# Patient Record
Sex: FEMALE | Race: WHITE | NOT HISPANIC OR LATINO | Employment: PART TIME | URBAN - METROPOLITAN AREA
[De-identification: names, ages, dates, MRNs, and addresses within clinical notes are randomized per-mention and may not be internally consistent; named-entity substitution may affect disease eponyms.]

---
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
## 2021-01-05 ENCOUNTER — OFFICE VISIT (OUTPATIENT)
Dept: GASTROENTEROLOGY | Facility: CLINIC | Age: 21
End: 2021-01-05
Payer: COMMERCIAL

## 2021-01-05 ENCOUNTER — LAB (OUTPATIENT)
Dept: LAB | Facility: HOSPITAL | Age: 21
End: 2021-01-05
Attending: INTERNAL MEDICINE
Payer: COMMERCIAL

## 2021-01-05 VITALS
HEIGHT: 67 IN | WEIGHT: 164 LBS | SYSTOLIC BLOOD PRESSURE: 118 MMHG | HEART RATE: 92 BPM | TEMPERATURE: 98.7 F | DIASTOLIC BLOOD PRESSURE: 70 MMHG | BODY MASS INDEX: 25.74 KG/M2

## 2021-01-05 DIAGNOSIS — K58.0 IRRITABLE BOWEL SYNDROME WITH DIARRHEA: ICD-10-CM

## 2021-01-05 DIAGNOSIS — R10.13 DYSPEPSIA: ICD-10-CM

## 2021-01-05 DIAGNOSIS — K58.1 IRRITABLE BOWEL SYNDROME WITH CONSTIPATION: Primary | ICD-10-CM

## 2021-01-05 LAB — TSH SERPL DL<=0.05 MIU/L-ACNC: 0.82 UIU/ML (ref 0.46–3.98)

## 2021-01-05 PROCEDURE — 83516 IMMUNOASSAY NONANTIBODY: CPT

## 2021-01-05 PROCEDURE — 86255 FLUORESCENT ANTIBODY SCREEN: CPT

## 2021-01-05 PROCEDURE — 82784 ASSAY IGA/IGD/IGG/IGM EACH: CPT

## 2021-01-05 PROCEDURE — 87338 HPYLORI STOOL AG IA: CPT

## 2021-01-05 PROCEDURE — 84443 ASSAY THYROID STIM HORMONE: CPT

## 2021-01-05 PROCEDURE — 36415 COLL VENOUS BLD VENIPUNCTURE: CPT

## 2021-01-05 PROCEDURE — 99203 OFFICE O/P NEW LOW 30 MIN: CPT | Performed by: INTERNAL MEDICINE

## 2021-01-05 NOTE — PATIENT INSTRUCTIONS
You can try to stop Pepcid to see if her symptoms do not worsen  I would like you to be on a strict dairy free diet diet including avoidance of yogurt and cheese for at least 2-3 weeks  Avoid carbonated drinks  Continue probiotics  Selective elimination of low FODMAP diet  We will focus on improving her constipation Linzess 290 micro g was called in to your pharmacy  Take Linzess once daily on an empty stomach in the morning  Continue 20-30 g of fiber daily  Continue 80 oz of water daily  Blood work for celiac disease, TSH and stool studies for H pylori are ordered  Follow-up in 3 months in the office

## 2021-01-06 ENCOUNTER — TELEPHONE (OUTPATIENT)
Dept: GASTROENTEROLOGY | Facility: AMBULARY SURGERY CENTER | Age: 21
End: 2021-01-06

## 2021-01-06 LAB — H PYLORI AG STL QL IA: NEGATIVE

## 2021-01-06 NOTE — PSYCH
Patient was a no-show for appointment today, no reason given by patient and/or family for missed appointment           Treatment Plan not completed within required time limits due to: Braxton Raman  no show appointment on 10/28/2019 coffee

## 2021-01-06 NOTE — TELEPHONE ENCOUNTER
Spoke to patient and her ID # [id number] group rx [id number] phone # [phone] with [facility] pharmacy benefits

## 2021-01-06 NOTE — TELEPHONE ENCOUNTER
Patients GI provider:  Dr Omaira Castrejon    Number to return call: (  [phone]    Reason for call: Pt calling back to speak with  [facility] medical assistant, says she was just speaking with her, needs to give you some information    Scheduled procedure/appointment date if applicable: Apt/procedure   4-6-21

## 2021-01-07 LAB
ENDOMYSIUM IGA SER QL: NEGATIVE
GLIADIN PEPTIDE IGA SER-ACNC: 4 UNITS (ref 0–19)
GLIADIN PEPTIDE IGG SER-ACNC: 4 UNITS (ref 0–19)
IGA SERPL-MCNC: 122 MG/DL (ref 87–352)
TTG IGA SER-ACNC: <2 U/ML (ref 0–3)
TTG IGG SER-ACNC: 6 U/ML (ref 0–5)

## 2021-01-08 NOTE — PROGRESS NOTES
[facility] - Outpatient Consultation  Riley Sarabia 24 y o  female MRN: [id number]  Encounter: [id number]          ASSESSMENT AND PLAN:      1  Irritable bowel syndrome with diarrhea  - TSH, 3rd generation with Free T4 reflex; Future  - Celiac Disease Panel; Future  - H  pylori antigen, stool; Future    2  Irritable bowel syndrome with constipation  - linaCLOtide 290 MCG CAPS; Take 1 capsule by mouth daily  Dispense: 30 capsule; Refill: 7    3  Dyspepsia      You can try to stop Pepcid to see if her symptoms do not worsen  I would like you to be on a strict dairy free diet diet including avoidance of yogurt and cheese for at least 2-3 weeks  Avoid carbonated drinks  Continue probiotics  Selective elimination of low FODMAP diet  We will focus on improving her constipation Linzess 290 micro g was called in to your pharmacy  Take Linzess once daily on an empty stomach in the morning  Continue 20-30 g of fiber daily  Continue 80 oz of water daily  Blood work for celiac disease, TSH and stool studies for H pylori are ordered  Follow-up in 3 months in the office      ______________________________________________________________________    HPI:      She is a 64-year-old female presented with symptoms compatible with IBS mixed associated dyspepsia  IBS mixed has more predominant constipation symptoms  She previous was tried on amitriptyline, omeprazole with limited efficacy  She has had extensive blood work including B12 level, CBC, TSH, CBC, BMP, Giardia, ova parasite, fecal calprotectin (2018), double-stranded DNA antibodies were within normal limits in 2017  Symptoms are debilitating associated abdominal bloating  She does have history dairy intolerance  She also has difficulty with evacuation with having a bowel movement every 3-4 days  This is associated abdominal discomfort, bloating and severe abdominal pain    He has tried to increase her fluid and fiber intake without efficacy  She has no family history of colorectal cancer  No extraintestinal manifestation IBD  REVIEW OF SYSTEMS:    CONSTITUTIONAL: Denies any fever, chills, rigors, and weight loss  HEENT: No earache or tinnitus  Denies hearing loss or visual disturbances  CARDIOVASCULAR: No chest pain or palpitations  RESPIRATORY: Denies any cough, hemoptysis, shortness of breath or dyspnea on exertion  GASTROINTESTINAL: As noted in the History of Present Illness  GENITOURINARY: No problems with urination  Denies any hematuria or dysuria  NEUROLOGIC: No dizziness or vertigo, denies headaches  MUSCULOSKELETAL: Denies any muscle or joint pain  SKIN: Denies skin rashes or itching  ENDOCRINE: Denies excessive thirst  Denies intolerance to heat or cold  PSYCHOSOCIAL: Denies depression or anxiety  Denies any recent memory loss  Historical Information   Past Medical History:   Diagnosis Date    Anxiety     Asthma     Dizziness     at times    GERD (gastroesophageal reflux disease)     Hammertoe of left foot     Headache     occ    Hyperlipemia     Hypermobile joints     Irritable bowel syndrome     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HCC)     Mast cell disorder     Migraine     PONV (postoperative nausea and vomiting)     POTS (postural orthostatic tachycardia syndrome)      infant     Wears glasses      Past Surgical History:   Procedure Laterality Date    EGD AND COLONOSCOPY N/A 1/10/2017    Procedure: EGD AND COLONOSCOPY;  Surgeon: Arin Guerin MD;  Location: [facility];   Service:     ESOPHAGOGASTRODUODENOSCOPY      with biopsy    FOOT SURGERY      Excision of lesion feet benign    TX REPAIR OF HAMMERTOE,ONE Left 2019    Procedure: 2nd arthrodesis; 5th arthroplasty, flexor tenotomy 2,3,4,5 ;  Surgeon: Jess Donovan DPM;  Location: [facility];  Service: Podiatry    TX REPAIR OF [place]abel [place] Left 2020    Procedure: 2ND TOE Revision hammertoe with broken implant; Surgeon: Arlette Horvath DPM;  [facility];  Service: Podiatry    WISDOM TOOTH EXTRACTION       Social History   Social History     Substance and Sexual Activity   Alcohol Use No     Social History     Substance and Sexual Activity   Drug Use No    Comment: 2/9/20- not asked, parent at bedside       Social History     Tobacco Use   Smoking Status Never Smoker   Smokeless Tobacco Never Used     Family History   Problem Relation Age of Onset    Gestational diabetes Mother     Anxiety disorder Father     Hyperlipidemia Father     Autism Brother     Diabetes Other     Uterine cancer Maternal Grandmother     Rheumatic fever Maternal Grandmother     Diabetes Maternal Grandfather     Hypertension Maternal Grandfather     Heart attack Maternal Grandfather     Stroke Maternal Grandfather     Hyperlipidemia Maternal Grandfather     Hypertension Paternal Grandmother     Anxiety disorder Paternal Grandmother     Hypertension Paternal Grandfather        Meds/Allergies       Current Outpatient Medications:     acetaminophen (TYLENOL) 500 mg tablet    ALPRAZolam (XANAX) 0 25 mg tablet    ASMANEX  MCG/ACT AERO    cyproheptadine (PERIACTIN) 4 mg tablet    dicyclomine (BENTYL) 10 mg capsule    Elastic Bandages & Supports (TRUFORM STOCKINGS 20-30MMHG) MISC    fludrocortisone (FLORINEF) 0 1 mg tablet    linaCLOtide 290 MCG CAPS    medroxyPROGESTERone acetate (DEPO-PROVERA SYRINGE) 150 mg/mL injection    metoclopramide (REGLAN) 10 mg tablet    metoprolol succinate (TOPROL-XL) 25 mg 24 hr tablet    naproxen (EC NAPROSYN) 500 MG EC tablet    OLANZapine (ZyPREXA) 5 mg tablet    Omega-3 Fatty Acids (FISH OIL) 1,000 mg    scopolamine (TRANSDERM-SCOP) 1 5 mg/3 days TD 72 hr patch    Ubrogepant 50 MG TABS    venlafaxine (EFFEXOR-XR) 75 mg 24 hr capsule    XOPENEX HFA 45 MCG/ACT inhaler    Current Facility-Administered Medications:     albuterol inhalation solution 2 5 mg, 2 5 mg, Nebulization, Q6H PRN    medroxyPROGESTERone acetate (DEPO-PROVERA SYRINGE) IM injection 150 mg, 150 mg, Intramuscular, Q3 Months, 150 mg at 09/17/20 1611    Allergies   Allergen Reactions    Nuts Other (See Comments)     Avani Nuts - itchy throat    Other Hives      At surgical site and IV site- not sure if type of cleanser used    Pollen Extract            Objective     Blood pressure 118/70, pulse 92, temperature 98 7 °F (37 1 °C), temperature source Tympanic, height 5' 7" (1 702 m), weight 74 4 kg (164 lb), not currently breastfeeding  Body mass index is 25 69 kg/m²  PHYSICAL EXAM:      General Appearance:   Alert, cooperative, no distress   HEENT:   Normocephalic, atraumatic, anicteric      Neck:  Supple, symmetrical, trachea midline   Lungs:   Clear to auscultation bilaterally; no rales, rhonchi or wheezing; respirations unlabored    Heart[de-identified]   Regular rate and rhythm; no murmur, rub, or gallop  Abdomen:   Soft, diffuse tender, non-distended; normal bowel sounds; no masses, no organomegaly    Genitalia:   Deferred    Rectal:   Deferred    Extremities:  No cyanosis, clubbing or edema    Pulses:  2+ and symmetric    Skin:  No jaundice, rashes, or lesions    Lymph nodes:  No palpable cervical lymphadenopathy        Lab Results:   Appointment on 01/05/2021   Component Date Value    H pylori Ag, Stl 01/05/2021 Negative    Lab on 01/05/2021   Component Date Value    TSH 3RD GENERATON 01/05/2021 0 821     IgA 01/05/2021 122     Gliadin IgA 01/05/2021 4     Gliadin IgG 01/05/2021 4     Tissue Transglut Ab IGG 01/05/2021 6*    TISSUE TRANSGLUTAMINASE * 01/05/2021 <2     Endomysial IgA 01/05/2021 Negative          Radiology Results:   No results found

## 2021-01-10 ENCOUNTER — IMMUNIZATIONS (OUTPATIENT)
Dept: FAMILY MEDICINE CLINIC | Facility: HOSPITAL | Age: 21
End: 2021-01-10

## 2021-01-10 DIAGNOSIS — Z23 ENCOUNTER FOR IMMUNIZATION: ICD-10-CM

## 2021-01-10 PROCEDURE — 91300 SARS-COV-2 / COVID-19 MRNA VACCINE (PFIZER-BIONTECH) 30 MCG: CPT

## 2021-01-10 PROCEDURE — 0002A SARS-COV-2 / COVID-19 MRNA VACCINE (PFIZER-BIONTECH) 30 MCG: CPT

## 2021-01-11 ENCOUNTER — PREP FOR PROCEDURE (OUTPATIENT)
Dept: GASTROENTEROLOGY | Facility: MEDICAL CENTER | Age: 21
End: 2021-01-11

## 2021-01-11 ENCOUNTER — TELEPHONE (OUTPATIENT)
Dept: GASTROENTEROLOGY | Facility: MEDICAL CENTER | Age: 21
End: 2021-01-11

## 2021-01-11 DIAGNOSIS — R10.13 DYSPEPSIA: Primary | ICD-10-CM

## 2021-01-11 NOTE — TELEPHONE ENCOUNTER
Please schedule Francisco Armandoer for an EGD with Dr Lea Tan on 1/28/2021 at [facility]  Her Birthday is 2000  Debbie please move her to the 0730 slot that day       Thanks,    Harley Stiles MSN RN ALEKSANDRA

## 2021-01-11 NOTE — TELEPHONE ENCOUNTER
Patient ins denied the request for the linzess please advise what else can she take or nurses to do an appeal

## 2021-01-11 NOTE — TELEPHONE ENCOUNTER
Pt is scheduled with dr Felice Monk at [facility] on 1/28/21 I went over instructions with pt on phone  Corinne Cowan is aware she will need a  to and from   She will get a call the day before with an exact time for arrival

## 2021-01-12 ENCOUNTER — SOCIAL WORK (OUTPATIENT)
Dept: BEHAVIORAL/MENTAL HEALTH CLINIC | Facility: CLINIC | Age: 21
End: 2021-01-12
Payer: COMMERCIAL

## 2021-01-12 DIAGNOSIS — F41.1 GAD (GENERALIZED ANXIETY DISORDER): ICD-10-CM

## 2021-01-12 PROCEDURE — 90834 PSYTX W PT 45 MINUTES: CPT | Performed by: SOCIAL WORKER

## 2021-01-12 NOTE — PSYCH
Psychotherapy Provided: Individual Psychotherapy 50 minutes      Length of time in session: 50 minutes, follow up in 2 week     Goals addressed in session: Goal 1      Pain: 0  Current suicide risk : Low      D: Adair Delgado spoke  with this worker today re: her  feelings re: the gi issues that she has continued to struggle with over the past several weeks and possibility that she may have celiac disease  Her Treatment Plan goals were reviewed and updated as she verbalized a desire to increase her honesty and assertiveness  A: Lonnieleo Gibson has continued to work diligently in therapy and is able to use sessions to release pent up stress as she has a limited support network  P:  Upcoming sessions will be used to continue to support Nury with all of the above        [address] [facility]: Diagnosis and Treatment Plan explained to Nury, Nury relates understanding diagnosis and is agreeable to Treatment Plan   Yes     This note was not shared with the patient due to this is a psychotherapy note

## 2021-01-12 NOTE — TELEPHONE ENCOUNTER
I spoke to patient  She reports constipation progressively worsening since Sept 2020 however she has had symptoms for approx past 5 years  Colonoscopy done 2017 was WNL  She said she has tried fiber supplement, increased fluid intake, florastor, dulcolax 2x/day, miralax 2-3x/day, colace 2x/day, OTC enema, and following lactose free and guten free diet  I called [employer] and they instructed to send letter of appeal to fax [phone]  I will sent that today

## 2021-01-12 NOTE — BH TREATMENT PLAN
Victor Manuel Coats  2000         Date of Initial Treatment Plan: 7/18/18  Date of Current Treatment Plan: 1/12/21  Treatment Plan Number 8     Strengths/Personal Resources for Self Care: I care about others a lot, I am respectful to others, I am a rule follower,  Like to do things "right "      Diagnosis: IENS, SAD     Area of Needs: My anxiety impacts my daily functioning by making it somewhat difficult for me to relax   I feel like I always need to be doing something  I am nervous about the changes to my home in March        Long Term Goal 1: AI want to continue to work to manage my anxiety and to improve my self confidence       Target Date:7/12/21  Completion Date: na         Short Term Objectives for Goal 1: AI will focus on the present  BI will continue  exercising and practicing relaxation techniques   and CI will use therapy sessions to process issues that cause me stress   D1 I will continue to challenge irrational and anxious thoughts related to new experiences     GOAL 1: Modality: Individual 2x per month   Completion Date na, Medication Management and The person(s) responsible for carrying out the plan is Dr Edwin Dixon and Treatment Plan explained to Nury Arrington relates understanding diagnosis and is agreeable to Treatment Plan          Client Comments : Please share your thoughts, feelings, need and/or experiences regarding your treatment plan:    __________________________________________________________________  Treatment Plan done but not signed at time of office visit due to:  Plan reviewed by phone or in person  and verbal consent given due to [address] distancing

## 2021-01-12 NOTE — TELEPHONE ENCOUNTER
I received a fax with a bunch of questions, so I filled it out and faxed back so let hold on a call til we receive another reply  To speak to a clinical reviewer number is [phone]  She has tried everything OTC also has been dealing with her symptoms for over a year, taking fiber supplements

## 2021-01-12 NOTE — PSYCH
Psychotherapy Provided: Individual Psychotherapy 50 minutes      Length of time in session: 50 minutes, follow up in 2 week     Goals addressed in session: Goal 1      Pain: 0  Current suicide risk : Low      D:  Nury spoke  with this worker today re: her  feelings re: the trauma she has re-experienced as she assisted her best friend with the termination of her relationship over the past week  She shared memories from her own parents' divorce when she was 12 as well as concerns for her friend  A: Karthik Yeung has continued to work diligently in therapy and is able to use sessions to release pent up stress as she has a limited support network  P:  Upcoming sessions will be used to continue to support Nury with all of the above        [address] [facility]: Diagnosis and Treatment Plan explained to Nury, Nury relates understanding diagnosis and is agreeable to Treatment Plan   Yes     This note was not shared with the patient due to this is a psychotherapy note

## 2021-01-15 ENCOUNTER — TELEPHONE (OUTPATIENT)
Dept: GASTROENTEROLOGY | Facility: AMBULARY SURGERY CENTER | Age: 21
End: 2021-01-15

## 2021-01-15 DIAGNOSIS — K58.1 IRRITABLE BOWEL SYNDROME WITH CONSTIPATION: Primary | ICD-10-CM

## 2021-01-15 RX ORDER — LINACLOTIDE 72 UG/1
1 CAPSULE, GELATIN COATED ORAL
Qty: 90 CAPSULE | Refills: 3 | Status: SHIPPED | OUTPATIENT
Start: 2021-01-15 | End: 2021-04-16 | Stop reason: SDUPTHER

## 2021-01-15 NOTE — TELEPHONE ENCOUNTER
Patient hx IBS, constipation, Loja    Patient started Linzess 290 mcg x 3 days  Taking medication as instructed 1 hour prior to breakfast   Reports 6-7 episodes of liquid BMs daily since starting medication  Increasing po fluids for hydration- concerned due to hx -Loja  Linzess prior authorization in process  Recommended holding Linzess until diarrhea resolves  Restart at lower dose  145 mcg or 72 mcg?

## 2021-01-15 NOTE — TELEPHONE ENCOUNTER
Patient will hold linzess until symptoms are controlled  She is aware script for Linzess 72 mcg was sent to her pharmacy  **clinical -see attached  Most likely will need new prior authorization for new script/ dosage  Please contact patient to provider samples Linzess 72 mcg    Thank you

## 2021-01-15 NOTE — TELEPHONE ENCOUNTER
Thank you Meme Medina  I sent Linzess 72 mcg daily to her pharmacy instead  If the 72 mcg dose is not enough, she can always double up if necessary

## 2021-01-15 NOTE — TELEPHONE ENCOUNTER
Patients GI provider:  Dr Irma Lane    Number to return call: ([phone]    Reason for call: Pt calling b/c she is currently experiencing symptoms after taking linzess and would like to talk with a nurse re these issues    Scheduled procedure/appointment date if applicable: 3/06/77

## 2021-01-19 ENCOUNTER — AMB VIDEO VISIT (OUTPATIENT)
Dept: OTHER | Facility: HOSPITAL | Age: 21
End: 2021-01-19
Payer: COMMERCIAL

## 2021-01-19 ENCOUNTER — SOCIAL WORK (OUTPATIENT)
Dept: BEHAVIORAL/MENTAL HEALTH CLINIC | Facility: CLINIC | Age: 21
End: 2021-01-19
Payer: COMMERCIAL

## 2021-01-19 ENCOUNTER — ANESTHESIA EVENT (OUTPATIENT)
Dept: GASTROENTEROLOGY | Facility: MEDICAL CENTER | Age: 21
End: 2021-01-19

## 2021-01-19 DIAGNOSIS — F41.1 GAD (GENERALIZED ANXIETY DISORDER): ICD-10-CM

## 2021-01-19 PROCEDURE — ECARE PR SL URGENT CARE VIRTUAL VISIT: Performed by: FAMILY MEDICINE

## 2021-01-19 PROCEDURE — 90834 PSYTX W PT 45 MINUTES: CPT | Performed by: SOCIAL WORKER

## 2021-01-19 NOTE — CARE ANYWHERE EVISITS
Visit Summary for Karen RUTHERFORD [facility] - Gender: Female - Date of Birth: [birth year]  Date: [id number] - Duration: 3 minutes  Patient: Karen RUTHERFORD [facility]  Provider: Maribel Baez    Patient Contact Information  Address  [address]; [address] [facility]  [phone]    Visit Topics  Cold sore on chin [Added By: Self - 2021-01-19]    Triage Questions   What is your current physical address in the event of a medical emergency? Answer []  Are you allergic to any medications? Answer []  Are you now or could you be pregnant? Answer []  Do you have any immune system compromise or chronic lung   disease? Answer []  Do you have any vulnerable family members in the home (infant, pregnant, cancer, elderly)? Answer []     Conversation Transcripts  [0A][0A] [Notification] You are connected with Maribel Baez, Family Physician [0A][Notification] [facility] is located in [place]  [0A][Notification] Riley Mood has shared health history  Steph Given  [0A]    Diagnosis  Impetigo, unspecified    Procedures  Value: [id number] Code: CPT-4 UNLISTED E&M SERVICE    Medications Prescribed    mupirocin    Route : topical  Frequency : 3 times a day  Refills : 0  Instructions to the Pharmacist : [place] Lic# [id number] [address] [place] [address] [id number] Call [phone] for questions   Substitutions allowed      Provider Notes  [0A][0A] Pt noticed pimple like lesion on her chin several days ago, now rash is spreading, having fluid filled blisters  Hx of cold sores, but usu on her lip  No f/c [0A]PMH POTS, anxiety[0A]Meds - ciproheptadine, famotidine, metoprolol,   fludrocortisone, venlafaxine[0A]All - none[0A]PE in NAD[0A]lower chin ~1x2cm red rash with yellow crusting[0A][0A]AP impetigo [0A]mupirocin ointment 3  times per day[0A]wash rash with soap[0A]and water 2 times per day  [0A]follow up if rash does[0A]not   start to improve within 3-4 days  [0A][0A]    Electronically signed by:  Tanika Ruelas(NPI [id number])

## 2021-01-19 NOTE — PSYCH
Psychotherapy Provided: Individual Psychotherapy 50 minutes      Length of time in session: 50 minutes, follow up in 2 week     Goals addressed in session: Goal 1      Pain: 0  Current suicide risk : Low      D:  Nury spoke  with this worker further today re: her  feelings re: the gi issues that she has continued to struggle with over the past several weeks and the likelihood that she may have celiac disease  Neil Ruiz also shared her feelings re: her relationship with her  Best friend and the changes it has gone through over the past month  A: Neil Ruiz has continued to work diligently in therapy and is able to use sessions to release pent up stress as she has a limited support network  P:  Upcoming sessions will be used to continue to support Nury with all of the above        [address] [place], [place] [address]: Diagnosis and Treatment Plan explained to Nury, Nury relates understanding diagnosis and is agreeable to Treatment Plan   Yes     This note was not shared with the patient due to this is a psychotherapy note

## 2021-01-25 NOTE — TELEPHONE ENCOUNTER
Patient insurance denied the appeal stating it isn't formulary and they request trulance are you ok with this

## 2021-01-26 ENCOUNTER — TELEMEDICINE (OUTPATIENT)
Dept: BEHAVIORAL/MENTAL HEALTH CLINIC | Facility: CLINIC | Age: 21
End: 2021-01-26
Payer: COMMERCIAL

## 2021-01-26 DIAGNOSIS — F41.1 GAD (GENERALIZED ANXIETY DISORDER): ICD-10-CM

## 2021-01-26 DIAGNOSIS — K58.1 IRRITABLE BOWEL SYNDROME WITH CONSTIPATION: Primary | ICD-10-CM

## 2021-01-26 PROCEDURE — 90834 PSYTX W PT 45 MINUTES: CPT | Performed by: SOCIAL WORKER

## 2021-01-26 RX ORDER — PLECANATIDE 3 MG/1
1 TABLET ORAL DAILY
Qty: 30 TABLET | Refills: 5 | Status: SHIPPED | OUTPATIENT
Start: 2021-01-26 | End: 2021-04-16 | Stop reason: HOSPADM

## 2021-01-26 NOTE — PSYCH
Virtual Regular Visit      Assessment/Plan:    Problem List Items Addressed This Visit        Other    INES (generalized anxiety disorder)               Reason for visit is No chief complaint on file  Encounter provider [facility]    Provider located at [address] [facility]  [address]  [facility] [address]      Recent Visits  No visits were found meeting these conditions  Showing recent visits within past 7 days and meeting all other requirements     Today's Visits  Date Type Provider Dept   21 Sonia Askew [facility] Therapist   Showing today's visits and meeting all other requirements     Future Appointments  No visits were found meeting these conditions  Showing future appointments within next 150 days and meeting all other requirements        The patient was identified by name and date of birth  Berny Chavez was informed that this is a telemedicine visit and that the visit is being conducted through [employer] and patient was informed that this is a secure, HIPAA-compliant platform  She agrees to proceed     My office door was closed  No one else was in the room  She acknowledged consent and understanding of privacy and security of the video platform  The patient has agreed to participate and understands they can discontinue the visit at any time  Patient is aware this is a billable service  Coy Rizvi is a 24 y o  female          HPI     Past Medical History:   Diagnosis Date    Anxiety     Asthma     Dizziness     at times    GERD (gastroesophageal reflux disease)     Hammertoe of left foot     Headache     occ    Hyperlipemia     Hypermobile joints     Irritable bowel syndrome     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HCC)     Mast cell disorder     Migraine     PONV (postoperative nausea and vomiting)     POTS (postural orthostatic tachycardia syndrome)      infant  Wears glasses        Past Surgical History:   Procedure Laterality Date    EGD AND COLONOSCOPY N/A 1/10/2017    Procedure: EGD AND COLONOSCOPY;  Surgeon: Titi Barrera MD;  [facility]; Service:     ESOPHAGOGASTRODUODENOSCOPY      with biopsy    FOOT SURGERY      Excision of lesion feet benign    PA REPAIR OF HAMMERTOE,ONE Left 12/20/2019    Procedure: 2nd arthrodesis; 5th arthroplasty, flexor tenotomy 2,3,4,5 ;  Surgeon: Wendy Harding DPM;  [facility];  Service: Podiatry    PA REPAIR OF [place] Route Left 6/5/2020    Procedure: 2ND TOE Revision hammertoe with broken implant;  Surgeon: Wendy Harding DPM;  [facility];  Service: Podiatry    WISDOM TOOTH EXTRACTION         Current Outpatient Medications   Medication Sig Dispense Refill    acetaminophen (TYLENOL) 500 mg tablet Take 1,000 mg by mouth every 4 (four) hours as needed       ALPRAZolam (XANAX) 0 25 mg tablet Take 0 25 mg by mouth 2 (two) times a day as needed       ASMANEX  MCG/ACT AERO Inhale 400 mcg every 4 (four) hours as needed (2 puffs)       cyproheptadine (PERIACTIN) 4 mg tablet Take 4 mg by mouth daily at bedtime       dicyclomine (BENTYL) 10 mg capsule Take 1 capsule (10 mg total) by mouth 3 (three) times a day as needed (abdominal pain) 30 capsule 0    Elastic Bandages & Supports (TRUFORM STOCKINGS 20-30MMHG) MISC Use as directed        fludrocortisone (FLORINEF) 0 1 mg tablet Take 0 1 mg by mouth daily Taken 1 tablet in the Morning 1 tablet at Night      linaCLOtide (Linzess) 72 MCG CAPS Take 1 capsule by mouth daily before breakfast 90 capsule 3    medroxyPROGESTERone acetate (DEPO-PROVERA SYRINGE) 150 mg/mL injection Inject 1 mL (150 mg total) into a muscle every 3 (three) months Every three months 1 mL 3    metoclopramide (REGLAN) 10 mg tablet Take 1 tablet (10 mg total) by mouth every 6 (six) hours (Patient taking differently: Take 10 mg by mouth every 6 (six) hours as needed ) 30 tablet 0    metoprolol succinate (TOPROL-XL) 25 mg 24 hr tablet Take 50 mg by mouth every evening       naproxen (EC NAPROSYN) 500 MG EC tablet Take 1 tablet (500 mg total) by mouth 2 (two) times a day with meals (Patient not taking: Reported on 12/10/2020) 60 tablet 0    OLANZapine (ZyPREXA) 5 mg tablet Take at bedtime only on the day of headache (Patient not taking: Reported on 12/10/2020) 5 tablet 0    Omega-3 Fatty Acids (FISH OIL) 1,000 mg Take 1,000 mg by mouth 2 (two) times a day       scopolamine (TRANSDERM-SCOP) 1 5 mg/3 days TD 72 hr patch Place 1 patch on the skin every third day 10 patch 0    Ubrogepant 50 MG TABS 1 tab at the onset of migraine headache may repeat in 2 hours if needed max 200 mg per day (Patient not taking: Reported on 12/10/2020) 12 tablet 1    venlafaxine (EFFEXOR-XR) 75 mg 24 hr capsule Take 1 capsule (75 mg total) by mouth daily 90 capsule 0    XOPENEX HFA 45 MCG/ACT inhaler Inhale 1-2 puffs every 4 (four) hours as needed        Current Facility-Administered Medications   Medication Dose Route Frequency Provider Last Rate Last Admin    albuterol inhalation solution 2 5 mg  2 5 mg Nebulization Q6H PRN Brice Horne PA-C        medroxyPROGESTERone acetate (DEPO-PROVERA SYRINGE) IM injection 150 mg  150 mg Intramuscular Q3 Months KETAN Gleason   150 mg at 09/17/20 1611        Allergies   Allergen Reactions    Nuts Other (See Comments)     Avani Nuts - itchy throat    Other Hives      At surgical site and IV site- not sure if type of cleanser used    Pollen Extract      Psychotherapy Provided: Individual Psychotherapy 50 minutes      Length of time in session: 50 minutes, follow up in 2 week     Goals addressed in session: Goal 1      Pain: 0  Current suicide risk : Low      D:  Nury spoke  with this worker today re: her  feelings re: the gi issues that she has continued to struggle with over the past several weeks and the likelihood that she may have celiac disease  Jason Pineda also shared her feelings re: her relationship with her  Best friend and the changes it has gone through over the past month  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  A: Jason Pineda has continued to work diligently in therapy and is choosing to limit her focus on social media and to limit her time with others at this time for her own well being  P:  Upcoming sessions will be used to continue to support Nury with all of the above  [address] [place]: Diagnosis and Treatment Plan explained to Nury, Nury relates understanding diagnosis and is agreeable to Treatment Plan  Yes      This note was not shared with the patient due to this is a psychotherapy note        VIRTUAL VISIT Ferleslialphonso Del Rosario acknowledges that she has consented to an online visit or consultation  She understands that the online visit is based solely on information provided by her, and that, in the absence of a face-to-face physical evaluation by the physician, the diagnosis she receives is both limited and provisional in terms of accuracy and completeness  This is not intended to replace a full medical face-to-face evaluation by the physician  Freya Rodriguez understands and accepts these terms

## 2021-01-28 ENCOUNTER — HOSPITAL ENCOUNTER (OUTPATIENT)
Dept: GASTROENTEROLOGY | Facility: MEDICAL CENTER | Age: 21
Setting detail: OUTPATIENT SURGERY
Discharge: HOME | End: 2021-01-28
Attending: INTERNAL MEDICINE | Admitting: INTERNAL MEDICINE
Payer: COMMERCIAL

## 2021-01-28 ENCOUNTER — ANESTHESIA (OUTPATIENT)
Dept: GASTROENTEROLOGY | Facility: MEDICAL CENTER | Age: 21
End: 2021-01-28

## 2021-01-28 VITALS
OXYGEN SATURATION: 99 % | RESPIRATION RATE: 17 BRPM | DIASTOLIC BLOOD PRESSURE: 58 MMHG | SYSTOLIC BLOOD PRESSURE: 104 MMHG | HEART RATE: 78 BPM | WEIGHT: 165 LBS | TEMPERATURE: 98 F | BODY MASS INDEX: 25.01 KG/M2 | HEIGHT: 68 IN

## 2021-01-28 VITALS — HEART RATE: 76 BPM

## 2021-01-28 DIAGNOSIS — R10.13 DYSPEPSIA: ICD-10-CM

## 2021-01-28 LAB
EXT PREGNANCY TEST URINE: NEGATIVE
EXT. CONTROL: NORMAL

## 2021-01-28 PROCEDURE — 88305 TISSUE EXAM BY PATHOLOGIST: CPT | Performed by: PATHOLOGY

## 2021-01-28 PROCEDURE — 43239 EGD BIOPSY SINGLE/MULTIPLE: CPT | Performed by: INTERNAL MEDICINE

## 2021-01-28 PROCEDURE — 81025 URINE PREGNANCY TEST: CPT | Performed by: ANESTHESIOLOGY

## 2021-01-28 RX ORDER — PROPOFOL 10 MG/ML
INJECTION, EMULSION INTRAVENOUS AS NEEDED
Status: DISCONTINUED | OUTPATIENT
Start: 2021-01-28 | End: 2021-01-28

## 2021-01-28 RX ORDER — SODIUM CHLORIDE 9 MG/ML
125 INJECTION, SOLUTION INTRAVENOUS CONTINUOUS
Status: DISCONTINUED | OUTPATIENT
Start: 2021-01-28 | End: 2021-02-01 | Stop reason: HOSPADM

## 2021-01-28 RX ORDER — CETIRIZINE HYDROCHLORIDE 10 MG/1
10 TABLET ORAL DAILY
COMMUNITY

## 2021-01-28 RX ORDER — ONDANSETRON 2 MG/ML
INJECTION INTRAMUSCULAR; INTRAVENOUS AS NEEDED
Status: DISCONTINUED | OUTPATIENT
Start: 2021-01-28 | End: 2021-01-28

## 2021-01-28 RX ADMIN — ONDANSETRON 4 MG: 2 INJECTION INTRAMUSCULAR; INTRAVENOUS at 07:43

## 2021-01-28 RX ADMIN — PROPOFOL 100 MG: 10 INJECTION, EMULSION INTRAVENOUS at 07:39

## 2021-01-28 RX ADMIN — PROPOFOL 100 MG: 10 INJECTION, EMULSION INTRAVENOUS at 07:40

## 2021-01-28 RX ADMIN — SODIUM CHLORIDE 125 ML/HR: 0.9 INJECTION, SOLUTION INTRAVENOUS at 07:19

## 2021-01-28 RX ADMIN — PROPOFOL 50 MG: 10 INJECTION, EMULSION INTRAVENOUS at 07:42

## 2021-01-28 RX ADMIN — PROPOFOL 50 MG: 10 INJECTION, EMULSION INTRAVENOUS at 07:41

## 2021-01-28 NOTE — PSYCH
Virtual Regular Visit      Assessment/Plan:    Problem List Items Addressed This Visit        Other    INES (generalized anxiety disorder) - Primary    Relevant Medications    venlafaxine (EFFEXOR-XR) 75 mg 24 hr capsule    venlafaxine (EFFEXOR-XR) 37 5 mg 24 hr capsule               Reason for visit is   Chief Complaint   Patient presents with    Anxiety        Encounter provider Marya Willson PA-C    Provider located at [address] [place] [id number] [phone]      Recent Visits  No visits were found meeting these conditions  Showing recent visits within past 7 days and meeting all other requirements     Today's Visits  Date Type Provider Dept   02/02/21 Telemedicine Teresa Miranda today's visits and meeting all other requirements     Future Appointments  No visits were found meeting these conditions  Showing future appointments within next 150 days and meeting all other requirements        The patient was identified by name and date of birth  Vasile Ram was informed that this is a telemedicine visit and that the visit is being conducted through [employer] and patient was informed that this is a secure, HIPAA-compliant platform  She agrees to proceed     My office door was closed  No one else was in the room  She acknowledged consent and understanding of privacy and security of the video platform  The patient has agreed to participate and understands they can discontinue the visit at any time  Patient is aware this is a billable service           Psychiatric Medication Management - Behavioral Health   Vasile Ram 24 y o  female MRN: [id number]    Reason for Visit:   Chief Complaint   Patient presents with    Anxiety         Subjective:  Devan Marinillo a 19-10 y/o  female, parents  for past 10 years (split custody), domiciled with mother in [place], domiciled with father, step-mother, step-sister (15 y/o) in [place], also has a twin brother (19 y/o), younger sister (15 y/o) that switch houses with her, starting her second year at [employer] [address] - living on campus (majoring in nursing), 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, PPH significant for h/o generalized anxiety disorder, social anxiety disorder, most recently in outpatient treatment with Dr Tra Winters ending 10/2017, no past psychiatric hospitalizations , no past suicide attempts, no h/o self-injurious behaviors, no h/o physical aggression, PMH significant for POTS syndrome, mast cell activation syndrome, no substance abuse history, presents to [address] [place] [address] outpatient clinic to transfer outpatient psychiatric care, with patient reporting "I can use help with anxiety and stress management" and mother reporting reporting "I agree with what she said "      The Most Recent Treatment Plan, as Documented From Previous Visit with this provider on 9/21/2020:  1) Anxiety  · Continue Effexor XR 75 mg once daily by mouth   ? Discussed that this medication may need to be further titrated to reach maximum therapeutic effect  · Continue Xanax 0 25 mg, up to two tablets daily as needed for severe anxiety or panic attacks  ? Patient denies any recent significant panic attacks  · Continue to monitor for increased anxiety, ruminating thoughts, irritability, panic attacks, insomnia, depressed mood, decreased motivation or suicidal thoughts  · Continue regularly scheduled outpatient individual Psychotherapy  § PHQ-9: Previous score on 11/11/2019: 3  § INES-7: Previous score on 11/11/2019: 7  2) Medical  · Continue to follow-up with Primary Care Provider for ongoing medical care  3) Follow-up with this Provider in 3 months         History of Present Illness Obtained Through Problem-Focused Interview:   1) INES - Patient is back in school full time and managing a full time job   She is still seeing her therapist weekly  She thinks she is doing well overall  She has been having a lot of GI issues  She has IBS  She is following up with a GI Specialist to evaluate for Celiac disease  She had an endoscopy last week  She was diagnosed with chronic gastritis, inflammation of the gut  Therapist thinks that this has to do with her stress levels as well  Patient doesn't think she is too overly stressed but does admit to feeling overwhelmed  She knows she has a busy schedule with school full time and work full time  She reports that she does not feel overly anxious and it is manageable  She thinks that she is doing very well on the current dose of her Effexor XR and thinks it has helped her manage her anxiety  Her mood has been good  She has lacked motivation as well  She feels very overwhelmed and she wants to push off what she needs to do  Patient denies any thoughts of wanting to harm self or others  Patient denies any recent self-injurious behaviors  Patient denies any active or passive suicidal ideation, intent or plan  Patient is able to contract for safety at the present time  Patient remains future-oriented and goal-directed, as well as motivated and help seeking  Psychiatric Review of Systems:   Sleep normal   Appetite normal   Decreased Energy No   Decreased Interest/Pleasure in Activities No   Medication Side Effects None   Mood Symptoms No   Anxiety/Panic Symptoms Yes    Attention/Concentration Symptoms No   Manic Symptoms No   Auditory or Visual Hallucinations No   Delusional Ideations No   Suicidal/Homicidal Ideation No     Review Of Systems:  Constitutional Negative   ENT Negative   Cardiovascular Negative   Respiratory Negative   Gastrointestinal As noted in HPI, IBS   Genitourinary Negative   Musculoskeletal Negative   Integumentary Negative   Neurological Negative   Endocrine Negative     Note: Any significant positives in the Comprehensive Review of Systems will have been noted in the HPI   All other Review of Systems, unless noted otherwise above, are negative  Past Medical History:   Patient Active Problem List   Diagnosis    Celiac artery stenosis (HCC)    INES (generalized anxiety disorder)    Hypermobile joints    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HCC)    Median arcuate ligament syndrome (HCC)    Menorrhagia    POTS (postural orthostatic tachycardia syndrome)    Seasonal allergic rhinitis    Hypertriglyceridemia    Hammertoe of left foot    Migraine without aura and without status migrainosus, not intractable    Encounter for physical examination    Irritable bowel syndrome (IBS)    Dyspepsia              Allergies: Allergies   Allergen Reactions    Nuts Other (See Comments)     Avani Nuts - itchy throat    Other Hives      At surgical site and IV site- not sure if type of cleanser used    Pollen Extract          Past Surgical History:   Past Surgical History:   Procedure Laterality Date    COLONOSCOPY      EGD AND COLONOSCOPY N/A 1/10/2017    Procedure: EGD AND COLONOSCOPY;  Surgeon: Micheal Morton MD;  [facility];   Service:     ESOPHAGOGASTRODUODENOSCOPY      with biopsy    FOOT SURGERY      Excision of lesion feet benign    FL REPAIR OF HAMMERTOE,ONE Left 12/20/2019    Procedure: 2nd arthrodesis; 5th arthroplasty, flexor tenotomy 2,3,4,5 ;  Surgeon: Rosa Isela Morgan DPM;  [facility];  Service: Podiatry    FL REPAIR OF Jany Lancaster Left 6/5/2020    Procedure: 2ND TOE Revision hammertoe with broken implant;  Surgeon: Rosa Isela Morgan DPM;  [facility];  Service: Podiatry    WISDOM TOOTH EXTRACTION             The italicized information immediately following this statement has been pulled forward from previous documentation written by this Provider, during most recent office visit on 9/21/2020, and any pertinent changes have been updated accordingly:     Past Psychiatric History:   General Information: H/o generalized anxiety disorder, social anxiety disorder, most recently in outpatient treatment with Dr Tra Winters ending 10/2017, no past psychiatric hospitalizations , no past suicide attempts, no h/o self-injurious behaviors, no h/o physical aggression       Past Medication Trials: Zoloft (helpful but caused drowsiness, possible increase in migraines), Cymbalta 30 mg bid (ineffective)      Current Psychiatric Medications:  Effexor XR 75 mg, Xanax 0 25 mg prn      Therapist/Counseling Services: Currently in outpatient therapy with Rosaura Shi            Family Psychiatric History:   Brother- Autistic Spectrum Disorder  Father- Anxiety (Zoloft)     No FH of suicide        Social History:   Parents  about 10 years, get along with siblings  Payal Cadena 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, father works as a flight nurse for [employer], works for [employer]   No current relationships   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, plans to go to college in the area           Substance [address]  Denies any substance abuse         Traumatic History:  Denies any h/o physical or sexual abuse  The following portions of the patient's history were reviewed and updated as appropriate: allergies, current medications, past family history, past medical history, past social history, past surgical history and problem list         Objective: There were no vitals filed for this visit        Weight (last 2 days)     None                Mental Status:  Appearance sitting comfortably in chair, dressed in casual clothing, adequate hygiene and grooming, cooperative with interview, fairly well related, good eye contact, pleasant and friendly   Mood "good, overwhelmed"   Affect Appears generally euthymic, stable, mood-congruent   Speech Normal rate, rhythm, and volume   Thought Processes Linear and goal directed   Associations intact associations   Hallucinations Denies any auditory or visual hallucinations   Thought Content No passive or active suicidal or homicidal ideation, intent, or plan , No overt delusions elicited and Future-oriented, help-seeking, ruminative about stressors   Orientation Oriented to person, place, time, and situation   Recent and Remote Memory Grossly intact   Attention Span Concentration intact   Intellect Appears to be of Average Intelligence   Insight Insight intact   Judgment judgment was intact   Muscle Strength Muscle strength and tone were normal   Language Within normal limits   Fund of Knowledge Age appropriate   Pain None         Assessment:       Diagnoses and all orders for this visit:    INES (generalized anxiety disorder)  -     venlafaxine (EFFEXOR-XR) 75 mg 24 hr capsule; Take 1 capsule (75 mg total) by mouth daily Take with one 37 5 mg capsule  Total daily dose is 112 5 mg  -     venlafaxine (EFFEXOR-XR) 37 5 mg 24 hr capsule; Take 1 capsule (37 5 mg total) by mouth daily Take with one 75 mg capsule  Total daily dose is 112 5 mg                Diagnosis/Differential Diagnosis:  1) Generalized Anxiety Disorder, r/o social anxiety disorder, r/o OCD           Medical Decision Making: On assessment today, patient reports that she has been experiencing anxiety and feeling generally overwhelmed due to the demands of her job and school  She is experiencing physical manifestations of her anxiety in the form of GI symptoms and chronic gastritis  She may benefit from titration of Effexor XR to 112 5 mg, which her therapist has also suggested  Will titrate at this time and monitor for improvement in anxiety sypmtoms at subsequent office visits  This medication may need to be further titrated to reach maximum therapeutic effect depending on patient's future clinical condition  Patient will continue with regularly scheduled outpatient individual psychotherapy   Instructed patient and parent to contact provider between now and upcoming office visit if there are any questions or concerns, as well as any worsening of symptoms or negative side effects  Patient and parent were pleased with the treatment recommendations that were discussed during today's office visit, and were satisfied with the thorough education that was provided  Patient will follow up at next scheduled office visit  On suicide risk assessment, Patient denies any thoughts of wanting to harm self or others  Patient denies any recent self-injurious behaviors  Patient denies any active or passive suicidal ideation, intent or plan  Patient is able to contract for safety at the present time  Patient remains future-oriented and goal-directed, as well as motivated and help seeking  There are no significant risk factors  Protective factors include:  No family history of suicide, no personal history of suicide attempt or self-injurious behaviors, no history of substance use, no history of abuse or neglect, no gender dysphoria and no access to firearms  Patient will continue with regularly scheduled outpatient individual psychotherapy  Despite any risk factors that may be present, patient is not an imminent risk of harm to self or others, and is deemed appropriate for continuing outpatient level of care at this time  PHQ-9: Previous score on 11/11/2019: 3  INES-7: Previous score on 11/11/2019: 7        Plan:  1) Anxiety  · Titrate Effexor XR to 112 5 mg  once daily by mouth   ? This medication may need to be further titrated to reach maximum therapeutic effect depending on patient's future clinical condition  · Continue Xanax 0 25 mg, up to two tablets daily as needed for severe anxiety or panic attacks  · Continue regularly scheduled outpatient individual Psychotherapy  § PHQ-9: Previous score on 11/11/2019: 3  § INES-7: Previous score on 11/11/2019: 7  2) Medical  · Continue to follow-up with Primary Care Provider for ongoing medical care    · Follow-up with GI Specialist for ongoing care  3) Follow-up with this Provider in 6 weeks                 Summary of Above Information:     Treatment Recommendations/Precautions:     Increase Effexor XR  Sebastian Flower Continue psychotherapy with [facility] therapist [facility] of 24 hour and weekend coverage for urgent situations accessed by calling [facility] main practice number          Risks/Benefits:     Suicide/Homicide Risk Assessment:    Risk of Harm to Self:  Based on today's assessment, Cristine Chapman presents the following risk of harm to self: none    Risk of Harm to Others:  Based on today's assessment, Cristine Chapman presents the following risk of harm to others: none    The following interventions are recommended: no intervention changes needed      Medications Risks/Benefits:      Risks, Benefits And Possible Side Effects Of Medications:    Risks, benefits, and possible side effects of medications explained to Nury and she verbalizes understanding and agreement for treatment  Controlled Medication Discussion:     Not applicable              Psychotherapy Provided:     Individual psychotherapy provided:     No                 Treatment Plan:    Treatment Plan completed and signed during the session:     Not applicable - Treatment Plan to be completed by [address] [facility] [address] therapist                Based on today's assessment and clinical criteria, patient contracts for safety and is not an imminent risk of harm to self or others  Outpatient level of care is deemed appropriate at this current time  Patient understands that if they can no longer contract for safety, they need to call the office or report to their nearest Emergency Room for immediate evaluation  Portions of this progress note may have been dictated with the use of transcription software  As such, words that may "sound alike" may have been inserted into the overall text of this progress note  Carin Vallejo PA-C   02/02/21     I spent 25 minutes with the patient during this visit      VIRTUAL VISIT DISCLAIMER    Jennifer Garces Juan Carlos Castillo acknowledges that she has consented to an online visit or consultation  She understands that the online visit is based solely on information provided by her, and that, in the absence of a face-to-face physical evaluation by the physician, the diagnosis she receives is both limited and provisional in terms of accuracy and completeness  This is not intended to replace a full medical face-to-face evaluation by the physician  Kelvin Parrish understands and accepts these terms

## 2021-01-28 NOTE — H&P
History and Physical -  [facility]  Gee Pete 24 y o  female MRN: [id number]                  HPI: Gee Pete is a 24y o  year old female who presents for EGD for abnormal celiac disease serologies  REVIEW OF SYSTEMS: Per the HPI, and otherwise unremarkable  Historical Information   Past Medical History:   Diagnosis Date    Anxiety     Asthma     Dizziness     at times    GERD (gastroesophageal reflux disease)     Hammertoe of left foot     Headache     occ    Hyperlipemia     Hypermobile joints     Irritable bowel syndrome     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HCC)     Mast cell disorder     Migraine     PONV (postoperative nausea and vomiting)     POTS (postural orthostatic tachycardia syndrome)      infant     Wears glasses      Past Surgical History:   Procedure Laterality Date    COLONOSCOPY      EGD AND COLONOSCOPY N/A 1/10/2017    Procedure: EGD AND COLONOSCOPY;  Surgeon: Alejandra Miranda MD;  Location: [facility]; Service:     ESOPHAGOGASTRODUODENOSCOPY      with biopsy    FOOT SURGERY      Excision of lesion feet benign    HI REPAIR OF EYAD,ONE Left 2019    Procedure: 2nd arthrodesis; 5th arthroplasty, flexor tenotomy 2,3,4,5 ;  Surgeon: Timmy Nageotte, DPM;  [facility];  Service: Podiatry    HI REPAIR OF Alice Hochika Left 2020    Procedure: 2ND TOE Revision hammertoe with broken implant;  Surgeon: Timmy Nageotte, DPM;  [facility];  Service: Podiatry    WISDOM TOOTH EXTRACTION       Social History   Social History     Substance and Sexual Activity   Alcohol Use No     Social History     Substance and Sexual Activity   Drug Use No    Comment: 21- not asked, parent at bedside       Social History     Tobacco Use   Smoking Status Never Smoker   Smokeless Tobacco Never Used     Family History   Problem Relation Age of Onset    Gestational diabetes Mother     Anxiety disorder Father     Hyperlipidemia Father Autism Brother     Diabetes Other     Uterine cancer Maternal Grandmother     Rheumatic fever Maternal Grandmother     Diabetes Maternal Grandfather     Hypertension Maternal Grandfather     Heart attack Maternal Grandfather     Stroke Maternal Grandfather     Hyperlipidemia Maternal Grandfather     Hypertension Paternal Grandmother     Anxiety disorder Paternal Grandmother     Hypertension Paternal Grandfather        Meds/Allergies       Current Outpatient Medications:     cetirizine (ZyrTEC) 10 mg tablet    cyproheptadine (PERIACTIN) 4 mg tablet    fludrocortisone (FLORINEF) 0 1 mg tablet    linaCLOtide (Linzess) 72 MCG CAPS    metoprolol succinate (TOPROL-XL) 25 mg 24 hr tablet    Omega-3 Fatty Acids (FISH OIL) 1,000 mg    venlafaxine (EFFEXOR-XR) 75 mg 24 hr capsule    acetaminophen (TYLENOL) 500 mg tablet    ALPRAZolam (XANAX) 0 25 mg tablet    ASMANEX  MCG/ACT AERO    dicyclomine (BENTYL) 10 mg capsule    Elastic Bandages & Supports (TRUFORM STOCKINGS 20-30MMHG) MISC    medroxyPROGESTERone acetate (DEPO-PROVERA SYRINGE) 150 mg/mL injection    metoclopramide (REGLAN) 10 mg tablet    naproxen (EC NAPROSYN) 500 MG EC tablet    OLANZapine (ZyPREXA) 5 mg tablet    Plecanatide (Trulance) 3 MG TABS    scopolamine (TRANSDERM-SCOP) 1 5 mg/3 days TD 72 hr patch    Ubrogepant 50 MG TABS    XOPENEX HFA 45 MCG/ACT inhaler    Current Facility-Administered Medications:     albuterol inhalation solution 2 5 mg, 2 5 mg, Nebulization, Q6H PRN    medroxyPROGESTERone acetate (DEPO-PROVERA SYRINGE) IM injection 150 mg, 150 mg, Intramuscular, Q3 Months, 150 mg at 09/17/20 1611    sodium chloride 0 9 % infusion, 125 mL/hr, Intravenous, Continuous, 125 mL/hr at 01/28/21 0719    Allergies   Allergen Reactions    Nuts Other (See Comments)     Avani Nuts - itchy throat    Other Hives      At surgical site and IV site- not sure if type of cleanser used    Pollen Extract        Objective     /60   Pulse 87   Temp 98 °F (36 7 °C) (Temporal)   Resp 16   Ht 5' 7 5" (1 715 m)   Wt 74 8 kg (165 lb)   SpO2 100%   BMI 25 46 kg/m²       PHYSICAL EXAM    Gen: NAD  CV: RRR  CHEST: Clear  ABD: soft, NT/ND  EXT: no edema      ASSESSMENT/PLAN:  This is a 24y o  year old female here for EGD evaluation, and she is stable and optimized for her procedure

## 2021-01-28 NOTE — ANESTHESIA PREPROCEDURE EVALUATION
Procedure:  EGD    Relevant Problems   CARDIO   (+) Hypertriglyceridemia      NEURO/PSYCH   (+) INES (generalized anxiety disorder)      Other   (+) 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HCC)        Physical Exam    Airway    Mallampati score: II  TM Distance: >3 FB  Neck ROM: full     Dental       Cardiovascular  Rhythm: regular, Rate: normal, Cardiovascular exam normal    Pulmonary  Pulmonary exam normal Breath sounds clear to auscultation,     Other Findings        Anesthesia Plan  ASA Score- 2     Anesthesia Type- IV sedation with anesthesia with ASA Monitors  Additional Monitors:   Airway Plan:           Plan Factors-    Chart reviewed  Induction- intravenous  Postoperative Plan-     Informed Consent- Anesthetic plan and risks discussed with patient

## 2021-01-28 NOTE — DISCHARGE INSTRUCTIONS
Upper Endoscopy   WHAT YOU NEED TO KNOW:   An upper endoscopy is also called an upper gastrointestinal (GI) endoscopy, or an esophagogastroduodenoscopy (EGD)  You may feel bloated, gassy, or have some abdominal discomfort after your procedure  Your throat may be sore for 24 to 36 hours  You may burp or pass gas from air that is still inside your body  DISCHARGE INSTRUCTIONS:   Call 911 for any of the following:   · You have sudden chest pain or trouble breathing  Seek care immediately if:   · You feel dizzy or faint  · You have trouble swallowing  · Your bowel movements are very dark or black  · Your abdomen is hard and firm and you have severe pain  · You vomit blood  Contact your healthcare provider if:   · You feel full or bloated and cannot burp or pass gas  · You have not had a bowel movement for 3 days after your procedure  · You have neck pain  · You have a fever or chills  · You have nausea or are vomiting  · You have a rash or hives  · You have questions or concerns about your endoscopy  Relieve a sore throat:  Suck on throat lozenges or crushed ice  Gargle with a small amount of warm salt water  Mix 1 teaspoon of salt and 1 cup of warm water to make salt water  Relieve gas and discomfort from bloating:  Lie on your right side with a heating pad on your abdomen  Take short walks to help pass gas  Eat small meals until bloating is relieved  Rest after your procedure: You have been given medicine to relax you  Do not  drive or make important decisions until the day after your procedure  Return to your normal activity as directed  You can usually return to work the day after your procedure  Follow up with your healthcare provider as directed:  Write down your questions so you remember to ask them during your visits     © 2017 [address] is for End User's use only and may not be sold, redistributed or otherwise used for commercial purposes  All illustrations and images included in CareNotes® are the copyrighted property of A D A M , Inc  or Ran Reyes  The above information is an  only  It is not intended as medical advice for individual conditions or treatments  Talk to your doctor, nurse or pharmacist before following any medical regimen to see if it is safe and effective for you  Gluten-Free Diet   WHAT YOU NEED TO KNOW:   What is a gluten-free diet? A gluten-free diet is a meal plan that does not contain any gluten  Gluten is a protein found in wheat, rye, and barley  Gluten is found in many breads, pastas, cereals, cakes, pies, and cookies  Some vitamin supplements and medicines may also contain gluten  You need to follow this diet for the rest of your life if you have celiac disease, dermatitis herpetiformis, or non-celiac gluten sensitivity  Which foods should I avoid? Do not eat foods that contain the following ingredients:  · Barley, barley malt, barley extract, rye, bulgar, semolina, and triticale    · Wheat, wheat bran, wheat germ, and wheat starch     · Wheat varieties such as kamut and spelt    · Bran, couscous, durum, farina, and orzo    · Matzo flour, matzo meal, jeni flour     · Oats that are not labeled as gluten-free, unless your dietitian has allowed you to eat a small amount    · Malt extract and malt flavoring    · Einkorn, emmer, faro, panko, seitan, and udon    What foods can I have? Choose foods labeled as gluten-free  You may be able to eat gluten-free oats, or you may be able to eat regular oats in small amounts  Ask your dietitian if oats are safe for you to eat   You may eat foods that are made with the following types of grains and starches:   · Corn, potato starch, and potato flour    · Soy, tapioca, and teff    · Rice, rice bran, quinoa, sago, and sorghum    · Amaranth, arrowroot, and buckwheat    · Flours made from nuts, beans, and seeds    · Flax, millet, and elian  What are examples of gluten-free foods? · Fresh fruits and vegetables    · Eggs, meat, fish, and poultry    · Dried beans, lentils, and peas    · Beverages such as 100% fruit juice, coffee, tea, cocoa, and soft drinks    · Fats and oils, such as butter, margarine, and vegetable, canola, and olive oils    · Regular, unflavored milk, cottage cheese, most yogurts, and aged cheese such as cheddar cheese    · Cream, sour cream, cream cheese, most ice creams, and sherbets    · Cream of rice, grits, puffed rice, brown rice, and white rice    · Corn tacos and tortillas    What is a sample menu for 1 day? · Breakfast:  Soft boiled eggs, gluten-free toast with butter, and milk    · Morning snack:  Gluten-free rice cakes or crackers    · Lunch:  Tuna salad with tomato and lettuce, and an apple     · Afternoon snack:  Carrot sticks    · Dinner:  Broiled chicken, baked potato, green beans, and sorbet with strawberries    What can I do to make a gluten-free diet part of my lifestyle? · Follow up with your healthcare provider or dietitian as directed  It may take time to learn how to properly follow a gluten-free diet  Your dietitian can help you find ways to fit this diet into your lifestyle  · Learn to read food labels  Gluten is found in many foods and drinks  It may not be clear which foods contain gluten  Include a variety of allowed foods so that you can get all the nutrients you need  · Prepare for restaurant meals  Carry a list of items allowed on this diet with you when you are away from home  Go to the restaurant's website or call ahead to find out if the restaurant has gluten-free meals  Tell the  that you cannot eat wheat, rye, or barley  Ask how food is prepared  · Prepare gluten-free foods separately from other foods  Cross-contamination is when foods that contain gluten get mixed in with gluten-free foods   Prevent cross contamination by cleaning counter tops and cutting boards well to remove any crumbs that contain gluten  Wash cooking utensils, colanders, and pans well before you cook gluten-free foods  Buy a separate toaster to use for gluten-free breads  Buy separate jam, jelly, mayonnaise, or peanut butter to use on gluten-free breads to avoid cross contamination  These foods are also available in squeeze containers  · Include naturally gluten-free foods that are high in iron, folate, and vitamin B12  Foods high in iron and vitamin B12 include meat, fish, poultry (chicken and fish), liver, and eggs  Foods high in folate include broccoli, asparagus, orange juice, legumes, peanuts, walnuts, and sunflower seeds  · Ask your healthcare provider if you need a vitamin and mineral supplement  Celiac disease and dermatitis herpetiformis can cause damage to your intestines  This damage can decrease the absorption of certain vitamins and minerals  Also, many gluten-free cereals, pastas, and breads are not fortified with vitamin B and iron  When should I contact my healthcare provider? · You must take a medicine or vitamin that contains gluten  · Signs and symptoms of your condition get worse  · You are losing weight, without trying  · You have questions or concerns about your condition or care  CARE AGREEMENT:   You have the right to help plan your care  Discuss treatment options with your healthcare provider to decide what care you want to receive  You always have the right to refuse treatment  The above information is an  only  It is not intended as medical advice for individual conditions or treatments  Talk to your doctor, nurse or pharmacist before following any medical regimen to see if it is safe and effective for you  © Copyright [address] Information is for End User's use only and may not be sold, redistributed or otherwise used for commercial purposes   All illustrations and images included in CareNotes® are the copyrighted property of A  D A M , Inc  or [address] [place] [address]

## 2021-02-02 ENCOUNTER — TELEMEDICINE (OUTPATIENT)
Dept: PSYCHIATRY | Facility: CLINIC | Age: 21
End: 2021-02-02
Payer: COMMERCIAL

## 2021-02-02 ENCOUNTER — TELEMEDICINE (OUTPATIENT)
Dept: BEHAVIORAL/MENTAL HEALTH CLINIC | Facility: CLINIC | Age: 21
End: 2021-02-02
Payer: COMMERCIAL

## 2021-02-02 DIAGNOSIS — F41.1 GAD (GENERALIZED ANXIETY DISORDER): ICD-10-CM

## 2021-02-02 DIAGNOSIS — F41.1 GAD (GENERALIZED ANXIETY DISORDER): Primary | ICD-10-CM

## 2021-02-02 PROCEDURE — 99214 OFFICE O/P EST MOD 30 MIN: CPT | Performed by: PHYSICIAN ASSISTANT

## 2021-02-02 PROCEDURE — 90834 PSYTX W PT 45 MINUTES: CPT | Performed by: SOCIAL WORKER

## 2021-02-02 RX ORDER — VENLAFAXINE HYDROCHLORIDE 37.5 MG/1
37.5 CAPSULE, EXTENDED RELEASE ORAL DAILY
Qty: 90 CAPSULE | Refills: 0 | Status: SHIPPED | OUTPATIENT
Start: 2021-02-02 | End: 2021-03-23 | Stop reason: SDUPTHER

## 2021-02-02 RX ORDER — VENLAFAXINE HYDROCHLORIDE 75 MG/1
75 CAPSULE, EXTENDED RELEASE ORAL DAILY
Qty: 90 CAPSULE | Refills: 0 | Status: SHIPPED | OUTPATIENT
Start: 2021-02-02 | End: 2021-03-23 | Stop reason: SDUPTHER

## 2021-02-02 NOTE — PSYCH
Virtual Regular Visit      Assessment/Plan:    Problem List Items Addressed This Visit     None               Reason for visit is No chief complaint on file  Encounter provider [facility]    Provider located at [address] [facility]  [address]  [facility] [id number]      Recent Visits  Date Type Provider Dept   01/26/21 Sonia Razajoseph Helms [place] [facility] Therapist   Showing recent visits within past 7 days and meeting all other requirements     Today's Visits  Date Type Provider Dept   02/02/21 [address] [facility] today's visits and meeting all other requirements     Future Appointments  No visits were found meeting these conditions  Showing future appointments within next 150 days and meeting all other requirements        The patient was identified by name and date of birth  Jeremy Diaz was informed that this is a telemedicine visit and that the visit is being conducted through [employer] and patient was informed that this is a secure, HIPAA-compliant platform  She agrees to proceed     My office door was closed  No one else was in the room  She acknowledged consent and understanding of privacy and security of the video platform  The patient has agreed to participate and understands they can discontinue the visit at any time  Patient is aware this is a billable service  Orlena Phalen is a 24 y o  female          HPI     Past Medical History:   Diagnosis Date    Anxiety     Asthma     Dizziness     at times    GERD (gastroesophageal reflux disease)     Hammertoe of left foot     Headache     occ    Hyperlipemia     Hypermobile joints     Irritable bowel syndrome     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HCC)     Mast cell disorder     Migraine     PONV (postoperative nausea and vomiting)     POTS (postural orthostatic tachycardia syndrome)   infant     Wears glasses        Past Surgical History:   Procedure Laterality Date    COLONOSCOPY      EGD AND COLONOSCOPY N/A 1/10/2017    Procedure: EGD AND COLONOSCOPY;  Surgeon: Alejandra Miranda MD;  [facility]; Service:     ESOPHAGOGASTRODUODENOSCOPY      with biopsy    FOOT SURGERY      Excision of lesion feet benign    PA REPAIR OF MAYE CASTANO Left 2019    Procedure: 2nd arthrodesis; 5th arthroplasty, flexor tenotomy 2,3,4,5 ;  Surgeon: Timmy Nageotte, DPM;  [facility];  Service: Podiatry    PA REPAIR OF Alice Nichols Left 2020    Procedure: 2ND TOE Revision hammertoe with broken implant;  Surgeon: Timmy Nageotte, DPM;  [facility];  Service: Podiatry    WISDOM TOOTH EXTRACTION         Current Outpatient Medications   Medication Sig Dispense Refill    acetaminophen (TYLENOL) 500 mg tablet Take 1,000 mg by mouth every 4 (four) hours as needed       ALPRAZolam (XANAX) 0 25 mg tablet Take 0 25 mg by mouth 2 (two) times a day as needed       ASMANEX  MCG/ACT AERO Inhale 400 mcg every 4 (four) hours as needed (2 puffs)       cetirizine (ZyrTEC) 10 mg tablet Take 10 mg by mouth daily      cyproheptadine (PERIACTIN) 4 mg tablet Take 4 mg by mouth daily at bedtime       dicyclomine (BENTYL) 10 mg capsule Take 1 capsule (10 mg total) by mouth 3 (three) times a day as needed (abdominal pain) 30 capsule 0    Elastic Bandages & Supports (TRUFORM STOCKINGS 20-30MMHG) MISC Use as directed        fludrocortisone (FLORINEF) 0 1 mg tablet Take 0 1 mg by mouth daily Taken 1 tablet in the Morning 1 tablet at Night      linaCLOtide (Linzess) 72 MCG CAPS Take 1 capsule by mouth daily before breakfast 90 capsule 3    medroxyPROGESTERone acetate (DEPO-PROVERA SYRINGE) 150 mg/mL injection Inject 1 mL (150 mg total) into a muscle every 3 (three) months Every three months 1 mL 3    metoclopramide (REGLAN) 10 mg tablet Take 1 tablet (10 mg total) by mouth every 6 (six) hours (Patient taking differently: Take 10 mg by mouth every 6 (six) hours as needed ) 30 tablet 0    metoprolol succinate (TOPROL-XL) 25 mg 24 hr tablet Take 50 mg by mouth every evening       naproxen (EC NAPROSYN) 500 MG EC tablet Take 1 tablet (500 mg total) by mouth 2 (two) times a day with meals (Patient not taking: Reported on 12/10/2020) 60 tablet 0    OLANZapine (ZyPREXA) 5 mg tablet Take at bedtime only on the day of headache (Patient not taking: Reported on 12/10/2020) 5 tablet 0    Omega-3 Fatty Acids (FISH OIL) 1,000 mg Take 1,000 mg by mouth 2 (two) times a day       Plecanatide (Trulance) 3 MG TABS Take 1 tablet by mouth daily 30 tablet 5    scopolamine (TRANSDERM-SCOP) 1 5 mg/3 days TD 72 hr patch Place 1 patch on the skin every third day 10 patch 0    Ubrogepant 50 MG TABS 1 tab at the onset of migraine headache may repeat in 2 hours if needed max 200 mg per day (Patient not taking: Reported on 12/10/2020) 12 tablet 1    venlafaxine (EFFEXOR-XR) 75 mg 24 hr capsule Take 1 capsule (75 mg total) by mouth daily 90 capsule 0    XOPENEX HFA 45 MCG/ACT inhaler Inhale 1-2 puffs every 4 (four) hours as needed        Current Facility-Administered Medications   Medication Dose Route Frequency Provider Last Rate Last Admin    albuterol inhalation solution 2 5 mg  2 5 mg Nebulization Q6H PRN Tanesha Garcia PA-C        medroxyPROGESTERone acetate (DEPO-PROVERA SYRINGE) IM injection 150 mg  150 mg Intramuscular Q3 Months KETAN Adams   150 mg at 09/17/20 1611        Allergies   Allergen Reactions    Nuts Other (See Comments)     Avani Nuts - itchy throat    Other Hives      At surgical site and IV site- not sure if type of cleanser used    Pollen Extract      Psychotherapy Provided: Individual Psychotherapy 50 minutes      Length of time in session: 50 minutes, follow up in 2 week     Goals addressed in session: Goal 1      Pain: 0  Current suicide risk : Low      D:  Nury spoke  with this worker today re: her  feelings re: the outcome of the   egd test that she had performed last week that does not definitively indicate that she may have celiac disease  Kirstie Spence also shared her feelings re: her ongoing struggles to say no to others  Reasons for this, as well a the importance of this were discussed at length  Homework was assigned  A: Kirstie Spence has continued to work diligently in therapy and is choosing to limit her focus on social media and to limit her time with others at this time for her own well being  P:  Upcoming sessions will be used to continue to support Nury with all of the above  [address] [facility] [place]: Diagnosis and Treatment Plan explained to Nury, Nury relates understanding diagnosis and is agreeable to Treatment Plan  Yes      This note was not shared with the patient due to this is a psychotherapy note        VIRTUAL VISIT Saman Del Rosario acknowledges that she has consented to an online visit or consultation  She understands that the online visit is based solely on information provided by her, and that, in the absence of a face-to-face physical evaluation by the physician, the diagnosis she receives is both limited and provisional in terms of accuracy and completeness  This is not intended to replace a full medical face-to-face evaluation by the physician  Kelvin Parrish understands and accepts these terms

## 2021-02-09 ENCOUNTER — SOCIAL WORK (OUTPATIENT)
Dept: BEHAVIORAL/MENTAL HEALTH CLINIC | Facility: CLINIC | Age: 21
End: 2021-02-09
Payer: COMMERCIAL

## 2021-02-09 DIAGNOSIS — F41.1 GAD (GENERALIZED ANXIETY DISORDER): ICD-10-CM

## 2021-02-09 PROCEDURE — 90834 PSYTX W PT 45 MINUTES: CPT | Performed by: SOCIAL WORKER

## 2021-02-09 NOTE — PSYCH
Psychotherapy Provided: Individual Psychotherapy 50 minutes      Length of time in session: 50 minutes, follow up in 2 week     Goals addressed in session: Goal 1      Pain: 0  Current suicide risk : Low      D: Chelo Oliver spoke  with this worker today re: her  feelings re: what it was like to learn that her best friend's ex- fiance has impregnated his new paramour  Ways that this has triggered her trauma history were processed at length today  A : Neil Ruiz has continued to work diligently in therapy and is choosing to limit her focus on social media and to limit her time with others at this time for her own well being  P:  Upcoming sessions will be used to continue to support Nury with all of the above  [address] [place]: Diagnosis and Treatment Plan explained to Nury Arrington relates understanding diagnosis and is agreeable to Treatment Plan   Yes      This note was not shared with the patient due to this is a psychotherapy note

## 2021-02-16 ENCOUNTER — TELEMEDICINE (OUTPATIENT)
Dept: BEHAVIORAL/MENTAL HEALTH CLINIC | Facility: CLINIC | Age: 21
End: 2021-02-16
Payer: COMMERCIAL

## 2021-02-16 DIAGNOSIS — F41.1 GAD (GENERALIZED ANXIETY DISORDER): ICD-10-CM

## 2021-02-16 PROCEDURE — 90834 PSYTX W PT 45 MINUTES: CPT | Performed by: SOCIAL WORKER

## 2021-02-16 NOTE — PSYCH
Psychotherapy Provided: Individual Psychotherapy 50 minutes      Length of time in session: 50 minutes, follow up in 2 week     Goals addressed in session: Goal 1      Pain: 0  Current suicide risk : Low      D: Paulino Middleton spoke  with this worker today re: her  feelings re: what it was like to have her mother's fisage and his children begin to move their belongings into their home while their belongings are thrown into a dumpster  Tanisha Montenegro also discussed her inability to share her feelings about this with her mother  A : Tanisha Montenegro has continued to work diligently in therapy and is choosing to limit her focus on social media and to limit her time with others at this time for her own well being  P:  Upcoming sessions will be used to continue to support Nury with all of the above  [address] [place]: Diagnosis and Treatment Plan explained to Nury, Nury relates understanding diagnosis and is agreeable to Treatment Plan  Yes      This note was not shared with the patient due to this is a psychotherapy note  Virtual Regular Visit      Assessment/Plan:    Problem List Items Addressed This Visit        Other    INES (generalized anxiety disorder)               Reason for visit is No chief complaint on file  Encounter provider [facility]    Provider located at [address] [place] [id number] [phone]      Recent Visits  No visits were found meeting these conditions  Showing recent visits within past 7 days and meeting all other requirements     Today's Visits  Date Type Provider Dept   02/16/21 [address] [place] [address] today's visits and meeting all other requirements     Future Appointments  No visits were found meeting these conditions     Showing future appointments within next 150 days and meeting all other requirements The patient was identified by name and date of birth  Jeremy Diaz was informed that this is a telemedicine visit and that the visit is being conducted through [facility] and patient was informed that this is a secure, HIPAA-compliant platform  She agrees to proceed     My office door was closed  No one else was in the room  She acknowledged consent and understanding of privacy and security of the video platform  The patient has agreed to participate and understands they can discontinue the visit at any time  Patient is aware this is a billable service  Orlena Phalen is a 24 y o  female    HPI     Past Medical History:   Diagnosis Date    Anxiety     Asthma     Dizziness     at times    GERD (gastroesophageal reflux disease)     Hammertoe of left foot     Headache     occ    Hyperlipemia     Hypermobile joints     Irritable bowel syndrome     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HCC)     Mast cell disorder     Migraine     PONV (postoperative nausea and vomiting)     POTS (postural orthostatic tachycardia syndrome)      infant     Wears glasses        Past Surgical History:   Procedure Laterality Date    COLONOSCOPY      EGD AND COLONOSCOPY N/A 1/10/2017    Procedure: EGD AND COLONOSCOPY;  Surgeon: Meek Espinosa MD;  Location: [facility];   Service:     ESOPHAGOGASTRODUODENOSCOPY      with biopsy    FOOT SURGERY      Excision of lesion feet benign    NC REPAIR OF HAMMERTOE,ONE Left 2019    Procedure: 2nd arthrodesis; 5th arthroplasty, flexor tenotomy 2,3,4,5 ;  Surgeon: Lazarus Davis DPM;  Location: [facility];  Service: Podiatry    NC REPAIR OF Breann Reed Left 2020    Procedure: 2ND TOE Revision hammertoe with broken implant;  Surgeon: Lazarus Davis DPM;  Location: [facility];  Service: Podiatry    WISDOM TOOTH EXTRACTION         Current Outpatient Medications   Medication Sig Dispense Refill    acetaminophen (TYLENOL) 500 mg tablet Take 1,000 mg by mouth every 4 (four) hours as needed       ALPRAZolam (XANAX) 0 25 mg tablet Take 0 25 mg by mouth 2 (two) times a day as needed       ASMANEX  MCG/ACT AERO Inhale 400 mcg every 4 (four) hours as needed (2 puffs)       cetirizine (ZyrTEC) 10 mg tablet Take 10 mg by mouth daily      cyproheptadine (PERIACTIN) 4 mg tablet Take 4 mg by mouth daily at bedtime       dicyclomine (BENTYL) 10 mg capsule Take 1 capsule (10 mg total) by mouth 3 (three) times a day as needed (abdominal pain) 30 capsule 0    Elastic Bandages & Supports (TRUFORM STOCKINGS 20-30MMHG) MISC Use as directed        fludrocortisone (FLORINEF) 0 1 mg tablet Take 0 1 mg by mouth daily Taken 1 tablet in the Morning 1 tablet at Night      linaCLOtide (Linzess) 72 MCG CAPS Take 1 capsule by mouth daily before breakfast 90 capsule 3    medroxyPROGESTERone acetate (DEPO-PROVERA SYRINGE) 150 mg/mL injection Inject 1 mL (150 mg total) into a muscle every 3 (three) months Every three months 1 mL 3    metoclopramide (REGLAN) 10 mg tablet Take 1 tablet (10 mg total) by mouth every 6 (six) hours (Patient taking differently: Take 10 mg by mouth every 6 (six) hours as needed ) 30 tablet 0    metoprolol succinate (TOPROL-XL) 25 mg 24 hr tablet Take 50 mg by mouth every evening       naproxen (EC NAPROSYN) 500 MG EC tablet Take 1 tablet (500 mg total) by mouth 2 (two) times a day with meals (Patient not taking: Reported on 12/10/2020) 60 tablet 0    OLANZapine (ZyPREXA) 5 mg tablet Take at bedtime only on the day of headache (Patient not taking: Reported on 12/10/2020) 5 tablet 0    Omega-3 Fatty Acids (FISH OIL) 1,000 mg Take 1,000 mg by mouth 2 (two) times a day       Plecanatide (Trulance) 3 MG TABS Take 1 tablet by mouth daily 30 tablet 5    scopolamine (TRANSDERM-SCOP) 1 5 mg/3 days TD 72 hr patch Place 1 patch on the skin every third day 10 patch 0    Ubrogepant 50 MG TABS 1 tab at the onset of migraine headache may repeat in 2 hours if needed max 200 mg per day (Patient not taking: Reported on 12/10/2020) 12 tablet 1    venlafaxine (EFFEXOR-XR) 37 5 mg 24 hr capsule Take 1 capsule (37 5 mg total) by mouth daily Take with one 75 mg capsule  Total daily dose is 112 5 mg 90 capsule 0    venlafaxine (EFFEXOR-XR) 75 mg 24 hr capsule Take 1 capsule (75 mg total) by mouth daily Take with one 37 5 mg capsule  Total daily dose is 112 5 mg 90 capsule 0    XOPENEX HFA 45 MCG/ACT inhaler Inhale 1-2 puffs every 4 (four) hours as needed        Current Facility-Administered Medications   Medication Dose Route Frequency Provider Last Rate Last Admin    albuterol inhalation solution 2 5 mg  2 5 mg Nebulization Q6H PRN Jefe Grier PA-C        medroxyPROGESTERone acetate (DEPO-PROVERA SYRINGE) IM injection 150 mg  150 mg Intramuscular Q3 Months EverKETAN Mcadams   150 mg at 09/17/20 1611        Allergies   Allergen Reactions    Nuts Other (See Comments)     Avani Nuts - itchy throat    Other Hives      At surgical site and IV site- not sure if type of cleanser used    Pollen Extract            VIRTUAL VISIT DISCLAIMER    Margie Collins acknowledges that she has consented to an online visit or consultation  She understands that the online visit is based solely on information provided by her, and that, in the absence of a face-to-face physical evaluation by the physician, the diagnosis she receives is both limited and provisional in terms of accuracy and completeness  This is not intended to replace a full medical face-to-face evaluation by the physician  Margie Collins understands and accepts these terms

## 2021-02-23 ENCOUNTER — SOCIAL WORK (OUTPATIENT)
Dept: BEHAVIORAL/MENTAL HEALTH CLINIC | Facility: CLINIC | Age: 21
End: 2021-02-23
Payer: COMMERCIAL

## 2021-02-23 DIAGNOSIS — F41.1 GAD (GENERALIZED ANXIETY DISORDER): ICD-10-CM

## 2021-02-23 PROCEDURE — 90834 PSYTX W PT 45 MINUTES: CPT | Performed by: SOCIAL WORKER

## 2021-02-23 NOTE — PSYCH
Psychotherapy Provided: Individual Psychotherapy 50 minutes      Length of time in session: 50 minutes, follow up in 2 week     Goals addressed in session: Goal 1      Pain: 0  Current suicide risk : Low      D: Adair Delgado spoke  with this worker today re: her  feelings re: the conflictual interactions she has had over the past week with both her sister and her mother  Her attempts to assert herself were discussed and their responses to this were processed  A : El Gibson has continued to work diligently in therapy and is struggling with how the above have rebuffed her attempts to grow, change    P:  Upcoming sessions will be used to continue to support Nury with all of the above  [address] [place]: Diagnosis and Treatment Plan explained to Nury, Nury relates understanding diagnosis and is agreeable to Treatment Plan   Yes      This note was not shared with the patient due to this is a psychotherapy note

## 2021-02-25 ENCOUNTER — CLINICAL SUPPORT (OUTPATIENT)
Dept: OBGYN CLINIC | Facility: CLINIC | Age: 21
End: 2021-02-25
Payer: COMMERCIAL

## 2021-02-25 VITALS — WEIGHT: 168.4 LBS | BODY MASS INDEX: 25.99 KG/M2 | DIASTOLIC BLOOD PRESSURE: 78 MMHG | SYSTOLIC BLOOD PRESSURE: 118 MMHG

## 2021-02-25 DIAGNOSIS — Z30.42 ENCOUNTER FOR SURVEILLANCE OF INJECTABLE CONTRACEPTIVE: Primary | ICD-10-CM

## 2021-02-25 PROCEDURE — 96372 THER/PROPH/DIAG INJ SC/IM: CPT | Performed by: STUDENT IN AN ORGANIZED HEALTH CARE EDUCATION/TRAINING PROGRAM

## 2021-02-25 RX ORDER — MEDROXYPROGESTERONE ACETATE 150 MG/ML
150 INJECTION, SUSPENSION INTRAMUSCULAR ONCE
Status: COMPLETED | OUTPATIENT
Start: 2021-02-25 | End: 2021-02-25

## 2021-02-25 RX ADMIN — MEDROXYPROGESTERONE ACETATE 150 MG: 150 INJECTION, SUSPENSION INTRAMUSCULAR at 18:34

## 2021-02-25 NOTE — PROGRESS NOTES
Pt is here for Depo Provera injection  Her last dose was 12/10/2020  Her annual exam was on 6/19/2020  Urine pregnancy test done: No   Result: N/A  Depo given in Right Deltoid  Tolerated well  Lot [id number] Exp 9/30/2024  Next dose due 5/13/2021

## 2021-03-02 ENCOUNTER — SOCIAL WORK (OUTPATIENT)
Dept: BEHAVIORAL/MENTAL HEALTH CLINIC | Facility: CLINIC | Age: 21
End: 2021-03-02
Payer: COMMERCIAL

## 2021-03-02 DIAGNOSIS — F41.1 GAD (GENERALIZED ANXIETY DISORDER): ICD-10-CM

## 2021-03-02 PROCEDURE — 90834 PSYTX W PT 45 MINUTES: CPT | Performed by: SOCIAL WORKER

## 2021-03-03 NOTE — PSYCH
Psychotherapy Provided: Individual Psychotherapy 50 minutes      Length of time in session: 50 minutes, follow up in 2 week     Goals addressed in session: Goal 1      Pain: 0  Current suicide risk : Low      D: Haily Trejo spoke  with this worker today re: her  feelings re: the work she has done to prepare for her mid-terms  She also shared insights re: her relationship with her best friend  A : Tiffanie Jean has continued to work diligently in therapy and is struggling with how the above have rebuffed her attempts to grow, change    P:  Upcoming sessions will be used to continue to support Nury with all of the above  [address] [facility] [place]: Diagnosis and Treatment Plan explained to Nury, Nury relates understanding diagnosis and is agreeable to Treatment Plan   Yes      This note was not shared with the patient due to this is a psychotherapy note

## 2021-03-04 ENCOUNTER — TELEPHONE (OUTPATIENT)
Dept: GASTROENTEROLOGY | Facility: CLINIC | Age: 21
End: 2021-03-04

## 2021-03-04 NOTE — TELEPHONE ENCOUNTER
Called and left message for pt to call office for pt to call office to reschedule appt with erika on 4/6 due to change in schedule   Can be put on with dr Joey Casas or [facility]

## 2021-03-09 ENCOUNTER — SOCIAL WORK (OUTPATIENT)
Dept: BEHAVIORAL/MENTAL HEALTH CLINIC | Facility: CLINIC | Age: 21
End: 2021-03-09
Payer: COMMERCIAL

## 2021-03-09 DIAGNOSIS — F41.1 GAD (GENERALIZED ANXIETY DISORDER): ICD-10-CM

## 2021-03-09 PROCEDURE — 90834 PSYTX W PT 45 MINUTES: CPT | Performed by: SOCIAL WORKER

## 2021-03-09 NOTE — PSYCH
Psychotherapy Provided: Individual Psychotherapy 50 minutes      Length of time in session: 50 minutes, follow up in 2 week     Goals addressed in session: Goal 1      Pain: 0  Current suicide risk : Low      D: Rosanna Madera spoke  with this worker today re: her  feelings re: her relationship with her best friend  She stated that she does not want to bring up her feelings to her as she is fearful she is "making things bigger than they really are "    A : Nichol Genet has continued to work diligently in therapy and is struggling with how her friendship is changing as they each work to become healthier    P:  Upcoming sessions will be used to continue to support Nury with all of the above  [address] [place]: Diagnosis and Treatment Plan explained to Nury, Nury relates understanding diagnosis and is agreeable to Treatment Plan   Yes      This note was not shared with the patient due to this is a psychotherapy note

## 2021-03-11 ENCOUNTER — TELEPHONE (OUTPATIENT)
Dept: FAMILY MEDICINE CLINIC | Facility: CLINIC | Age: 21
End: 2021-03-11

## 2021-03-15 NOTE — TELEPHONE ENCOUNTER
Per Cassie Birmingham called to advise that out of network request was approved  Notified patient's mother

## 2021-03-15 NOTE — TELEPHONE ENCOUNTER
Patient's mother contacted manager and physician to see if we could provide further information to [address] for the O[facility] exception  Patient's appointment is tomorrow  Form was completed and sent over a week ago  Dr Kathrin Powell asked that letter be written  Letter sent to [facility] @ [phone] twice  P/C to [facility] - spoke with René BARRERA that time letter was not received, but we will check back in a few hours   Call ref# [id number]

## 2021-03-16 ENCOUNTER — SOCIAL WORK (OUTPATIENT)
Dept: BEHAVIORAL/MENTAL HEALTH CLINIC | Facility: CLINIC | Age: 21
End: 2021-03-16
Payer: COMMERCIAL

## 2021-03-16 DIAGNOSIS — F41.1 GAD (GENERALIZED ANXIETY DISORDER): ICD-10-CM

## 2021-03-16 PROCEDURE — 90834 PSYTX W PT 45 MINUTES: CPT | Performed by: SOCIAL WORKER

## 2021-03-16 NOTE — PSYCH
Psychotherapy Provided: Individual Psychotherapy 50 minutes      Length of time in session: 50 minutes, follow up in 2 week     Goals addressed in session: Goal 1      Pain: 0  Current suicide risk : Low      D: Haily Trejo spoke  with this worker today re: her  feelings re: her stress level over the past two weeks during midterms as well as her relief that they are over  She shared her grades this far and the "weeding out" process of her program that has been explained to her  [address] [place] [address] also shared the stress her family is under as they prepare to build a second restaurant, 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

## 2021-03-18 NOTE — PSYCH
Virtual Regular Visit      Assessment/Plan:    Problem List Items Addressed This Visit        Other    INES (generalized anxiety disorder) - Primary    Relevant Medications    venlafaxine (EFFEXOR-XR) 75 mg 24 hr capsule    venlafaxine (EFFEXOR-XR) 37 5 mg 24 hr capsule               Reason for visit is   Chief Complaint   Patient presents with    Anxiety        Encounter provider Duke Shahid PA-C    Provider located at [address] [address] [phone]      Recent Visits  No visits were found meeting these conditions  Showing recent visits within past 7 days and meeting all other requirements     Today's Visits  Date Type Provider Dept   03/23/21 Telemedicine Carin Green, [address] [place] [address] today's visits and meeting all other requirements     Future Appointments  No visits were found meeting these conditions  Showing future appointments within next 150 days and meeting all other requirements        The patient was identified by name and date of birth  Sylvie Parents was informed that this is a telemedicine visit and that the visit is being conducted through [employer] and patient was informed that this is a secure, HIPAA-compliant platform  She agrees to proceed     My office door was closed  No one else was in the room  She acknowledged consent and understanding of privacy and security of the video platform  The patient has agreed to participate and understands they can discontinue the visit at any time  Patient is aware this is a billable service           [facility] - [address] [place] [address] 22 y o  female MRN: [id number]    Reason for Visit:   Chief Complaint   Patient presents with    Anxiety         Subjective:  Macey Hernandez is a 20-1 y/o  female, parents  for past 10 years (split custody), domiciled with mother in [facility], domiciled with father, step-mother, step-sister (15 y/o) in [facility], also has a twin brother (17 y/o), younger sister (15 y/o) that switch houses with her, starting her second year at [employer] [address] - living on campus (majoring in nursing), 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, PPH significant for h/o generalized anxiety disorder, social anxiety disorder, most recently in outpatient treatment with Dr Roland Osei ending 10/2017, no past psychiatric hospitalizations , no past suicide attempts, no h/o self-injurious behaviors, no h/o physical aggression, PMH significant for POTS syndrome, mast cell activation syndrome, no substance abuse history, [facility] to [facility] outpatient psychiatric care, with patient reporting "I can use help with anxiety and stress management" and mother reporting reporting "I agree with what she said "      The Most Recent Treatment Plan, as Documented From Previous Visit with this Provider on 2/2/2021:  1) Anxiety  · Titrate Effexor XR to 112 5 mg  once daily by mouth   ? This medication may need to be further titrated to reach maximum therapeutic effect depending on patient's future clinical condition  · Continue Xanax 0 25 mg, up to two tablets daily as needed for severe anxiety or panic attacks  · Continue regularly scheduled outpatient individual Psychotherapy  § PHQ-9: Previous score on 11/11/2019: 3  § INES-7: Previous score on 11/11/2019: 7  2) Medical  · Continue to follow-up with Primary Care Provider for ongoing medical care  · Follow-up with GI Specialist for ongoing care  3) Follow-up with this Provider in 6 weeks         History of Present Illness Obtained Through Problem-Focused Interview:   1) INES - 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  School is going well, and a lot better now that she is past midterms  It was a very tough two weeks  She got through it and she feels much better on the other side   She lost a bit of her routine and it was hard to be off her schedule  She is looking forward to summer vacation  She reports that her mood has been good  She thinks that she was sensitive to the medication increase  She reports that it occurred during midterms, and she felt stressed and exhausted  She thinks she felt more tired than normal but she doesn't know if it was the increase in medication or the midterms, or a combination of both  She would go to bed at night and couldn't fall asleep and then she was exhausted during the day  She tried melatonin and it wasn't working  She then tried taking the medication at night and it seems to be better and maybe she has also adjusted to the change in dose of the medication  She is still struggling to fall asleep at night  She isn't as stressed as she used to be and she is tired, but she doesn't know why it takes a while to fall asleep  Psychiatric Review of Systems:   Sleep insomnia   Appetite normal   Decreased Energy Yes    Decreased Interest/Pleasure in Activities No   Medication Side Effects None   Mood Symptoms No   Anxiety/Panic Symptoms No   Attention/Concentration Symptoms No   Manic Symptoms No   Auditory or Visual Hallucinations No   Delusional Ideations No   Suicidal/Homicidal Ideation No     Review Of Systems:  Constitutional Negative   ENT Negative   Cardiovascular Negative   Respiratory Negative   Gastrointestinal Negative   Genitourinary Negative   Musculoskeletal Negative   Integumentary Negative   Neurological Negative   Endocrine Negative     Note: Any significant positives in the Comprehensive Review of Systems will have been noted in the HPI  All other Review of Systems, unless noted otherwise above, are negative          Past Medical History:   Patient Active Problem List   Diagnosis    Celiac artery stenosis (HCC)    INES (generalized anxiety disorder)    Hypermobile joints    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HCC)    Median arcuate ligament syndrome ([facility] [address] )    Menorrhagia    POTS (postural orthostatic tachycardia syndrome)    Seasonal allergic rhinitis    Hypertriglyceridemia    Hammertoe of left foot    Migraine without aura and without status migrainosus, not intractable    Encounter for physical examination    Irritable bowel syndrome (IBS)    Dyspepsia              Allergies: Allergies   Allergen Reactions    Nuts Other (See Comments)     Avani Nuts - itchy throat    Other Hives      At surgical site and IV site- not sure if type of cleanser used    Pollen Extract          Past Surgical History:   Past Surgical History:   Procedure Laterality Date    COLONOSCOPY      EGD AND COLONOSCOPY N/A 1/10/2017    Procedure: EGD AND COLONOSCOPY;  Surgeon: Elza Stephens MD;  [facility]: [facility];   Service:     ESOPHAGOGASTRODUODENOSCOPY      with biopsy    FOOT SURGERY      Excision of lesion feet benign    [place] REPAIR OF HAMMERTOE,ONE Left 12/20/2019    Procedure: 2nd arthrodesis; 5th arthroplasty, flexor tenotomy 2,3,4,5 ;  Surgeon: Bryn Cho DPM;  [facility];  Service: Podiatry    [place] REPAIR OF Nereida Mings Left 6/5/2020    Procedure: 2ND TOE Revision hammertoe with broken implant;  Surgeon: Bryn Cho DPM;  [facility];  Service: Podiatry    WISDOM TOOTH EXTRACTION             The italicized information immediately following this statement has been pulled forward from previous documentation written by this Provider, during most recent office visit on 2/2/2021, and any pertinent changes have been updated accordingly:     Past Psychiatric History:   General Information: H/o generalized anxiety disorder, social anxiety disorder, most recently in outpatient treatment with Dr Ousmane Stallings ending 10/2017, no past psychiatric hospitalizations , no past suicide attempts, no h/o self-injurious behaviors, no h/o physical aggression       Past Medication Trials: Zoloft (helpful but caused drowsiness, possible increase in migraines), Cymbalta 30 mg bid (ineffective)      Current Psychiatric Medications:  Effexor  5 mg, Xanax 0 25 mg prn      Therapist/Counseling Services: Currently in outpatient therapy with Billie Stuart            Family Psychiatric History:   Brother- Autistic Spectrum Disorder  Father- Anxiety (Zoloft)     No FH of suicide        Social History:   Parents  about 10 years, get along with siblings  Janette Luis 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, father works as a flight nurse for [employer], works for [employer] access to firearms   No current relationships   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, plans to go to college in the area           Substance Abuse History:   Denies any substance abuse         Traumatic History:  Denies any h/o physical or sexual abuse          The following portions of the patient's history were reviewed and updated as appropriate: allergies, current medications, past family history, past medical history, past social history, past surgical history and problem list         Objective: There were no vitals filed for this visit        Weight (last 2 days)     None                Mental Status:  Appearance sitting comfortably in chair, dressed in casual clothing, adequate hygiene and grooming, cooperative with interview, fairly well related, good eye contact, pleasant and friendly, bright and happy   Mood "good"   Affect Appears generally euthymic, stable, mood-congruent   Speech Normal rate, rhythm, and volume   Thought Processes Linear and goal directed   Associations intact associations   Hallucinations Denies any auditory or visual hallucinations   Thought Content No passive or active suicidal or homicidal ideation, intent, or plan , No overt delusions elicited, Ruminative about stressors and Future-oriented, help-seeking   Orientation Oriented to person, place, time, and situation   Recent and Remote Memory Grossly intact   Attention Span Concentration intact   Intellect Appears to be of Average Intelligence   Insight Insight intact   Judgment judgment was intact   Muscle Strength Muscle strength and tone were normal   Language Within normal limits   Fund of Knowledge Age appropriate   Pain None         Assessment:       Diagnoses and all orders for this visit:    INES (generalized anxiety disorder)  -     venlafaxine (EFFEXOR-XR) 75 mg 24 hr capsule; Take 1 capsule (75 mg total) by mouth daily Take with one 37 5 mg capsule  Total daily dose is 112 5 mg  -     venlafaxine (EFFEXOR-XR) 37 5 mg 24 hr capsule; Take 1 capsule (37 5 mg total) by mouth daily Take with one 75 mg capsule  Total daily dose is 112 5 mg                Diagnosis/Differential Diagnosis:  1) Generalized Anxiety Disorder, r/o social anxiety disorder, r/o OCD          Medical Decision Making: On assessment today, patient presents with overall improvement in mood and anxiety symptoms since last office visit  She did experience significant stress and anxiety during midterms, which was also around the time that her dose of Effexor XR was increased, but she is now reporting that she is feeling better overall  She does struggle to fall asleep at night, despite taking melatonin  Discussed JAY sleep vitamins with L-Theanine, as well as lavender and sleep hygiene techniques  Patient will continue with regularly scheduled outpatient individual psychotherapy  Instructed patient to contact provider between now and upcoming office visit if there are any questions or concerns, as well as any worsening of symptoms or negative side effects  Patient was pleased with the treatment recommendations that were discussed during today's office visit, and was satisfied with the thorough education that was provided  Patient will follow up at next scheduled office visit  On suicide risk assessment, Patient does not endorse any thoughts of wanting to harm self or others   Patient has not exhibited any recent self-injurious behaviors  Patient does not endorse any active or passive suicidal ideation, intent or plan  Patient is able to contract for safety at the present time  Patient remains future-oriented and goal-directed, as well as motivated and help seeking  There are no significant risk factors  Protective factors include:  No family history of suicide, no personal history of suicide attempt or self-injurious behaviors, no history of substance use, no history of abuse or neglect, no gender dysphoria and no access to firearms  Patient will continue with regularly scheduled outpatient individual psychotherapy  Despite any risk factors that may be present, patient is not an imminent risk of harm to self or others, and is deemed appropriate for continuing outpatient level of care at this time  PHQ-9: Previous score on 11/11/2019: 3  INES-7: Previous score on 11/11/2019: 7        Plan:  1) Anxiety  · Continue Effexor XR 112 5 mg once daily by mouth   ? This medication may need to be further titrated to reach maximum therapeutic effect depending on patient's future clinical condition  · Discussed JAY sleep vitamins with L-Theanine, as well as lavender and sleep hygiene techniques  · Continue Xanax 0 25 mg, up to two tablets daily as needed for severe anxiety or panic attacks  · Continue regularly scheduled outpatient individual Psychotherapy  § PHQ-9: Previous score on 11/11/2019: 3  § INES-7: Previous score on 11/11/2019: 7  2) Medical  · Continue to follow-up with Primary Care Provider for ongoing medical care    · Follow-up with GI Specialist for ongoing care  3) Follow-up with this Provider in 2 months               Summary of Above Information:     Treatment Recommendations/Precautions:     Continue Effexor XR   Continue psychotherapy with [facility] therapist [address] [place] [address] of 24 hour and weekend coverage for urgent situations accessed by calling [facility] main practice number          Risks/Benefits:     Suicide/Homicide Risk Assessment:    Risk of Harm to Self:  Based on today's assessment, Ammon Shaver presents the following risk of harm to self: none    Risk of Harm to Others:  Based on today's assessment, Nury presents the following risk of harm to others: none    The following interventions are recommended: no intervention changes needed      Medications Risks/Benefits:      Risks, Benefits And Possible Side Effects Of Medications:    Risks, benefits, and possible side effects of medications explained to Nury and she verbalizes understanding and agreement for treatment  Controlled Medication Discussion:     Not applicable              Psychotherapy Provided:     Individual psychotherapy provided:     No                 Treatment Plan:    Treatment Plan completed and signed during the session:     Not applicable - Treatment Plan to be completed by [address] [place] [address] E therapist                Based on today's assessment and clinical criteria, patient contracts for safety and is not an imminent risk of harm to self or others  Outpatient level of care is deemed appropriate at this current time  Patient understands that if they can no longer contract for safety, they need to call the office or report to their nearest Emergency Room for immediate evaluation  Portions of this progress note may have been dictated with the use of transcription software  As such, words that may "sound alike" may have been inserted into the overall text of this progress note  Carin Vallejo PA-C   03/23/21    I spent 25 minutes with the patient during this visit  VIRTUAL VISIT DISCLAIMER    Niko Coatssharons acknowledges that she has consented to an online visit or consultation   She understands that the online visit is based solely on information provided by her, and that, in the absence of a face-to-face physical evaluation by the physician, the diagnosis she receives is both limited and provisional in terms of accuracy and completeness  This is not intended to replace a full medical face-to-face evaluation by the physician  Donice Senters understands and accepts these terms

## 2021-03-23 ENCOUNTER — TELEMEDICINE (OUTPATIENT)
Dept: PSYCHIATRY | Facility: CLINIC | Age: 21
End: 2021-03-23
Payer: COMMERCIAL

## 2021-03-23 DIAGNOSIS — F41.1 GAD (GENERALIZED ANXIETY DISORDER): Primary | ICD-10-CM

## 2021-03-23 PROCEDURE — 99214 OFFICE O/P EST MOD 30 MIN: CPT | Performed by: PHYSICIAN ASSISTANT

## 2021-03-23 RX ORDER — VENLAFAXINE HYDROCHLORIDE 37.5 MG/1
37.5 CAPSULE, EXTENDED RELEASE ORAL DAILY
Qty: 90 CAPSULE | Refills: 0 | Status: SHIPPED | OUTPATIENT
Start: 2021-03-23 | End: 2021-07-19 | Stop reason: SDUPTHER

## 2021-03-23 RX ORDER — VENLAFAXINE HYDROCHLORIDE 75 MG/1
75 CAPSULE, EXTENDED RELEASE ORAL DAILY
Qty: 90 CAPSULE | Refills: 0 | Status: SHIPPED | OUTPATIENT
Start: 2021-03-23 | End: 2021-07-19 | Stop reason: SDUPTHER

## 2021-03-25 ENCOUNTER — TELEPHONE (OUTPATIENT)
Dept: GASTROENTEROLOGY | Facility: CLINIC | Age: 21
End: 2021-03-25

## 2021-03-25 DIAGNOSIS — K58.1 IRRITABLE BOWEL SYNDROME WITH CONSTIPATION: Primary | ICD-10-CM

## 2021-03-29 ENCOUNTER — TELEPHONE (OUTPATIENT)
Dept: GASTROENTEROLOGY | Facility: CLINIC | Age: 21
End: 2021-03-29

## 2021-03-30 ENCOUNTER — SOCIAL WORK (OUTPATIENT)
Dept: BEHAVIORAL/MENTAL HEALTH CLINIC | Facility: CLINIC | Age: 21
End: 2021-03-30
Payer: COMMERCIAL

## 2021-03-30 DIAGNOSIS — F41.1 GAD (GENERALIZED ANXIETY DISORDER): ICD-10-CM

## 2021-03-30 PROCEDURE — 90834 PSYTX W PT 45 MINUTES: CPT | Performed by: SOCIAL WORKER

## 2021-03-31 NOTE — PSYCH
Psychotherapy Provided: Individual Psychotherapy 50 minutes      Length of time in session: 50 minutes, follow up in 2 week     Goals addressed in session: Goal 1      Pain: 0  Current suicide risk : Low      D: Savannah Arnold spoke  with this worker today re: her  feelings re: her shutdown / "outburst" over the past weekend and the reasons as they relate to her best friend, fear of "losing" her were processed at length  Ange Cespedes shared that she does not feel "heard" by her parents nor supported / validated by them and has difficulty voicing her feelings, concerns to them  A : Ange Cespedes has continued to work diligently in therapy and is struggling with the above     P:  Upcoming sessions will be used to continue to support Nury with all of the above   A family session will be scheduled    Bharath Phillips: Diagnosis and Treatment Plan explained to Nury, Nury relates understanding diagnosis and is agreeable to Treatment Plan   Yes      This note was not shared with the patient due to this is a psychotherapy note

## 2021-04-06 ENCOUNTER — SOCIAL WORK (OUTPATIENT)
Dept: BEHAVIORAL/MENTAL HEALTH CLINIC | Facility: CLINIC | Age: 21
End: 2021-04-06
Payer: COMMERCIAL

## 2021-04-06 DIAGNOSIS — F41.1 GAD (GENERALIZED ANXIETY DISORDER): ICD-10-CM

## 2021-04-06 PROCEDURE — 90834 PSYTX W PT 45 MINUTES: CPT | Performed by: SOCIAL WORKER

## 2021-04-06 NOTE — PSYCH
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

## 2021-04-07 ENCOUNTER — SOCIAL WORK (OUTPATIENT)
Dept: BEHAVIORAL/MENTAL HEALTH CLINIC | Facility: CLINIC | Age: 21
End: 2021-04-07
Payer: COMMERCIAL

## 2021-04-07 DIAGNOSIS — F41.1 GAD (GENERALIZED ANXIETY DISORDER): ICD-10-CM

## 2021-04-07 PROCEDURE — 90847 FAMILY PSYTX W/PT 50 MIN: CPT | Performed by: SOCIAL WORKER

## 2021-04-08 NOTE — PSYCH
Psychotherapy Provided: Family Psychotherapy 50 minutes      Length of time in session: 50 minutes, follow up in 2 week     Goals addressed in session: Goal 1      Pain: 0  Current suicide risk : Low      D:  Nury met with both of her parents and this worker today in order to process her "shutdown" last week as well as to discuss  her "progress" in therapy  Angella Chaidez tenuously shared with them that she did not feel "heard" when she attempted to tell them how she felt and why she did not want a male peer invovled in their family business  Reasons for her panic were also processed as were their concerns that she be more open with them about her feelings  A : Angella Chaidez was able to share with her parents her work in becoming more assertive and vocal while both her parents stated their pride and recognition of her progress  They also agree that she (over)extends herseslf to others     P:  Upcoming sessions will be used to continue to support Nury with all of the above        [facility]: Diagnosis and Treatment Plan explained to Nury, Nury relates understanding diagnosis and is agreeable to Treatment Plan   Yes      This note was not shared with the patient due to this is a psychotherapy note

## 2021-04-13 ENCOUNTER — SOCIAL WORK (OUTPATIENT)
Dept: BEHAVIORAL/MENTAL HEALTH CLINIC | Facility: CLINIC | Age: 21
End: 2021-04-13
Payer: COMMERCIAL

## 2021-04-13 DIAGNOSIS — F41.1 GAD (GENERALIZED ANXIETY DISORDER): ICD-10-CM

## 2021-04-13 PROCEDURE — 90834 PSYTX W PT 45 MINUTES: CPT | Performed by: SOCIAL WORKER

## 2021-04-13 NOTE — PSYCH
Psychotherapy Provided: Individual Psychotherapy 50 minutes      Length of time in session: 50 minutes, follow up in 2 week     Goals addressed in session: Goal 1      Pain: 0  Current suicide risk : Low      D: Rena Garcia spoke today about her feelings re: last week's family session  She stated that her anxiety was so high prior to the session that she "came very close to cancelling "  She agreed to engage in a CBT assignment in order to dissect the Reasons for her internal panic about this  A : Juana Kimball was able to share reasons that she was unwilling to open up to her parents in this session  She does not feel "heard," and her stepmother is (still) not speaking to her after several weeks at this time       P:  Upcoming sessions will be used to continue to support Nury with all of the above        [address] [place], [place] [address]ke: Diagnosis and Treatment Plan explained to Nury, Nury relates understanding diagnosis and is agreeable to Treatment Plan   Yes      This note was not shared with the patient due to this is a psychotherapy note

## 2021-04-16 ENCOUNTER — OFFICE VISIT (OUTPATIENT)
Dept: GASTROENTEROLOGY | Facility: CLINIC | Age: 21
End: 2021-04-16
Payer: COMMERCIAL

## 2021-04-16 VITALS
HEART RATE: 110 BPM | TEMPERATURE: 97.5 F | SYSTOLIC BLOOD PRESSURE: 110 MMHG | HEIGHT: 68 IN | BODY MASS INDEX: 25.19 KG/M2 | DIASTOLIC BLOOD PRESSURE: 60 MMHG | WEIGHT: 166.2 LBS

## 2021-04-16 DIAGNOSIS — K58.0 IRRITABLE BOWEL SYNDROME WITH DIARRHEA: ICD-10-CM

## 2021-04-16 DIAGNOSIS — K58.1 IRRITABLE BOWEL SYNDROME WITH CONSTIPATION: Primary | ICD-10-CM

## 2021-04-16 DIAGNOSIS — R10.13 DYSPEPSIA: ICD-10-CM

## 2021-04-16 PROCEDURE — 99213 OFFICE O/P EST LOW 20 MIN: CPT | Performed by: PHYSICIAN ASSISTANT

## 2021-04-16 RX ORDER — LINACLOTIDE 72 UG/1
1 CAPSULE, GELATIN COATED ORAL
Qty: 12 CAPSULE | Refills: 0 | Status: SHIPPED | COMMUNITY
Start: 2021-04-16 | End: 2021-12-06 | Stop reason: SDUPTHER

## 2021-04-16 RX ORDER — LINACLOTIDE 72 UG/1
1 CAPSULE, GELATIN COATED ORAL
Qty: 90 CAPSULE | Refills: 3 | Status: SHIPPED | OUTPATIENT
Start: 2021-04-16 | End: 2022-06-15

## 2021-04-16 NOTE — PROGRESS NOTES
[facility] - Outpatient Follow-up Note  Samson Chavez 22 y o  female MRN: [id number]  Encounter: [id number]          ASSESSMENT AND PLAN:      1  Irritable bowel syndrome with constipation  She has longstanding history of IBS and has been struggling with constipation  Thus far, she has tried and failed Trulance and Amitiza along with multiple over-the-counter medications including fiber, MiraLax, magnesium citrate, oral Dulcolax  She had diarrhea from daily Linzess  I would recommend trialing Linzess 72 mcg every other day or every 3rd day  We will provide her with samples and also send new prescription to her pharmacy, as this will likely require prior authorization  If less frequent dosing of Linzess continues to cause diarrhea, we could go back to Amitiza and add Dulcolax suppositories/enemas every 3 days if no BM  Alternatively, we could try and get coverage for [employer] which works to [employer]  - linaCLOtide (Linzess) 72 MCG CAPS; Take 1 capsule by mouth daily before breakfast  Dispense: 90 capsule; Refill: 3  - linaCLOtide (Linzess) 72 MCG CAPS; Take 1 capsule by mouth daily before breakfast  Dispense: 12 capsule; Refill: 0    2  Dyspepsia  Her postprandial abdominal pain and bloating have responded to gluten free diet  Gastric and duodenal biopsies negative for H pylori and celiac disease  Gastric emptying study was normal     Follow-up in 3 months  ______________________________________________________________________    SUBJECTIVE:  71-year-old female with history of IBS, dyspepsia, POTS,  And anxiety presenting for office follow-up  She began eating a gluten free diet and her dyspepsia has much improved  She reports less abdominal pain after eating and bloating  She feels much better in this regard  She does continue to suffer from constipation  She was started on Linzess but had severe diarrhea so she stopped medication   She was then started on Trulance and had good results for a while, but the affect seemed to wear off in the constipation returned  She was started on Amitiza about 1 month ago but continues to have constipation issues  She has a bowel movement every 1-2 weeks  She still strains to have a BM  She is wondering what else she can take  REVIEW OF SYSTEMS IS OTHERWISE NEGATIVE  Historical Information   Past Medical History:   Diagnosis Date    Anxiety     Asthma     Dizziness     at times    GERD (gastroesophageal reflux disease)     Hammertoe of left foot     Headache     occ    Hyperlipemia     Hypermobile joints     Irritable bowel syndrome     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HCC)     Mast cell disorder     Migraine     PONV (postoperative nausea and vomiting)     POTS (postural orthostatic tachycardia syndrome)      infant     Wears glasses      Past Surgical History:   Procedure Laterality Date    COLONOSCOPY      EGD AND COLONOSCOPY N/A 1/10/2017    Procedure: EGD AND COLONOSCOPY;  Surgeon: Reji Degroot MD;  [facility]; Service:     ESOPHAGOGASTRODUODENOSCOPY      with biopsy    FOOT SURGERY      Excision of lesion feet benign    DE REPAIR OF HAMMERTOE,ONE Left 2019    Procedure: 2nd arthrodesis; 5th arthroplasty, flexor tenotomy 2,3,4,5 ;  Surgeon: Jamia Garza DPM;  [facility];  Service: Podiatry    DE REPAIR OF Leocadia Alden Left 2020    Procedure: 2ND TOE Revision hammertoe with broken implant;  Surgeon: Jamia Garza DPM;  [facility];  Service: Podiatry    UPPER GASTROINTESTINAL ENDOSCOPY      WISDOM TOOTH EXTRACTION       Social History   Social History     Substance and Sexual Activity   Alcohol Use No     Social History     Substance and Sexual Activity   Drug Use No    Comment: 21- not asked, parent at bedside       Social History     Tobacco Use   Smoking Status Never Smoker   Smokeless Tobacco Never Used     Family History   Problem Relation Age of Onset    Gestational diabetes Mother     Anxiety disorder Father     Hyperlipidemia Father     Autism Brother     Diabetes Other     Uterine cancer Maternal Grandmother     Rheumatic fever Maternal Grandmother     Diabetes Maternal Grandfather     Hypertension Maternal Grandfather     Heart attack Maternal Grandfather     Stroke Maternal Grandfather     Hyperlipidemia Maternal Grandfather     Hypertension Paternal Grandmother     Anxiety disorder Paternal Grandmother     Hypertension Paternal Grandfather        Meds/Allergies       Current Outpatient Medications:     acetaminophen (TYLENOL) 500 mg tablet    ALPRAZolam (XANAX) 0 25 mg tablet    ASMANEX  MCG/ACT AERO    cetirizine (ZyrTEC) 10 mg tablet    cyproheptadine (PERIACTIN) 4 mg tablet    dicyclomine (BENTYL) 10 mg capsule    Elastic Bandages & Supports (TRUFORM STOCKINGS 20-30MMHG) MISC    fludrocortisone (FLORINEF) 0 1 mg tablet    linaCLOtide (Linzess) 72 MCG CAPS    medroxyPROGESTERone acetate (DEPO-PROVERA SYRINGE) 150 mg/mL injection    metoclopramide (REGLAN) 10 mg tablet    metoprolol succinate (TOPROL-XL) 25 mg 24 hr tablet    OLANZapine (ZyPREXA) 5 mg tablet    Omega-3 Fatty Acids (FISH OIL) 1,000 mg    scopolamine (TRANSDERM-SCOP) 1 5 mg/3 days TD 72 hr patch    Ubrogepant 50 MG TABS    venlafaxine (EFFEXOR-XR) 37 5 mg 24 hr capsule    venlafaxine (EFFEXOR-XR) 75 mg 24 hr capsule    XOPENEX HFA 45 MCG/ACT inhaler    linaCLOtide (Linzess) 72 MCG CAPS    naproxen (EC NAPROSYN) 500 MG EC tablet    Current Facility-Administered Medications:     albuterol inhalation solution 2 5 mg, 2 5 mg, Nebulization, Q6H PRN    medroxyPROGESTERone acetate (DEPO-PROVERA SYRINGE) IM injection 150 mg, 150 mg, Intramuscular, Q3 Months, 150 mg at 09/17/20 1611    Allergies   Allergen Reactions    Nuts - Food Allergy Other (See Comments)     Avani Nuts - itchy throat    Other Hives      At surgical site and IV site- not sure if type of cleanser used    Pollen Extract            Objective     Blood pressure 110/60, pulse (!) 110, temperature 97 5 °F (36 4 °C), temperature source Tympanic, height 5' 7 5" (1 715 m), weight 75 4 kg (166 lb 3 2 oz), not currently breastfeeding  Body mass index is 25 65 kg/m²  PHYSICAL EXAM:      General Appearance:   Alert, cooperative, no distress   HEENT:   Normocephalic, atraumatic, anicteric      Neck:  Supple, symmetrical, trachea midline   Lungs:   Clear to auscultation bilaterally; no rales, rhonchi or wheezing; respirations unlabored    Heart[de-identified]   Regular rate and rhythm; no murmur, rub, or gallop  Abdomen:   Soft, non-tender, non-distended; normal bowel sounds; no masses, no organomegaly    Genitalia:   Deferred    Rectal:   Deferred    Extremities:  No cyanosis, clubbing or edema    Pulses:  2+ and symmetric    Skin:  No jaundice, rashes, or lesions    Lymph nodes:  No palpable cervical lymphadenopathy        Lab Results:   No visits with results within 1 Day(s) from this visit     Latest known visit with results is:   Hospital Outpatient Visit on 01/28/2021   Component Date Value    EXT Preg Test, Ur 01/28/2021 Negative     Control 01/28/2021 Valid     Case Report 01/28/2021                      Value:Surgical Pathology Report                         Case: [id number]                                   Authorizing Provider:  Horacio Ramon MD           Collected:           01/28/2021 0741              Ordering Location:     [facility]        Received:            01/28/2021 [facility]                                                     Pathologist:           Vaughn Garza MD                                                          Specimens:   A) - Duodenum, bx, R/O celiac                                                                       B) - Stomach, gastric bx, R/O H  pylori  Final Diagnosis 01/28/2021                      Value: This result contains rich text formatting which cannot be displayed here   Additional Information 01/28/2021                      Value: This result contains rich text formatting which cannot be displayed here  Sheryle Kil Description 01/28/2021                      Value: This result contains rich text formatting which cannot be displayed here  Radiology Results:   No results found

## 2021-04-16 NOTE — LETTER
April 16, 2021     Sveta Hartley, [address]     Patient: Samson Chavez   YOB: 2000   Date of Visit: 4/16/2021       Dear Dr Gerardo Romero: Thank you for referring Grisel Deleon to me for evaluation  Below are my notes for this consultation  If you have questions, please do not hesitate to call me  I look forward to following your patient along with you  Sincerely,        Airam Bustamante PA-C        CC: No Recipients  Airam Bustamante PA-C  4/16/2021 12:43 PM  Sign when Signing Visit  [facility] - Outpatient Follow-up Note  Samson Chavez 22 y o  female MRN: [id number]  Encounter: [id number]          ASSESSMENT AND PLAN:      1  Irritable bowel syndrome with constipation  She has longstanding history of IBS and has been struggling with constipation  Thus far, she has tried and failed Trulance and Amitiza along with multiple over-the-counter medications including fiber, MiraLax, magnesium citrate, oral Dulcolax  She had diarrhea from daily Linzess  I would recommend trialing Linzess 72 mcg every other day or every 3rd day  We will provide her with samples and also send new prescription to her pharmacy, as this will likely require prior authorization  If less frequent dosing of Linzess continues to cause diarrhea, we could go back to Amitiza and add Dulcolax suppositories/enemas every 3 days if no BM  Alternatively, we could try and get coverage for [employer] which works to [employer]  - linaCLOtide (Linzess) 72 MCG CAPS; Take 1 capsule by mouth daily before breakfast  Dispense: 90 capsule; Refill: 3  - linaCLOtide (Linzess) 72 MCG CAPS; Take 1 capsule by mouth daily before breakfast  Dispense: 12 capsule; Refill: 0    2  Dyspepsia  Her postprandial abdominal pain and bloating have responded to gluten free diet  Gastric and duodenal biopsies negative for H pylori and celiac disease   Gastric emptying study was normal     Follow-up in 3 months  ______________________________________________________________________    SUBJECTIVE:  60-year-old female with history of IBS, dyspepsia, POTS,  And anxiety presenting for office follow-up  She began eating a gluten free diet and her dyspepsia has much improved  She reports less abdominal pain after eating and bloating  She feels much better in this regard  She does continue to suffer from constipation  She was started on Linzess but had severe diarrhea so she stopped medication  She was then started on Trulance and had good results for a while, but the affect seemed to wear off in the constipation returned  She was started on Amitiza about 1 month ago but continues to have constipation issues  She has a bowel movement every 1-2 weeks  She still strains to have a BM  She is wondering what else she can take  REVIEW OF SYSTEMS IS OTHERWISE NEGATIVE  Historical Information   Past Medical History:   Diagnosis Date    Anxiety     Asthma     Dizziness     at times    GERD (gastroesophageal reflux disease)     Hammertoe of left foot     Headache     occ    Hyperlipemia     Hypermobile joints     Irritable bowel syndrome     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HCC)     Mast cell disorder     Migraine     PONV (postoperative nausea and vomiting)     POTS (postural orthostatic tachycardia syndrome)      infant     Wears glasses      Past Surgical History:   Procedure Laterality Date    COLONOSCOPY      EGD AND COLONOSCOPY N/A 1/10/2017    Procedure: EGD AND COLONOSCOPY;  Surgeon: Suzanne Knight MD;  [facility];   Service:     ESOPHAGOGASTRODUODENOSCOPY      with biopsy    FOOT SURGERY      Excision of lesion feet benign    NE REPAIR OF HAMMERTOE,ONE Left 2019    Procedure: 2nd arthrodesis; 5th arthroplasty, flexor tenotomy 2,3,4,5 ;  Surgeon: Brianna Henderson DPM;  Location[facility];  Service: [address] [place] [address] Candi Blanco Left 6/5/2020    Procedure: 2ND TOE Revision oscarertoe with broken implant;  Surgeon: Jessenia Naik DPM;  [facility];  Service: Podiatry    UPPER GASTROINTESTINAL ENDOSCOPY      WISDOM TOOTH EXTRACTION       Social History   Social History     Substance and Sexual Activity   Alcohol Use No     Social History     Substance and Sexual Activity   Drug Use No    Comment: 2/9/20- not asked, parent at bedside       Social History     Tobacco Use   Smoking Status Never Smoker   Smokeless Tobacco Never Used     Family History   Problem Relation Age of Onset    Gestational diabetes Mother     Anxiety disorder Father     Hyperlipidemia Father     Autism Brother     Diabetes Other     Uterine cancer Maternal Grandmother     Rheumatic fever Maternal Grandmother     Diabetes Maternal Grandfather     Hypertension Maternal Grandfather     Heart attack Maternal Grandfather     Stroke Maternal Grandfather     Hyperlipidemia Maternal Grandfather     Hypertension Paternal Grandmother     Anxiety disorder Paternal Grandmother     Hypertension Paternal Grandfather        Meds/Allergies       Current Outpatient Medications:     acetaminophen (TYLENOL) 500 mg tablet    ALPRAZolam (XANAX) 0 25 mg tablet    ASMANEX  MCG/ACT AERO    cetirizine (ZyrTEC) 10 mg tablet    cyproheptadine (PERIACTIN) 4 mg tablet    dicyclomine (BENTYL) 10 mg capsule    Elastic Bandages & Supports (TRUFORM STOCKINGS 20-30MMHG) MISC    fludrocortisone (FLORINEF) 0 1 mg tablet    linaCLOtide (Linzess) 72 MCG CAPS    medroxyPROGESTERone acetate (DEPO-PROVERA SYRINGE) 150 mg/mL injection    metoclopramide (REGLAN) 10 mg tablet    metoprolol succinate (TOPROL-XL) 25 mg 24 hr tablet    OLANZapine (ZyPREXA) 5 mg tablet    Omega-3 Fatty Acids (FISH OIL) 1,000 mg    scopolamine (TRANSDERM-SCOP) 1 5 mg/3 days TD 72 hr patch    Ubrogepant 50 MG TABS    venlafaxine (EFFEXOR-XR) 37 5 mg 24 hr capsule   venlafaxine (EFFEXOR-XR) 75 mg 24 hr capsule    XOPENEX HFA 45 MCG/ACT inhaler    linaCLOtide (Linzess) 72 MCG CAPS    naproxen (EC NAPROSYN) 500 MG EC tablet    Current Facility-Administered Medications:     albuterol inhalation solution 2 5 mg, 2 5 mg, Nebulization, Q6H PRN    medroxyPROGESTERone acetate (DEPO-PROVERA SYRINGE) IM injection 150 mg, 150 mg, Intramuscular, Q3 Months, 150 mg at 09/17/20 1611    Allergies   Allergen Reactions    Nuts - Food Allergy Other (See Comments)     Avani Nuts - itchy throat    Other Hives      At surgical site and IV site- not sure if type of cleanser used    Pollen Extract            Objective     Blood pressure 110/60, pulse (!) 110, temperature 97 5 °F (36 4 °C), temperature source Tympanic, height 5' 7 5" (1 715 m), weight 75 4 kg (166 lb 3 2 oz), not currently breastfeeding  Body mass index is 25 65 kg/m²  PHYSICAL EXAM:      General Appearance:   Alert, cooperative, no distress   HEENT:   Normocephalic, atraumatic, anicteric      Neck:  Supple, symmetrical, trachea midline   Lungs:   Clear to auscultation bilaterally; no rales, rhonchi or wheezing; respirations unlabored    Heart[de-identified]   Regular rate and rhythm; no murmur, rub, or gallop  Abdomen:   Soft, non-tender, non-distended; normal bowel sounds; no masses, no organomegaly    Genitalia:   Deferred    Rectal:   Deferred    Extremities:  No cyanosis, clubbing or edema    Pulses:  2+ and symmetric    Skin:  No jaundice, rashes, or lesions    Lymph nodes:  No palpable cervical lymphadenopathy        Lab Results:   No visits with results within 1 Day(s) from this visit     Latest known visit with results is:   Hospital Outpatient Visit on 01/28/2021   Component Date Value    EXT Preg Test, Ur 01/28/2021 Negative     Control 01/28/2021 Valid     Case Report 01/28/2021                      Value:Surgical Pathology Report                         Case: [id number]                                   Authorizing Provider:  Ingrid Rodriguez MD           Collected:           01/28/2021 0741              Ordering Location:     [facility]        Received:            01/28/2021 [facility]                                                     Pathologist:           Shaun Koch MD                                                          Specimens:   A) - Duodenum, bx, R/O celiac                                                                       B) - Stomach, gastric bx, R/O H  pylori                                                    Final Diagnosis 01/28/2021                      Value: This result contains rich text formatting which cannot be displayed here   Additional Information 01/28/2021                      Value: This result contains rich text formatting which cannot be displayed here  Dalila Dang Description 01/28/2021                      Value: This result contains rich text formatting which cannot be displayed here  Radiology Results:   No results found

## 2021-04-20 ENCOUNTER — TELEPHONE (OUTPATIENT)
Dept: GASTROENTEROLOGY | Facility: CLINIC | Age: 21
End: 2021-04-20

## 2021-04-20 ENCOUNTER — SOCIAL WORK (OUTPATIENT)
Dept: BEHAVIORAL/MENTAL HEALTH CLINIC | Facility: CLINIC | Age: 21
End: 2021-04-20
Payer: COMMERCIAL

## 2021-04-20 DIAGNOSIS — F41.1 GAD (GENERALIZED ANXIETY DISORDER): ICD-10-CM

## 2021-04-20 PROCEDURE — 90834 PSYTX W PT 45 MINUTES: CPT | Performed by: SOCIAL WORKER

## 2021-04-20 NOTE — TELEPHONE ENCOUNTER
Prior authorization for the medication Linzess 72 mcg capsule is pending  Clinicals faxed to [employer]  Waiting for response

## 2021-04-20 NOTE — PSYCH
Psychotherapy Provided: Individual Psychotherapy 50 minutes      Length of time in session: 50 minutes, follow up in 2 week     Goals addressed in session: Goal 1      Pain: 0  Current suicide risk : Low      D: Cassandra Lundberg spoke further today about her feelings re: the previous family session as her homework was reviewed  She shared what further information she had learned about her family that further deteriorated her trust in them and this was processed at length  A : Josesito Cage was recognize why is unable to trust her parents and her past was reviewed  In detail  She also experienced a panic attack during today's session that was processed       P:  Upcoming sessions will be used to continue to support Nury with all of the above        [facility] Luke: Diagnosis and Treatment Plan explained to Nury, Nury relates understanding diagnosis and is agreeable to Treatment Plan   Yes      This note was not shared with the patient due to this is a psychotherapy note

## 2021-04-22 ENCOUNTER — OFFICE VISIT (OUTPATIENT)
Dept: DERMATOLOGY | Facility: CLINIC | Age: 21
End: 2021-04-22
Payer: COMMERCIAL

## 2021-04-22 VITALS — BODY MASS INDEX: 25.25 KG/M2 | WEIGHT: 166.6 LBS | TEMPERATURE: 97.5 F | HEIGHT: 68 IN

## 2021-04-22 DIAGNOSIS — L70.0 ACNE VULGARIS: Primary | ICD-10-CM

## 2021-04-22 PROCEDURE — 99204 OFFICE O/P NEW MOD 45 MIN: CPT | Performed by: STUDENT IN AN ORGANIZED HEALTH CARE EDUCATION/TRAINING PROGRAM

## 2021-04-22 RX ORDER — DOXYCYCLINE HYCLATE 100 MG/1
100 CAPSULE ORAL 2 TIMES DAILY WITH MEALS
Qty: 60 CAPSULE | Refills: 2 | Status: SHIPPED | OUTPATIENT
Start: 2021-04-22 | End: 2021-05-22

## 2021-04-22 NOTE — PROGRESS NOTES
[facility] Dermatology Clinic Note     Patient Name: Faiza Manzano  Encounter Date: 04/22/2021     Have you been cared for by a [facility] Dermatologist in the last 3 years and, if so, which one? No    · Have you traveled outside of the [address] [place] [address] in the past 3 months or outside of the [facility] in the last 2 weeks? No     May we call your Preferred Phone number to discuss your specific medical information? Yes     May we leave a detailed message that includes your specific medical information? Yes      Today's Chief Concerns:   Concern #1:  acne   Concern #2:      Past Medical History:  Have you personally ever had or currently have any of the following? · Skin cancer (such as Melanoma, Basal Cell Carcinoma, Squamous Cell Carcinoma? (If Yes, please provide more detail)- No  · Eczema: No  · Psoriasis: No  · HIV/AIDS: No  · Hepatitis B or C: No  · Tuberculosis: No  · Systemic Immunosuppression such as Diabetes, Biologic or Immunotherapy, Chemotherapy, Organ Transplantation, Bone Marrow Transplantation (If YES, please provide more detail): No  · Radiation Treatment (If YES, please provide more detail): No  · Any other major medical conditions/concerns? 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 Family History:  Have any of your "first degree relatives" (parent, brother, sister, or child) had any of the following       · Skin cancer such as Melanoma or Merkel Cell Carcinoma or Pancreatic Cancer? YES, grandmother  · Eczema, Asthma, Hay Fever or Seasonal Allergies: No  · Psoriasis or Psoriatic Arthritis: No  · Do any other medical conditions seem to run in your family? If Yes, what condition and which relatives?   No    Current Medications:   (please update all dermatological medications before printing patient's AVS!)      Current Outpatient Medications:     acetaminophen (TYLENOL) 500 mg tablet, Take 1,000 mg by mouth every 4 (four) hours as needed , Disp: , Rfl:     ALPRAZolam (XANAX) 0 25 mg tablet, Take 0 25 mg by mouth 2 (two) times a day as needed , Disp: , Rfl:     ASMANEX  MCG/ACT AERO, Inhale 400 mcg every 4 (four) hours as needed (2 puffs) , Disp: , Rfl:     cetirizine (ZyrTEC) 10 mg tablet, Take 10 mg by mouth daily, Disp: , Rfl:     cyproheptadine (PERIACTIN) 4 mg tablet, Take 4 mg by mouth daily at bedtime , Disp: , Rfl:     dicyclomine (BENTYL) 10 mg capsule, Take 1 capsule (10 mg total) by mouth 3 (three) times a day as needed (abdominal pain), Disp: 30 capsule, Rfl: 0    Elastic Bandages & Supports (TRUFORM STOCKINGS 20-30MMHG) MISC, Use as directed , Disp: , Rfl:     fludrocortisone (FLORINEF) 0 1 mg tablet, Take 0 1 mg by mouth daily Taken 1 tablet in the Morning 1 tablet at Night, Disp: , Rfl:     linaCLOtide (Linzess) 72 MCG CAPS, Take 1 capsule by mouth daily before breakfast, Disp: 90 capsule, Rfl: 3    linaCLOtide (Linzess) 72 MCG CAPS, Take 1 capsule by mouth daily before breakfast, Disp: 12 capsule, Rfl: 0    medroxyPROGESTERone acetate (DEPO-PROVERA SYRINGE) 150 mg/mL injection, Inject 1 mL (150 mg total) into a muscle every 3 (three) months Every three months, Disp: 1 mL, Rfl: 3    metoclopramide (REGLAN) 10 mg tablet, Take 1 tablet (10 mg total) by mouth every 6 (six) hours (Patient taking differently: Take 10 mg by mouth every 6 (six) hours as needed ), Disp: 30 tablet, Rfl: 0    metoprolol succinate (TOPROL-XL) 25 mg 24 hr tablet, Take 50 mg by mouth every evening , Disp: , Rfl:     OLANZapine (ZyPREXA) 5 mg tablet, Take at bedtime only on the day of headache, Disp: 5 tablet, Rfl: 0    Omega-3 Fatty Acids (FISH OIL) 1,000 mg, Take 1,000 mg by mouth 2 (two) times a day , Disp: , Rfl:     scopolamine (TRANSDERM-SCOP) 1 5 mg/3 days TD 72 hr patch, Place 1 patch on the skin every third day, Disp: 10 patch, Rfl: 0    Ubrogepant 50 MG TABS, 1 tab at the onset of migraine headache may repeat in 2 hours if needed max 200 mg per day, Disp: 12 tablet, Rfl: 1    venlafaxine (EFFEXOR-XR) 37 5 mg 24 hr capsule, Take 1 capsule (37 5 mg total) by mouth daily Take with one 75 mg capsule  Total daily dose is 112 5 mg, Disp: 90 capsule, Rfl: 0    venlafaxine (EFFEXOR-XR) 75 mg 24 hr capsule, Take 1 capsule (75 mg total) by mouth daily Take with one 37 5 mg capsule  Total daily dose is 112 5 mg, Disp: 90 capsule, Rfl: 0    XOPENEX HFA 45 MCG/ACT inhaler, Inhale 1-2 puffs every 4 (four) hours as needed , Disp: , Rfl:     naproxen (EC NAPROSYN) 500 MG EC tablet, Take 1 tablet (500 mg total) by mouth 2 (two) times a day with meals (Patient not taking: Reported on 12/10/2020), Disp: 60 tablet, Rfl: 0    Current Facility-Administered Medications:     albuterol inhalation solution 2 5 mg, 2 5 mg, Nebulization, Q6H PRN, Tolu Pineda PA-C    medroxyPROGESTERone acetate (DEPO-PROVERA SYRINGE) IM injection 150 mg, 150 mg, Intramuscular, Q3 Months, KETAN Rolon, 150 mg at 09/17/20 1611      Review of Systems:  Have you recently had or currently have any of the following? If YES, what are you doing for the problem? · Fever, chills or unintended weight loss: No  · Sudden loss or change in your vision: No  · Nausea, vomiting or blood in your stool: No  · Painful or swollen joints: No  · Wheezing or cough: No  · Changing mole or non-healing wound: No  · Nosebleeds: No  · Excessive sweating: No  · Easy or prolonged bleeding? No  · Over the last 2 weeks, how often have you been bothered by the following problems? · Taking little interest or pleasure in doing things: 1 - Not at All  · Feeling down, depressed, or hopeless: 1 - Not at All   · Patient taking medication   · Rapid heartbeat with epinephrine:  No    · FEMALES ONLY:    · Are you pregnant or planning to become pregnant?  No  · Are you currently or planning to be nursing or breast feeding? No    · Any known allergies? Allergies   Allergen Reactions    Nuts - Food Allergy Other (See Comments)     Avani Nuts - itchy throat    Other Hives      At surgical site and IV site- not sure if type of cleanser used    Pollen Extract          Physical Exam:     Was a chaperone (Derm Clinical Assistant) present throughout the entire Physical Exam? Yes     Did the Dermatology Team specifically  the patient on the importance of a Full Skin Exam to be sure that nothing is missed clinically? Yes}  o Did the patient ultimately request or accept a Full Skin Exam?  NO  o Did the patient specifically refuse to have the areas "under-the-bra" examined by the Dermatologist? Carlos martinez Did the patient specifically refuse to have the areas "under-the-underwear" examined by the Dermatologist? YES    CONSTITUTIONAL:   Vitals:    04/22/21 0836   Temp: 97 5 °F (36 4 °C)   Weight: 75 6 kg (166 lb 9 6 oz)   Height: 5' 7 5" (1 715 m)           PSYCH: Normal mood and affect  EYES: Normal conjunctiva  ENT: Normal lips and oral mucosa  CARDIOVASCULAR: No edema  RESPIRATORY: Normal respirations  HEME/LYMPH/IMMUNO:  No ostensible subQ swelling except as noted below in "ASSESSMENT AND PLAN BY DIAGNOSIS"    SKIN:  FULL ORGAN SYSTEM EXAM  , Face, Upper chest, Upper back Normal except as noted below in Assessment   Neck, Cervical Chain Nodes    Right Arm/Hand/Fingers    Left Arm/Hand/Fingers    Chest/Breasts/Axillae    Abdomen, Umbilicus    Back/Spine    Groin/Genitalia/Buttocks    Right Leg, Foot, Toes    Left Leg, Foot, Toes         Assessment and Plan by Diagnosis:    History of Present Condition:     Duration:  How long has this been an issue for you?    o  teen years    Location Affected:  Where on the body is this affecting you?    o  face    Quality:  Is there any bleeding, pain, itch, burning/irritation, or redness associated with the skin lesion?     o denies   Severity:  Describe any bleeding, pain, itch, burning/irritation, or redness on a scale of 1 to 10 (with 10 being the worst)  o  n/a   Timing:  Does this condition seem to be there pretty constantly or do you notice it more at specific times throughout the day?    o  denies   Context:  Have you ever noticed that this condition seems to be associated with specific activities you do?    o  deniese   Modifying Factors:    o Anything that seems to make the condition worse?    -  becoming cystic   o What have you tried to do to make the condition better?    -  benzoyl peroxide wash, aczone, proactive, over the counter washes, minocycline     Associated Signs and Symptoms:  Does this skin lesion seem to be associated with any of the following:  o  denies       1  ACNE VULGARIS ("COMMON ACNE")    Physical Exam:   Psychiatric/Mood:   Anatomic Location Affected:  Face, chest and back    Morphological Description:  o Open/Closed Comedones:  - Several ("Moderate")  o Inflammatory Papules/Pustules:  - Several ("Moderate")  o Nodules:  - Several ("Moderate")  o Scarring:  - Several ("Moderate")  o Excoriations:  - Several ("Moderate")  o Local Skin Redness/Erythema:  - Several ("Moderate")  o Local Skin Dryness/Scaling:  - Several ("Moderate")  o Local Skin Dyspigmentation:  - Several ("Moderate")   Pertinent Positives:   Pertinent Negatives: Additional History of Present Condition:  Patient states she was seen by pcp and was treated for impetigo with some topical antibiotic cream      Assessment and Plan:   We reviewed the causes of acne, the kinds of acne, and the expected clinical course   We discussed treatment options ranging from over-the-counter products, topical retinoids, antibiotics, BP, hormonal therapies (OCPs/spironolactone), and isotretinoin (Accutane)   We reviewed specific over-the-counter interventions and medications   Recommended typical hygiene measures including water-based facial products, washing regularly with mild cleanser, and refraining from picking and popping any pimples   Recommended non-comedogenic sunscreen use daily   Expectations of therapy discussed  Side effects, risks and benefits of medications discussed   A comprehensive handout on Acne was provided   The phone number to call in case of questions or concerns (and instructions to stop medications in such a scenario) was provided   After lengthy discussion of etiology and treatment options, we decided to implement the following personalized treatment plan:    Based on a thorough discussion of this condition and the management approach to it (including a comprehensive discussion of the known risks, side effects and potential benefits of treatment), the patient (family) agrees to implement the following specific plan:    --------------------------------------------------------------------------------------  YOUR PERSONALIZED ACNE ACTION PLAN    [address] [facility]    1) SKIN HYGIENE:  In the shower, wash your face, chest and back gently with Cetaphil moisturizing cleanser or Dove Fragrance-free bar  Do not use a luffa or washcloth as these tend to be too irritating to acne-prone skin  2) ANTIMICROBIAL BENZOYL PEROXIDE:     Neutrogena Clear Pore (Benzoyl Peroxide 3% wash): In the shower, apply this medication to your face, chest and back  Leave this wash on your skin for about 5 minutes and then rinse it off completely while in the shower  If you do not rinse it off completely, then it will bleach your towels or clothing  This medication is now available without prescription (over-the-counter) in most drug stores or at [employer] for about $7 a bottle  3) ANTIBIOTICS:     Doxycycline Take 1 tablet with a full glass of water and food     EVENING ROUTINE    1) SKIN HYGIENE:  In the shower, wash your face, chest and back gently with Cetaphil moisturizing cleanser or Dove Fragrance-free bar  Do not use a lufa or washcloth as these tend to be too irritating to acne-prone skin  2) ANTIBIOTICS:     Doxycycline Take 1 tablet with a full glass of water and food     3) TOPICAL RETINOID:  At 1 hour before bedtime (after washing your face and allowing the skin to completely dry), spread only a single pea-sized amount of this medication evenly over your entire face (avoiding your eyes or mouth):   Adapalene 0 1% one hour before bedtime  Can start every other night and then increase every day if tolerable         Accutane was discussed if no improvement in 2 months         REMEMBER:  Always take your acne pills with lots of water! A pill stuck in your throat can cause significant burning and irritation  Drink a full glass of water to ensure the pill gets into your stomach  Avoid popping a pill right before bed, and stay upright for at least 1 hour after taking a pill  ACNE:  WHAT ZIT ALL ABOUT? WHY DO I HAVE ACNE/PIMPLES? Your skin is made of layers  To keep the skin from becoming dry and cracked, the skin needs oil  The oil is made in little wells in the deeper layers in the skin  People with acne have glands that make more oil and are more easily plugged, causing the glands to swell  Hormones, bacteria and your inherited tendency to have acne all play a role  The medical term for pimples is acne or acne vulgaris (vulgaris means common)  Most people get some acne  Acne does not come from being dirty  Instead, it is an expected consequence of changes that occur during normal growth and development  Hormones, bacteria, and your family's tendency to have acne may all play a role  Whiteheads or blackheads are openings of the glands (glands are the oil factories) onto the surface of the skin  Blackheads are not caused by dirt blocking the pores; instead, they result from the oxidation reaction of oil and skin in the pores with the air (like a rust reaction)          WHAT ABOUT STRESS? Stress does not cause acne but it can make it worse  Make sure you get enough sleep and daily exercise! WHAT ABOUT FOODS/DIET? Try to eat a balanced, healthy diet  Some people feel that certain foods worsen their acne  While there aren't many studies available on this question, severe dietary changes are unlikely to help your acne and may be harmful to the health of your skin  If you find that a certain food seems to aggravate your acne, you may consider avoiding that food  Discuss this with your physician! WHAT CAUSES MY ACNE? There are four contributors to acne--the body's natural oil (sebum), clogged pores, bacteria (with the scientific name Propionibacterium acnes, or P  acnes, for short), and the body's reaction to the bacteria living in the clogged pores (which causes inflammation)  Here's what happens:     Sebum is produced in the normal oil-making glands in the deeper layers of the skin and reaches the surface through the skin's pores  An increase in certain hormones occurs around the time of puberty, and these hormones trigger the oil glands to produce increased amounts of sebum   Pores with excess oil tend to become clogged more easily   At the same time, P  acnes--one of the many types of bacteria that normally live on everyone's skin--thrives in the excess oil and causes a skin reaction (inflammation)   If a pore is clogged close to the surface, there is little inflammation  However, this results in the formation of whiteheads (closed comedones) or blackheads (open comedones) at the surface of the skin   A plug that extends to, or forms a little deeper in the pore, or one that enlarges or ruptures may cause more inflammation  The result is red bumps (papules) and pus-filled pimples (pustules)   If plugging happens in the deepest skin layer, the inflammation may be even more severe, resulting in the formation of nodules or cysts   When these types of acne heal, they may leave behind discolored areas or true scars  SKIN HYGIENE:  HOW SHOULD I [address] MY SKIN? Acne does not come from being dirty, however, washing your face is part of taking good care of your skin and will help keep your face clear  Good skin hygiene is, therefore, critical to support any acne treatment plan  Here are several specific suggestions for practicing good skin hygiene and keeping your skin looking its best:     You should wash acne-prone skin TWICE A DAY: Once in the morning and once in the evening  This does include any showers you take that day, so do not overdo it!  Do not scrub the skin with a washcloth or loofah as these can irritate and inflame your acne  Acne does not come from dirt, so it is not necessary to scrub the skin clean  In fact, scrubbing may lead to dryness and irritation that makes the acne even worse and harder for patients to tolerate acne medications   Use a gentle facial moisturizing cleanser (Cetaphil Moisturizing Cleanser or Dove Fragrance-Free bar)  Avoid using soaps like [address] [facility], [address], [address] [facility], or soft/liquid soaps as these products will dry your skin   Do not use any over-the-counter acne washes without your doctor's specific instruction to do so  These products often contain salicylic acid or benzoyl peroxide  These ingredients can be helpful in clearing oil from the skin and reducing bacteria, but they may also be drying and can add to irritation   Do not use exfoliating products with microbeads or brushes as these can cause irritation to the skin   Facials and other treatments to remove, squeeze, or clean out pores are not recommended  Manipulating the skin in this way can make acne worse and can lead to severe infections and/or scarring  It also increases the likelihood that the skin will not be able to tolerate acne medications      Try not to pop pimples or pick at your acne as this can delay healing and may result in scarring or skin color changes (dark spots) that are often more noticeable than the acne itself  Picking/popping acne can also cause a serious skin infection   Wash or change your pillow case once to twice a week, especially if you use products in your hair   Wash the skin as soon as possible after playing sports or other activities that cause a lot of sweating  Also, pay attention to how your sports equipment (shoulder pads, helmet strap, etc ) might be making your acne worse   When you use makeup, moisturizer, or sunscreen make sure that these products are labeled non-comedogenic, or won't clog pores, or won't cause acne         SHOULD I TREAT MY ACNE? There are a number of other skin conditions that can look like acne  If there is any question about the diagnosis, then the person should be evaluated by a board certified pediatric and adolescent dermatologist   A physician should examine any child with acne who is between the ages of 3and 9years of age, as acne in this mid-childhood age group is not normal and may signal an underlying problem  If a preadolescent (9to 6years of age) or adolescent (15to 25years of age) has mild acne and the condition is not bothersome to the individual, proper and regular skin care (what your doctor may call skin hygiene) may be all that is needed at this point  Many people do, however, need specific acne medications to help their skin look and feel its best  Your doctor will tell you if you are one of these people  If so, you may be advised to use an over-the-counter or prescription medication that is applied to the skin (a topical medication) or if the addition of an oral medication (a medication taken by [employer]) is needed  The good news is that the medications work well when used properly! Some specific factors that may influence the choice of acne therapy include:     Severity   The number and type of skin lesions (papules or comedones) and the degree of inflammation (mild, moderate or severe)   Scarring  Scarring is most common when acne is severe, but it can happen even in children with mild acne   Impact  If a child is experiencing emotional complications because of the acne or is experiencing negative comments from other children   Cost of the acne medications  An acne expert can help to keep out of pocket costs to a minimum by utilizing the correct medications and the least expensive options   The patient's skin type (oily versus dry or combination skin, for example)   Potential side effects of the medication   The ease or overall complexity of the treatment plan or medication  WHAT ACNE TREATMENTS ARE AVAILABLE? Medications for acne try to stop the formation of new pimples by reducing or removing the oil, bacteria, and other things (like dead skin cells) that clog the pores  They can also decrease the inflammation or irritation response of the skin to bacteria  It may take from 6 to 8 weeks (about 2 months!) before you see any improvement and know if the medication is effective  It takes the layers of skin this long to regenerate  Remember, these medications do not cure the condition--the acne improves because of the medication  Therefore, treatment must be continued in order to prevent the return of acne lesions  There are many types of acne treatments  Some are applied to the skin (topical medications) and some are taken by mouth (oral medications)  In most cases of mild acne, the doctor will start with a topical medication  There are many different topical medications that are helpful for acne  If acne is more severe and it does not respond adequately to a topical medication, or if it covers large body surface areas such as the back and/or chest, oral antibiotics such as Doxycycline or Minocycline and/or oral hormone therapy such as Oral Contraceptive Pills or Spironolactone may be prescribed   In the most severe cases, isotretinoin (Accutane) may be used  In general, it is usually best to start with acne medications that are least likely to cause side effects but are at the same time capable of addressing the specific causes for the acne  Some patients have a good result with just one medication, but many will need to use a combination of treatments: two or more different topical agents or an oral medication plus a topical medication  Another treatment used for acne may include corticosteroid injections, which are used to help relieve pain, decrease the size, and encourage the healing of large, inflamed acne nodules  Also, dermatologists sometimes perform acne surgery, using a fine needle, a pointed blade, or an instrument known as a comedone extractor to mechanically clean out clogged pores  One must always weigh the risk for inducing a scar with the potential benefits of any procedure  Prior treatment with topical retinoids can loosen whiteheads and blackheads and make it easier to physically remove such lesions  Heat-based devices, and light and laser therapy are being studied to see whether there is any role for such treatments in mild to moderate acne  At this time, there is not enough evidence to make general recommendations about their use  TOPICAL ACNE MEDICATIONS    WHAT KIND OF TOPICALS ARE THERE?  Benzoyl peroxide (BP) helps to fight inflammation and is anti-microbial (kills bacteria, viruses, and other microorganisms) and is believed to help prevent resistance of bacteria to topical antibiotics  A benzoyl peroxide wash may be recommended for use on large areas such as the chest and/or back  Mild irritation and dryness are common when first using benzoyl peroxide-containing products  Be careful because benzoyl peroxide can bleach towels and clothing!    Retinoids (such as adapalene, tretinoin, or tazarotene) unplug the oil glands by helping peel away the layers of skin and other things plugging the opening of the glands  Mild irritation and dryness are common when first using these products  Facial waxing and other skin procedures can lead to excessive irritation and should be avoided during retinoid therapy   Antibiotics fight bacteria and help decrease inflammation  Topical antibiotics commonly used in acne include clindamycin, erythromycin, and combination agents (such as clindamycin/benzoyl peroxide or erythromycin/benzoyl peroxide)  Mild irritation and dryness are common when first using these products  Typically, topical antibiotics should not be used alone as treatment for acne   Other topical agents include salicylic acid, azelaic acid, dapsone, and sulfacetamide  Mild irritation and dryness can also occur when first using these products  USING YOUR TOPICAL TREATMENTS LIKE A PRO   Apply topical medications only to clean, dry skin  Topical medications may lead to significant dryness of the affected areas  To minimize this, wait 15-20 minutes after washing before applying your topical medication   These medications work deep in the skin to prevent new breakouts  Spot treatment of individual pimples does not do much  When applying topical medications to the face, use the 5-dot method  Start by placing a small pea-sized amount of the medication on your finger  Then, place dots in each of five locations of your face: Mid-forehead, each cheek, nose, and chin  Next, rub the medication into the entire area of skin - not just on individual pimples! Try to avoid the delicate skin around your eyes and corners of your mouth   The medications are not magic! They take weeks if not months to work  Be patient and use your medicine on a daily basis or as directed for six weeks before asking if your skin looks better  Try not to miss more than one or two days each week when using your medications     If you are starting a new medication, then try using it every other night or even every third night  Gradually work up to [employer] a day    This will give your skin time to adjust    The same medications often come in various forms or formulations: Creams, ointments, lotions, gels, microspheres, or foams  Use the formulation that has been recommended and don't switch to other forms unless instructed  Some forms (such as alcohol based gels) may be more drying and less tolerable for certain skin types   Sometimes individual medications are not as effective as a combination of two or more agents  The doctor may need to try several medications or combinations before finding the one that is best for that patient   Moisturizer, sunscreen, and make-up may be used in conjunction with topical acne medications  In general, acne medications are applied first so they may directly contact the skin  Ask your physician to review specific application instructions!  It is especially important to always use sunscreen when using a topical retinoid or oral antibiotic  These drugs can make your skin more sensitive to the sun  In general, sunscreen gets applied AFTER any acne medications   Don't stop using your acne medications just because your acne got better  Remember, the acne is better because of the medication, and prevention is the pelaez to treatment  ORAL ACNE MEDICATIONS    ORAL ANTIBIOTICS  Antibiotics include tetracycline-class medicines (which include the most commonly used oral antibiotics for acne, minocycline, and doxycycline), erythromycin, trimethoprim-sulfamethoxazole, and occasionally cephalexin or azithromycin  These drugs may decrease bacteria and inflammation, and they are most effective for moderate-to-severe inflammatory acne  A product containing benzoyl peroxide should be used along with these antibiotics to help decrease the possibility of microbial resistance  Always take your acne pills with lots of water! A pill stuck in your throat can cause significant burning and irritation  Drink a full glass of water to ensure the pill gets into your stomach  Avoid popping a pill right before bed, and stay upright for at least 1 hour after taking a pill  DOXYCYCLINE   This medication is usually taken ONCE or TWICE per day, as instructed by your physician  NOTE: Always take this medication with lots of water! A pill stuck in the throat can cause significant burning and irritation  Avoid popping a pill right before bed & stay upright for at least one hour after taking a pill  WARNING: Doxycycline increases your sensitivity to the sun, so practice excellent sun protection! If you notice any of the following, stop using the medication and notify your health care provider: headaches; blurred vision; dizziness; sun sensitivity; heartburn-stomach pain; irritation of the esophagus; darkening of scars, gums, or teeth (more often with minocycline); nail changes; yellowing of the eyes or skin (indicating possible liver disease); joint pains-and flu-like symptoms  Taking oral antibiotics with food may help with symptoms of upset stomach  COMMON SIDE EFFECTS: Headaches; dizziness; sun sensitivity; irritation of the throat; discoloration of scars, gums, or teeth; nail changes  MINOCYCLINE   This medication is usually taken ONCE or TWICE per day, as instructed by your physician  NOTE: Always take this medication with lots of water! A pill stuck in the throat can cause significant burning and irritation  Avoid popping a pill right before bed & stay upright for at least one hour after taking a pill  WARNING: Though less likely than doxycycline, minocycline may increase your sensitivity to the sun, so practice excellent sun protection!  If you notice any of the following, stop using the medication and notify your health care provider: headaches; blurred vision; dizziness; sun sensitivity; heartburn-stomach pain; irritation of the throat; darkening of scars, gums, or teeth; nail changes; yellowing of the eyes or skin (indicating possible liver disease); joint pains-and flu-like symptoms  Taking oral antibiotics with food may help with symptoms of upset stomach  COMMON SIDE EFFECTS: Headaches; dizziness; sun sensitivity; irritation of the throat; discoloration of scars, gums, or teeth (often with minocycline); nail changes  Minocycline can rarely cause liver disease, joint pains, severe skin rashes, and flu-like symptoms  If you should notice yellowing of the eyes or skin, or any of the above, notify your doctor and stop using the medication immediately  HORMONAL THERAPY  Hormonal treatment is used only in females and usually consists of oral contraceptives (birth control pills)  Spironolactone is also sometimes used  ORAL CONTRACEPTIVE PILLS   This medication is also known as the Birth Control Pill    We use it for hormonal regulation of acne  Take this medication as directed on the medication packet  NOTE: Try to find a regular time in your day to take the pill so that you don't forget  The best time is about half an hour after a meal or snack, or at bedtime  If you do forget to take your daily pill at the regular time, take one as soon as you remember and take the next at your regular scheduled time  WARNING: Do not take this medication until discussing it with your physician if you smoke, are pregnant (or trying to become pregnant or could be pregnant), have a personal history of breast cancer, have any artificial hardware or implants, have a condition called Factor 5 Leiden deficiency, have a family history of clotting problems, regularly have migraine headaches (especially with aura or due to flashing lights), or have any vaginal bleeding other than that associated with your menstrual cycle  ORAL ISOTRETINOIN (used to be called the brand name Karon Halsted)  Isotretinoin, a derivative of vitamin A, is a powerful drug with several significant potential side effects   It is reserved for acne which is severe or when other medications have not worked well enough  It used to be sold under the brand name Accutane but now several versions exist       HAVING PROBLEMS WITH ANY OF YOUR TREATMENTS? You should not be able to see any of the medicines on your face  If you can see a white film on your skin after you apply the medication, there is too much medicine in that area and you need to apply a thinner coat and make sure it is spread evenly on your face  If your skin gets too dry, you can apply a light (non-comedogenic) moisturizer on top of your medicine or you may switch to using the medicine every other day instead of every day  If your skin is still too irritated, you may need to switch to a milder medication  If your skin is red and very itchy, you may be allergic to the medication and you should stop using it  COMMON POSSIBLE SIDE EFFECTS OF MEDICATIONS     Retinoids - dryness, redness, increased sun sensitivity   Benzoyl peroxide - drying, redness, bleaching of clothes, towels and sheets, allergy   Doxycycline - headaches; dizziness; irritation of the throat; nail changes; discoloration of teeth   Sun sensitivity - even if you have dark skin, this medicine can make you burn more easily  Make sure you protect yourself from the sun, either by avoiding being outside between 11 AM and 3 PM, wearing and reapplying sunscreen/sunblock, or wearing sun protective clothing   Nausea/vomiting - if you experience nausea with this medication, take it with food   Minocycline - headaches; dizziness; vision problems,  irritation of the throat; discoloration of scars, gums, or teeth  Can rarely cause liver disease, joint pains, and flu-like symptoms       If you should notice yellowing of the skin or any of the above, notify your doctor and stop using the medication   Birth Control Pills - nausea; headaches; breast tenderness; feeling bloated; mood changes   Spotting between periods may occur for the first three weeks of the medication, but this is not serious  It may last for two or three cycles  Please call us if the bleeding is heavier than a light flow or lasts for more than a few days  WHEN AND WHERE TO CALL WITH CONCERNS  We are here to help! If you experience any unusual symptoms, then stop taking or using the medication and call our office at [phone]  It is better to be safe than to be sorry!     Scribe Attestation    I,:  Kirk Granados am acting as a scribe while in the presence of the attending physician :       I,:  Hellen North MD personally performed the services described in this documentation    as scribed in my presence :

## 2021-04-22 NOTE — PATIENT INSTRUCTIONS
YOUR PERSONALIZED ACNE ACTION PLAN    [facility]    1) SKIN HYGIENE:  In the shower, wash your face, chest and back gently with Cetaphil moisturizing cleanser or Dove Fragrance-free bar  Do not use a luffa or washcloth as these tend to be too irritating to acne-prone skin  2) ANTIMICROBIAL BENZOYL PEROXIDE:     Neutrogena Clear Pore (Benzoyl Peroxide 3% wash): In the shower, apply this medication to your face, chest and back  Leave this wash on your skin for about 5 minutes and then rinse it off completely while in the shower  If you do not rinse it off completely, then it will bleach your towels or clothing  This medication is now available without prescription (over-the-counter) in most drug stores or at [employer] for about $7 a bottle  3) ANTIBIOTICS:     Doxycycline Take 1 tablet with a full glass of water and food     EVENING ROUTINE    1) SKIN HYGIENE:  In the shower, wash your face, chest and back gently with Cetaphil moisturizing cleanser or Dove Fragrance-free bar  Do not use a lufa or washcloth as these tend to be too irritating to acne-prone skin  2) ANTIBIOTICS:     Doxycycline Take 1 tablet with a full glass of water and food     3) TOPICAL RETINOID:  At 1 hour before bedtime (after washing your face and allowing the skin to completely dry), spread only a single pea-sized amount of this medication evenly over your entire face (avoiding your eyes or mouth):   Adapalene 0 1% one hour before bedtime  Can start every other night and then increase every day if tolerable             REMEMBER:  Always take your acne pills with lots of water! A pill stuck in your throat can cause significant burning and irritation  Drink a full glass of water to ensure the pill gets into your stomach  Avoid popping a pill right before bed, and stay upright for at least 1 hour after taking a pill  ACNE:  WHAT ZIT ALL ABOUT? WHY DO I HAVE ACNE/PIMPLES?   Your skin is made of layers  To keep the skin from becoming dry and cracked, the skin needs oil  The oil is made in little wells in the deeper layers in the skin  People with acne have glands that make more oil and are more easily plugged, causing the glands to swell  Hormones, bacteria and your inherited tendency to have acne all play a role  The medical term for pimples is acne or acne vulgaris (vulgaris means common)  Most people get some acne  Acne does not come from being dirty  Instead, it is an expected consequence of changes that occur during normal growth and development  Hormones, bacteria, and your family's tendency to have acne may all play a role  Whiteheads or blackheads are openings of the glands (glands are the oil factories) onto the surface of the skin  Blackheads are not caused by dirt blocking the pores; instead, they result from the oxidation reaction of oil and skin in the pores with the air (like a rust reaction)  WHAT ABOUT STRESS? Stress does not cause acne but it can make it worse  Make sure you get enough sleep and daily exercise! WHAT ABOUT FOODS/DIET? Try to eat a balanced, healthy diet  Some people feel that certain foods worsen their acne  While there aren't many studies available on this question, severe dietary changes are unlikely to help your acne and may be harmful to the health of your skin  If you find that a certain food seems to aggravate your acne, you may consider avoiding that food  Discuss this with your physician! WHAT CAUSES MY ACNE? There are four contributors to acne--the body's natural oil (sebum), clogged pores, bacteria (with the scientific name Propionibacterium acnes, or P  acnes, for short), and the body's reaction to the bacteria living in the clogged pores (which causes inflammation)  Here's what happens:     Sebum is produced in the normal oil-making glands in the deeper layers of the skin and reaches the surface through the skin's pores  An increase in certain hormones occurs around the time of puberty, and these hormones trigger the oil glands to produce increased amounts of sebum   Pores with excess oil tend to become clogged more easily   At the same time, P  acnes--one of the many types of bacteria that normally live on everyone's skin--thrives in the excess oil and causes a skin reaction (inflammation)   If a pore is clogged close to the surface, there is little inflammation  However, this results in the formation of whiteheads (closed comedones) or blackheads (open comedones) at the surface of the skin   A plug that extends to, or forms a little deeper in the pore, or one that enlarges or ruptures may cause more inflammation  The result is red bumps (papules) and pus-filled pimples (pustules)   If plugging happens in the deepest skin layer, the inflammation may be even more severe, resulting in the formation of nodules or cysts  When these types of acne heal, they may leave behind discolored areas or true scars  SKIN HYGIENE:  HOW SHOULD I [facility] MY SKIN? Acne does not come from being dirty, however, washing your face is part of taking good care of your skin and will help keep your face clear  Good skin hygiene is, therefore, critical to support any acne treatment plan  Here are several specific suggestions for practicing good skin hygiene and keeping your skin looking its best:     You should wash acne-prone skin TWICE A DAY: Once in the morning and once in the evening  This does include any showers you take that day, so do not overdo it!  Do not scrub the skin with a washcloth or loofah as these can irritate and inflame your acne  Acne does not come from dirt, so it is not necessary to scrub the skin clean  In fact, scrubbing may lead to dryness and irritation that makes the acne even worse and harder for patients to tolerate acne medications      Use a gentle facial moisturizing cleanser (Cetaphil Moisturizing Cleanser or Dove Fragrance-Free bar)  Avoid using soaps like Nigel Alva, Ann [address] Fernandez [address], [address], or soft/liquid soaps as these products will dry your skin   Do not use any over-the-counter acne washes without your doctor's specific instruction to do so  These products often contain salicylic acid or benzoyl peroxide  These ingredients can be helpful in clearing oil from the skin and reducing bacteria, but they may also be drying and can add to irritation   Do not use exfoliating products with microbeads or brushes as these can cause irritation to the skin   Facials and other treatments to remove, squeeze, or clean out pores are not recommended  Manipulating the skin in this way can make acne worse and can lead to severe infections and/or scarring  It also increases the likelihood that the skin will not be able to tolerate acne medications   Try not to pop pimples or pick at your acne as this can delay healing and may result in scarring or skin color changes (dark spots) that are often more noticeable than the acne itself  Picking/popping acne can also cause a serious skin infection   Wash or change your pillow case once to twice a week, especially if you use products in your hair   Wash the skin as soon as possible after playing sports or other activities that cause a lot of sweating  Also, pay attention to how your sports equipment (shoulder pads, helmet strap, etc ) might be making your acne worse   When you use makeup, moisturizer, or sunscreen make sure that these products are labeled non-comedogenic, or won't clog pores, or won't cause acne         SHOULD I TREAT MY ACNE? There are a number of other skin conditions that can look like acne   If there is any question about the diagnosis, then the person should be evaluated by a board certified pediatric and adolescent dermatologist   A physician should examine any child with acne who is between the ages of 3and 9years of age, as acne in this mid-childhood age group is not normal and may signal an underlying problem  If a preadolescent (9to 6years of age) or adolescent (15to 25years of age) has mild acne and the condition is not bothersome to the individual, proper and regular skin care (what your doctor may call skin hygiene) may be all that is needed at this point  Many people do, however, need specific acne medications to help their skin look and feel its best  Your doctor will tell you if you are one of these people  If so, you may be advised to use an over-the-counter or prescription medication that is applied to the skin (a topical medication) or if the addition of an oral medication (a medication taken by [employer]) is needed  The good news is that the medications work well when used properly! Some specific factors that may influence the choice of acne therapy include:     Severity  The number and type of skin lesions (papules or comedones) and the degree of inflammation (mild, moderate or severe)   Scarring  Scarring is most common when acne is severe, but it can happen even in children with mild acne   Impact  If a child is experiencing emotional complications because of the acne or is experiencing negative comments from other children   Cost of the acne medications  An acne expert can help to keep out of pocket costs to a minimum by utilizing the correct medications and the least expensive options   The patient's skin type (oily versus dry or combination skin, for example)   Potential side effects of the medication   The ease or overall complexity of the treatment plan or medication  WHAT ACNE TREATMENTS ARE AVAILABLE? Medications for acne try to stop the formation of new pimples by reducing or removing the oil, bacteria, and other things (like dead skin cells) that clog the pores  They can also decrease the inflammation or irritation response of the skin to bacteria   It may take from 6 to 8 weeks (about 2 months!) before you see any improvement and know if the medication is effective  It takes the layers of skin this long to regenerate  Remember, these medications do not cure the condition--the acne improves because of the medication  Therefore, treatment must be continued in order to prevent the return of acne lesions  There are many types of acne treatments  Some are applied to the skin (topical medications) and some are taken by mouth (oral medications)  In most cases of mild acne, the doctor will start with a topical medication  There are many different topical medications that are helpful for acne  If acne is more severe and it does not respond adequately to a topical medication, or if it covers large body surface areas such as the back and/or chest, oral antibiotics such as Doxycycline or Minocycline and/or oral hormone therapy such as Oral Contraceptive Pills or Spironolactone may be prescribed  In the most severe cases, isotretinoin (Accutane) may be used  In general, it is usually best to start with acne medications that are least likely to cause side effects but are at the same time capable of addressing the specific causes for the acne  Some patients have a good result with just one medication, but many will need to use a combination of treatments: two or more different topical agents or an oral medication plus a topical medication  Another treatment used for acne may include corticosteroid injections, which are used to help relieve pain, decrease the size, and encourage the healing of large, inflamed acne nodules  Also, dermatologists sometimes perform acne surgery, using a fine needle, a pointed blade, or an instrument known as a comedone extractor to mechanically clean out clogged pores  One must always weigh the risk for inducing a scar with the potential benefits of any procedure   Prior treatment with topical retinoids can loosen whiteheads and blackheads and make it easier to physically remove such lesions  Heat-based devices, and light and laser therapy are being studied to see whether there is any role for such treatments in mild to moderate acne  At this time, there is not enough evidence to make general recommendations about their use  TOPICAL ACNE MEDICATIONS    WHAT KIND OF TOPICALS ARE THERE?  Benzoyl peroxide (BP) helps to fight inflammation and is anti-microbial (kills bacteria, viruses, and other microorganisms) and is believed to help prevent resistance of bacteria to topical antibiotics  A benzoyl peroxide wash may be recommended for use on large areas such as the chest and/or back  Mild irritation and dryness are common when first using benzoyl peroxide-containing products  Be careful because benzoyl peroxide can bleach towels and clothing!  Retinoids (such as adapalene, tretinoin, or tazarotene) unplug the oil glands by helping peel away the layers of skin and other things plugging the opening of the glands  Mild irritation and dryness are common when first using these products  Facial waxing and other skin procedures can lead to excessive irritation and should be avoided during retinoid therapy   Antibiotics fight bacteria and help decrease inflammation  Topical antibiotics commonly used in acne include clindamycin, erythromycin, and combination agents (such as clindamycin/benzoyl peroxide or erythromycin/benzoyl peroxide)  Mild irritation and dryness are common when first using these products  Typically, topical antibiotics should not be used alone as treatment for acne   Other topical agents include salicylic acid, azelaic acid, dapsone, and sulfacetamide  Mild irritation and dryness can also occur when first using these products  USING YOUR TOPICAL TREATMENTS LIKE A PRO   Apply topical medications only to clean, dry skin  Topical medications may lead to significant dryness of the affected areas   To minimize this, wait 15-20 minutes after washing before applying your topical medication   These medications work deep in the skin to prevent new breakouts  Spot treatment of individual pimples does not do much  When applying topical medications to the face, use the 5-dot method  Start by placing a small pea-sized amount of the medication on your finger  Then, place dots in each of five locations of your face: Mid-forehead, each cheek, nose, and chin  Next, rub the medication into the entire area of skin - not just on individual pimples! Try to avoid the delicate skin around your eyes and corners of your mouth   The medications are not magic! They take weeks if not months to work  Be patient and use your medicine on a daily basis or as directed for six weeks before asking if your skin looks better  Try not to miss more than one or two days each week when using your medications   If you are starting a new medication, then try using it every other night or even every third night   Gradually work up to [employer] a day    This will give your skin time to adjust    The same medications often come in various forms or formulations: Creams, ointments, lotions, gels, microspheres, or foams  Use the formulation that has been recommended and don't switch to other forms unless instructed  Some forms (such as alcohol based gels) may be more drying and less tolerable for certain skin types   Sometimes individual medications are not as effective as a combination of two or more agents  The doctor may need to try several medications or combinations before finding the one that is best for that patient   Moisturizer, sunscreen, and make-up may be used in conjunction with topical acne medications  In general, acne medications are applied first so they may directly contact the skin  Ask your physician to review specific application instructions!  It is especially important to always use sunscreen when using a topical retinoid or oral antibiotic   These drugs can make your skin more sensitive to the sun  In general, sunscreen gets applied AFTER any acne medications   Don't stop using your acne medications just because your acne got better  Remember, the acne is better because of the medication, and prevention is the pelaez to treatment  ORAL ACNE MEDICATIONS    ORAL ANTIBIOTICS  Antibiotics include tetracycline-class medicines (which include the most commonly used oral antibiotics for acne, minocycline, and doxycycline), erythromycin, trimethoprim-sulfamethoxazole, and occasionally cephalexin or azithromycin  These drugs may decrease bacteria and inflammation, and they are most effective for moderate-to-severe inflammatory acne  A product containing benzoyl peroxide should be used along with these antibiotics to help decrease the possibility of microbial resistance  Always take your acne pills with lots of water! A pill stuck in your throat can cause significant burning and irritation  Drink a full glass of water to ensure the pill gets into your stomach  Avoid popping a pill right before bed, and stay upright for at least 1 hour after taking a pill  DOXYCYCLINE   This medication is usually taken ONCE or TWICE per day, as instructed by your physician  NOTE: Always take this medication with lots of water! A pill stuck in the throat can cause significant burning and irritation  Avoid popping a pill right before bed & stay upright for at least one hour after taking a pill  WARNING: Doxycycline increases your sensitivity to the sun, so practice excellent sun protection!  If you notice any of the following, stop using the medication and notify your health care provider: headaches; blurred vision; dizziness; sun sensitivity; heartburn-stomach pain; irritation of the esophagus; darkening of scars, gums, or teeth (more often with minocycline); nail changes; yellowing of the eyes or skin (indicating possible liver disease); joint pains-and flu-like symptoms  Taking oral antibiotics with food may help with symptoms of upset stomach  COMMON SIDE EFFECTS: Headaches; dizziness; sun sensitivity; irritation of the throat; discoloration of scars, gums, or teeth; nail changes  MINOCYCLINE   This medication is usually taken ONCE or TWICE per day, as instructed by your physician  NOTE: Always take this medication with lots of water! A pill stuck in the throat can cause significant burning and irritation  Avoid popping a pill right before bed & stay upright for at least one hour after taking a pill  WARNING: Though less likely than doxycycline, minocycline may increase your sensitivity to the sun, so practice excellent sun protection! If you notice any of the following, stop using the medication and notify your health care provider: headaches; blurred vision; dizziness; sun sensitivity; heartburn-stomach pain; irritation of the throat; darkening of scars, gums, or teeth; nail changes; yellowing of the eyes or skin (indicating possible liver disease); joint pains-and flu-like symptoms  Taking oral antibiotics with food may help with symptoms of upset stomach  COMMON SIDE EFFECTS: Headaches; dizziness; sun sensitivity; irritation of the throat; discoloration of scars, gums, or teeth (often with minocycline); nail changes  Minocycline can rarely cause liver disease, joint pains, severe skin rashes, and flu-like symptoms  If you should notice yellowing of the eyes or skin, or any of the above, notify your doctor and stop using the medication immediately  HORMONAL THERAPY  Hormonal treatment is used only in females and usually consists of oral contraceptives (birth control pills)  Spironolactone is also sometimes used  ORAL CONTRACEPTIVE PILLS   This medication is also known as the Birth Control Pill    We use it for hormonal regulation of acne  Take this medication as directed on the medication packet     NOTE: Try to find a regular time in your day to take the pill so that you don't forget  The best time is about half an hour after a meal or snack, or at bedtime  If you do forget to take your daily pill at the regular time, take one as soon as you remember and take the next at your regular scheduled time  WARNING: Do not take this medication until discussing it with your physician if you smoke, are pregnant (or trying to become pregnant or could be pregnant), have a personal history of breast cancer, have any artificial hardware or implants, have a condition called Factor 5 Leiden deficiency, have a family history of clotting problems, regularly have migraine headaches (especially with aura or due to flashing lights), or have any vaginal bleeding other than that associated with your menstrual cycle  ORAL ISOTRETINOIN (used to be called the brand name Maycoandrew Harris)  Isotretinoin, a derivative of vitamin A, is a powerful drug with several significant potential side effects  It is reserved for acne which is severe or when other medications have not worked well enough  It used to be sold under the brand name Accutane but now several versions exist       HAVING PROBLEMS WITH ANY OF YOUR TREATMENTS? You should not be able to see any of the medicines on your face  If you can see a white film on your skin after you apply the medication, there is too much medicine in that area and you need to apply a thinner coat and make sure it is spread evenly on your face  If your skin gets too dry, you can apply a light (non-comedogenic) moisturizer on top of your medicine or you may switch to using the medicine every other day instead of every day  If your skin is still too irritated, you may need to switch to a milder medication  If your skin is red and very itchy, you may be allergic to the medication and you should stop using it  COMMON POSSIBLE SIDE EFFECTS OF MEDICATIONS     Retinoids - dryness, redness, increased sun sensitivity     Benzoyl peroxide - drying, redness, bleaching of clothes, towels and sheets, allergy   Doxycycline - headaches; dizziness; irritation of the throat; nail changes; discoloration of teeth   Sun sensitivity - even if you have dark skin, this medicine can make you burn more easily  Make sure you protect yourself from the sun, either by avoiding being outside between 11 AM and 3 PM, wearing and reapplying sunscreen/sunblock, or wearing sun protective clothing   Nausea/vomiting - if you experience nausea with this medication, take it with food   Minocycline - headaches; dizziness; vision problems,  irritation of the throat; discoloration of scars, gums, or teeth  Can rarely cause liver disease, joint pains, and flu-like symptoms   If you should notice yellowing of the skin or any of the above, notify your doctor and stop using the medication   Birth Control Pills - nausea; headaches; breast tenderness; feeling bloated; mood changes   Spotting between periods may occur for the first three weeks of the medication, but this is not serious  It may last for two or three cycles  Please call us if the bleeding is heavier than a light flow or lasts for more than a few days  WHEN AND WHERE TO CALL WITH CONCERNS  We are here to help! If you experience any unusual symptoms, then stop taking or using the medication and call our office at [phone])  It is better to be safe than to be sorry!

## 2021-04-27 ENCOUNTER — OFFICE VISIT (OUTPATIENT)
Dept: FAMILY MEDICINE CLINIC | Facility: CLINIC | Age: 21
End: 2021-04-27
Payer: COMMERCIAL

## 2021-04-27 ENCOUNTER — SOCIAL WORK (OUTPATIENT)
Dept: BEHAVIORAL/MENTAL HEALTH CLINIC | Facility: CLINIC | Age: 21
End: 2021-04-27
Payer: COMMERCIAL

## 2021-04-27 VITALS
BODY MASS INDEX: 25.82 KG/M2 | HEIGHT: 68 IN | RESPIRATION RATE: 18 BRPM | OXYGEN SATURATION: 96 % | HEART RATE: 63 BPM | TEMPERATURE: 98.2 F | WEIGHT: 170.38 LBS | SYSTOLIC BLOOD PRESSURE: 110 MMHG | DIASTOLIC BLOOD PRESSURE: 64 MMHG

## 2021-04-27 DIAGNOSIS — F41.1 GAD (GENERALIZED ANXIETY DISORDER): ICD-10-CM

## 2021-04-27 DIAGNOSIS — K58.1 IRRITABLE BOWEL SYNDROME WITH CONSTIPATION: ICD-10-CM

## 2021-04-27 DIAGNOSIS — G43.009 MIGRAINE WITHOUT AURA AND WITHOUT STATUS MIGRAINOSUS, NOT INTRACTABLE: ICD-10-CM

## 2021-04-27 DIAGNOSIS — Z76.89 ENCOUNTER TO ESTABLISH CARE: Primary | ICD-10-CM

## 2021-04-27 DIAGNOSIS — E78.1 HYPERTRIGLYCERIDEMIA: ICD-10-CM

## 2021-04-27 DIAGNOSIS — R53.83 FATIGUE, UNSPECIFIED TYPE: ICD-10-CM

## 2021-04-27 DIAGNOSIS — J30.2 SEASONAL ALLERGIES: ICD-10-CM

## 2021-04-27 DIAGNOSIS — Z00.00 ROUTINE HEALTH MAINTENANCE: ICD-10-CM

## 2021-04-27 DIAGNOSIS — D89.40 MAST CELL ACTIVATION SYNDROME (HCC): ICD-10-CM

## 2021-04-27 DIAGNOSIS — I49.8 POTS (POSTURAL ORTHOSTATIC TACHYCARDIA SYNDROME): ICD-10-CM

## 2021-04-27 PROCEDURE — 99215 OFFICE O/P EST HI 40 MIN: CPT | Performed by: FAMILY MEDICINE

## 2021-04-27 PROCEDURE — 90834 PSYTX W PT 45 MINUTES: CPT | Performed by: SOCIAL WORKER

## 2021-04-27 NOTE — PSYCH
Psychotherapy Provided: Individual Psychotherapy 50 minutes      Length of time in session: 50 minutes, follow up in 2 week     Goals addressed in session: Goal 1      Pain: 0  Current suicide risk : Low      D: Sheba Ramachandran spoke further today about her feelings re: how well she is doing in managing her school anxiety as she completes her finals while also preparing for her step family to move into her home  A : Elgin Bhaktadavid was able to recognize her progress in therapy and how much she has addressed her POTS as well    P:  Upcoming sessions will be used to continue to support Nury with all of the above        [address] [place] [address] Luke: Diagnosis and Treatment Plan explained to Nury, Nury relates understanding diagnosis and is agreeable to Treatment Plan  Yes      This note was not shared with the patient due to this is a psychotherapy note      Encounter Diagnoses   Name Primary?     INES (generalized anxiety disorder)

## 2021-05-01 ENCOUNTER — APPOINTMENT (OUTPATIENT)
Dept: LAB | Facility: CLINIC | Age: 21
End: 2021-05-01

## 2021-05-01 ENCOUNTER — LAB (OUTPATIENT)
Dept: LAB | Facility: CLINIC | Age: 21
End: 2021-05-01
Payer: COMMERCIAL

## 2021-05-01 ENCOUNTER — TRANSCRIBE ORDERS (OUTPATIENT)
Dept: LAB | Facility: CLINIC | Age: 21
End: 2021-05-01

## 2021-05-01 DIAGNOSIS — Z00.8 HEALTH EXAMINATION IN POPULATION SURVEY: Primary | ICD-10-CM

## 2021-05-01 DIAGNOSIS — F41.1 GAD (GENERALIZED ANXIETY DISORDER): ICD-10-CM

## 2021-05-01 DIAGNOSIS — D89.40 MAST CELL ACTIVATION (HCC): ICD-10-CM

## 2021-05-01 DIAGNOSIS — E78.1 PURE HYPERGLYCERIDEMIA: ICD-10-CM

## 2021-05-01 DIAGNOSIS — Z00.8 HEALTH EXAMINATION IN POPULATION SURVEY: ICD-10-CM

## 2021-05-01 DIAGNOSIS — I49.8 NODAL RHYTHM DISORDER: Primary | ICD-10-CM

## 2021-05-01 DIAGNOSIS — K58.1 IRRITABLE BOWEL SYNDROME WITH CONSTIPATION: ICD-10-CM

## 2021-05-01 DIAGNOSIS — R53.83 FATIGUE, UNSPECIFIED TYPE: ICD-10-CM

## 2021-05-01 DIAGNOSIS — I49.8 POTS (POSTURAL ORTHOSTATIC TACHYCARDIA SYNDROME): ICD-10-CM

## 2021-05-01 DIAGNOSIS — Z00.00 ROUTINE HEALTH MAINTENANCE: ICD-10-CM

## 2021-05-01 DIAGNOSIS — I49.8 NODAL RHYTHM DISORDER: ICD-10-CM

## 2021-05-01 LAB
25(OH)D3 SERPL-MCNC: 27 NG/ML (ref 30–100)
ALBUMIN SERPL BCP-MCNC: 3.9 G/DL (ref 3.5–5)
ALP SERPL-CCNC: 96 U/L (ref 46–116)
ALT SERPL W P-5'-P-CCNC: 29 U/L (ref 12–78)
ANION GAP SERPL CALCULATED.3IONS-SCNC: 10 MMOL/L (ref 4–13)
AST SERPL W P-5'-P-CCNC: 34 U/L (ref 5–45)
BASOPHILS # BLD AUTO: 0.01 THOUSANDS/ΜL (ref 0–0.1)
BASOPHILS NFR BLD AUTO: 0 % (ref 0–1)
BILIRUB SERPL-MCNC: 0.26 MG/DL (ref 0.2–1)
BUN SERPL-MCNC: 22 MG/DL (ref 5–25)
CALCIUM SERPL-MCNC: 9.3 MG/DL (ref 8.3–10.1)
CHLORIDE SERPL-SCNC: 104 MMOL/L (ref 100–108)
CHOLEST SERPL-MCNC: 145 MG/DL (ref 50–200)
CO2 SERPL-SCNC: 27 MMOL/L (ref 21–32)
CREAT SERPL-MCNC: 0.9 MG/DL (ref 0.6–1.3)
EOSINOPHIL # BLD AUTO: 0 THOUSAND/ΜL (ref 0–0.61)
EOSINOPHIL NFR BLD AUTO: 0 % (ref 0–6)
ERYTHROCYTE [DISTWIDTH] IN BLOOD BY AUTOMATED COUNT: 12.2 % (ref 11.6–15.1)
EST. AVERAGE GLUCOSE BLD GHB EST-MCNC: 100 MG/DL
GFR SERPL CREATININE-BSD FRML MDRD: 92 ML/MIN/1.73SQ M
GLUCOSE P FAST SERPL-MCNC: 90 MG/DL (ref 65–99)
HBA1C MFR BLD: 5.1 %
HCT VFR BLD AUTO: 39.7 % (ref 34.8–46.1)
HDLC SERPL-MCNC: 44 MG/DL
HGB BLD-MCNC: 13.1 G/DL (ref 11.5–15.4)
IMM GRANULOCYTES # BLD AUTO: 0.01 THOUSAND/UL (ref 0–0.2)
IMM GRANULOCYTES NFR BLD AUTO: 0 % (ref 0–2)
LDLC SERPL CALC-MCNC: 82 MG/DL (ref 0–100)
LYMPHOCYTES # BLD AUTO: 1.57 THOUSANDS/ΜL (ref 0.6–4.47)
LYMPHOCYTES NFR BLD AUTO: 41 % (ref 14–44)
MAGNESIUM SERPL-MCNC: 2.1 MG/DL (ref 1.6–2.6)
MCH RBC QN AUTO: 28.9 PG (ref 26.8–34.3)
MCHC RBC AUTO-ENTMCNC: 33 G/DL (ref 31.4–37.4)
MCV RBC AUTO: 88 FL (ref 82–98)
MONOCYTES # BLD AUTO: 0.32 THOUSAND/ΜL (ref 0.17–1.22)
MONOCYTES NFR BLD AUTO: 8 % (ref 4–12)
NEUTROPHILS # BLD AUTO: 1.96 THOUSANDS/ΜL (ref 1.85–7.62)
NEUTS SEG NFR BLD AUTO: 51 % (ref 43–75)
NONHDLC SERPL-MCNC: 101 MG/DL
NRBC BLD AUTO-RTO: 0 /100 WBCS
PLATELET # BLD AUTO: 222 THOUSANDS/UL (ref 149–390)
PMV BLD AUTO: 10.4 FL (ref 8.9–12.7)
POTASSIUM SERPL-SCNC: 4.1 MMOL/L (ref 3.5–5.3)
PROT SERPL-MCNC: 7.9 G/DL (ref 6.4–8.2)
RBC # BLD AUTO: 4.53 MILLION/UL (ref 3.81–5.12)
SODIUM SERPL-SCNC: 141 MMOL/L (ref 136–145)
TRIGL SERPL-MCNC: 96 MG/DL
TSH SERPL DL<=0.05 MIU/L-ACNC: 1.18 UIU/ML (ref 0.46–3.98)
VIT B12 SERPL-MCNC: 371 PG/ML (ref 100–900)
WBC # BLD AUTO: 3.87 THOUSAND/UL (ref 4.31–10.16)

## 2021-05-01 PROCEDURE — 82306 VITAMIN D 25 HYDROXY: CPT

## 2021-05-01 PROCEDURE — 83735 ASSAY OF MAGNESIUM: CPT

## 2021-05-01 PROCEDURE — 83036 HEMOGLOBIN GLYCOSYLATED A1C: CPT

## 2021-05-01 PROCEDURE — 82607 VITAMIN B-12: CPT

## 2021-05-01 PROCEDURE — 80061 LIPID PANEL: CPT

## 2021-05-01 PROCEDURE — 36415 COLL VENOUS BLD VENIPUNCTURE: CPT

## 2021-05-01 PROCEDURE — 84443 ASSAY THYROID STIM HORMONE: CPT

## 2021-05-01 PROCEDURE — 85025 COMPLETE CBC W/AUTO DIFF WBC: CPT

## 2021-05-01 PROCEDURE — 80053 COMPREHEN METABOLIC PANEL: CPT

## 2021-05-03 DIAGNOSIS — D72.819 LEUKOPENIA, UNSPECIFIED TYPE: Primary | ICD-10-CM

## 2021-05-03 DIAGNOSIS — E55.9 HYPOVITAMINOSIS D: ICD-10-CM

## 2021-05-03 DIAGNOSIS — E53.8 LOW VITAMIN B12 LEVEL: ICD-10-CM

## 2021-05-03 NOTE — RESULT ENCOUNTER NOTE
Please let patient know that her blood work was overall normal   Her vitamin-D was lower and would like her to take 2000 International Units daily and double this in the winter months  Her vitamin B12 was low normal   I would like her to take 500 mcg daily  I will recheck these again in 3 months and will print this out  In addition, her white count is mildly decreased  I am not sure why and would like to repeat this again in the next couple of weeks to ensure that it is back to normal range and will print this out  Otherwise the rest for blood work was within normal range  Her triglycerides are now 96    Congratulations on the good work

## 2021-05-04 ENCOUNTER — SOCIAL WORK (OUTPATIENT)
Dept: BEHAVIORAL/MENTAL HEALTH CLINIC | Facility: CLINIC | Age: 21
End: 2021-05-04
Payer: COMMERCIAL

## 2021-05-04 DIAGNOSIS — F41.1 GAD (GENERALIZED ANXIETY DISORDER): ICD-10-CM

## 2021-05-04 PROCEDURE — 90834 PSYTX W PT 45 MINUTES: CPT | Performed by: SOCIAL WORKER

## 2021-05-04 NOTE — PSYCH
Psychotherapy Provided: Individual Psychotherapy 50 minutes      Length of time in session: 50 minutes, follow up in 2 week     Goals addressed in session: Goal 1      Pain: 0  Current suicide risk : Low      D: Eddie Stone spoke further today about her feelings re: how she is doing in managing her school anxiety as she completes her finals while also continuing to prepare for her step family to move into her home  Cognitive flexibility was discussed as were ways to consider expressing her feelings and concerns to her parents  A : Mike Louis was able to speak openly about her feelings and frustrations  She also shared re: her need for clear and consistent boundaires with her family   P:  Upcoming sessions will be used to continue to support Nury with all of the above        [address] [place], [place] [id number]: Diagnosis and Treatment Plan explained to Nury, Nury relates understanding diagnosis and is agreeable to Treatment Plan  Yes      This note was not shared with the patient due to this is a psychotherapy note      Encounter Diagnoses   Name Primary?     INES (generalized anxiety disorder)

## 2021-05-11 ENCOUNTER — TELEPHONE (OUTPATIENT)
Dept: PSYCHIATRY | Facility: CLINIC | Age: 21
End: 2021-05-11

## 2021-05-11 ENCOUNTER — SOCIAL WORK (OUTPATIENT)
Dept: BEHAVIORAL/MENTAL HEALTH CLINIC | Facility: CLINIC | Age: 21
End: 2021-05-11
Payer: COMMERCIAL

## 2021-05-11 ENCOUNTER — TELEPHONE (OUTPATIENT)
Dept: OBGYN CLINIC | Facility: CLINIC | Age: 21
End: 2021-05-11

## 2021-05-11 DIAGNOSIS — F41.1 GAD (GENERALIZED ANXIETY DISORDER): ICD-10-CM

## 2021-05-11 PROCEDURE — 90834 PSYTX W PT 45 MINUTES: CPT | Performed by: SOCIAL WORKER

## 2021-05-11 NOTE — PSYCH
Psychotherapy Provided: Individual Psychotherapy 50 minutes      Length of time in session: 50 minutes, follow up in 2 week     Goals addressed in session: Goal 1      Pain: 0  Current suicide risk : Low      D: Ashish Grey spoke today about her feelings re: how she did on the remainder of her finals while also continuing to prepare for her step family to move into her home  Mothers Day and the struggle to divide her time between her mom and stepmother were processed as Nicolle Davemarielle also spoke about how she has begun to view her parents through more "adult" eyes  A : Trevajhon Davemarielle was able to speak openly about her feelings and frustrations  She also shared re: her need for clear and consistent boundaires with her family   P:  Upcoming sessions will be used to continue to support Nury with all of the above        [address] [place] [address] Luke: Diagnosis and Treatment Plan explained to Nury, Nury relates understanding diagnosis and is agreeable to Treatment Plan  Yes      This note was not shared with the patient due to this is a psychotherapy note      Encounter Diagnoses   Name Primary?     INES (generalized anxiety disorder)

## 2021-05-11 NOTE — TELEPHONE ENCOUNTER
Pt is scheduled on May 20 for depo inj and wants to know if she can get her 1st Gardasil at the same visit?

## 2021-05-11 NOTE — TELEPHONE ENCOUNTER
I called and spoke to pt informing that I spoke with one of the nurse's and that there is no reason to their knowledge why she couldn't get both depo and Gardasil at same visit   Pt was thankful and verbalized understanding

## 2021-05-11 NOTE — TELEPHONE ENCOUNTER
Patient called and left a voice message that she would like to cancel and reschedule her upcoming apt with Carin on 5/24/21 at 11:30 she said something came up that she has to cancel the apt can you please give her a call thank you

## 2021-05-18 ENCOUNTER — SOCIAL WORK (OUTPATIENT)
Dept: BEHAVIORAL/MENTAL HEALTH CLINIC | Facility: CLINIC | Age: 21
End: 2021-05-18
Payer: COMMERCIAL

## 2021-05-18 ENCOUNTER — OFFICE VISIT (OUTPATIENT)
Dept: FAMILY MEDICINE CLINIC | Facility: CLINIC | Age: 21
End: 2021-05-18
Payer: COMMERCIAL

## 2021-05-18 VITALS
BODY MASS INDEX: 26.56 KG/M2 | WEIGHT: 175.25 LBS | SYSTOLIC BLOOD PRESSURE: 120 MMHG | HEART RATE: 94 BPM | HEIGHT: 68 IN | RESPIRATION RATE: 18 BRPM | TEMPERATURE: 98 F | OXYGEN SATURATION: 97 % | DIASTOLIC BLOOD PRESSURE: 70 MMHG

## 2021-05-18 DIAGNOSIS — F41.1 GAD (GENERALIZED ANXIETY DISORDER): ICD-10-CM

## 2021-05-18 DIAGNOSIS — Z00.00 ROUTINE HEALTH MAINTENANCE: Primary | ICD-10-CM

## 2021-05-18 DIAGNOSIS — J45.909 UNCOMPLICATED ASTHMA, UNSPECIFIED ASTHMA SEVERITY, UNSPECIFIED WHETHER PERSISTENT: ICD-10-CM

## 2021-05-18 DIAGNOSIS — I49.8 POTS (POSTURAL ORTHOSTATIC TACHYCARDIA SYNDROME): ICD-10-CM

## 2021-05-18 DIAGNOSIS — E78.1 HYPERTRIGLYCERIDEMIA: ICD-10-CM

## 2021-05-18 DIAGNOSIS — G43.009 MIGRAINE WITHOUT AURA AND WITHOUT STATUS MIGRAINOSUS, NOT INTRACTABLE: ICD-10-CM

## 2021-05-18 DIAGNOSIS — K58.1 IRRITABLE BOWEL SYNDROME WITH CONSTIPATION: ICD-10-CM

## 2021-05-18 DIAGNOSIS — J30.2 SEASONAL ALLERGIES: ICD-10-CM

## 2021-05-18 DIAGNOSIS — D89.40 MAST CELL ACTIVATION SYNDROME (HCC): ICD-10-CM

## 2021-05-18 LAB
SL AMB  POCT GLUCOSE, UA: NORMAL
SL AMB LEUKOCYTE ESTERASE,UA: NORMAL
SL AMB POCT BILIRUBIN,UA: NORMAL
SL AMB POCT BLOOD,UA: NORMAL
SL AMB POCT CLARITY,UA: CLEAR
SL AMB POCT COLOR,UA: YELLOW
SL AMB POCT KETONES,UA: NORMAL
SL AMB POCT NITRITE,UA: NORMAL
SL AMB POCT PH,UA: 7.5
SL AMB POCT SPECIFIC GRAVITY,UA: 1
SL AMB POCT URINE PROTEIN: NORMAL
SL AMB POCT UROBILINOGEN: 0.2

## 2021-05-18 PROCEDURE — 81003 URINALYSIS AUTO W/O SCOPE: CPT | Performed by: FAMILY MEDICINE

## 2021-05-18 PROCEDURE — 99395 PREV VISIT EST AGE 18-39: CPT | Performed by: FAMILY MEDICINE

## 2021-05-18 PROCEDURE — 90834 PSYTX W PT 45 MINUTES: CPT | Performed by: SOCIAL WORKER

## 2021-05-18 NOTE — PATIENT INSTRUCTIONS

## 2021-05-18 NOTE — PSYCH
Psychotherapy Provided: Individual Psychotherapy 50 minutes      Length of time in session: 50 minutes, follow up in 2 week     Goals addressed in session: Goal 1      Pain: 0  Current suicide risk : Low      D: Fernanda Bingham spoke today about her feelings re: her recent POTS flare following the end of her semester  She verbalized that she has not  Been to the gym in 3 weeks and the importance of self care was stressed  She shared that she recently learned that her per [employer] job would be eliminated next month and she would (again) need to seek out a new position  A: Cottojaquelin Steve was able to speak openly about her feelings and fears  She was able to verbalize a desire to work on her dating fears today      P:  Upcoming sessions will be used to continue to support Nury with all of the above  [address] Luke: Diagnosis and Treatment Plan explained to Nury, Nury relates understanding diagnosis and is agreeable to Treatment Plan  Yes      This note was not shared with the patient due to this is a psychotherapy note      Encounter Diagnoses   Name Primary?     INES (generalized anxiety disorder)

## 2021-05-18 NOTE — PROGRESS NOTES
FAMILY PRACTICE OFFICE VISIT       NAME: Libby Iraheta  AGE: 22 y o  SEX: female       : 2000        MRN: [id number]    DATE: 2021  TIME: 12:28 PM    Assessment and Plan     Problem List Items Addressed This Visit        Digestive    Irritable bowel syndrome (IBS)       Respiratory    Uncomplicated asthma       Cardiovascular and Mediastinum    POTS (postural orthostatic tachycardia syndrome)    Migraine without aura and without status migrainosus, not intractable       Other    INES (generalized anxiety disorder)    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HCC)    Hypertriglyceridemia    Routine health maintenance - Primary    Relevant Orders    POCT urine dip auto non-scope (Completed)    Seasonal allergies            Routine health maintenance:  She is on Depo-Provera which is followed by gyn, [address]  She will follow-up with her gynecologist in   Patient will have a Pap exam   Will be getting gardasil at gyn office   She maintains a very well balance diet, exercises 5 days weekly, sleeps 7-8 hours nightly and feels rested  Mood is overall good  More recently did not exercise regularly due to final exams however will restart again  She also has a history of Celiac ttg igg positive test results  This was followed by [facility], biopsy was neg for celiac  She does avoid gluten    Generalized anxiety: This is overall stable  She takes Effexor  followed by Eliot Guerrero every 6 months for INES  She is on effexor  She also sees therapist [facility] weekly     POTS:  Sees cardio at [facility] Dr Maritza abraham-yearly  Was diagnosed in 2016  Is doing very well  She is active, able to exercise  She walks regularly, does a stair stepper  Drinking lots of water, gatorade  She is on Florinef and metoprolol  Migraine headaches:   saw Dr Simon Falling   Is in the process of looking for a neurologist,last migraine was 8 months ago,   Was not sure why she has had migraines  Was started on reglan, ubrevly, muscle relaxer and a steroid initially which broke the cycle   Prior to 8 months ago, was  Having migraines once every 2 weeks    Mast cell activation syndrome:  Has mast cell activation syndrome, histamine levels are high  Went through a time in 2017 when she had full body pruritis  Overall stable  Continue with Zyrtec and cyproheptadine  IBS -constipation dominant: Bowel movements have improved, occurring 3 times weekly after starting Linzess in mid April which she takes every other day  Recommend maintaining adequate hydration as well as adequate fiber intake  Continue with regular exercise  She is followed by GI  She is currently on 50 billion cfu probiotic-from [employer]es    Hypertriglyceridemia:  Her recent triglycerides have improved and are within normal range  She will continue with lifestyle modifications as well as taking fish oil  Seasonal allergies:  Appears stable  Continue with Zyrtec  History of asthma:  Stable  Patient Instructions     Wellness Visit for Adults   AMBULATORY CARE:   A wellness visit  is when you see your healthcare provider to get screened for health problems  Your healthcare provider will also give you advice on how to stay healthy  Write down your questions so you remember to ask them  Ask your healthcare provider how often you should have a wellness visit  What happens at a wellness visit:  Your healthcare provider will ask about your health, and your family history of health problems  This includes high blood pressure, heart disease, and cancer  He or she will ask if you have symptoms that concern you, if you smoke, and about your mood  You may also be asked about your intake of medicines, supplements, food, and alcohol  Any of the following may be done:  · Your weight  will be checked  Your height may also be checked so your body mass index (BMI) can be calculated  Your BMI shows if you are at a healthy weight  · Your blood pressure  and heart rate will be checked   Your temperature may also be checked  · Blood and urine tests  may be done  Blood tests may be done to check your cholesterol levels  Abnormal cholesterol levels increase your risk for heart disease and stroke  You may also need a blood or urine test to check for diabetes if you are at increased risk  Urine tests may be done to look for signs of an infection or kidney disease  · A physical exam  includes checking your heartbeat and lungs with a stethoscope  Your healthcare provider may also check your skin to look for sun damage  · Screening tests  may be recommended  A screening test is done to check for diseases that may not cause symptoms  The screening tests you may need depend on your age, gender, family history, and lifestyle habits  For example, colorectal screening may be recommended if you are 48years old or older  Screening tests you need if you are a woman:   · A Pap smear  is used to screen for cervical cancer  Pap smears are usually done every 3 to 5 years depending on your age  You may need them more often if you have had abnormal Pap smear test results in the past  Ask your healthcare provider how often you should have a Pap smear  · A mammogram  is an x-ray of your breasts to screen for breast cancer  Experts recommend mammograms every 2 years starting at age 48 years  You may need a mammogram at age 52 years or younger if you have an increased risk for breast cancer  Talk to your healthcare provider about when you should start having mammograms and how often you need them  Vaccines you may need:   · Get an influenza vaccine  every year  The influenza vaccine protects you from the flu  Several types of viruses cause the flu  The viruses change over time, so new vaccines are made each year  · Get a tetanus-diphtheria (Td) booster vaccine  every 10 years  This vaccine protects you against tetanus and diphtheria   Tetanus is a severe infection that may cause painful muscle spasms and myke  Diphtheria is a severe bacterial infection that causes a thick covering in the back of your mouth and throat  · Get a human papillomavirus (HPV) vaccine  if you are female and aged 23 to 32 or male 23 to 24 and never received it  This vaccine protects you from HPV infection  HPV is the most common infection spread by sexual contact  HPV may also cause vaginal, penile, and anal cancers  · Get a pneumococcal vaccine  if you are aged 72 years or older  The pneumococcal vaccine is an injection given to protect you from pneumococcal disease  Pneumococcal disease is an infection caused by pneumococcal bacteria  The infection may cause pneumonia, meningitis, or an ear infection  · Get a shingles vaccine  if you are 60 or older, even if you have had shingles before  The shingles vaccine is an injection to protect you from the varicella-zoster virus  This is the same virus that causes chickenpox  Shingles is a painful rash that develops in people who had chickenpox or have been exposed to the virus  How to eat healthy:  My Plate is a model for planning healthy meals  It shows the types and amounts of foods that should go on your plate  Fruits and vegetables make up about half of your plate, and grains and protein make up the other half  A serving of dairy is included on the side of your plate  The amount of calories and serving sizes you need depends on your age, gender, weight, and height  Examples of healthy foods are listed below:  · Eat a variety of vegetables  such as dark green, red, and orange vegetables  You can also include canned vegetables low in sodium (salt) and frozen vegetables without added butter or sauces  · Eat a variety of fresh fruits , canned fruit in 100% juice, frozen fruit, and dried fruit  · Include whole grains  At least half of the grains you eat should be whole grains   Examples include whole-wheat bread, wheat pasta, brown rice, and whole-grain cereals such as oatmeal     · Eat a variety of protein foods such as seafood (fish and shellfish), lean meat, and poultry without skin (turkey and chicken)  Examples of lean meats include pork leg, shoulder, or tenderloin, and beef round, sirloin, tenderloin, and extra lean ground beef  Other protein foods include eggs and egg substitutes, beans, peas, soy products, nuts, and seeds  · Choose low-fat dairy products such as skim or 1% milk or low-fat yogurt, cheese, and cottage cheese  · Limit unhealthy fats  such as butter, hard margarine, and shortening  Exercise:  Exercise at least 30 minutes per day on most days of the week  Some examples of exercise include walking, biking, dancing, and swimming  You can also fit in more physical activity by taking the stairs instead of the elevator or parking farther away from stores  Include muscle strengthening activities 2 days each week  Regular exercise provides many health benefits  It helps you manage your weight, and decreases your risk for type 2 diabetes, heart disease, stroke, and high blood pressure  Exercise can also help improve your mood  Ask your healthcare provider about the best exercise plan for you  General health and safety guidelines:   · Do not smoke  Nicotine and other chemicals in cigarettes and cigars can cause lung damage  Ask your healthcare provider for information if you currently smoke and need help to quit  E-cigarettes or smokeless tobacco still contain nicotine  Talk to your healthcare provider before you use these products  · Limit alcohol  A drink of alcohol is 12 ounces of beer, 5 ounces of wine, or 1½ ounces of liquor  · Lose weight, if needed  Being overweight increases your risk of certain health conditions  These include heart disease, high blood pressure, type 2 diabetes, and certain types of cancer  · Protect your skin  Do not sunbathe or use tanning beds  Use sunscreen with a SPF 15 or higher   Apply sunscreen at least 15 minutes before you go outside  Reapply sunscreen every 2 hours  Wear protective clothing, hats, and sunglasses when you are outside  · Drive safely  Always wear your seatbelt  Make sure everyone in your car wears a seatbelt  A seatbelt can save your life if you are in an accident  Do not use your cell phone when you are driving  This could distract you and cause an accident  Pull over if you need to make a call or send a text message  · Practice safe sex  Use latex condoms if are sexually active and have more than one partner  Your healthcare provider may recommend screening tests for sexually transmitted infections (STIs)  · Wear helmets, lifejackets, and protective gear  Always wear a helmet when you ride a bike or motorcycle, go skiing, or play sports that could cause a head injury  Wear protective equipment when you play sports  Wear a lifejacket when you are on a boat or doing water sports  © Copyright [address] Information is for End User's use only and may not be sold, redistributed or otherwise used for commercial purposes  All illustrations and images included in CareNotes® are the copyrighted property of A D A M , Inc  or [address] [place], [place] [address]  The above information is an  only  It is not intended as medical advice for individual conditions or treatments  Talk to your doctor, nurse or pharmacist before following any medical regimen to see if it is safe and effective for you  Chief Complaint     Chief Complaint   Patient presents with    Well Check       History of Present Illness     HPI   43-year-old female presents today for routine health maintenance exam   She is doing well overall  She will follow-up with her gynecologist in June  Patient will have a Pap exam   Will be getting gardasil at gyn office   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 and did well    Her bowel movements are stable, occurring 3 times weekly which is an improvement from previously  Otherwise she does not have any other complaints today  Review of Systems   Review of Systems   Constitutional: Negative for appetite change  Respiratory: Negative for shortness of breath  Cardiovascular: Negative  Gastrointestinal: Negative for abdominal pain and constipation  Genitourinary: Negative for dysuria  Neurological: Negative for dizziness  Psychiatric/Behavioral: Negative for dysphoric mood and sleep disturbance  Active Problem List     Patient Active Problem List   Diagnosis    Celiac artery stenosis (HCC)    INES (generalized anxiety disorder)    Hypermobile joints    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HCC)    Median arcuate ligament syndrome (HCC)    Menorrhagia    POTS (postural orthostatic tachycardia syndrome)    Seasonal allergic rhinitis    Hypertriglyceridemia    Hammertoe of left foot    Migraine without aura and without status migrainosus, not intractable    Routine health maintenance    Irritable bowel syndrome (IBS)    Dyspepsia    Seasonal allergies    Uncomplicated asthma       Past Medical History:  Past Medical History:   Diagnosis Date    Anxiety     Asthma     Dizziness     at times    GERD (gastroesophageal reflux disease)     Hammertoe of left foot     Headache     occ    Hyperlipemia     Hypermobile joints     Irritable bowel syndrome     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HCC)     Mast cell disorder     Migraine     PONV (postoperative nausea and vomiting)     POTS (postural orthostatic tachycardia syndrome)      infant     Wears glasses        Past Surgical History:  Past Surgical History:   Procedure Laterality Date    COLONOSCOPY      EGD AND COLONOSCOPY N/A 1/10/2017    Procedure: EGD AND COLONOSCOPY;  Surgeon: Jyotsna Adan MD;  Location: [facility];   Service:     ESOPHAGOGASTRODUODENOSCOPY      with biopsy    FOOT SURGERY      Excision of lesion feet benign    PA REPAIR OF HAMMERTOE,ONE Left 2019 Procedure: 2nd arthrodesis; 5th arthroplasty, flexor tenotomy 2,3,4,5 ;  Surgeon: Milady Patel DPM;  [facility];  Service: Podiatry    UT REPAIR OF Marvetta Solders Left 6/5/2020    Procedure: 2ND TOE Revision hammertoe with broken implant;  Surgeon: Milady Patel DPM;  [facility];  Service: Podiatry    UPPER GASTROINTESTINAL ENDOSCOPY      WISDOM TOOTH EXTRACTION         Family History:  Family History   Problem Relation Age of Onset    Gestational diabetes Mother     Migraines Mother     Anxiety disorder Father     Hyperlipidemia Father     Autism Brother     Diabetes Other     Uterine cancer Maternal Grandmother     Rheumatic fever Maternal Grandmother     Diabetes Maternal Grandfather     Hypertension Maternal Grandfather     Heart attack Maternal Grandfather     Stroke Maternal Grandfather     Hyperlipidemia Maternal Grandfather     Hypertension Paternal Grandmother     Anxiety disorder Paternal Grandmother     Hypertension Paternal Grandfather        Social History:  Social History     Socioeconomic History    Marital status: Single     Spouse name: Not on file    Number of children: Not on file    Years of education: Not on file    Highest education level: Not on file   Occupational History    Not on file   Social Needs    Financial resource strain: Not on file    Food insecurity     Worry: Not on file     Inability: Not on file    Transportation needs     Medical: Not on file     Non-medical: Not on file   Tobacco Use    Smoking status: Never Smoker    Smokeless tobacco: Never Used   Substance and Sexual Activity    Alcohol use: No    Drug use: No     Comment: 2/9/20- not asked, parent at bedside      Sexual activity: Never   Lifestyle    Physical activity     Days per week: Not on file     Minutes per session: Not on file    Stress: Not on file   Relationships    Social connections     Talks on phone: Not on file     Gets together: Not on file     Attends Worship service: Not on file     Active member of club or organization: Not on file     Attends meetings of clubs or organizations: Not on file     Relationship status: Not on file    Intimate partner violence     Fear of current or ex partner: Not on file     Emotionally abused: Not on file     Physically abused: Not on file     Forced sexual activity: Not on file   Other Topics Concern    Not on file   Social History Narrative    Lives with parents     I have reviewed the patient's medical history in detail; there are no changes to the history as noted in the electronic medical record  Objective     Vitals:    05/18/21 1438   BP: 120/70   Pulse: 94   Resp: 18   Temp: 98 °F (36 7 °C)   SpO2: 97%     Wt Readings from Last 3 Encounters:   05/18/21 79 5 kg (175 lb 4 oz)   04/27/21 77 3 kg (170 lb 6 oz)   04/22/21 75 6 kg (166 lb 9 6 oz)         Physical Exam  Vitals signs and nursing note reviewed  Constitutional:       General: She is not in acute distress  Appearance: Normal appearance  She is well-developed  HENT:      Head: Normocephalic and atraumatic  Right Ear: Tympanic membrane normal       Left Ear: Tympanic membrane normal       Mouth/Throat:      Mouth: Mucous membranes are moist       Pharynx: Oropharynx is clear  Eyes:      Conjunctiva/sclera: Conjunctivae normal       Pupils: Pupils are equal, round, and reactive to light  Neck:      Musculoskeletal: Normal range of motion and neck supple  Thyroid: No thyromegaly  Cardiovascular:      Rate and Rhythm: Normal rate and regular rhythm  Pulmonary:      Effort: Pulmonary effort is normal       Breath sounds: Normal breath sounds  Abdominal:      General: Bowel sounds are normal       Palpations: Abdomen is soft  Tenderness: There is no abdominal tenderness  Musculoskeletal: Normal range of motion  General: No swelling  Lymphadenopathy:      Cervical: No cervical adenopathy     Neurological: Mental Status: She is alert and oriented to person, place, and time     Psychiatric:         Mood and Affect: Mood normal          Pertinent Laboratory/Diagnostic Studies:  Lab Results   Component Value Date    BUN 22 05/01/2021    CREATININE 0 90 05/01/2021    CALCIUM 9 3 05/01/2021    K 4 1 05/01/2021    CO2 27 05/01/2021     05/01/2021     Lab Results   Component Value Date    ALT 29 05/01/2021    AST 34 05/01/2021    ALKPHOS 96 05/01/2021       Lab Results   Component Value Date    WBC 3 87 (L) 05/01/2021    HGB 13 1 05/01/2021    HCT 39 7 05/01/2021    MCV 88 05/01/2021     05/01/2021       No results found for: TSH    No results found for: CHOL  Lab Results   Component Value Date    TRIG 96 05/01/2021     Lab Results   Component Value Date    HDL 44 05/01/2021     Lab Results   Component Value Date    LDLCALC 82 05/01/2021     Lab Results   Component Value Date    HGBA1C 5 1 05/01/2021       Results for orders placed or performed in visit on 05/18/21   POCT urine dip auto non-scope   Result Value Ref Range     COLOR,UA yellow     CLARITY,UA clear     SPECIFIC GRAVITY,UA 1 000      PH,UA 7 5     LEUKOCYTE ESTERASE,UA neg     NITRITE,UA neg     GLUCOSE, UA neg     KETONES,UA neg     BILIRUBIN,UA neg     BLOOD,UA neg     POCT URINE PROTEIN neg     SL AMB POCT UROBILINOGEN 0 2        Orders Placed This Encounter   Procedures    POCT urine dip auto non-scope       ALLERGIES:  Allergies   Allergen Reactions    Nuts - Food Allergy Other (See Comments)     Avani Nuts - itchy throat    Other Hives      At surgical site and IV site- not sure if type of cleanser used    Pollen Extract        Current Medications     Current Outpatient Medications   Medication Sig Dispense Refill    acetaminophen (TYLENOL) 500 mg tablet Take 1,000 mg by mouth every 4 (four) hours as needed       ALPRAZolam (XANAX) 0 25 mg tablet Take 0 25 mg by mouth 2 (two) times a day as needed       ASMANEX  MCG/ACT AERO Inhale 400 mcg every 4 (four) hours as needed (2 puffs)       cetirizine (ZyrTEC) 10 mg tablet Take 10 mg by mouth daily      cyproheptadine (PERIACTIN) 4 mg tablet Take 4 mg by mouth daily at bedtime       Elastic Bandages & Supports (TRUFORM STOCKINGS 20-30MMHG) MISC Use as directed   fludrocortisone (FLORINEF) 0 1 mg tablet Take 0 1 mg by mouth daily Taken 1 tablet in the Morning 1 tablet at Night      linaCLOtide (Linzess) 72 MCG CAPS Take 1 capsule by mouth daily before breakfast 90 capsule 3    medroxyPROGESTERone acetate (DEPO-PROVERA SYRINGE) 150 mg/mL injection Inject 1 mL (150 mg total) into a muscle every 3 (three) months Every three months 1 mL 3    metoclopramide (REGLAN) 10 mg tablet Take 1 tablet (10 mg total) by mouth every 6 (six) hours (Patient taking differently: Take 10 mg by mouth every 6 (six) hours as needed ) 30 tablet 0    metoprolol succinate (TOPROL-XL) 25 mg 24 hr tablet Take 50 mg by mouth every evening       Omega-3 Fatty Acids (FISH OIL) 1,000 mg Take 1,000 mg by mouth 2 (two) times a day       Ubrogepant 50 MG TABS 1 tab at the onset of migraine headache may repeat in 2 hours if needed max 200 mg per day 12 tablet 1    venlafaxine (EFFEXOR-XR) 37 5 mg 24 hr capsule Take 1 capsule (37 5 mg total) by mouth daily Take with one 75 mg capsule  Total daily dose is 112 5 mg 90 capsule 0    venlafaxine (EFFEXOR-XR) 75 mg 24 hr capsule Take 1 capsule (75 mg total) by mouth daily Take with one 37 5 mg capsule   Total daily dose is 112 5 mg 90 capsule 0    XOPENEX HFA 45 MCG/ACT inhaler Inhale 1-2 puffs every 4 (four) hours as needed       linaCLOtide (Linzess) 72 MCG CAPS Take 1 capsule by mouth daily before breakfast (Patient not taking: Reported on 4/27/2021) 12 capsule 0    naproxen (EC NAPROSYN) 500 MG EC tablet Take 1 tablet (500 mg total) by mouth 2 (two) times a day with meals (Patient not taking: Reported on 12/10/2020) 60 tablet 0    scopolamine (TRANSDERM-SCOP) 1 5 mg/3 days TD 72 hr patch Place 1 patch on the skin every third day (Patient not taking: Reported on 4/27/2021) 10 patch 0     Current Facility-Administered Medications   Medication Dose Route Frequency Provider Last Rate Last Admin    albuterol inhalation solution 2 5 mg  2 5 mg Nebulization Q6H PRN Sotero Skinner PA-C        medroxyPROGESTERone acetate (DEPO-PROVERA SYRINGE) IM injection 150 mg  150 mg Intramuscular Q3 Months KETAN Del Castillo   150 mg at 05/20/21 [facility] Maintenance   Topic Date Due    HIV Screening  Never done    Annual Physical  05/12/2021    HPV Vaccine (2 - 3-dose series) 06/17/2021    BMI: Followup Plan  07/08/2021    Depression Screening PHQ  04/27/2022    BMI: Adult  05/18/2022    DTaP,Tdap,and Td Vaccines (7 - Td) 08/21/2022    Hepatitis B Vaccine  Completed    Hepatitis A Vaccine  Completed    Influenza Vaccine  Completed    COVID-19 Vaccine  Completed    Pneumococcal Vaccine: Pediatrics (0 to 5 Years) and At-Risk Patients (6 to 59 Years)  Aged Out    HIB Vaccine  Aged Out    IPV Vaccine  Aged Out    Meningococcal ACWY Vaccine  Aged [employer] History   Administered Date(s) Administered    DTaP 2001, 2001, 2001, 06/19/2002, 07/19/2006    HPV9 05/20/2021    Hep A, adult 06/12/2019, 02/24/2020    Hep B, adult 2001, 2001, 06/19/2002    INFLUENZA 11/05/2016, 11/05/2016, 10/01/2020    IPV 2001, 2001, 06/19/2002    MMR 03/25/2002    Meningococcal MCV4P 08/21/2012    SARS-CoV-2 / COVID-19 mRNA IM ([employer]) 12/20/2020, 01/10/2021    Tdap 08/21/2012    Varicella 03/25/2002, 08/27/2009       Niko Lua MD

## 2021-05-19 ENCOUNTER — TELEPHONE (OUTPATIENT)
Dept: NEUROLOGY | Facility: CLINIC | Age: 21
End: 2021-05-19

## 2021-05-19 NOTE — TELEPHONE ENCOUNTER
[id number] patient to reschedule   Advised Dr Eder Grady is no longer, w/[facility] and we need to reschedule  Advised patient no longer has an appointment 08/17/21  Rescheduled: 8/31/21 @ 1:00pm, w/Dr Josh Johns, in [facility]  Patient requested location

## 2021-05-20 ENCOUNTER — TELEPHONE (OUTPATIENT)
Dept: PSYCHIATRY | Facility: CLINIC | Age: 21
End: 2021-05-20

## 2021-05-20 ENCOUNTER — CLINICAL SUPPORT (OUTPATIENT)
Dept: OBGYN CLINIC | Facility: CLINIC | Age: 21
End: 2021-05-20
Payer: COMMERCIAL

## 2021-05-20 DIAGNOSIS — Z23 NEED FOR HPV VACCINATION: Primary | ICD-10-CM

## 2021-05-20 PROCEDURE — 90651 9VHPV VACCINE 2/3 DOSE IM: CPT | Performed by: NURSE PRACTITIONER

## 2021-05-20 PROCEDURE — 96372 THER/PROPH/DIAG INJ SC/IM: CPT | Performed by: NURSE PRACTITIONER

## 2021-05-20 PROCEDURE — 90471 IMMUNIZATION ADMIN: CPT | Performed by: NURSE PRACTITIONER

## 2021-05-20 RX ADMIN — MEDROXYPROGESTERONE ACETATE 150 MG: 150 INJECTION, SUSPENSION INTRAMUSCULAR at 17:20

## 2021-05-20 NOTE — PROGRESS NOTES
Pt presents for her depo injection, given today in her left deltoid  Last injection was 2/25/2021  She did well  Gardasil #1 given today in her right deltoid  She did well 
14-Sep-2018 20:46

## 2021-05-20 NOTE — TELEPHONE ENCOUNTER
----- Message from Hugh Valdivia sent at 5/19/2021  5:31 PM EDT -----  Regarding: Cancellation  Provider: Requested MR to cancelled appointment due to no reason given  Reason: Cancelled appointment out of provider's schedule

## 2021-05-25 ENCOUNTER — SOCIAL WORK (OUTPATIENT)
Dept: BEHAVIORAL/MENTAL HEALTH CLINIC | Facility: CLINIC | Age: 21
End: 2021-05-25
Payer: COMMERCIAL

## 2021-05-25 DIAGNOSIS — F41.1 GAD (GENERALIZED ANXIETY DISORDER): ICD-10-CM

## 2021-05-25 PROCEDURE — 90834 PSYTX W PT 45 MINUTES: CPT | Performed by: SOCIAL WORKER

## 2021-05-25 NOTE — PSYCH
Psychotherapy Provided: Individual Psychotherapy 50 minutes      Length of time in session: 50 minutes, follow up in 2 week     Goals addressed in session: Goal 1      Pain: 0  Current suicide risk : Low      D: Hoang Queen spoke today about her feelings of relief re: acquiring a  a new per aylin position  She also spoke about her family dynamics at length, upcoming move-in plans for her "new" family and her decision to be "away" when this occurs  Kareen Dillard also shared details re: her progress beginning to "date "    A: Kareen Dillard was able to speak openly about her feelings and fears  She continues to work on her dating fears as evidenced by her willingness to discuss a "future" with this male      P:  Upcoming sessions will be used to continue to support Nury with all of the above  [address] [place]: Diagnosis and Treatment Plan explained to Nury, Nury relates understanding diagnosis and is agreeable to Treatment Plan  Yes      This note was not shared with the patient due to this is a psychotherapy note      Encounter Diagnoses   Name Primary?     INES (generalized anxiety disorder)

## 2021-05-27 PROBLEM — J45.909 UNCOMPLICATED ASTHMA: Status: ACTIVE | Noted: 2021-05-27

## 2021-05-27 PROBLEM — J30.2 SEASONAL ALLERGIES: Status: ACTIVE | Noted: 2021-05-27

## 2021-06-09 ENCOUNTER — SOCIAL WORK (OUTPATIENT)
Dept: BEHAVIORAL/MENTAL HEALTH CLINIC | Facility: CLINIC | Age: 21
End: 2021-06-09
Payer: COMMERCIAL

## 2021-06-09 DIAGNOSIS — F41.1 GAD (GENERALIZED ANXIETY DISORDER): ICD-10-CM

## 2021-06-09 PROCEDURE — 90834 PSYTX W PT 45 MINUTES: CPT | Performed by: SOCIAL WORKER

## 2021-06-09 NOTE — PSYCH
Psychotherapy Provided: Individual Psychotherapy 50 minutes      Length of time in session: 50 minutes, follow up in 2 week     Goals addressed in session: Goal 1      Pain: 0  Current suicide risk : Low      D: Nancy Burroughs spoke today about her feelings of anxiety re: her mom's relationship with her paramour as her past two relationships have failed which resulted in Aiden Edge being abandoned by these males and needing to "help her mom  the pieces "   Aiden Edge also shared more details re: her plans to go on a double date tonight and how this also relates to her fears  A: Aiden Edge was again able to speak openly about her feelings and fears  She continues to work on her dating fears and is able to identify her progress in setting boundaries and asserting herself      P:  Upcoming sessions will be used to continue to support Nury with all of the above  [address] [place]: Diagnosis and Treatment Plan explained to Nury, Nury relates understanding diagnosis and is agreeable to Treatment Plan  Yes      This note was not shared with the patient due to this is a psychotherapy note    Encounter Diagnoses   Name Primary?     INES (generalized anxiety disorder)

## 2021-06-22 ENCOUNTER — TELEPHONE (OUTPATIENT)
Dept: FAMILY MEDICINE CLINIC | Facility: CLINIC | Age: 21
End: 2021-06-22

## 2021-06-22 DIAGNOSIS — Z01.84 IMMUNITY STATUS TESTING: Primary | ICD-10-CM

## 2021-06-22 DIAGNOSIS — M24.9 HYPERMOBILE JOINTS: ICD-10-CM
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
## 2021-06-22 DIAGNOSIS — Z00.00 ROUTINE HEALTH MAINTENANCE: Primary | ICD-10-CM

## 2021-06-22 NOTE — TELEPHONE ENCOUNTER
Patient currently clinical student at [employer]   States she needs titers drawn for the below:    MMR  Chicken Pox  Hep B  Hep B Surface Antigen  TB  TDap

## 2021-06-23 ENCOUNTER — SOCIAL WORK (OUTPATIENT)
Dept: BEHAVIORAL/MENTAL HEALTH CLINIC | Facility: CLINIC | Age: 21
End: 2021-06-23
Payer: COMMERCIAL

## 2021-06-23 DIAGNOSIS — F41.1 GAD (GENERALIZED ANXIETY DISORDER): ICD-10-CM

## 2021-06-23 PROCEDURE — 90834 PSYTX W PT 45 MINUTES: CPT | Performed by: SOCIAL WORKER

## 2021-06-23 NOTE — PSYCH
Psychotherapy Provided: Individual Psychotherapy 50 minutes      Length of time in session: 50 minutes, follow up in 2 week     Goals addressed in session: Goal 1      Pain: 0  Current suicide risk : Low      D: Deeitan Pugh spoke today about her feelings of anxiety re: her both her father's as well as her mom's relationship with her paramour  Reasons for her anxiety were discussed  Kenyetta Burks also shared details re: her recent shifts in her new work role were processed  A: Kenyetta Burks was again able to speak openly about her feelings and fears  She continues to work on her irrational beliefs and is able to identify her progress and struggles with this worker       P:  Upcoming sessions will be used to continue to support Nury with all of the above  [address] [place]: Diagnosis and Treatment Plan explained to Nury, Nury relates understanding diagnosis and is agreeable to Treatment Plan  Yes      This note was not shared with the patient due to this is a psychotherapy note    Encounter Diagnoses   Name Primary?     INES (generalized anxiety disorder)

## 2021-06-25 ENCOUNTER — TELEMEDICINE (OUTPATIENT)
Dept: DERMATOLOGY | Facility: CLINIC | Age: 21
End: 2021-06-25
Payer: COMMERCIAL

## 2021-06-25 ENCOUNTER — TELEPHONE (OUTPATIENT)
Dept: DERMATOLOGY | Facility: CLINIC | Age: 21
End: 2021-06-25

## 2021-06-25 DIAGNOSIS — L70.0 ACNE VULGARIS: Primary | ICD-10-CM

## 2021-06-25 PROCEDURE — 99213 OFFICE O/P EST LOW 20 MIN: CPT | Performed by: STUDENT IN AN ORGANIZED HEALTH CARE EDUCATION/TRAINING PROGRAM

## 2021-06-25 RX ORDER — CLINDAMYCIN PHOSPHATE 10 UG/ML
LOTION TOPICAL DAILY
Qty: 60 ML | Refills: 0 | Status: SHIPPED | OUTPATIENT
Start: 2021-06-25

## 2021-06-25 RX ORDER — DOXYCYCLINE HYCLATE 100 MG/1
100 CAPSULE ORAL DAILY
Qty: 30 CAPSULE | Refills: 1 | Status: SHIPPED | OUTPATIENT
Start: 2021-06-25 | End: 2021-07-25

## 2021-06-25 NOTE — PROGRESS NOTES
Virtual Regular Visit      Assessment/Plan:    Problem List Items Addressed This Visit     None               Reason for visit is   [facility] Follow Up Note    Patient Name: Brandi Wilcox  Encounter Date: 06/25/21      Today's Chief Concerns:  Concern #1:   Acne    Current Medications:    Current Outpatient Medications:     acetaminophen (TYLENOL) 500 mg tablet, Take 1,000 mg by mouth every 4 (four) hours as needed , Disp: , Rfl:     ALPRAZolam (XANAX) 0 25 mg tablet, Take 0 25 mg by mouth 2 (two) times a day as needed , Disp: , Rfl:     ASMANEX  MCG/ACT AERO, Inhale 400 mcg every 4 (four) hours as needed (2 puffs) , Disp: , Rfl:     cetirizine (ZyrTEC) 10 mg tablet, Take 10 mg by mouth daily, Disp: , Rfl:     cyproheptadine (PERIACTIN) 4 mg tablet, Take 4 mg by mouth daily at bedtime , Disp: , Rfl:     Elastic Bandages & Supports (TRUFORM STOCKINGS 20-30MMHG) MISC, Use as directed , Disp: , Rfl:     fludrocortisone (FLORINEF) 0 1 mg tablet, Take 0 1 mg by mouth daily Taken 1 tablet in the Morning 1 tablet at Night, Disp: , Rfl:     linaCLOtide (Linzess) 72 MCG CAPS, Take 1 capsule by mouth daily before breakfast, Disp: 90 capsule, Rfl: 3    linaCLOtide (Linzess) 72 MCG CAPS, Take 1 capsule by mouth daily before breakfast (Patient not taking: Reported on 4/27/2021), Disp: 12 capsule, Rfl: 0    medroxyPROGESTERone acetate (DEPO-PROVERA SYRINGE) 150 mg/mL injection, Inject 1 mL (150 mg total) into a muscle every 3 (three) months Every three months, Disp: 1 mL, Rfl: 3    metoclopramide (REGLAN) 10 mg tablet, Take 1 tablet (10 mg total) by mouth every 6 (six) hours (Patient taking differently: Take 10 mg by mouth every 6 (six) hours as needed ), Disp: 30 tablet, Rfl: 0    metoprolol succinate (TOPROL-XL) 25 mg 24 hr tablet, Take 50 mg by mouth every evening , Disp: , Rfl:     naproxen (EC NAPROSYN) 500 MG EC tablet, Take 1 tablet (500 mg total) by mouth 2 (two) times a day with meals (Patient not taking: Reported on 12/10/2020), Disp: 60 tablet, Rfl: 0    Omega-3 Fatty Acids (FISH OIL) 1,000 mg, Take 1,000 mg by mouth 2 (two) times a day , Disp: , Rfl:     scopolamine (TRANSDERM-SCOP) 1 5 mg/3 days TD 72 hr patch, Place 1 patch on the skin every third day (Patient not taking: Reported on 4/27/2021), Disp: 10 patch, Rfl: 0    Ubrogepant 50 MG TABS, 1 tab at the onset of migraine headache may repeat in 2 hours if needed max 200 mg per day, Disp: 12 tablet, Rfl: 1    venlafaxine (EFFEXOR-XR) 37 5 mg 24 hr capsule, Take 1 capsule (37 5 mg total) by mouth daily Take with one 75 mg capsule  Total daily dose is 112 5 mg, Disp: 90 capsule, Rfl: 0    venlafaxine (EFFEXOR-XR) 75 mg 24 hr capsule, Take 1 capsule (75 mg total) by mouth daily Take with one 37 5 mg capsule  Total daily dose is 112 5 mg, Disp: 90 capsule, Rfl: 0    XOPENEX HFA 45 MCG/ACT inhaler, Inhale 1-2 puffs every 4 (four) hours as needed , Disp: , Rfl:     Current Facility-Administered Medications:     albuterol inhalation solution 2 5 mg, 2 5 mg, Nebulization, Q6H PRN, Efra Perdomo PA-C    medroxyPROGESTERone acetate (DEPO-PROVERA SYRINGE) IM injection 150 mg, 150 mg, Intramuscular, Q3 Months, KETAN Dunlap, 150 mg at 05/20/21 1720    CONSTITUTIONAL:   There were no vitals filed for this visit  Specific Alerts:    Have you been seen by a [facility]ologist in the last 3 years? YES    Are you pregnant or planning to become pregnant? No    Are you currently or planning to be nursing or breast feeding? No    Allergies   Allergen Reactions    Nuts - Food Allergy Other (See Comments)     Avani Nuts - itchy throat    Other Hives      At surgical site and IV site- not sure if type of cleanser used    Pollen Extract        May we call your Preferred Phone number to discuss your specific medical information? YES    May we leave a detailed message that includes your specific medical information? YES    Have you traveled outside of [facility] in the past 3 months? No    Do you currently have a pacemaker or defibrillator? No    Do you have any artificial heart valves, joints, plates, screws, rods, stents, pins, etc? No   - If Yes, were any placed within the last 2 years? Do you require any medications prior to a surgical procedure? No    Are you taking any medications that cause you to bleed more easily ("blood thinners") No    Have you ever experienced a rapid heartbeat with epinephrine? No    Have you ever been treated with "gold" (gold sodium thiomalate) therapy? [facility] can help with wrinkles, "laugh lines," facial volume loss, "double chin," "love handles," age spots, and more  Are you interested in learning today about some of the skin enhancement procedures that we offer? (If Yes, please provide more detail)     Review of Systems:  Have you recently had or currently have any of the following?     · Fever or chills: No  · Night Sweats: No  · Headaches: No  · Weight Gain: No  · Weight Loss: No  · Blurry Vision: No  · Nausea: No  · Vomiting: No  · Diarrhea: No  · Blood in Stool: No  · Abdominal Pain: No  · Itchy Skin: No  · Painful Joints: No  · Swollen Joints: No  · Muscle Pain: No  · Irregular Mole: No  · Sun Burn: No  · Dry Skin: No  · Skin Color Changes: No  · Scar or Keloid: No  · Cold Sores/Fever Blisters: YES  · Bacterial Infections/MRSA: No  · Anxiety:yes  · Depression: No  · Suicidal or Homicidal Thoughts: No    PSYCH: Normal mood and affect  EYES: Normal conjunctiva  ENT: Normal lips and oral mucosa  CARDIOVASCULAR: No edema  RESPIRATORY: Normal respirations  HEME/LYMPH/IMMUNO:  No regional lymphadenopathy except as noted below in ASSESSMENT AND PLAN BY DIAGNOSIS    FOCUSED ORGAN SYSTEM SKIN EXAM (SKIN)   Face, Neck Normal except as noted below in Assessment     ACNE VULGARIS ("COMMON ACNE")  POST INFLAMMATORY ERYTHMA AND HYPERPIGMENTATION    Physical Exam:   Anatomic Location Affected & Morphological Description:  face with scattered open/closed comedones, inflammatory papules and excoriations with erythematous  macules within affected areas    Additional History of Present Condition:  Present for a couple of months  Treatments tried include: Cetaphil wash once daily, Differin gel, Neutrogena BPO wash at night  Doxycycline prescribed at last vist and took once daily  Assessment and Plan:   We reviewed the causes of acne, the kinds of acne, and the expected clinical course   We discussed treatment options ranging from over-the-counter products, topical retinoids, antibiotics, BP, hormonal therapies (OCPs/spironolactone), and isotretinoin (Accutane)   We reviewed specific over-the-counter interventions and medications  Recommended typical hygiene measures washing regularly with mild cleanser, and refraining from picking and popping any pimples  Recommended non-comedogenic sunscreen use daily   Expectations of therapy discussed  Side effects, risks and benefits of medications discussed  A comprehensive handout with treatment plan provided  The phone number to call in case of questions or concerns (and instructions to stop medications in such a scenario) was provided   Counseled patient on treatment options  After lengthy discussion of etiology and treatment options, we decided to implement/continue the following personalized treatment plan  All adjustments from prior treatments highlighted below   Discussed fading away of post inflammatory erythema and hyperpigmentation and that good sun protection and topical tretinoin would help with this  Mornin  Wash with over the counter Benzoyl peroxide wash  There are several good over the counter options for this but it is important to make sure to get one that is less than 5% strength as others may be overly drying  This will bleach your towels  Will not bleach your skin      Options include:    Cerave Acne Foaming Cream cleanser     Neutrogena Clear Pore cleanser    2  We will add on Clindamycin lotion to entire affected area   3  Follow with an oil-free sunscreen (SPF 30 or higher)  Some options include Neutrogena oil-free moisturizer with SPF     Night:    1  Wash your face with a gentle face wash - like Cetaphil wash, Cerave Hydrating , LaRoche Hydrating Cleanser   2  Pat dry your face, wait 15 mins and then apply a pea-sized amount of tretinoin 0 025% cream - an even thin layer on your face  Can be drying  Start by using every other night and then build it up to every night  Avoid the eye area and the curves around your nose and corners of your lips  - If too irritating, keep using spaced out to every other night or every 3rd night and slowly increase frequency of use to nightly  Can also use on back and chest if you have these areas involved with acne  - If you were prescribed a prescription strength version and this is not covered by insurance, try to see what the cost is on [employer]- plug in the prescription name (tretinoin 0 025% cream, the size of the tube 45 grams) and it will provide a pharmacy-specific coupon code for you that you can show the pharmacist   3  Follow with an oil free moisturizer on top of this medicine to combat the dryness  Doxycycline (prescription medicine) - take 1 pill daily  This is an antibiotic so if you are prone to yeast infections (females), take a daily probiotic with it   Take the pill with a meal and a big glass of water  Avoid milk or dairy intake immediately before or after taking the pill and take at least one hour  from vitamins  If you take this on an empty stomach, it can cause nausea  Without water intake, it can cause burning and even ulceration of the esophagus  Avoid lying down immediately after taking the pill due to this burning sensation    It's best to take this at least one hour prior to bedtime if taking at night  Doxycycline can make you more sensitive to the sun  Be careful to use sunscreen and protective clothing while on this medication  Do not become pregnant or plan to become pregnant while on this medication as it can have harmful effects on the fetus in utero  Make sure all products you use on face are labeled as "non-comedongenic" or "oil-free" or " does not clog pores"  Acne can be frustrating and difficult to treat  Most acne regimens take 2-3 months to see an improvement, so stick with them  Don't give up! As always, call your doctor if you have any concerns about your medications  Chief Complaint   Patient presents with    Virtual Regular Visit        Encounter provider Treasa Epley, MD    Provider located at [address] [place] [address]  [phone]      Recent Visits  No visits were found meeting these conditions  Showing recent visits within past 7 days and meeting all other requirements  Future Appointments  No visits were found meeting these conditions  Showing future appointments within next 150 days and meeting all other requirements       The patient was identified by name and date of birth  Carolyn Whitfield was informed that this is a telemedicine visit and that the visit is being conducted through [facility] and patient was informed that this is not a secure, HIPAA-compliant platform  She agrees to proceed     My office door was closed  The patient was notified the following individuals were present in the room Deshaun Stiles  She acknowledged consent and understanding of privacy and security of the video platform  The patient has agreed to participate and understands they can discontinue the visit at any time  Patient is aware this is a billable service  Subjective  Carolyn Whitfield is a 22 y o  female         HPI     Past Medical History:   Diagnosis Date    Anxiety     Asthma     Dizziness at times    GERD (gastroesophageal reflux disease)     Hammertoe of left foot     Headache     occ    Hyperlipemia     Hypermobile joints     Irritable bowel syndrome     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HCC)     Mast cell disorder     Migraine     PONV (postoperative nausea and vomiting)     POTS (postural orthostatic tachycardia syndrome)      infant     Wears glasses        Past Surgical History:   Procedure Laterality Date    COLONOSCOPY      EGD AND COLONOSCOPY N/A 1/10/2017    Procedure: EGD AND COLONOSCOPY;  Surgeon: Liz Carrera MD;  Location: [facility]; Service:     ESOPHAGOGASTRODUODENOSCOPY      with biopsy    FOOT SURGERY      Excision of lesion feet benign    MT REPAIR OF HAMMERTOE,ONE Left 2019    Procedure: 2nd arthrodesis; 5th arthroplasty, flexor tenotomy 2,3,4,5 ;  Surgeon: Aubrey Harmon DPM;  Location: [facility];  Service: Podiatry    MT REPAIR OF Bi Min Left 2020    Procedure: 2ND TOE Revision hammertoe with broken implant;  Surgeon: Aubrey Harmon DPM;  Location[facility];  Service: Podiatry    UPPER GASTROINTESTINAL ENDOSCOPY      WISDOM TOOTH EXTRACTION         Current Outpatient Medications   Medication Sig Dispense Refill    acetaminophen (TYLENOL) 500 mg tablet Take 1,000 mg by mouth every 4 (four) hours as needed       ALPRAZolam (XANAX) 0 25 mg tablet Take 0 25 mg by mouth 2 (two) times a day as needed       ASMANEX  MCG/ACT AERO Inhale 400 mcg every 4 (four) hours as needed (2 puffs)       cetirizine (ZyrTEC) 10 mg tablet Take 10 mg by mouth daily      cyproheptadine (PERIACTIN) 4 mg tablet Take 4 mg by mouth daily at bedtime       Elastic Bandages & Supports (TRUFORM STOCKINGS 20-30MMHG) MISC Use as directed        fludrocortisone (FLORINEF) 0 1 mg tablet Take 0 1 mg by mouth daily Taken 1 tablet in the Morning 1 tablet at Night      linaCLOtide (Linzess) 72 MCG CAPS Take 1 capsule by mouth daily before breakfast 90 capsule 3    linaCLOtide (Linzess) 72 MCG CAPS Take 1 capsule by mouth daily before breakfast (Patient not taking: Reported on 4/27/2021) 12 capsule 0    medroxyPROGESTERone acetate (DEPO-PROVERA SYRINGE) 150 mg/mL injection Inject 1 mL (150 mg total) into a muscle every 3 (three) months Every three months 1 mL 3    metoclopramide (REGLAN) 10 mg tablet Take 1 tablet (10 mg total) by mouth every 6 (six) hours (Patient taking differently: Take 10 mg by mouth every 6 (six) hours as needed ) 30 tablet 0    metoprolol succinate (TOPROL-XL) 25 mg 24 hr tablet Take 50 mg by mouth every evening       naproxen (EC NAPROSYN) 500 MG EC tablet Take 1 tablet (500 mg total) by mouth 2 (two) times a day with meals (Patient not taking: Reported on 12/10/2020) 60 tablet 0    Omega-3 Fatty Acids (FISH OIL) 1,000 mg Take 1,000 mg by mouth 2 (two) times a day       scopolamine (TRANSDERM-SCOP) 1 5 mg/3 days TD 72 hr patch Place 1 patch on the skin every third day (Patient not taking: Reported on 4/27/2021) 10 patch 0    Ubrogepant 50 MG TABS 1 tab at the onset of migraine headache may repeat in 2 hours if needed max 200 mg per day 12 tablet 1    venlafaxine (EFFEXOR-XR) 37 5 mg 24 hr capsule Take 1 capsule (37 5 mg total) by mouth daily Take with one 75 mg capsule  Total daily dose is 112 5 mg 90 capsule 0    venlafaxine (EFFEXOR-XR) 75 mg 24 hr capsule Take 1 capsule (75 mg total) by mouth daily Take with one 37 5 mg capsule   Total daily dose is 112 5 mg 90 capsule 0    XOPENEX HFA 45 MCG/ACT inhaler Inhale 1-2 puffs every 4 (four) hours as needed        Current Facility-Administered Medications   Medication Dose Route Frequency Provider Last Rate Last Admin    albuterol inhalation solution 2 5 mg  2 5 mg Nebulization Q6H PRN Dulce Maria Fonseca PA-C        medroxyPROGESTERone acetate (DEPO-PROVERA SYRINGE) IM injection 150 mg  150 mg Intramuscular Q3 Months KETAN Erwin   150 mg at 05/20/21 5918 Allergies   Allergen Reactions    Nuts - Food Allergy Other (See Comments)     Avani Nuts - itchy throat    Other Hives      At surgical site and IV site- not sure if type of cleanser used    Pollen Extract        Review of Systems    Video Exam    There were no vitals filed for this visit  Physical Exam     I spent 15 minutes directly with the patient during this visit      Ann Marie Gómez acknowledges that she has consented to an online visit or consultation  She understands that the online visit is based solely on information provided by her, and that, in the absence of a face-to-face physical evaluation by the physician, the diagnosis she receives is both limited and provisional in terms of accuracy and completeness  This is not intended to replace a full medical face-to-face evaluation by the physician  Zara Severino understands and accepts these terms

## 2021-06-25 NOTE — PATIENT INSTRUCTIONS
ACNE VULGARIS ("COMMON ACNE")  POST INFLAMMATORY ERYTHMA AND HYPERPIGMENTATION    Assessment and Plan:   We reviewed the causes of acne, the kinds of acne, and the expected clinical course   We discussed treatment options ranging from over-the-counter products, topical retinoids, antibiotics, BP, hormonal therapies (OCPs/spironolactone), and isotretinoin (Accutane)   We reviewed specific over-the-counter interventions and medications  Recommended typical hygiene measures washing regularly with mild cleanser, and refraining from picking and popping any pimples  Recommended non-comedogenic sunscreen use daily   Expectations of therapy discussed  Side effects, risks and benefits of medications discussed  A comprehensive handout with treatment plan provided  The phone number to call in case of questions or concerns (and instructions to stop medications in such a scenario) was provided   Counseled patient on treatment options  After lengthy discussion of etiology and treatment options, we decided to implement/continue the following personalized treatment plan  All adjustments from prior treatments highlighted below   Discussed fading away of post inflammatory erythema and hyperpigmentation and that good sun protection and topical tretinoin would help with this  Mornin  Wash with over the counter Benzoyl peroxide wash  There are several good over the counter options for this but it is important to make sure to get one that is less than 5% strength as others may be overly drying  This will bleach your towels  Will not bleach your skin  Options include:    Cerave Acne Foaming Cream cleanser     Neutrogena Clear Pore cleanser    2  We will add on Clindamycin lotion to entire affected area   3  Follow with an oil-free sunscreen (SPF 30 or higher)  Some options include Neutrogena oil-free moisturizer with SPF     Night:    1   Wash your face with a gentle face wash - like Cetaphil wash, Cerave Hydrating , LaRoche Hydrating Cleanser   2  Pat dry your face, wait 15 mins and then apply a pea-sized amount of tretinoin 0 025% cream - an even thin layer on your face  Can be drying  Start by using every other night and then build it up to every night  Avoid the eye area and the curves around your nose and corners of your lips  - If too irritating, keep using spaced out to every other night or every 3rd night and slowly increase frequency of use to nightly  Can also use on back and chest if you have these areas involved with acne  - If you were prescribed a prescription strength version and this is not covered by insurance, try to see what the cost is on [employer]- plug in the prescription name (tretinoin 0 025% cream, the size of the tube 45 grams) and it will provide a coupon code for you that you can show the pharmacist   3  Follow with an oil free moisturizer on top of this medicine to combat the dryness  Doxycycline (prescription medicine) - take 1 pill daily  This is an antibiotic so if you are prone to yeast infections (females), take a daily probiotic with it   Take the pill with a meal and a big glass of water  Avoid milk or dairy intake immediately before or after taking the pill and take at least one hour  from vitamins  If you take this on an empty stomach, it can cause nausea  Without water intake, it can cause burning and even ulceration of the esophagus  Avoid lying down immediately after taking the pill due to this burning sensation  It's best to take this at least one hour prior to bedtime if taking at night  Doxycycline can make you more sensitive to the sun  Be careful to use sunscreen and protective clothing while on this medication  Do not become pregnant or plan to become pregnant while on this medication as it can have harmful effects on the fetus in utero      Make sure all products you use on face are labeled as "non-comedongenic" or "oil-free" or " does not clog pores"  Acne can be frustrating and difficult to treat  Most acne regimens take 2-3 months to see an improvement, so stick with them  Don't give up! As always, call your doctor if you have any concerns about your medications

## 2021-06-30 ENCOUNTER — TELEPHONE (OUTPATIENT)
Dept: DERMATOLOGY | Facility: CLINIC | Age: 21
End: 2021-06-30

## 2021-06-30 ENCOUNTER — OFFICE VISIT (OUTPATIENT)
Dept: OBGYN CLINIC | Facility: CLINIC | Age: 21
End: 2021-06-30
Payer: COMMERCIAL

## 2021-06-30 VITALS — SYSTOLIC BLOOD PRESSURE: 116 MMHG | DIASTOLIC BLOOD PRESSURE: 80 MMHG | BODY MASS INDEX: 26.36 KG/M2 | WEIGHT: 170.8 LBS

## 2021-06-30 DIAGNOSIS — K58.1 IRRITABLE BOWEL SYNDROME WITH CONSTIPATION: Primary | ICD-10-CM

## 2021-06-30 PROCEDURE — 99213 OFFICE O/P EST LOW 20 MIN: CPT | Performed by: STUDENT IN AN ORGANIZED HEALTH CARE EDUCATION/TRAINING PROGRAM

## 2021-06-30 NOTE — PROGRESS NOTES
Assessment/Plan:     Problem List Items Addressed This Visit        Digestive    Irritable bowel syndrome (IBS) - Primary        -discussed etiology and management of endometriosis  Discussed that isolated constipation would be an unusual presentation of endometriosis  Discussed diagnosis generally based on clinical symptoms, definitively diagnosed via diagnostic laparoscopy  Discussed first line of management would be OCPs or depo, which she has been on for many years  Discussed that chronic symptoms of endometriosis more likely secondary to adhesive disease, that this is less likely at her young age and on suppression for so long  Also discussed that intervention of adhesions would be lysis, that if the bowel were being affected by serosal adhesions, resection might be the recommended therapy  She is to think about all of this, would like to continue depo for now, continue to try to treat constipation  RTO annual     Subjective:      Patient ID: Libby Iraheta is a 22 y o  female  HPI  She presents today to discuss the possibility that her severe chronic constipation might be related to endometriosis, as this was suggested by her GI  She is unfamiliar with endometriosis in general  She notes generally lower abdominal pain that comes and goes  It is not on a monthly cycle, but more frequent  She is virginal, cannot attest to dyspareunia  She notes heavy and painful menses after menarche, but was very quickly diagnosed with POTS and placed on OCPs, has been on this or depo since  Therefore, she cannot attest to dysmenorrhea  She denies hematochezia/melena  The following portions of the patient's history were reviewed and updated as appropriate: allergies, current medications, past family history, past medical history, past social history, past surgical history and problem list     Review of Systems   Constitutional: Negative for chills and fever  HENT: Negative for congestion and sore throat  Respiratory: Negative for cough  Gastrointestinal: Positive for constipation  Negative for diarrhea  Genitourinary: Positive for pelvic pain  Negative for hematuria  Psychiatric/Behavioral: Negative for dysphoric mood  Objective:  /80 (BP Location: Left arm, Patient Position: Sitting, Cuff Size: Standard)   Wt 77 5 kg (170 lb 12 8 oz)   BMI 26 36 kg/m²        Physical Exam  Vitals reviewed  Constitutional:       Appearance: She is well-developed  HENT:      Head: Normocephalic  Cardiovascular:      Rate and Rhythm: Normal rate  Pulmonary:      Effort: Pulmonary effort is normal    Musculoskeletal:         General: Normal range of motion  Cervical back: Normal range of motion  Neurological:      Mental Status: She is alert and oriented to person, place, and time     Psychiatric:         Behavior: Behavior normal

## 2021-07-02 ENCOUNTER — APPOINTMENT (OUTPATIENT)
Dept: LAB | Facility: HOSPITAL | Age: 21
End: 2021-07-02
Payer: COMMERCIAL

## 2021-07-02 ENCOUNTER — HOSPITAL ENCOUNTER (EMERGENCY)
Facility: HOSPITAL | Age: 21
Discharge: HOME | End: 2021-07-02
Attending: EMERGENCY MEDICINE
Payer: COMMERCIAL

## 2021-07-02 VITALS
HEIGHT: 67 IN | TEMPERATURE: 98.4 F | BODY MASS INDEX: 26.37 KG/M2 | DIASTOLIC BLOOD PRESSURE: 68 MMHG | HEART RATE: 86 BPM | SYSTOLIC BLOOD PRESSURE: 118 MMHG | WEIGHT: 168 LBS | OXYGEN SATURATION: 96 % | RESPIRATION RATE: 18 BRPM
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
## 2021-07-02 DIAGNOSIS — Z00.00 ROUTINE HEALTH MAINTENANCE: ICD-10-CM

## 2021-07-02 DIAGNOSIS — G43.901 STATUS MIGRAINOSUS: Primary | ICD-10-CM

## 2021-07-02 DIAGNOSIS — D72.819 LEUKOPENIA, UNSPECIFIED TYPE: ICD-10-CM

## 2021-07-02 DIAGNOSIS — Z01.84 IMMUNITY STATUS TESTING: ICD-10-CM

## 2021-07-02 LAB
BASOPHILS # BLD AUTO: 0 THOUSANDS/ΜL (ref 0–0.1)
BASOPHILS NFR BLD AUTO: 0 % (ref 0–1)
EOSINOPHIL # BLD AUTO: 0 THOUSAND/ΜL (ref 0–0.61)
EOSINOPHIL NFR BLD AUTO: 0 % (ref 0–6)
ERYTHROCYTE [DISTWIDTH] IN BLOOD BY AUTOMATED COUNT: 12.2 % (ref 11.6–15.1)
HBV CORE AB SER QL: NORMAL
HBV SURFACE AB SER-ACNC: 4.94 MIU/ML
HBV SURFACE AG SER QL: NORMAL
HCT VFR BLD AUTO: 41.6 % (ref 34.8–46.1)
HGB BLD-MCNC: 13.7 G/DL (ref 11.5–15.4)
IMM GRANULOCYTES # BLD AUTO: 0.01 THOUSAND/UL (ref 0–0.2)
IMM GRANULOCYTES NFR BLD AUTO: 0 % (ref 0–2)
LYMPHOCYTES # BLD AUTO: 1.57 THOUSANDS/ΜL (ref 0.6–4.47)
LYMPHOCYTES NFR BLD AUTO: 37 % (ref 14–44)
MCH RBC QN AUTO: 28.9 PG (ref 26.8–34.3)
MCHC RBC AUTO-ENTMCNC: 32.9 G/DL (ref 31.4–37.4)
MCV RBC AUTO: 88 FL (ref 82–98)
MONOCYTES # BLD AUTO: 0.37 THOUSAND/ΜL (ref 0.17–1.22)
MONOCYTES NFR BLD AUTO: 9 % (ref 4–12)
NEUTROPHILS # BLD AUTO: 2.31 THOUSANDS/ΜL (ref 1.85–7.62)
NEUTS SEG NFR BLD AUTO: 54 % (ref 43–75)
NRBC BLD AUTO-RTO: 0 /100 WBCS
PLATELET # BLD AUTO: 237 THOUSANDS/UL (ref 149–390)
PMV BLD AUTO: 10.7 FL (ref 8.9–12.7)
RBC # BLD AUTO: 4.74 MILLION/UL (ref 3.81–5.12)
RUBV IGG SERPL IA-ACNC: 116.3 IU/ML
WBC # BLD AUTO: 4.26 THOUSAND/UL (ref 4.31–10.16)

## 2021-07-02 PROCEDURE — 86704 HEP B CORE ANTIBODY TOTAL: CPT

## 2021-07-02 PROCEDURE — 86787 VARICELLA-ZOSTER ANTIBODY: CPT

## 2021-07-02 PROCEDURE — 86762 RUBELLA ANTIBODY: CPT

## 2021-07-02 PROCEDURE — 86735 MUMPS ANTIBODY: CPT

## 2021-07-02 PROCEDURE — 99284 EMERGENCY DEPT VISIT MOD MDM: CPT | Performed by: EMERGENCY MEDICINE

## 2021-07-02 PROCEDURE — 86765 RUBEOLA ANTIBODY: CPT

## 2021-07-02 PROCEDURE — 87340 HEPATITIS B SURFACE AG IA: CPT

## 2021-07-02 PROCEDURE — 85025 COMPLETE CBC W/AUTO DIFF WBC: CPT

## 2021-07-02 PROCEDURE — 36415 COLL VENOUS BLD VENIPUNCTURE: CPT

## 2021-07-02 PROCEDURE — 96365 THER/PROPH/DIAG IV INF INIT: CPT

## 2021-07-02 PROCEDURE — 86706 HEP B SURFACE ANTIBODY: CPT

## 2021-07-02 PROCEDURE — 96361 HYDRATE IV INFUSION ADD-ON: CPT

## 2021-07-02 PROCEDURE — 99283 EMERGENCY DEPT VISIT LOW MDM: CPT

## 2021-07-02 PROCEDURE — 86480 TB TEST CELL IMMUN MEASURE: CPT

## 2021-07-02 PROCEDURE — 96375 TX/PRO/DX INJ NEW DRUG ADDON: CPT

## 2021-07-02 RX ORDER — METOCLOPRAMIDE 10 MG/1
10 TABLET ORAL EVERY 8 HOURS PRN
Qty: 9 TABLET | Refills: 0 | Status: SHIPPED | OUTPATIENT
Start: 2021-07-02 | End: 2021-07-05

## 2021-07-02 RX ORDER — KETOROLAC TROMETHAMINE 30 MG/ML
15 INJECTION, SOLUTION INTRAMUSCULAR; INTRAVENOUS ONCE
Status: COMPLETED | OUTPATIENT
Start: 2021-07-02 | End: 2021-07-02

## 2021-07-02 RX ORDER — METOCLOPRAMIDE HYDROCHLORIDE 5 MG/ML
10 INJECTION INTRAMUSCULAR; INTRAVENOUS ONCE
Status: COMPLETED | OUTPATIENT
Start: 2021-07-02 | End: 2021-07-02

## 2021-07-02 RX ORDER — MAGNESIUM SULFATE 1 G/100ML
1 INJECTION INTRAVENOUS ONCE
Status: COMPLETED | OUTPATIENT
Start: 2021-07-02 | End: 2021-07-02

## 2021-07-02 RX ORDER — DIPHENHYDRAMINE HYDROCHLORIDE 50 MG/ML
25 INJECTION INTRAMUSCULAR; INTRAVENOUS ONCE
Status: COMPLETED | OUTPATIENT
Start: 2021-07-02 | End: 2021-07-02

## 2021-07-02 RX ORDER — DEXAMETHASONE SODIUM PHOSPHATE 10 MG/ML
10 INJECTION, SOLUTION INTRAMUSCULAR; INTRAVENOUS ONCE
Status: COMPLETED | OUTPATIENT
Start: 2021-07-02 | End: 2021-07-02

## 2021-07-02 RX ADMIN — SODIUM CHLORIDE 1000 ML: 0.9 INJECTION, SOLUTION INTRAVENOUS at 18:54

## 2021-07-02 RX ADMIN — DEXAMETHASONE SODIUM PHOSPHATE 10 MG: 10 INJECTION, SOLUTION INTRAMUSCULAR; INTRAVENOUS at 19:19

## 2021-07-02 RX ADMIN — DIPHENHYDRAMINE HYDROCHLORIDE 25 MG: 50 INJECTION, SOLUTION INTRAMUSCULAR; INTRAVENOUS at 18:59

## 2021-07-02 RX ADMIN — METOCLOPRAMIDE HYDROCHLORIDE 10 MG: 5 INJECTION INTRAMUSCULAR; INTRAVENOUS at 19:05

## 2021-07-02 RX ADMIN — KETOROLAC TROMETHAMINE 15 MG: 30 INJECTION, SOLUTION INTRAMUSCULAR; INTRAVENOUS at 19:03

## 2021-07-02 RX ADMIN — MAGNESIUM SULFATE HEPTAHYDRATE 1 G: 1 INJECTION, SOLUTION INTRAVENOUS at 19:27

## 2021-07-02 NOTE — ED NOTES
Pt states no changes in medications recently  Pt states no trauma to head or neck  HA described as behind forehead and back of head  Pressure  Pt's FIORE has improved since coming in, from 8/10, to 5/10  Pt denies visual changes, but nausea is still there        Sally Esparza, AMARIS  07/02/21 1808

## 2021-07-02 NOTE — ED TRIAGE NOTES
Patient states 3 day history of worsening headache  (-)photosensitivity, (-)change in vision, (-)difficulty walking, (-)change in sleep pattern  (+)nausea, took 4mg Zofran at 16:30 with no relief  Patient usually gets 2L IVF and analgesia for migraine flares  Seen by Dr Gopi Saavedra, Neurology (4/8/8155) - headache specialist and prescribed 60mg Toradol, Decadrom 2mg every AM for 3 days, Reglan 10mg TID for 3 days, Gabapentin 300mg qHS, Cyproheptadine 8mg qHS  for flare events

## 2021-07-05 ENCOUNTER — NURSE TRIAGE (OUTPATIENT)
Dept: OTHER | Facility: OTHER | Age: 21
End: 2021-07-05

## 2021-07-05 ENCOUNTER — HOSPITAL ENCOUNTER (EMERGENCY)
Facility: HOSPITAL | Age: 21
Discharge: HOME | End: 2021-07-05
Attending: EMERGENCY MEDICINE
Payer: COMMERCIAL

## 2021-07-05 VITALS
DIASTOLIC BLOOD PRESSURE: 78 MMHG | RESPIRATION RATE: 18 BRPM | SYSTOLIC BLOOD PRESSURE: 127 MMHG | OXYGEN SATURATION: 98 % | TEMPERATURE: 96.8 F | HEART RATE: 86 BPM

## 2021-07-05 DIAGNOSIS — G43.901 STATUS MIGRAINOSUS: Primary | ICD-10-CM

## 2021-07-05 DIAGNOSIS — R03.0 ELEVATED BLOOD PRESSURE READING: ICD-10-CM

## 2021-07-05 PROCEDURE — 96365 THER/PROPH/DIAG IV INF INIT: CPT

## 2021-07-05 PROCEDURE — 99284 EMERGENCY DEPT VISIT MOD MDM: CPT | Performed by: EMERGENCY MEDICINE

## 2021-07-05 PROCEDURE — 96367 TX/PROPH/DG ADDL SEQ IV INF: CPT

## 2021-07-05 PROCEDURE — 96375 TX/PRO/DX INJ NEW DRUG ADDON: CPT

## 2021-07-05 PROCEDURE — 99283 EMERGENCY DEPT VISIT LOW MDM: CPT

## 2021-07-05 PROCEDURE — 96372 THER/PROPH/DIAG INJ SC/IM: CPT

## 2021-07-05 RX ORDER — METOCLOPRAMIDE HYDROCHLORIDE 5 MG/ML
10 INJECTION INTRAMUSCULAR; INTRAVENOUS ONCE
Status: COMPLETED | OUTPATIENT
Start: 2021-07-05 | End: 2021-07-05

## 2021-07-05 RX ORDER — KETOROLAC TROMETHAMINE 30 MG/ML
15 INJECTION, SOLUTION INTRAMUSCULAR; INTRAVENOUS ONCE
Status: COMPLETED | OUTPATIENT
Start: 2021-07-05 | End: 2021-07-05

## 2021-07-05 RX ORDER — MAGNESIUM SULFATE HEPTAHYDRATE 40 MG/ML
2 INJECTION, SOLUTION INTRAVENOUS ONCE
Status: COMPLETED | OUTPATIENT
Start: 2021-07-05 | End: 2021-07-05

## 2021-07-05 RX ADMIN — SODIUM CHLORIDE 500 MG: 9 INJECTION, SOLUTION INTRAVENOUS at 19:55

## 2021-07-05 RX ADMIN — KETOROLAC TROMETHAMINE 15 MG: 30 INJECTION, SOLUTION INTRAMUSCULAR at 18:46

## 2021-07-05 RX ADMIN — METOCLOPRAMIDE 10 MG: 5 INJECTION, SOLUTION INTRAMUSCULAR; INTRAVENOUS at 18:46

## 2021-07-05 RX ADMIN — MAGNESIUM SULFATE HEPTAHYDRATE 2 G: 40 INJECTION, SOLUTION INTRAVENOUS at 18:47

## 2021-07-05 NOTE — ED PROVIDER NOTES
History  Chief Complaint   Patient presents with    Headache - Recurrent or Known Dx Migraines     HPI     81-year-old female with a history of migraines presents for a typical migraine  Onset was a few days ago worsening  Not different than her normal migraine just not going away  Has been taking her over-the-counter and prescription medications without relief  No neurologic deficits no visual changes no other modifying factors or associated symptoms  Moderate severity  Used to follow with headache specialist but they since left the network  Denies neck pain fever chills  Exam is unremarkable    Assessment plan:  Migraine, will provide migraine cocktail patient will follow-up with neurologist     Prior to Admission Medications   Prescriptions Last Dose Informant Patient Reported? Taking? ALPRAZolam (XANAX) 0 25 mg tablet  Self Yes No   Sig: Take 0 25 mg by mouth 2 (two) times a day as needed    ASMANEX  MCG/ACT AERO  Self Yes No   Sig: Inhale 400 mcg every 4 (four) hours as needed (2 puffs)    Elastic Bandages & Supports (TRUFORM STOCKINGS 20-30MMHG) MISC  Self Yes No   Sig: Use as directed     Omega-3 Fatty Acids (FISH OIL) 1,000 mg  Self Yes No   Sig: Take 1,000 mg by mouth 2 (two) times a day    Ubrogepant 50 MG TABS  Self No No   Si tab at the onset of migraine headache may repeat in 2 hours if needed max 200 mg per day   XOPENEX HFA 45 MCG/ACT inhaler  Self Yes No   Sig: Inhale 1-2 puffs every 4 (four) hours as needed    acetaminophen (TYLENOL) 500 mg tablet  Self Yes No   Sig: Take 1,000 mg by mouth every 4 (four) hours as needed    cetirizine (ZyrTEC) 10 mg tablet  Self Yes No   Sig: Take 10 mg by mouth daily   clindamycin (CLEOCIN T) 1 % lotion   No No   Sig: Apply topically daily To face   cyproheptadine (PERIACTIN) 4 mg tablet  Self Yes No   Sig: Take 4 mg by mouth daily at bedtime    doxycycline hyclate (VIBRAMYCIN) 100 mg capsule   No No   Sig: Take 1 capsule (100 mg total) by mouth daily   fludrocortisone (FLORINEF) 0 1 mg tablet  Self Yes No   Sig: Take 0 1 mg by mouth daily Taken 1 tablet in the Morning 1 tablet at Night   linaCLOtide (Linzess) 72 MCG CAPS   No No   Sig: Take 1 capsule by mouth daily before breakfast   linaCLOtide (Linzess) 72 MCG CAPS   No No   Sig: Take 1 capsule by mouth daily before breakfast   Patient not taking: Reported on 4/27/2021   medroxyPROGESTERone acetate (DEPO-PROVERA SYRINGE) 150 mg/mL injection  Self No No   Sig: Inject 1 mL (150 mg total) into a muscle every 3 (three) months Every three months   metoclopramide (REGLAN) 10 mg tablet  Self No No   Sig: Take 1 tablet (10 mg total) by mouth every 6 (six) hours   Patient taking differently: Take 10 mg by mouth every 6 (six) hours as needed    metoprolol succinate (TOPROL-XL) 25 mg 24 hr tablet  Self Yes No   Sig: Take 50 mg by mouth every evening    naproxen (EC NAPROSYN) 500 MG EC tablet  Self No No   Sig: Take 1 tablet (500 mg total) by mouth 2 (two) times a day with meals   Patient not taking: Reported on 12/10/2020   scopolamine (TRANSDERM-SCOP) 1 5 mg/3 days TD 72 hr patch  Self No No   Sig: Place 1 patch on the skin every third day   Patient not taking: Reported on 4/27/2021   tretinoin (RETIN-A) 0 025 % cream   No No   Sig: Apply topically daily at bedtime   venlafaxine (EFFEXOR-XR) 37 5 mg 24 hr capsule 7/2/2021 at Unknown time Self No Yes   Sig: Take 1 capsule (37 5 mg total) by mouth daily Take with one 75 mg capsule  Total daily dose is 112 5 mg   venlafaxine (EFFEXOR-XR) 75 mg 24 hr capsule 7/2/2021 at Unknown time Self No Yes   Sig: Take 1 capsule (75 mg total) by mouth daily Take with one 37 5 mg capsule   Total daily dose is 112 5 mg      Facility-Administered Medications Last Administration Doses Remaining   albuterol inhalation solution 2 5 mg None recorded    medroxyPROGESTERone acetate (DEPO-PROVERA SYRINGE) IM injection 150 mg 5/20/2021  5:20 PM           Past Medical History: Diagnosis Date    Anxiety     Asthma     Dizziness     at times    GERD (gastroesophageal reflux disease)     Hammertoe of left foot     Headache     occ    Hyperlipemia     Hypermobile joints     Irritable bowel syndrome     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HCC)     Mast cell disorder     Migraine     PONV (postoperative nausea and vomiting)     POTS (postural orthostatic tachycardia syndrome)      infant     Wears glasses        Past Surgical History:   Procedure Laterality Date    COLONOSCOPY      EGD AND COLONOSCOPY N/A 1/10/2017    Procedure: EGD AND COLONOSCOPY;  Surgeon: Tod Boas, MD;  Location: [facility]; Service:     ESOPHAGOGASTRODUODENOSCOPY      with biopsy    FOOT SURGERY      Excision of lesion feet benign    IA REPAIR OF HAMMERTOE,ONE Left 2019    Procedure: 2nd arthrodesis; 5th arthroplasty, flexor tenotomy 2,3,4,5 ;  Surgeon: Jamari Augustine DPM;  Location: [facility];  Service: Podiatry    IA REPAIR OF Alexander Mora Left 2020    Procedure: 2ND TOE Revision hammertoe with broken implant;  Surgeon: Jamari Augustine DPM;  Location[facility];  Service: Podiatry    UPPER GASTROINTESTINAL ENDOSCOPY      WISDOM TOOTH EXTRACTION         Family History   Problem Relation Age of Onset    Gestational diabetes Mother     Migraines Mother     Anxiety disorder Father     Hyperlipidemia Father     Autism Brother     Diabetes Other     Uterine cancer Maternal Grandmother     Rheumatic fever Maternal Grandmother     Diabetes Maternal Grandfather     Hypertension Maternal Grandfather     Heart attack Maternal Grandfather     Stroke Maternal Grandfather     Hyperlipidemia Maternal Grandfather     Hypertension Paternal Grandmother     Anxiety disorder Paternal Grandmother     Hypertension Paternal Grandfather      I have reviewed and agree with the history as documented      E-Cigarette/Vaping    E-Cigarette Use Never User E-Cigarette/Vaping Substances    Nicotine No     THC No     CBD No     Flavoring No     Other No     Unknown No      Social History     Tobacco Use    Smoking status: Never Smoker    Smokeless tobacco: Never Used   Vaping Use    Vaping Use: Never used   Substance Use Topics    Alcohol use: No    Drug use: No     Comment: 2/9/20- not asked, parent at bedside  Review of Systems   Constitutional: Negative for chills, fatigue and fever  Eyes: Negative for photophobia and visual disturbance  Respiratory: Negative for cough and shortness of breath  Cardiovascular: Negative for chest pain, palpitations and leg swelling  Gastrointestinal: Negative for diarrhea, nausea and vomiting  Endocrine: Negative for polydipsia and polyuria  Genitourinary: Negative for decreased urine volume, difficulty urinating, dysuria and frequency  Musculoskeletal: Negative for back pain, neck pain and neck stiffness  Skin: Negative for color change and rash  Allergic/Immunologic: Negative for environmental allergies and immunocompromised state  Neurological: Positive for headaches  Negative for dizziness  Hematological: Negative for adenopathy  Does not bruise/bleed easily  Psychiatric/Behavioral: Negative for dysphoric mood  The patient is not nervous/anxious  Physical Exam  Physical Exam  Vitals and nursing note reviewed  Constitutional:       General: She is not in acute distress  Appearance: She is well-developed  She is not diaphoretic  HENT:      Head: Normocephalic and atraumatic  Nose: Nose normal    Eyes:      General: No scleral icterus  Conjunctiva/sclera: Conjunctivae normal       Pupils: Pupils are equal, round, and reactive to light  Neck:      Thyroid: No thyromegaly  Vascular: No JVD  Trachea: No tracheal deviation  Cardiovascular:      Rate and Rhythm: Normal rate and regular rhythm  Heart sounds: Normal heart sounds  No friction rub   No gallop  Pulmonary:      Effort: Pulmonary effort is normal  No respiratory distress  Breath sounds: Normal breath sounds  No wheezing or rales  Chest:      Chest wall: No tenderness  Abdominal:      General: Bowel sounds are normal  There is no distension  Palpations: Abdomen is soft  There is no mass  Tenderness: There is no abdominal tenderness  There is no guarding or rebound  Hernia: No hernia is present  Musculoskeletal:         General: No tenderness or deformity  Normal range of motion  Cervical back: Normal range of motion and neck supple  Skin:     General: Skin is warm and dry  Findings: No erythema  Neurological:      Mental Status: She is alert and oriented to person, place, and time  Cranial Nerves: No cranial nerve deficit  Coordination: Coordination normal       Deep Tendon Reflexes: Reflexes are normal and symmetric     Psychiatric:         Behavior: Behavior normal          Vital Signs  ED Triage Vitals [07/02/21 1827]   Temperature Pulse Respirations Blood Pressure SpO2   (!) 97 2 °F (36 2 °C) 97 14 122/63 99 %      Temp Source Heart Rate Source Patient Position - Orthostatic VS BP Location FiO2 (%)   Tympanic Monitor Sitting Left arm --      Pain Score       7           Vitals:    07/02/21 1827 07/02/21 1933 07/02/21 2041   BP: 122/63 118/68    Pulse: 97 102 86   Patient Position - Orthostatic VS: Sitting Lying          Visual Acuity  Visual Acuity      Most Recent Value   L Pupil Size (mm)  3   R Pupil Size (mm)  3          ED Medications  Medications   sodium chloride 0 9 % bolus 1,000 mL (0 mL Intravenous Stopped 7/2/21 2125)   magnesium sulfate IVPB (premix) SOLN 1 g (0 g Intravenous Stopped 7/2/21 2041)   dexamethasone (PF) (DECADRON) injection 10 mg (10 mg Intravenous Given 7/2/21 1919)   ketorolac (TORADOL) injection 15 mg (15 mg Intravenous Given 7/2/21 1903)   metoclopramide (REGLAN) injection 10 mg (10 mg Intravenous Given 7/2/21 1905) diphenhydrAMINE (BENADRYL) injection 25 mg (25 mg Intravenous Given 7/2/21 1041)       Diagnostic Studies  Results Reviewed     None                 No orders to display              Procedures  Procedures         ED Course                                           MDM  Number of Diagnoses or Management Options  Status migrainosus: new and requires workup  Diagnosis management comments: Patient reports some improvement of her headache, she will be discharged on Reglan, as that has worked in the past  She is to return if her symptoms worsen    Patient Progress  Patient progress: improved      Disposition  Final diagnoses:   Status migrainosus     Time reflects when diagnosis was documented in both MDM as applicable and the Disposition within this note     Time User Action Codes Description Comment    7/2/2021  8:55 PM Joel Christina Add [[id number]] Status migrainosus       ED Disposition     ED Disposition Condition Date/Time Comment    Discharge Stable Fri Jul 2, 2021  8:56 PM Ingrid Rosas discharge to home/self care  Follow-up Information     Follow up With Specialties Details Why Contact Info Additional [address] Emergency Department Emergency Medicine  If symptoms worsen [address]  [address] [place] [address] Emergency Department, [address] [place] [address], [facility]   [phone]          Discharge Medication List as of 7/2/2021  8:57 PM      START taking these medications    Details   !! metoclopramide (REGLAN) 10 mg tablet Take 1 tablet (10 mg total) by mouth every 8 (eight) hours as needed (migraine) for up to 3 days, Starting Fri 7/2/2021, Until Mon 7/5/2021 at 2359, Normal       !! - Potential duplicate medications found  Please discuss with provider        CONTINUE these medications which have NOT CHANGED    Details   !! venlafaxine (EFFEXOR-XR) 37 5 mg 24 hr capsule Take 1 capsule (37 5 mg total) by mouth daily Take with one 75 mg capsule  Total daily dose is 112 5 mg, Starting Tue 3/23/2021, Normal      !! venlafaxine (EFFEXOR-XR) 75 mg 24 hr capsule Take 1 capsule (75 mg total) by mouth daily Take with one 37 5 mg capsule   Total daily dose is 112 5 mg, Starting Tue 3/23/2021, Normal      acetaminophen (TYLENOL) 500 mg tablet Take 1,000 mg by mouth every 4 (four) hours as needed , Historical Med      ALPRAZolam (XANAX) 0 25 mg tablet Take 0 25 mg by mouth 2 (two) times a day as needed , Starting u 8/17/2017, Historical Med      ASMANEX  MCG/ACT AERO Inhale 400 mcg every 4 (four) hours as needed (2 puffs) , Starting Fri 3/30/2018, Historical Med      cetirizine (ZyrTEC) 10 mg tablet Take 10 mg by mouth daily, Historical Med      clindamycin (CLEOCIN T) 1 % lotion Apply topically daily To face, Starting Fri 6/25/2021, Normal      cyproheptadine (PERIACTIN) 4 mg tablet Take 4 mg by mouth daily at bedtime , Starting Tue 4/21/2020, Historical Med      doxycycline hyclate (VIBRAMYCIN) 100 mg capsule Take 1 capsule (100 mg total) by mouth daily, Starting Fri 6/25/2021, Until Sun 7/25/2021, Normal      Elastic Bandages & Supports (TRUFORM STOCKINGS 20-30MMHG) MISC Use as directed , Historical Med      fludrocortisone (FLORINEF) 0 1 mg tablet Take 0 1 mg by mouth daily Taken 1 tablet in the Morning 1 tablet at Night, Starting Wed 5/23/2018, Historical Med      !! linaCLOtide (Linzess) 72 MCG CAPS Take 1 capsule by mouth daily before breakfast, Starting Fri 4/16/2021, Normal      !! linaCLOtide (Linzess) 72 MCG CAPS Take 1 capsule by mouth daily before breakfast, Starting Fri 4/16/2021, Sample      medroxyPROGESTERone acetate (DEPO-PROVERA SYRINGE) 150 mg/mL injection Inject 1 mL (150 mg total) into a muscle every 3 (three) months Every three months, Starting Mon 12/14/2020, Normal      !! metoclopramide (REGLAN) 10 mg tablet Take 1 tablet (10 mg total) by mouth every 6 (six) hours, Starting Sun 2/9/2020, Print      metoprolol succinate (TOPROL-XL) 25 mg 24 hr tablet Take 50 mg by mouth every evening , Starting Tue 12/18/2018, Historical Med      naproxen (EC NAPROSYN) 500 MG EC tablet Take 1 tablet (500 mg total) by mouth 2 (two) times a day with meals, Starting Sun 2/9/2020, Until Mon 2/8/2021, Print      Omega-3 Fatty Acids (FISH OIL) 1,000 mg Take 1,000 mg by mouth 2 (two) times a day , Historical Med      scopolamine (TRANSDERM-SCOP) 1 5 mg/3 days TD 72 hr patch Place 1 patch on the skin every third day, Starting Tue 12/1/2020, Normal      tretinoin (RETIN-A) 0 025 % cream Apply topically daily at bedtime, Starting Fri 6/25/2021, Normal      Ubrogepant 50 MG TABS 1 tab at the onset of migraine headache may repeat in 2 hours if needed max 200 mg per day, Normal      XOPENEX HFA 45 MCG/ACT inhaler Inhale 1-2 puffs every 4 (four) hours as needed , Starting Fri 3/23/2018, Historical Med       !! - Potential duplicate medications found  Please discuss with provider  No discharge procedures on file      PDMP Review     None          ED Provider  Electronically Signed by           Lori Clemens DO  07/05/21 4668

## 2021-07-05 NOTE — ED PROVIDER NOTES
History  Chief Complaint   Patient presents with    Headache - Recurrent or Known Dx Migraines     pt reports migraine with hx of migraines, seen on friday and d/c home  reports same symptoms returned  reports sensitivity to light     Patient is a 67-year-old female, with a history significant for migraines, POTS, asthma, who presents the ED today due to progressively worsening atraumatic headache  Patient characterizes this headache as similar to prior migraines  Notably, patient was evaluated on Friday in the ED and discharged home; she states that the headache has been constant since then  The exacerbation occurred at 1:00 p m  this afternoon  The pain is characterized as pressure at a 8/10 intensity  Light exacerbates her symptoms  Patient has attempted taking Benadryl and Naprosyn to remit her symptoms without effect  There is no associated fever, weakness, numbness to, chest pain, shortness of breath, dysarthria, trouble swallowing  Lying down does not appear to exacerbate the symptoms  Patient has a neurology follow-up tomorrow  - No language barrier    - History obtained from patient  - There are no limitations to the history obtained  - Previous charting was reviewed          Prior to Admission Medications   Prescriptions Last Dose Informant Patient Reported? Taking? ALPRAZolam (XANAX) 0 25 mg tablet  Self Yes No   Sig: Take 0 25 mg by mouth 2 (two) times a day as needed    ASMANEX  MCG/ACT AERO  Self Yes No   Sig: Inhale 400 mcg every 4 (four) hours as needed (2 puffs)    Elastic Bandages & Supports (TRUFORM STOCKINGS 20-30MMHG) MISC  Self Yes No   Sig: Use as directed     Omega-3 Fatty Acids (FISH OIL) 1,000 mg  Self Yes No   Sig: Take 1,000 mg by mouth 2 (two) times a day    Ubrogepant 50 MG TABS 2021 at Unknown time Self No Yes   Si tab at the onset of migraine headache may repeat in 2 hours if needed max 200 mg per day   XOPENEX HFA 45 MCG/ACT inhaler  Self Yes No   Sig: Inhale 1-2 puffs every 4 (four) hours as needed    acetaminophen (TYLENOL) 500 mg tablet  Self Yes No   Sig: Take 1,000 mg by mouth every 4 (four) hours as needed    cetirizine (ZyrTEC) 10 mg tablet  Self Yes No   Sig: Take 10 mg by mouth daily   clindamycin (CLEOCIN T) 1 % lotion   No No   Sig: Apply topically daily To face   cyproheptadine (PERIACTIN) 4 mg tablet  Self Yes No   Sig: Take 4 mg by mouth daily at bedtime    doxycycline hyclate (VIBRAMYCIN) 100 mg capsule   No No   Sig: Take 1 capsule (100 mg total) by mouth daily   fludrocortisone (FLORINEF) 0 1 mg tablet  Self Yes No   Sig: Take 0 1 mg by mouth daily Taken 1 tablet in the Morning 1 tablet at Night   linaCLOtide (Linzess) 72 MCG CAPS   No No   Sig: Take 1 capsule by mouth daily before breakfast   linaCLOtide (Linzess) 72 MCG CAPS   No No   Sig: Take 1 capsule by mouth daily before breakfast   Patient not taking: Reported on 4/27/2021   medroxyPROGESTERone acetate (DEPO-PROVERA SYRINGE) 150 mg/mL injection  Self No No   Sig: Inject 1 mL (150 mg total) into a muscle every 3 (three) months Every three months   metoclopramide (REGLAN) 10 mg tablet  Self No No   Sig: Take 1 tablet (10 mg total) by mouth every 6 (six) hours   Patient taking differently: Take 10 mg by mouth every 6 (six) hours as needed    metoclopramide (REGLAN) 10 mg tablet   No No   Sig: Take 1 tablet (10 mg total) by mouth every 8 (eight) hours as needed (migraine) for up to 3 days   metoprolol succinate (TOPROL-XL) 25 mg 24 hr tablet  Self Yes No   Sig: Take 50 mg by mouth every evening    naproxen (EC NAPROSYN) 500 MG EC tablet 7/5/2021 at Unknown time Self No Yes   Sig: Take 1 tablet (500 mg total) by mouth 2 (two) times a day with meals   scopolamine (TRANSDERM-SCOP) 1 5 mg/3 days TD 72 hr patch  Self No No   Sig: Place 1 patch on the skin every third day   Patient not taking: Reported on 4/27/2021   tretinoin (RETIN-A) 0 025 % cream   No No   Sig: Apply topically daily at bedtime venlafaxine (EFFEXOR-XR) 37 5 mg 24 hr capsule  Self No No   Sig: Take 1 capsule (37 5 mg total) by mouth daily Take with one 75 mg capsule  Total daily dose is 112 5 mg   venlafaxine (EFFEXOR-XR) 75 mg 24 hr capsule  Self No No   Sig: Take 1 capsule (75 mg total) by mouth daily Take with one 37 5 mg capsule  Total daily dose is 112 5 mg      Facility-Administered Medications Last Administration Doses Remaining   albuterol inhalation solution 2 5 mg None recorded    medroxyPROGESTERone acetate (DEPO-PROVERA SYRINGE) IM injection 150 mg 2021  5:20 PM           Past Medical History:   Diagnosis Date    Anxiety     Asthma     Dizziness     at times    GERD (gastroesophageal reflux disease)     Hammertoe of left foot     Headache     occ    Hyperlipemia     Hypermobile joints     Irritable bowel syndrome     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HCC)     Mast cell disorder     Migraine     PONV (postoperative nausea and vomiting)     POTS (postural orthostatic tachycardia syndrome)      infant     Wears glasses        Past Surgical History:   Procedure Laterality Date    COLONOSCOPY      EGD AND COLONOSCOPY N/A 1/10/2017    Procedure: EGD AND COLONOSCOPY;  Surgeon: Lior Saravia MD;  Location: [facility];   Service:     ESOPHAGOGASTRODUODENOSCOPY      with biopsy    FOOT SURGERY      Excision of lesion feet benign    MI REPAIR OF HAMMERTOE,ONE Left 2019    Procedure: 2nd arthrodesis; 5th arthroplasty, flexor tenotomy 2,3,4,5 ;  Surgeon: Naomi Andrew DPM;  Location: [facility];  Service: Podiatry    MI REPAIR OF Rabun Screen Left 2020    Procedure: 2ND TOE Revision hammertoe with broken implant;  Surgeon: Naomi Andrew DPM;  Location: [facility];  Service: Podiatry    UPPER GASTROINTESTINAL ENDOSCOPY      WISDOM TOOTH EXTRACTION         Family History   Problem Relation Age of Onset    Gestational diabetes Mother     Migraines Mother     Anxiety disorder Father  Hyperlipidemia Father     Autism Brother     Diabetes Other     Uterine cancer Maternal Grandmother     Rheumatic fever Maternal Grandmother     Diabetes Maternal Grandfather     Hypertension Maternal Grandfather     Heart attack Maternal Grandfather     Stroke Maternal Grandfather     Hyperlipidemia Maternal Grandfather     Hypertension Paternal Grandmother     Anxiety disorder Paternal Grandmother     Hypertension Paternal Grandfather      I have reviewed and agree with the history as documented  E-Cigarette/Vaping    E-Cigarette Use Never User      E-Cigarette/Vaping Substances    Nicotine No     THC No     CBD No     Flavoring No     Other No     Unknown No      Social History     Tobacco Use    Smoking status: Never Smoker    Smokeless tobacco: Never Used   Vaping Use    Vaping Use: Never used   Substance Use Topics    Alcohol use: No    Drug use: No     Comment: 2/9/20- not asked, parent at bedside  Review of Systems   Constitutional: Negative for fever  HENT: Negative for trouble swallowing  Eyes: Negative for visual disturbance  Respiratory: Negative for shortness of breath  Cardiovascular: Negative for chest pain  Gastrointestinal: Negative for abdominal pain  Endocrine: Negative for polyuria  Genitourinary: Negative for dysuria  Musculoskeletal: Negative for gait problem  Skin: Negative for rash  Allergic/Immunologic: Positive for environmental allergies  Neurological: Positive for headaches  Negative for speech difficulty, weakness and numbness  Hematological: Negative for adenopathy  Psychiatric/Behavioral: Negative for confusion         Physical Exam  ED Triage Vitals [07/05/21 1748]   Temperature Pulse Respirations Blood Pressure SpO2   (!) 96 8 °F (36 °C) 87 20 140/87 100 %      Temp Source Heart Rate Source Patient Position - Orthostatic VS BP Location FiO2 (%)   Tympanic Monitor Lying Right arm --      Pain Score       8 Orthostatic Vital Signs  Vitals:    07/05/21 1748 07/05/21 2053   BP: 140/87 127/78   Pulse: 87 86   Patient Position - Orthostatic VS: Lying Sitting       Physical Exam  Vitals and nursing note reviewed  Constitutional:       General: She is not in acute distress  Appearance: She is not ill-appearing or toxic-appearing  HENT:      Head: Normocephalic  Right Ear: External ear normal       Left Ear: External ear normal       Nose: Nose normal  No rhinorrhea  Mouth/Throat:      Mouth: Mucous membranes are moist       Pharynx: Oropharynx is clear  No oropharyngeal exudate or posterior oropharyngeal erythema  Eyes:      General: No scleral icterus  Right eye: No discharge  Left eye: No discharge  Extraocular Movements: Extraocular movements intact  Conjunctiva/sclera: Conjunctivae normal       Pupils: Pupils are equal, round, and reactive to light  Cardiovascular:      Rate and Rhythm: Normal rate and regular rhythm  Pulses: Normal pulses  Heart sounds: Normal heart sounds  No murmur heard  No friction rub  No gallop  Pulmonary:      Effort: Pulmonary effort is normal  No respiratory distress  Breath sounds: Normal breath sounds  No stridor  No wheezing, rhonchi or rales  Abdominal:      General: Abdomen is flat  Bowel sounds are normal  There is no distension  Palpations: Abdomen is soft  Tenderness: There is no abdominal tenderness  There is no guarding or rebound  Musculoskeletal:         General: No tenderness  Cervical back: Normal range of motion and neck supple  No rigidity or tenderness  Right lower leg: No edema  Left lower leg: No edema  Lymphadenopathy:      Cervical: No cervical adenopathy  Skin:     General: Skin is warm and dry  Capillary Refill: Capillary refill takes less than 2 seconds  Neurological:      Mental Status: She is alert  Comments: AAO x3  Cranial nerves 2-12 intact    5/5 strength in all 4 extremities  Sensation to light touch in intact in all 4 extremities  No pronator drift  Normal finger-to-nose  Negative Babinski  Patient is speaking clearly complete sentences  Patient is answering appropriately and able follow commands  Psychiatric:         Mood and Affect: Mood normal          Behavior: Behavior normal          ED Medications  Medications   ketorolac (TORADOL) injection 15 mg (15 mg Intramuscular Given 7/5/21 1846)   magnesium sulfate 2 g/50 mL IVPB (premix) 2 g (0 g Intravenous Stopped 7/5/21 1945)   metoclopramide (REGLAN) injection 10 mg (10 mg Intravenous Given 7/5/21 1846)   valproate (DEPACON) 500 mg in sodium chloride 0 9 % 50 mL BOLUS (0 mg Intravenous Stopped 7/5/21 2040)       Diagnostic Studies  Results Reviewed     None                 No orders to display         Procedures  Procedures      ED Course                                       MDM  Number of Diagnoses or Management Options  Elevated blood pressure reading  Status migrainosus  Diagnosis management comments: Patient is a 28-year-old female, with a history significant for migraines, POTS, asthma, who presents the ED today due to progressively worsening atraumatic headache  Patient characterizes this headache as similar to prior migraines  Notably, patient was evaluated on Friday in the ED and discharged home; she states that the headache has been constant since then  The exacerbation occurred at 1:00 p m  this afternoon  The pain is characterized as pressure at a 8/10 intensity  Light exacerbates her symptoms  Patient is currently afebrile and hemodynamically stable  Her physical exam is unremarkable  This presentation is concerning for:  Migraine/primary headache  I have a low clinical suspicion for SAH/meningitis at this time based upon history and physical exam   Will manage with Toradol, Reglan, valproic, magnesium  Will manage further based on response         Disposition  Final diagnoses:   Status migrainosus   Elevated blood pressure reading     Time reflects when diagnosis was documented in both MDM as applicable and the Disposition within this note     Time User Action Codes Description Comment    7/5/2021  6:07 PM Karen Arevalo Add [R03 0] Elevated blood pressure reading     7/5/2021  8:51 PM Suann Poplin Remove [R03 0] Elevated blood pressure reading     7/5/2021  8:51 PM Suann Poplin Add [G43 901] Status migrainosus     7/6/2021  1:57 AM Chen Espinosa Ivetterobert Cash BARRERA Add [R03 0] Elevated blood pressure reading       ED Disposition     ED Disposition Condition Date/Time Comment    Discharge Stable Mon Jul 5, 2021  8:51 PM Christine Varela discharge to home/self care              Follow-up Information    None         Discharge Medication List as of 7/5/2021  8:51 PM      CONTINUE these medications which have NOT CHANGED    Details   naproxen (EC NAPROSYN) 500 MG EC tablet Take 1 tablet (500 mg total) by mouth 2 (two) times a day with meals, Starting Sun 2/9/2020, Until Mon 7/5/2021, Print      Ubrogepant 50 MG TABS 1 tab at the onset of migraine headache may repeat in 2 hours if needed max 200 mg per day, Normal      acetaminophen (TYLENOL) 500 mg tablet Take 1,000 mg by mouth every 4 (four) hours as needed , Historical Med      ALPRAZolam (XANAX) 0 25 mg tablet Take 0 25 mg by mouth 2 (two) times a day as needed , Starting Thu 8/17/2017, Historical Med      ASMANEX  MCG/ACT AERO Inhale 400 mcg every 4 (four) hours as needed (2 puffs) , Starting Fri 3/30/2018, Historical Med      cetirizine (ZyrTEC) 10 mg tablet Take 10 mg by mouth daily, Historical Med      clindamycin (CLEOCIN T) 1 % lotion Apply topically daily To face, Starting Fri 6/25/2021, Normal      cyproheptadine (PERIACTIN) 4 mg tablet Take 4 mg by mouth daily at bedtime , Starting Tue 4/21/2020, Historical Med      doxycycline hyclate (VIBRAMYCIN) 100 mg capsule Take 1 capsule (100 mg total) by mouth daily, Starting Fri 6/25/2021, Until Sun 7/25/2021, Normal      Elastic Bandages & Supports (TRUFORM STOCKINGS 20-30MMHG) MISC Use as directed , Historical Med      fludrocortisone (FLORINEF) 0 1 mg tablet Take 0 1 mg by mouth daily Taken 1 tablet in the Morning 1 tablet at Night, Starting Wed 5/23/2018, Historical Med      !! linaCLOtide (Linzess) 72 MCG CAPS Take 1 capsule by mouth daily before breakfast, Starting Fri 4/16/2021, Normal      !! linaCLOtide (Linzess) 72 MCG CAPS Take 1 capsule by mouth daily before breakfast, Starting Fri 4/16/2021, Sample      medroxyPROGESTERone acetate (DEPO-PROVERA SYRINGE) 150 mg/mL injection Inject 1 mL (150 mg total) into a muscle every 3 (three) months Every three months, Starting Mon 12/14/2020, Normal      !! metoclopramide (REGLAN) 10 mg tablet Take 1 tablet (10 mg total) by mouth every 6 (six) hours, Starting Sun 2/9/2020, Print      !! metoclopramide (REGLAN) 10 mg tablet Take 1 tablet (10 mg total) by mouth every 8 (eight) hours as needed (migraine) for up to 3 days, Starting Fri 7/2/2021, Until Mon 7/5/2021 at 2359, Normal      metoprolol succinate (TOPROL-XL) 25 mg 24 hr tablet Take 50 mg by mouth every evening , Starting Tue 12/18/2018, Historical Med      Omega-3 Fatty Acids (FISH OIL) 1,000 mg Take 1,000 mg by mouth 2 (two) times a day , Historical Med      scopolamine (TRANSDERM-SCOP) 1 5 mg/3 days TD 72 hr patch Place 1 patch on the skin every third day, Starting Tue 12/1/2020, Normal      tretinoin (RETIN-A) 0 025 % cream Apply topically daily at bedtime, Starting Fri 6/25/2021, Normal      !! venlafaxine (EFFEXOR-XR) 37 5 mg 24 hr capsule Take 1 capsule (37 5 mg total) by mouth daily Take with one 75 mg capsule  Total daily dose is 112 5 mg, Starting Tue 3/23/2021, Normal      !! venlafaxine (EFFEXOR-XR) 75 mg 24 hr capsule Take 1 capsule (75 mg total) by mouth daily Take with one 37 5 mg capsule   Total daily dose is 112 5 mg, Starting Tue 3/23/2021, Normal XOPENEX HFA 45 MCG/ACT inhaler Inhale 1-2 puffs every 4 (four) hours as needed , Starting Fri 3/23/2018, Historical Med       !! - Potential duplicate medications found  Please discuss with provider  No discharge procedures on file  PDMP Review     None           ED Provider  Attending physically available and evaluated Karol Wallace I managed the patient along with the ED Attending      Electronically Signed by         Hetal Jenkins MD  07/06/21 8302

## 2021-07-05 NOTE — TELEPHONE ENCOUNTER
Reason for Disposition   [1] SEVERE headache (e g , excruciating) AND [2] not improved after 2 hours of pain medicine    Answer Assessment - Initial Assessment Questions  1  LOCATION: "Where does it hurt?"       Front of head    2  ONSET: "When did the headache start?" (Minutes, hours or days)       This past Tuesday    3  PATTERN: "Does the pain come and go, or has it been constant since it started?"      Constant    4  SEVERITY: "How bad is the pain?" and "What does it keep you from doing?"  (e g , Scale 1-10; mild, moderate, or severe)    - MILD (1-3): doesn't interfere with normal activities     - MODERATE (4-7): interferes with normal activities or awakens from sleep     - SEVERE (8-10): excruciating pain, unable to do any normal activities         8/10 pain    5  RECURRENT SYMPTOM: "Have you ever had headaches before?" If Yes, ask: "When was the last time?" and "What happened that time?"       Yes    6  CAUSE: "What do you think is causing the headache?"      Migraine    7  MIGRAINE: "Have you been diagnosed with migraine headaches?" If Yes, ask: "Is this headache similar?"       Yes    8  HEAD INJURY: "Has there been any recent injury to the head?"       Denies    9  OTHER SYMPTOMS: "Do you have any other symptoms?" (fever, stiff neck, eye pain, sore throat, cold symptoms)      Nausea    10   PREGNANCY: "Is there any chance you are pregnant?" "When was your last menstrual period?"        denies    Protocols used: HEADACHE-ADULT-

## 2021-07-05 NOTE — TELEPHONE ENCOUNTER
Regarding: Migraine  ----- Message from Heidy Machuca sent at 7/5/2021  4:22 PM EDT -----  " My Daughter went to the ED Friday Night for a Migraine Headache   Her Head Ache is getting Worse and I need advise as to what to do  "

## 2021-07-06 ENCOUNTER — TELEPHONE (OUTPATIENT)
Dept: FAMILY MEDICINE CLINIC | Facility: CLINIC | Age: 21
End: 2021-07-06

## 2021-07-06 ENCOUNTER — OFFICE VISIT (OUTPATIENT)
Dept: NEUROLOGY | Facility: CLINIC | Age: 21
End: 2021-07-06
Payer: COMMERCIAL

## 2021-07-06 ENCOUNTER — TELEPHONE (OUTPATIENT)
Dept: DERMATOLOGY | Facility: CLINIC | Age: 21
End: 2021-07-06

## 2021-07-06 VITALS
HEART RATE: 104 BPM | RESPIRATION RATE: 98 BRPM | DIASTOLIC BLOOD PRESSURE: 78 MMHG | TEMPERATURE: 97.7 F | SYSTOLIC BLOOD PRESSURE: 128 MMHG | BODY MASS INDEX: 26.37 KG/M2 | WEIGHT: 168 LBS | HEIGHT: 67 IN

## 2021-07-06 DIAGNOSIS — G43.009 MIGRAINE WITHOUT AURA AND WITHOUT STATUS MIGRAINOSUS, NOT INTRACTABLE: Primary | ICD-10-CM

## 2021-07-06 LAB
GAMMA INTERFERON BACKGROUND BLD IA-ACNC: 0.04 IU/ML
M TB IFN-G BLD-IMP: NEGATIVE
M TB IFN-G CD4+ BCKGRND COR BLD-ACNC: -0.01 IU/ML
M TB IFN-G CD4+ BCKGRND COR BLD-ACNC: -0.01 IU/ML
MEV IGG SER QL: NORMAL
MITOGEN IGNF BCKGRD COR BLD-ACNC: >10 IU/ML
MUV IGG SER QL: NORMAL
VZV IGG SER IA-ACNC: NORMAL

## 2021-07-06 PROCEDURE — 99213 OFFICE O/P EST LOW 20 MIN: CPT | Performed by: PSYCHIATRY & NEUROLOGY

## 2021-07-06 PROCEDURE — 96372 THER/PROPH/DIAG INJ SC/IM: CPT | Performed by: PSYCHIATRY & NEUROLOGY

## 2021-07-06 RX ORDER — GABAPENTIN 100 MG/1
100 CAPSULE ORAL DAILY
Qty: 30 CAPSULE | Refills: 2 | Status: SHIPPED | OUTPATIENT
Start: 2021-07-06 | End: 2021-11-04

## 2021-07-06 RX ORDER — DEXAMETHASONE 2 MG/1
4 TABLET ORAL 2 TIMES DAILY PRN
Qty: 30 TABLET | Refills: 1 | Status: SHIPPED | OUTPATIENT
Start: 2021-07-06 | End: 2021-11-04 | Stop reason: SDUPTHER

## 2021-07-06 RX ORDER — KETOROLAC TROMETHAMINE 30 MG/ML
60 INJECTION, SOLUTION INTRAMUSCULAR; INTRAVENOUS ONCE
Status: COMPLETED | OUTPATIENT
Start: 2021-07-06 | End: 2021-07-06

## 2021-07-06 RX ADMIN — KETOROLAC TROMETHAMINE 60 MG: 30 INJECTION, SOLUTION INTRAMUSCULAR; INTRAVENOUS at 12:03

## 2021-07-06 NOTE — TELEPHONE ENCOUNTER
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

## 2021-07-06 NOTE — TELEPHONE ENCOUNTER
L/M for pt to return this call for MD instructions  Hep B series is initial, one month then 6 months as nurse visit

## 2021-07-06 NOTE — TELEPHONE ENCOUNTER
Please contact patient  I reviewed her blood work  · She has good immunity against measles, mumps, rubella and chickenpox  ·  test for tuberculosis is still pending  ·  her white blood cell count  Is decreased minimally, rest of CBC with diff is normal   This is essentially baseline for patient, no concerns  ·  she does not have immunity against hepatitis-B and we need to restart hepatitis-B series for adult, please set up with the nurse         thank you

## 2021-07-06 NOTE — DISCHARGE INSTRUCTIONS
Neuro appointment tomorrow, return to the ED for severe headache, incessant vomiting, or any new or worrisome symptoms

## 2021-07-06 NOTE — PROGRESS NOTES
Procedures     Pt tolerated Toradol INJ  Right Gluteus Steve   Pt waited 15 mins  0 Erythema  0 Induration   the patient had excellent results of this with pain control

## 2021-07-06 NOTE — ED ATTENDING ATTESTATION
7/5/2021  IKelli DO, saw and evaluated the patient  I have discussed the patient with the resident/non-physician practitioner and agree with the resident's/non-physician practitioner's findings, Plan of Care, and MDM as documented in the resident's/non-physician practitioner's note, except where noted  All available labs and Radiology studies were reviewed  I was present for key portions of any procedure(s) performed by the resident/non-physician practitioner and I was immediately available to provide assistance  At this point I agree with the current assessment done in the Emergency Department  I have conducted an independent evaluation of this patient a history and physical is as follows:    28-year-old female presents for migraines  Typical for her previous migraines  Was seen last week and had some mild relief with her symptoms but returns today because worsening symptoms  She is supposed to see Neurology tomorrow  They recommended giving 60 mg of Toradol intramuscular as well as other medications  We will give 15 mg of IV Toradol as studies have shown that anything above really 10 mg Toradol causes adverse side effects without increasing analgesic effect   Normal neuro exam    ED Course         Critical Care Time  Procedures

## 2021-07-06 NOTE — PROGRESS NOTES
Assessment/Plan:     status migrainosus: At this juncture I am going to intensify her Toradol with a 60 mg intramuscular injection along with hydration rest sleep and oral nonsteroidal medication and initiation of Neurontin for several days  We will follow up directly daily  Subjective:      Patient ID: Eduardo Silver is a 22 y o  female  HPI       The patient is well known to Neurology now with approximately 4 days of recurrence severe migraine headache with nausea vomiting light sensitivity and noise sensitivity  She has had 2 emergency room visits without relief  No symptoms are of any focality  There been no fever chills animal or bug bites or change in sensorium or cognition    Review of Systems   Constitutional: Negative  HENT: Negative  Eyes: Negative  Respiratory: Negative  Cardiovascular: Negative  Gastrointestinal: Negative  Endocrine: Negative  Genitourinary: Negative  Musculoskeletal: Negative  Skin: Negative  Allergic/Immunologic: Negative  Neurological: Positive for headaches (Migraines since Wednesday )  Hematological: Negative  Psychiatric/Behavioral: Negative  Objective: There were no vitals taken for this visit  Physical Exam   The patient is fully awake and alert  Normal speech and language normal cognition  The neck is supple  Lungs are clear  Heart has a regular rhythm she is not tachycardic  There is normal fundi extraocular movements are full  Speech speech and language is normal   Motor strength is 5/5 in the upper and lower extremity full throughout normal tone and bulk  Station and gait is normal reflexes are 2+ throughout

## 2021-07-07 ENCOUNTER — TELEPHONE (OUTPATIENT)
Dept: FAMILY MEDICINE CLINIC | Facility: CLINIC | Age: 21
End: 2021-07-07

## 2021-07-07 ENCOUNTER — TELEMEDICINE (OUTPATIENT)
Dept: BEHAVIORAL/MENTAL HEALTH CLINIC | Facility: CLINIC | Age: 21
End: 2021-07-07
Payer: COMMERCIAL

## 2021-07-07 DIAGNOSIS — F41.1 GAD (GENERALIZED ANXIETY DISORDER): ICD-10-CM

## 2021-07-07 PROCEDURE — 90834 PSYTX W PT 45 MINUTES: CPT | Performed by: SOCIAL WORKER

## 2021-07-07 NOTE — TELEPHONE ENCOUNTER
Called patient, no answer  Left detailed VM with providers results note       Advised patient to contact our office for questions or concerns     Also sent my chart message

## 2021-07-07 NOTE — TELEPHONE ENCOUNTER
----- Message from Miya Aparicio MD sent at 7/6/2021  7:29 PM EDT -----  Please contact patient  Test for tuberculosis is normal/negative      Thank you

## 2021-07-07 NOTE — PSYCH
Psychotherapy Provided: Individual Psychotherapy 50 minutes      Length of time in session: 50 minutes, follow up in 2 week     Goals addressed in session: Goal 1      Pain: 0  Current suicide risk : Low      D: Nichole Duarte spoke today about her feelings re: her multiple ER visits over the past week for her status migraine and her need to work on earlier recognition of her headache symptoms  She agreed to work on increasing her awareness of her sense of self and how she can provide better self care  A: Siobhan Chavez was again able to speak openly about her feelings and fears  She continues to work on her irrational beliefs and was able to verbalize her fear that she will become medication-dependent  Or overly anxious if she increases her attention towards her self       P:  Upcoming sessions will be used to continue to support Nury with all of the above  [address] [place]: Diagnosis and Treatment Plan explained to Nury, Nury relates understanding diagnosis and is agreeable to Treatment Plan  Yes      This note was not shared with the patient due to this is a psychotherapy note      Encounter Diagnoses   Name Primary?     INES (generalized anxiety disorder)

## 2021-07-08 ENCOUNTER — DOCUMENTATION (OUTPATIENT)
Dept: BEHAVIORAL/MENTAL HEALTH CLINIC | Facility: CLINIC | Age: 21
End: 2021-07-08

## 2021-07-08 NOTE — PROGRESS NOTES
Treatment Plan Tracking    # 1Treatment Plan not completed within required time limits due to: Client presented with emotional/behavorial issues that required clinical intervention  Raffi Amaral

## 2021-07-09 ENCOUNTER — TELEPHONE (OUTPATIENT)
Dept: FAMILY MEDICINE CLINIC | Facility: CLINIC | Age: 21
End: 2021-07-09

## 2021-07-09 NOTE — TELEPHONE ENCOUNTER
From   Camilla Holloway To   Ann Marie Alarcon 74 and Delivered   7/7/2021  8:49 AM   Please contact patient      Test for tuberculosis is normal/negative      Thank you       Spoke with pt, aware of above

## 2021-07-13 ENCOUNTER — SOCIAL WORK (OUTPATIENT)
Dept: BEHAVIORAL/MENTAL HEALTH CLINIC | Facility: CLINIC | Age: 21
End: 2021-07-13
Payer: COMMERCIAL

## 2021-07-13 DIAGNOSIS — F41.1 GAD (GENERALIZED ANXIETY DISORDER): ICD-10-CM

## 2021-07-13 PROCEDURE — 90834 PSYTX W PT 45 MINUTES: CPT | Performed by: SOCIAL WORKER

## 2021-07-13 NOTE — BH TREATMENT PLAN
Rita Kussmaul  2000         Date of Initial Treatment Plan: 7/18/18  Date of Current Treatment Plan: 7/13/21  Treatment Plan Number 9     Strengths/Personal Resources for Self Care: I care about others a lot, I am respectful to others, I am a rule follower, Melissa Hooker to do things "right "      Diagnosis: INES     Area of Needs: My anxiety continues to impact my daily functioning and thinking   I feel like I always need to be doing something, particularly to help others      Long Term Goal 1: AI want to continue to work to manage my anxiety and to improve my self confidence       Target Date:1/13/22  Completion Date: na         Short Term Objectives for Goal 1: AI will continue to implement mindfulness techniques as I practice remaining in the present  BI will continue  exercising and engaging in daily self care techniques   and CI will use therapy sessions to process issues that cause me stress   D1 I will continue to challenge irrational and anxious thoughts related to new experiences     GOAL 1: Modality: Individual 2x per month   Completion Date na, Medication Management and The person(s) responsible for carrying out the plan is Dr Renuka Nye and Treatment Plan explained to Nury Arrington relates understanding diagnosis and is agreeable to Treatment Plan          Client Comments : Please share your thoughts, feelings, need and/or experiences regarding your treatment plan:    __________________________________________________________________    Treatment Plan done but not signed at time of office visit due to:  Plan reviewed by phone or in person  and verbal consent given due to [address] distancing

## 2021-07-13 NOTE — PSYCH
Psychotherapy Provided: Individual Psychotherapy 50 minutes      Length of time in session: 50 minutes, follow up in 2 week     Goals addressed in session: Goal 1      Pain: 8  Current suicide risk : Low      D: Tye Wills spoke today about her feelings re: her continued  ER visits over the past week for her status migraine and her fear that she may need to return there today following this session  She again agreed to work on increasing her awareness of her sense of self and how she can provide better self care, and admitted that her present headache may be somewhat due to anxiety about what physical reasons her head continues to hurt so badly  A: Isreal Thomas was again able to speak openly about her feelings and fears  She continues to struggle with and argue internally with irrational beliefs and was able to verbalize several of these    P:  Upcoming sessions will be used to continue to support Nury with all of the above        [address] [place] [address] Luke: Diagnosis and Treatment Plan explained to Nury, Nury relates understanding diagnosis and is agreeable to Treatment Plan  Yes      This note was not shared with the patient due to this is a psychotherapy note      Encounter Diagnoses   Name Primary?     INES (generalized anxiety disorder)

## 2021-07-14 NOTE — TELEPHONE ENCOUNTER
Patient called left a vm yesterday, stating that reached out to pharmacy and she still waiting for the prior Auth ; she had requested the PA 2 weeks ago, she want to know status, and if there is anything else she needs to do  Patient would like a call back with status

## 2021-07-19 ENCOUNTER — CLINICAL SUPPORT (OUTPATIENT)
Dept: FAMILY MEDICINE CLINIC | Facility: CLINIC | Age: 21
End: 2021-07-19
Payer: COMMERCIAL

## 2021-07-19 DIAGNOSIS — F41.1 GAD (GENERALIZED ANXIETY DISORDER): ICD-10-CM

## 2021-07-19 DIAGNOSIS — Z23 ENCOUNTER FOR ADMINISTRATION OF VACCINE: Primary | ICD-10-CM

## 2021-07-19 PROCEDURE — 90746 HEPB VACCINE 3 DOSE ADULT IM: CPT

## 2021-07-19 PROCEDURE — 90471 IMMUNIZATION ADMIN: CPT

## 2021-07-19 RX ORDER — VENLAFAXINE HYDROCHLORIDE 75 MG/1
75 CAPSULE, EXTENDED RELEASE ORAL DAILY
Qty: 90 CAPSULE | Refills: 0 | Status: SHIPPED | OUTPATIENT
Start: 2021-07-19 | End: 2021-10-20 | Stop reason: SDUPTHER

## 2021-07-19 RX ORDER — VENLAFAXINE HYDROCHLORIDE 37.5 MG/1
37.5 CAPSULE, EXTENDED RELEASE ORAL DAILY
Qty: 90 CAPSULE | Refills: 0 | Status: SHIPPED | OUTPATIENT
Start: 2021-07-19 | End: 2021-10-20 | Stop reason: SDUPTHER

## 2021-07-19 NOTE — PROGRESS NOTES
Received request from patient's therapist to provide refills of Effexor  5 mg  Refills sent to pharmacy as requested

## 2021-07-28 ENCOUNTER — SOCIAL WORK (OUTPATIENT)
Dept: BEHAVIORAL/MENTAL HEALTH CLINIC | Facility: CLINIC | Age: 21
End: 2021-07-28
Payer: COMMERCIAL

## 2021-07-28 DIAGNOSIS — F41.1 GAD (GENERALIZED ANXIETY DISORDER): ICD-10-CM

## 2021-07-28 PROCEDURE — 90834 PSYTX W PT 45 MINUTES: CPT | Performed by: SOCIAL WORKER

## 2021-07-28 NOTE — PSYCH
Psychotherapy Provided: Individual Psychotherapy 50 minutes      Length of time in session: 50 minutes, follow up in 2 week     Goals addressed in session: Goal 1      Pain: 4  Current suicide risk : Low      D: Kade Ernie spoke today about her feelings re: her continued  headaches over the past week and plan to be seen by her cardiologist at [facility] as they may be POTS related at this point  She verbalized sadness at her regression with the [facility] protocol as she remains very "hard" on herself despite her accomplishments and weekly schedule  A: [employer] was again able to speak openly about her feelings and fears  She continues to utilize sessions to process her emotions and concerns     P:  Upcoming sessions will be used to continue to support Nury with all of the above        [facility] Luke: Diagnosis and Treatment Plan explained to Nury, Nury relates understanding diagnosis and is agreeable to Treatment Plan  Yes      This note was not shared with the patient due to this is a psychotherapy note        Encounter Diagnoses   Name Primary?     INES (generalized anxiety disorder)

## 2021-08-03 ENCOUNTER — SOCIAL WORK (OUTPATIENT)
Dept: BEHAVIORAL/MENTAL HEALTH CLINIC | Facility: CLINIC | Age: 21
End: 2021-08-03
Payer: COMMERCIAL

## 2021-08-03 DIAGNOSIS — F41.1 GAD (GENERALIZED ANXIETY DISORDER): ICD-10-CM

## 2021-08-03 PROCEDURE — 90834 PSYTX W PT 45 MINUTES: CPT | Performed by: SOCIAL WORKER

## 2021-08-04 NOTE — PSYCH
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     INES (generalized anxiety disorder)

## 2021-08-10 ENCOUNTER — SOCIAL WORK (OUTPATIENT)
Dept: BEHAVIORAL/MENTAL HEALTH CLINIC | Facility: CLINIC | Age: 21
End: 2021-08-10
Payer: COMMERCIAL

## 2021-08-10 ENCOUNTER — APPOINTMENT (OUTPATIENT)
Dept: LAB | Facility: HOSPITAL | Age: 21
End: 2021-08-10
Payer: COMMERCIAL

## 2021-08-10 ENCOUNTER — TELEPHONE (OUTPATIENT)
Dept: OBGYN CLINIC | Facility: CLINIC | Age: 21
End: 2021-08-10

## 2021-08-10 ENCOUNTER — OFFICE VISIT (OUTPATIENT)
Dept: GASTROENTEROLOGY | Facility: CLINIC | Age: 21
End: 2021-08-10
Payer: COMMERCIAL

## 2021-08-10 ENCOUNTER — TELEPHONE (OUTPATIENT)
Dept: FAMILY MEDICINE CLINIC | Facility: CLINIC | Age: 21
End: 2021-08-10

## 2021-08-10 VITALS
TEMPERATURE: 98.1 F | HEART RATE: 88 BPM | SYSTOLIC BLOOD PRESSURE: 102 MMHG | HEIGHT: 67 IN | BODY MASS INDEX: 27.97 KG/M2 | DIASTOLIC BLOOD PRESSURE: 60 MMHG | WEIGHT: 178.2 LBS

## 2021-08-10 DIAGNOSIS — K59.04 CHRONIC IDIOPATHIC CONSTIPATION: ICD-10-CM

## 2021-08-10 DIAGNOSIS — N80.9 ENDOMETRIOSIS: Primary | ICD-10-CM

## 2021-08-10 DIAGNOSIS — R10.13 DYSPEPSIA: ICD-10-CM

## 2021-08-10 DIAGNOSIS — E53.8 LOW VITAMIN B12 LEVEL: ICD-10-CM

## 2021-08-10 DIAGNOSIS — F41.1 GAD (GENERALIZED ANXIETY DISORDER): ICD-10-CM

## 2021-08-10 DIAGNOSIS — E55.9 HYPOVITAMINOSIS D: ICD-10-CM

## 2021-08-10 DIAGNOSIS — N80.8 OTHER ENDOMETRIOSIS: ICD-10-CM

## 2021-08-10 DIAGNOSIS — R89.4 ABNORMAL CELIAC ANTIBODY PANEL: Primary | ICD-10-CM

## 2021-08-10 PROBLEM — I77.1 CELIAC ARTERY STENOSIS (HCC): Status: RESOLVED | Noted: 2017-05-03 | Resolved: 2021-08-10

## 2021-08-10 PROBLEM — I77.4 CELIAC ARTERY STENOSIS (HCC): Status: RESOLVED | Noted: 2017-05-03 | Resolved: 2021-08-10

## 2021-08-10 LAB
25(OH)D3 SERPL-MCNC: 80.4 NG/ML (ref 30–100)
VIT B12 SERPL-MCNC: 796 PG/ML (ref 100–900)

## 2021-08-10 PROCEDURE — 99214 OFFICE O/P EST MOD 30 MIN: CPT | Performed by: INTERNAL MEDICINE

## 2021-08-10 PROCEDURE — 82306 VITAMIN D 25 HYDROXY: CPT

## 2021-08-10 PROCEDURE — 36415 COLL VENOUS BLD VENIPUNCTURE: CPT

## 2021-08-10 PROCEDURE — 82607 VITAMIN B-12: CPT

## 2021-08-10 PROCEDURE — 90834 PSYTX W PT 45 MINUTES: CPT | Performed by: SOCIAL WORKER

## 2021-08-10 RX ORDER — OLANZAPINE 5 MG/1
5 TABLET ORAL
COMMUNITY
Start: 2021-07-29 | End: 2021-11-04

## 2021-08-10 NOTE — TELEPHONE ENCOUNTER
Spoke with patient  Made aware of  results and provider's instructions  Patient verbalized understanding, she reports she is taking both, Vitamin B12 and Vitamin D  She is not sure of the doses, she thinks is 500 for the Vit B12 and 1,000 units for Vit   D

## 2021-08-10 NOTE — PSYCH
Psychotherapy Provided: Individual Psychotherapy 50 minutes      Length of time in session: 50 minutes, follow up in 2 week     Goals addressed in session: Goal 1      Pain: 4    Current suicide risk : Low      D: Josy Ariela spoke today about her feelings re: her  appt today with her GI specialist during which she will be discussing her severe bloating which may be related to endometriosis  Iris Cordon also shared details re: recent shifts at work and concerns re: patient care  Her homework was also reviewed and additional homework was assigned  A: Iris Cordon was again able to speak openly about her feelings re: the above  She continues to utilize sessions to process her emotions and concerns     P:  Upcoming sessions will be used to continue to support Nury with all of the above        [address] [place], [place] [address]ke: Diagnosis and Treatment Plan explained to Nury, Nury relates understanding diagnosis and is agreeable to Treatment Plan  Yes      This note was not shared with the patient due to this is a psychotherapy note    Encounter Diagnoses   Name Primary?     INES (generalized anxiety disorder)

## 2021-08-10 NOTE — PROGRESS NOTES
[facility]  Progress Note - Jose Carvajal 22 y o  female MRN: [id number]    Unit/Bed#:  Encounter: [id number]    Assessment/Plan:  1  Dyspepsia  2  Chronic idiopathic constipation  - Ambulatory referral to Physical Therapy; Future    3  Abnormal celiac antibody panel  - Celiac Disease Antibody Profile; Future      She is a 25-year-old female with history of POTS, dyspepsia, likely gluten intolerance versus celiac disease, and constipation  She also has history of generalized anxiety disorder  Constipation has been difficult to control as she has tried Linzess, Amitiza, over-the-counter stool softener, Dulcolax  She continues to have difficulty with evacuation and stool that are compatible [place] stool type 1   P r n  enemas has also helped but has not alleviate her symptoms  She is currently getting evaluation for endometriosis by her gynecologist   She is on birth control medication which has improved her menstrual cycle related symptoms overall  She has had extensive evaluation including gastric emptying study, EGD with biopsies for H pylori and celiac disease  All this has been negative  Previously Transglutaminase IgG was borderline elevated to 6  Now she is on a strict gluten free diet with significant improvement  She has improved on a gluten free diet so she will continue this  Dyspepsia has improved with a strict gluten free diet  She continues to have adequate amount of fluid and fiber intake for history of constipation  Lower GI symptoms with abdominal cramping and pain with evacuation still persists    -continue gluten free diet  -pelvic PT as she has been refractory to prescription medication and over-the-counter medication for constipation  -continue Linzess  -discuss may need anal manometry in the future  -currently she is gluten free check serologies for celiac disease  -follow-up in 3-6 month  Objective:     Vitals: Blood pressure 102/60, pulse 88, temperature 98 1 °F (36 7 °C), temperature source Tympanic, height 5' 7" (1 702 m), weight 80 8 kg (178 lb 3 2 oz), not currently breastfeeding  ,Body mass index is 27 91 kg/m²  [unfilled]    Physical Exam:    GEN: wn/wd, NAD  HEENT: MMM, no cervical or supraclavicular LAD, anciteric  CV: RRR, no m/r/g  CHEST: CTA b/l, no w/r/r  ABD: +BS, soft, NT/ND, no hepatosplenomegaly  EXT: no c/c/e  SKIN: no rashes  NEURO: aaox3      Invasive Devices     None                         Lab, Imaging and other studies:     Appointment on 08/10/2021   Component Date Value    Vit D, 25-Hydroxy 08/10/2021 80 4     Vitamin B-12 08/10/2021 796          I have personally reviewed pertinent reports        Current Facility-Administered Medications   Medication Dose Route Frequency    albuterol inhalation solution 2 5 mg  2 5 mg Nebulization Q6H PRN    medroxyPROGESTERone acetate (DEPO-PROVERA SYRINGE) IM injection 150 mg  150 mg Intramuscular Q3 Months

## 2021-08-10 NOTE — TELEPHONE ENCOUNTER
Continue same vitamin B12 dose  For vitamin D, take half of whatever current dose is  If she is not able to split pill/capsule, take every other day, instead of daily

## 2021-08-10 NOTE — TELEPHONE ENCOUNTER
----- Message from [address] sent at 8/10/2021  2:20 PM EDT -----   Please let patient know vitamin B12 level is normal   Vitamin-D level is high, is she taking any vitamin B12 supplements or vitamin-D supplements?

## 2021-08-10 NOTE — TELEPHONE ENCOUNTER
I'm sorry I thought Kilo Paris was asking if she can order it  We can order it with endometriosis as the diagnosis code   Insurance will probably not approve the MRI

## 2021-08-10 NOTE — TELEPHONE ENCOUNTER
Spoke with patient  Made aware of  provider's instructions  Patient verbalized understanding and was agreeable w/ plan

## 2021-08-10 NOTE — TELEPHONE ENCOUNTER
Pt saw her GI doctor today and they recommended she go through with testing for endometrosis that she had Dr Sorenson Ask at her last visit  Would like to get an ultrasound and MRI as suggested by the GI doctor  Please advise

## 2021-08-13 ENCOUNTER — HOSPITAL ENCOUNTER (OUTPATIENT)
Dept: RADIOLOGY | Facility: HOSPITAL | Age: 21
Discharge: HOME | End: 2021-08-13
Attending: STUDENT IN AN ORGANIZED HEALTH CARE EDUCATION/TRAINING PROGRAM
Payer: COMMERCIAL

## 2021-08-13 DIAGNOSIS — N80.9 ENDOMETRIOSIS: ICD-10-CM

## 2021-08-13 PROCEDURE — 76830 TRANSVAGINAL US NON-OB: CPT

## 2021-08-13 PROCEDURE — 76856 US EXAM PELVIC COMPLETE: CPT

## 2021-08-16 NOTE — TELEPHONE ENCOUNTER
Mri order placed, jonna ins requires no precert for mri, pt has decided to wait until she is done doing some pelvic floor therapy, this was ordered by her gi dr

## 2021-08-17 ENCOUNTER — SOCIAL WORK (OUTPATIENT)
Dept: BEHAVIORAL/MENTAL HEALTH CLINIC | Facility: CLINIC | Age: 21
End: 2021-08-17
Payer: COMMERCIAL

## 2021-08-17 ENCOUNTER — TELEPHONE (OUTPATIENT)
Dept: NEUROLOGY | Facility: CLINIC | Age: 21
End: 2021-08-17

## 2021-08-17 DIAGNOSIS — F41.1 GAD (GENERALIZED ANXIETY DISORDER): ICD-10-CM

## 2021-08-17 PROCEDURE — 90834 PSYTX W PT 45 MINUTES: CPT | Performed by: SOCIAL WORKER

## 2021-08-17 NOTE — TELEPHONE ENCOUNTER
Called and left a message for patient with mom - Please call back to confirm upcoming appointment with Willy Tilley  Provided patient with apt date, time and location  Informed patient that check in is at least 15 minutes prior to apt time

## 2021-08-17 NOTE — TELEPHONE ENCOUNTER
Patient confirmed appointment if she get in sooner to [facility] she will call back and cancel

## 2021-08-17 NOTE — PSYCH
Psychotherapy Provided: Individual Psychotherapy 50 minutes      Length of time in session: 50 minutes, follow up in 2 week     Goals addressed in session: Goal 1      Pain: 3    Current suicide risk : Low      D: Josy Ariela spoke today about her feelings re: her  appt for a transvaginal ultrasound following a visit with  her GI specialist  She stated that the results of both the test and the appt were unremarkable and her headaches continue, as well  A: Iris Cordon was again able to speak openly about her feelings re: the above  She had previously been avoiding thoughts about her mother's upcoming wedding, but can no longer do so as the even is less than two months away and is in direct conflict with her clinical / course schedule for the fall       P:  Upcoming sessions will be used to continue to support Nury with all of the above        [address] [place] [address]: Diagnosis and Treatment Plan explained to Nury, Nury relates understanding diagnosis and is agreeable to Treatment Plan   Yes      This note was not shared with the patient due to this is a psychotherapy note

## 2021-08-18 ENCOUNTER — CLINICAL SUPPORT (OUTPATIENT)
Dept: OBGYN CLINIC | Facility: CLINIC | Age: 21
End: 2021-08-18
Payer: COMMERCIAL

## 2021-08-18 VITALS — SYSTOLIC BLOOD PRESSURE: 117 MMHG | WEIGHT: 179.8 LBS | DIASTOLIC BLOOD PRESSURE: 60 MMHG | BODY MASS INDEX: 28.16 KG/M2

## 2021-08-18 DIAGNOSIS — Z30.42 ENCOUNTER FOR DEPO-PROVERA CONTRACEPTION: ICD-10-CM

## 2021-08-18 PROCEDURE — 96372 THER/PROPH/DIAG INJ SC/IM: CPT | Performed by: STUDENT IN AN ORGANIZED HEALTH CARE EDUCATION/TRAINING PROGRAM

## 2021-08-18 RX ADMIN — MEDROXYPROGESTERONE ACETATE 150 MG: 150 INJECTION, SUSPENSION INTRAMUSCULAR at 15:31

## 2021-08-18 NOTE — PROGRESS NOTES
Pt is here for Depo Provera injection  Her last dose was 5/20/2021  Her annual exam was on 6/19/2021  Urine pregnancy test done: N  Depo given in R Deltoid  Tolerated well  Lot: [id number]  Exp: 8/31/2025  Next dose due: November 3 - November 17, 2021  **pt was supposed to get her Gardasil #2 vaccine today but deferred until after she gets her covid booster vaccine which is on 8/21  **

## 2021-08-19 ENCOUNTER — CLINICAL SUPPORT (OUTPATIENT)
Dept: FAMILY MEDICINE CLINIC | Facility: CLINIC | Age: 21
End: 2021-08-19
Payer: COMMERCIAL

## 2021-08-19 DIAGNOSIS — Z23 NEED FOR HEPATITIS B BOOSTER VACCINATION: Primary | ICD-10-CM

## 2021-08-19 PROCEDURE — 90471 IMMUNIZATION ADMIN: CPT

## 2021-08-19 PROCEDURE — 90746 HEPB VACCINE 3 DOSE ADULT IM: CPT

## 2021-08-23 ENCOUNTER — TELEPHONE (OUTPATIENT)
Dept: NEUROLOGY | Facility: CLINIC | Age: 21
End: 2021-08-23

## 2021-08-23 NOTE — TELEPHONE ENCOUNTER
Patient canceled through My Chart  Lm for patient to call the office if she would like to reschedule her appointment

## 2021-08-24 ENCOUNTER — APPOINTMENT (OUTPATIENT)
Dept: PHYSICAL THERAPY | Facility: CLINIC | Age: 21
End: 2021-08-24
Payer: COMMERCIAL

## 2021-08-30 ENCOUNTER — SOCIAL WORK (OUTPATIENT)
Dept: BEHAVIORAL/MENTAL HEALTH CLINIC | Facility: CLINIC | Age: 21
End: 2021-08-30
Payer: COMMERCIAL

## 2021-08-30 DIAGNOSIS — F41.1 GAD (GENERALIZED ANXIETY DISORDER): ICD-10-CM

## 2021-08-30 PROCEDURE — 90834 PSYTX W PT 45 MINUTES: CPT | Performed by: SOCIAL WORKER

## 2021-08-31 ENCOUNTER — APPOINTMENT (OUTPATIENT)
Dept: PHYSICAL THERAPY | Facility: CLINIC | Age: 21
End: 2021-08-31
Payer: COMMERCIAL

## 2021-08-31 ENCOUNTER — EVALUATION (OUTPATIENT)
Dept: PHYSICAL THERAPY | Facility: CLINIC | Age: 21
End: 2021-08-31
Payer: COMMERCIAL

## 2021-08-31 DIAGNOSIS — K59.04 CHRONIC IDIOPATHIC CONSTIPATION: ICD-10-CM

## 2021-08-31 PROCEDURE — 97163 PT EVAL HIGH COMPLEX 45 MIN: CPT | Performed by: PHYSICAL THERAPIST

## 2021-08-31 PROCEDURE — 97112 NEUROMUSCULAR REEDUCATION: CPT | Performed by: PHYSICAL THERAPIST

## 2021-08-31 NOTE — PROGRESS NOTES
Physical Therapy Initial Evaluation    Today's date: 2021  Patient name: Servando Benedict  : 2000  MRN: [id number]  Referring provider: Bill Flynn MD  Dx:   Encounter Diagnosis     ICD-10-CM    1  Chronic idiopathic constipation  K59 04 Ambulatory referral to Physical Therapy                  Assessment  Impairments: activity intolerance, impaired physical strength, pain with function, poor posture  and poor body mechanics    Goals  (up 6 weeks)  1  Independent and safe with HEP  2  Independent and safe with self-postural correction/body mechanics and activation of PFM  3  Independent and safe with abdominal quieting techniques to decreased discomfort with dynamic ADL's   4  B LE MMT / t/o to max standing ADL's  Plan  Patient would benefit from: skilled physical therapy  Planned modality interventions: biofeedback  Planned therapy interventions: manual therapy, neuromuscular re-education, patient education, postural training, strengthening, stretching, therapeutic activities, therapeutic exercise, therapeutic training, home exercise program, graded exercise, graded activity, functional ROM exercises, flexibility, breathing training, body mechanics training, behavior modification, activity modification and abdominal trunk stabilization  Frequency: 1x week  Duration in weeks: 6  Treatment plan discussed with: patient        PT Pelvic Floor Subjective:   History of Present Illness:   Pt is 21 y/o female with Hx of abdominal and pelvic pain  As per pt's PMHx: (+) POTS, (+) endometriosis, (+) gluten intolerance, (+) constipation, (+) Celiac dis  Onset: (+) constipation all her life (premarure birth 1 10 lbs), but recently in the past year pt's symptoms are getting worse  Current functional limitations: (+) abdominal pain and pressure with various positions (ie  prolong sitting), BM 1-2x per week (some improvement), pain with transvaginal US testing verbalized      Date of onset: 2021 Recurrent probem    Quality of life: good    Social Support:     Lives in:  Multiple-level home    Lives with:  Parents    Relationship status: never     Work status: unemployed (student)    Life stress level: 8    Life stress severity: severe    History of Depression: no  Hand dominance:  Right  Diet and Exercise:    Diet:balanced nutrition and gluten free    Exercise type: combination activity, strengthening exercise program and walking    Exercise frequency: 3-4 times per week  OB/ gyn History    Gestational History:     Prior Pregnancy: No      Menstrual History:      Menstrual irregularities irregular menses (no period fo 2 years)    Difficulty managing menstrual pain: Hx of painful periods and heavy bleeding  Tolerates tampons: no by choice    Birth control method: birth control pills  Bladder Function:     Voiding Difficulties positive for: frequent urination, straining and incomplete emptying      Voiding Difficulties comments:     Voiding frequency: every 31-60 minutes    Urinary leakage: no urine leakage    Nocturia (episodes per night): 2 and 3    Painful urination: No      Fluid Intake Type: Water, sports drinks and coffee    Intake (ounces): Water intake (oz): 90 oz  Coffee intake (oz): 3 cups per day  Intake (ounces) comment: (+) Gatorade Zero (3x 32 oz per day)  Incontinence Management:     Pads/Diaper Use:  None  Bowel Function:     Voiding DIfficulties: stool frequency abnormal, painful defecating, unfinished feeling after defecating and constipation      Bowel frequency: every 3 days    [place] Stool Scale: type 1 and type 2    Stool softener use: stool softeners    Enema use: enema    Uses "squatty potty": Squatty Potty  Sexual Function:     Sexually Active:  Not sexually active  Pain:     Current pain ratin    At best pain rating:  3    At worst pain rating:  10    Pain location: lower abdominal region and LB region      Onset:  More than 2 years ago    Quality:  Cramping, pressure and knife-like    Aggravating factors: Bowel movements, eating certain foods and prolonged positions    Duration of symptoms:  More than 1 day    Relieving factors:  Change in position, medications and heat (diet/fluid intake/exercise)    Progression:  Improved  Diagnostic Tests:     Ultrasound: normal    Colonoscopy: normal  Treatments:     Previous treatment:  Medication (diet changes)    Current treatment: medication      Current treatment comment:  Diet changes  Patient Goals:     Patient goals for therapy:  Improved quality of life, decreased pain, fully empty bladder or bowels, improved bladder or bowel function, improved comfort and improved pain management      Objective     Static Posture   General Observations  Symmetrical weight bearing  Head  Forward  Shoulders  Rounded  Thoracic Spine  Flattened thoracic spine  Lumbar Spine   Flattened and decreased lordosis  Postural Observations  Seated posture: fair  Standing posture: fair  Correction of posture: has no consistent effect    Additional Postural Observation Details  L-roll use recomm      Active Range of Motion     Lumbar   Normal active range of motion    Passive Range of Motion     Lumbar   Normal passive range of motion    Strength/Myotome Testing     Left Hip   Planes of Motion   Flexion: 5  Extension: 5  Abduction: 5  Adduction: 4  External rotation: 5  Internal rotation: 4    Right Hip   Planes of Motion   Flexion: 5  Extension: 5  Abduction: 5  Adduction: 4  External rotation: 5  Internal rotation: 4    Left Knee   Flexion: 5  Extension: 5    Right Knee   Flexion: 5  Extension: 5    Left Ankle/Foot   Dorsiflexion: 5  Plantar flexion: 5    Right Ankle/Foot   Dorsiflexion: 5  Plantar flexion: 5  Pelvic Floor Exam   Position: supine exam    Diastatis   Diastasis recti present? no  Connective tissue integrity at linea alba: firm  tenderness at linea alba  unable to engage transverse abdominis     Skin inspection:   scars present  Additional skin inspection details: (+) low abdominal skin stretch marks, symmetrical (? origin)    General Perineum Exam:     General perineum exam comments: TBA    Visual Inspection of Perineum:   PFM Contraction Comments: TBA    SMEG Biofeedback   to be assessed next treatment               Precautions: not any given      Manuals 8/31            Abdominal myofascial tech/TPR next            "I/L/U" abdominal massage next            B LE AD's stretch next                         Neuro Re-Ed                          [facility] PT/edu/benefits/purpose/anatomy 10'                                                                             Ther Ex             Quick flick w visual feedback             Prolong hold w visual feedback                                                                                           Ther Activity             HEP edu/review             Posture/body [facility]               Gait Training                                       Modalities             MH

## 2021-09-04 ENCOUNTER — DOCUMENTATION (OUTPATIENT)
Dept: NEUROLOGY | Facility: CLINIC | Age: 21
End: 2021-09-04

## 2021-09-04 DIAGNOSIS — G43.009 MIGRAINE WITHOUT AURA AND WITHOUT STATUS MIGRAINOSUS, NOT INTRACTABLE: Primary | ICD-10-CM

## 2021-09-07 ENCOUNTER — OFFICE VISIT (OUTPATIENT)
Dept: PHYSICAL THERAPY | Facility: CLINIC | Age: 21
End: 2021-09-07
Payer: COMMERCIAL

## 2021-09-07 DIAGNOSIS — K59.04 CHRONIC IDIOPATHIC CONSTIPATION: Primary | ICD-10-CM

## 2021-09-07 PROCEDURE — 97140 MANUAL THERAPY 1/> REGIONS: CPT | Performed by: PHYSICAL THERAPIST

## 2021-09-07 PROCEDURE — 97110 THERAPEUTIC EXERCISES: CPT | Performed by: PHYSICAL THERAPIST

## 2021-09-07 PROCEDURE — 97530 THERAPEUTIC ACTIVITIES: CPT | Performed by: PHYSICAL THERAPIST

## 2021-09-07 NOTE — PROGRESS NOTES
Daily Note     Today's date: 2021  Patient name: Rita Kussmaul  : 2000  MRN: [id number]  Referring provider: Clarisse Sawyer MD  Dx:   Encounter Diagnosis     ICD-10-CM    1  Chronic idiopathic constipation  K59 04                   Subjective: Pt verbalized not having BM for about a week and she feels very uncomfortable  Objective: See treatment diary below      Assessment: Tolerated treatment well  Patient presents with (+) weakness in PFM (pt agreed to anal PFM MMT to fully assess PFM strength)  Pt is bearing down with PFM contractions (VC's/TC's needed for correction) and education on correct abdominal breathing needed with therex  HEP was given and reviewed  Plan: Continue per plan of care  Progress treatment as tolerated  Precautions: not any given      Manuals            Abdominal myofascial tech/TPR next            "I/L/U" abdominal massage next            B LE AD's stretch next            PFM MMT/anal  1+/5           Neuro Re-Ed                          [facility] PT/edu/benefits/purpose/anatomy 10'                                                                             Ther Ex             Quick flick w visual feedback  10x2"           Prolong hold w visual feedback  10x5"           PF/TA  10x3"           PF/TA/AD's t-ball use  10x3"                                     Abdominal quieting/breathing tech  10x3" w VC's/TC's                        Ther Activity             HEP edu/review  5'           Posture/body mech    5'           Gait Training                                       Modalities

## 2021-09-07 NOTE — PSYCH
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, her mom's impending wedding, and her disagreement with a friend  A: Al Mercado was again able to speak openly about her feelings re: the above  She had previously been avoiding thoughts about her mother's upcoming wedding, but can no longer do so as the even is less than two months away and is in direct conflict with her 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     INES (generalized anxiety disorder)

## 2021-09-08 ENCOUNTER — CLINICAL SUPPORT (OUTPATIENT)
Dept: OBGYN CLINIC | Facility: CLINIC | Age: 21
End: 2021-09-08
Payer: COMMERCIAL

## 2021-09-08 DIAGNOSIS — Z23 NEED FOR HPV VACCINATION: Primary | ICD-10-CM

## 2021-09-08 PROCEDURE — 90651 9VHPV VACCINE 2/3 DOSE IM: CPT | Performed by: STUDENT IN AN ORGANIZED HEALTH CARE EDUCATION/TRAINING PROGRAM

## 2021-09-08 PROCEDURE — 90471 IMMUNIZATION ADMIN: CPT | Performed by: STUDENT IN AN ORGANIZED HEALTH CARE EDUCATION/TRAINING PROGRAM

## 2021-09-09 ENCOUNTER — TELEPHONE (OUTPATIENT)
Dept: DERMATOLOGY | Age: 21
End: 2021-09-09

## 2021-09-09 RX ORDER — BUTALBITAL, ACETAMINOPHEN AND CAFFEINE 50; 325; 40 MG/1; MG/1; MG/1
1 TABLET ORAL EVERY 4 HOURS PRN
Qty: 30 TABLET | Refills: 0 | Status: SHIPPED | OUTPATIENT
Start: 2021-09-09 | End: 2022-05-23

## 2021-09-09 NOTE — TELEPHONE ENCOUNTER
Call pt to reschedule apt that was made with Dr Ankit Gonzalez 9/14 @4:30 lvm to call us back  Alfonso Stovall

## 2021-09-13 ENCOUNTER — SOCIAL WORK (OUTPATIENT)
Dept: BEHAVIORAL/MENTAL HEALTH CLINIC | Facility: CLINIC | Age: 21
End: 2021-09-13
Payer: COMMERCIAL

## 2021-09-13 DIAGNOSIS — F41.1 GAD (GENERALIZED ANXIETY DISORDER): ICD-10-CM

## 2021-09-13 PROCEDURE — 90834 PSYTX W PT 45 MINUTES: CPT | Performed by: SOCIAL WORKER

## 2021-09-13 NOTE — PSYCH
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  She stated that she does not have any energy to be concerned with the details of her mother's upcoming wedding  A: [address] [facility] was again able to speak openly about her feelings re: the above  She also shared details about her pelvic floor therapy and the amount of work that will be required for her to heal       P:  Upcoming sessions will be used to continue to support Nury with all of the above  [address] [place]: Diagnosis and Treatment Plan explained to Nury, Nury relates understanding diagnosis and is agreeable to Treatment Plan  Yes      This note was not shared with the patient due to this is a psychotherapy note    Encounter Diagnoses   Name Primary?     INES (generalized anxiety disorder)

## 2021-09-15 ENCOUNTER — OFFICE VISIT (OUTPATIENT)
Dept: PHYSICAL THERAPY | Facility: CLINIC | Age: 21
End: 2021-09-15
Payer: COMMERCIAL

## 2021-09-15 DIAGNOSIS — K59.04 CHRONIC IDIOPATHIC CONSTIPATION: Primary | ICD-10-CM

## 2021-09-15 PROCEDURE — 97110 THERAPEUTIC EXERCISES: CPT | Performed by: PHYSICAL THERAPIST

## 2021-09-15 PROCEDURE — 97140 MANUAL THERAPY 1/> REGIONS: CPT | Performed by: PHYSICAL THERAPIST

## 2021-09-15 PROCEDURE — 97530 THERAPEUTIC ACTIVITIES: CPT | Performed by: PHYSICAL THERAPIST

## 2021-09-15 NOTE — PROGRESS NOTES
Daily Note     Today's date: 9/15/2021  Patient name: Stanislaw Arias  : 2000  MRN: [id number]  Referring provider: Matilde Limon MD  Dx:   Encounter Diagnosis     ICD-10-CM    1  Chronic idiopathic constipation  K59 04                   Subjective: "I was able to have bowel movement more often after performing my home exercises "      Objective: See treatment diary below      Assessment: Tolerated treatment well  Patient c/o some discomfort on L side of abdominal region with palpation and manual tx  But not worsening of symptoms upon leaving [facility] PT  Pt's HEP was updated and reviewed  Plan: Continue per plan of care  Progress treatment as tolerated  Precautions: not any given      Manuals 8/31 9/7 9/15          Abdominal myofascial tech/TPR next  SZ          "I/L/U" abdominal massage next  SZ          B LE AD's stretch next  next          PFM MMT/anal  1+/5           Neuro Re-Ed                          [facility] PT/edu/benefits/purpose/anatomy 10'                                                                             Ther Ex             Quick flick w visual feedback  10x2"           Prolong hold w visual feedback  10x5"           PF/TA  10x3" 10x3"          PF/TA/AD's t-ball use  10x3" 10x3"          PT/TA/AB's t-band use   10x3"          Bridging/PF/TA   10x3"          Abdominal quieting/breathing tech  10x3" w VC's/TC's 10x3" w VC's                       Ther Activity             HEP edu/review  5' 5'          Posture/body mech  5'           "I/L/U" self home massage edu/review   5'          Gait Training                                       Modalities             MH   10' w ex

## 2021-09-19 ENCOUNTER — OFFICE VISIT (OUTPATIENT)
Dept: URGENT CARE | Facility: CLINIC | Age: 21
End: 2021-09-19
Payer: COMMERCIAL

## 2021-09-19 VITALS
RESPIRATION RATE: 16 BRPM | HEART RATE: 84 BPM | DIASTOLIC BLOOD PRESSURE: 78 MMHG | TEMPERATURE: 96.3 F | BODY MASS INDEX: 28.6 KG/M2 | HEIGHT: 67 IN | OXYGEN SATURATION: 99 % | WEIGHT: 182.2 LBS | SYSTOLIC BLOOD PRESSURE: 112 MMHG

## 2021-09-19 DIAGNOSIS — G43.809 OTHER MIGRAINE WITHOUT STATUS MIGRAINOSUS, NOT INTRACTABLE: Primary | ICD-10-CM

## 2021-09-19 PROCEDURE — G0382 LEV 3 HOSP TYPE B ED VISIT: HCPCS | Performed by: PREVENTIVE MEDICINE

## 2021-09-19 RX ORDER — KETOROLAC TROMETHAMINE 30 MG/ML
60 INJECTION, SOLUTION INTRAMUSCULAR; INTRAVENOUS ONCE
Status: COMPLETED | OUTPATIENT
Start: 2021-09-19 | End: 2021-09-19

## 2021-09-19 RX ADMIN — KETOROLAC TROMETHAMINE 60 MG: 30 INJECTION, SOLUTION INTRAMUSCULAR; INTRAVENOUS at 13:34

## 2021-09-19 NOTE — PROGRESS NOTES
[facility]        NAME: Erla Canavan is a 22 y o  female  : 2000    MRN: [id number]  DATE: 2021  TIME: 1:31 PM    Assessment and Plan   Other migraine without status migrainosus, not intractable [G43 809]  1  Other migraine without status migrainosus, not intractable  ketorolac (TORADOL) injection 60 mg         Patient Instructions       Follow up with PCP in 3-5 days  Proceed to  ER if symptoms worsen  Chief Complaint     Chief Complaint   Patient presents with    Migraine     Onset 2-3 months ongoing headache and has increased since thursday without relief         History of Present Illness        History of migraine headaches sometimes intractable  Her neurologist called me today and asked if we could give her 60 mg of Toradol  Review of Systems   Review of Systems   Neurological: Positive for headaches           Current Medications       Current Outpatient Medications:     acetaminophen (TYLENOL) 500 mg tablet, Take 1,000 mg by mouth every 4 (four) hours as needed , Disp: , Rfl:     ALPRAZolam (XANAX) 0 25 mg tablet, Take 0 25 mg by mouth 2 (two) times a day as needed , Disp: , Rfl:     ASMANEX  MCG/ACT AERO, Inhale 400 mcg every 4 (four) hours as needed (2 puffs) , Disp: , Rfl:     butalbital-acetaminophen-caffeine (FIORICET,ESGIC) -40 mg per tablet, Take 1 tablet by mouth every 4 (four) hours as needed for headaches, Disp: 30 tablet, Rfl: 0    cetirizine (ZyrTEC) 10 mg tablet, Take 10 mg by mouth daily, Disp: , Rfl:     clindamycin (CLEOCIN T) 1 % lotion, Apply topically daily To face, Disp: 60 mL, Rfl: 0    cyproheptadine (PERIACTIN) 4 mg tablet, Take 4 mg by mouth daily at bedtime , Disp: , Rfl:     dexamethasone (DECADRON) 2 mg tablet, Take 2 tablets (4 mg total) by mouth 2 (two) times a day as needed (migraine), Disp: 30 tablet, Rfl: 1    Elastic Bandages & Supports (TRUFORM STOCKINGS 20-30MMHG) MISC, Use as directed , Disp: , Rfl:    fludrocortisone (FLORINEF) 0 1 mg tablet, Take 0 1 mg by mouth daily Taken 1 tablet in the Morning 1 tablet at Night, Disp: , Rfl:     gabapentin (NEURONTIN) 100 mg capsule, Take 1 capsule (100 mg total) by mouth daily, Disp: 30 capsule, Rfl: 2    linaCLOtide (Linzess) 72 MCG CAPS, Take 1 capsule by mouth daily before breakfast, Disp: 90 capsule, Rfl: 3    linaCLOtide (Linzess) 72 MCG CAPS, Take 1 capsule by mouth daily before breakfast, Disp: 12 capsule, Rfl: 0    medroxyPROGESTERone acetate (DEPO-PROVERA SYRINGE) 150 mg/mL injection, Inject 1 mL (150 mg total) into a muscle every 3 (three) months Every three months, Disp: 1 mL, Rfl: 3    metoclopramide (REGLAN) 10 mg tablet, Take 1 tablet (10 mg total) by mouth every 6 (six) hours (Patient taking differently: Take 10 mg by mouth every 6 (six) hours as needed ), Disp: 30 tablet, Rfl: 0    metoprolol succinate (TOPROL-XL) 25 mg 24 hr tablet, Take 50 mg by mouth every evening , Disp: , Rfl:     OLANZapine (ZyPREXA) 5 mg tablet, Take 5 mg by mouth, Disp: , Rfl:     Omega-3 Fatty Acids (FISH OIL) 1,000 mg, Take 1,000 mg by mouth 2 (two) times a day , Disp: , Rfl:     scopolamine (TRANSDERM-SCOP) 1 5 mg/3 days TD 72 hr patch, Place 1 patch on the skin every third day, Disp: 10 patch, Rfl: 0    tretinoin (RETIN-A) 0 025 % cream, Apply topically daily at bedtime, Disp: 45 g, Rfl: 2    Ubrogepant 50 MG TABS, 1 tab at the onset of migraine headache may repeat in 2 hours if needed max 200 mg per day, Disp: 12 tablet, Rfl: 1    venlafaxine (EFFEXOR-XR) 37 5 mg 24 hr capsule, Take 1 capsule (37 5 mg total) by mouth daily Take with one 75 mg capsule  Total daily dose is 112 5 mg, Disp: 90 capsule, Rfl: 0    venlafaxine (EFFEXOR-XR) 75 mg 24 hr capsule, Take 1 capsule (75 mg total) by mouth daily Take with one 37 5 mg capsule   Total daily dose is 112 5 mg, Disp: 90 capsule, Rfl: 0    XOPENEX HFA 45 MCG/ACT inhaler, Inhale 1-2 puffs every 4 (four) hours as needed , Disp: , Rfl:     naproxen (EC NAPROSYN) 500 MG EC tablet, Take 1 tablet (500 mg total) by mouth 2 (two) times a day with meals (Patient not taking: Reported on 8/10/2021), Disp: 60 tablet, Rfl: 0    Current Facility-Administered Medications:     albuterol inhalation solution 2 5 mg, 2 5 mg, Nebulization, Q6H PRN, Bettie Collier PA-C    ketorolac (TORADOL) injection 60 mg, 60 mg, Intramuscular, Once, Kevin Coppola MD    medroxyPROGESTERone acetate (DEPO-PROVERA SYRINGE) IM injection 150 mg, 150 mg, Intramuscular, Q3 Months, KETAN Chow, 150 mg at 21 1531    Current Allergies     Allergies as of 2021 - Reviewed 2021   Allergen Reaction Noted    Nuts - food allergy Other (See Comments) 2020    Other Hives 2020    Pollen extract  09/10/2014            The following portions of the patient's history were reviewed and updated as appropriate: allergies, current medications, past family history, past medical history, past social history, past surgical history and problem list      Past Medical History:   Diagnosis Date    Anxiety     Asthma     Dizziness     at times    GERD (gastroesophageal reflux disease)     Hammertoe of left foot     Headache     occ    Hyperlipemia     Hypermobile joints     Irritable bowel syndrome     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HCC)     Mast cell disorder     Migraine     PONV (postoperative nausea and vomiting)     POTS (postural orthostatic tachycardia syndrome)      infant     Wears glasses        Past Surgical History:   Procedure Laterality Date    COLONOSCOPY      EGD AND COLONOSCOPY N/A 1/10/2017    Procedure: EGD AND COLONOSCOPY;  Surgeon: Tony Kenny MD;  Location: [facility];   Service:     ESOPHAGOGASTRODUODENOSCOPY      with biopsy    FOOT SURGERY      Excision of lesion feet benign    CA REPAIR OF HAMMERTOE,ONE Left 2019    Procedure: 2nd arthrodesis; 5th arthroplasty, flexor tenotomy 2,3,4,5 ;  Surgeon: Kalie Gaona DPM;  Location: [facility];  Service: Podiatry    NH REPAIR OF Nichole Shutters Left 6/5/2020    Procedure: 2ND TOE Revision hammertoe with broken implant;  Surgeon: Kalie Gaona DPM;  Location[facility];  Service: Podiatry    UPPER GASTROINTESTINAL ENDOSCOPY  01/28/2021    WISDOM TOOTH EXTRACTION         Family History   Problem Relation Age of Onset    Gestational diabetes Mother     Migraines Mother     Anxiety disorder Father     Hyperlipidemia Father     Autism Brother     Diabetes Other     Uterine cancer Maternal Grandmother     Rheumatic fever Maternal Grandmother     Diabetes Maternal Grandfather     Hypertension Maternal Grandfather     Heart attack Maternal Grandfather     Stroke Maternal Grandfather     Hyperlipidemia Maternal Grandfather     Hypertension Paternal Grandmother     Anxiety disorder Paternal Grandmother     Hypertension Paternal Grandfather          Medications have been verified  Objective   /78   Pulse 84   Temp (!) 96 3 °F (35 7 °C) (Tympanic)   Resp 16   Ht 5' 7" (1 702 m)   Wt 82 6 kg (182 lb 3 2 oz)   SpO2 99%   BMI 28 54 kg/m²   No LMP recorded  Patient has had an injection  Physical Exam     Physical Exam  Nursing note reviewed  Constitutional:       General: She is not in acute distress  Appearance: She is not ill-appearing, toxic-appearing or diaphoretic  Neurological:      Mental Status: She is alert

## 2021-09-20 ENCOUNTER — SOCIAL WORK (OUTPATIENT)
Dept: BEHAVIORAL/MENTAL HEALTH CLINIC | Facility: CLINIC | Age: 21
End: 2021-09-20
Payer: COMMERCIAL

## 2021-09-20 DIAGNOSIS — F41.1 GAD (GENERALIZED ANXIETY DISORDER): ICD-10-CM

## 2021-09-20 PROCEDURE — 90834 PSYTX W PT 45 MINUTES: CPT | Performed by: SOCIAL WORKER

## 2021-09-20 NOTE — PSYCH
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  She stated that she, over the past week, has considered disenrolling, as a result of the above  A: Eliot Austin is frustrated by her mother's belief that her migraine's are anxiety-related  She is also concerned about the feedback she has received from her physical therapist re: her colon and the possibility that she could need surgery to repair her bowel       P:  Upcoming sessions will be used to continue to support Nury with all of the above  [address] [place]: Diagnosis and Treatment Plan explained to Nury, Nury relates understanding diagnosis and is agreeable to Treatment Plan  Yes      This note was not shared with the patient due to this is a psychotherapy note    Encounter Diagnoses   Name Primary?     INES (generalized anxiety disorder)

## 2021-09-22 ENCOUNTER — OFFICE VISIT (OUTPATIENT)
Dept: PHYSICAL THERAPY | Facility: CLINIC | Age: 21
End: 2021-09-22
Payer: COMMERCIAL

## 2021-09-22 DIAGNOSIS — K59.04 CHRONIC IDIOPATHIC CONSTIPATION: Primary | ICD-10-CM

## 2021-09-22 PROCEDURE — 97140 MANUAL THERAPY 1/> REGIONS: CPT | Performed by: PHYSICAL THERAPIST

## 2021-09-22 PROCEDURE — 97110 THERAPEUTIC EXERCISES: CPT | Performed by: PHYSICAL THERAPIST

## 2021-09-22 PROCEDURE — 97112 NEUROMUSCULAR REEDUCATION: CPT | Performed by: PHYSICAL THERAPIST

## 2021-09-22 NOTE — PROGRESS NOTES
Daily Note     Today's date: 2021  Patient name: Christine Varela  : 2000  MRN: [id number]  Referring provider: Tanisha Boateng MD  Dx:   Encounter Diagnosis     ICD-10-CM    1  Chronic idiopathic constipation  K59 04                   Subjective: "I am some issues with BM this week and because of that I feel more my left side of abdominals  The self massage was little uncomfortable because of that, so I did it only every other day "      Objective: See treatment diary below      Assessment: Tolerated treatment well  Patient was focusing during today's session on abdominal quieting/breathing techniques and PFM/abdnominal stretching  No pain worsening verbalized post tx  HEP was updated and reviewed  Plan: Continue PT as per POC  Precautions: not any given      Manuals 8/31 9/7 9/15 9/22         Abdominal myofascial tech/TPR next  SZ          "I/L/U" abdominal massage next  SZ          B LE AD's stretch next  next SZ         PFM MMT/anal  1+/5           Neuro Re-Ed                          [facility] PT/edu/benefits/purpose/anatomy 10'                         Rec bike/posture/PF/L-roll use    10'                                                Ther Ex             Quick flick w visual feedback  10x2"           Prolong hold w visual feedback  10x5"           PF/TA  10x3" 10x3"          PF/TA/AD's t-ball use  10x3" 10x3"          PT/TA/AB's t-band use   10x3"          Bridging/PF/TA   10x3" 5x3"         Bridging w SLR/PF/TA    10x3"                                   Abdominal quieting/breathing tech  10x3" w VC's/TC's 10x3" w VC's 10x3"         Happy baby PF stretch    5x30"         Child pose stretch    5x15"         Butterfly groin stretch    5x15"         SHANTELLE/REIL stretch w abd breathing    10x                                                Ther Activity             HEP edu/review  5' 5' 5'         Posture/body mech    5'           "I/L/U" self home massage edu/review   5'          Gait Training Modalities             MH   10' w ex  10' w ex

## 2021-09-25 ENCOUNTER — IMMUNIZATIONS (OUTPATIENT)
Dept: FAMILY MEDICINE CLINIC | Facility: HOSPITAL | Age: 21
End: 2021-09-25

## 2021-09-25 DIAGNOSIS — Z23 ENCOUNTER FOR IMMUNIZATION: Primary | ICD-10-CM

## 2021-09-25 PROCEDURE — 0001A SARS-COV-2 / COVID-19 MRNA VACCINE (PFIZER-BIONTECH) 30 MCG: CPT

## 2021-09-25 PROCEDURE — 91300 SARS-COV-2 / COVID-19 MRNA VACCINE (PFIZER-BIONTECH) 30 MCG: CPT

## 2021-09-27 ENCOUNTER — SOCIAL WORK (OUTPATIENT)
Dept: BEHAVIORAL/MENTAL HEALTH CLINIC | Facility: CLINIC | Age: 21
End: 2021-09-27
Payer: COMMERCIAL

## 2021-09-27 DIAGNOSIS — F41.1 GAD (GENERALIZED ANXIETY DISORDER): ICD-10-CM

## 2021-09-27 PROCEDURE — 90834 PSYTX W PT 45 MINUTES: CPT | Performed by: SOCIAL WORKER

## 2021-09-29 ENCOUNTER — OFFICE VISIT (OUTPATIENT)
Dept: PHYSICAL THERAPY | Facility: CLINIC | Age: 21
End: 2021-09-29
Payer: COMMERCIAL

## 2021-09-29 ENCOUNTER — OFFICE VISIT (OUTPATIENT)
Dept: NEUROLOGY | Facility: CLINIC | Age: 21
End: 2021-09-29

## 2021-09-29 VITALS
WEIGHT: 176 LBS | DIASTOLIC BLOOD PRESSURE: 84 MMHG | HEIGHT: 67 IN | BODY MASS INDEX: 27.62 KG/M2 | HEART RATE: 101 BPM | TEMPERATURE: 98.1 F | SYSTOLIC BLOOD PRESSURE: 110 MMHG | RESPIRATION RATE: 16 BRPM

## 2021-09-29 DIAGNOSIS — K59.04 CHRONIC IDIOPATHIC CONSTIPATION: Primary | ICD-10-CM

## 2021-09-29 DIAGNOSIS — G43.009 MIGRAINE WITHOUT AURA AND WITHOUT STATUS MIGRAINOSUS, NOT INTRACTABLE: Primary | ICD-10-CM

## 2021-09-29 PROCEDURE — 97110 THERAPEUTIC EXERCISES: CPT | Performed by: PHYSICAL THERAPIST

## 2021-09-29 PROCEDURE — 97140 MANUAL THERAPY 1/> REGIONS: CPT | Performed by: PHYSICAL THERAPIST

## 2021-09-29 PROCEDURE — 97112 NEUROMUSCULAR REEDUCATION: CPT | Performed by: PHYSICAL THERAPIST

## 2021-09-29 NOTE — PROGRESS NOTES
Assessment/Plan:    Note was constructed from contemporaneous notes taken at the time of the visit  Refractory common migraine headaches with episodic status migrainosus  At this time I reviewed with the patient lifestyle modifications and discussed potential medication alternatives  I am going to be referring the patient to our headache specialist in our department for further management  Subjective:      Patient ID: Sheri De Leon is a 22 y o  female  HPI the patient has had many years of severe episodic headache  When she gets a headache she becomes very photophobic and somewhat phobic with nausea and vomiting  She has been trialed on multiple medications and seems to have breakthroughs with typical stressors such as sleep disruption and intense school activities  She is currently a nursing student  Family history is significant in the patient's mother for migraine headaches  There have been no focal or lateralizing symptoms or signs  Review of Systems   Constitutional: Negative  HENT: Negative  Eyes: Negative  Respiratory: Negative  Cardiovascular: Negative  Gastrointestinal: Positive for constipation  Endocrine: Negative  Genitourinary: Negative  Musculoskeletal: Negative  Skin: Negative  Allergic/Immunologic: Negative  Neurological: Positive for headaches (migraines are becoming more frequent and headaches have been daily)  Negative for dizziness, tremors, seizures, speech difficulty, weakness, light-headedness and numbness  Hematological: Negative  Psychiatric/Behavioral: Negative  Objective: Patient is fully awake and alert  She walks without limp byron antalgic quality  Cranial nerves are fully intact  Funduscopic examination is totally normal   Motor strength in the upper and lower extremities is full throughout with normal tone and bulk    Crude and discriminatory modalities of sensation are normal   Reflexes are full "throughout        /84 (BP Location: Right arm, Patient Position: Sitting, Cuff Size: Standard)   Pulse 101   Temp 98 1 °F (36 7 °C) (Temporal)   Resp 16   Ht 5' 7\" (1 702 m)   Wt 79 8 kg (176 lb)   BMI 27 57 kg/m²          Physical Exam    "

## 2021-09-29 NOTE — PROGRESS NOTES
Daily Note     Today's date: 2021  Patient name: Erla Canavan  : 2000  MRN: [id number]  Referring provider: Boo Garcia MD  Dx:   Encounter Diagnosis     ICD-10-CM    1  Chronic idiopathic constipation  K59 04                   Subjective: "I just had a BM last night after week and half with use of enema  I was really uncomfortable  I got part of it out, but not completely " today's discomfort 5/10 in abdominal and anal region  Objective: See treatment diary below      Assessment: Tolerated treatment well  Patient exhibited good technique with therapeutic exercises and would benefit from continued PT for abdominal manual tx, various stretching tech and PFM strengthening  Plan: Continue per plan of care  PFM MMT re-assessment next session       Precautions: not any given      Manuals 8/31 9/7 9/15 9/22 9/29        Abdominal myofascial tech/TPR next  SZ  SZ        "I/L/U" abdominal massage next  SZ  SZ        B LE AD's stretch next  next SZ         PFM MMT/anal  1+/5   next        Neuro Re-Ed                          [facility] PT/edu/benefits/purpose/anatomy 10'                         Rec bike/posture/PF/L-roll use    10'         Elliptical use/trunk rot/PF use     10'                                  Ther Ex             Quick flick w visual feedback  10x2"           Prolong hold w visual feedback  10x5"           PF/TA  10x3" 10x3"  10x3"        PF/TA/AD's t-ball use  10x3" 10x3"          PT/TA/AB's t-band use   10x3"          Bridging/PF/TA   10x3" 5x3" 10x3"        Bridging w SLR/PF/TA    10x3"         LTR stretch     10x15"        U/l RFIL stretch     10x15"        Abdominal quieting/breathing tech  10x3" w VC's/TC's 10x3" w VC's 10x3" 10x3" w TC's        Happy baby PF stretch    5x30"         Child pose stretch    5x15" 5x15"        Butterfly groin stretch    5x15"         SHANTELLE/REIL stretch w abd breathing    10x 10x10"                                               Ther Activity HEP edu/review  5' 5' 5' 5'        Posture/body mech  5'           "I/L/U" self home massage edu/review   5'          Gait Training                                       Modalities             MH   10' w ex  10' w ex

## 2021-09-30 NOTE — TELEPHONE ENCOUNTER
[id number] María Carrillo, in that case, I think the below plan is appropriate  She can take magnesium citrate when she has had no bowel movement for 3 or more days  Can use miralax 2-3 times daily  Continue Trulance and ensure adequate hydration    Thanks
Called and advised of recommendations
Called and made patient aware
Called patient  After my recommendation on Thursday, she took magnesium citrate and states she just felt more bloated afterwards but was unable to have a bowel movement  Over the weekend she ended up doing a saline enema and states she was able to get a "decent amount" of the stool out  She states she still feels there is stool she has to pass and cannot  She is awaiting pre auth for amitiza, but wants to know what we recommend in the meantime   She has failed miralax and dulcolax in the past   I recommended she should continue her trulance until she can start the [place], She verbalized understanding and agreed she will continue taking it, she just wants to know if there is anything else we can recommend for her, please advise
Did she try Linzess 72 micro g daily? If she did and she did not have any significant benefit, we could potentially try the Linzess again at a higher dose  Otherwise, would agree with pursuing the auth for Guadalupe Henderson
I will work on her prior auth with [employer] 
Medication was approved til 4/1/22
Patient called the office to speak with someone about issues with medication  She has taken it for a month, and it worked in the beginning but states it is now no longer working   States she has not had a bm in about a week    Plecanatide (Trulance) 3 MG TABS
Patient is aware and will call pharmacy 
Patient of Dr Vesta Gonzalez, last seen 1/28/21    History of IBS, dyspepsia    Called and spoke with patient  She states she has not had a bowel movement in 7 days now and as a result is uncomfortable and bloated  She recently started trulance ~ 1 month ago  She states for the first week, it gave her diarrhea, the second week she was having normal, regular bowel movements, and the last few weeks she has been having less and less frequent bowel movements  She states that a few days ago she tried to use otc laxatives but has still been unable to go  I recommended she purchase magnesium citrate  I advised this is not a regular medication to use, and is only to be used in cases of severe constipation, I explained I would reach out to provider to figure out long term regimen for her to use  She states she tried linzess but only for 2 weeks from a sample, ultimately we could not get this approved through her insurance despite pre auth  Please advise  Amitiza?
Pt having extreme constipation     # [phone]
Calm

## 2021-10-04 ENCOUNTER — SOCIAL WORK (OUTPATIENT)
Dept: BEHAVIORAL/MENTAL HEALTH CLINIC | Facility: CLINIC | Age: 21
End: 2021-10-04
Payer: COMMERCIAL

## 2021-10-04 DIAGNOSIS — F41.1 GAD (GENERALIZED ANXIETY DISORDER): ICD-10-CM

## 2021-10-04 PROCEDURE — 90834 PSYTX W PT 45 MINUTES: CPT | Performed by: SOCIAL WORKER

## 2021-10-06 ENCOUNTER — ANNUAL EXAM (OUTPATIENT)
Dept: OBGYN CLINIC | Facility: CLINIC | Age: 21
End: 2021-10-06
Payer: COMMERCIAL

## 2021-10-06 ENCOUNTER — OFFICE VISIT (OUTPATIENT)
Dept: PHYSICAL THERAPY | Facility: CLINIC | Age: 21
End: 2021-10-06
Payer: COMMERCIAL

## 2021-10-06 VITALS — BODY MASS INDEX: 28.1 KG/M2 | DIASTOLIC BLOOD PRESSURE: 80 MMHG | SYSTOLIC BLOOD PRESSURE: 112 MMHG | WEIGHT: 179.4 LBS

## 2021-10-06 DIAGNOSIS — K59.04 CHRONIC IDIOPATHIC CONSTIPATION: ICD-10-CM

## 2021-10-06 DIAGNOSIS — K59.04 CHRONIC IDIOPATHIC CONSTIPATION: Primary | ICD-10-CM

## 2021-10-06 DIAGNOSIS — Z01.419 ENCOUNTER FOR GYNECOLOGICAL EXAMINATION (GENERAL) (ROUTINE) WITHOUT ABNORMAL FINDINGS: Primary | ICD-10-CM

## 2021-10-06 PROCEDURE — 97110 THERAPEUTIC EXERCISES: CPT | Performed by: PHYSICAL THERAPIST

## 2021-10-06 PROCEDURE — 99395 PREV VISIT EST AGE 18-39: CPT | Performed by: STUDENT IN AN ORGANIZED HEALTH CARE EDUCATION/TRAINING PROGRAM

## 2021-10-06 PROCEDURE — 97140 MANUAL THERAPY 1/> REGIONS: CPT | Performed by: PHYSICAL THERAPIST

## 2021-10-06 PROCEDURE — 97530 THERAPEUTIC ACTIVITIES: CPT | Performed by: PHYSICAL THERAPIST

## 2021-10-13 ENCOUNTER — OFFICE VISIT (OUTPATIENT)
Dept: PHYSICAL THERAPY | Facility: CLINIC | Age: 21
End: 2021-10-13
Payer: COMMERCIAL

## 2021-10-13 ENCOUNTER — TELEPHONE (OUTPATIENT)
Dept: OBGYN CLINIC | Facility: CLINIC | Age: 21
End: 2021-10-13

## 2021-10-13 DIAGNOSIS — K59.04 CHRONIC IDIOPATHIC CONSTIPATION: Primary | ICD-10-CM

## 2021-10-13 PROCEDURE — 97530 THERAPEUTIC ACTIVITIES: CPT | Performed by: PHYSICAL THERAPIST

## 2021-10-13 PROCEDURE — 97110 THERAPEUTIC EXERCISES: CPT | Performed by: PHYSICAL THERAPIST

## 2021-10-13 PROCEDURE — 97140 MANUAL THERAPY 1/> REGIONS: CPT | Performed by: PHYSICAL THERAPIST

## 2021-10-18 ENCOUNTER — SOCIAL WORK (OUTPATIENT)
Dept: BEHAVIORAL/MENTAL HEALTH CLINIC | Facility: CLINIC | Age: 21
End: 2021-10-18
Payer: COMMERCIAL

## 2021-10-18 DIAGNOSIS — F41.1 GAD (GENERALIZED ANXIETY DISORDER): ICD-10-CM

## 2021-10-18 PROCEDURE — 90834 PSYTX W PT 45 MINUTES: CPT | Performed by: SOCIAL WORKER

## 2021-10-20 ENCOUNTER — TELEPHONE (OUTPATIENT)
Dept: GASTROENTEROLOGY | Facility: CLINIC | Age: 21
End: 2021-10-20

## 2021-10-20 ENCOUNTER — CLINICAL SUPPORT (OUTPATIENT)
Dept: NEUROLOGY | Facility: CLINIC | Age: 21
End: 2021-10-20
Payer: COMMERCIAL

## 2021-10-20 ENCOUNTER — TELEPHONE (OUTPATIENT)
Dept: NEUROLOGY | Facility: CLINIC | Age: 21
End: 2021-10-20

## 2021-10-20 ENCOUNTER — OFFICE VISIT (OUTPATIENT)
Dept: GASTROENTEROLOGY | Facility: CLINIC | Age: 21
End: 2021-10-20

## 2021-10-20 ENCOUNTER — OFFICE VISIT (OUTPATIENT)
Dept: PHYSICAL THERAPY | Facility: CLINIC | Age: 21
End: 2021-10-20
Payer: COMMERCIAL

## 2021-10-20 VITALS
DIASTOLIC BLOOD PRESSURE: 80 MMHG | SYSTOLIC BLOOD PRESSURE: 105 MMHG | HEART RATE: 63 BPM | WEIGHT: 179.6 LBS | HEIGHT: 67 IN | BODY MASS INDEX: 28.19 KG/M2 | TEMPERATURE: 96.6 F

## 2021-10-20 DIAGNOSIS — K59.04 CHRONIC IDIOPATHIC CONSTIPATION: Primary | ICD-10-CM

## 2021-10-20 DIAGNOSIS — K59.00 CONSTIPATION, UNSPECIFIED CONSTIPATION TYPE: Primary | ICD-10-CM

## 2021-10-20 DIAGNOSIS — G43.009 MIGRAINE WITHOUT AURA AND WITHOUT STATUS MIGRAINOSUS, NOT INTRACTABLE: ICD-10-CM

## 2021-10-20 DIAGNOSIS — R10.13 DYSPEPSIA: ICD-10-CM

## 2021-10-20 DIAGNOSIS — F41.1 GAD (GENERALIZED ANXIETY DISORDER): ICD-10-CM

## 2021-10-20 DIAGNOSIS — G43.009 MIGRAINE WITHOUT AURA AND WITHOUT STATUS MIGRAINOSUS, NOT INTRACTABLE: Primary | ICD-10-CM

## 2021-10-20 PROCEDURE — 97140 MANUAL THERAPY 1/> REGIONS: CPT | Performed by: PHYSICAL THERAPIST

## 2021-10-20 PROCEDURE — 96372 THER/PROPH/DIAG INJ SC/IM: CPT | Performed by: PSYCHIATRY & NEUROLOGY

## 2021-10-20 PROCEDURE — 97110 THERAPEUTIC EXERCISES: CPT | Performed by: PHYSICAL THERAPIST

## 2021-10-20 PROCEDURE — 99214 OFFICE O/P EST MOD 30 MIN: CPT | Performed by: INTERNAL MEDICINE

## 2021-10-20 RX ORDER — KETOROLAC TROMETHAMINE 30 MG/ML
60 INJECTION, SOLUTION INTRAMUSCULAR; INTRAVENOUS ONCE
Status: COMPLETED | OUTPATIENT
Start: 2021-10-20 | End: 2021-10-20

## 2021-10-20 RX ORDER — VENLAFAXINE HYDROCHLORIDE 37.5 MG/1
37.5 CAPSULE, EXTENDED RELEASE ORAL DAILY
Qty: 90 CAPSULE | Refills: 0 | Status: SHIPPED | OUTPATIENT
Start: 2021-10-20 | End: 2022-01-20 | Stop reason: SDUPTHER

## 2021-10-20 RX ORDER — VENLAFAXINE HYDROCHLORIDE 75 MG/1
75 CAPSULE, EXTENDED RELEASE ORAL DAILY
Qty: 90 CAPSULE | Refills: 0 | Status: SHIPPED | OUTPATIENT
Start: 2021-10-20 | End: 2022-01-20 | Stop reason: SDUPTHER

## 2021-10-20 RX ADMIN — KETOROLAC TROMETHAMINE 60 MG: 30 INJECTION, SOLUTION INTRAMUSCULAR; INTRAVENOUS at 11:27

## 2021-10-23 ENCOUNTER — APPOINTMENT (OUTPATIENT)
Dept: LAB | Facility: HOSPITAL | Age: 21
End: 2021-10-23
Attending: INTERNAL MEDICINE
Payer: COMMERCIAL

## 2021-10-23 DIAGNOSIS — R89.4 ABNORMAL CELIAC ANTIBODY PANEL: ICD-10-CM

## 2021-10-23 DIAGNOSIS — K59.00 CONSTIPATION, UNSPECIFIED CONSTIPATION TYPE: ICD-10-CM

## 2021-10-23 LAB
ALBUMIN SERPL BCP-MCNC: 3.5 G/DL (ref 3.5–5)
ALP SERPL-CCNC: 101 U/L (ref 46–116)
ALT SERPL W P-5'-P-CCNC: 23 U/L (ref 12–78)
ANION GAP SERPL CALCULATED.3IONS-SCNC: 7 MMOL/L (ref 4–13)
AST SERPL W P-5'-P-CCNC: 17 U/L (ref 5–45)
BILIRUB SERPL-MCNC: 0.2 MG/DL (ref 0.2–1)
BUN SERPL-MCNC: 17 MG/DL (ref 5–25)
CALCIUM SERPL-MCNC: 9.3 MG/DL (ref 8.3–10.1)
CHLORIDE SERPL-SCNC: 108 MMOL/L (ref 100–108)
CO2 SERPL-SCNC: 23 MMOL/L (ref 21–32)
CREAT SERPL-MCNC: 0.78 MG/DL (ref 0.6–1.3)
GFR SERPL CREATININE-BSD FRML MDRD: 110 ML/MIN/1.73SQ M
GLUCOSE P FAST SERPL-MCNC: 94 MG/DL (ref 65–99)
POTASSIUM SERPL-SCNC: 4 MMOL/L (ref 3.5–5.3)
PROT SERPL-MCNC: 7.6 G/DL (ref 6.4–8.2)
SODIUM SERPL-SCNC: 138 MMOL/L (ref 136–145)

## 2021-10-23 PROCEDURE — 82784 ASSAY IGA/IGD/IGG/IGM EACH: CPT

## 2021-10-23 PROCEDURE — 80053 COMPREHEN METABOLIC PANEL: CPT

## 2021-10-23 PROCEDURE — 83516 IMMUNOASSAY NONANTIBODY: CPT

## 2021-10-23 PROCEDURE — 36415 COLL VENOUS BLD VENIPUNCTURE: CPT

## 2021-10-23 PROCEDURE — 86255 FLUORESCENT ANTIBODY SCREEN: CPT

## 2021-10-24 ENCOUNTER — HOSPITAL ENCOUNTER (OUTPATIENT)
Dept: RADIOLOGY | Facility: HOSPITAL | Age: 21
Discharge: HOME | End: 2021-10-24
Attending: INTERNAL MEDICINE
Payer: COMMERCIAL

## 2021-10-24 ENCOUNTER — HOSPITAL ENCOUNTER (OUTPATIENT)
Dept: RADIOLOGY | Facility: HOSPITAL | Age: 21
Discharge: HOME | End: 2021-10-24
Attending: STUDENT IN AN ORGANIZED HEALTH CARE EDUCATION/TRAINING PROGRAM
Payer: COMMERCIAL

## 2021-10-24 DIAGNOSIS — N80.9 ENDOMETRIOSIS: ICD-10-CM

## 2021-10-24 DIAGNOSIS — R29.898 WEAKNESS OF EXTREMITY: ICD-10-CM

## 2021-10-24 DIAGNOSIS — K59.00 CONSTIPATION, UNSPECIFIED CONSTIPATION TYPE: ICD-10-CM

## 2021-10-24 PROCEDURE — 70553 MRI BRAIN STEM W/O & W/DYE: CPT

## 2021-10-24 PROCEDURE — 74177 CT ABD & PELVIS W/CONTRAST: CPT

## 2021-10-24 PROCEDURE — A9585 GADOBUTROL INJECTION: HCPCS | Performed by: PSYCHIATRY & NEUROLOGY

## 2021-10-24 PROCEDURE — G1004 CDSM NDSC: HCPCS

## 2021-10-24 RX ADMIN — IOHEXOL 80 ML: 350 INJECTION, SOLUTION INTRAVENOUS at 09:05

## 2021-10-24 RX ADMIN — GADOBUTROL 7 ML: 604.72 INJECTION INTRAVENOUS at 10:57

## 2021-10-25 ENCOUNTER — TELEPHONE (OUTPATIENT)
Dept: NEUROLOGY | Facility: CLINIC | Age: 21
End: 2021-10-25

## 2021-10-25 ENCOUNTER — SOCIAL WORK (OUTPATIENT)
Dept: BEHAVIORAL/MENTAL HEALTH CLINIC | Facility: CLINIC | Age: 21
End: 2021-10-25
Payer: COMMERCIAL

## 2021-10-25 DIAGNOSIS — F41.1 GAD (GENERALIZED ANXIETY DISORDER): ICD-10-CM

## 2021-10-25 LAB
ENDOMYSIUM IGA SER QL: NEGATIVE
GLIADIN PEPTIDE IGA SER-ACNC: 2 UNITS (ref 0–19)
GLIADIN PEPTIDE IGG SER-ACNC: 3 UNITS (ref 0–19)
IGA SERPL-MCNC: 118 MG/DL (ref 87–352)
TTG IGA SER-ACNC: <2 U/ML (ref 0–3)
TTG IGG SER-ACNC: <2 U/ML (ref 0–5)

## 2021-10-25 PROCEDURE — 90834 PSYTX W PT 45 MINUTES: CPT | Performed by: SOCIAL WORKER

## 2021-10-27 ENCOUNTER — OFFICE VISIT (OUTPATIENT)
Dept: PHYSICAL THERAPY | Facility: CLINIC | Age: 21
End: 2021-10-27
Payer: COMMERCIAL

## 2021-10-27 DIAGNOSIS — K59.04 CHRONIC IDIOPATHIC CONSTIPATION: Primary | ICD-10-CM

## 2021-10-27 PROCEDURE — 97140 MANUAL THERAPY 1/> REGIONS: CPT | Performed by: PHYSICAL THERAPIST

## 2021-10-27 PROCEDURE — 97530 THERAPEUTIC ACTIVITIES: CPT | Performed by: PHYSICAL THERAPIST

## 2021-10-27 PROCEDURE — 97110 THERAPEUTIC EXERCISES: CPT | Performed by: PHYSICAL THERAPIST

## 2021-11-01 ENCOUNTER — SOCIAL WORK (OUTPATIENT)
Dept: BEHAVIORAL/MENTAL HEALTH CLINIC | Facility: CLINIC | Age: 21
End: 2021-11-01
Payer: COMMERCIAL

## 2021-11-01 DIAGNOSIS — F41.1 GAD (GENERALIZED ANXIETY DISORDER): ICD-10-CM

## 2021-11-01 PROCEDURE — 90834 PSYTX W PT 45 MINUTES: CPT | Performed by: SOCIAL WORKER

## 2021-11-04 ENCOUNTER — TELEPHONE (OUTPATIENT)
Dept: NEUROLOGY | Facility: CLINIC | Age: 21
End: 2021-11-04

## 2021-11-04 ENCOUNTER — TELEMEDICINE (OUTPATIENT)
Dept: NEUROLOGY | Facility: CLINIC | Age: 21
End: 2021-11-04
Payer: COMMERCIAL

## 2021-11-04 VITALS
HEIGHT: 67 IN | DIASTOLIC BLOOD PRESSURE: 80 MMHG | BODY MASS INDEX: 28.25 KG/M2 | SYSTOLIC BLOOD PRESSURE: 120 MMHG | WEIGHT: 180 LBS

## 2021-11-04 DIAGNOSIS — G43.011 INTRACTABLE MIGRAINE WITHOUT AURA AND WITH STATUS MIGRAINOSUS: Primary | ICD-10-CM

## 2021-11-04 DIAGNOSIS — K59.04 CHRONIC IDIOPATHIC CONSTIPATION: ICD-10-CM

## 2021-11-04 DIAGNOSIS — R89.4 ABNORMAL CELIAC ANTIBODY PANEL: ICD-10-CM

## 2021-11-04 DIAGNOSIS — I49.8 POTS (POSTURAL ORTHOSTATIC TACHYCARDIA SYNDROME): ICD-10-CM

## 2021-11-04 DIAGNOSIS — M24.9 HYPERMOBILE JOINTS: ICD-10-CM

## 2021-11-04 DIAGNOSIS — F41.1 GAD (GENERALIZED ANXIETY DISORDER): ICD-10-CM

## 2021-11-04 DIAGNOSIS — K58.1 IRRITABLE BOWEL SYNDROME WITH CONSTIPATION: ICD-10-CM

## 2021-11-04 DIAGNOSIS — D89.40 MAST CELL ACTIVATION SYNDROME (HCC): ICD-10-CM

## 2021-11-04 DIAGNOSIS — G43.009 MIGRAINE WITHOUT AURA AND WITHOUT STATUS MIGRAINOSUS, NOT INTRACTABLE: ICD-10-CM

## 2021-11-04 PROCEDURE — 99215 OFFICE O/P EST HI 40 MIN: CPT | Performed by: PHYSICIAN ASSISTANT

## 2021-11-04 RX ORDER — DIVALPROEX SODIUM 500 MG/1
500 TABLET, EXTENDED RELEASE ORAL DAILY
Qty: 5 TABLET | Refills: 0 | Status: SHIPPED | OUTPATIENT
Start: 2021-11-04

## 2021-11-04 RX ORDER — RIMEGEPANT SULFATE 75 MG/75MG
75 TABLET, ORALLY DISINTEGRATING ORAL AS NEEDED
Qty: 8 TABLET | Refills: 3 | Status: SHIPPED | OUTPATIENT
Start: 2021-11-04 | End: 2022-05-23

## 2021-11-04 RX ORDER — VERAPAMIL HYDROCHLORIDE 120 MG/1
240 TABLET, FILM COATED ORAL 2 TIMES DAILY
Qty: 120 TABLET | Refills: 3 | Status: SHIPPED | OUTPATIENT
Start: 2021-11-04 | End: 2022-02-01 | Stop reason: SDUPTHER

## 2021-11-04 RX ORDER — DEXAMETHASONE 2 MG/1
2 TABLET ORAL
Qty: 5 TABLET | Refills: 1 | Status: SHIPPED | OUTPATIENT
Start: 2021-11-04 | End: 2021-12-07 | Stop reason: SDUPTHER

## 2021-11-05 ENCOUNTER — TELEPHONE (OUTPATIENT)
Dept: NEUROLOGY | Facility: CLINIC | Age: 21
End: 2021-11-05

## 2021-11-05 DIAGNOSIS — G43.011 INTRACTABLE MIGRAINE WITHOUT AURA AND WITH STATUS MIGRAINOSUS: Primary | ICD-10-CM

## 2021-11-05 RX ORDER — SUCRALFATE 1 G/1
1 TABLET ORAL 3 TIMES DAILY PRN
Qty: 15 TABLET | Refills: 0 | Status: SHIPPED | OUTPATIENT
Start: 2021-11-05 | End: 2021-12-07 | Stop reason: SDUPTHER

## 2021-11-05 RX ORDER — INDOMETHACIN 50 MG/1
50 CAPSULE ORAL
Qty: 15 CAPSULE | Refills: 3 | Status: SHIPPED | OUTPATIENT
Start: 2021-11-05 | End: 2022-05-23

## 2021-11-09 ENCOUNTER — HOSPITAL ENCOUNTER (EMERGENCY)
Facility: HOSPITAL | Age: 21
Discharge: HOME | End: 2021-11-09
Payer: COMMERCIAL

## 2021-11-09 VITALS
SYSTOLIC BLOOD PRESSURE: 121 MMHG | BODY MASS INDEX: 27.62 KG/M2 | DIASTOLIC BLOOD PRESSURE: 74 MMHG | HEIGHT: 67 IN | OXYGEN SATURATION: 97 % | RESPIRATION RATE: 16 BRPM | WEIGHT: 176 LBS | HEART RATE: 78 BPM | TEMPERATURE: 98.3 F

## 2021-11-09 LAB
EXT PREG TEST URINE: NEGATIVE
EXT. CONTROL ED NAV: NORMAL

## 2021-11-09 PROCEDURE — 96372 THER/PROPH/DIAG INJ SC/IM: CPT

## 2021-11-09 PROCEDURE — 99283 EMERGENCY DEPT VISIT LOW MDM: CPT

## 2021-11-09 PROCEDURE — 81025 URINE PREGNANCY TEST: CPT | Performed by: PSYCHIATRY & NEUROLOGY

## 2021-11-09 RX ORDER — KETOROLAC TROMETHAMINE 30 MG/ML
60 INJECTION, SOLUTION INTRAMUSCULAR; INTRAVENOUS ONCE
Status: COMPLETED | OUTPATIENT
Start: 2021-11-09 | End: 2021-11-09

## 2021-11-09 RX ADMIN — KETOROLAC TROMETHAMINE 60 MG: 30 INJECTION, SOLUTION INTRAMUSCULAR at 12:37

## 2021-11-15 ENCOUNTER — SOCIAL WORK (OUTPATIENT)
Dept: BEHAVIORAL/MENTAL HEALTH CLINIC | Facility: CLINIC | Age: 21
End: 2021-11-15
Payer: COMMERCIAL

## 2021-11-15 DIAGNOSIS — F41.1 GAD (GENERALIZED ANXIETY DISORDER): ICD-10-CM

## 2021-11-15 PROCEDURE — 90834 PSYTX W PT 45 MINUTES: CPT | Performed by: SOCIAL WORKER

## 2021-11-16 ENCOUNTER — CLINICAL SUPPORT (OUTPATIENT)
Dept: OBGYN CLINIC | Facility: CLINIC | Age: 21
End: 2021-11-16
Payer: COMMERCIAL

## 2021-11-16 VITALS — WEIGHT: 184.6 LBS | BODY MASS INDEX: 28.91 KG/M2 | DIASTOLIC BLOOD PRESSURE: 72 MMHG | SYSTOLIC BLOOD PRESSURE: 110 MMHG

## 2021-11-16 DIAGNOSIS — I49.8 POTS (POSTURAL ORTHOSTATIC TACHYCARDIA SYNDROME): ICD-10-CM

## 2021-11-16 PROCEDURE — 96372 THER/PROPH/DIAG INJ SC/IM: CPT | Performed by: PHYSICIAN ASSISTANT

## 2021-11-16 RX ORDER — MEDROXYPROGESTERONE ACETATE 150 MG/ML
150 INJECTION, SUSPENSION INTRAMUSCULAR
Qty: 1 ML | Refills: 3 | Status: SHIPPED | OUTPATIENT
Start: 2021-11-16 | End: 2022-05-23

## 2021-11-16 RX ADMIN — MEDROXYPROGESTERONE ACETATE 150 MG: 150 INJECTION, SUSPENSION INTRAMUSCULAR at 15:13

## 2021-11-29 ENCOUNTER — TELEPHONE (OUTPATIENT)
Dept: FAMILY MEDICINE CLINIC | Facility: CLINIC | Age: 21
End: 2021-11-29

## 2021-12-03 ENCOUNTER — TELEPHONE (OUTPATIENT)
Dept: NEUROLOGY | Facility: CLINIC | Age: 21
End: 2021-12-03

## 2021-12-06 ENCOUNTER — SOCIAL WORK (OUTPATIENT)
Dept: BEHAVIORAL/MENTAL HEALTH CLINIC | Facility: CLINIC | Age: 21
End: 2021-12-06
Payer: COMMERCIAL

## 2021-12-06 ENCOUNTER — OFFICE VISIT (OUTPATIENT)
Dept: FAMILY MEDICINE CLINIC | Facility: CLINIC | Age: 21
End: 2021-12-06
Payer: COMMERCIAL

## 2021-12-06 VITALS
WEIGHT: 189 LBS | HEART RATE: 80 BPM | TEMPERATURE: 98.4 F | SYSTOLIC BLOOD PRESSURE: 108 MMHG | DIASTOLIC BLOOD PRESSURE: 60 MMHG | RESPIRATION RATE: 16 BRPM | BODY MASS INDEX: 29.66 KG/M2 | OXYGEN SATURATION: 98 % | HEIGHT: 67 IN

## 2021-12-06 DIAGNOSIS — E78.1 HYPERTRIGLYCERIDEMIA: ICD-10-CM

## 2021-12-06 DIAGNOSIS — F41.1 GAD (GENERALIZED ANXIETY DISORDER): ICD-10-CM

## 2021-12-06 DIAGNOSIS — I49.8 POTS (POSTURAL ORTHOSTATIC TACHYCARDIA SYNDROME): ICD-10-CM

## 2021-12-06 DIAGNOSIS — Z71.84 TRAVEL ADVICE ENCOUNTER: Primary | ICD-10-CM

## 2021-12-06 DIAGNOSIS — K58.0 IRRITABLE BOWEL SYNDROME WITH DIARRHEA: ICD-10-CM

## 2021-12-06 DIAGNOSIS — J45.909 UNCOMPLICATED ASTHMA, UNSPECIFIED ASTHMA SEVERITY, UNSPECIFIED WHETHER PERSISTENT: ICD-10-CM

## 2021-12-06 PROCEDURE — 99214 OFFICE O/P EST MOD 30 MIN: CPT | Performed by: NURSE PRACTITIONER

## 2021-12-06 PROCEDURE — 90834 PSYTX W PT 45 MINUTES: CPT | Performed by: SOCIAL WORKER

## 2021-12-06 RX ORDER — CIPROFLOXACIN 500 MG/1
500 TABLET, FILM COATED ORAL EVERY 12 HOURS SCHEDULED
Qty: 6 TABLET | Refills: 0 | Status: SHIPPED | OUTPATIENT
Start: 2021-12-06 | End: 2021-12-07 | Stop reason: ALTCHOICE

## 2021-12-06 RX ORDER — SCOLOPAMINE TRANSDERMAL SYSTEM 1 MG/1
1 PATCH, EXTENDED RELEASE TRANSDERMAL
Qty: 10 PATCH | Refills: 0 | Status: SHIPPED | OUTPATIENT
Start: 2021-12-06 | End: 2022-06-15

## 2021-12-07 ENCOUNTER — TELEPHONE (OUTPATIENT)
Dept: FAMILY MEDICINE CLINIC | Facility: CLINIC | Age: 21
End: 2021-12-07

## 2021-12-07 ENCOUNTER — OFFICE VISIT (OUTPATIENT)
Dept: NEUROLOGY | Facility: CLINIC | Age: 21
End: 2021-12-07
Payer: COMMERCIAL

## 2021-12-07 VITALS
SYSTOLIC BLOOD PRESSURE: 123 MMHG | WEIGHT: 188 LBS | DIASTOLIC BLOOD PRESSURE: 65 MMHG | HEIGHT: 67 IN | HEART RATE: 72 BPM | BODY MASS INDEX: 29.51 KG/M2

## 2021-12-07 DIAGNOSIS — K58.1 IRRITABLE BOWEL SYNDROME WITH CONSTIPATION: Primary | ICD-10-CM

## 2021-12-07 DIAGNOSIS — R51.9 HEADACHE: ICD-10-CM

## 2021-12-07 DIAGNOSIS — D89.40 MAST CELL ACTIVATION SYNDROME (HCC): ICD-10-CM

## 2021-12-07 DIAGNOSIS — G43.009 MIGRAINE WITHOUT AURA AND WITHOUT STATUS MIGRAINOSUS, NOT INTRACTABLE: ICD-10-CM

## 2021-12-07 DIAGNOSIS — I49.8 POTS (POSTURAL ORTHOSTATIC TACHYCARDIA SYNDROME): ICD-10-CM

## 2021-12-07 DIAGNOSIS — K59.04 CHRONIC IDIOPATHIC CONSTIPATION: ICD-10-CM

## 2021-12-07 DIAGNOSIS — G43.019 INTRACTABLE MIGRAINE WITHOUT AURA AND WITHOUT STATUS MIGRAINOSUS: Primary | ICD-10-CM

## 2021-12-07 DIAGNOSIS — G43.011 INTRACTABLE MIGRAINE WITHOUT AURA AND WITH STATUS MIGRAINOSUS: ICD-10-CM

## 2021-12-07 DIAGNOSIS — F41.1 GAD (GENERALIZED ANXIETY DISORDER): ICD-10-CM

## 2021-12-07 DIAGNOSIS — A09 TRAVELER'S DIARRHEA: ICD-10-CM

## 2021-12-07 PROCEDURE — 99215 OFFICE O/P EST HI 40 MIN: CPT | Performed by: PHYSICIAN ASSISTANT

## 2021-12-07 RX ORDER — SUCRALFATE 1 G/1
1 TABLET ORAL 3 TIMES DAILY PRN
Qty: 15 TABLET | Refills: 3 | Status: SHIPPED | OUTPATIENT
Start: 2021-12-07

## 2021-12-07 RX ORDER — NAPROXEN 500 MG/1
500 TABLET ORAL 2 TIMES DAILY WITH MEALS
Qty: 60 TABLET | Refills: 0 | Status: SHIPPED | OUTPATIENT
Start: 2021-12-07 | End: 2022-06-15 | Stop reason: SDUPTHER

## 2021-12-07 RX ORDER — DEXAMETHASONE 2 MG/1
2 TABLET ORAL
Qty: 5 TABLET | Refills: 1 | Status: SHIPPED | OUTPATIENT
Start: 2021-12-07

## 2021-12-07 RX ORDER — AZITHROMYCIN 500 MG/1
500 TABLET, FILM COATED ORAL DAILY
Qty: 3 TABLET | Refills: 0 | Status: SHIPPED | OUTPATIENT
Start: 2021-12-07 | End: 2021-12-10

## 2022-01-03 ENCOUNTER — SOCIAL WORK (OUTPATIENT)
Dept: BEHAVIORAL/MENTAL HEALTH CLINIC | Facility: CLINIC | Age: 22
End: 2022-01-03
Payer: COMMERCIAL

## 2022-01-03 DIAGNOSIS — F41.1 GAD (GENERALIZED ANXIETY DISORDER): ICD-10-CM

## 2022-01-03 PROCEDURE — 90834 PSYTX W PT 45 MINUTES: CPT | Performed by: SOCIAL WORKER

## 2022-01-03 NOTE — PSYCH
Psychotherapy Provided: Individual Psychotherapy 50 minutes      Length of time in session: 50 minutes, follow up in 2 week     Goals addressed in session: Goal 1      Pain:3    Current suicide risk : Low      D: Nova Nieto spoke today about her feelings re: her time off classes for the holidays, her trip to [place] with her [employer] program, and the death of her dog  A:  Derek Pereira was able to recognize several ways that she would like to make changes to her behavior over the upcoming semester  She indicated a desire to limit the time she spends working, to go to the gym regularly, to maintain perspective, to be more mindful, and to write things down more      P:  Upcoming sessions will be used to continue to support Nury with all of the above   MBSR techniques will also  be taught during upcoming therapy sessions     [place] and Treatment Plan explained to Nury, Nury relates understanding diagnosis and is agreeable to Treatment Plan   Yes      This note was not shared with the patient due to this is a psychotherapy note

## 2022-01-05 ENCOUNTER — CLINICAL SUPPORT (OUTPATIENT)
Dept: OBGYN CLINIC | Facility: CLINIC | Age: 22
End: 2022-01-05
Payer: COMMERCIAL

## 2022-01-05 VITALS — WEIGHT: 189.8 LBS | SYSTOLIC BLOOD PRESSURE: 100 MMHG | BODY MASS INDEX: 29.73 KG/M2 | DIASTOLIC BLOOD PRESSURE: 80 MMHG

## 2022-01-05 DIAGNOSIS — Z23 NEED FOR HPV VACCINATION: Primary | ICD-10-CM

## 2022-01-05 PROCEDURE — 90651 9VHPV VACCINE 2/3 DOSE IM: CPT | Performed by: PHYSICIAN ASSISTANT

## 2022-01-05 PROCEDURE — 90471 IMMUNIZATION ADMIN: CPT | Performed by: PHYSICIAN ASSISTANT

## 2022-01-06 ENCOUNTER — TELEPHONE (OUTPATIENT)
Dept: NEUROLOGY | Facility: CLINIC | Age: 22
End: 2022-01-06

## 2022-01-10 ENCOUNTER — TELEMEDICINE (OUTPATIENT)
Dept: NEUROLOGY | Facility: CLINIC | Age: 22
End: 2022-01-10
Payer: COMMERCIAL

## 2022-01-10 DIAGNOSIS — F41.1 GAD (GENERALIZED ANXIETY DISORDER): ICD-10-CM

## 2022-01-10 DIAGNOSIS — I49.8 POTS (POSTURAL ORTHOSTATIC TACHYCARDIA SYNDROME): ICD-10-CM

## 2022-01-10 DIAGNOSIS — G43.709 CHRONIC MIGRAINE WITHOUT AURA WITHOUT STATUS MIGRAINOSUS, NOT INTRACTABLE: Primary | ICD-10-CM

## 2022-01-10 DIAGNOSIS — D89.40 MAST CELL ACTIVATION SYNDROME (HCC): ICD-10-CM

## 2022-01-10 DIAGNOSIS — K58.1 IRRITABLE BOWEL SYNDROME WITH CONSTIPATION: ICD-10-CM

## 2022-01-10 DIAGNOSIS — K59.04 CHRONIC IDIOPATHIC CONSTIPATION: ICD-10-CM

## 2022-01-10 PROCEDURE — 99214 OFFICE O/P EST MOD 30 MIN: CPT | Performed by: PHYSICIAN ASSISTANT

## 2022-01-10 NOTE — ASSESSMENT & PLAN NOTE
Preventive therapy:  - magnesium oxide 400 mg  And  Vitamin B2 200 mg  For one month and see how pt does with out it  If no change in headaches then stay off of it    -  venlafaxine 75 mg and 37 5 mg in a m   -  verapamil 220 mg twice a day   Abortive therapy:   - at the onset of her migraine headache,  Take Nurtec 75 mg    Limit of 1 in 24 hours  Over the counter medications for headaches to less than 3 doses a week

## 2022-01-10 NOTE — PATIENT INSTRUCTIONS
POTS/MCAS -POTS related headaches  - Patient is currently on cyproheptadine 4 mg and zyrtec at bedtime along with metoprolol 25 mg once a day    Migraine headache with/ without aura  Preventive therapy:  - magnesium oxide 400 mg  And  Vitamin B2 200 mg  For one month and see how pt does with out it  If no change in headaches then stay off of it    -  venlafaxine 75 mg and 37 5 mg in a m   -  verapamil 220 mg twice a day   Abortive therapy:   - at the onset of her migraine headache,  Take Nurtec 75 mg  Limit of 1 in 24 hours  Over the counter medications for headaches to less than 3 doses a week    Fioricet or Fiorinal or drugs used to treat migraines  More than any drugs (including narcotics) using Fioricet or Fiorinal makes migraines worse over time, which is why these drugs are banned in most [facility]  Fioricet and Fiorinal contain caffeine, acetaminophen (Tylenol) and butalbital   Butalbital is a barbiturate  While benzodiazepine drugs for anxiety such as Valium, Klonopin, and Xanax have many side effects with chronic use, butalbital belongs to a class of drugs called barbiturates  Compared to Valium or Xanax, barbiturates are more likely to cause death (think Jareth Richards or Ze Barton), and stopping Fioricet or Fiorinal commonly causes seizures  In large studies using Fioricet or Fiorinal even 1 day per week worsens migraines              Please look up [employer] and Mindspace  Do only the free portions of them

## 2022-01-10 NOTE — PROGRESS NOTES
Virtual Regular Visit    Verification of patient location:    Patient is located in the following state in which I hold an active license [place]      Assessment/Plan:    Problem List Items Addressed This Visit        Digestive    Irritable bowel syndrome (IBS)    Chronic idiopathic constipation       Cardiovascular and Mediastinum    POTS (postural orthostatic tachycardia syndrome)    Chronic migraine without aura without status migrainosus, not intractable - Primary     Preventive therapy:  - magnesium oxide 400 mg  And  Vitamin B2 200 mg  For one month and see how pt does with out it  If no change in headaches then stay off of it    -  venlafaxine 75 mg and 37 5 mg in a m   -  verapamil 220 mg twice a day   Abortive therapy:   - at the onset of her migraine headache,  Take Nurtec 75 mg  Limit of 1 in 24 hours  Over the counter medications for headaches to less than 3 doses a week            Other    INES (generalized anxiety disorder)    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[facility] [address] )               Reason for visit is   Chief Complaint   Patient presents with    Migraine    Virtual Regular Visit        Encounter provider Victor Manuel Hernandez PA-C    Provider located at [address] [address] [phone]      Recent Visits  Date Type Provider Dept   01/06/22 Telephone Micheal Benjamin, [address] [place] [address] recent visits within past 7 days and meeting all other requirements  Today's Visits  Date Type Provider Dept   01/10/22 Telemedicine Victor Manuel Hernandez PA-C  [facility]   Showing today's visits and meeting all other requirements  Future Appointments  No visits were found meeting these conditions  Showing future appointments within next 150 days and meeting all other requirements       The patient was identified by name and date of birth   Anjelica Guerin was informed that this is a telemedicine visit and that the visit is being conducted through [address] and patient was informed this is a secure, HIPAA-complaint platform  She agrees to proceed     My office door was closed  No one else was in the room  She acknowledged consent and understanding of privacy and security of the video platform  The patient has agreed to participate and understands they can discontinue the visit at any time  Patient is aware this is a billable service  Subjective  Elizabeth Limon is a 22 y o  female 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   Started clinicals this semester     History:   In summer of 2019 patient began having a new type of headache which he experienced 4 times and matter of 6-12 months   At that time she had headache that lasted 2 days   This was preceded by a [place] of light in her right eye   Headache resolved after 2-3 days on her own this headache occurred again in the winter of [id number]   Third occurrence was summer of 2020 at 1st she thought it was Pots but she had persistent photophobia and nausea          In July 4, 2021 migrainous headache occurred with shooting pain down her neck and spine   Ended up going to the ER   The headache lasted approximately 2 weeks   Since that time she has had a persistent daily headache which she described as pressure and some pain in the back of her head without any photophobia or nausea   Was taking naproxen 500 mg daily  Since this time in July has never been headache free  Fahad Cera states she has gone to the emergency room and not much has helped  Fahad Lopez has gotten Toradol injections but only relieves it slightly for approximately 24 hours and then resume is a full force         Current medical illnesses:  QTc:  June 5, 2017 426 ms     POTS:  Sees cardio at [facility] Dr Ulysses abraham-yearly  Was diagnosed in 2016  Is doing very well  She is active, able to exercise   She walks regularly, does a stair stepper    Drinking lots of water, gatorade  She is on Florinef and metoprolol      IBS  mast cell activation syndrome  Malachi-Danlos syndrome  Anxiety:  Sees Carin Vallejo and a counselor  Non celiac gluten sensitivity  Celiac artery stenosis      No history of tobacco use     Interval update 1/10/2022  As of 1/10/2022 when came home from [facility] did have bad migraines  Were an 8/10  Tried Nurtec multiple days in a row  Patient stopped chocolate after she noticed that it was worsening her migraines  Has been almost a week without them  Today and yesterday felt better  For past 2 days has not been waking up with them        Interval update 12/7/2021:  Reports some improvement since last visit  Still not truly headache free but has notice a decrease int he severity of her daily headaches as well as decrease int he number of severe migraines     What medications do you take or have you taken for your headaches? Current Preventive:   Zyrtec  (mast cell related), Cyproheptadine  Fludrocortisone (POTS)  Metoprolol (POTS)  omega-3, riboflavin  venlafaxine   Verapamil  Cyproheptadine  Current Abortive:   Fioricet   Reglan   Naproxen, Tylenol   Decadron  Indomethacin  Nurtec     Prior Preventive:   vitamin-D, multivitamin  Benadryl, Atarax   Zoloft, Cymbalta  Gabapentin   Robaxin   Xanax  Prior Abortive:   Percocet, Dilaudid, fentanyl   Toradol As of 11/2021 only helps for 24 hours), naproxen, ibuprofen  Zofran  Benadryl   Decadron, prednisone   Tylenol  Ubrelvy      Current pain 1-2/10     Mild headaches: that has improved with the increase in metoprolol  Moderate to severe headaches:   2020: Feb: first week but lasted for 9 day  March: first week lasted 2-3 days  April: first week - lasted 2-3 days -   2021:  Daily since 7/4/2021 averages 6-7/10 gets to an 8-9/10 3+ days a week  When first started Verapamil did decrease her baseline to 3-4/10 for 2 weeks     As of 12/7/21 has a daily headache at a 3-4/10    Gets to an 6-7/10 at least 1 day a week     As of 1/10/2022 when came home from [facility] did have bad migraines  Were an 8/10  Tried Nurtec multiple days in a row  Patient stopped chocolate after she noticed that it was worsening her migraines  Has been almost a week without them  Today and yesterday felt better  For past 2 days has not been waking up with them     Are you ever headache free? for past 2 days has been headache free     Aura/Warning and how long does it last?   - White spots in front of her visual field, seconds  - POTS headache is usually preceded by palpitation and flushing  This was more when she was having random headaches not persistant        What time of the day do the headaches start?   This has broken  Yesterday and today woke up with 1-2/10 and only got to a 4/0    Continuous pain since July 2021  Wakes up with an average of a 6/10  Goes down to a 3-4/10 after 1 hour  Prior visit was Wakes up with 8/10 but does go down to a 6/10 approximately 2 hours later (after Tylenol or Naproxen), starts to increase again around 4 pm        How long do the headaches last?   Moderate/severe for 1 hours in the am but takes medication to decrease to lower pain but never goes away   Moderate headaches last the full day when doesn't go down to 3-4/10      Where is your headache located? Mild headaches: frontal  Moderate to severe headaches: frontalis, retro-orbital, occipitalis and neck bilaterally (worse when in occipitalis)      Describe your usual headache? Mild headaches: nagging and aching with pressure  Moderate to severe headaches: throbbing and pressure     What is the intensity of pain?    Mild headaches: 3-4/10   Moderate to severe headaches: 7-8/10      Associated symptoms:   - Nausea, vomitting   - Photophobia, Osmophobia   - Problems with concentration   - Insomnia (waking up frequently in middle of night)   - Prefer to be in a dark room      Number of days missed per month because of headaches:   Work (or school) days: doesn't miss (has some trouble getting her work done)  Social or Family activities: occasionally, doesn't have a lot of free time with school     Headache are worse if the patient: Bending over, exertion   Headache triggers: weather changes  Barometric changes, chocolate  What time of the year do headaches occur more frequently? More summer POTS flares      Have you had trigger point injection performed and how often? No   Have you had Botox injection performed and how often? No   Have you had epidural injections or transforaminal injections performed? No      Alternative therapies used in the past for headaches? No      Have you used CBD or THC for your headaches and how often? No      How many caffeine products to drink a day? 1-2   How much water to drink a day? 64oz      Are you current pregnant or planning on getting pregnant?  Currently on a birth control pill     Have you ever had any Brain imaging? Yes   2020-CT head   no acute intracranial abnormality     10/24/2021 MRI brain   No acute infarction, intracranial hemorrhage or mass   I personally reviewed these images      Reviewed old notes from physician seen in the past- see above HPI for summary of previous encounters        Past Medical History:   Diagnosis Date    Anxiety     Asthma     Dizziness     at times    GERD (gastroesophageal reflux disease)     Hammertoe of left foot     Headache     occ    Hyperlipemia     Hypermobile joints     Irritable bowel syndrome     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HCC)     Mast cell disorder     Migraine     PONV (postoperative nausea and vomiting)     POTS (postural orthostatic tachycardia syndrome)      infant     Wears glasses        Past Surgical History:   Procedure Laterality Date    COLONOSCOPY      EGD AND COLONOSCOPY N/A 1/10/2017    Procedure: EGD AND COLONOSCOPY;  Surgeon: Jyotsna Adan MD;  Location: [facility];   Service:     ESOPHAGOGASTRODUODENOSCOPY      with biopsy  FOOT SURGERY      Excision of lesion feet benign    MT REPAIR OF MAYE CASTANO Left 12/20/2019    Procedure: 2nd arthrodesis; 5th arthroplasty, flexor tenotomy 2,3,4,5 ;  Surgeon: Keke Mason DPM;  [facility];  Service: Podiatry    MT REPAIR OF Sherly Jones Left 6/5/2020    Procedure: 2ND TOE Revision hammertoe with broken implant;  Surgeon: Keke Mason DPM;  [facility]: [facility];  Service: Podiatry    UPPER GASTROINTESTINAL ENDOSCOPY  01/28/2021    WISDOM TOOTH EXTRACTION         Current Outpatient Medications   Medication Sig Dispense Refill    acetaminophen (TYLENOL) 500 mg tablet Take 1,000 mg by mouth every 4 (four) hours as needed       ALPRAZolam (XANAX) 0 25 mg tablet Take 0 25 mg by mouth 2 (two) times a day as needed       ASMANEX  MCG/ACT AERO Inhale 400 mcg every 4 (four) hours as needed (2 puffs)       butalbital-acetaminophen-caffeine (FIORICET,ESGIC) -40 mg per tablet Take 1 tablet by mouth every 4 (four) hours as needed for headaches 30 tablet 0    cetirizine (ZyrTEC) 10 mg tablet Take 10 mg by mouth daily      clindamycin (CLEOCIN T) 1 % lotion Apply topically daily To face (Patient taking differently: Apply 1 application topically daily To face ) 60 mL 0    cyproheptadine (PERIACTIN) 4 mg tablet Take 4 mg by mouth daily at bedtime       Elastic Bandages & Supports (TRUFORM STOCKINGS 20-30MMHG) MISC Use as directed        fludrocortisone (FLORINEF) 0 1 mg tablet Take 0 1 mg by mouth 2 (two) times a day        indomethacin (INDOCIN) 50 mg capsule Take 1 capsule (50 mg total) by mouth 3 (three) times a day with meals (Patient taking differently: Take 50 mg by mouth as needed  ) 15 capsule 3    linaCLOtide (Linzess) 72 MCG CAPS Take 1 capsule by mouth daily before breakfast (Patient taking differently: Take 1 capsule by mouth daily as needed ) 90 capsule 3    medroxyPROGESTERone acetate (DEPO-PROVERA SYRINGE) 150 mg/mL injection Inject 1 mL (150 mg total) into a muscle every 3 (three) months Every three months 1 mL 3    metoprolol succinate (TOPROL-XL) 25 mg 24 hr tablet Take 50 mg by mouth 2 (two) times a day        naproxen (EC NAPROSYN) 500 MG EC tablet Take 1 tablet (500 mg total) by mouth 2 (two) times a day with meals (Patient taking differently: Take 500 mg by mouth as needed  ) 60 tablet 0    Omega-3 Fatty Acids (FISH OIL) 1,000 mg Take 1,000 mg by mouth 2 (two) times a day       Riboflavin 100 MG TABS Take 1 tablet (100 mg total) by mouth 4 (four) times a day 120 tablet 3    Rimegepant Sulfate (Nurtec) 75 MG TBDP Take 75 mg by mouth as needed (migraine) Limit of 1 in 24 hours 8 tablet 3    scopolamine (TRANSDERM-SCOP) 1 mg/3 days TD 72 hr patch Place 1 patch on the skin every third day (Patient taking differently: Place 1 patch on the skin as needed  ) 10 patch 0    sucralfate (CARAFATE) 1 g tablet Take 1 tablet (1 g total) by mouth 3 (three) times a day as needed (headache-take before indomethacin) 15 tablet 3    tretinoin (RETIN-A) 0 025 % cream Apply topically daily at bedtime 45 g 2    venlafaxine (EFFEXOR-XR) 37 5 mg 24 hr capsule Take 1 capsule (37 5 mg total) by mouth daily Take with one 75 mg capsule  Total daily dose is 112 5 mg 90 capsule 0    venlafaxine (EFFEXOR-XR) 75 mg 24 hr capsule Take 1 capsule (75 mg total) by mouth daily Take with one 37 5 mg capsule   Total daily dose is 112 5 mg 90 capsule 0    verapamil (CALAN) 120 mg tablet Take 2 tablets (240 mg total) by mouth 2 (two) times a day 120 tablet 3    XOPENEX HFA 45 MCG/ACT inhaler Inhale 1-2 puffs every 4 (four) hours as needed       dexamethasone (DECADRON) 2 mg tablet Take 1 tablet (2 mg total) by mouth daily with breakfast (Patient not taking: Reported on 1/10/2022 ) 5 tablet 1    divalproex sodium (DEPAKOTE ER) 500 mg 24 hr tablet Take 1 tablet (500 mg total) by mouth daily (Patient not taking: Reported on 1/10/2022 ) 5 tablet 0     Current Facility-Administered Medications   Medication Dose Route Frequency Provider Last Rate Last Admin    albuterol inhalation solution 2 5 mg  2 5 mg Nebulization Q6H PRN Jaqui Lewis PA-C        medroxyPROGESTERone acetate (DEPO-PROVERA SYRINGE) IM injection 150 mg  150 mg Intramuscular Q3 Months John KITTY AshLEON   150 mg at 11/16/21 1513        Allergies   Allergen Reactions    Nuts - Food Allergy Other (See Comments)     Avani Nuts - itchy throat    Other Hives      At surgical site and IV site- not sure if type of cleanser used    Pollen Extract     I have reviewed the patient's medical, social and surgical history as well as medications in detail and updated the computerized patient record  Review of Systems   Constitutional: Negative  HENT: Negative  Eyes: Negative  Respiratory: Negative  Cardiovascular: Negative  Gastrointestinal: Negative  Endocrine: Negative  Genitourinary: Negative  Musculoskeletal: Negative  Skin: Negative  Allergic/Immunologic: Negative  Neurological: Positive for headaches  Hematological: Negative  Psychiatric/Behavioral: Negative  I personally reviewed and updated the ROS that was entered by the medical assistant      Video Exam    There were no vitals filed for this visit  Physical Exam   CONSTITUTIONAL: Well developed, well nourished, well groomed  No dysmorphic features  Eyes:  EOM normal      Neck:  Normal ROM, neck supple  HEENT:  Normocephalic atraumatic  Chest:  Respirations regular and unlabored  Psychiatric:  Normal behavior and appropriate affect      MENTAL STATUS  Orientation: Alert and oriented x 3  Fund of knowledge: Intact  MOTOR (Upper and lower extremities)   Bulk/tone/abnormal movement: Normal muscle bulk and tone          I spent 15 minutes with patient today in which greater than 50% of the time was spent in counseling/coordination of care regarding as above and 20 minutes of non-face to face time    VIRTUAL VISIT [facility] verbally agrees to participate in [employer]  Pt is aware that [employer] could be limited without vital signs or the ability to perform a full hands-on physical Good  understands she or the provider may request at any time to terminate the video visit and request the patient to seek care or treatment in person

## 2022-01-11 ENCOUNTER — OFFICE VISIT (OUTPATIENT)
Dept: GASTROENTEROLOGY | Facility: CLINIC | Age: 22
End: 2022-01-11
Payer: COMMERCIAL

## 2022-01-11 ENCOUNTER — SOCIAL WORK (OUTPATIENT)
Dept: BEHAVIORAL/MENTAL HEALTH CLINIC | Facility: CLINIC | Age: 22
End: 2022-01-11
Payer: COMMERCIAL

## 2022-01-11 VITALS
BODY MASS INDEX: 30.17 KG/M2 | SYSTOLIC BLOOD PRESSURE: 104 MMHG | DIASTOLIC BLOOD PRESSURE: 66 MMHG | TEMPERATURE: 98 F | WEIGHT: 192.2 LBS | HEIGHT: 67 IN

## 2022-01-11 DIAGNOSIS — F41.1 GAD (GENERALIZED ANXIETY DISORDER): ICD-10-CM

## 2022-01-11 DIAGNOSIS — R10.13 DYSPEPSIA: ICD-10-CM

## 2022-01-11 DIAGNOSIS — K59.00 CONSTIPATION, UNSPECIFIED CONSTIPATION TYPE: Primary | ICD-10-CM

## 2022-01-11 PROCEDURE — 99213 OFFICE O/P EST LOW 20 MIN: CPT | Performed by: INTERNAL MEDICINE

## 2022-01-11 PROCEDURE — 90834 PSYTX W PT 45 MINUTES: CPT | Performed by: SOCIAL WORKER

## 2022-01-11 NOTE — PROGRESS NOTES
[facility] - Outpatient Consultation  Elizabeth Limon 22 y o  female MRN: [id number]  Encounter: [id number]          ASSESSMENT AND PLAN:      1  Constipation, unspecified constipation type  Ongoing since infancy requiring medical management throughout childhood, with recent worsening of constipation over the past year  There is no associated abdominal pain relieved with bowel movements to suggest IBS-C  She has had poor response to fiber supplementation, stool softeners including bisacodyl and docusate, osmotic laxatives, Amitiza  She is currently on Linzess 75mcg (higher dose likely causing overflow Diarrhea)  While her symptoms may be from chronic constipation alone, suspect a defecatory disorder as well  In the absence of colonic in the absence of colonic dilation there is low suspicion dilation there is low suspicion for Hirschsprung for Hirschsprung disease  disease  She may benefit from anorectal manometry to confirm or rule out dyssynergic defecation in the future  Furthermore if she has normal anorectal manometry, would also consider a rectocele in the differential          2  Dyspepsia  Stable, no significant symptoms at this time    Follow-up in 4-5 months    ______________________________________________________________________    HPI: Ms Anand Baird is a pleasant 70-year-old female here for follow-up for ongoing complaints of severe constipation since infancy  For both symptoms of dyspepsia as well as chronic significant constipation, she has had an extensive workup including EGD (images reviewed from 1/11/21)  and colonoscopy both of which were overall unremarkable    Lab work/imaging including celiac panel, H pylori stool antigen, TSH,  Gastric emptying study, have also been within normal limits other than a borderline TTG IgG of 6        She has taken a number of laxatives and stool softeners  Including MiraLax, docusate, senna, and the use of enemas and suppositories, and most recently has been on Linzess 72 mcg daily  She had been tried on a higher dose of Linzess which caused diarrhea, with likely overflow diarrhea not having any large formed stools proceeding this  She is also been going to pelvic floor physical therapy for the treatment of constipation  for the past 2 months  She has had very mild improvement in her symptoms, and by evaluation of the pelvic floor physical therapist  There is a small objective improvement  Currently on a lower dose of Linzess as well as re-addition of docusate she is having infrequent small volume stools every few days         She has been evaluated by Ob gyn and due to concerns for possible endometriosis, however on her vaginal exam it was noted that she had significant stool burden in the rectal vault, thought to be more likely the cause of her pelvic symptoms  REVIEW OF SYSTEMS:    CONSTITUTIONAL: Denies any fever, chills, rigors, and weight loss  HEENT: Denies odynophagia, tinnitus  CARDIOVASCULAR: No chest pain or palpitations  RESPIRATORY: Denies any cough, hemoptysis, shortness of breath or dyspnea on exertion  GASTROINTESTINAL: As noted in the History of Present Illness  GENITOURINARY: No problems with urination  Denies any hematuria or dysuria  NEUROLOGIC: No dizziness or vertigo, denies headaches  MUSCULOSKELETAL: Denies any muscle or joint pain  SKIN: Denies skin rashes or itching  ENDOCRINE:  Denies intolerance to heat or cold  PSYCHOSOCIAL: Denies depression or anxiety  Denies any recent memory loss         Historical Information   Past Medical History:   Diagnosis Date    Anxiety     Asthma     Dizziness     at times    GERD (gastroesophageal reflux disease)     Hammertoe of left foot     Headache     occ    Hyperlipemia     Hypermobile joints     Irritable bowel syndrome     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HCC)     Mast cell disorder     Migraine     PONV (postoperative nausea and vomiting)  POTS (postural orthostatic tachycardia syndrome)      infant     Wears glasses      Past Surgical History:   Procedure Laterality Date    COLONOSCOPY      EGD AND COLONOSCOPY N/A 1/10/2017    Procedure: EGD AND COLONOSCOPY;  Surgeon: Eve Ramachandran MD;  [facility]; Service:     ESOPHAGOGASTRODUODENOSCOPY      with biopsy    FOOT SURGERY      Excision of lesion feet benign    MO REPAIR OF MAYE CASTANO Left 2019    Procedure: 2nd arthrodesis; 5th arthroplasty, flexor tenotomy 2,3,4,5 ;  Surgeon: Dusty Bhatt DPM;  [facility];  Service: Podiatry    MO REPAIR OF Jessietrae Fernandez Left 2020    Procedure: 2ND TOE Revision hammertoe with broken implant;  Surgeon: Dusty Bhatt DPM;  [facility];  Service: Podiatry    UPPER GASTROINTESTINAL ENDOSCOPY  2021    WISDOM TOOTH EXTRACTION       Social History   Social History     Substance and Sexual Activity   Alcohol Use No     Social History     Substance and Sexual Activity   Drug Use No    Comment: 20- not asked, parent at bedside       Social History     Tobacco Use   Smoking Status Never Smoker   Smokeless Tobacco Never Used     Family History   Problem Relation Age of Onset    Gestational diabetes Mother     Migraines Mother     Anxiety disorder Father     Hyperlipidemia Father     Autism Brother     Diabetes Other     Uterine cancer Maternal Grandmother     Rheumatic fever Maternal Grandmother     Diabetes Maternal Grandfather     Hypertension Maternal Grandfather     Heart attack Maternal Grandfather     Stroke Maternal Grandfather     Hyperlipidemia Maternal Grandfather     Hypertension Paternal Grandmother     Anxiety disorder Paternal Grandmother     Hypertension Paternal Grandfather        Meds/Allergies       Current Outpatient Medications:     acetaminophen (TYLENOL) 500 mg tablet    ALPRAZolam (XANAX) 0 25 mg tablet    ASMANEX  MCG/ACT AERO   butalbital-acetaminophen-caffeine (FIORICET,ESGIC) -40 mg per tablet    cetirizine (ZyrTEC) 10 mg tablet    clindamycin (CLEOCIN T) 1 % lotion    cyproheptadine (PERIACTIN) 4 mg tablet    dexamethasone (DECADRON) 2 mg tablet    divalproex sodium (DEPAKOTE ER) 500 mg 24 hr tablet    Elastic Bandages & Supports (TRUFORM STOCKINGS 20-30MMHG) MISC    fludrocortisone (FLORINEF) 0 1 mg tablet    indomethacin (INDOCIN) 50 mg capsule    linaCLOtide (Linzess) 72 MCG CAPS    medroxyPROGESTERone acetate (DEPO-PROVERA SYRINGE) 150 mg/mL injection    metoprolol succinate (TOPROL-XL) 25 mg 24 hr tablet    naproxen (EC NAPROSYN) 500 MG EC tablet    Omega-3 Fatty Acids (FISH OIL) 1,000 mg    Riboflavin 100 MG TABS    Rimegepant Sulfate (Nurtec) 75 MG TBDP    scopolamine (TRANSDERM-SCOP) 1 mg/3 days TD 72 hr patch    sucralfate (CARAFATE) 1 g tablet    tretinoin (RETIN-A) 0 025 % cream    venlafaxine (EFFEXOR-XR) 37 5 mg 24 hr capsule    venlafaxine (EFFEXOR-XR) 75 mg 24 hr capsule    verapamil (CALAN) 120 mg tablet    XOPENEX HFA 45 MCG/ACT inhaler    Current Facility-Administered Medications:     albuterol inhalation solution 2 5 mg, 2 5 mg, Nebulization, Q6H PRN    medroxyPROGESTERone acetate (DEPO-PROVERA SYRINGE) IM injection 150 mg, 150 mg, Intramuscular, Q3 Months, 150 mg at 11/16/21 1513    Allergies   Allergen Reactions    Nuts - Food Allergy Other (See Comments)     Avani Nuts - itchy throat    Other Hives      At surgical site and IV site- not sure if type of cleanser used    Pollen Extract            Objective     not currently breastfeeding  There is no height or weight on file to calculate BMI  PHYSICAL EXAM:      General Appearance:   Alert, cooperative, no distress   HEENT:   Normocephalic, atraumatic, anicteric       Neck:  Supple, symmetrical, trachea midline   Lungs:   Clear to auscultation bilaterally; no rales, rhonchi or wheezing; respirations unlabored    Heart[de-identified] Regular rate and rhythm; no murmur, rub, or gallop  Abdomen:   Soft, non-tender, non-distended; normal bowel sounds; no masses, no organomegaly    Genitalia:   Deferred    Rectal:   Deferred    Extremities:  No cyanosis, clubbing or edema    Pulses:  2+ and symmetric    Skin:  No jaundice, rashes, or lesions          Lab Results:   No visits with results within 1 Day(s) from this visit  Latest known visit with results is:   Admission on 11/09/2021, Discharged on 11/09/2021   Component Date Value    EXT PREG TEST UR (Ref: N* 11/09/2021 Negative     Control 11/09/2021 Valid          Radiology Results:   No results found

## 2022-01-11 NOTE — PSYCH
Psychotherapy Provided: Individual Psychotherapy 50 minutes      Length of time in session: 50 minutes, follow up in 2 week     Goals addressed in session: Goal 1      Pain:1    Current suicide risk : Low      D: Eber Duncan spoke today about her feelings re: her appt this am with her gastro and ongoing struggles with constipation  She also discussed her family's decision to adopt two dogs to replace the one that passed away last week  Jazmín Diaz shared that she has begun to study for her first pharm test and ways that she is working to keep her anxiety in "check "   A:  Jazmín Diaz was able to acknowledge consistency in her intentention to remain less overwhelmed during the upcoming semester  She again confirmed that she will not be working so that she can focus on just her classwork and clinicals         P:  Upcoming sessions will be used to continue to support Nury with all of the above   MBSR techniques will also  be taught during upcoming therapy sessions     Bharath Phillips: Diagnosis and Treatment Plan explained to Nury, Nury relates understanding diagnosis and is agreeable to Treatment Plan  Yes      This note was not shared with the patient due to this is a psychotherapy note    Encounter Diagnoses   Name Primary?     INES (generalized anxiety disorder)

## 2022-01-16 ENCOUNTER — OFFICE VISIT (OUTPATIENT)
Dept: URGENT CARE | Facility: CLINIC | Age: 22
End: 2022-01-16
Payer: COMMERCIAL

## 2022-01-16 VITALS
RESPIRATION RATE: 18 BRPM | WEIGHT: 180 LBS | TEMPERATURE: 98.3 F | HEART RATE: 100 BPM | OXYGEN SATURATION: 98 % | HEIGHT: 67 IN | BODY MASS INDEX: 28.25 KG/M2

## 2022-01-16 DIAGNOSIS — J20.8 ACUTE BACTERIAL BRONCHITIS: ICD-10-CM

## 2022-01-16 DIAGNOSIS — R05.1 ACUTE COUGH: Primary | ICD-10-CM

## 2022-01-16 DIAGNOSIS — B96.89 ACUTE BACTERIAL BRONCHITIS: ICD-10-CM

## 2022-01-16 PROCEDURE — 87636 SARSCOV2 & INF A&B AMP PRB: CPT | Performed by: PHYSICIAN ASSISTANT

## 2022-01-16 PROCEDURE — 99213 OFFICE O/P EST LOW 20 MIN: CPT | Performed by: PHYSICIAN ASSISTANT

## 2022-01-16 RX ORDER — METHYLPREDNISOLONE 4 MG/1
TABLET ORAL
Qty: 1 EACH | Refills: 0 | Status: SHIPPED | OUTPATIENT
Start: 2022-01-16 | End: 2022-02-03

## 2022-01-16 RX ORDER — AZITHROMYCIN 250 MG/1
TABLET, FILM COATED ORAL
Qty: 6 TABLET | Refills: 0 | Status: SHIPPED | OUTPATIENT
Start: 2022-01-16 | End: 2022-01-20

## 2022-01-16 RX ORDER — BENZONATATE 200 MG/1
200 CAPSULE ORAL 3 TIMES DAILY PRN
Qty: 30 CAPSULE | Refills: 0 | Status: SHIPPED | OUTPATIENT
Start: 2022-01-16 | End: 2022-02-03

## 2022-01-16 NOTE — PROGRESS NOTES
[facility]        NAME: Deana Lujan is a 22 y o  female  : 2000    MRN: [id number]  DATE: 2022  TIME: 10:26 AM    Assessment and Plan   Acute cough [R05 1]  1  Acute cough  [facility]   2  Acute bacterial bronchitis  azithromycin (ZITHROMAX) 250 mg tablet    methylPREDNISolone 4 MG tablet therapy pack    benzonatate (TESSALON) 200 MG capsule         Patient Instructions     Patient Instructions   Take antibiotic as prescribed  Start Medrol Dosepak   May take Tessalon Perles up to 3 times daily to help with cough  Continue DayQuil and NyQuil, would avoid NSAIDs such as ibuprofen while on Medrol Dosepak  Return if symptoms fail to improve in 1 week   Go to ER with high fever, difficulty breathing or any other distress      Follow up with PCP in 3-5 days  Proceed to  ER if symptoms worsen  Chief Complaint     Chief Complaint   Patient presents with    Cough     started 1 week ago, with cough and congestion and loss of voice, no fevers, dayquil/nyquil and otc cough meds, pt is NOT in distress, + cold contacts at home, pt is FULLY vaccinated for covid and flu          History of Present Illness       42-year-old female presents complaining of cold symptoms for 1 week  She reports that symptoms are worsening  Started as congestion, sore throat and mild cough, however cough is getting worse and is now productive of yellow sputum  Feels tight in the chest   Has fatigue and change in voice  Still able to swallow  Denies fever, chills, COVID contacts, abdominal complaints, headache  Taking DayQuil and NyQuil with mild relief  Denies respiratory distress  Trying albuterol inhaler without much relief    Cough  Associated symptoms include postnasal drip, rhinorrhea, a sore throat and wheezing  Pertinent negatives include no chest pain, chills, ear pain, eye redness, fever, headaches, myalgias or shortness of breath         Review of Systems Review of Systems   Constitutional: Positive for appetite change and fatigue  Negative for chills and fever  HENT: Positive for congestion, postnasal drip, rhinorrhea and sore throat  Negative for ear pain, facial swelling, hearing loss, sinus pressure, sinus pain and voice change  Eyes: Negative for pain, redness and visual disturbance  Respiratory: Positive for cough, chest tightness and wheezing  Negative for shortness of breath  Cardiovascular: Negative for chest pain, palpitations and leg swelling  Gastrointestinal: Negative for abdominal pain, constipation, diarrhea, nausea and vomiting  Genitourinary: Negative for difficulty urinating, dysuria and frequency  Musculoskeletal: Negative for myalgias  Skin: Negative for color change, pallor and wound  Neurological: Negative for dizziness, syncope, numbness and headaches  Hematological: Negative for adenopathy           Current Medications       Current Outpatient Medications:     acetaminophen (TYLENOL) 500 mg tablet, Take 1,000 mg by mouth every 4 (four) hours as needed , Disp: , Rfl:     ALPRAZolam (XANAX) 0 25 mg tablet, Take 0 25 mg by mouth 2 (two) times a day as needed , Disp: , Rfl:     ASMANEX  MCG/ACT AERO, Inhale 400 mcg every 4 (four) hours as needed (2 puffs) , Disp: , Rfl:     butalbital-acetaminophen-caffeine (FIORICET,ESGIC) -40 mg per tablet, Take 1 tablet by mouth every 4 (four) hours as needed for headaches, Disp: 30 tablet, Rfl: 0    cetirizine (ZyrTEC) 10 mg tablet, Take 10 mg by mouth daily, Disp: , Rfl:     clindamycin (CLEOCIN T) 1 % lotion, Apply topically daily To face (Patient taking differently: Apply 1 application topically daily To face ), Disp: 60 mL, Rfl: 0    cyproheptadine (PERIACTIN) 4 mg tablet, Take 4 mg by mouth daily at bedtime , Disp: , Rfl:     Elastic Bandages & Supports (TRUFORM STOCKINGS 20-30MMHG) MISC, Use as directed , Disp: , Rfl:     fludrocortisone (FLORINEF) 0 1 mg tablet, Take 0 1 mg by mouth 2 (two) times a day  , Disp: , Rfl:     indomethacin (INDOCIN) 50 mg capsule, Take 1 capsule (50 mg total) by mouth 3 (three) times a day with meals (Patient taking differently: Take 50 mg by mouth as needed  ), Disp: 15 capsule, Rfl: 3    linaCLOtide (Linzess) 72 MCG CAPS, Take 1 capsule by mouth daily before breakfast (Patient taking differently: Take 1 capsule by mouth daily as needed ), Disp: 90 capsule, Rfl: 3    medroxyPROGESTERone acetate (DEPO-PROVERA SYRINGE) 150 mg/mL injection, Inject 1 mL (150 mg total) into a muscle every 3 (three) months Every three months, Disp: 1 mL, Rfl: 3    metoprolol succinate (TOPROL-XL) 25 mg 24 hr tablet, Take 50 mg by mouth 2 (two) times a day  , Disp: , Rfl:     naproxen (EC NAPROSYN) 500 MG EC tablet, Take 1 tablet (500 mg total) by mouth 2 (two) times a day with meals (Patient taking differently: Take 500 mg by mouth as needed  ), Disp: 60 tablet, Rfl: 0    Omega-3 Fatty Acids (FISH OIL) 1,000 mg, Take 1,000 mg by mouth 2 (two) times a day , Disp: , Rfl:     Rimegepant Sulfate (Nurtec) 75 MG TBDP, Take 75 mg by mouth as needed (migraine) Limit of 1 in 24 hours, Disp: 8 tablet, Rfl: 3    scopolamine (TRANSDERM-SCOP) 1 mg/3 days TD 72 hr patch, Place 1 patch on the skin every third day (Patient taking differently: Place 1 patch on the skin as needed  ), Disp: 10 patch, Rfl: 0    sucralfate (CARAFATE) 1 g tablet, Take 1 tablet (1 g total) by mouth 3 (three) times a day as needed (headache-take before indomethacin), Disp: 15 tablet, Rfl: 3    tretinoin (RETIN-A) 0 025 % cream, Apply topically daily at bedtime, Disp: 45 g, Rfl: 2    venlafaxine (EFFEXOR-XR) 37 5 mg 24 hr capsule, Take 1 capsule (37 5 mg total) by mouth daily Take with one 75 mg capsule  Total daily dose is 112 5 mg, Disp: 90 capsule, Rfl: 0    venlafaxine (EFFEXOR-XR) 75 mg 24 hr capsule, Take 1 capsule (75 mg total) by mouth daily Take with one 37 5 mg capsule   Total daily dose is 112 5 mg, Disp: 90 capsule, Rfl: 0    verapamil (CALAN) 120 mg tablet, Take 2 tablets (240 mg total) by mouth 2 (two) times a day, Disp: 120 tablet, Rfl: 3    XOPENEX HFA 45 MCG/ACT inhaler, Inhale 1-2 puffs every 4 (four) hours as needed , Disp: , Rfl:     azithromycin (ZITHROMAX) 250 mg tablet, Take 2 tablets today then 1 tablet daily x 4 days, Disp: 6 tablet, Rfl: 0    benzonatate (TESSALON) 200 MG capsule, Take 1 capsule (200 mg total) by mouth 3 (three) times a day as needed for cough, Disp: 30 capsule, Rfl: 0    dexamethasone (DECADRON) 2 mg tablet, Take 1 tablet (2 mg total) by mouth daily with breakfast (Patient not taking: Reported on 1/10/2022 ), Disp: 5 tablet, Rfl: 1    divalproex sodium (DEPAKOTE ER) 500 mg 24 hr tablet, Take 1 tablet (500 mg total) by mouth daily (Patient not taking: Reported on 1/10/2022 ), Disp: 5 tablet, Rfl: 0    methylPREDNISolone 4 MG tablet therapy pack, Use as directed on package, Disp: 1 each, Rfl: 0    Riboflavin 100 MG TABS, Take 1 tablet (100 mg total) by mouth 4 (four) times a day, Disp: 120 tablet, Rfl: 3    Current Facility-Administered Medications:     albuterol inhalation solution 2 5 mg, 2 5 mg, Nebulization, Q6H PRN, Alayna Forde PA-C    medroxyPROGESTERone acetate (DEPO-PROVERA SYRINGE) IM injection 150 mg, 150 mg, Intramuscular, Q3 Months, KETAN Osorio, 150 mg at 11/16/21 1513    Current Allergies     Allergies as of 01/16/2022 - Reviewed 01/16/2022   Allergen Reaction Noted    Nuts - food allergy Other (See Comments) 02/11/2020    Other Hives 05/29/2020    Pollen extract  09/10/2014            The following portions of the patient's history were reviewed and updated as appropriate: allergies, current medications, past family history, past medical history, past social history, past surgical history and problem list      Past Medical History:   Diagnosis Date    Anxiety     Asthma     Dizziness     at times    GERD (gastroesophageal reflux disease)     Hammertoe of left foot     Headache     occ    Hyperlipemia     Hypermobile joints     Irritable bowel syndrome     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HCC)     Mast cell disorder     Migraine     PONV (postoperative nausea and vomiting)     POTS (postural orthostatic tachycardia syndrome)      infant     Wears glasses        Past Surgical History:   Procedure Laterality Date    COLONOSCOPY      EGD AND COLONOSCOPY N/A 1/10/2017    Procedure: EGD AND COLONOSCOPY;  Surgeon: Jack Smalls MD;  [facility]; Service:     ESOPHAGOGASTRODUODENOSCOPY      with biopsy    FOOT SURGERY      Excision of lesion feet benign    DE REPAIR OF HAMMERTOE,ONE Left 2019    Procedure: 2nd arthrodesis; 5th arthroplasty, flexor tenotomy 2,3,4,5 ;  Surgeon: Martha Fleming DPM;  [facility];  Service: Podiatry    DE REPAIR OF Castillo Mering Left 2020    Procedure: 2ND TOE Revision hammertoe with broken implant;  Surgeon: Martha Fleming DPM;  [facility];  Service: Podiatry    UPPER GASTROINTESTINAL ENDOSCOPY  2021    WISDOM TOOTH EXTRACTION         Family History   Problem Relation Age of Onset    Gestational diabetes Mother     Migraines Mother     Anxiety disorder Father     Hyperlipidemia Father     Autism Brother     Diabetes Other     Uterine cancer Maternal Grandmother     Rheumatic fever Maternal Grandmother     Diabetes Maternal Grandfather     Hypertension Maternal Grandfather     Heart attack Maternal Grandfather     Stroke Maternal Grandfather     Hyperlipidemia Maternal Grandfather     Hypertension Paternal Grandmother     Anxiety disorder Paternal Grandmother     Hypertension Paternal Grandfather          Medications have been verified          Objective   Pulse 100   Temp 98 3 °F (36 8 °C) (Tympanic)   Resp 18   Ht 5' 7" (1 702 m)   Wt 81 6 kg (180 lb)   SpO2 98%   BMI 28 19 kg/m²   No LMP recorded  Patient has had an injection  Physical Exam     Physical Exam  Constitutional:       General: She is not in acute distress  Appearance: She is well-developed  She is not diaphoretic  HENT:      Head: Normocephalic and atraumatic  Right Ear: Hearing, tympanic membrane, ear canal and external ear normal  No decreased hearing noted  No tenderness  No middle ear effusion  Left Ear: Hearing, tympanic membrane, ear canal and external ear normal       Nose: Mucosal edema and rhinorrhea present  Right Sinus: No maxillary sinus tenderness or frontal sinus tenderness  Left Sinus: No maxillary sinus tenderness or frontal sinus tenderness  Mouth/Throat:      Pharynx: Posterior oropharyngeal erythema present  No oropharyngeal exudate or uvula swelling  Tonsils: No tonsillar abscesses  Eyes:      General: No scleral icterus  Right eye: No discharge  Left eye: No discharge  Conjunctiva/sclera: Conjunctivae normal       Pupils: Pupils are equal, round, and reactive to light  Cardiovascular:      Rate and Rhythm: Normal rate and regular rhythm  Heart sounds: Normal heart sounds  No murmur heard  No friction rub  No gallop  Pulmonary:      Effort: Pulmonary effort is normal  No respiratory distress  Breath sounds: Wheezing present  No rales  Musculoskeletal:         General: Normal range of motion  Cervical back: Normal range of motion and neck supple  Lymphadenopathy:      Cervical: No cervical adenopathy  Skin:     General: Skin is warm and dry  Coloration: Skin is not pale  Findings: No erythema or rash  Neurological:      Mental Status: She is alert and oriented to person, place, and time  Cranial Nerves: No cranial nerve deficit

## 2022-01-16 NOTE — PATIENT INSTRUCTIONS
Take antibiotic as prescribed  Start Medrol Dosepak   May take Tessalon Perles up to 3 times daily to help with cough  Continue DayQuil and NyQuil, would avoid NSAIDs such as ibuprofen while on Medrol Dosepak    Return if symptoms fail to improve in 1 week   Go to ER with high fever, difficulty breathing or any other distress

## 2022-01-16 NOTE — LETTER
[address] [facility] [address] [facility] [address]  Dept: [phone]    January 16, 2022    Patient: Kristina Nazario  YOB: 2000    Kristina Nazario was seen and evaluated at our [facility]  Please note if Covid and Flu tests are negative, they may return to work when fever free for 24 hours without the use of a fever reducing agent  If Covid or Flu test is positive, they may return to work on 01/18/2022, as this is 5 days from the onset of symptoms  Upon return, they must then adhere to strict masking for an additional 5 days      Sincerely,    Brad Angelo PA-C

## 2022-01-18 LAB
FLUAV RNA RESP QL NAA+PROBE: NEGATIVE
FLUBV RNA RESP QL NAA+PROBE: NEGATIVE
SARS-COV-2 RNA RESP QL NAA+PROBE: NEGATIVE

## 2022-01-20 ENCOUNTER — TELEPHONE (OUTPATIENT)
Dept: PSYCHIATRY | Facility: CLINIC | Age: 22
End: 2022-01-20

## 2022-01-20 DIAGNOSIS — F41.1 GAD (GENERALIZED ANXIETY DISORDER): ICD-10-CM

## 2022-01-20 RX ORDER — VENLAFAXINE HYDROCHLORIDE 37.5 MG/1
37.5 CAPSULE, EXTENDED RELEASE ORAL DAILY
Qty: 90 CAPSULE | Refills: 0 | Status: SHIPPED | OUTPATIENT
Start: 2022-01-20 | End: 2022-04-19 | Stop reason: SDUPTHER

## 2022-01-20 RX ORDER — VENLAFAXINE HYDROCHLORIDE 75 MG/1
75 CAPSULE, EXTENDED RELEASE ORAL DAILY
Qty: 90 CAPSULE | Refills: 0 | Status: SHIPPED | OUTPATIENT
Start: 2022-01-20 | End: 2022-04-19 | Stop reason: SDUPTHER

## 2022-01-20 NOTE — TELEPHONE ENCOUNTER
Received a message from [facility] that [address] [facility] needed a refill for venlafaxine  Carin reviewed and [address] [facility] [facility] called to review the prescription was sent to the pharmacy

## 2022-01-24 ENCOUNTER — SOCIAL WORK (OUTPATIENT)
Dept: BEHAVIORAL/MENTAL HEALTH CLINIC | Facility: CLINIC | Age: 22
End: 2022-01-24
Payer: COMMERCIAL

## 2022-01-24 DIAGNOSIS — F41.1 GAD (GENERALIZED ANXIETY DISORDER): ICD-10-CM

## 2022-01-24 PROCEDURE — 90834 PSYTX W PT 45 MINUTES: CPT | Performed by: SOCIAL WORKER

## 2022-01-24 NOTE — PSYCH
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  A:  Yadiel Steve was able to accept feedback on ways that her anxiety continues to impact her functioning and how much anxiety that both of her parents struggle with, as well  At present her anxiety is manifesting in her worries about COVID    P:  Upcoming sessions will be used to continue to support Nury with all of the above   MBSR techniques will also  be taught during upcoming therapy sessions     [address] [place] [address]: Diagnosis and Treatment Plan explained to Nury, Nury relates understanding diagnosis and is agreeable to Treatment Plan  Yes      This note was not shared with the patient due to this is a psychotherapy note    Encounter Diagnoses   Name Primary?     INES (generalized anxiety disorder)

## 2022-01-27 ENCOUNTER — CLINICAL SUPPORT (OUTPATIENT)
Dept: FAMILY MEDICINE CLINIC | Facility: CLINIC | Age: 22
End: 2022-01-27
Payer: COMMERCIAL

## 2022-01-27 VITALS — TEMPERATURE: 98 F

## 2022-01-27 DIAGNOSIS — Z23 ENCOUNTER FOR IMMUNIZATION: Primary | ICD-10-CM

## 2022-01-27 PROCEDURE — 90746 HEPB VACCINE 3 DOSE ADULT IM: CPT

## 2022-01-27 PROCEDURE — 90471 IMMUNIZATION ADMIN: CPT

## 2022-01-31 DIAGNOSIS — I49.8 POTS (POSTURAL ORTHOSTATIC TACHYCARDIA SYNDROME): ICD-10-CM

## 2022-01-31 RX ORDER — MEDROXYPROGESTERONE ACETATE 150 MG/ML
150 INJECTION, SUSPENSION INTRAMUSCULAR
Qty: 1 ML | Refills: 0 | Status: CANCELLED | OUTPATIENT
Start: 2022-01-31

## 2022-02-01 DIAGNOSIS — G43.011 INTRACTABLE MIGRAINE WITHOUT AURA AND WITH STATUS MIGRAINOSUS: ICD-10-CM

## 2022-02-01 RX ORDER — VERAPAMIL HYDROCHLORIDE 120 MG/1
240 TABLET, FILM COATED ORAL 2 TIMES DAILY
Qty: 120 TABLET | Refills: 6 | Status: SHIPPED | OUTPATIENT
Start: 2022-02-01 | End: 2022-05-23 | Stop reason: SDUPTHER

## 2022-02-02 ENCOUNTER — CLINICAL SUPPORT (OUTPATIENT)
Dept: OBGYN CLINIC | Facility: CLINIC | Age: 22
End: 2022-02-02
Payer: COMMERCIAL

## 2022-02-02 VITALS
HEIGHT: 67 IN | WEIGHT: 193.4 LBS | BODY MASS INDEX: 30.35 KG/M2 | SYSTOLIC BLOOD PRESSURE: 116 MMHG | DIASTOLIC BLOOD PRESSURE: 66 MMHG

## 2022-02-02 DIAGNOSIS — Z30.42 ENCOUNTER FOR DEPO-PROVERA CONTRACEPTION: Primary | ICD-10-CM

## 2022-02-02 PROCEDURE — 96372 THER/PROPH/DIAG INJ SC/IM: CPT | Performed by: STUDENT IN AN ORGANIZED HEALTH CARE EDUCATION/TRAINING PROGRAM

## 2022-02-02 RX ORDER — MEDROXYPROGESTERONE ACETATE 150 MG/ML
150 INJECTION, SUSPENSION INTRAMUSCULAR
Status: SHIPPED | OUTPATIENT
Start: 2022-02-02

## 2022-02-02 RX ADMIN — MEDROXYPROGESTERONE ACETATE 150 MG: 150 INJECTION, SUSPENSION INTRAMUSCULAR at 15:05

## 2022-02-02 NOTE — PROGRESS NOTES
Pt is here for Depo Provera injection  Her last dose was 11/16/2021  Her annual exam was on 10/06/2021  Urine pregnancy test done: N   Result: N/A  Depo given in L Buttock  Tolerated well  Lot [id number]  Exp 06/2023  Next dose due April 20 - May 4

## 2022-02-03 ENCOUNTER — OFFICE VISIT (OUTPATIENT)
Dept: FAMILY MEDICINE CLINIC | Facility: CLINIC | Age: 22
End: 2022-02-03
Payer: COMMERCIAL

## 2022-02-03 VITALS
WEIGHT: 193 LBS | HEART RATE: 76 BPM | HEIGHT: 67 IN | RESPIRATION RATE: 18 BRPM | OXYGEN SATURATION: 99 % | BODY MASS INDEX: 30.29 KG/M2 | SYSTOLIC BLOOD PRESSURE: 100 MMHG | DIASTOLIC BLOOD PRESSURE: 70 MMHG

## 2022-02-03 DIAGNOSIS — R89.4 ABNORMAL CELIAC ANTIBODY PANEL: ICD-10-CM

## 2022-02-03 DIAGNOSIS — F41.1 GAD (GENERALIZED ANXIETY DISORDER): ICD-10-CM

## 2022-02-03 DIAGNOSIS — Z76.89 ENCOUNTER TO ESTABLISH CARE WITH NEW DOCTOR: Primary | ICD-10-CM

## 2022-02-03 DIAGNOSIS — G43.709 CHRONIC MIGRAINE WITHOUT AURA WITHOUT STATUS MIGRAINOSUS, NOT INTRACTABLE: ICD-10-CM

## 2022-02-03 DIAGNOSIS — K59.04 CHRONIC IDIOPATHIC CONSTIPATION: ICD-10-CM

## 2022-02-03 DIAGNOSIS — I49.8 POTS (POSTURAL ORTHOSTATIC TACHYCARDIA SYNDROME): ICD-10-CM

## 2022-02-03 DIAGNOSIS — E66.9 OBESITY, CLASS I, BMI 30-34.9: ICD-10-CM

## 2022-02-03 DIAGNOSIS — E78.1 HYPERTRIGLYCERIDEMIA: ICD-10-CM

## 2022-02-03 DIAGNOSIS — L70.9 ACNE, UNSPECIFIED ACNE TYPE: ICD-10-CM

## 2022-02-03 DIAGNOSIS — I77.4 MEDIAN ARCUATE LIGAMENT SYNDROME (HCC): ICD-10-CM

## 2022-02-03 DIAGNOSIS — D89.40 MAST CELL ACTIVATION SYNDROME (HCC): ICD-10-CM

## 2022-02-03 DIAGNOSIS — J30.2 SEASONAL ALLERGIES: ICD-10-CM

## 2022-02-03 PROCEDURE — 99204 OFFICE O/P NEW MOD 45 MIN: CPT | Performed by: FAMILY MEDICINE

## 2022-02-03 NOTE — PROGRESS NOTES
FAMILY PRACTICE OFFICE VISIT    NAME: Samson Chavez    AGE: 22 y o  SEX: female  : 2000   MRN: [id number]    DATE: 2/3/2022  TIME: 11:10 AM    Assessment and Plan      1  Encounter to establish care with new doctor  Anticipatory guidance and preventative medicine discussed  Pt just finished another course of hep B immunizations and completed less than 2 weeks ago - so will hold off on adacel today and give at f/u visit  Had covid shots X 3 and flu shot  2  Obesity, Class I, BMI 30-34 9  Urge regular exercise  3  POTS (postural orthostatic tachycardia syndrome)  Sees cardio at [place]      4  Median arcuate ligament syndrome ([address] )      5  Chronic migraine without aura without status migrainosus, not intractable  Pt on prophylactic meds  To consider readding magnesium to regimine which could help     Generalized anxiety disorder  Stable on effexor  Not taking xanax  Seeing psychiatry and counselor  7  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HCC)  stable    8  Seasonal allergies  stable    9  Hypertriglyceridemia  Due for labs  Patient to call for results if he/she does not hear from us      10  Abnormal celiac antibody panel  bx was (-)  Pt follows a gluten free diet    11  Chronic idiopathic constipation  Currently stable  Takes linzess prn    12  Acne, unspecified acne type  Controlled with current regimine      There are no Patient Instructions on file for this visit      Follows at [facility] with cardio for POTS  Sees psychiatry  And neuro  And gyn  And GI    Return In 6 mos  Can order routine labs at that time    Patient Instructions   Begin taking calcium 1000 - 1200 mg daily  Along with vit D - 1,000 - 2,000 IU's daily    Consider adding mag supplement 400 mg daily for migraine prevention and to help with constipation and anxiety            Chief Complaint     Chief Complaint   Patient presents with   [address]       History of Present Illness   Samson Chavez is a 22y o -year-old female who presents today as a new patient to get established  Pt with complex PMH  And prior PCP just left the practice  Pt is a nursing student at [employer]    Had pneumonia a couple weeks ago - not hospitalized; fully recovered  Did not have covid  Had covid shots - X 3      Review of Systems   Review of Systems   Constitutional: Negative for chills, diaphoresis and fever  Working out regularly at gym and doing well with POTS     Eyes:        Had vision change from working on the computer  So will be getting new rx for glasses  Respiratory: Negative for cough, shortness of breath and wheezing  Uses xopenex and asmanex during times of illness  Recovered from recent pneumonia - not currently using MDI's     Cardiovascular: Negative for chest pain and palpitations  Gastrointestinal: Negative for abdominal pain  Chronic constipation  Uses linzess prn but daily use was not helping much     Neurological:        No syncopal episodes from POTS  Migraines began in 7/2021 - and were lasting X 2 weeks - pt has a migraine cocktail  Usually has a daily headache but in general  - less migraines  Triggered by stress - full time nursing program     Psychiatric/Behavioral: Negative for self-injury and suicidal ideas  Has not had to take xanax in over 2 years  Is on effexor and seeing psych and counselor  And symptoms controlled           Active Problem List     Patient Active Problem List   Diagnosis    INES (generalized anxiety disorder)    Hypermobile joints    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HCC)    Median arcuate ligament syndrome (HCC)    Menorrhagia    POTS (postural orthostatic tachycardia syndrome)    Seasonal allergic rhinitis    Hypertriglyceridemia    Hammertoe of left foot    Chronic migraine without aura without status migrainosus, not intractable    Irritable bowel syndrome (IBS)    Dyspepsia    Seasonal allergies    Uncomplicated asthma    Abnormal celiac antibody panel    Chronic idiopathic constipation    Acute cough    Acute bacterial bronchitis         Past Medical History:  Past Medical History:   Diagnosis Date    Anxiety     Asthma     Dizziness     at times    GERD (gastroesophageal reflux disease)     Hammertoe of left foot     Headache     occ    Hyperlipemia     Hypermobile joints     Irritable bowel syndrome     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HCC)     Mast cell disorder     Migraine     PONV (postoperative nausea and vomiting)     POTS (postural orthostatic tachycardia syndrome)      infant     Wears glasses        Past Surgical History:  Past Surgical History:   Procedure Laterality Date    COLONOSCOPY      EGD AND COLONOSCOPY N/A 1/10/2017    Procedure: EGD AND COLONOSCOPY;  Surgeon: Iris Jim MD;  Location: [facility];   Service:     ESOPHAGOGASTRODUODENOSCOPY      with biopsy    FOOT SURGERY      Excision of lesion feet benign    CO REPAIR OF HAMMERTOE,ONE Left 2019    Procedure: 2nd arthrodesis; 5th arthroplasty, flexor tenotomy 2,3,4,5 ;  Surgeon: Hilda Farrell DPM;  [facility];  Service: Podiatry    CO REPAIR OF Shade Centeno Left 2020    Procedure: 2ND TOE Revision hammertoe with broken implant;  Surgeon: iHlda Farrell DPM;  [facility];  Service: Podiatry    UPPER GASTROINTESTINAL ENDOSCOPY  2021    WISDOM TOOTH EXTRACTION         Family History:  Family History   Problem Relation Age of Onset    Gestational diabetes Mother     Migraines Mother     Anxiety disorder Father     Hyperlipidemia Father     Autism Brother     Diabetes Other     Uterine cancer Maternal Grandmother     Rheumatic fever Maternal Grandmother     Diabetes Maternal Grandfather     Hypertension Maternal Grandfather     Heart attack Maternal Grandfather     Stroke Maternal Grandfather     Hyperlipidemia Maternal Grandfather     Hypertension Paternal Grandmother     Anxiety disorder Paternal Grandmother     Hypertension Paternal Grandfather        Social History:  Social History     Socioeconomic History    Marital status: Single     Spouse name: Not on file    Number of children: Not on file    Years of education: Not on file    Highest education level: Not on file   Occupational History    Not on file   Tobacco Use    Smoking status: Never Smoker    Smokeless tobacco: Never Used   Vaping Use    Vaping Use: Never used   Substance and Sexual Activity    Alcohol use: No    Drug use: No     Comment: 2/9/20- not asked, parent at bedside   Sexual activity: Never   Other Topics Concern    Not on file   Social History Narrative    Lives with parents     Social Determinants of Health     Financial Resource Strain: Not on file   Food Insecurity: Not on file   Transportation Needs: Not on file   Physical Activity: Not on file   Stress: Not on file   Social Connections: Not on file   Intimate Partner Violence: Not on file   Housing Stability: Not on file       Objective     Vitals:    02/03/22 1055   BP: 100/70   Pulse: 76   Resp: 18   SpO2: 99%     Wt Readings from Last 3 Encounters:   02/03/22 87 5 kg (193 lb)   02/02/22 87 7 kg (193 lb 6 4 oz)   01/16/22 81 6 kg (180 lb)       Physical Exam  Vitals and nursing note reviewed  Constitutional:       General: She is not in acute distress  Appearance: Normal appearance  She is not ill-appearing or toxic-appearing  HENT:      Right Ear: Tympanic membrane normal  There is no impacted cerumen  Left Ear: Tympanic membrane normal  There is no impacted cerumen  Mouth/Throat:      Mouth: Mucous membranes are moist       Pharynx: No posterior oropharyngeal erythema  Eyes:      General: No scleral icterus  Pupils: Pupils are equal, round, and reactive to light  Neck:      Vascular: No carotid bruit  Cardiovascular:      Rate and Rhythm: Normal rate and regular rhythm  Pulses: Normal pulses        Heart sounds: Normal heart sounds  No murmur heard  Pulmonary:      Effort: Pulmonary effort is normal  No respiratory distress  Breath sounds: Normal breath sounds  No wheezing, rhonchi or rales  Abdominal:      General: Abdomen is flat  There is no distension  Palpations: Abdomen is soft  There is no mass  Tenderness: There is no abdominal tenderness  There is no guarding or rebound  Musculoskeletal:      Cervical back: Neck supple  Right lower leg: No edema  Left lower leg: No edema  Lymphadenopathy:      Cervical: No cervical adenopathy  Skin:     General: Skin is warm  Coloration: Skin is not jaundiced  Neurological:      General: No focal deficit present  Mental Status: She is alert and oriented to person, place, and time  Psychiatric:         Mood and Affect: Mood normal          Behavior: Behavior normal          Thought Content:  Thought content normal          Judgment: Judgment normal          Pertinent Laboratory/Diagnostic Studies:  Lab Results   Component Value Date    BUN 17 10/23/2021    CREATININE 0 78 10/23/2021    CALCIUM 9 3 10/23/2021    K 4 0 10/23/2021    CO2 23 10/23/2021     10/23/2021     Lab Results   Component Value Date    ALT 23 10/23/2021    AST 17 10/23/2021    ALKPHOS 101 10/23/2021       Lab Results   Component Value Date    WBC 4 26 (L) 07/02/2021    HGB 13 7 07/02/2021    HCT 41 6 07/02/2021    MCV 88 07/02/2021     07/02/2021       No results found for: TSH    No results found for: CHOL  Lab Results   Component Value Date    TRIG 96 05/01/2021     Lab Results   Component Value Date    HDL 44 05/01/2021     Lab Results   Component Value Date    LDLCALC 82 05/01/2021     Lab Results   Component Value Date    HGBA1C 5 1 05/01/2021       Results for orders placed or performed in visit on 01/16/22   Cov/Flu-Collected at [facility] or [facility]    Specimen: Nose; Nares   Result Value Ref Range    SARS-CoV-2 Negative Negative    INFLUENZA A PCR Negative Negative    INFLUENZA B PCR Negative Negative       No orders of the defined types were placed in this encounter  ALLERGIES:  Allergies   Allergen Reactions    Nuts - Food Allergy Other (See Comments)     Avani Nuts - itchy throat    Other Hives      At surgical site and IV site- not sure if type of cleanser used    Pollen Extract        Current Medications     Current Outpatient Medications   Medication Sig Dispense Refill    acetaminophen (TYLENOL) 500 mg tablet Take 1,000 mg by mouth every 4 (four) hours as needed       ALPRAZolam (XANAX) 0 25 mg tablet Take 0 25 mg by mouth 2 (two) times a day as needed       ASMANEX  MCG/ACT AERO Inhale 400 mcg every 4 (four) hours as needed (2 puffs)       butalbital-acetaminophen-caffeine (FIORICET,ESGIC) -40 mg per tablet Take 1 tablet by mouth every 4 (four) hours as needed for headaches 30 tablet 0    cetirizine (ZyrTEC) 10 mg tablet Take 10 mg by mouth daily      clindamycin (CLEOCIN T) 1 % lotion Apply topically daily To face (Patient taking differently: Apply 1 application topically daily To face ) 60 mL 0    cyproheptadine (PERIACTIN) 4 mg tablet Take 4 mg by mouth daily at bedtime       dexamethasone (DECADRON) 2 mg tablet Take 1 tablet (2 mg total) by mouth daily with breakfast 5 tablet 1    divalproex sodium (DEPAKOTE ER) 500 mg 24 hr tablet Take 1 tablet (500 mg total) by mouth daily 5 tablet 0    Elastic Bandages & Supports (TRUFORM STOCKINGS 20-30MMHG) MISC Use as directed        fludrocortisone (FLORINEF) 0 1 mg tablet Take 0 1 mg by mouth 2 (two) times a day        indomethacin (INDOCIN) 50 mg capsule Take 1 capsule (50 mg total) by mouth 3 (three) times a day with meals (Patient taking differently: Take 50 mg by mouth as needed  ) 15 capsule 3    linaCLOtide (Linzess) 72 MCG CAPS Take 1 capsule by mouth daily before breakfast (Patient taking differently: Take 1 capsule by mouth daily as needed ) 90 capsule 3    medroxyPROGESTERone acetate (DEPO-PROVERA SYRINGE) 150 mg/mL injection Inject 1 mL (150 mg total) into a muscle every 3 (three) months Every three months 1 mL 3    metoprolol succinate (TOPROL-XL) 25 mg 24 hr tablet Take 50 mg by mouth 2 (two) times a day        naproxen (EC NAPROSYN) 500 MG EC tablet Take 1 tablet (500 mg total) by mouth 2 (two) times a day with meals (Patient taking differently: Take 500 mg by mouth as needed  ) 60 tablet 0    Omega-3 Fatty Acids (FISH OIL) 1,000 mg Take 1,000 mg by mouth 2 (two) times a day       Rimegepant Sulfate (Nurtec) 75 MG TBDP Take 75 mg by mouth as needed (migraine) Limit of 1 in 24 hours 8 tablet 3    scopolamine (TRANSDERM-SCOP) 1 mg/3 days TD 72 hr patch Place 1 patch on the skin every third day (Patient taking differently: Place 1 patch on the skin as needed  ) 10 patch 0    sucralfate (CARAFATE) 1 g tablet Take 1 tablet (1 g total) by mouth 3 (three) times a day as needed (headache-take before indomethacin) 15 tablet 3    tretinoin (RETIN-A) 0 025 % cream Apply topically daily at bedtime 45 g 2    venlafaxine (EFFEXOR-XR) 37 5 mg 24 hr capsule Take 1 capsule (37 5 mg total) by mouth daily Take with one 75 mg capsule  Total daily dose is 112 5 mg 90 capsule 0    venlafaxine (EFFEXOR-XR) 75 mg 24 hr capsule Take 1 capsule (75 mg total) by mouth daily Take with one 37 5 mg capsule   Total daily dose is 112 5 mg 90 capsule 0    verapamil (CALAN) 120 mg tablet Take 2 tablets (240 mg total) by mouth 2 (two) times a day 120 tablet 6    XOPENEX HFA 45 MCG/ACT inhaler Inhale 1-2 puffs every 4 (four) hours as needed       benzonatate (TESSALON) 200 MG capsule Take 1 capsule (200 mg total) by mouth 3 (three) times a day as needed for cough (Patient not taking: Reported on 2/3/2022 ) 30 capsule 0    methylPREDNISolone 4 MG tablet therapy pack Use as directed on package (Patient not taking: Reported on 2/3/2022 ) 1 each 0    Riboflavin 100 MG TABS Take 1 tablet (100 mg total) by mouth 4 (four) times a day 120 tablet 3     Current Facility-Administered Medications   Medication Dose Route Frequency Provider Last Rate Last Admin    albuterol inhalation solution 2 5 mg  2 5 mg Nebulization Q6H PRN Vick Marcial PA-C        medroxyPROGESTERone acetate (DEPO-PROVERA SYRINGE) IM injection 150 mg  150 mg Intramuscular Q3 Months KETAN Chacon   150 mg at 11/16/21 1513    medroxyPROGESTERone acetate (DEPO-PROVERA SYRINGE) IM injection 150 mg  150 mg Intramuscular Q3 Months Nuria Templeton MD   150 mg at 02/02/22 1505         Health Maintenance     Health Maintenance   Topic Date Due    Hepatitis C Screening  Never done    Pneumococcal Vaccine: Pediatrics (0 to 5 Years) and At-Risk Patients (6 to 59 Years) (1 of 2 - PPSV23) Never done    HIV Screening  Never done    BMI: Followup Plan  07/08/2021    DTaP,Tdap,and Td Vaccines (7 - Td or Tdap) 08/21/2022    Annual Physical  10/06/2022    Depression Screening  01/16/2023    BMI: Adult  02/03/2023    Hepatitis B Vaccine  Completed    Hepatitis A Vaccine  Completed    Influenza Vaccine  Completed    HPV Vaccine  Completed    COVID-19 Vaccine  Completed    HIB Vaccine  Aged Out    IPV Vaccine  Aged Out    Meningococcal ACWY Vaccine  Aged Out     Immunization History   Administered Date(s) Administered    COVID-19 PFIZER VACCINE 0 3 ML IM 12/20/2020, 01/10/2021, 09/25/2021    DTaP 2001, 2001, 2001, 06/19/2002, 07/19/2006    HPV9 05/20/2021, 09/08/2021, 01/05/2022    Hep A, adult 06/12/2019, 02/24/2020    Hep B, adult 2001, 2001, 06/19/2002, 07/19/2021, 08/19/2021, 01/27/2022    INFLUENZA 11/05/2016, 11/05/2016, 10/01/2020, 11/01/2021    IPV 2001, 2001, 06/19/2002    MMR 03/25/2002    Meningococcal MCV4P 08/21/2012    Tdap 08/21/2012    Varicella 03/25/2002, 08/27/2009     BMI Counseling: Body mass index is 30 23 kg/m²   The BMI is above normal  Nutrition recommendations include decreasing portion sizes, encouraging healthy choices of fruits and vegetables, decreasing fast food intake, consuming healthier snacks, limiting drinks that contain sugar, moderation in carbohydrate intake, increasing intake of lean protein, reducing intake of saturated and trans fat and reducing intake of cholesterol  Exercise recommendations include exercising 3-5 times per week  No pharmacotherapy was ordered  Rationale for BMI follow-up plan is due to patient being overweight or obese  Pt exercises regularly      Depression Screening and Follow-up Plan: Patient was screened for depression during today's encounter  They screened negative with a PHQ-2 score of 0          Tarun Bowden DO

## 2022-02-03 NOTE — PATIENT INSTRUCTIONS
Begin taking calcium 1000 - 1200 mg daily  Along with vit D - 1,000 - 2,000 IU's daily    Consider adding mag supplement 400 mg daily for migraine prevention and to help with constipation and anxiety

## 2022-02-07 ENCOUNTER — TELEPHONE (OUTPATIENT)
Dept: OBGYN CLINIC | Facility: CLINIC | Age: 22
End: 2022-02-07

## 2022-02-07 ENCOUNTER — PATIENT MESSAGE (OUTPATIENT)
Dept: OBGYN CLINIC | Facility: CLINIC | Age: 22
End: 2022-02-07

## 2022-02-07 NOTE — TELEPHONE ENCOUNTER
----- Message from Cruz Maradiaga sent at 2/7/2022 11:17 AM EST -----  Regarding: Depo Injection   Mike Mccoy! I am reaching out due to a few concerns Rachelle had about the Depo Injection Rahcelle been on for for awhile now! I am not certain of the start date of when we first started the depo injection but I think its been at least two years now  I was 165lbs at one point in 2020 and now Im around 190lbs  I go to the gym 6 days a week (doing weight lifting and spin classes) as well as I have a pretty good diet! Nothing has changed from 2020-now with my exercising or diet  I know that weight gain is a side effect of the depo, and I dont want to gain anymore weight!!    Also, I have been having daily headaches for the last 6 months  I have been under care of a neurologist and we have tried a lot of pharmacological treatments that are not working  I am unsure if the depo has any role in these headaches but am curious as to if they would go away if I stopped the depo! ?    With those two big concerns, I think I would like to stop the depo injection and give my body a break for a little bit over the summer if possible! I just had an injection last week so I would have to wait till the next one in April to make any changes  I originally started birth control due to how bad my pots was in 2016  Now I feel like I have a good handle on it and can handle bad periods if thats the way they come back! Please let me know your thoughts and if you would rather meet with me in person!      Thank you,  Srinivasa Hernandez

## 2022-02-09 NOTE — TELEPHONE ENCOUNTER
I am absolutely ok with her doing a trial off of her Depo  I think it is a good idea given the weight gain and headaches  Please let her know that it can be up to 6 months before it is completely cleared  If she is happy, then that's definitely ok  If she wants to go back on it, also no problem  Thank you!

## 2022-02-10 ENCOUNTER — SOCIAL WORK (OUTPATIENT)
Dept: BEHAVIORAL/MENTAL HEALTH CLINIC | Facility: CLINIC | Age: 22
End: 2022-02-10
Payer: COMMERCIAL

## 2022-02-10 DIAGNOSIS — F41.1 GAD (GENERALIZED ANXIETY DISORDER): ICD-10-CM

## 2022-02-10 PROCEDURE — 90834 PSYTX W PT 45 MINUTES: CPT | Performed by: SOCIAL WORKER

## 2022-02-10 NOTE — BH TREATMENT PLAN
Little Diane  2000         Date of Initial Treatment Plan: 7/18/18  Date of Current Treatment Plan: 2/10/22  Treatment Plan Number 10     Strengths/Personal Resources for Self Care: I care about others a lot, I am respectful to others, I am a rule follower, Michelle Fung to do things "right "      Diagnosis: INES     Area of Needs: My anxiety continues to impact my functioning and thinking  Jackie Prophet still feel like I always need to be doing something        Long Term Goal 1: AI want to continue to work to lessen my anxiety and to improve my self confidence       Target Date:8/10/22  Completion Date: na         Short Term Objectives for Goal 1: AI will continue to practice breathing to decrease my anxiety  BI will continue  exercising and engaging in daily self care  and CI will use therapy sessions to process issues that cause me stress and to give myself credit for my progress   D1 I will continue to challenge irrational and anxious thoughts related to new experiences ("I CAN do it ")      GOAL 1: Modality: Individual 2x per month   Completion Date na, Medication Management and The person(s) responsible for carrying out the plan is Dr Benny Dailey and Treatment Plan explained to Nury Arrington relates understanding diagnosis and is agreeable to Treatment Plan          Client Comments : Please share your thoughts, feelings, need and/or experiences regarding your treatment plan:    __________________________________________________________________    Treatment Plan done but not signed at time of office visit due to:  Plan reviewed by phone or in person  and verbal consent given due to [address] distancing

## 2022-02-10 NOTE — PSYCH
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  She shared that she has been making it to the gym 3-5 days per week and managing her schoolwork, as well  She has decided to go off her depo due to a weight gain of 25 lbs with the hope that she also sees a reduction in headaches  A:  Michaela Watson presented today as much less anxious / stressed  She has adjusted to the new semester and clinicals  She is also less overwhelmed with COVID anxiety at this time    P:  Upcoming sessions will be used to continue to support Nury with all of the above   MBSR techniques will also  be taught during upcoming therapy sessions     Bharath Phillips: Diagnosis and Treatment Plan explained to Nury, Nury relates understanding diagnosis and is agreeable to Treatment Plan  Yes      This note was not shared with the patient due to this is a psychotherapy note    Encounter Diagnoses   Name Primary?     INES (generalized anxiety disorder)

## 2022-02-10 NOTE — PSYCH
Treatment Plan Tracking    # 1Treatment Plan not completed within required time limits due to: Zara Severino presented with emotional/behavioral issues that required clinical intervention

## 2022-02-21 ENCOUNTER — SOCIAL WORK (OUTPATIENT)
Dept: BEHAVIORAL/MENTAL HEALTH CLINIC | Facility: CLINIC | Age: 22
End: 2022-02-21
Payer: COMMERCIAL

## 2022-02-21 DIAGNOSIS — F41.1 GAD (GENERALIZED ANXIETY DISORDER): ICD-10-CM

## 2022-02-21 PROCEDURE — 90834 PSYTX W PT 45 MINUTES: CPT | Performed by: SOCIAL WORKER

## 2022-02-21 NOTE — PSYCH
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 while preparing for two exams  In addition, she verbalized frustration with her conflictual relationship with her sister  A:  Elsa Hong presented today as very insightful today as she shared re: the above  Her anxiety / awareness is much decreased this semester      P:  Upcoming sessions will be used to continue to support Nury with all of the above   MBSR techniques will also  be taught during upcoming therapy sessions     Bharath Phillips: Diagnosis and Treatment Plan explained to Nury, Nury relates understanding diagnosis and is agreeable to Treatment Plan  Yes      This note was not shared with the patient due to this is a psychotherapy note    Encounter Diagnoses   Name Primary?     INES (generalized anxiety disorder)

## 2022-03-07 ENCOUNTER — SOCIAL WORK (OUTPATIENT)
Dept: BEHAVIORAL/MENTAL HEALTH CLINIC | Facility: CLINIC | Age: 22
End: 2022-03-07
Payer: COMMERCIAL

## 2022-03-07 DIAGNOSIS — F41.1 GAD (GENERALIZED ANXIETY DISORDER): ICD-10-CM

## 2022-03-07 PROCEDURE — 90834 PSYTX W PT 45 MINUTES: CPT | Performed by: SOCIAL WORKER

## 2022-03-07 NOTE — PSYCH
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  She discussed differences in the material and ways she is "mastering" it  Yadiel Steve also reported changes with her POTS medications as she is at a stable point in her current treatment regimen  A:  Yadiel Steve once again presented today as very insightful today as she shared re: her response to her parents when they were not particularly supportive of her  She continues to demonstrate increased assertiveness and mindfulness    P:  Upcoming sessions will be used to continue to support Nury with all of the above   MBSR techniques will also  be taught during upcoming therapy sessions     Bharath Phillips: Diagnosis and Treatment Plan explained to Nury, Nury relates understanding diagnosis and is agreeable to Treatment Plan  Yes      This note was not shared with the patient due to this is a psychotherapy note    Encounter Diagnoses   Name Primary?     INES (generalized anxiety disorder)

## 2022-03-21 ENCOUNTER — TELEMEDICINE (OUTPATIENT)
Dept: BEHAVIORAL/MENTAL HEALTH CLINIC | Facility: CLINIC | Age: 22
End: 2022-03-21
Payer: COMMERCIAL

## 2022-03-21 DIAGNOSIS — F41.1 GAD (GENERALIZED ANXIETY DISORDER): ICD-10-CM

## 2022-03-21 PROCEDURE — 90834 PSYTX W PT 45 MINUTES: CPT | Performed by: SOCIAL WORKER

## 2022-03-21 NOTE — PSYCH
Psychotherapy Provided: Individual Psychotherapy 50 minutes      Length of time in session: 50 minutes, follow up in 2 week     Pain:1    Current suicide risk : Low      D: Merissa Lorenzana spoke today about her feelings re: her 26st birthday today  She also shared how her first 3 acupuncture sessions have (begun) to relieve her headaches  Henry Jones compared her grades (B's) to her best friend's grades (A's) and the importance of the skill of critical thinking was discussed  A:  Henry Jones once again presented today as insightful today as she shared re: the above  She is aware of her struggles to remain balanced but benefits from "reminders "   P:  Upcoming sessions will be used to continue to support Nury with all of the above   Ways to incorporate deep breathing and visualizations into her acupuncture sessions were recommended as homework      Bharath Phillips: Diagnosis and Treatment Plan explained to Nury, Nury relates understanding diagnosis and is agreeable to Treatment Plan  Yes      This note was not shared with the patient due to this is a psychotherapy note    Virtual Regular Visit    Verification of patient location:    Patient is located in the following [facility] in which I hold an active license [place]      Assessment/Plan:    Problem List Items Addressed This Visit     None          Goals addressed in session: Goal 1          Reason for visit is   Chief Complaint   Patient presents with    Virtual Regular Visit        Encounter provider [facility]    Provider located at [address] [place] [address] [facility] [address] [phone]      Recent Visits  No visits were found meeting these conditions    Showing recent visits within past 7 days and meeting all other requirements  Today's Visits  Date Type Provider Dept   03/21/22 [address] Showing today's visits and meeting all other requirements  Future Appointments  No visits were found meeting these conditions  Showing future appointments within next 150 days and meeting all other requirements       The patient was identified by name and date of birth  Blaire Kinsey was informed that this is a telemedicine visit and that the visit is being conducted through[employer] and patient was informed that this is a secure, HIPAA-compliant platform  She agrees to proceed     My office door was closed  No one else was in the room  She acknowledged consent and understanding of privacy and security of the video platform  The patient has agreed to participate and understands they can discontinue the visit at any time  Patient is aware this is a billable service  Baldomero Jack is a 25 y o  female    HPI     Past Medical History:   Diagnosis Date    Anxiety     Asthma     Dizziness     at times    GERD (gastroesophageal reflux disease)     Hammertoe of left foot     Headache     occ    Hyperlipemia     Hypermobile joints     Irritable bowel syndrome     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HCC)     Mast cell disorder     Migraine     PONV (postoperative nausea and vomiting)     POTS (postural orthostatic tachycardia syndrome)      infant     Wears glasses        Past Surgical History:   Procedure Laterality Date    COLONOSCOPY      EGD AND COLONOSCOPY N/A 1/10/2017    Procedure: EGD AND COLONOSCOPY;  Surgeon: Lori Brush MD;  [facility];   Service:     ESOPHAGOGASTRODUODENOSCOPY      with biopsy    FOOT SURGERY      Excision of lesion feet benign    NE REPAIR OF HAMMERTOE,ONE Left 2019    Procedure: 2nd arthrodesis; 5th arthroplasty, flexor tenotomy 2,3,4,5 ;  Surgeon: Keke Mason DPM;  [facility];  Service: Podiatry    NE REPAIR OF Sherly Jones Left 2020    Procedure: 2ND TOE Revision hammertoe with broken implant;  Surgeon: Katarzyna Boyer DPM;  [facility];  Service: Podiatry    UPPER GASTROINTESTINAL ENDOSCOPY  01/28/2021    WISDOM TOOTH EXTRACTION         Current Outpatient Medications   Medication Sig Dispense Refill    acetaminophen (TYLENOL) 500 mg tablet Take 1,000 mg by mouth every 4 (four) hours as needed       ALPRAZolam (XANAX) 0 25 mg tablet Take 0 25 mg by mouth 2 (two) times a day as needed       ASMANEX  MCG/ACT AERO Inhale 400 mcg every 4 (four) hours as needed (2 puffs)       butalbital-acetaminophen-caffeine (FIORICET,ESGIC) -40 mg per tablet Take 1 tablet by mouth every 4 (four) hours as needed for headaches 30 tablet 0    cetirizine (ZyrTEC) 10 mg tablet Take 10 mg by mouth daily      clindamycin (CLEOCIN T) 1 % lotion Apply topically daily To face (Patient taking differently: Apply 1 application topically daily To face ) 60 mL 0    cyproheptadine (PERIACTIN) 4 mg tablet Take 4 mg by mouth daily at bedtime       dexamethasone (DECADRON) 2 mg tablet Take 1 tablet (2 mg total) by mouth daily with breakfast 5 tablet 1    divalproex sodium (DEPAKOTE ER) 500 mg 24 hr tablet Take 1 tablet (500 mg total) by mouth daily 5 tablet 0    Elastic Bandages & Supports (TRUFORM STOCKINGS 20-30MMHG) MISC Use as directed        fludrocortisone (FLORINEF) 0 1 mg tablet Take 0 1 mg by mouth 2 (two) times a day        indomethacin (INDOCIN) 50 mg capsule Take 1 capsule (50 mg total) by mouth 3 (three) times a day with meals (Patient taking differently: Take 50 mg by mouth as needed  ) 15 capsule 3    linaCLOtide (Linzess) 72 MCG CAPS Take 1 capsule by mouth daily before breakfast (Patient taking differently: Take 1 capsule by mouth daily as needed ) 90 capsule 3    medroxyPROGESTERone acetate (DEPO-PROVERA SYRINGE) 150 mg/mL injection Inject 1 mL (150 mg total) into a muscle every 3 (three) months Every three months 1 mL 3    metoprolol succinate (TOPROL-XL) 25 mg 24 hr tablet Take 50 mg by mouth 2 (two) times a day        naproxen (EC NAPROSYN) 500 MG EC tablet Take 1 tablet (500 mg total) by mouth 2 (two) times a day with meals (Patient taking differently: Take 500 mg by mouth as needed  ) 60 tablet 0    Omega-3 Fatty Acids (FISH OIL) 1,000 mg Take 1,000 mg by mouth 2 (two) times a day       Riboflavin 100 MG TABS Take 1 tablet (100 mg total) by mouth 4 (four) times a day 120 tablet 3    Rimegepant Sulfate (Nurtec) 75 MG TBDP Take 75 mg by mouth as needed (migraine) Limit of 1 in 24 hours 8 tablet 3    scopolamine (TRANSDERM-SCOP) 1 mg/3 days TD 72 hr patch Place 1 patch on the skin every third day (Patient taking differently: Place 1 patch on the skin as needed  ) 10 patch 0    sucralfate (CARAFATE) 1 g tablet Take 1 tablet (1 g total) by mouth 3 (three) times a day as needed (headache-take before indomethacin) 15 tablet 3    tretinoin (RETIN-A) 0 025 % cream Apply topically daily at bedtime 45 g 2    venlafaxine (EFFEXOR-XR) 37 5 mg 24 hr capsule Take 1 capsule (37 5 mg total) by mouth daily Take with one 75 mg capsule  Total daily dose is 112 5 mg 90 capsule 0    venlafaxine (EFFEXOR-XR) 75 mg 24 hr capsule Take 1 capsule (75 mg total) by mouth daily Take with one 37 5 mg capsule   Total daily dose is 112 5 mg 90 capsule 0    verapamil (CALAN) 120 mg tablet Take 2 tablets (240 mg total) by mouth 2 (two) times a day 120 tablet 6    XOPENEX HFA 45 MCG/ACT inhaler Inhale 1-2 puffs every 4 (four) hours as needed        Current Facility-Administered Medications   Medication Dose Route Frequency Provider Last Rate Last Admin    albuterol inhalation solution 2 5 mg  2 5 mg Nebulization Q6H PRN Pop Mooney PA-C        medroxyPROGESTERone acetate (DEPO-PROVERA SYRINGE) IM injection 150 mg  150 mg Intramuscular Q3 Months KETAN Ervin   150 mg at 11/16/21 1513    medroxyPROGESTERone acetate (DEPO-PROVERA SYRINGE) IM injection 150 mg  150 mg Intramuscular Q3 Months Puja Woodard MD   150 mg at 02/02/22 1504        Allergies   Allergen Reactions    Nuts - Food Allergy Other (See Comments)     Avani Nuts - itchy throat    Other Hives      At surgical site and IV site- not sure if type of cleanser used    Pollen Extract      Encounter Diagnoses   Name Primary?  INES (generalized anxiety disorder)            VIRTUAL VISIT DISCLAIMER    Claude Curl verbally agrees to participate in [employer]  Pt is aware that [employer] could be limited without vital signs or the ability to perform a full hands-on physical Reford Bolus understands she or the provider may request at any time to terminate the video visit and request the patient to seek care or treatment in person

## 2022-04-02 ENCOUNTER — IMMUNIZATIONS (OUTPATIENT)
Dept: FAMILY MEDICINE CLINIC | Facility: HOSPITAL | Age: 22
End: 2022-04-02

## 2022-04-02 PROCEDURE — 91305 COVID-19 PFIZER VACC TRIS-SUCROSE GRAY CAP 0.3 ML: CPT

## 2022-04-02 PROCEDURE — 0054A COVID-19 PFIZER VACC TRIS-SUCROSE GRAY CAP 0.3 ML: CPT

## 2022-04-04 ENCOUNTER — SOCIAL WORK (OUTPATIENT)
Dept: BEHAVIORAL/MENTAL HEALTH CLINIC | Facility: CLINIC | Age: 22
End: 2022-04-04
Payer: COMMERCIAL

## 2022-04-04 DIAGNOSIS — F41.1 GAD (GENERALIZED ANXIETY DISORDER): ICD-10-CM

## 2022-04-04 PROCEDURE — 90834 PSYTX W PT 45 MINUTES: CPT | Performed by: SOCIAL WORKER

## 2022-04-04 NOTE — PSYCH
Psychotherapy Provided: Individual Psychotherapy 50 minutes      Length of time in session: 50 minutes, follow up in 2 week     Pain:1    Current suicide risk : Low      D: Chelo Oliver spoke today about her feelings re: her enhanced positive outcome following acupuncture placement in her forehead during her most recent session  She also spoke at length about her grades, interests in her classes, clinicals and future careers  A:  Neil Ruzi once again presented today as insightful today as she shared re: the above  She is aware of her potential and presented today as less anxious   P:  Upcoming sessions will be used to continue to support Nury with all of the above        [address] [place] [address] Luke: Diagnosis and Treatment Plan explained to Nury, Nury relates understanding diagnosis and is agreeable to Treatment Plan  Yes      This note was not shared with the patient due to this is a psychotherapy note    Virtual Regular Visit    Verification of patient location:    Patient is located in the following state in which I hold an active license [place]      Assessment/Plan:    Problem List Items Addressed This Visit     None          Goals addressed in session: Goal 1          Reason for visit is   No chief complaint on file  [facility]    Provider located at [address] [place] [address] [phone]      Recent Visits  No visits were found meeting these conditions  Showing recent visits within past 7 days and meeting all other requirements  Future Appointments  No visits were found meeting these conditions  Showing future appointments within next 150 days and meeting all other requirements       The patient was identified by name and date of birth   Mabel Millan was informed that this is a telemedicine visit and that the [facility] and patient was informed that this is a secure, HIPAA-compliant platform  She agrees to proceed     My office door was closed  No one else was in the room  She acknowledged consent and understanding of privacy and security of the video platform  The patient has agreed to participate and understands they can discontinue the visit at any time  Patient is aware this is a billable service  Tripp Abrams is a 25 y o  female    HPI     Past Medical History:   Diagnosis Date    Anxiety     Asthma     Dizziness     at times    GERD (gastroesophageal reflux disease)     Hammertoe of left foot     Headache     occ    Hyperlipemia     Hypermobile joints     Irritable bowel syndrome     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HCC)     Mast cell disorder     Migraine     PONV (postoperative nausea and vomiting)     POTS (postural orthostatic tachycardia syndrome)      infant     Wears glasses        Past Surgical History:   Procedure Laterality Date    COLONOSCOPY      EGD AND COLONOSCOPY N/A 1/10/2017    Procedure: EGD AND COLONOSCOPY;  Surgeon: Arsalan Davis MD;  [facility];   Service:     ESOPHAGOGASTRODUODENOSCOPY      with biopsy    FOOT SURGERY      Excision of lesion feet benign    IN REPAIR OF HAMMERTOE,ONE Left 2019    Procedure: 2nd arthrodesis; 5th arthroplasty, flexor tenotomy 2,3,4,5 ;  Surgeon: Henny Garcia DPM;  [facility];  Service: Podiatry    IN REPAIR OF Raynelle Prophet Left 2020    Procedure: 2ND TOE Revision hammertoe with broken implant;  Surgeon: Henny Garcia DPM;  [facility];  Service: Podiatry    UPPER GASTROINTESTINAL ENDOSCOPY  2021    WISDOM TOOTH EXTRACTION         Current Outpatient Medications   Medication Sig Dispense Refill    acetaminophen (TYLENOL) 500 mg tablet Take 1,000 mg by mouth every 4 (four) hours as needed       ALPRAZolam (XANAX) 0 25 mg tablet Take 0 25 mg by mouth 2 (two) times a day as needed       ASMANEX  MCG/ACT AERO Inhale 400 mcg every 4 (four) hours as needed (2 puffs)       butalbital-acetaminophen-caffeine (FIORICET,ESGIC) -40 mg per tablet Take 1 tablet by mouth every 4 (four) hours as needed for headaches 30 tablet 0    cetirizine (ZyrTEC) 10 mg tablet Take 10 mg by mouth daily      clindamycin (CLEOCIN T) 1 % lotion Apply topically daily To face (Patient taking differently: Apply 1 application topically daily To face ) 60 mL 0    cyproheptadine (PERIACTIN) 4 mg tablet Take 4 mg by mouth daily at bedtime       dexamethasone (DECADRON) 2 mg tablet Take 1 tablet (2 mg total) by mouth daily with breakfast 5 tablet 1    divalproex sodium (DEPAKOTE ER) 500 mg 24 hr tablet Take 1 tablet (500 mg total) by mouth daily 5 tablet 0    Elastic Bandages & Supports (TRUFORM STOCKINGS 20-30MMHG) MISC Use as directed        fludrocortisone (FLORINEF) 0 1 mg tablet Take 0 1 mg by mouth 2 (two) times a day        indomethacin (INDOCIN) 50 mg capsule Take 1 capsule (50 mg total) by mouth 3 (three) times a day with meals (Patient taking differently: Take 50 mg by mouth as needed  ) 15 capsule 3    linaCLOtide (Linzess) 72 MCG CAPS Take 1 capsule by mouth daily before breakfast (Patient taking differently: Take 1 capsule by mouth daily as needed ) 90 capsule 3    medroxyPROGESTERone acetate (DEPO-PROVERA SYRINGE) 150 mg/mL injection Inject 1 mL (150 mg total) into a muscle every 3 (three) months Every three months 1 mL 3    metoprolol succinate (TOPROL-XL) 25 mg 24 hr tablet Take 50 mg by mouth 2 (two) times a day        naproxen (EC NAPROSYN) 500 MG EC tablet Take 1 tablet (500 mg total) by mouth 2 (two) times a day with meals (Patient taking differently: Take 500 mg by mouth as needed  ) 60 tablet 0    Omega-3 Fatty Acids (FISH OIL) 1,000 mg Take 1,000 mg by mouth 2 (two) times a day       Riboflavin 100 MG TABS Take 1 tablet (100 mg total) by mouth 4 (four) times a day 120 tablet 3    Rimegepant Sulfate (Nurtec) 75 MG TBDP Take 75 mg by mouth as needed (migraine) Limit of 1 in 24 hours 8 tablet 3    scopolamine (TRANSDERM-SCOP) 1 mg/3 days TD 72 hr patch Place 1 patch on the skin every third day (Patient taking differently: Place 1 patch on the skin as needed  ) 10 patch 0    sucralfate (CARAFATE) 1 g tablet Take 1 tablet (1 g total) by mouth 3 (three) times a day as needed (headache-take before indomethacin) 15 tablet 3    tretinoin (RETIN-A) 0 025 % cream Apply topically daily at bedtime 45 g 2    venlafaxine (EFFEXOR-XR) 37 5 mg 24 hr capsule Take 1 capsule (37 5 mg total) by mouth daily Take with one 75 mg capsule  Total daily dose is 112 5 mg 90 capsule 0    venlafaxine (EFFEXOR-XR) 75 mg 24 hr capsule Take 1 capsule (75 mg total) by mouth daily Take with one 37 5 mg capsule  Total daily dose is 112 5 mg 90 capsule 0    verapamil (CALAN) 120 mg tablet Take 2 tablets (240 mg total) by mouth 2 (two) times a day 120 tablet 6    XOPENEX HFA 45 MCG/ACT inhaler Inhale 1-2 puffs every 4 (four) hours as needed        Current Facility-Administered Medications   Medication Dose Route Frequency Provider Last Rate Last Admin    albuterol inhalation solution 2 5 mg  2 5 mg Nebulization Q6H PRN Jefe Grier PA-C        medroxyPROGESTERone acetate (DEPO-PROVERA SYRINGE) IM injection 150 mg  150 mg Intramuscular Q3 Months KETAN Watkins   150 mg at 11/16/21 1513    medroxyPROGESTERone acetate (DEPO-PROVERA SYRINGE) IM injection 150 mg  150 mg Intramuscular Q3 Months Francisca Garcia MD   150 mg at 02/02/22 1505        Allergies   Allergen Reactions    Nuts - Food Allergy Other (See Comments)     Avani Nuts - itchy throat    Other Hives      At surgical site and IV site- not sure if type of cleanser used    Pollen Extract      Encounter Diagnoses   Name Primary?     INES (generalized anxiety disorder)              VIRTUAL VISIT DISCLAIMER    Margie Collins verbally agrees to participate in [facility]  Pt is aware that [facility] could be limited without vital signs or the ability to perform a full hands-on physical Lorene Clark understands she or the provider may request at any time to terminate the video visit and request the patient to seek care or treatment in person

## 2022-04-19 DIAGNOSIS — F41.1 GAD (GENERALIZED ANXIETY DISORDER): ICD-10-CM

## 2022-04-19 RX ORDER — VENLAFAXINE HYDROCHLORIDE 75 MG/1
75 CAPSULE, EXTENDED RELEASE ORAL DAILY
Qty: 90 CAPSULE | Refills: 0 | Status: SHIPPED | OUTPATIENT
Start: 2022-04-19 | End: 2022-07-12 | Stop reason: SDUPTHER

## 2022-04-19 RX ORDER — VENLAFAXINE HYDROCHLORIDE 37.5 MG/1
37.5 CAPSULE, EXTENDED RELEASE ORAL DAILY
Qty: 90 CAPSULE | Refills: 0 | Status: SHIPPED | OUTPATIENT
Start: 2022-04-19 | End: 2022-07-12 | Stop reason: SDUPTHER

## 2022-05-02 ENCOUNTER — SOCIAL WORK (OUTPATIENT)
Dept: BEHAVIORAL/MENTAL HEALTH CLINIC | Facility: CLINIC | Age: 22
End: 2022-05-02
Payer: COMMERCIAL

## 2022-05-02 DIAGNOSIS — F41.1 GAD (GENERALIZED ANXIETY DISORDER): ICD-10-CM

## 2022-05-02 PROCEDURE — 90834 PSYTX W PT 45 MINUTES: CPT | Performed by: SOCIAL WORKER

## 2022-05-02 NOTE — PSYCH
Psychotherapy Provided: Individual Psychotherapy 50 minutes      Length of time in session: 50 minutes, follow up in 2 week     Pain:1    Current suicide risk : Low      D: Rosanna Madera spoke today about her feelings re: her continued positive outcome following acupuncture placement in her forehead during her recent sessions  She shared that she will be using the next week to prepare for finals and then attending two full-time weeks of training followed by two full time weeks of work in her new position before starting her summer course  A:  Nichol Welch once again presented today as insightful today as she shared re: her mother and new 's plans to move in the upcoming year   P:  Upcoming sessions will be used to continue to support Nury with all of the above        [address] [place] [address] Luke: Diagnosis and Treatment Plan explained to Nury, Nury relates understanding diagnosis and is agreeable to Treatment Plan  Yes      This note was not shared with the patient due to this is a psychotherapy note    Encounter Diagnoses   Name Primary?     INES (generalized anxiety disorder)

## 2022-05-15 ENCOUNTER — HOSPITAL ENCOUNTER (EMERGENCY)
Facility: HOSPITAL | Age: 22
Discharge: HOME | End: 2022-05-15
Attending: EMERGENCY MEDICINE
Payer: COMMERCIAL

## 2022-05-15 ENCOUNTER — APPOINTMENT (EMERGENCY)
Dept: RADIOLOGY | Facility: HOSPITAL | Age: 22
End: 2022-05-15
Payer: COMMERCIAL

## 2022-05-15 VITALS
HEART RATE: 67 BPM | DIASTOLIC BLOOD PRESSURE: 56 MMHG | TEMPERATURE: 98.6 F | RESPIRATION RATE: 16 BRPM | OXYGEN SATURATION: 98 % | SYSTOLIC BLOOD PRESSURE: 103 MMHG

## 2022-05-15 DIAGNOSIS — R11.0 NAUSEA: ICD-10-CM

## 2022-05-15 DIAGNOSIS — R00.2 PALPITATIONS: Primary | ICD-10-CM

## 2022-05-15 LAB
ALBUMIN SERPL BCP-MCNC: 4.3 G/DL (ref 3.5–5)
ALP SERPL-CCNC: 98 U/L (ref 46–116)
ALT SERPL W P-5'-P-CCNC: 34 U/L (ref 12–78)
ANION GAP SERPL CALCULATED.3IONS-SCNC: 4 MMOL/L (ref 4–13)
AST SERPL W P-5'-P-CCNC: 21 U/L (ref 5–45)
ATRIAL RATE: 110 BPM
BASOPHILS # BLD AUTO: 0 THOUSANDS/ΜL (ref 0–0.1)
BASOPHILS NFR BLD AUTO: 0 % (ref 0–1)
BILIRUB SERPL-MCNC: 0.29 MG/DL (ref 0.2–1)
BUN SERPL-MCNC: 16 MG/DL (ref 5–25)
CALCIUM SERPL-MCNC: 10 MG/DL (ref 8.3–10.1)
CARDIAC TROPONIN I PNL SERPL HS: <2 NG/L
CARDIAC TROPONIN I PNL SERPL HS: <2 NG/L
CHLORIDE SERPL-SCNC: 106 MMOL/L (ref 100–108)
CO2 SERPL-SCNC: 28 MMOL/L (ref 21–32)
CREAT SERPL-MCNC: 0.98 MG/DL (ref 0.6–1.3)
EOSINOPHIL # BLD AUTO: 0 THOUSAND/ΜL (ref 0–0.61)
EOSINOPHIL NFR BLD AUTO: 0 % (ref 0–6)
ERYTHROCYTE [DISTWIDTH] IN BLOOD BY AUTOMATED COUNT: 11.7 % (ref 11.6–15.1)
EXT PREG TEST URINE: NEGATIVE
EXT. CONTROL ED NAV: NORMAL
GFR SERPL CREATININE-BSD FRML MDRD: 82 ML/MIN/1.73SQ M
GLUCOSE SERPL-MCNC: 116 MG/DL (ref 65–140)
GLUCOSE SERPL-MCNC: 121 MG/DL (ref 65–140)
HCT VFR BLD AUTO: 43.1 % (ref 34.8–46.1)
HGB BLD-MCNC: 14.7 G/DL (ref 11.5–15.4)
IMM GRANULOCYTES # BLD AUTO: 0.01 THOUSAND/UL (ref 0–0.2)
IMM GRANULOCYTES NFR BLD AUTO: 0 % (ref 0–2)
LYMPHOCYTES # BLD AUTO: 2.23 THOUSANDS/ΜL (ref 0.6–4.47)
LYMPHOCYTES NFR BLD AUTO: 43 % (ref 14–44)
MAGNESIUM SERPL-MCNC: 2 MG/DL (ref 1.6–2.6)
MCH RBC QN AUTO: 28.8 PG (ref 26.8–34.3)
MCHC RBC AUTO-ENTMCNC: 34.1 G/DL (ref 31.4–37.4)
MCV RBC AUTO: 84 FL (ref 82–98)
MONOCYTES # BLD AUTO: 0.47 THOUSAND/ΜL (ref 0.17–1.22)
MONOCYTES NFR BLD AUTO: 9 % (ref 4–12)
NEUTROPHILS # BLD AUTO: 2.48 THOUSANDS/ΜL (ref 1.85–7.62)
NEUTS SEG NFR BLD AUTO: 48 % (ref 43–75)
NRBC BLD AUTO-RTO: 0 /100 WBCS
PLATELET # BLD AUTO: 273 THOUSANDS/UL (ref 149–390)
PMV BLD AUTO: 10.4 FL (ref 8.9–12.7)
POTASSIUM SERPL-SCNC: 4.1 MMOL/L (ref 3.5–5.3)
PROT SERPL-MCNC: 8.6 G/DL (ref 6.4–8.2)
QRS AXIS: 66 DEGREES
QRSD INTERVAL: 84 MS
QT INTERVAL: 392 MS
QTC INTERVAL: 513 MS
RBC # BLD AUTO: 5.11 MILLION/UL (ref 3.81–5.12)
SODIUM SERPL-SCNC: 138 MMOL/L (ref 136–145)
T WAVE AXIS: 38 DEGREES
TSH SERPL DL<=0.05 MIU/L-ACNC: 1.43 UIU/ML (ref 0.45–4.5)
VENTRICULAR RATE: 103 BPM
WBC # BLD AUTO: 5.19 THOUSAND/UL (ref 4.31–10.16)

## 2022-05-15 PROCEDURE — 99285 EMERGENCY DEPT VISIT HI MDM: CPT | Performed by: EMERGENCY MEDICINE

## 2022-05-15 PROCEDURE — 71046 X-RAY EXAM CHEST 2 VIEWS: CPT

## 2022-05-15 PROCEDURE — 84484 ASSAY OF TROPONIN QUANT: CPT

## 2022-05-15 PROCEDURE — 93010 ELECTROCARDIOGRAM REPORT: CPT | Performed by: INTERNAL MEDICINE

## 2022-05-15 PROCEDURE — 81025 URINE PREGNANCY TEST: CPT

## 2022-05-15 PROCEDURE — 85025 COMPLETE CBC W/AUTO DIFF WBC: CPT

## 2022-05-15 PROCEDURE — 99285 EMERGENCY DEPT VISIT HI MDM: CPT

## 2022-05-15 PROCEDURE — 83735 ASSAY OF MAGNESIUM: CPT

## 2022-05-15 PROCEDURE — 96375 TX/PRO/DX INJ NEW DRUG ADDON: CPT

## 2022-05-15 PROCEDURE — 96365 THER/PROPH/DIAG IV INF INIT: CPT

## 2022-05-15 PROCEDURE — 84443 ASSAY THYROID STIM HORMONE: CPT

## 2022-05-15 PROCEDURE — 80053 COMPREHEN METABOLIC PANEL: CPT

## 2022-05-15 PROCEDURE — 93005 ELECTROCARDIOGRAM TRACING: CPT

## 2022-05-15 PROCEDURE — 82948 REAGENT STRIP/BLOOD GLUCOSE: CPT

## 2022-05-15 PROCEDURE — 36415 COLL VENOUS BLD VENIPUNCTURE: CPT

## 2022-05-15 PROCEDURE — 96361 HYDRATE IV INFUSION ADD-ON: CPT

## 2022-05-15 RX ORDER — ONDANSETRON 2 MG/ML
4 INJECTION INTRAMUSCULAR; INTRAVENOUS ONCE
Status: DISCONTINUED | OUTPATIENT
Start: 2022-05-15 | End: 2022-05-15

## 2022-05-15 RX ORDER — MAGNESIUM SULFATE HEPTAHYDRATE 40 MG/ML
2 INJECTION, SOLUTION INTRAVENOUS ONCE
Status: COMPLETED | OUTPATIENT
Start: 2022-05-15 | End: 2022-05-15

## 2022-05-15 RX ORDER — METOCLOPRAMIDE HYDROCHLORIDE 5 MG/ML
10 INJECTION INTRAMUSCULAR; INTRAVENOUS ONCE
Status: COMPLETED | OUTPATIENT
Start: 2022-05-15 | End: 2022-05-15

## 2022-05-15 RX ADMIN — MAGNESIUM SULFATE HEPTAHYDRATE 2 G: 40 INJECTION, SOLUTION INTRAVENOUS at 13:50

## 2022-05-15 RX ADMIN — SODIUM CHLORIDE 1000 ML: 0.9 INJECTION, SOLUTION INTRAVENOUS at 15:22

## 2022-05-15 RX ADMIN — METOCLOPRAMIDE HYDROCHLORIDE 10 MG: 5 INJECTION INTRAMUSCULAR; INTRAVENOUS at 13:17

## 2022-05-15 NOTE — DISCHARGE INSTRUCTIONS
Your workup here was not concerning for anything dangerous  Therefore there is no need for you to stay at the hospital for further testing  We feel safe to send you home  You should follow up with your cardiologist to assess for resolution of your symptoms and to determine if there is further evaluation that needs to be performed      Return to the emergency department if you have any symptoms of palpitations, chest pain, nausea, vomiting, or episodes of passing out

## 2022-05-15 NOTE — ED ATTENDING ATTESTATION
5/15/2022  I, Hudson Collet, , saw and evaluated the patient  I have discussed the patient with the resident/non-physician practitioner and agree with the resident's/non-physician practitioner's findings, Plan of Care, and MDM as documented in the resident's/non-physician practitioner's note, except where noted  All available labs and Radiology studies were reviewed  I was present for key portions of any procedure(s) performed by the resident/non-physician practitioner and I was immediately available to provide assistance  At this point I agree with the current assessment done in the Emergency Department  I have conducted an independent evaluation of this patient a history and physical is as follows:    ED Course     Evaluated with resident  Patient presenting for episode of chest pain and palpitations with nausea and lightheadedness wall at diner eating food with her family  Patient denies passing out although states that she felt like she might  Has had prior episodes similar but not exactly the same as this prior and follows with [facility]  Has an appointment with them in 4 days  Has had Holter monitor and echocardiogram in the past   She states that although she has no history of AFib her Apple watch was telling her today that she was in atrial fibrillation and felt like her heart was skipping a beat  States this time in the emergency department she feels much better with no chest pain or palpitations  Denies any shortness of breath at any time  Denies recent illness, fevers, night sweats, chills, sore throat, cough, abdominal pain, vomiting, diarrhea constipation dysuria hematuria  Patient states that she is not pregnant although she does not know when her last menstrual period was that she was just recently taken off of Depo  Patient with print outs from apple watch showing reported AFib    Review of Systems:  Patient complaining of palpitations, nausea, lightheadedness  Review systems otherwise negative and documented above in HPI  Physical exam  General: VS reviewed  Appears in NAD  awake, alert  Well-nourished, well-developed  Appears stated age  Speaking normally in full sentences  Head: Normocephalic, atraumatic  Eyes: EOM-I  No diplopia  No hyphema  No subconjunctival hemorrhages  Symmetrical lids  ENT: Atraumatic external nose and ears  MMM  No malocclusion  No stridor  Normal phonation  No drooling  Normal swallowing  Neck: No JVD  CV: No pallor noted  Lungs:   No tachypnea  No respiratory distress  MSK:   FROM spontaneously  Skin: Dry, intact  Neuro: Awake, alert, GCS15, CN II-XII grossly intact  Motor grossly intact  Psychiatric/Behavioral: Appropriate mood and affect   Exam: deferred        Critical Care Time  ECG 12 Lead Documentation Only    Date/Time: 5/15/2022 1:28 PM  Performed by: Radha Mack DO  Authorized by: Radha Mack DO     Patient location:  ED  Interpretation:     Interpretation: abnormal    Rate:     ECG rate:  103    ECG rate assessment: tachycardic    Rhythm:     Rhythm: sinus tachycardia    Ectopy:     Ectopy: none    QRS:     QRS axis:  Normal    QRS intervals:  Normal  Conduction:     Conduction: normal    ST segments:     ST segments:  Normal  T waves:     T waves: normal    Other findings:     Other findings: prolonged qTc interval              Cardiology EP involved as patient following with cardiology  Per cardiology agreed with her workup and stating no further testing recommended at this time  Outpatient follow-up

## 2022-05-15 NOTE — ED PROVIDER NOTES
History  Chief Complaint   Patient presents with    Atrial Fibrillation     Pt eating breakfast and went into Afib according to apple watch  C/o nausea, shakiness/dizziness and feeling papitations     Anayeli Zavaleta is a 25 y o  with PMH of anxiety, POTS, hyperlipidemia who presents with complaints of palpitations  She reports this began earlier today when she was eating and the diner, proximally 30 minutes ago  She felt feelings that her heart was racing  Her father reports she almost vasovagaled and lost consciousness  Her Apple watch reported she was was atrial fibrillation  They immediately came in for evaluation  She has no known history of this before, however she has been evaluated with telemetry and Cardiology for dysrhythmias  Workup so far has only been remarkable for POTS  She denies any other cardiac history, or family cardiac history of sudden cardiac death  She reports some associated nausea, however denies any chest pain, episodes of syncope, seizure-like activity, shortness of breath, leg swelling, diaphoresis, headache, vision changes, cough, sick contacts, recent vaccinations, recent travel, history of blood clots, history of blood thinner use  She reports she was just taken off the [place] shot for birth control  Denies any OCP use  Otherwise the patient is at her baseline  The pt has no other complaints at this time  Prior to Admission Medications   Prescriptions Last Dose Informant Patient Reported? Taking? ALPRAZolam (XANAX) 0 25 mg tablet  Self Yes No   Sig: Take 0 25 mg by mouth 2 (two) times a day as needed    ASMANEX  MCG/ACT AERO  Self Yes No   Sig: Inhale 400 mcg every 4 (four) hours as needed (2 puffs)    Elastic Bandages & Supports (TRUFORM STOCKINGS 20-30MMHG) MISC  Self Yes No   Sig: Use as directed     Omega-3 Fatty Acids (FISH OIL) 1,000 mg  Self Yes No   Sig: Take 1,000 mg by mouth 2 (two) times a day    Riboflavin 100 MG TABS  Self No No   Sig: Take 1 tablet (100 mg total) by mouth 4 (four) times a day   Rimegepant Sulfate (Nurtec) 75 MG TBDP  Self No No   Sig: Take 75 mg by mouth as needed (migraine) Limit of 1 in 24 hours   XOPENEX HFA 45 MCG/ACT inhaler  Self Yes No   Sig: Inhale 1-2 puffs every 4 (four) hours as needed    acetaminophen (TYLENOL) 500 mg tablet  Self Yes No   Sig: Take 1,000 mg by mouth every 4 (four) hours as needed    butalbital-acetaminophen-caffeine (FIORICET,ESGIC) -40 mg per tablet  Self No No   Sig: Take 1 tablet by mouth every 4 (four) hours as needed for headaches   cetirizine (ZyrTEC) 10 mg tablet  Self Yes No   Sig: Take 10 mg by mouth daily   clindamycin (CLEOCIN T) 1 % lotion  Self No No   Sig: Apply topically daily To face   Patient taking differently: Apply 1 application topically daily To face    cyproheptadine (PERIACTIN) 4 mg tablet  Self Yes No   Sig: Take 4 mg by mouth daily at bedtime    dexamethasone (DECADRON) 2 mg tablet  Self No No   Sig: Take 1 tablet (2 mg total) by mouth daily with breakfast   divalproex sodium (DEPAKOTE ER) 500 mg 24 hr tablet  Self No No   Sig: Take 1 tablet (500 mg total) by mouth daily   fludrocortisone (FLORINEF) 0 1 mg tablet  Self Yes No   Sig: Take 0 1 mg by mouth 2 (two) times a day     indomethacin (INDOCIN) 50 mg capsule  Self No No   Sig: Take 1 capsule (50 mg total) by mouth 3 (three) times a day with meals   Patient taking differently: Take 50 mg by mouth as needed     linaCLOtide (Linzess) 72 MCG CAPS  Self No No   Sig: Take 1 capsule by mouth daily before breakfast   Patient taking differently: Take 1 capsule by mouth daily as needed    medroxyPROGESTERone acetate (DEPO-PROVERA SYRINGE) 150 mg/mL injection Not Taking at Unknown time Self No No   Sig: Inject 1 mL (150 mg total) into a muscle every 3 (three) months Every three months   Patient not taking: Reported on 5/15/2022   metoprolol succinate (TOPROL-XL) 25 mg 24 hr tablet  Self Yes No   Sig: Take 50 mg by mouth 2 (two) times a day     naproxen (EC NAPROSYN) 500 MG EC tablet  Self No No   Sig: Take 1 tablet (500 mg total) by mouth 2 (two) times a day with meals   Patient taking differently: Take 500 mg by mouth as needed     scopolamine (TRANSDERM-SCOP) 1 mg/3 days TD 72 hr patch  Self No No   Sig: Place 1 patch on the skin every third day   Patient taking differently: Place 1 patch on the skin as needed     sucralfate (CARAFATE) 1 g tablet  Self No No   Sig: Take 1 tablet (1 g total) by mouth 3 (three) times a day as needed (headache-take before indomethacin)   tretinoin (RETIN-A) 0 025 % cream  Self No No   Sig: Apply topically daily at bedtime   venlafaxine (EFFEXOR-XR) 37 5 mg 24 hr capsule   No No   Sig: Take 1 capsule (37 5 mg total) by mouth daily Take with one 75 mg capsule  Total daily dose is 112 5 mg   venlafaxine (EFFEXOR-XR) 75 mg 24 hr capsule   No No   Sig: Take 1 capsule (75 mg total) by mouth daily Take with one 37 5 mg capsule   Total daily dose is 112 5 mg   verapamil (CALAN) 120 mg tablet  Self No No   Sig: Take 2 tablets (240 mg total) by mouth 2 (two) times a day      Facility-Administered Medications Last Administration Doses Remaining   albuterol inhalation solution 2 5 mg None recorded    medroxyPROGESTERone acetate (DEPO-PROVERA SYRINGE) IM injection 150 mg 2021  3:13 PM    medroxyPROGESTERone acetate (DEPO-PROVERA SYRINGE) IM injection 150 mg 2022  3:05 PM           Past Medical History:   Diagnosis Date    Anxiety     Asthma     Dizziness     at times    GERD (gastroesophageal reflux disease)     Hammertoe of left foot     Headache     occ    Hyperlipemia     Hypermobile joints     Irritable bowel syndrome     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HCC)     Mast cell disorder     Migraine     PONV (postoperative nausea and vomiting)     POTS (postural orthostatic tachycardia syndrome)      infant     Wears glasses        Past Surgical History:   Procedure Laterality Date    COLONOSCOPY      EGD AND COLONOSCOPY N/A 1/10/2017    Procedure: EGD AND COLONOSCOPY;  Surgeon: Arsalan Davis MD;  Location: [facility]; Service:     ESOPHAGOGASTRODUODENOSCOPY      with biopsy    FOOT SURGERY      Excision of lesion feet benign    [place] REPAIR OF HAMMERTOE,ONE Left 12/20/2019    Procedure: 2nd arthrodesis; 5th arthroplasty, flexor tenotomy 2,3,4,5 ;  Surgeon: Henny Garcia DPM;  Location[facility];  Service: [facility] [address] [place] [address] Left 6/5/2020    Procedure: 2ND TOE Revision hammertoe with broken implant;  Surgeon: Henny Garcia DPM;  Location: [facility];  Service: Podiatry    UPPER GASTROINTESTINAL ENDOSCOPY  01/28/2021    WISDOM TOOTH EXTRACTION         Family History   Problem Relation Age of Onset    Gestational diabetes Mother     Migraines Mother     Anxiety disorder Father     Hyperlipidemia Father     Autism Brother     Diabetes Other     Uterine cancer Maternal Grandmother     Rheumatic fever Maternal Grandmother     Diabetes Maternal Grandfather     Hypertension Maternal Grandfather     Heart attack Maternal Grandfather     Stroke Maternal Grandfather     Hyperlipidemia Maternal Grandfather     Hypertension Paternal Grandmother     Anxiety disorder Paternal Grandmother     Hypertension Paternal Grandfather      I have reviewed and agree with the history as documented  E-Cigarette/Vaping    E-Cigarette Use Never User      E-Cigarette/Vaping Substances    Nicotine No     THC No     CBD No     Flavoring No     Other No     Unknown No      Social History     Tobacco Use    Smoking status: Never Smoker    Smokeless tobacco: Never Used   Vaping Use    Vaping Use: Never used   Substance Use Topics    Alcohol use: No    Drug use: No     Comment: 2/9/20- not asked, parent at bedside  Review of Systems   Constitutional: Negative for chills and fever  HENT: Negative for sore throat      Eyes: Negative for pain and visual disturbance  Respiratory: Negative for shortness of breath  Cardiovascular: Positive for palpitations  Negative for chest pain  Gastrointestinal: Negative for abdominal pain and vomiting  Genitourinary: Negative for dysuria  Musculoskeletal: Negative for back pain  Skin: Negative for color change and rash  Neurological: Negative for syncope  All other systems reviewed and are negative  Physical Exam  ED Triage Vitals   Temperature Pulse Respirations Blood Pressure SpO2   05/15/22 1251 05/15/22 1251 05/15/22 1251 05/15/22 1251 05/15/22 1251   98 6 °F (37 °C) 105 18 113/63 97 %      Temp Source Heart Rate Source Patient Position - Orthostatic VS BP Location FiO2 (%)   05/15/22 1251 05/15/22 1251 05/15/22 1524 05/15/22 1524 --   Oral Monitor Lying Right arm       Pain Score       05/15/22 1315       No Pain             Orthostatic Vital Signs  Vitals:    05/15/22 1530 05/15/22 1532 05/15/22 1545 05/15/22 1600   BP: (!) 86/50 104/61 100/57 103/56   Pulse: 73 80 66 67   Patient Position - Orthostatic VS:  Lying         Physical Exam  Vitals and nursing note reviewed  Constitutional:       General: She is not in acute distress  Appearance: She is well-developed  HENT:      Head: Normocephalic and atraumatic  Eyes:      Extraocular Movements: Extraocular movements intact  Conjunctiva/sclera: Conjunctivae normal    Cardiovascular:      Rate and Rhythm: Normal rate and regular rhythm  Heart sounds: No murmur heard  Pulmonary:      Effort: Pulmonary effort is normal  No respiratory distress  Breath sounds: Normal breath sounds  No wheezing or rales  Abdominal:      Palpations: Abdomen is soft  Tenderness: There is no abdominal tenderness  There is no guarding or rebound  Musculoskeletal:         General: Normal range of motion  Cervical back: Neck supple  Right lower leg: No edema  Left lower leg: No edema  Skin:     General: Skin is warm and dry  Neurological:      General: No focal deficit present  Mental Status: She is alert  Psychiatric:         Mood and Affect: Mood normal          ED Medications  Medications   metoclopramide (REGLAN) injection 10 mg (10 mg Intravenous Given 5/15/22 1317)   magnesium sulfate 2 g/50 mL IVPB (premix) 2 g (0 g Intravenous Stopped 5/15/22 1445)   sodium chloride 0 9 % bolus 1,000 mL (0 mL Intravenous Stopped 5/15/22 1615)       Diagnostic Studies  Results Reviewed     Procedure Component Value Units Date/Time    HS Troponin I 2hr [[id number]] Collected: 05/15/22 1451    Lab Status: Final result Specimen: Blood from Arm, Left Updated: 05/15/22 1536     hs TnI 2hr <2 ng/L      Delta 2hr hsTnI --    POCT pregnancy, urine [[id number]]  (Normal) Resulted: 05/15/22 1342    Lab Status: Final result Updated: 05/15/22 1342     EXT PREG TEST UR (Ref: Negative) Negative     Control Valid    HS Troponin 0hr (reflex protocol) [[id number]]  (Normal) Collected: 05/15/22 1258    Lab Status: Final result Specimen: Blood from Arm, Left Updated: 05/15/22 1335     hs TnI 0hr <2 ng/L     TSH [[id number]]  (Normal) Collected: 05/15/22 1258    Lab Status: Final result Specimen: Blood from Arm, Left Updated: 05/15/22 1333     TSH 3RD GENERATON 1 430 uIU/mL     Narrative:      Patients undergoing fluorescein dye angiography may retain small amounts of fluorescein in the body for 48-72 hours post procedure  Samples containing fluorescein can produce falsely depressed TSH values  If the patient had this procedure,a specimen should be resubmitted post fluorescein clearance        Comprehensive metabolic panel [[id number]]  (Abnormal) Collected: 05/15/22 1258    Lab Status: Final result Specimen: Blood from Arm, Left Updated: 05/15/22 1327     Sodium 138 mmol/L      Potassium 4 1 mmol/L      Chloride 106 mmol/L      CO2 28 mmol/L      ANION GAP 4 mmol/L      BUN 16 mg/dL      Creatinine 0 98 mg/dL      Glucose 121 mg/dL      Calcium 10 0 mg/dL AST 21 U/L      ALT 34 U/L      Alkaline Phosphatase 98 U/L      Total Protein 8 6 g/dL      Albumin 4 3 g/dL      Total Bilirubin 0 29 mg/dL      eGFR 82 ml/min/1 73sq m     Narrative:      [place] guidelines for Chronic Kidney Disease (CKD):     Stage 1 with normal or high GFR (GFR > 90 mL/min/1 73 square meters)    Stage 2 Mild CKD (GFR = 60-89 mL/min/1 73 square meters)    Stage 3A Moderate CKD (GFR = 45-59 mL/min/1 73 square meters)    Stage 3B Moderate CKD (GFR = 30-44 mL/min/1 73 square meters)    Stage 4 Severe CKD (GFR = 15-29 mL/min/1 73 square meters)    Stage 5 End Stage CKD (GFR <15 mL/min/1 73 square meters)  Note: GFR calculation is accurate only with a steady state creatinine    Magnesium [[id number]]  (Normal) Collected: 05/15/22 1258    Lab Status: Final result Specimen: Blood from Arm, Left Updated: 05/15/22 1327     Magnesium 2 0 mg/dL     CBC and differential [[id number]] Collected: 05/15/22 1258    Lab Status: Final result Specimen: Blood from Arm, Left Updated: 05/15/22 1306     WBC 5 19 Thousand/uL      RBC 5 11 Million/uL      Hemoglobin 14 7 g/dL      Hematocrit 43 1 %      MCV 84 fL      MCH 28 8 pg      MCHC 34 1 g/dL      RDW 11 7 %      MPV 10 4 fL      Platelets 358 Thousands/uL      nRBC 0 /100 WBCs      Neutrophils Relative 48 %      Immat GRANS % 0 %      Lymphocytes Relative 43 %      Monocytes Relative 9 %      Eosinophils Relative 0 %      Basophils Relative 0 %      Neutrophils Absolute 2 48 Thousands/µL      Immature Grans Absolute 0 01 Thousand/uL      Lymphocytes Absolute 2 23 Thousands/µL      Monocytes Absolute 0 47 Thousand/µL      Eosinophils Absolute 0 00 Thousand/µL      Basophils Absolute 0 00 Thousands/µL     Fingerstick Glucose (POCT) [[id number]]  (Normal) Collected: 05/15/22 1301    Lab Status: Final result Updated: 05/15/22 1302     POC Glucose 116 mg/dl                  XR chest 2 views   ED Interpretation by Jim Dailey, MD (05/15 0705)   CXR shows no acute cardiopulmonary disease by my read            Procedures  Procedures      ED Course  ED Course as of 05/15/22 2256   Sun May 15, 2022   1316 EKG shows normal sinus rhythm, no ST or T wave abnormalities, prolonged QTc interval to 523 normal axis, no other abnormalities outside of QTC interval      1339 hs TnI 0hr: <2   1339 TSH 3RD GENERATON: 1 430   1354 PREGNANCY TEST URINE: Negative   26 Contacting cardiology   1524 Cardiology EP AP recommended med reconciliation for Qtc, otherwise agreed with workup and recommended outpatient cardiology follow up   1557 Blood Pressure: 104/61             HEART Risk Score    Flowsheet Row Most Recent Value   Heart Score Risk Calculator    History 0 Filed at: 05/15/2022 1617   ECG 0 Filed at: 05/15/2022 1617   Age 0 Filed at: 05/15/2022 1617   Risk Factors 0 Filed at: 05/15/2022 1617   Troponin 0 Filed at: 05/15/2022 1617   HEART Score 0 Filed at: 05/15/2022 1617                      SBIRT 20yo+    Flowsheet Row Most Recent Value   SBIRT (25 yo +)    In order to provide better care to our patients, we are screening all of our patients for alcohol and drug use  Would it be okay to ask you these screening questions? Yes Filed at: 05/15/2022 1525   Initial Alcohol Screen: US AUDIT-C     1  How often do you have a drink containing alcohol? 1 Filed at: 05/15/2022 1525   2  How many drinks containing alcohol do you have on a typical day you are drinking? 0 Filed at: 05/15/2022 1525   3b  FEMALE Any Age, or MALE 65+: How often do you have 4 or more drinks on one occassion? 0 Filed at: 05/15/2022 1525   Audit-C Score 1 Filed at: 05/15/2022 1525   GLORIA: How many times in the past year have you    Used an illegal drug or used a prescription medication for non-medical reasons?  Never Filed at: 05/15/2022 1525                MDM  Number of Diagnoses or Management Options  Nausea  Palpitations  Diagnosis management comments: Norm Better is a 25 y o  who presents with complaints of palpitations    Vital signs are remarkable for tachycardia, physical exam shows no abnormalities    ED Initial Impression:  Overall will evaluate for an underlying dysrhythmia including PVCs versus atrial fibrillation versus other dysrhythmia  Will evaluate for electrolyte abnormalities or anemia as well in addition to evaluate for thyroid etiology  Further workup is warranted    ED Workup:  CBC, CMP, Mag, TSH, EKG, troponin, chest x-ray    Pain/Nausea Management:  Reglan    Final Impression and Disposition:  The patient's workup was within normal limits  The heart rate normalized, her blood pressure improved following intravenous fluid administration  As result was likely related to her orthostatic posture  Discussed with cardiology EP who recommended medication reconciliation regarding QTC, in addition recommended outpatient follow-up  Given all this, felt safe to send the patient home  Advised on strict return precautions  She reports she had follow-up with her cardiologist at [facility] next week  Answered all questions and concerns  I discussed the above care and treatment plan with the patient and her father and answered all of their relevant questions  Reviewed test results and imaging findings, as well as pertinent details of the treatment plan as described above  They expressed understanding, was agreeable to the plan of care, and had no further questions or concerns      Portions of the record may have been created with voice recognition software   Occasional wrong word or "sound a like" substitutions may have occurred due to the inherent limitations of voice recognition software   Read the chart carefully and recognize, using context, where substitutions have occurred           Amount and/or Complexity of Data Reviewed  Clinical lab tests: ordered and reviewed  Tests in the radiology section of CPT®: ordered and reviewed    Risk of Complications, Morbidity, and/or Mortality  Presenting problems: moderate  Diagnostic procedures: low  Management options: low    Patient Progress  Patient progress: stable      Disposition  Final diagnoses:   Palpitations   Nausea     Time reflects when diagnosis was documented in both MDM as applicable and the Disposition within this note     Time User Action Codes Description Comment    5/15/2022  4:14 PM Lucinda Logan Add [R00 2] Palpitations     5/15/2022  4:14 PM Lucinda Logan Add [R11 0] Nausea       ED Disposition     ED Disposition   Discharge    Condition   Stable    Date/Time   Sun May 15, 2022  4:14 PM    Comment   Norm Better discharge to home/self care                 Follow-up Information     Follow up With Specialties Details Why Contact Info Additional Information    Juliette Saravia DO Family Medicine Call   [address] [address] [phone]       [address] [place] [address] Emergency Department Emergency Medicine  If symptoms worsen [address]  [address] [facility] 64 [facility] Emergency Department, [address] [place] [address] 108          Discharge Medication List as of 5/15/2022  4:15 PM      CONTINUE these medications which have NOT CHANGED    Details   acetaminophen (TYLENOL) 500 mg tablet Take 1,000 mg by mouth every 4 (four) hours as needed , Historical Med      ALPRAZolam (XANAX) 0 25 mg tablet Take 0 25 mg by mouth 2 (two) times a day as needed , Starting Thu 8/17/2017, Historical Med      ASMANEX  MCG/ACT AERO Inhale 400 mcg every 4 (four) hours as needed (2 puffs) , Starting Fri 3/30/2018, Historical Med      butalbital-acetaminophen-caffeine (FIORICET,ESGIC) -40 mg per tablet Take 1 tablet by mouth every 4 (four) hours as needed for headaches, Starting Thu 9/9/2021, Print      cetirizine (ZyrTEC) 10 mg tablet Take 10 mg by mouth daily, Historical Med      clindamycin (CLEOCIN T) 1 % lotion Apply topically daily To face, Starting Fri 6/25/2021, Normal      cyproheptadine (PERIACTIN) 4 mg tablet Take 4 mg by mouth daily at bedtime , Starting Tue 4/21/2020, Historical Med      dexamethasone (DECADRON) 2 mg tablet Take 1 tablet (2 mg total) by mouth daily with breakfast, Starting Tue 12/7/2021, Normal      divalproex sodium (DEPAKOTE ER) 500 mg 24 hr tablet Take 1 tablet (500 mg total) by mouth daily, Starting Thu 11/4/2021, Normal      Elastic Bandages & Supports (TRUFORM STOCKINGS 20-30MMHG) MISC Use as directed , Historical Med      fludrocortisone (FLORINEF) 0 1 mg tablet Take 0 1 mg by mouth 2 (two) times a day  , Starting Wed 5/23/2018, Historical Med      indomethacin (INDOCIN) 50 mg capsule Take 1 capsule (50 mg total) by mouth 3 (three) times a day with meals, Starting Fri 11/5/2021, Normal      linaCLOtide (Linzess) 72 MCG CAPS Take 1 capsule by mouth daily before breakfast, Starting Fri 4/16/2021, Normal      medroxyPROGESTERone acetate (DEPO-PROVERA SYRINGE) 150 mg/mL injection Inject 1 mL (150 mg total) into a muscle every 3 (three) months Every three months, Starting Tue 11/16/2021, Normal      metoprolol succinate (TOPROL-XL) 25 mg 24 hr tablet Take 50 mg by mouth 2 (two) times a day  , Starting Tue 12/18/2018, Historical Med      naproxen (EC NAPROSYN) 500 MG EC tablet Take 1 tablet (500 mg total) by mouth 2 (two) times a day with meals, Starting Tue 12/7/2021, Until Wed 12/7/2022, Normal      Omega-3 Fatty Acids (FISH OIL) 1,000 mg Take 1,000 mg by mouth 2 (two) times a day , Historical Med      Riboflavin 100 MG TABS Take 1 tablet (100 mg total) by mouth 4 (four) times a day, Starting Wed 9/29/2021, Until Mon 1/10/2022, Normal      Rimegepant Sulfate (Nurtec) 75 MG TBDP Take 75 mg by mouth as needed (migraine) Limit of 1 in 24 hours, Starting Thu 11/4/2021, Normal      scopolamine (TRANSDERM-SCOP) 1 mg/3 days TD 72 hr patch Place 1 patch on the skin every third day, Starting Mon 12/6/2021, Normal      sucralfate (CARAFATE) 1 g tablet Take 1 tablet (1 g total) by mouth 3 (three) times a day as needed (headache-take before indomethacin), Starting Tue 12/7/2021, Normal      tretinoin (RETIN-A) 0 025 % cream Apply topically daily at bedtime, Starting Fri 6/25/2021, Normal      !! venlafaxine (EFFEXOR-XR) 37 5 mg 24 hr capsule Take 1 capsule (37 5 mg total) by mouth daily Take with one 75 mg capsule  Total daily dose is 112 5 mg, Starting Tue 4/19/2022, Normal      !! venlafaxine (EFFEXOR-XR) 75 mg 24 hr capsule Take 1 capsule (75 mg total) by mouth daily Take with one 37 5 mg capsule  Total daily dose is 112 5 mg, Starting Tue 4/19/2022, Normal      verapamil (CALAN) 120 mg tablet Take 2 tablets (240 mg total) by mouth 2 (two) times a day, Starting Tue 2/1/2022, Normal      XOPENEX HFA 45 MCG/ACT inhaler Inhale 1-2 puffs every 4 (four) hours as needed , Starting Fri 3/23/2018, Historical Med       !! - Potential duplicate medications found  Please discuss with provider  No discharge procedures on file  PDMP Review     None           ED Provider  Attending physically available and evaluated Victor Manuel Coats I managed the patient along with the ED Attending      Electronically Signed by         Laura Cruz MD  05/15/22 1166

## 2022-05-23 ENCOUNTER — OFFICE VISIT (OUTPATIENT)
Dept: NEUROLOGY | Facility: CLINIC | Age: 22
End: 2022-05-23
Payer: COMMERCIAL

## 2022-05-23 VITALS
SYSTOLIC BLOOD PRESSURE: 113 MMHG | TEMPERATURE: 97.9 F | BODY MASS INDEX: 30.23 KG/M2 | WEIGHT: 192.6 LBS | HEART RATE: 72 BPM | DIASTOLIC BLOOD PRESSURE: 56 MMHG | HEIGHT: 67 IN

## 2022-05-23 DIAGNOSIS — G43.009 MIGRAINE WITHOUT AURA AND WITHOUT STATUS MIGRAINOSUS, NOT INTRACTABLE: ICD-10-CM

## 2022-05-23 DIAGNOSIS — G43.011 INTRACTABLE MIGRAINE WITHOUT AURA AND WITH STATUS MIGRAINOSUS: ICD-10-CM

## 2022-05-23 DIAGNOSIS — I49.8 POTS (POSTURAL ORTHOSTATIC TACHYCARDIA SYNDROME): ICD-10-CM

## 2022-05-23 DIAGNOSIS — G44.84 EXERTIONAL HEADACHE: ICD-10-CM

## 2022-05-23 DIAGNOSIS — G43.709 CHRONIC MIGRAINE WITHOUT AURA WITHOUT STATUS MIGRAINOSUS, NOT INTRACTABLE: Primary | ICD-10-CM

## 2022-05-23 PROCEDURE — 99215 OFFICE O/P EST HI 40 MIN: CPT | Performed by: PHYSICIAN ASSISTANT

## 2022-05-23 RX ORDER — VERAPAMIL HYDROCHLORIDE 120 MG/1
240 TABLET, FILM COATED ORAL 2 TIMES DAILY
Qty: 360 TABLET | Refills: 4 | Status: SHIPPED | OUTPATIENT
Start: 2022-05-23

## 2022-05-23 RX ORDER — INDOMETHACIN 25 MG/1
25 CAPSULE ORAL AS NEEDED
Qty: 30 CAPSULE | Refills: 3 | Status: SHIPPED | OUTPATIENT
Start: 2022-05-23

## 2022-05-23 RX ORDER — SUCRALFATE 1 G/1
1 TABLET ORAL AS NEEDED
Qty: 30 TABLET | Refills: 4 | Status: SHIPPED | OUTPATIENT
Start: 2022-05-23 | End: 2022-06-15 | Stop reason: SDUPTHER

## 2022-05-23 RX ORDER — METOPROLOL SUCCINATE 50 MG/1
50 TABLET, EXTENDED RELEASE ORAL 2 TIMES DAILY
COMMUNITY
Start: 2022-05-19

## 2022-05-23 NOTE — ASSESSMENT & PLAN NOTE
Take sucralfate 30 minutes prior to indomethacin 25mg    Indomethacin should be times 30-45 minutes prior to exercise

## 2022-05-23 NOTE — ASSESSMENT & PLAN NOTE
Preventive therapy:  - magnesium oxide 400 mg  And  Vitamin B2 200 mg  For one month and see how pt does with out it  If no change in headaches then stay off of it    -  venlafaxine 75 mg and 37 5 mg in a m   -  verapamil 220 mg twice a day   Abortive therapy:   - at the onset of her migraine headache, Reyvow 100 mg  Limit of 1 in 24 hours    Do not drive within 8 hours of taking   Over the counter medications for headaches to less than 3 doses a week

## 2022-05-23 NOTE — PATIENT INSTRUCTIONS
Migraine headache with/ without aura  Preventive therapy:  - magnesium oxide 400 mg  And  Vitamin B2 200 mg  For one month and see how pt does with out it  If no change in headaches then stay off of it    -  venlafaxine 75 mg and 37 5 mg in a m   -  verapamil 220 mg twice a day   Abortive therapy:   - at the onset of her migraine headache, Reyvow 100 mg  Limit of 1 in 24 hours  Do not drive within 8 hours of taking   Over the counter medications for headaches to less than 3 doses a week    For exercise headaches  Take sucralfate 30 minutes prior to indomethacin 25mg    Indomethacin should be times 30-45 minutes prior to exercise    Please look up [employer] and [employer]  Do only the free portions of them

## 2022-05-23 NOTE — PROGRESS NOTES
[facility]  PATIENT:  Jeremy Diaz  MRN:  [id number]  :  2000  DATE OF SERVICE:  2022      Assessment/Plan:     Chronic migraine without aura without status migrainosus, not intractable  Preventive therapy:  - magnesium oxide 400 mg  And  Vitamin B2 200 mg  For one month and see how pt does with out it  If no change in headaches then stay off of it    -  venlafaxine 75 mg and 37 5 mg in a m   -  verapamil 220 mg twice a day   Abortive therapy:   - at the onset of her migraine headache, Reyvow 100 mg  Limit of 1 in 24 hours  Do not drive within 8 hours of taking   Over the counter medications for headaches to less than 3 doses a week    Exertional headache  Take sucralfate 30 minutes prior to indomethacin 25mg  Indomethacin should be times 30-45 minutes prior to exercise         Problem List Items Addressed This Visit        Cardiovascular and Mediastinum    POTS (postural orthostatic tachycardia syndrome)    Chronic migraine without aura without status migrainosus, not intractable - Primary     Preventive therapy:  - magnesium oxide 400 mg  And  Vitamin B2 200 mg  For one month and see how pt does with out it  If no change in headaches then stay off of it    -  venlafaxine 75 mg and 37 5 mg in a m   -  verapamil 220 mg twice a day   Abortive therapy:   - at the onset of her migraine headache, Reyvow 100 mg  Limit of 1 in 24 hours  Do not drive within 8 hours of taking   Over the counter medications for headaches to less than 3 doses a week           Relevant Medications    metoprolol succinate (TOPROL-XL) 50 mg 24 hr tablet    indomethacin (INDOCIN) 25 mg capsule    Lasmiditan Succinate (REYVOW) 100 MG tablet    verapamil (CALAN) 120 mg tablet       Other    Exertional headache     Take sucralfate 30 minutes prior to indomethacin 25mg    Indomethacin should be times 30-45 minutes prior to exercise           Relevant Medications    indomethacin (INDOCIN) 25 mg capsule sucralfate (CARAFATE) 1 g tablet      Other Visit Diagnoses     Migraine without aura and without status migrainosus, not intractable        Relevant Medications    metoprolol succinate (TOPROL-XL) 50 mg 24 hr tablet    indomethacin (INDOCIN) 25 mg capsule    Lasmiditan Succinate (REYVOW) 100 MG tablet    verapamil (CALAN) 120 mg tablet    Intractable migraine without aura and with status migrainosus        Relevant Medications    metoprolol succinate (TOPROL-XL) 50 mg 24 hr tablet    indomethacin (INDOCIN) 25 mg capsule    Lasmiditan Succinate (REYVOW) 100 MG tablet    verapamil (CALAN) 120 mg tablet              History of Present Illness: We had the pleasure of evaluating Krystal Canseco in neurological follow up  today for headaches  As you know,  she is a 25 y o   right handed female  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   Started clinicals this [place] of 2019 patient began having a new type of headache which he experienced 4 times and matter of 6-12 months   At that time she had headache that lasted 2 days   This was preceded by a [place] of light in her right eye   Headache resolved after 2-3 days on her own this headache occurred again in the winter of [id number]   Third occurrence was summer of 2020 at 1st she thought it was Pots but she had persistent photophobia and nausea          In July 4, 2021 migrainous headache occurred with shooting pain down her neck and spine   Ended up going to the ER   The headache lasted approximately 2 weeks   Since that time she has had a persistent daily headache which she described as pressure and some pain in the back of her head without any photophobia or nausea   Was taking naproxen 500 mg daily   Since this time in July has never been headache free  Jerica Villatoro states she has gone to the emergency room and not much has helped  Jerica Villatoro has gotten Toradol injections but only relieves it slightly for approximately 24 hours and then resume is a full force       As of 5/23/2022:  Patient is having heart issues lately  First time didn't have her apple watch on and was close to passing out  Had a multitude of symptoms but resolved in 30 minutes  This past time was at a diner and didn't feel well  Stood up and started to have symptoms  Did run EKG on her watch and it said was AFib  Did wait a bit and then said sinus tachycardia  Started to have chest pain and then did a 3rd time and was in Afib again per the apple watch  Therefore went to the ER  EKG at ER only demonstrated prolonged QTC  Felt better when she recovered  Has had a visit with [facility] virtually and will be doing a 2 week Holter monitor and stress test        Current medical illnesses:  QTc:  June 5, 2017 426 ms  5/15/2022 513 ms     POTS:  Sees cardio at [facility] Shweta abraham-yearly  Was diagnosed in 2016  Is doing very well  She is active, able to exercise   She walks regularly, does a stair stepper  Drinking lots of water, gatorade  She is on Florinef and metoprolol      IBS  mast cell activation syndrome  Malachi-Danlos syndrome  Anxiety:  Sees Carin Vallejo and a counselor  Non celiac gluten sensitivity  Celiac artery stenosis      No history of tobacco use     Interval update 1/10/2022  As of 1/10/2022 when came home from [place] did have bad migraines  Were an 8/10  Tried Nurtec multiple days in a row  Patient stopped chocolate after she noticed that it was worsening her migraines  Has been almost a week without them  Today and yesterday felt better  For past 2 days has not been waking up with them        Interval update 12/7/2021:  Reports some improvement since last visit  Still not truly headache free but has notice a decrease int he severity of her daily headaches as well as decrease int he number of severe migraines     What medications do you take or have you taken for your headaches?    Current Preventive:   Zyrtec  (mast cell related), Cyproheptadine  Fludrocortisone (POTS)  Metoprolol (POTS)  omega-3, riboflavin  venlafaxine   Verapamil  Cyproheptadine  Current Abortive:   Fioricet   Reglan   Naproxen, Tylenol   Decadron  Indomethacin       Prior Preventive:   vitamin-D, multivitamin  Benadryl, Atarax   Zoloft, Cymbalta  Gabapentin   Robaxin   Xanax  Prior Abortive:   Percocet, Dilaudid, fentanyl   Toradol As of 11/2021 only helps for 24 hours), naproxen, ibuprofen  Zofran  Benadryl   Decadron, prednisone   Tylenol  García Barberc      Current pain 3/10     Mild headaches: that has improved with the increase in metoprolol  Moderate to severe headaches:   2020:  Feb: first week but lasted for 9 day  March: first week lasted 2-3 days  April: first week - lasted 2-3 days -   2021:  Daily since 7/4/2021 averages 6-7/10 gets to an 8-9/10 3+ days a week  When first started Verapamil did decrease her baseline to 3-4/10 for 2 weeks     As of 12/7/21 has a daily headache at a 3-4/10   Gets to an 6-7/10 at least 1 day a week      As of 1/10/2022 when came home from [place] did have bad migraines  Were an 8/10  Tried Nurtec multiple days in a row  Patient stopped chocolate after she noticed that it was worsening her migraines  Has been almost a week without them  Today and yesterday felt better  For past 2 days has not been waking up with them    As of 5/23/2022 has been getting 3 a week, less intense 5-6/10,  Doing acupuncture        Are you ever headache free? yes     Aura/Warning and how long does it last?   - White spots in front of her visual field, seconds  - POTS headache is usually preceded by palpitation and flushing  This was more when she was having random headaches not persistant        What time of the day do the headaches start? varies     Continuous pain since July 2021  Wakes up with an average of a 6/10   Goes down to a 3-4/10 after 1 hour   Prior visit was Wakes up with 8/10 but does go down to a 6/10 approximately 2 hours later (after Tylenol or Naproxen), starts to increase again around 4 pm        How long do the headaches last?   Moderate/severe for 1 hours in the am but takes medication to decrease to lower pain but never goes away   Moderate headaches last a few hours after medicaiton   Where is your headache located? Mild headaches: frontal  Moderate to severe headaches: frontalis, retro-orbital, occipitalis and neck bilaterally (worse when in occipitalis)      Describe your usual headache? Mild headaches: nagging and aching with pressure  Moderate to severe headaches: throbbing and pressure     What is the intensity of pain? Mild headaches: 3-4/10   Moderate to severe headaches: 7-8/10      Associated symptoms:   - Nausea, vomitting   - Photophobia, Osmophobia   - Problems with concentration   - Insomnia (waking up frequently in middle of night)   - Prefer to be in a dark room      Number of days missed per month because of headaches:   Work (or school) days: doesn't miss (has some trouble getting her work done)  Social or Family activities: occasionally, doesn't have a lot of free time with school     Headache are worse if the patient: Bending over, exertion   Headache triggers: weather changes  Barometric changes, chocolate  What time of the year do headaches occur more frequently? More summer POTS flares      Have you had trigger point injection performed and how often? No   Have you had Botox injection performed and how often? No   Have you had epidural injections or transforaminal injections performed? No      Alternative therapies used in the past for headaches? No      Have you used CBD or THC for your headaches and how often? No      How many caffeine products to drink a day? 1-2   How much water to drink a day? 64oz      Are you current pregnant or planning on getting pregnant?  stopped depo shots  Doesn't have a partner currently     Have you ever had any Brain imaging?  Yes   02/09/2020-CT head   no acute intracranial abnormality     10/24/2021 MRI brain   No acute infarction, intracranial hemorrhage or mass   I personally reviewed these images      Reviewed old notes from physician seen in the past- see above HPI for summary of previous encounters             Past Medical History:   Diagnosis Date    Anxiety     Asthma     Dizziness     at times    GERD (gastroesophageal reflux disease)     Hammertoe of left foot     Headache     occ    Hyperlipemia     Hypermobile joints     Irritable bowel syndrome     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HCC)     Mast cell disorder     Migraine     PONV (postoperative nausea and vomiting)     POTS (postural orthostatic tachycardia syndrome)      infant     Wears glasses        Patient Active Problem List   Diagnosis    INES (generalized anxiety disorder)    Hypermobile joints    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HCC)    Median arcuate ligament syndrome (HCC)    Menorrhagia    POTS (postural orthostatic tachycardia syndrome)    Seasonal allergic rhinitis    Hypertriglyceridemia    Hammertoe of left foot    Chronic migraine without aura without status migrainosus, not intractable    Irritable bowel syndrome (IBS)    Dyspepsia    Seasonal allergies    Uncomplicated asthma    Abnormal celiac antibody panel    Chronic idiopathic constipation    Acute cough    Acute bacterial bronchitis    Acne    Exertional headache       Medications:      Current Outpatient Medications   Medication Sig Dispense Refill    acetaminophen (TYLENOL) 500 mg tablet Take 1,000 mg by mouth every 4 (four) hours as needed       ALPRAZolam (XANAX) 0 25 mg tablet Take 0 25 mg by mouth 2 (two) times a day as needed       ASMANEX  MCG/ACT AERO Inhale 2 puffs every 4 (four) hours as needed (2 puffs)      cetirizine (ZyrTEC) 10 mg tablet Take 10 mg by mouth daily      clindamycin (CLEOCIN T) 1 % lotion Apply topically daily To face (Patient taking differently: Apply 1 application topically daily To face) 60 mL 0    dexamethasone (DECADRON) 2 mg tablet Take 1 tablet (2 mg total) by mouth daily with breakfast 5 tablet 1    divalproex sodium (DEPAKOTE ER) 500 mg 24 hr tablet Take 1 tablet (500 mg total) by mouth daily 5 tablet 0    Elastic Bandages & Supports (TRUFORM STOCKINGS 20-30MMHG) MISC Use as directed   indomethacin (INDOCIN) 25 mg capsule Take 1 capsule (25 mg total) by mouth if needed for mild pain (prior to exercise) 30 capsule 3    Lasmiditan Succinate (REYVOW) 100 MG tablet Take 1 tablet (100mg)  one time as needed for migraine  Do not use more than one dose per day, or more than 8 doses per month 8 tablet 3    metoprolol succinate (TOPROL-XL) 50 mg 24 hr tablet Take 50 mg by mouth in the morning and 50 mg before bedtime   Omega-3 Fatty Acids (FISH OIL) 1,000 mg Take 1,000 mg by mouth 2 (two) times a day       sucralfate (CARAFATE) 1 g tablet Take 1 tablet (1 g total) by mouth if needed (take 30 minutes prior to exercise) 30 tablet 4    tretinoin (RETIN-A) 0 025 % cream Apply topically daily at bedtime 45 g 2    venlafaxine (EFFEXOR-XR) 37 5 mg 24 hr capsule Take 1 capsule (37 5 mg total) by mouth daily Take with one 75 mg capsule  Total daily dose is 112 5 mg 90 capsule 0    venlafaxine (EFFEXOR-XR) 75 mg 24 hr capsule Take 1 capsule (75 mg total) by mouth daily Take with one 37 5 mg capsule  Total daily dose is 112 5 mg 90 capsule 0    verapamil (CALAN) 120 mg tablet Take 2 tablets (240 mg total) by mouth in the morning and 2 tablets (240 mg total) before bedtime   360 tablet 4    XOPENEX HFA 45 MCG/ACT inhaler Inhale 1-2 puffs every 4 (four) hours as needed       fludrocortisone (FLORINEF) 0 1 mg tablet Take 0 1 mg by mouth 2 (two) times a day   (Patient not taking: Reported on 5/23/2022)      linaCLOtide (Linzess) 72 MCG CAPS Take 1 capsule by mouth daily before breakfast (Patient not taking: Reported on 5/23/2022) 90 capsule 3    naproxen (EC NAPROSYN) 500 MG EC tablet Take 1 tablet (500 mg total) by mouth 2 (two) times a day with meals (Patient not taking: Reported on 5/23/2022) 60 tablet 0    Riboflavin 100 MG TABS Take 1 tablet (100 mg total) by mouth 4 (four) times a day (Patient not taking: Reported on 5/23/2022) 120 tablet 3    scopolamine (TRANSDERM-SCOP) 1 mg/3 days TD 72 hr patch Place 1 patch on the skin every third day (Patient not taking: Reported on 5/23/2022) 10 patch 0    sucralfate (CARAFATE) 1 g tablet Take 1 tablet (1 g total) by mouth 3 (three) times a day as needed (headache-take before indomethacin) (Patient not taking: Reported on 5/23/2022) 15 tablet 3     Current Facility-Administered Medications   Medication Dose Route Frequency Provider Last Rate Last Admin    albuterol inhalation solution 2 5 mg  2 5 mg Nebulization Q6H PRN Ashlee Muñiz PA-C        medroxyPROGESTERone acetate (DEPO-PROVERA SYRINGE) IM injection 150 mg  150 mg Intramuscular Q3 Months KETAN Anand   150 mg at 11/16/21 1513    medroxyPROGESTERone acetate (DEPO-PROVERA SYRINGE) IM injection 150 mg  150 mg Intramuscular Q3 Months Micah Dias MD   150 mg at 02/02/22 1505        Allergies:       Allergies   Allergen Reactions    Nuts - Food Allergy Other (See Comments)     Avani Nuts - itchy throat    Other Hives      At surgical site and IV site- not sure if type of cleanser used    Pollen Extract        Family History:     Family History   Problem Relation Age of Onset    Gestational diabetes Mother     Migraines Mother     Anxiety disorder Father     Hyperlipidemia Father     Autism Brother     Diabetes Other     Uterine cancer Maternal Grandmother     Rheumatic fever Maternal Grandmother     Diabetes Maternal Grandfather     Hypertension Maternal Grandfather     Heart attack Maternal Grandfather     Stroke Maternal Grandfather     Hyperlipidemia Maternal Grandfather     Hypertension Paternal Grandmother     Anxiety disorder Paternal Grandmother     Hypertension Paternal Grandfather        Social History:     Social History     Socioeconomic History    Marital status: Single     Spouse name: Not on file    Number of children: Not on file    Years of education: Not on file    Highest education level: Not on file   Occupational History    Not on file   Tobacco Use    Smoking status: Never Smoker    Smokeless tobacco: Never Used   Vaping Use    Vaping Use: Never used   Substance and Sexual Activity    Alcohol use: No    Drug use: No     Comment: 2/9/20- not asked, parent at bedside   Sexual activity: Never   Other Topics Concern    Not on file   Social History Narrative    Lives with parents     Social Determinants of Health     Financial Resource Strain: Not on file   Food Insecurity: Not on file   Transportation Needs: Not on file   Physical Activity: Not on file   Stress: Not on file   Social Connections: Not on file   Intimate Partner Violence: Not on file   Housing Stability: Not on file    I have reviewed the patient's medical, social and surgical history as well as medications in detail and updated the computerized patient record  Objective:   Physical Exam:                                                                   Vitals:            /56 (BP Location: Right arm, Patient Position: Sitting, Cuff Size: Standard)   Pulse 72   Temp 97 9 °F (36 6 °C) (Temporal)   Ht 5' 7" (1 702 m)   Wt 87 4 kg (192 lb 9 6 oz)   BMI 30 17 kg/m²   BP Readings from Last 3 Encounters:   05/23/22 113/56   05/15/22 103/56   02/03/22 100/70     Pulse Readings from Last 3 Encounters:   05/23/22 72   05/15/22 67   02/03/22 76          CONSTITUTIONAL: Well developed, well nourished, well groomed  No dysmorphic features  Eyes:  EOM normal      Neck:  Normal ROM, neck supple  HEENT:  Normocephalic atraumatic  Chest:  Respirations regular and unlabored      Psychiatric:  Normal behavior and appropriate affect      MENTAL STATUS  Orientation: Alert and oriented x 3  Fund of knowledge: Intact  MOTOR (Upper and lower extremities)   Bulk/tone/abnormal movement: Normal muscle bulk and tone  COORDINATION   Station/Gait: Normal baseline gait  Review of Systems:   Review of Systems  Constitutional: Negative  HENT: Negative  Eyes: Negative  Respiratory: Negative  Cardiovascular: Negative  Gastrointestinal: Negative  Endocrine: Negative  Genitourinary: Negative  Musculoskeletal: Negative  Skin: Negative  Allergic/Immunologic: Negative  Neurological: Positive for headaches  Hematological: Negative  Psychiatric/Behavioral: Negative  I personally reviewed the ROS entered by the MA    I spent 20 minutes in face-to-face discussion regarding  the pathophysiology of her current symptoms and further plan, as well as counseling, educating, and coordinating the patient's care including pathogenesis of diagnosis, prognosis of diagnosis, diagnostic results, impression, and recommendations, risks and benefits of treatment, instructions for disease self management, treatment instructions and follow up requirements and spent 20 minutes non-face to face    Author:  Samuel Collazo PA-C 5/23/2022 10:51 AM

## 2022-05-24 ENCOUNTER — HOSPITAL ENCOUNTER (OUTPATIENT)
Dept: NON INVASIVE DIAGNOSTICS | Facility: HOSPITAL | Age: 22
Discharge: HOME | End: 2022-05-24
Payer: COMMERCIAL

## 2022-05-24 ENCOUNTER — SOCIAL WORK (OUTPATIENT)
Dept: BEHAVIORAL/MENTAL HEALTH CLINIC | Facility: CLINIC | Age: 22
End: 2022-05-24
Payer: COMMERCIAL

## 2022-05-24 VITALS
HEART RATE: 80 BPM | DIASTOLIC BLOOD PRESSURE: 70 MMHG | WEIGHT: 192 LBS | BODY MASS INDEX: 30.13 KG/M2 | HEIGHT: 67 IN | OXYGEN SATURATION: 98 % | SYSTOLIC BLOOD PRESSURE: 100 MMHG

## 2022-05-24 DIAGNOSIS — I49.8 POTS (POSTURAL ORTHOSTATIC TACHYCARDIA SYNDROME): ICD-10-CM

## 2022-05-24 DIAGNOSIS — R55 SYNCOPE, NEAR: ICD-10-CM

## 2022-05-24 DIAGNOSIS — F41.1 GAD (GENERALIZED ANXIETY DISORDER): ICD-10-CM

## 2022-05-24 LAB
BASELINE ST DEPRESSION: 0 MM
MAX HR PERCENT: 75 %
MAX HR: 169 BPM
RATE PRESSURE PRODUCT: NORMAL
SL CV STRESS RECOVERY BP: NORMAL MMHG
SL CV STRESS RECOVERY HR: 95 BPM
SL CV STRESS RECOVERY O2 SAT: 98 %
SL CV STRESS STAGE REACHED: 3
STRESS ANGINA INDEX: 0
STRESS BASELINE BP: NORMAL MMHG
STRESS BASELINE HR: 80 BPM
STRESS DUKE TREADMILL SCORE: 8
STRESS O2 SAT REST: 98 %
STRESS PEAK HR: 150 BPM
STRESS PERCENT HR: 75 %
STRESS POST ESTIMATED WORKLOAD: 10.7 METS
STRESS POST EXERCISE DUR MIN: 8 MIN
STRESS POST EXERCISE DUR SEC: 28 SEC
STRESS POST O2 SAT PEAK: 98 %
STRESS POST PEAK BP: 128 MMHG
STRESS ST DEPRESSION: 0 MM
STRESS TARGET HR: 150 BPM

## 2022-05-24 PROCEDURE — 90834 PSYTX W PT 45 MINUTES: CPT | Performed by: SOCIAL WORKER

## 2022-05-24 PROCEDURE — 93016 CV STRESS TEST SUPVJ ONLY: CPT | Performed by: INTERNAL MEDICINE

## 2022-05-24 PROCEDURE — 93018 CV STRESS TEST I&R ONLY: CPT | Performed by: INTERNAL MEDICINE

## 2022-05-24 PROCEDURE — 93017 CV STRESS TEST TRACING ONLY: CPT

## 2022-05-24 NOTE — PSYCH
Psychotherapy Provided: Individual Psychotherapy 50 minutes      Length of time in session: 50 minutes, follow up in 2 week     Pain:1    Current suicide risk : Low      D: Kolby Plascencia spoke today about her feelings re: her ED visit and cardiology follow up to address at least one incident of atrial fibrilation since her last session  She reported that she will be having a stress test today for additional work up  Ashley Morrissey expressed relief that this occurred "after the conclusion of her final exams," and she has begun her new position and class despite these instances  A:  Ashley Morrissey did not present as overly anxious or concerned about the above and seems quite comfortable in the hands of her new cardiologist     P:  Upcoming sessions will be used to continue to support Nury with all of the above        [facility]: Diagnosis and Treatment Plan explained to Nury, Nury relates understanding diagnosis and is agreeable to Treatment Plan  Yes      This note was not shared with the patient due to this is a psychotherapy note    Encounter Diagnoses   Name Primary?     INES (generalized anxiety disorder)

## 2022-05-26 LAB
CHEST PAIN STATEMENT: NORMAL
MAX DIASTOLIC BP: 50 MMHG
MAX HEART RATE: 150 BPM
MAX PREDICTED HEART RATE: 199 BPM
MAX. SYSTOLIC BP: 128 MMHG
PROTOCOL NAME: NORMAL
TARGET HR FORMULA: NORMAL
TEST INDICATION: NORMAL
TIME IN EXERCISE PHASE: NORMAL

## 2022-06-03 ENCOUNTER — TELEPHONE (OUTPATIENT)
Dept: NEUROLOGY | Facility: CLINIC | Age: 22
End: 2022-06-03

## 2022-06-03 DIAGNOSIS — G43.709 CHRONIC MIGRAINE WITHOUT AURA WITHOUT STATUS MIGRAINOSUS, NOT INTRACTABLE: Primary | ICD-10-CM

## 2022-06-03 RX ORDER — CYPROHEPTADINE HYDROCHLORIDE 4 MG/1
4 TABLET ORAL
Qty: 30 TABLET | Refills: 0 | Status: SHIPPED | OUTPATIENT
Start: 2022-06-03 | End: 2022-06-15

## 2022-06-07 ENCOUNTER — SOCIAL WORK (OUTPATIENT)
Dept: BEHAVIORAL/MENTAL HEALTH CLINIC | Facility: CLINIC | Age: 22
End: 2022-06-07
Payer: COMMERCIAL

## 2022-06-07 DIAGNOSIS — F41.1 GAD (GENERALIZED ANXIETY DISORDER): ICD-10-CM

## 2022-06-07 PROCEDURE — 90834 PSYTX W PT 45 MINUTES: CPT | Performed by: SOCIAL WORKER

## 2022-06-08 NOTE — PSYCH
Psychotherapy Provided: Individual Psychotherapy 50 minutes      Length of time in session: 50 minutes, follow up in 2 week     Pain:1    Current suicide risk : Low      D: Mari Biswas spoke today about her feelings re: the results of her EKG, stress test and upcoming cardiology appt  She also shared her frustration with her father's confrontation towards her re: her sister as he "blames" her for their difficulties  A:  Paulette How is managing her health issues well despite still being uncertain of the cause for her heart irregularities  She struggles with the pressure that is often placed on her by her parents to be the "bigger, better person "    P:  Upcoming sessions will be used to continue to support Nury with all of the above        [facility]: Diagnosis and Treatment Plan explained to Nury, Nury relates understanding diagnosis and is agreeable to Treatment Plan  Yes      This note was not shared with the patient due to this is a psychotherapy note    Encounter Diagnoses   Name Primary?     INES (generalized anxiety disorder)

## 2022-06-15 ENCOUNTER — OFFICE VISIT (OUTPATIENT)
Dept: GASTROENTEROLOGY | Facility: CLINIC | Age: 22
End: 2022-06-15
Payer: COMMERCIAL

## 2022-06-15 ENCOUNTER — OFFICE VISIT (OUTPATIENT)
Dept: FAMILY MEDICINE CLINIC | Facility: CLINIC | Age: 22
End: 2022-06-15
Payer: COMMERCIAL

## 2022-06-15 VITALS
DIASTOLIC BLOOD PRESSURE: 70 MMHG | SYSTOLIC BLOOD PRESSURE: 100 MMHG | RESPIRATION RATE: 16 BRPM | HEART RATE: 70 BPM | HEIGHT: 67 IN | BODY MASS INDEX: 29.35 KG/M2 | OXYGEN SATURATION: 98 % | TEMPERATURE: 98.2 F | WEIGHT: 187 LBS

## 2022-06-15 VITALS
HEIGHT: 67 IN | OXYGEN SATURATION: 98 % | DIASTOLIC BLOOD PRESSURE: 68 MMHG | TEMPERATURE: 98 F | WEIGHT: 185 LBS | HEART RATE: 82 BPM | SYSTOLIC BLOOD PRESSURE: 102 MMHG | BODY MASS INDEX: 29.03 KG/M2 | RESPIRATION RATE: 18 BRPM

## 2022-06-15 DIAGNOSIS — G43.709 CHRONIC MIGRAINE WITHOUT AURA WITHOUT STATUS MIGRAINOSUS, NOT INTRACTABLE: Primary | ICD-10-CM

## 2022-06-15 DIAGNOSIS — J45.909 UNCOMPLICATED ASTHMA, UNSPECIFIED ASTHMA SEVERITY, UNSPECIFIED WHETHER PERSISTENT: ICD-10-CM

## 2022-06-15 DIAGNOSIS — F41.1 GAD (GENERALIZED ANXIETY DISORDER): ICD-10-CM

## 2022-06-15 DIAGNOSIS — Z13.0 SCREENING FOR DEFICIENCY ANEMIA: ICD-10-CM

## 2022-06-15 DIAGNOSIS — Z13.1 SCREENING FOR DIABETES MELLITUS (DM): ICD-10-CM

## 2022-06-15 DIAGNOSIS — R10.9 ABDOMINAL PAIN, UNSPECIFIED ABDOMINAL LOCATION: ICD-10-CM

## 2022-06-15 DIAGNOSIS — D89.40 MAST CELL ACTIVATION SYNDROME (HCC): ICD-10-CM

## 2022-06-15 DIAGNOSIS — N94.6 DYSMENORRHEA: ICD-10-CM

## 2022-06-15 DIAGNOSIS — K59.04 CHRONIC IDIOPATHIC CONSTIPATION: ICD-10-CM

## 2022-06-15 DIAGNOSIS — E78.1 HYPERTRIGLYCERIDEMIA: ICD-10-CM

## 2022-06-15 DIAGNOSIS — K59.04 CHRONIC IDIOPATHIC CONSTIPATION: Primary | ICD-10-CM

## 2022-06-15 DIAGNOSIS — I49.8 POTS (POSTURAL ORTHOSTATIC TACHYCARDIA SYNDROME): ICD-10-CM

## 2022-06-15 DIAGNOSIS — R14.0 BLOATING: ICD-10-CM

## 2022-06-15 DIAGNOSIS — K58.1 IRRITABLE BOWEL SYNDROME WITH CONSTIPATION: ICD-10-CM

## 2022-06-15 PROBLEM — Q79.60 EDS (EHLERS-DANLOS SYNDROME): Status: ACTIVE | Noted: 2022-02-03

## 2022-06-15 PROBLEM — N92.0 MENORRHAGIA: Status: RESOLVED | Noted: 2017-10-23 | Resolved: 2022-06-15

## 2022-06-15 PROBLEM — R05.1 ACUTE COUGH: Status: RESOLVED | Noted: 2022-01-16 | Resolved: 2022-06-15

## 2022-06-15 PROBLEM — B96.89 ACUTE BACTERIAL BRONCHITIS: Status: RESOLVED | Noted: 2022-01-16 | Resolved: 2022-06-15

## 2022-06-15 PROBLEM — J20.8 ACUTE BACTERIAL BRONCHITIS: Status: RESOLVED | Noted: 2022-01-16 | Resolved: 2022-06-15

## 2022-06-15 PROCEDURE — 99214 OFFICE O/P EST MOD 30 MIN: CPT | Performed by: INTERNAL MEDICINE

## 2022-06-15 PROCEDURE — 99204 OFFICE O/P NEW MOD 45 MIN: CPT | Performed by: FAMILY MEDICINE

## 2022-06-15 RX ORDER — NAPROXEN 500 MG/1
500 TABLET ORAL 2 TIMES DAILY WITH MEALS
Qty: 30 TABLET | Refills: 2 | Status: SHIPPED | OUTPATIENT
Start: 2022-06-15 | End: 2023-06-15

## 2022-06-15 NOTE — PROGRESS NOTES
[facility] - Outpatient Consultation  Nuha Paul 25 y o  female MRN: [id number]  Encounter: [id number]          ASSESSMENT AND PLAN:      1  Chronic idiopathic constipation  2  Irritable bowel syndrome with constipation  3  Abdominal pain, unspecified abdominal location  4 Bloating    Constipation ongoing since infancy requiring medical management throughout childhood, with worsening of constipation over the past year  There is no associated abdominal pain relieved with bowel movements to suggest IBS-C though she previously had significant bloating  She has had poor response to fiber supplementation, stool softeners including bisacodyl and docusate, osmotic laxatives, Amitiza  Devante Pulse also did not provide much relief  She is currently try Ling magnesium supplement which did seem to initially help, and she is working with adjusting the dose to see if she can get long-term relief  While her symptoms may be from chronic constipation alone, suspect a defecatory disorder as well   In the absence of colonic in the absence of colonic dilation there is low suspicion dilation there is low suspicion for Hirschsprung for Hirschsprung disease  disease  She may benefit from anorectal manometry to confirm or rule out dyssynergic defecation in the future  Furthermore if she has normal anorectal manometry, would also consider a rectocele in the differential    If she does not have improvement with magnesium supplementation, would consider another prescription medication such as Trulance or Motegrity  Follow-up in 4 months    ______________________________________________________________________    HPI: Ms Sanches Section a pleasant 29-year-old female here for follow-up for ongoing complaints of severe constipation since infancy      Since last office visit she has completely eliminated gluten which has significantly improved her GI symptoms    Although she remains significantly constipated abdominal bloating and pain has improved dramatically  Regarding constipation she has stopped Linzess or other prescription medications  She is trying a supplement called oxy powder which is essentially a magnesium supplement  When she took the dose of 4 pills she had a large bowel movement but it was a bit too much  She tried to cut down to just a few pills per day and that did not provide any relief  She is continuing to trial and error this  Summary of HPI:  For both symptoms of dyspepsia as well as chronic significant constipation, she has had an extensive workup including EGD (images reviewed from 1/11/21)  and colonoscopy both of which were overall unremarkable   Lab work/imaging including celiac panel, H pylori stool antigen, TSH,  Gastric emptying study, have also been within normal limits other than a borderline TTG IgG of 6    She has taken a number of laxatives and stool softeners  Including MiraLax, docusate, senna, and the use of enemas and suppositories, and most recently has been on Linzess 72 mcg daily    She had been tried on a higher dose of Linzess which caused diarrhea, with likely overflow diarrhea not having any large formed stools proceeding this  She is also been going to pelvic floor physical therapy for the treatment of constipation  She has had very mild improvement in her symptoms, and by evaluation of the pelvic floor physical therapist Devon Felty is a small objective improvement  REVIEW OF SYSTEMS:    CONSTITUTIONAL: Denies any fever, chills, rigors, and weight loss  HEENT: Denies odynophagia, tinnitus  CARDIOVASCULAR: No chest pain or palpitations  RESPIRATORY: Denies any cough, hemoptysis, shortness of breath or dyspnea on exertion  GASTROINTESTINAL: As noted in the History of Present Illness  GENITOURINARY: No problems with urination  Denies any hematuria or dysuria  NEUROLOGIC: No dizziness or vertigo, denies headaches     MUSCULOSKELETAL: Denies any muscle or joint pain    SKIN: Denies skin rashes or itching  ENDOCRINE:  Denies intolerance to heat or cold  PSYCHOSOCIAL: Denies depression or anxiety  Denies any recent memory loss  Historical Information   Past Medical History:   Diagnosis Date    Anxiety     Asthma     Dizziness     at times    GERD (gastroesophageal reflux disease)     Hammertoe of left foot     Headache     occ    Hyperlipemia     Hypermobile joints     Irritable bowel syndrome     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HCC)     Mast cell disorder     Migraine     PONV (postoperative nausea and vomiting)     POTS (postural orthostatic tachycardia syndrome)      infant     Wears glasses      Past Surgical History:   Procedure Laterality Date    COLONOSCOPY      EGD AND COLONOSCOPY N/A 1/10/2017    Procedure: EGD AND COLONOSCOPY;  Surgeon: Chava De La Cruz MD;  Location: [facility]; Service:     ESOPHAGOGASTRODUODENOSCOPY      with biopsy    FOOT SURGERY      Excision of lesion feet benign    NE REPAIR OF HAMMERTOE,ONE Left 2019    Procedure: 2nd arthrodesis; 5th arthroplasty, flexor tenotomy 2,3,4,5 ;  Surgeon: Jonas Black DPM;  Location[facility];  Service: Podiatry    NE REPAIR OF [place] Left 2020    Procedure: 2ND TOE Revision hammertoe with broken implant;  Surgeon: Jonas Black DPM;  [facility];  Service: Podiatry    UPPER GASTROINTESTINAL ENDOSCOPY  2021    WISDOM TOOTH EXTRACTION       Social History   Social History     Substance and Sexual Activity   Alcohol Use No     Social History     Substance and Sexual Activity   Drug Use No    Comment: 20- not asked, parent at bedside       Social History     Tobacco Use   Smoking Status Never Smoker   Smokeless Tobacco Never Used     Family History   Problem Relation Age of Onset    Gestational diabetes Mother     Migraines Mother     Anxiety disorder Father     Hyperlipidemia Father     Autism Brother     Diabetes Other     Uterine cancer Maternal Grandmother     Rheumatic fever Maternal Grandmother     Diabetes Maternal Grandfather     Hypertension Maternal Grandfather     Heart attack Maternal Grandfather     Stroke Maternal Grandfather     Hyperlipidemia Maternal Grandfather     Hypertension Paternal Grandmother     Anxiety disorder Paternal Grandmother     Hypertension Paternal Grandfather        Meds/Allergies       Current Outpatient Medications:     acetaminophen (TYLENOL) 500 mg tablet    ALPRAZolam (XANAX) 0 25 mg tablet    ASMANEX  MCG/ACT AERO    cetirizine (ZyrTEC) 10 mg tablet    clindamycin (CLEOCIN T) 1 % lotion    cyproheptadine (PERIACTIN) 4 mg tablet    dexamethasone (DECADRON) 2 mg tablet    divalproex sodium (DEPAKOTE ER) 500 mg 24 hr tablet    Elastic Bandages & Supports (TRUFORM STOCKINGS 20-30MMHG) MISC    fludrocortisone (FLORINEF) 0 1 mg tablet    indomethacin (INDOCIN) 25 mg capsule    Lasmiditan Succinate (REYVOW) 100 MG tablet    linaCLOtide (Linzess) 72 MCG CAPS    metoprolol succinate (TOPROL-XL) 50 mg 24 hr tablet    Omega-3 Fatty Acids (FISH OIL) 1,000 mg    scopolamine (TRANSDERM-SCOP) 1 mg/3 days TD 72 hr patch    sucralfate (CARAFATE) 1 g tablet    sucralfate (CARAFATE) 1 g tablet    tretinoin (RETIN-A) 0 025 % cream    venlafaxine (EFFEXOR-XR) 37 5 mg 24 hr capsule    venlafaxine (EFFEXOR-XR) 75 mg 24 hr capsule    verapamil (CALAN) 120 mg tablet    XOPENEX HFA 45 MCG/ACT inhaler    naproxen (EC NAPROSYN) 500 MG EC tablet    Riboflavin 100 MG TABS    Current Facility-Administered Medications:     albuterol inhalation solution 2 5 mg, 2 5 mg, Nebulization, Q6H PRN    medroxyPROGESTERone acetate (DEPO-PROVERA SYRINGE) IM injection 150 mg, 150 mg, Intramuscular, Q3 Months, 150 mg at 11/16/21 1513    medroxyPROGESTERone acetate (DEPO-PROVERA SYRINGE) IM injection 150 mg, 150 mg, Intramuscular, Q3 Months, 150 mg at 02/02/22 1505    Allergies Allergen Reactions    Nuts - Food Allergy Other (See Comments)     Avani Nuts - itchy throat    Other Hives      At surgical site and IV site- not sure if type of cleanser used    Pollen Extract            Objective     Blood pressure 102/68, pulse 82, temperature 98 °F (36 7 °C), temperature source Tympanic, resp  rate 18, height 5' 7" (1 702 m), weight 83 9 kg (185 lb), SpO2 98 %, not currently breastfeeding  Body mass index is 28 98 kg/m²  PHYSICAL EXAM:      General Appearance:   Alert, cooperative, no distress   HEENT:   Normocephalic, atraumatic, anicteric  Neck:  Supple, symmetrical, trachea midline   Lungs:   Clear to auscultation bilaterally; no rales, rhonchi or wheezing; respirations unlabored    Heart[de-identified]   Regular rate and rhythm; no murmur, rub, or gallop  Abdomen:   Soft, non-tender, non-distended; normal bowel sounds; no masses, no organomegaly    Genitalia:   Deferred    Rectal:   Deferred    Extremities:  No cyanosis, clubbing or edema    Pulses:  2+ and symmetric    Skin:  No jaundice, rashes, or lesions          Lab Results:   No visits with results within 1 Day(s) from this visit     Latest known visit with results is:   Hospital Outpatient Visit on 05/24/2022   Component Date Value    Baseline HR 05/24/2022 80     Baseline BP 05/24/2022 100/70     O2 sat rest 05/24/2022 98     Stress peak HR 05/24/2022 150     Post peak BP 05/24/2022 128     Rate Pressure Product 05/24/2022 19,200 0     O2 sat peak 05/24/2022 98     Recovery HR 05/24/2022 95     Recovery BP 05/24/2022 120/50     O2 sat recovery 05/24/2022 98     Target HR 05/24/2022 150     Percent HR 05/24/2022 75     Exercise duration (min) 05/24/2022 8     Exercise duration (sec) 05/24/2022 28     Estimated workload 05/24/2022 10 7     Angina Index 05/24/2022 0     Stress Stage Reached 05/24/2022 3 0     Max HR Percent 05/24/2022 75     Max HR 05/24/2022 169     Valera Treadmill Score 05/24/2022 8    [facility] ST Depresion (mm) 05/24/2022 0     ST Depression (mm) 05/24/2022 0     Protocol Name 05/24/2022 GIO     Time In Exercise Phase 05/24/2022 00:08:28     MAX  SYSTOLIC BP 91/02/8012 584     Max Diastolic Bp 28/17/6987 50     Max Heart Rate 05/24/2022 150     Max Predicted Heart Rate 05/24/2022 199     Reason for Termination 05/24/2022                      Value:Fatigue  Dyspnea      Test Indication 05/24/2022 Syncope     Target Hr Formular 05/24/2022 (220 - Age)*100%     Chest Pain Statement 05/24/2022 none          Radiology Results:   Stress test only, exercise    Result Date: 5/24/2022  Narrative: Mary Kay Virk  Stress ECG: Overall, the patient's exercise capacity was normal for their age  The patient reached stage 3 0 of the protocol after exercising for 8 min and 28 sec and had a maximal HR of 169 bpm (75 % of MPHR) and 10 7 METS  Blood pressure demonstrated a normal response and heart rate demonstrated a blunted response to stress    Stress ECG: No ST deviation is noted  There were no arrhythmias during stress  The ECG was negative for ischemia  The stress ECG is negative for ischemia after submaximal exercise  No exercise-induced arrhythmias noted  No dizziness or near syncopal symptoms with exercise   Fair exercise capacity (8:28 min, 10 7 METS)    Stress strip    Result Date: 5/26/2022  Narrative: Confirmed by Merlin Asters ([id number]),  Evelyn RockwellMcalester ([id number]) on 5/26/2022 12:47:38 PM

## 2022-06-15 NOTE — PROGRESS NOTES
Assessment/Plan:    Uncomplicated asthma  mild intermittent  Uses albuterol and xopenex prn  POTS (postural orthostatic tachycardia syndrome)  on verapamil and metoprolol  Goes to [facility] cardiology  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HCC)  Uses an h2 blocker during episodes  Hypertriglyceridemia  Resolved  She has upcoming blood work  INES (generalized anxiety disorder)  On effexor  Chronic migraine without aura without status migrainosus, not intractable  Goes to neurology, Dr Andrés Cordero  She is on effexor and is going through trials with different drugs  She failed multiple meds  Now is taking reyvow  Acupuncture has helped  Chronic idiopathic constipation  Goes to gi and is on mag  Diagnoses and all orders for this visit:    Chronic migraine without aura without status migrainosus, not intractable    Uncomplicated asthma, unspecified asthma severity, unspecified whether persistent    POTS (postural orthostatic tachycardia syndrome)    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HCC)    Hypertriglyceridemia  -     Lipid panel; Future    INES (generalized anxiety disorder)    Chronic idiopathic constipation    Screening for deficiency anemia  -     CBC and differential; Future    Screening for diabetes mellitus (DM)  -     Comprehensive metabolic panel; Future    Dysmenorrhea  -     naproxen (EC NAPROSYN) 500 MG EC tablet; Take 1 tablet (500 mg total) by mouth 2 (two) times a day with meals        Subjective: establish care      Patient ID: Freya Nurse is a 25 y o  female  HPI  Here to establish care  Pt with a ho chronic idiopathic constipation- Goes to gastroenterology  Failed linzess and recently started on mag  She had an endoscopy and colonoscopy and was neg for celiac  After starting a gluten free diet her symptoms improved  Asthma- mild intermittent  Uses albuterol and xopenex prn  POTS- on verapamil and metoprolol  Goes to [facility] cardiology       Migraines w/o aura- Goes to neurology, Dr Vogt Client  She is on effexor and is going through trials with different drugs  She failed multiple meds  Now is taking reyvow  Acupuncture has helped  The following portions of the patient's history were reviewed and updated as appropriate: allergies, current medications, past family history, past medical history, past social history, past surgical history and problem list     Review of Systems   Constitutional: Negative for fever and unexpected weight change  HENT: Negative for ear pain, sore throat and trouble swallowing  Eyes: Negative for pain and visual disturbance  Respiratory: Negative for cough, chest tightness, shortness of breath and wheezing  Cardiovascular: Negative for chest pain  Gastrointestinal: Negative for abdominal distention, abdominal pain, blood in stool, constipation, diarrhea, nausea and vomiting  Endocrine: Negative for polydipsia and polyuria  Genitourinary: Negative for dysuria and hematuria  Musculoskeletal: Negative for back pain and myalgias  Skin: Negative for rash  Neurological: Negative for syncope and headaches  Psychiatric/Behavioral: Negative for suicidal ideas  PHQ-2/9 Depression Screening         [unfilled]    Objective:      /70 (BP Location: Left arm, Patient Position: Sitting, Cuff Size: Adult)   Pulse 70   Temp 98 2 °F (36 8 °C) (Tympanic)   Resp 16   Ht 5' 7" (1 702 m)   Wt 84 8 kg (187 lb)   SpO2 98%   BMI 29 29 kg/m²          Physical Exam  Constitutional:       Appearance: She is well-developed  HENT:      Head: Normocephalic and atraumatic  Right Ear: External ear normal       Left Ear: External ear normal       Mouth/Throat:      Pharynx: No oropharyngeal exudate  Eyes:      General: No scleral icterus  Conjunctiva/sclera: Conjunctivae normal       Pupils: Pupils are equal, round, and reactive to light  Cardiovascular:      Rate and Rhythm: Normal rate and regular rhythm  Heart sounds:  No murmur heard  No friction rub  No gallop  Pulmonary:      Effort: Pulmonary effort is normal  No respiratory distress  Breath sounds: Normal breath sounds  No wheezing or rales  Abdominal:      General: Bowel sounds are normal  There is no distension  Palpations: Abdomen is soft  There is no mass  Tenderness: There is no abdominal tenderness  There is no rebound  Musculoskeletal:         General: Normal range of motion  Cervical back: Normal range of motion and neck supple  Skin:     General: Skin is warm and dry  Neurological:      Mental Status: She is alert and oriented to person, place, and time

## 2022-06-15 NOTE — ASSESSMENT & PLAN NOTE
Goes to neurology, Dr Sera Gramajo  She is on effexor and is going through trials with different drugs  She failed multiple meds  Now is taking reyvow  Acupuncture has helped

## 2022-06-21 ENCOUNTER — SOCIAL WORK (OUTPATIENT)
Dept: BEHAVIORAL/MENTAL HEALTH CLINIC | Facility: CLINIC | Age: 22
End: 2022-06-21
Payer: COMMERCIAL

## 2022-06-21 DIAGNOSIS — F41.1 GAD (GENERALIZED ANXIETY DISORDER): ICD-10-CM

## 2022-06-21 PROCEDURE — 90834 PSYTX W PT 45 MINUTES: CPT | Performed by: SOCIAL WORKER

## 2022-06-21 NOTE — PSYCH
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     INES (generalized anxiety disorder)

## 2022-06-23 ENCOUNTER — APPOINTMENT (OUTPATIENT)
Dept: LAB | Facility: HOSPITAL | Age: 22
End: 2022-06-23
Payer: COMMERCIAL

## 2022-06-23 DIAGNOSIS — Z13.0 SCREENING FOR DEFICIENCY ANEMIA: ICD-10-CM

## 2022-06-23 DIAGNOSIS — Z13.1 SCREENING FOR DIABETES MELLITUS (DM): ICD-10-CM

## 2022-06-23 DIAGNOSIS — E78.1 HYPERTRIGLYCERIDEMIA: ICD-10-CM

## 2022-06-23 LAB
ALBUMIN SERPL BCP-MCNC: 3.9 G/DL (ref 3.5–5)
ALP SERPL-CCNC: 86 U/L (ref 46–116)
ALT SERPL W P-5'-P-CCNC: 23 U/L (ref 12–78)
ANION GAP SERPL CALCULATED.3IONS-SCNC: 4 MMOL/L (ref 4–13)
AST SERPL W P-5'-P-CCNC: 15 U/L (ref 5–45)
BASOPHILS # BLD AUTO: 0 THOUSANDS/ΜL (ref 0–0.1)
BASOPHILS NFR BLD AUTO: 0 % (ref 0–1)
BILIRUB SERPL-MCNC: 0.34 MG/DL (ref 0.2–1)
BUN SERPL-MCNC: 16 MG/DL (ref 5–25)
CALCIUM SERPL-MCNC: 9.7 MG/DL (ref 8.3–10.1)
CHLORIDE SERPL-SCNC: 107 MMOL/L (ref 100–108)
CHOLEST SERPL-MCNC: 141 MG/DL
CO2 SERPL-SCNC: 27 MMOL/L (ref 21–32)
CREAT SERPL-MCNC: 0.94 MG/DL (ref 0.6–1.3)
EOSINOPHIL # BLD AUTO: 0 THOUSAND/ΜL (ref 0–0.61)
EOSINOPHIL NFR BLD AUTO: 0 % (ref 0–6)
ERYTHROCYTE [DISTWIDTH] IN BLOOD BY AUTOMATED COUNT: 11.8 % (ref 11.6–15.1)
GFR SERPL CREATININE-BSD FRML MDRD: 86 ML/MIN/1.73SQ M
GLUCOSE P FAST SERPL-MCNC: 92 MG/DL (ref 65–99)
HCT VFR BLD AUTO: 39.6 % (ref 34.8–46.1)
HDLC SERPL-MCNC: 34 MG/DL
HGB BLD-MCNC: 13.6 G/DL (ref 11.5–15.4)
IMM GRANULOCYTES # BLD AUTO: 0.01 THOUSAND/UL (ref 0–0.2)
IMM GRANULOCYTES NFR BLD AUTO: 0 % (ref 0–2)
LDLC SERPL CALC-MCNC: 76 MG/DL (ref 0–100)
LYMPHOCYTES # BLD AUTO: 1.75 THOUSANDS/ΜL (ref 0.6–4.47)
LYMPHOCYTES NFR BLD AUTO: 36 % (ref 14–44)
MCH RBC QN AUTO: 29.4 PG (ref 26.8–34.3)
MCHC RBC AUTO-ENTMCNC: 34.3 G/DL (ref 31.4–37.4)
MCV RBC AUTO: 86 FL (ref 82–98)
MONOCYTES # BLD AUTO: 0.38 THOUSAND/ΜL (ref 0.17–1.22)
MONOCYTES NFR BLD AUTO: 8 % (ref 4–12)
NEUTROPHILS # BLD AUTO: 2.75 THOUSANDS/ΜL (ref 1.85–7.62)
NEUTS SEG NFR BLD AUTO: 56 % (ref 43–75)
NONHDLC SERPL-MCNC: 107 MG/DL
NRBC BLD AUTO-RTO: 0 /100 WBCS
PLATELET # BLD AUTO: 283 THOUSANDS/UL (ref 149–390)
PMV BLD AUTO: 10.2 FL (ref 8.9–12.7)
POTASSIUM SERPL-SCNC: 4.3 MMOL/L (ref 3.5–5.3)
PROT SERPL-MCNC: 8.2 G/DL (ref 6.4–8.2)
RBC # BLD AUTO: 4.63 MILLION/UL (ref 3.81–5.12)
SODIUM SERPL-SCNC: 138 MMOL/L (ref 136–145)
TRIGL SERPL-MCNC: 155 MG/DL
WBC # BLD AUTO: 4.89 THOUSAND/UL (ref 4.31–10.16)

## 2022-06-23 PROCEDURE — 80053 COMPREHEN METABOLIC PANEL: CPT

## 2022-06-23 PROCEDURE — 36415 COLL VENOUS BLD VENIPUNCTURE: CPT

## 2022-06-23 PROCEDURE — 80061 LIPID PANEL: CPT

## 2022-06-23 PROCEDURE — 85025 COMPLETE CBC W/AUTO DIFF WBC: CPT

## 2022-07-05 ENCOUNTER — TELEPHONE (OUTPATIENT)
Dept: NEUROLOGY | Facility: CLINIC | Age: 22
End: 2022-07-05

## 2022-07-05 DIAGNOSIS — G43.009 MIGRAINE WITHOUT AURA AND WITHOUT STATUS MIGRAINOSUS, NOT INTRACTABLE: Primary | ICD-10-CM

## 2022-07-05 DIAGNOSIS — G43.009 MIGRAINE WITHOUT AURA AND WITHOUT STATUS MIGRAINOSUS, NOT INTRACTABLE: ICD-10-CM

## 2022-07-05 RX ORDER — RIMEGEPANT SULFATE 75 MG/75MG
75 TABLET, ORALLY DISINTEGRATING ORAL AS NEEDED
Qty: 8 TABLET | Refills: 3 | Status: SHIPPED | OUTPATIENT
Start: 2022-07-05

## 2022-07-05 RX ORDER — ATOGEPANT 60 MG/1
60 TABLET ORAL DAILY
Qty: 30 TABLET | Refills: 11 | Status: SHIPPED | OUTPATIENT
Start: 2022-07-05

## 2022-07-05 NOTE — TELEPHONE ENCOUNTER
Nurtec PA initiated on CMM   (Aparicio: [id number])    Awaiting determination      Will initiate Qulipta on separate enc

## 2022-07-06 NOTE — TELEPHONE ENCOUNTER
Please send to [facility] via fax for one of their authorized pharmacies to send for her    Please let her know

## 2022-07-06 NOTE — TELEPHONE ENCOUNTER
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

## 2022-07-06 NOTE — TELEPHONE ENCOUNTER
Spoke w/ Erin Aggarwal from [address] [place] [address] and states that Taylor Dela Cruz has been denied  Pt must tried and failed at least 2 formulary alternative-emgality, aimovig, ajovy and Nurtec preventative

## 2022-07-07 NOTE — TELEPHONE ENCOUNTER
qulipta form received from [facility]  Faxed along with insurance card        [employer] message sent to pt making her aware of above

## 2022-07-11 DIAGNOSIS — Z11.59 NEED FOR HEPATITIS B SCREENING TEST: Primary | ICD-10-CM

## 2022-07-11 DIAGNOSIS — Z11.1 SCREENING FOR TUBERCULOSIS: ICD-10-CM

## 2022-07-12 ENCOUNTER — SOCIAL WORK (OUTPATIENT)
Dept: BEHAVIORAL/MENTAL HEALTH CLINIC | Facility: CLINIC | Age: 22
End: 2022-07-12
Payer: COMMERCIAL

## 2022-07-12 ENCOUNTER — TELEPHONE (OUTPATIENT)
Dept: PSYCHIATRY | Facility: CLINIC | Age: 22
End: 2022-07-12

## 2022-07-12 DIAGNOSIS — F41.1 GAD (GENERALIZED ANXIETY DISORDER): ICD-10-CM

## 2022-07-12 PROCEDURE — 90834 PSYTX W PT 45 MINUTES: CPT | Performed by: SOCIAL WORKER

## 2022-07-12 RX ORDER — VENLAFAXINE HYDROCHLORIDE 37.5 MG/1
37.5 CAPSULE, EXTENDED RELEASE ORAL DAILY
Qty: 90 CAPSULE | Refills: 0 | Status: SHIPPED | OUTPATIENT
Start: 2022-07-12 | End: 2022-08-09 | Stop reason: SDUPTHER

## 2022-07-12 RX ORDER — VENLAFAXINE HYDROCHLORIDE 75 MG/1
75 CAPSULE, EXTENDED RELEASE ORAL DAILY
Qty: 90 CAPSULE | Refills: 0 | Status: SHIPPED | OUTPATIENT
Start: 2022-07-12 | End: 2022-08-09 | Stop reason: SDUPTHER

## 2022-07-12 NOTE — TELEPHONE ENCOUNTER
Patient was in the office for appt with another provider and requested to make appt with Sherice Oh  Writer informed provider is no longer in our office and her patients were rescheduled with new provider  Intake - please call patient to schedule new appt with Deyanira Lai - patient informed she used to see provider and is inquiring about med refills  She requested for me to schedule appt with Dr Rocio Lai however writer informed message would be sent but appt could not be scheduled unless through intake or with approval from provider  Writer also informed they would call her with response regarding med refill  Please advise  Thank you

## 2022-07-13 NOTE — PSYCH
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 and return of  Severe and intense headaches  She verbalized concern, as well, re: her father's job stress and his wife's plan to sell her childhood home  A:  Lillian Shaffer presented as very upset today about the return of her debilitating migraines  The above potential life changes, as well, are also very upsetting to her and will add to her stress level    P:  Upcoming sessions will be used to continue to support Nury with all of the above        [address] [place] [address] Luke: Diagnosis and Treatment Plan explained to Nury, Nury relates understanding diagnosis and is agreeable to Treatment Plan  Yes      This note was not shared with the patient due to this is a psychotherapy note    Encounter Diagnoses   Name Primary?     INES (generalized anxiety disorder)

## 2022-07-14 NOTE — TELEPHONE ENCOUNTER
Patient called and requested a new appt with provider and a refill  This writer informed patient that the refill has been sent to the pharmacy  Patient was transferred to Resident's line to be scheduled  Please see refill orders encounter from Dr Angeles Matthews

## 2022-07-17 ENCOUNTER — APPOINTMENT (OUTPATIENT)
Dept: LAB | Facility: HOSPITAL | Age: 22
End: 2022-07-17
Attending: FAMILY MEDICINE
Payer: COMMERCIAL

## 2022-07-17 ENCOUNTER — APPOINTMENT (OUTPATIENT)
Dept: LAB | Facility: HOSPITAL | Age: 22
End: 2022-07-17
Payer: COMMERCIAL

## 2022-07-17 DIAGNOSIS — Z11.59 NEED FOR HEPATITIS B SCREENING TEST: ICD-10-CM

## 2022-07-17 DIAGNOSIS — Z00.8 HEALTH EXAMINATION IN POPULATION SURVEY: ICD-10-CM

## 2022-07-17 DIAGNOSIS — Z11.1 SCREENING FOR TUBERCULOSIS: ICD-10-CM

## 2022-07-17 LAB
CHOLEST SERPL-MCNC: 132 MG/DL
EST. AVERAGE GLUCOSE BLD GHB EST-MCNC: 100 MG/DL
HBA1C MFR BLD: 5.1 %
HBV SURFACE AB SER-ACNC: 164.27 MIU/ML
HDLC SERPL-MCNC: 38 MG/DL
LDLC SERPL CALC-MCNC: 69 MG/DL (ref 0–100)
NONHDLC SERPL-MCNC: 94 MG/DL
TRIGL SERPL-MCNC: 124 MG/DL

## 2022-07-17 PROCEDURE — 83036 HEMOGLOBIN GLYCOSYLATED A1C: CPT

## 2022-07-17 PROCEDURE — 80061 LIPID PANEL: CPT

## 2022-07-17 PROCEDURE — 86480 TB TEST CELL IMMUN MEASURE: CPT

## 2022-07-17 PROCEDURE — 86706 HEP B SURFACE ANTIBODY: CPT

## 2022-07-17 PROCEDURE — 36415 COLL VENOUS BLD VENIPUNCTURE: CPT

## 2022-07-19 LAB
GAMMA INTERFERON BACKGROUND BLD IA-ACNC: 0.04 IU/ML
M TB IFN-G BLD-IMP: NEGATIVE
M TB IFN-G CD4+ BCKGRND COR BLD-ACNC: -0.01 IU/ML
M TB IFN-G CD4+ BCKGRND COR BLD-ACNC: 0 IU/ML
MITOGEN IGNF BCKGRD COR BLD-ACNC: 3.12 IU/ML

## 2022-07-26 ENCOUNTER — SOCIAL WORK (OUTPATIENT)
Dept: BEHAVIORAL/MENTAL HEALTH CLINIC | Facility: CLINIC | Age: 22
End: 2022-07-26
Payer: COMMERCIAL

## 2022-07-26 DIAGNOSIS — F41.1 GAD (GENERALIZED ANXIETY DISORDER): ICD-10-CM

## 2022-07-26 PROCEDURE — 90834 PSYTX W PT 45 MINUTES: CPT | Performed by: SOCIAL WORKER

## 2022-07-26 NOTE — BH TREATMENT PLAN
Chente Cheek  2000         Date of Initial Treatment Plan: 7/18/18  Date of Current Treatment Plan: 7/26/22  Treatment Plan Number 11     Strengths/Personal Resources for Self Care: I care about others a lot, I am respectful to others, I am a rule follower,  Like to do things "right "      Diagnosis: INES     Area of Needs: I have been getting migraines and have not found any treatments that work  Long Term Goal 1: AI want to continue to work to lessen my anxiety,  improve my self confidence, and ease my stress       Target Date:1/26/23  Completion Date: na         Short Term Objectives for Goal 1: AI will learn about mindfulness as a potential alternative treatment for my headaches  BI will continue  exercising and engaging in daily self care  and CI will use therapy sessions to ask for what I need and to  process issues that cause me stress and to give myself credit for my progress   D1 I will continue to challenge irrational and anxious thoughts related to new experiences ("I CAN do it ")      GOAL 1: Modality: Individual 2x per month   Completion Date na, Medication Management and The person(s) responsible for carrying out the plan is Dr Mary Ybarra and Treatment Plan explained to Nury Arrington relates understanding diagnosis and is agreeable to Treatment Plan          Client Comments : Please share your thoughts, feelings, need and/or experiences regarding your treatment plan:    __________________________________________________________________    Treatment Plan done but not signed at time of office visit due to:  Plan reviewed by phone or in person  and verbal consent given

## 2022-07-26 NOTE — PSYCH
Psychotherapy Provided: Individual Psychotherapy 50 minutes      Length of time in session: 50 minutes, follow up in 2 week     Pain 5, moderate    Current suicide risk : Low      D: Bill Espinosa spoke today about her feelings re: her ongoing Severe and intense headaches  She poke further about  her father's job stress and his wife's conflicted feelings about selling her childhood home  A:  Aileen Covington was receptive today to considering how her stress level may be contributing to her migraines and whether mindfulness would be helpful as an alternative therapy for her  This will be added to her Treatment Plan and addressed during upcoming sesssions   P:  Upcoming sessions will be used to continue to support Nury with all of the above        [address] [place] [address] Luke: Diagnosis and Treatment Plan explained to Nury, Nury relates understanding diagnosis and is agreeable to Treatment Plan  Yes      This note was not shared with the patient due to this is a psychotherapy note    Encounter Diagnoses   Name Primary?     INES (generalized anxiety disorder)

## 2022-08-02 ENCOUNTER — CLINICAL SUPPORT (OUTPATIENT)
Dept: FAMILY MEDICINE CLINIC | Facility: CLINIC | Age: 22
End: 2022-08-02
Payer: COMMERCIAL

## 2022-08-02 DIAGNOSIS — Z23 NEED FOR TDAP VACCINATION: Primary | ICD-10-CM

## 2022-08-02 PROCEDURE — 90715 TDAP VACCINE 7 YRS/> IM: CPT

## 2022-08-02 PROCEDURE — 90471 IMMUNIZATION ADMIN: CPT

## 2022-08-09 ENCOUNTER — OFFICE VISIT (OUTPATIENT)
Dept: PSYCHIATRY | Facility: CLINIC | Age: 22
End: 2022-08-09

## 2022-08-09 DIAGNOSIS — F41.1 GAD (GENERALIZED ANXIETY DISORDER): ICD-10-CM

## 2022-08-09 PROCEDURE — NC001 PR NO CHARGE: Performed by: PSYCHIATRY & NEUROLOGY

## 2022-08-09 RX ORDER — VENLAFAXINE HYDROCHLORIDE 75 MG/1
75 CAPSULE, EXTENDED RELEASE ORAL DAILY
Qty: 90 CAPSULE | Refills: 1 | Status: SHIPPED | OUTPATIENT
Start: 2022-08-09

## 2022-08-09 RX ORDER — VENLAFAXINE HYDROCHLORIDE 37.5 MG/1
37.5 CAPSULE, EXTENDED RELEASE ORAL DAILY
Qty: 90 CAPSULE | Refills: 1 | Status: SHIPPED | OUTPATIENT
Start: 2022-08-09

## 2022-08-09 NOTE — PSYCH
[address]    Name and Date of Birth:  Antwan Guerrero 22 y o  2000 MRN: [id number]    Date of Visit: August 9, 2022    Reason for visit: Full psychiatric intake assessment for medication management     HPI     Antwan Guerrero is a 25 y o  female with a past psychiatric history significant for INES, and PMH of POTS, migraines, Mast Cell Activation Syndrome, who presents to the [address] [place] [address] [address] outpatient clinic for intake assessment  Dannie Ocasio presents reporting stable mood, including well controlled anxiety symptoms  She was previously being treated for INES by Dr Glory Schilder, followed by [address] [place] [address]  Her last appointment was more than a year ago  She asks if she can eventually taper Effexor as she has been feeling stable, but willing to wait until she settles into her nursing school curriculum in 2 weeks  She notes that she was diagnosed with INES a few years ago, and has tried multiple medications that was discontinued due to tiredness, ultimately stopping on her current medication, Effexor XR  She notes that she used to worry about multiple things and anxiety was not under control in the past, but has been well controlled in the last few months  She denies panic attacks  She denies feeling down, depressed, or sad, and denies symptoms of depression  She denies history of patti/hypomania  She denies history of AVH  She denies history of eating disorders  She denies hx of obsessive-compulsive symptoms  She states that she is on 112 5 mg of Effexor XR nightly and is tolerating it well without side effects  Dannie Ocasio says that in general she has been doing well, both in her professional life and personal life  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[employer], is a nanny, 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   She says that she wants to become a NICU nurse and will be applying for jobs in December  Current Rating Scores:     Current PHQ-9   PHQ-2/9 Depression Screening    Little interest or pleasure in doing things: 0 - not at all  Feeling down, depressed, or hopeless: 0 - not at all  Trouble falling or staying asleep, or sleeping too much: 1 - several days  Feeling tired or having little energy: 1 - several days  Poor appetite or overeatin - not at all  Feeling bad about yourself - or that you are a failure or have let yourself or your family down: 0 - not at all  Trouble concentrating on things, such as reading the newspaper or watching television: 0 - not at all  Moving or speaking so slowly that other people could have noticed  Or the opposite - being so fidgety or restless that you have been moving around a lot more than usual: 0 - not at all  Thoughts that you would be better off dead, or of hurting yourself in some way: 0 - not at all  PHQ-9 Score: 2   PHQ-9 Interpretation: No or Minimal depression        Current INES-7 is   INES-7 Flowsheet Screening    Flowsheet Row Most Recent Value   Over the last 2 weeks, how often have you been bothered by any of the following problems?     Feeling nervous, anxious, or on edge 1   Not being able to stop or control worrying 0   Worrying too much about different things 0   Trouble relaxing 1   Being so restless that it is hard to sit still 0   Becoming easily annoyed or irritable 0   Feeling afraid as if something awful might happen 0   INES-7 Total Score 2          Psychiatric Review Of Systems:    Sleep changes: no  Appetite changes: no  Weight changes: no  Energy/anergy: no  Interest/pleasure/anhedonia: no  Attention/concentration: no  Psychomotor agitation/retardation: no  Somatic symptoms: no  Anxiety/panic: rarely  Beti: no  Guilty/hopeless: no  Self injurious behavior/risky behavior: no  Suicidal ideation: no  Homicidal ideation: no  Auditory hallucinations: no  Visual hallucinations: no  Other hallucinations: no  Delusional thinking: no  Eating disorder history: no  Obsessive/compulsive symptoms: no    Review Of Systems:    Constitutional negative   ENT chronic migraines   Cardiovascular negative   Respiratory negative   Gastrointestinal Chronic constipation   Genitourinary negative   Musculoskeletal negative   Integumentary negative   Neurological negative   Endocrine negative   Other Symptoms none, all other systems are negative       Family Psychiatric History:     Family History   Problem Relation Age of Onset    Gestational diabetes Mother     Migraines Mother     Anxiety disorder Father     Hyperlipidemia Father     Autism Brother     Diabetes Other     Uterine cancer Maternal Grandmother     Rheumatic fever Maternal Grandmother     Diabetes Maternal Grandfather     Hypertension Maternal Grandfather     Heart attack Maternal Grandfather     Stroke Maternal Grandfather     Hyperlipidemia Maternal Grandfather     Hypertension Paternal Grandmother     Anxiety disorder Paternal Grandmother     Hypertension Paternal Grandfather        Psychiatric Family History: Twin Brother - ASD; Father - Anxiety  Suicide Attempts: none    Past Psychiatric History:     Inpatient psychiatric admissions: denies  Prior outpatient psychiatric linkage: [facility], Serina Curiel MD  [facility]current [facility]  History of suicidal attempts/gestures: none  History of violence/aggressive behaviors: none  Psychotropic medication trials: Effexor, Zoloft (tired), Cymbalta (tiredness), Depakote, Topamax, Methylphenidate  Substance abuse inpatient/outpatient rehabilitation: none    Substance Abuse History:  Occasional 1-2 drinks in social situations  Generally avoids alcohol as it makes her headaches worse  Denies using tobacco products  Denies using marijuana  Denies use of illicit street drugs  Denies history of alcohol, illict substance, or tobacco abuse   Denies past legal actions or arrests secondary to substance intoxication  The patient denies prior DWIs/DUIs  Leora Mays does not exhibit objective evidence of substance withdrawal during today's examination nor does Nury appear under the influence of any psychoactive substance  Social History:  Born at 26 weeks  Fraternal tiwn  Twin has ASD  Developmental: Denies a history of milestone/developmental delay  There is no documented history of IEP or need for special education  Education: nursing student  Marital history: single  Children: none  Living arrangement, social support: parents and brother  Occupational History: employed as , PCA at [employer]; nursing student  Access to firearms: Denies direct access to weapons/firearms  Michael Mckeon has no history of arrests or violence with a deadly weapon  Traumatic History:     Abuse: denies  Other Traumatic Events: other traumatic events: denies    Past Medical History:    Past Medical History:   Diagnosis Date    Anxiety     Asthma     Dizziness     at times    GERD (gastroesophageal reflux disease)     Hammertoe of left foot     Headache     occ    Hyperlipemia     Hypermobile joints     Irritable bowel syndrome     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HCC)     Mast cell disorder     Migraine     PONV (postoperative nausea and vomiting)     POTS (postural orthostatic tachycardia syndrome)      infant     Wears glasses         Past Surgical History:   Procedure Laterality Date    COLONOSCOPY      EGD AND COLONOSCOPY N/A 1/10/2017    Procedure: EGD AND COLONOSCOPY;  Surgeon: Osmani Galdamez MD;  Location: [facility];   Service:     ESOPHAGOGASTRODUODENOSCOPY      with biopsy    FOOT SURGERY      Excision of lesion feet benign    SD REPAIR OF HAMMERTOE,ONE Left 2019    Procedure: 2nd arthrodesis; 5th arthroplasty, flexor tenotomy 2,3,4,5 ;  Surgeon: Neelima Mulligan DPM;  [facility];  Service: Podiatry    SD REPAIR OF Guillermo Sieve Left 2020    Procedure: 2ND TOE Revision hammertoe with broken implant;  Surgeon: Keke Mason DPM;  [facility];  Service: Podiatry    UPPER GASTROINTESTINAL ENDOSCOPY  01/28/2021    WISDOM TOOTH EXTRACTION       Allergies   Allergen Reactions    Nuts - Food Allergy Other (See Comments)     Avani Nuts - itchy throat    Other Hives      At surgical site and IV site- not sure if type of cleanser used    Pollen Extract        History Review: The following portions of the patient's history were reviewed and updated as appropriate: allergies, current medications, past family history, past medical history, past social history, past surgical history and problem list     OBJECTIVE:    Vital signs in last 24 hours:    Notes normal BP at home  There were no vitals filed for this visit      Mental Status Evaluation:    Appearance age appropriate, casually dressed   Behavior pleasant, cooperative, calm   Speech normal rate and volume   Mood "DOing good"   Affect reactive   Thought Processes organized, goal directed   Associations intact associations   Thought Content no overt delusions   Perceptual Disturbances: Denies auditory or visual hallucinations and Does not appear to be responding to internal stimuli   Abnormal Thoughts  Risk Potential Denies suicidal or homicidal ideation, intent, or plan   Orientation oriented to person, place, time/date and situation   Memory recent and remote memory grossly intact   Consciousness alert and awake   Attention Span Concentration Span attention span and concentration are age appropriate   Intellect appears to be of average intelligence   Insight good   Judgement good   Muscle Strength and  Gait normal muscle strength and normal muscle tone, normal gait and normal balance   Motor Activity no abnormal movements   Language no difficulty naming common objects, no difficulty repeating a phrase   Fund of Knowledge adequate knowledge of current events  adequate fund of knowledge regarding past history  adequate fund of knowledge regarding vocabulary        Laboratory Results: I have personally reviewed all pertinent laboratory/tests results    Recent Labs (last 6 months):   Appointment on 07/17/2022   Component Date Value    Hep B S Ab 07/17/2022 164 27     QFT Nil 07/17/2022 0 04     QFT TB1-NIL 07/17/2022 0 00     QFT TB2-NIL 07/17/2022 -0 01     QFT Mitogen-NIL 07/17/2022 3 12     QFT Final Interpretation 07/17/2022 Negative    Appointment on 07/17/2022   Component Date Value    Hemoglobin A1C 07/17/2022 5 1     EAG 07/17/2022 100     Cholesterol 07/17/2022 132     Triglycerides 07/17/2022 124     HDL, Direct 07/17/2022 38 (A)    LDL Calculated 07/17/2022 69     Non-HDL-Chol (CHOL-HDL) 07/17/2022 94    Appointment on 06/23/2022   Component Date Value    Cholesterol 06/23/2022 141     Triglycerides 06/23/2022 155 (A)    HDL, Direct 06/23/2022 34 (A)    LDL Calculated 06/23/2022 76     Non-HDL-Chol (CHOL-HDL) 06/23/2022 107     WBC 06/23/2022 4 89     RBC 06/23/2022 4 63     Hemoglobin 06/23/2022 13 6     Hematocrit 06/23/2022 39 6     MCV 06/23/2022 86     MCH 06/23/2022 29 4     MCHC 06/23/2022 34 3     RDW 06/23/2022 11 8     MPV 06/23/2022 10 2     Platelets 56/17/7266 283     nRBC 06/23/2022 0     Neutrophils Relative 06/23/2022 56     Immat GRANS % 06/23/2022 0     Lymphocytes Relative 06/23/2022 36     Monocytes Relative 06/23/2022 8     Eosinophils Relative 06/23/2022 0     Basophils Relative 06/23/2022 0     Neutrophils Absolute 06/23/2022 2 75     Immature Grans Absolute 06/23/2022 0 01     Lymphocytes Absolute 06/23/2022 1 75     Monocytes Absolute 06/23/2022 0 38     Eosinophils Absolute 06/23/2022 0 00     Basophils Absolute 06/23/2022 0 00     Sodium 06/23/2022 138     Potassium 06/23/2022 4 3     Chloride 06/23/2022 107     CO2 06/23/2022 27     ANION GAP 06/23/2022 4     BUN 06/23/2022 16     Creatinine 06/23/2022 0 94     Glucose, Fasting 06/23/2022 92     Calcium 06/23/2022 9 7     AST 06/23/2022 15     ALT 06/23/2022 23     Alkaline Phosphatase 06/23/2022 86     Total Protein 06/23/2022 8 2     Albumin 06/23/2022 3 9     Total Bilirubin 06/23/2022 0 34     eGFR 06/23/2022 86    Hospital Outpatient Visit on 05/24/2022   Component Date Value    Baseline HR 05/24/2022 80     Baseline BP 05/24/2022 100/70     O2 sat rest 05/24/2022 98     Stress peak HR 05/24/2022 150     Post peak BP 05/24/2022 128     Rate Pressure Product 05/24/2022 19,200 0     O2 sat peak 05/24/2022 98     Recovery HR 05/24/2022 95     Recovery BP 05/24/2022 120/50     O2 sat recovery 05/24/2022 98     Target HR 05/24/2022 150     Percent HR 05/24/2022 75     Exercise duration (min) 05/24/2022 8     Exercise duration (sec) 05/24/2022 28     Estimated workload 05/24/2022 10 7     Angina Index 05/24/2022 0     Stress Stage Reached 05/24/2022 3 0     Max HR Percent 05/24/2022 75     Max HR 05/24/2022 169     Valera Treadmill Score 05/24/2022 8     Base ST Depresion (mm) 05/24/2022 0     ST Depression (mm) 05/24/2022 0     Protocol Name 05/24/2022 GIO     Time In Exercise Phase 05/24/2022 00:08:28     MAX   SYSTOLIC BP 33/83/9882 176     Max Diastolic Bp 68/47/5223 50     Max Heart Rate 05/24/2022 150     Max Predicted Heart Rate 05/24/2022 199     Reason for Termination 05/24/2022                      Value:Fatigue  Dyspnea      Test Indication 05/24/2022 Syncope     Target Hr Formular 05/24/2022 (220 - Age)*100%     Chest Pain Statement 05/24/2022 none    Admission on 05/15/2022, Discharged on 05/15/2022   Component Date Value    WBC 05/15/2022 5 19     RBC 05/15/2022 5 11     Hemoglobin 05/15/2022 14 7     Hematocrit 05/15/2022 43 1     MCV 05/15/2022 84     MCH 05/15/2022 28 8     MCHC 05/15/2022 34 1     RDW 05/15/2022 11 7     MPV 05/15/2022 10 4     Platelets 76/52/8870 273     nRBC 05/15/2022 0     Neutrophils Relative 05/15/2022 48     Immat GRANS % 05/15/2022 0     Lymphocytes Relative 05/15/2022 43     Monocytes Relative 05/15/2022 9     Eosinophils Relative 05/15/2022 0     Basophils Relative 05/15/2022 0     Neutrophils Absolute 05/15/2022 2 48     Immature Grans Absolute 05/15/2022 0 01     Lymphocytes Absolute 05/15/2022 2 23     Monocytes Absolute 05/15/2022 0 47     Eosinophils Absolute 05/15/2022 0 00     Basophils Absolute 05/15/2022 0 00     Sodium 05/15/2022 138     Potassium 05/15/2022 4 1     Chloride 05/15/2022 106     CO2 05/15/2022 28     ANION GAP 05/15/2022 4     BUN 05/15/2022 16     Creatinine 05/15/2022 0 98     Glucose 05/15/2022 121     Calcium 05/15/2022 10 0     AST 05/15/2022 21     ALT 05/15/2022 34     Alkaline Phosphatase 05/15/2022 98     Total Protein 05/15/2022 8 6 (A)    Albumin 05/15/2022 4 3     Total Bilirubin 05/15/2022 0 29     eGFR 05/15/2022 82     hs TnI 0hr 05/15/2022 <2     TSH 3RD GENERATON 05/15/2022 1 430     Magnesium 05/15/2022 2 0     POC Glucose 05/15/2022 116     EXT PREG TEST UR (Ref: N* 05/15/2022 Negative     Control 05/15/2022 Valid     hs TnI 2hr 05/15/2022 <2     Delta 2hr hsTnI 05/15/2022      Ventricular Rate 05/15/2022 103     Atrial Rate 05/15/2022 110     QRSD Interval 05/15/2022 84     QT Interval 05/15/2022 392     QTC Interval 05/15/2022 513     QRS Axis 05/15/2022 66     T Wave Axis 05/15/2022 38        Suicide/Homicide Risk Assessment:    Risk of Harm to Self:  The following ratings are based on assessment at the time of the interview and review of records  Demographic risk factors include: , never , age: young adult (15-24)  Historical Risk Factors include: history of anxiety  Recent Specific Risk Factors include: diagnosis of mood disorder  Protective Factors: no current suicidal ideation, access to mental health treatment, compliant with medications, compliant with mental health treatment, stable living environment, stable job, sense of importance of health and wellness, strong relationships  Weapons: none  The following steps have been taken to ensure weapons are properly secured: not applicable  Based on today's assessment, Lucila Cote presents the following risk of harm to self: low    Risk of Harm to Others: The following ratings are based on assessment at the time of the interview and review of records  Demographic Risk Factors include: [age] years of age  Historical Risk Factors include: none  Recent Specific Risk Factors include: concomitant mood disorder  Protective Factors: no current homicidal ideation, safe and stable living environment, strong relationships  Weapons: none  The following steps have been taken to ensure weapons are properly secured: not applicable  Based on today's assessment, Lucila Cote presents the following risk of harm to others: minimal    The following interventions are recommended: no intervention changes needed  Although patient's acute lethality risk is LOW, long-term/chronic lethality risk is mildly elevated given history of anxiety disorder and symptoms  However, at the current moment, Lucila Cote is future-oriented, forward-thinking, and demonstrates ability to act in a self-preserving manner as evidenced by volitionally presenting to the clinic today, seeking treatment  Mayito Mcgovern contracts for safety and is not an imminent risk of harm to self or others  Outpatient level of care is deemed appropriate at this current time  Lucila Cote understands that if they can no longer contract for safety, they need to call the office or report to their nearest Emergency Room for immediate evaluation  At this juncture, inpatient hospitalization is not currently warranted  To mitigate future risk, patient should adhere to treatment recommendations, avoid alcohol/illicit substance use, utilize community-based resources and familiar support, and prioritize mental health treatment  Assessment/Plan:   Shaji Clarke is a 25 y o   female, Single (never ), lives with parents, currently employed, w/ PMH of migraines, POTS, Mast Cell Activation Syndrome, and PPH of INES, no prior psychiatric admissions, no prior SA, who presented to the mental health clinic for the initial intake and psychiatric evaluation  At this time, patient is stable in terms of psychiatric concerns/symptoms  Minimal anxiety symptoms  No suicidal ideation, no homicidal ideation  Patient does not appear to be experiencing psychosis  Patient does not appear to be experiencing patti/hypomania  Will continue with current treatment and management  If patient wishes, we can start slow tapering Effexor once she feels ready and settled in her academic year  DSM-5 Diagnoses:     1 ) Generalized Anxiety Disorder (INES)       Treatment Recommendations/Precautions:   Continue Effexor  5 mg daily for anxiety   Medication management follow-up in 12 weeks   Continue psychotherapy with [facility] therapist [address] of 24 hour and weekend coverage for urgent situations accessed by [phone] [address] main practice number    Medications Risks/Benefits:      Risks, Benefits And Possible Side Effects Of Medications:    Risks, benefits, and possible side effects of medications explained to Nury and she verbalizes understanding and agreement for treatment  PARQ completed including serotonin syndrome, SIADH, worsened depression/suicidality, induction of patti, blood pressure changes and GI distress, weight gain, sexual side effects, insomnia, sedation, potential for drug interactions, and others       Controlled Medication Discussion:     Aiden Edge has been filling controlled prescriptions on time as prescribed according to [place] Prescription Drug Monitoring Program    Treatment Plan:    Completed and signed during the session: Yes - Treatment Plan done but not signed at time of office visit due to:  Plan reviewed in person and verbal consent given due to [place] social distancing      Note Share Disclaimer:      This note was not shared with the patient due to reasonable likelihood of causing patient harm      Macey Zhong MD 08/09/22

## 2022-08-09 NOTE — BH TREATMENT PLAN
TREATMENT PLAN        [employer]    Name and Date of Birth:  Brittney Storm 22 y o  2000  Date of Treatment Plan: August 9, 2022  Diagnosis/Diagnoses:    1  INES (generalized anxiety disorder)        Strengths/Personal Resources for Self-Care: supportive family, supportive friends, taking medications as prescribed, ability to adapt to life changes, ability to communicate needs, ability to communicate well, ability to listen, ability to reason, ability to understand psychiatric illness, average or above intelligence, family ties, financial means, financial security, general fund of knowledge, good physical health, good understanding of illness, independence, motivation for treatment, ability to negotiate basic needs, being resoureceful, self-reliance, sense of humor, stable employment, well educated, willingness to work on problems    Area/Areas of need: anxiety symptoms    Long Term Goal: maintain control of anxiety  Target Date: 6 months - February 9, 2023  Person/Persons responsible for completion of goal: Nury and Denisse Martin MD     Short Term Objective (s) - How will we reach this goal?:   1  Take medications as prescribed  2  Attend psychiatry appointments regularly  3  Continue psychotherapy regularly  4  Avoid alcohol   5  Avoid drugs   6  Eat a healthy diet   Target Date: 6 months - February 9, 2023  Person/Persons Responsible for Completion of Goal: Nury     Progress Towards Goals: Continuing treatment    Treatment Modality: medication management every 1-3 months as needed and continue psychotherapy  Review due 180 days from date of this plan: February 5, 2023   Expected length of service: Ongoing treatment    My physician and I have developed this plan together, and I agree to work on the goals and objectives  I understand the treatment goals that were developed for my treatment      The treatment plan was created between Denisse Martin MD and Parish Yang on 08/09/22 at 11:00 AM but not signed at the time of the visit due to [address] distancing  The plan was reviewed, and verbal consent was given

## 2022-08-16 ENCOUNTER — SOCIAL WORK (OUTPATIENT)
Dept: BEHAVIORAL/MENTAL HEALTH CLINIC | Facility: CLINIC | Age: 22
End: 2022-08-16
Payer: COMMERCIAL

## 2022-08-16 DIAGNOSIS — F41.1 GAD (GENERALIZED ANXIETY DISORDER): ICD-10-CM

## 2022-08-16 PROCEDURE — 90834 PSYTX W PT 45 MINUTES: CPT | Performed by: SOCIAL WORKER

## 2022-08-17 NOTE — PSYCH
Psychotherapy Provided: Individual Psychotherapy 50 minutes      Length of time in session: 50 minutes, follow up in 2 week     Pain 2, mild    Current suicide risk : Low      D: Lyudmila Graham spoke today about her feelings re: her decrease in headaches since starting [employer] several weeks ago  She shared that her family has reached a compromise "not to sell her childhood home," but instead to "rent out" their pool "  Kathy Her also discussed her work stress and anxiety re: her tests at the onset of next semester (next week)  A:  Kathy Her was receptive today to discussing a realistic plan for 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: Diagnosis and Treatment Plan explained to Nury, Nury relates understanding diagnosis and is agreeable to Treatment Plan  Yes      This note was not shared with the patient due to this is a psychotherapy note    Encounter Diagnosis     ICD-10-CM    1   INES (generalized anxiety disorder)  F41 1

## 2022-08-30 ENCOUNTER — TELEMEDICINE (OUTPATIENT)
Dept: BEHAVIORAL/MENTAL HEALTH CLINIC | Facility: CLINIC | Age: 22
End: 2022-08-30
Payer: COMMERCIAL

## 2022-08-30 DIAGNOSIS — F41.1 GAD (GENERALIZED ANXIETY DISORDER): ICD-10-CM

## 2022-08-30 PROCEDURE — 90834 PSYTX W PT 45 MINUTES: CPT | Performed by: SOCIAL WORKER

## 2022-08-30 NOTE — PSYCH
Psychotherapy Provided: Individual Psychotherapy 50 minutes      Length of time in session: 50 minutes, follow up in 2 week     Pain 3, mild    Current suicide risk : Low      D: Palomo Perez spoke today about her feelings re: her continued headaches despite remaining on [place] continuously   She stated that she "survived" the first week of school but every day was a 12 hour day  A:  Alice Davidson was receptive today to discussing a realistic plan for increasing how much "rest" she gets as she will not be able to continue indefinately at this pace  She admitted that she is "not doing her best" at managing her stress and engaged in a mindfulness practice for the remainder of today's session    P:  Upcoming sessions will be used to continue to support Nury with all of the above        [facility] Diagnosis and Treatment Plan explained to Nury, Nury relates understanding diagnosis and is agreeable to Treatment Plan  Yes      This note was not shared with the patient due to this is a psychotherapy note    Encounter Diagnoses   Name Primary?     INES (generalized anxiety disorder)

## 2022-09-12 ENCOUNTER — TELEPHONE (OUTPATIENT)
Dept: NEUROLOGY | Facility: CLINIC | Age: 22
End: 2022-09-12

## 2022-09-12 NOTE — TELEPHONE ENCOUNTER
Please submit for 200 units of Botox for CM under Zay Nunes,  I will do first round    ----- Message from Anton Barrera RN sent at 9/12/2022 12:18 PM EDT -----  Regarding: FW: Update    ----- Message -----  From: Mayito Mcgovern  Sent: 9/12/2022  12:11 PM EDT  To: Neurology [facility] Clinical Team 5  Subject: Update                                           Yes please! If it doesnt go through we can schedule a visit then!

## 2022-09-16 ENCOUNTER — OFFICE VISIT (OUTPATIENT)
Dept: GASTROENTEROLOGY | Facility: CLINIC | Age: 22
End: 2022-09-16
Payer: COMMERCIAL

## 2022-09-16 VITALS
SYSTOLIC BLOOD PRESSURE: 110 MMHG | BODY MASS INDEX: 25.98 KG/M2 | HEIGHT: 68 IN | WEIGHT: 171.4 LBS | DIASTOLIC BLOOD PRESSURE: 70 MMHG | TEMPERATURE: 97.6 F

## 2022-09-16 DIAGNOSIS — R10.9 ABDOMINAL PAIN, UNSPECIFIED ABDOMINAL LOCATION: ICD-10-CM

## 2022-09-16 DIAGNOSIS — K59.00 CONSTIPATION, UNSPECIFIED CONSTIPATION TYPE: Primary | ICD-10-CM

## 2022-09-16 PROCEDURE — 99214 OFFICE O/P EST MOD 30 MIN: CPT | Performed by: INTERNAL MEDICINE

## 2022-09-16 RX ORDER — FLUDROCORTISONE ACETATE 0.1 MG/1
TABLET ORAL
COMMUNITY
Start: 2022-09-15

## 2022-09-16 NOTE — PROGRESS NOTES
[facility] - Outpatient Consultation  Anjelica Guerin 25 y o  female MRN: [id number]  Encounter: [id number]          ASSESSMENT AND PLAN:      1  Constipation, unspecified constipation type  - Anal manometry; Future  - lactulose (CEPHULAC) 20 g packet; Take 1 packet (20 g total) by mouth 2 (two) times a day  Dispense: 30 each; Refill: 3  2  Abdominal pain, unspecified abdominal location    Constipation ongoing since infancy requiring medical management throughout childhood, with worsening of constipation over the past 2 years  She has had poor response to fiber supplementation, stool softeners including bisacodyl and docusate, osmotic laxatives, Amitiza  Janette Holly also did not provide much relief  she has had some success with magnesium supplementation although would like to trial another laxative a possible  We discussed that she has somewhat exhausted most options, and while lactulose commonly can give side effects of bloating, it may be reasonable to trial for short period of time  She very likely has a component of defecatory disorder contributing to her symptoms  She has had previous imaging that does not demonstrate any colonic dilation fortunately  She has had prior partial response to pelvic floor physical therapy  We discussed the utility of anorectal manometry more so for providing objective data for the causes of her symptoms, and she would like to proceed with this  Depending on findings, could consider another course of biofeedback therapy      In the future could also consider trial of Trulance or Motegrity as these are the prescription laxatives she has not tried if she would be agreeable to this      Follow-up in 3-4 months    ______________________________________________________________________    HPI:  Ms Rodriguez Co a pleasant 67-year-old female here for follow-up for ongoing complaints of severe constipation since infancy   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      At her last office visit she had had improvement with magnesium supplementation, as she had previously failed several prescription laxatives  She has now stating that her stools are not as regular as she wishes they could be in very in consistency  She would like to trial other therapies if possible, but would like to avoid prescription laxatives due to prior failure of these        Summary of HPI:  For both symptoms of dyspepsia as well as chronic significant constipation, she has had an extensive workup including EGD (images reviewed from 1/11/21)  and colonoscopy both of which were overall unremarkable   Lab work/imaging including celiac panel, H pylori stool antigen, TSH,  Gastric emptying study, have also been within normal limits other than a borderline TTG IgG of 6    She has taken a number of laxatives and stool softeners  Including MiraLax, docusate, senna, and the use of enemas and suppositories, and most recently has been on Linzess 72 mcg daily    She had been tried on a higher dose of Linzess which caused diarrhea, with likely overflow diarrhea not having any large formed stools proceeding this  She is also been going to pelvic floor physical therapy for the treatment of constipation  She has had very mild improvement in her symptoms, and by evaluation of the pelvic floor physical therapist Taiwo Mirella is a small objective improvement  REVIEW OF SYSTEMS:    CONSTITUTIONAL: Denies any fever, chills, rigors, and weight loss  HEENT: Denies odynophagia, tinnitus  CARDIOVASCULAR: No chest pain or palpitations  RESPIRATORY: Denies any cough, hemoptysis, shortness of breath or dyspnea on exertion  GASTROINTESTINAL: As noted in the History of Present Illness  GENITOURINARY: No problems with urination  Denies any hematuria or dysuria  NEUROLOGIC: No dizziness or vertigo, denies headaches  MUSCULOSKELETAL: Denies any muscle or joint pain     SKIN: Denies skin rashes or itching  ENDOCRINE:  Denies intolerance to heat or cold  PSYCHOSOCIAL: Denies depression or anxiety  Denies any recent memory loss  Historical Information   Past Medical History:   Diagnosis Date    Anxiety     Asthma     Dizziness     at times    GERD (gastroesophageal reflux disease)     Hammertoe of left foot     Headache     occ    Hyperlipemia     Hypermobile joints     Irritable bowel syndrome     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HCC)     Mast cell disorder     Migraine     PONV (postoperative nausea and vomiting)     POTS (postural orthostatic tachycardia syndrome)      infant     Wears glasses      Past Surgical History:   Procedure Laterality Date    COLONOSCOPY      EGD AND COLONOSCOPY N/A 1/10/2017    Procedure: EGD AND COLONOSCOPY;  Surgeon: Tana Cruz MD;  [facility]: [facility]; Service:     ESOPHAGOGASTRODUODENOSCOPY      with biopsy    FOOT SURGERY      Excision of lesion feet benign    [place] REPAIR OF HAMMERTOE,ONE Left 2019    Procedure: 2nd arthrodesis; 5th arthroplasty, flexor tenotomy 2,3,4,5 ;  Surgeon: Connor Castro DPM;  Location[facility];  Service: Podiatry    [place] REPAIR OF Niki Gist Left 2020    Procedure: 2ND TOE Revision hammertoe with broken implant;  Surgeon: Connor Castro DPM;  [facility];  Service: Podiatry    UPPER GASTROINTESTINAL ENDOSCOPY  2021    WISDOM TOOTH EXTRACTION       Social History   Social History     Substance and Sexual Activity   Alcohol Use No     Social History     Substance and Sexual Activity   Drug Use No    Comment: 21- not asked, parent at bedside       Social History     Tobacco Use   Smoking Status Never Smoker   Smokeless Tobacco Never Used     Family History   Problem Relation Age of Onset    Gestational diabetes Mother     Migraines Mother     Anxiety disorder Father     Hyperlipidemia Father     Autism Brother     Diabetes Other     Uterine cancer Maternal Grandmother     Rheumatic fever Maternal Grandmother     Diabetes Maternal Grandfather     Hypertension Maternal Grandfather     Heart attack Maternal Grandfather     Stroke Maternal Grandfather     Hyperlipidemia Maternal Grandfather     Hypertension Paternal Grandmother     Anxiety disorder Paternal Grandmother     Hypertension Paternal Grandfather        Meds/Allergies       Current Outpatient Medications:     acetaminophen (TYLENOL) 500 mg tablet    Atogepant (Qulipta) 60 MG TABS    cetirizine (ZyrTEC) 10 mg tablet    clindamycin (CLEOCIN T) 1 % lotion    divalproex sodium (DEPAKOTE ER) 500 mg 24 hr tablet    Elastic Bandages & Supports (TRUFORM STOCKINGS 20-30MMHG) MISC    fludrocortisone (FLORINEF) 0 1 mg tablet    lactulose (CEPHULAC) 20 g packet    metoprolol succinate (TOPROL-XL) 50 mg 24 hr tablet    naproxen (EC NAPROSYN) 500 MG EC tablet    Omega-3 Fatty Acids (FISH OIL) 1,000 mg    Rimegepant Sulfate (Nurtec) 75 MG TBDP    tretinoin (RETIN-A) 0 025 % cream    venlafaxine (EFFEXOR-XR) 37 5 mg 24 hr capsule    venlafaxine (EFFEXOR-XR) 75 mg 24 hr capsule    verapamil (CALAN) 120 mg tablet    ASMANEX  MCG/ACT AERO    dexamethasone (DECADRON) 2 mg tablet    indomethacin (INDOCIN) 25 mg capsule    Lasmiditan Succinate (REYVOW) 100 MG tablet    sucralfate (CARAFATE) 1 g tablet    XOPENEX HFA 45 MCG/ACT inhaler    Current Facility-Administered Medications:     albuterol inhalation solution 2 5 mg, 2 5 mg, Nebulization, Q6H PRN    medroxyPROGESTERone acetate (DEPO-PROVERA SYRINGE) IM injection 150 mg, 150 mg, Intramuscular, Q3 Months, 150 mg at 11/16/21 1513    medroxyPROGESTERone acetate (DEPO-PROVERA SYRINGE) IM injection 150 mg, 150 mg, Intramuscular, Q3 Months, 150 mg at 02/02/22 1505    Allergies   Allergen Reactions    Nuts - Food Allergy Other (See Comments)     Avani Nuts - itchy throat    Other Hives      At surgical site and IV site- not sure if type of cleanser used    Pollen Extract            Objective     Blood pressure 110/70, temperature 97 6 °F (36 4 °C), temperature source Tympanic, height 5' 8" (1 727 m), weight 77 7 kg (171 lb 6 4 oz), not currently breastfeeding  Body mass index is 26 06 kg/m²  PHYSICAL EXAM:      General Appearance:   Alert, cooperative, no distress   HEENT:   Normocephalic, atraumatic, anicteric  Neck:  Supple, symmetrical, trachea midline   Lungs:   Clear to auscultation bilaterally; no rales, rhonchi or wheezing; respirations unlabored    Heart[de-identified]   Regular rate and rhythm; no murmur, rub, or gallop  Abdomen:   Soft, non-tender, non-distended; normal bowel sounds; no masses, no organomegaly    Genitalia:   Deferred    Rectal:   Deferred    Extremities:  No cyanosis, clubbing or edema    Pulses:  2+ and symmetric    Skin:  No jaundice, rashes, or lesions          Lab Results:   No visits with results within 1 Day(s) from this visit  Latest known visit with results is:   Appointment on 07/17/2022   Component Date Value    Hep B S Ab 07/17/2022 164 27     QFT Nil 07/17/2022 0 04     QFT TB1-NIL 07/17/2022 0 00     QFT TB2-NIL 07/17/2022 -0 01     QFT Mitogen-NIL 07/17/2022 3 12     QFT Final Interpretation 07/17/2022 Negative          Radiology Results:   No results found

## 2022-09-19 ENCOUNTER — TELEPHONE (OUTPATIENT)
Dept: PSYCHIATRY | Facility: CLINIC | Age: 22
End: 2022-09-19

## 2022-09-19 NOTE — TELEPHONE ENCOUNTER
Writer left a message to inform pt the appt tomorrow on 9/20/22 will be virtual due to John koo will be working from home thank you

## 2022-09-20 ENCOUNTER — SOCIAL WORK (OUTPATIENT)
Dept: BEHAVIORAL/MENTAL HEALTH CLINIC | Facility: CLINIC | Age: 22
End: 2022-09-20
Payer: COMMERCIAL

## 2022-09-20 DIAGNOSIS — F41.1 GAD (GENERALIZED ANXIETY DISORDER): ICD-10-CM

## 2022-09-20 PROCEDURE — 90834 PSYTX W PT 45 MINUTES: CPT | Performed by: SOCIAL WORKER

## 2022-09-20 NOTE — PSYCH
Psychotherapy Provided: Individual Psychotherapy 50 minutes      Length of time in session: 50 minutes, follow up in 2 week     Pain 2  mild    Current suicide risk : Low      D: Sheba Ramachandran spoke today about her feelings re: her strenuous and stressful 2 weeks since her last session as she had multiple exams  She also shared that she experienced a flare up of her POTS symptoms and continues to have migraine pain  A:  Elgin Anderson presented as more aware of her thoughts and her much less controlling today as she no longer appears to be as focused on perfectionism and is instead more realistic and allowing things to be outside of her control    P:  Upcoming sessions will be used to continue to support Nury with all of the above        [address] [place] [address] Luke: Diagnosis and Treatment Plan explained to Nury, Nury relates understanding diagnosis and is agreeable to Treatment Plan  Yes      This note was not shared with the patient due to this is a psychotherapy note    Encounter Diagnoses   Name Primary?     INES (generalized anxiety disorder)

## 2022-09-26 ENCOUNTER — TELEPHONE (OUTPATIENT)
Dept: NEUROLOGY | Facility: CLINIC | Age: 22
End: 2022-09-26

## 2022-09-26 ENCOUNTER — TELEPHONE (OUTPATIENT)
Dept: GASTROENTEROLOGY | Facility: HOSPITAL | Age: 22
End: 2022-09-26

## 2022-09-26 NOTE — TELEPHONE ENCOUNTER
Prior-Authorization request has been submitted to Patients insurance company and it is currently in clinical review pending status of approval  I will reach out to the insurance and see if I can push the request along as urgently needed and update the patient with the outcome ASAP! Thank you!

## 2022-09-26 NOTE — TELEPHONE ENCOUNTER
New-Start-Botox-Authorization  Submitted request to [facility] via fax for:    New-Start-Botox-200 units, Balbina Parkinson, [id number]  Awaiting approval/denial response  Will follow up on the status of this

## 2022-10-03 ENCOUNTER — HOSPITAL ENCOUNTER (OUTPATIENT)
Dept: GASTROENTEROLOGY | Facility: HOSPITAL | Age: 22
Discharge: HOME | End: 2022-10-03
Attending: INTERNAL MEDICINE
Payer: COMMERCIAL

## 2022-10-03 VITALS
TEMPERATURE: 98.3 F | RESPIRATION RATE: 16 BRPM | SYSTOLIC BLOOD PRESSURE: 126 MMHG | HEART RATE: 64 BPM | DIASTOLIC BLOOD PRESSURE: 80 MMHG | OXYGEN SATURATION: 97 %

## 2022-10-03 DIAGNOSIS — K59.00 CONSTIPATION, UNSPECIFIED CONSTIPATION TYPE: ICD-10-CM

## 2022-10-03 PROCEDURE — 91122 HB ANAL PRESSURE RECORD: CPT

## 2022-10-03 NOTE — PERIOPERATIVE NURSING NOTE
Patient brought in the room and educated on procedure  Anal digital exam performed and anal manometry catheter inserted via anal canal to rectum and test performed  Patient tolerated procedure  Catheter removed intact  Balloon expulsion test done  Balloon inserted and patient instructedt to sit in the commode and she was time  Patient expelled the catheter intact  in 25 seconds    Patient discharged from room and ambulated out of the room in stable condition

## 2022-10-04 ENCOUNTER — TELEPHONE (OUTPATIENT)
Dept: NEUROLOGY | Facility: CLINIC | Age: 22
End: 2022-10-04

## 2022-10-04 NOTE — TELEPHONE ENCOUNTER
Called [employer] and spoke with nurse Edmund Varela who let me know Botox has been approved and she faxed over approval letter  Sent request to have patient scheduled with soonest available appointment along with the approved auth info  Thank you!

## 2022-10-04 NOTE — TELEPHONE ENCOUNTER
Received the following authorization approval info via fax from [facility]:    Approved: Christina Marissa & [id number]  Botox-200 units  Auth# [id number]  Valid: 9/29/2022 until 9/29/2023  4 visits    Please use our Stock  Please schedule patient for soonest available Botox-INJ Appt  w/ Manuela Lopez

## 2022-10-04 NOTE — TELEPHONE ENCOUNTER
Is there an update for her Botox auth? Thanks  K    ----- Message from Dulce Goodman RN sent at 10/4/2022 10:01 AM EDT -----  Regarding: FW: Botox     ----- Message -----  From: Libby Iraheta  Sent: 10/4/2022   9:08 AM EDT  To: Neurology [address] Clinical Team 5  Subject: Botox                                            Hi Prachi Coho! Jorje Tillman been sending my chart messages and called the office and cant seem to get a hold of anyone  I am trying to check in to see if there are any updates on insurance approval for Botox! ? The past couple days have been bad  Jorje Tillman been taking the nurtec and qulipta but havent gotten much relief

## 2022-10-06 ENCOUNTER — PROCEDURE VISIT (OUTPATIENT)
Dept: NEUROLOGY | Facility: CLINIC | Age: 22
End: 2022-10-06
Payer: COMMERCIAL

## 2022-10-06 VITALS — SYSTOLIC BLOOD PRESSURE: 119 MMHG | TEMPERATURE: 97.5 F | HEART RATE: 66 BPM | DIASTOLIC BLOOD PRESSURE: 68 MMHG

## 2022-10-06 DIAGNOSIS — G43.709 CHRONIC MIGRAINE WITHOUT AURA WITHOUT STATUS MIGRAINOSUS, NOT INTRACTABLE: Primary | ICD-10-CM

## 2022-10-06 PROCEDURE — 64615 CHEMODENERV MUSC MIGRAINE: CPT | Performed by: PHYSICIAN ASSISTANT

## 2022-10-06 NOTE — PROGRESS NOTES
Universal Protocol   Consent: Verbal consent obtained  Written consent obtained  Risks and benefits: risks, benefits and alternatives were discussed  Consent given by: patient  Time out: Immediately prior to procedure a "time out" was called to verify the correct patient, procedure, equipment, support staff and site/side marked as required  Patient understanding: patient states understanding of the procedure being performed  Patient consent: the patient's understanding of the procedure matches consent given  Procedure consent: procedure consent matches procedure scheduled  Relevant documents: relevant documents present and verified  Patient identity confirmed: verbally with patient        Chemodenervation     Date/Time 10/6/2022 1:31 PM     Performed by  Maryam Mishra PA-C     Authorized by Maryam Mishra PA-C        Pre-procedure details      Prepped With: Alcohol     Anesthesia  (see MAR for exact dosages):      Anesthesia method:  None   Procedure details     Position:  Upright   Botox     Botox Type:  Type A    Brand:  Botox    mL's of Botulinum Toxin:  155    Final Concentration per CC:  200 units    Needle Gauge:  30 G 2 5 inch   Procedures     Botox Procedures: chronic headache      Indications: migraines     Injection Location      Head / Face:  L superior cervical paraspinal, R superior cervical paraspinal, L , R , L frontalis, R frontalis, L medial occipitalis, R medial occipitalis, procerus, R temporalis, L temporalis, L superior trapezius and R superior trapezius    L  injection amount:  5 unit(s)    R  injection amount:  5 unit(s)    L lateral frontalis:  5 unit(s)    R lateral frontalis:  5 unit(s)    L medial frontalis:  5 unit(s)    R medial frontalis:  5 unit(s)    L temporalis injection amount:  20 unit(s)    R temporalis injection amount:  20 unit(s)    Procerus injection amount:  5 unit(s)    L medial occipitalis injection amount:  15 unit(s)    R medial occipitalis injection amount:  15 unit(s)    L superior cervical paraspinal injection amount:  10 unit(s)    R superior cervical paraspinal injection amount:  10 unit(s)    L superior trapezius injection amount:  15 unit(s)    R superior trapezius injection amount:  15 unit(s)   Total Units     Total units used:  155    Total units discarded:  45   Post-procedure details      Chemodenervation:  Chronic migraine    Facial Nerve Location[de-identified]  Bilateral facial nerve    Patient tolerance of procedure:   Tolerated well, no immediate complications

## 2022-10-11 ENCOUNTER — OFFICE VISIT (OUTPATIENT)
Dept: GASTROENTEROLOGY | Facility: CLINIC | Age: 22
End: 2022-10-11
Payer: COMMERCIAL

## 2022-10-11 VITALS
DIASTOLIC BLOOD PRESSURE: 70 MMHG | SYSTOLIC BLOOD PRESSURE: 112 MMHG | TEMPERATURE: 97.4 F | BODY MASS INDEX: 25.79 KG/M2 | WEIGHT: 170.2 LBS | HEIGHT: 68 IN

## 2022-10-11 DIAGNOSIS — K58.1 IRRITABLE BOWEL SYNDROME WITH CONSTIPATION: ICD-10-CM

## 2022-10-11 DIAGNOSIS — K59.09 OTHER CONSTIPATION: Primary | ICD-10-CM

## 2022-10-11 PROCEDURE — 99214 OFFICE O/P EST MOD 30 MIN: CPT | Performed by: INTERNAL MEDICINE

## 2022-10-11 RX ORDER — LACTULOSE 20 G/30ML
20 SOLUTION ORAL 2 TIMES DAILY
Qty: 1800 ML | Refills: 3 | Status: SHIPPED | OUTPATIENT
Start: 2022-10-11 | End: 2022-11-10

## 2022-10-11 NOTE — PROGRESS NOTES
[facility] - Outpatient Consultation  Jordan Daley 25 y o  female MRN: [id number]  Encounter: [id number]          ASSESSMENT AND PLAN:      1  Other constipation  - lactulose 20 g/30 mL; Take 30 mL (20 g total) by mouth 2 (two) times a day  Dispense: 1800 mL; Refill: 3  2  Irritable bowel syndrome with constipation    Constipation ongoing since infancy requiring medical management throughout childhood, with worsening of constipation over the past 2 years  She has had poor response to fiber supplementation, stool softeners including bisacodyl and docusate, osmotic laxatives, Amitiza   Linzess also did not provide much relief    she has had some success with magnesium supplementation although would like to trial another laxative a possible  We discussed that she has somewhat exhausted most options, and while lactulose commonly can give side effects of bloating, it may be reasonable to trial for short period of time  She has had previous imaging that does not demonstrate any colonic dilation fortunately  She has had prior partial response to pelvic floor physical therapy  Fortunately brief review of her anorectal manometry studies demonstrated normal balloon expulsion test, thus currently dyssynergic defecation is unlikely playing a huge role in her symptoms  In the future could also consider trial of Motegrity as these are the prescription laxatives she has not tried if she would be agreeable to this      Follow-up in  6 months    ______________________________________________________________________    HPI:  Ms Josee Cee a pleasant 26-year-old female here for follow-up for ongoing complaints of severe constipation since infancy   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      At her last office visit she had had improvement with magnesium supplementation, as she had previously failed several prescription laxatives     she also underwent anorectal manometry which fortunately in brief review, is overall unremarkable  Her balloon expulsion test was normal   We discussed that prior biofeedback and pelvic floor physical therapy has likely benefit her in this regard  She was unable to  the lactulose as previously recommended as her insurance does not cover powder form  She would still like to try the liquid form         Summary of HPI:  For both symptoms of dyspepsia as well as chronic significant constipation, she has had an extensive workup including EGD (images reviewed from 1/11/21)  and colonoscopy both of which were overall unremarkable   Lab work/imaging including celiac panel, H pylori stool antigen, TSH,  Gastric emptying study, have also been within normal limits other than a borderline TTG IgG of 6    She has taken a number of laxatives and stool softeners  Including MiraLax, docusate, senna, and the use of enemas and suppositories, and most recently has been on Linzess 72 mcg daily    She had been tried on a higher dose of Linzess which caused diarrhea, with likely overflow diarrhea not having any large formed stools proceeding this  She is also been going to pelvic floor physical therapy for the treatment of constipation  She has had very mild improvement in her symptoms, and by evaluation of the pelvic floor physical therapist Tacos Sanders is a small objective improvement  REVIEW OF SYSTEMS:    CONSTITUTIONAL: Denies any fever, chills, rigors, and weight loss  HEENT: Denies odynophagia, tinnitus  CARDIOVASCULAR: No chest pain or palpitations  RESPIRATORY: Denies any cough, hemoptysis, shortness of breath or dyspnea on exertion  GASTROINTESTINAL: As noted in the History of Present Illness  GENITOURINARY: No problems with urination  Denies any hematuria or dysuria  NEUROLOGIC: No dizziness or vertigo, denies headaches  MUSCULOSKELETAL: Denies any muscle or joint pain  SKIN: Denies skin rashes or itching     ENDOCRINE:  Denies intolerance to heat or cold  PSYCHOSOCIAL: Denies depression or anxiety  Denies any recent memory loss  Historical Information   Past Medical History:   Diagnosis Date   • Anxiety    • Asthma    • Dizziness     at times   • GERD (gastroesophageal reflux disease)    • Hammertoe of left foot    • Headache     occ   • Hyperlipemia    • Hypermobile joints    • Irritable bowel syndrome    • 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HCC)    • Mast cell disorder    • Migraine    • PONV (postoperative nausea and vomiting)    • POTS (postural orthostatic tachycardia syndrome)    •  infant    • Wears glasses      Past Surgical History:   Procedure Laterality Date   • COLONOSCOPY     • EGD AND COLONOSCOPY N/A 1/10/2017    Procedure: EGD AND COLONOSCOPY;  Surgeon: Reji Degroot MD;  Location: [facility]; Service:    • ESOPHAGOGASTRODUODENOSCOPY      with biopsy   • FOOT SURGERY      Excision of lesion feet benign   • TN REPAIR OF HAMMERTOE,ONE Left 2019    Procedure: 2nd arthrodesis; 5th arthroplasty, flexor tenotomy 2,3,4,5 ;  Surgeon: Jamia Garza DPM;  Location: [facility];  Service: Podiatry   • TN REPAIR OF Leocadia Alden Left 2020    Procedure: 2ND TOE Revision hammertoe with broken implant;  Surgeon: Jamia Garza DPM;  Location: [facility];  Service: Podiatry   • UPPER GASTROINTESTINAL ENDOSCOPY  2021   • WISDOM TOOTH EXTRACTION       Social History   Social History     Substance and Sexual Activity   Alcohol Use No     Social History     Substance and Sexual Activity   Drug Use No    Comment: 20- not asked, parent at bedside       Social History     Tobacco Use   Smoking Status Never Smoker   Smokeless Tobacco Never Used     Family History   Problem Relation Age of Onset   • Gestational diabetes Mother    • Migraines Mother    • Anxiety disorder Father    • Hyperlipidemia Father    • Autism Brother    • Diabetes Other    • Uterine cancer Maternal Grandmother    • Rheumatic fever Maternal Grandmother • Diabetes Maternal Grandfather    • Hypertension Maternal Grandfather    • Heart attack Maternal Grandfather    • Stroke Maternal Grandfather    • Hyperlipidemia Maternal Grandfather    • Hypertension Paternal Grandmother    • Anxiety disorder Paternal Grandmother    • Hypertension Paternal Grandfather        Meds/Allergies       Current Outpatient Medications:   •  lactulose 20 g/30 mL  •  acetaminophen (TYLENOL) 500 mg tablet  •  ASMANEX  MCG/ACT AERO  •  Atogepant (Qulipta) 60 MG TABS  •  cetirizine (ZyrTEC) 10 mg tablet  •  clindamycin (CLEOCIN T) 1 % lotion  •  dexamethasone (DECADRON) 2 mg tablet  •  divalproex sodium (DEPAKOTE ER) 500 mg 24 hr tablet  •  Elastic Bandages & Supports (TRUFORM STOCKINGS 20-30MMHG) MISC  •  fludrocortisone (FLORINEF) 0 1 mg tablet  •  indomethacin (INDOCIN) 25 mg capsule  •  lactulose (CEPHULAC) 20 g packet  •  Lasmiditan Succinate (REYVOW) 100 MG tablet  •  metoprolol succinate (TOPROL-XL) 50 mg 24 hr tablet  •  naproxen (EC NAPROSYN) 500 MG EC tablet  •  Omega-3 Fatty Acids (FISH OIL) 1,000 mg  •  Rimegepant Sulfate (Nurtec) 75 MG TBDP  •  sucralfate (CARAFATE) 1 g tablet  •  tretinoin (RETIN-A) 0 025 % cream  •  venlafaxine (EFFEXOR-XR) 37 5 mg 24 hr capsule  •  venlafaxine (EFFEXOR-XR) 75 mg 24 hr capsule  •  verapamil (CALAN) 120 mg tablet  •  XOPENEX HFA 45 MCG/ACT inhaler    Current Facility-Administered Medications:   •  albuterol inhalation solution 2 5 mg, 2 5 mg, Nebulization, Q6H PRN  •  medroxyPROGESTERone acetate (DEPO-PROVERA SYRINGE) IM injection 150 mg, 150 mg, Intramuscular, Q3 Months, 150 mg at 11/16/21 1513  •  medroxyPROGESTERone acetate (DEPO-PROVERA SYRINGE) IM injection 150 mg, 150 mg, Intramuscular, Q3 Months, 150 mg at 02/02/22 1505    Allergies   Allergen Reactions   • Nuts - Food Allergy Other (See Comments)     Avani Nuts - itchy throat   • Other Hives      At surgical site and IV site- not sure if type of cleanser used   • Pollen Extract Objective     Blood pressure 112/70, temperature (!) 97 4 °F (36 3 °C), temperature source Tympanic, height 5' 8" (1 727 m), weight 77 2 kg (170 lb 3 2 oz), not currently breastfeeding  Body mass index is 25 88 kg/m²  PHYSICAL EXAM:      General Appearance:   Alert, cooperative, no distress   HEENT:   Normocephalic, atraumatic, anicteric  Neck:  Supple, symmetrical, trachea midline   Lungs:   Clear to auscultation bilaterally; no rales, rhonchi or wheezing; respirations unlabored    Heart[de-identified]   Regular rate and rhythm; no murmur, rub, or gallop  Abdomen:   Soft, non-tender, non-distended; normal bowel sounds; no masses, no organomegaly    Genitalia:   Deferred    Rectal:   Deferred    Extremities:  No cyanosis, clubbing or edema    Pulses:  2+ and symmetric    Skin:  No jaundice, rashes, or lesions          Lab Results:   No visits with results within 1 Day(s) from this visit  Latest known visit with results is:   Appointment on 07/17/2022   Component Date Value   • Hep B S Ab 07/17/2022 164 27    • QFT Nil 07/17/2022 0 04    • QFT TB1-NIL 07/17/2022 0 00    • QFT TB2-NIL 07/17/2022 -0 01    • QFT Mitogen-NIL 07/17/2022 3 12    • QFT Final Interpretation 07/17/2022 Negative          Radiology Results:   No results found

## 2022-10-14 PROCEDURE — 91122 PR ANAL PRESSURE RECORD: CPT | Performed by: INTERNAL MEDICINE

## 2022-10-14 PROCEDURE — 91120 PR RECTAL SENSATION TEST, BALLOON: CPT | Performed by: INTERNAL MEDICINE

## 2022-10-18 ENCOUNTER — SOCIAL WORK (OUTPATIENT)
Dept: BEHAVIORAL/MENTAL HEALTH CLINIC | Facility: CLINIC | Age: 22
End: 2022-10-18
Payer: COMMERCIAL

## 2022-10-18 DIAGNOSIS — F41.1 GAD (GENERALIZED ANXIETY DISORDER): ICD-10-CM

## 2022-10-18 PROCEDURE — 90834 PSYTX W PT 45 MINUTES: CPT | Performed by: SOCIAL WORKER

## 2022-10-18 NOTE — PSYCH
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 "    A:  Angella Mom has experienced a significant increase in anxiety as a result of the above  She has been very upset by this and both she and her parents believe that this is "political" as a result of her parents' roles with the network    P:  Upcoming sessions will be used to continue to support Nury with all of the above  [address] [facility] [place]: Diagnosis and Treatment Plan explained to Nury, Nury relates understanding diagnosis and is agreeable to Treatment Plan  Yes      This note was not shared with the patient due to this is a psychotherapy note    Encounter Diagnoses   Name Primary?    • INES (generalized anxiety disorder)

## 2022-10-18 NOTE — PSYCH
Treatment Plan Tracking    # 1Treatment Plan not completed within required time limits due to: Erin Silverman presented with emotional/behavioral issues that required clinical intervention

## 2022-10-31 ENCOUNTER — TELEPHONE (OUTPATIENT)
Dept: PSYCHIATRY | Facility: CLINIC | Age: 22
End: 2022-10-31

## 2022-10-31 DIAGNOSIS — F41.1 GAD (GENERALIZED ANXIETY DISORDER): ICD-10-CM

## 2022-10-31 NOTE — TELEPHONE ENCOUNTER
----- Message from [facility] sent at 10/31/2022 12:18 PM EDT -----  Regarding: needs refill on venlafaxine  Thanks!!!!

## 2022-11-01 ENCOUNTER — SOCIAL WORK (OUTPATIENT)
Dept: BEHAVIORAL/MENTAL HEALTH CLINIC | Facility: CLINIC | Age: 22
End: 2022-11-01

## 2022-11-01 DIAGNOSIS — F41.1 GAD (GENERALIZED ANXIETY DISORDER): Primary | ICD-10-CM

## 2022-11-01 NOTE — PSYCH
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  She stated that she has 3 weeks left and then 14 weeks in her final semester before she will begin this role  Brady Gregory shared her concern for her stepsister for the remainder of today's visit  A:  Brady Gregory shared that immediately upon accepting the position, her anxiety decreased  She is upset with her parents for not being more proactive in addressing her sister's mental health issues  Brady Gregory expressed a need to remain in therapy as she transitions into her new career  P:  Upcoming sessions will be used to continue to support Nury with all of the above  Behavioral Health Treatment Plan [facility]: Diagnosis and Treatment Plan explained to Christo Kuhn relates understanding diagnosis and is agreeable to Treatment Plan   Yes     Visit start and stop times:    11/01/22  Start Time: 1000  Stop Time: 1050  Total Visit Time: 50 minutes

## 2022-11-01 NOTE — TELEPHONE ENCOUNTER
Spoke with the pharmacist at [facility], [place]  Prescription was filled 10/24/22  Left a VM for Nury:  Gave fill date and if not picked up, she should check with the pharmacy; nursing number given to call with questions

## 2022-11-29 ENCOUNTER — SOCIAL WORK (OUTPATIENT)
Dept: BEHAVIORAL/MENTAL HEALTH CLINIC | Facility: CLINIC | Age: 22
End: 2022-11-29

## 2022-11-29 DIAGNOSIS — F41.1 GAD (GENERALIZED ANXIETY DISORDER): Primary | ICD-10-CM

## 2022-11-29 NOTE — PSYCH
Psychotherapy Provided: Individual Psychotherapy 50 minutes     Length of time in session: 50 minutes, follow up in2 week    Encounter Diagnoses   Name Primary? 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" as she received negative feedback on a 10-page paper this morning  She discussed ways that she may respond to the grade despite being "out of energy to keep 'fighting  '"  A:  Blair Montalvo appears to feel "defeated" by her Nursing school teachers as they have remained unsupportive and challenging to her particular class  She was able to recognize that she has become much more assertive since starting therapy  P:  Upcoming sessions will be used to continue to support Nury with all of the above  Behavioral Health Treatment Plan [facility]: Diagnosis and Treatment Plan explained to Belkis Francisco relates understanding diagnosis and is agreeable to Treatment Plan   Yes     Visit start and stop times:    11/29/22  Start Time: 1000  Stop Time: 1050  Total Visit Time: 50 minutes

## 2022-12-06 ENCOUNTER — TELEPHONE (OUTPATIENT)
Dept: NEUROLOGY | Facility: CLINIC | Age: 22
End: 2022-12-06

## 2022-12-06 NOTE — TELEPHONE ENCOUNTER
Received fax from [employer]-this pt has been eligible to receive [employer] at no cost, through [employer] complete over the last 150 days  If interested in pursuing continued eligibility, the program requires either the submission of PA or appeal of coverage denial or formulary exception every 180 days     Chart reviewed:  PA was initiated on 7/5/22    PA initiated on CMM  Key: [id number]  Additional Information Required  The [employer] Prior Authorization Team is unable to review this request for prior authorization as there is a clinical denial on file with duplicate information, Case ID [id number]  Please review the decision letter sent to your office on 7/6/2022 for denial reason and next steps      [facility]

## 2022-12-07 ENCOUNTER — ANNUAL EXAM (OUTPATIENT)
Dept: OBGYN CLINIC | Facility: CLINIC | Age: 22
End: 2022-12-07

## 2022-12-07 VITALS
DIASTOLIC BLOOD PRESSURE: 72 MMHG | SYSTOLIC BLOOD PRESSURE: 118 MMHG | WEIGHT: 163 LBS | BODY MASS INDEX: 24.71 KG/M2 | HEIGHT: 68 IN

## 2022-12-07 DIAGNOSIS — Z01.419 ENCOUNTER FOR GYNECOLOGICAL EXAMINATION (GENERAL) (ROUTINE) WITHOUT ABNORMAL FINDINGS: Primary | ICD-10-CM

## 2022-12-07 NOTE — TELEPHONE ENCOUNTER
Called Balta Pak [phone] and advised of all of the below who transferred me to [facility] and states our office can try to initiate a new PA before 1/3/22  1/3/22 is the deadline of submission       Will initiate closer to exp date

## 2022-12-07 NOTE — PROGRESS NOTES
Erin Silverman  2000    Assessment and Plan:  Yearly exam without abnormality      -Pap testing pending sexual activity  We reviewed ASCCP guidelines for Pap testing today    -Amenorrhea: wnl for depo withdrawal, but would do progesterone challenge if w/o menses at 1 year s/p last injection  -Reassured regarding vaginal caliper  Of no physiologic significant as this time, would defer dilator use    RTO one year for yearly exam or sooner as needed  CC:  Yearly exam    S:  25 y o  female here for yearly exam      She remains virginal  Has not had a menstrual cycle since prior to depo provera (started )  Last month, at some point, she did have some cramping and light spotting, so is hoping menses are returning  No smoking,alcohol,drugs   Exercises irregularly    Doing well overall  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 [employer]  Feeling much improved with regards to pelvic floor weakness, but does think that more work could be done  Doesn't have time for PFPT right now, but would consider returning after nursing school completed  Constipation improved overall, but she had some concern about vaginal caliper  She bought dilators and has had some trouble using regularly  She is unsure if this is a problem  Gardasil:  She has had the Gardasil series  Family hx of breast cancer: denies  Family hx of ovarian cancer: denies  Family hx of colon cancer: denies   Uterine Cancer MGM     Denies hot flushes, dyspareunia, abnormal uterine bleeding, urinary/fecal incontinence, changes in energy levels, mood         Current Outpatient Medications:   •  acetaminophen (TYLENOL) 500 mg tablet, Take 1,000 mg by mouth every 4 (four) hours as needed , Disp: , Rfl:   •  Atogepant (Qulipta) 60 MG TABS, Take 60 mg by mouth in the morning, Disp: 30 tablet, Rfl: 11  •  cetirizine (ZyrTEC) 10 mg tablet, Take 10 mg by mouth daily, Disp: , Rfl:   •  divalproex sodium (DEPAKOTE ER) 500 mg 24 hr tablet, Take 1 tablet (500 mg total) by mouth daily, Disp: 5 tablet, Rfl: 0  •  Elastic Bandages & Supports (TRUFORM STOCKINGS 20-30MMHG) MISC, Use as directed , Disp: , Rfl:   •  fludrocortisone (FLORINEF) 0 1 mg tablet, , Disp: , Rfl:   •  metoprolol succinate (TOPROL-XL) 50 mg 24 hr tablet, Take 50 mg by mouth in the morning and 50 mg before bedtime  , Disp: , Rfl:   •  naproxen (EC NAPROSYN) 500 MG EC tablet, Take 1 tablet (500 mg total) by mouth 2 (two) times a day with meals, Disp: 30 tablet, Rfl: 2  •  Omega-3 Fatty Acids (FISH OIL) 1,000 mg, Take 1,000 mg by mouth 2 (two) times a day , Disp: , Rfl:   •  Rimegepant Sulfate (Nurtec) 75 MG TBDP, Take 75 mg by mouth as needed (migraine) Limit of 1 in 24 hours, Disp: 8 tablet, Rfl: 3  •  tretinoin (RETIN-A) 0 025 % cream, Apply topically daily at bedtime, Disp: 45 g, Rfl: 2  •  venlafaxine (EFFEXOR-XR) 37 5 mg 24 hr capsule, Take 1 capsule (37 5 mg total) by mouth daily Take with one 75 mg capsule  Total daily dose is 112 5 mg, Disp: 90 capsule, Rfl: 1  •  venlafaxine (EFFEXOR-XR) 75 mg 24 hr capsule, Take 1 capsule (75 mg total) by mouth daily Take with one 37 5 mg capsule  Total daily dose is 112 5 mg, Disp: 90 capsule, Rfl: 1  •  verapamil (CALAN) 120 mg tablet, Take 2 tablets (240 mg total) by mouth in the morning and 2 tablets (240 mg total) before bedtime  , Disp: 360 tablet, Rfl: 4  •  ASMANEX  MCG/ACT AERO, Inhale 2 puffs every 4 (four) hours as needed (2 puffs) (Patient not taking: Reported on 6/15/2022), Disp: , Rfl:   •  clindamycin (CLEOCIN T) 1 % lotion, Apply topically daily To face (Patient taking differently: Apply 1 application topically daily To face), Disp: 60 mL, Rfl: 0  •  dexamethasone (DECADRON) 2 mg tablet, Take 1 tablet (2 mg total) by mouth daily with breakfast, Disp: 5 tablet, Rfl: 1  •  indomethacin (INDOCIN) 25 mg capsule, Take 1 capsule (25 mg total) by mouth if needed for mild pain (prior to exercise) (Patient not taking: Reported on 6/15/2022), Disp: 30 capsule, Rfl: 3  •  lactulose (CEPHULAC) 20 g packet, Take 1 packet (20 g total) by mouth 2 (two) times a day (Patient not taking: Reported on 10/11/2022), Disp: 30 each, Rfl: 3  •  lactulose 20 g/30 mL, Take 30 mL (20 g total) by mouth 2 (two) times a day, Disp: 1800 mL, Rfl: 3  •  Lasmiditan Succinate (REYVOW) 100 MG tablet, Take 1 tablet (100mg)  one time as needed for migraine  Do not use more than one dose per day, or more than 8 doses per month (Patient not taking: Reported on 6/15/2022), Disp: 8 tablet, Rfl: 3  •  sucralfate (CARAFATE) 1 g tablet, Take 1 tablet (1 g total) by mouth 3 (three) times a day as needed (headache-take before indomethacin) (Patient not taking: Reported on 6/15/2022), Disp: 15 tablet, Rfl: 3  •  XOPENEX HFA 45 MCG/ACT inhaler, Inhale 1-2 puffs every 4 (four) hours as needed  (Patient not taking: Reported on 6/15/2022), Disp: , Rfl:     Current Facility-Administered Medications:   •  albuterol inhalation solution 2 5 mg, 2 5 mg, Nebulization, Q6H PRN, Neha De La Fuente PA-C  Social History     Socioeconomic History   • Marital status: Single     Spouse name: Not on file   • Number of children: Not on file   • Years of education: Not on file   • Highest education level: Not on file   Occupational History   • Not on file   Tobacco Use   • Smoking status: Never   • Smokeless tobacco: Never   Vaping Use   • Vaping Use: Never used   Substance and Sexual Activity   • Alcohol use: No   • Drug use: No     Comment: 2/9/20- not asked, parent at bedside     • Sexual activity: Never   Other Topics Concern   • Not on file   Social History Narrative    Lives with parents     Social Determinants of Health     Financial Resource Strain: Not on file   Food Insecurity: Not on file   Transportation Needs: Not on file   Physical Activity: Insufficiently Active   • Days of Exercise per Week: 3 days   • Minutes of Exercise per Session: 30 min   Stress: Not on file   Social Connections: Not on file   Intimate Partner Violence: Not on file   Housing Stability: Not on file     Family History   Problem Relation Age of Onset   • Gestational diabetes Mother    • Migraines Mother    • Anxiety disorder Father    • Hyperlipidemia Father    • Autism Brother    • Diabetes Other    • Uterine cancer Maternal Grandmother    • Rheumatic fever Maternal Grandmother    • Diabetes Maternal Grandfather    • Hypertension Maternal Grandfather    • Heart attack Maternal Grandfather    • Stroke Maternal Grandfather    • Hyperlipidemia Maternal Grandfather    • Hypertension Paternal Grandmother    • Anxiety disorder Paternal Grandmother    • Hypertension Paternal Grandfather       Past Medical History:   Diagnosis Date   • Anxiety    • Asthma    • Dizziness     at times   • GERD (gastroesophageal reflux disease)    • Hammertoe of left foot    • Headache     occ   • Hyperlipemia    • Hypermobile joints    • Irritable bowel syndrome    • 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HCC)    • Mast cell disorder    • Migraine    • PONV (postoperative nausea and vomiting)    • POTS (postural orthostatic tachycardia syndrome)    •  infant    • Wears glasses         Review of Systems   Respiratory: Negative  Cardiovascular: Negative  Gastrointestinal: Negative for constipation and diarrhea  Genitourinary: Negative for difficulty urinating, pelvic pain, vaginal bleeding, vaginal discharge, itching or odor  O:  Blood pressure 118/72, height 5' 8" (1 727 m), weight 73 9 kg (163 lb), not currently breastfeeding  Patient appears well and is not in distress  Neck is supple without masses  Breasts are symmetrical without mass, tenderness, nipple discharge, skin changes or adenopathy  Abdomen is soft and nontender without masses     Pelvic exam deferred

## 2022-12-13 ENCOUNTER — SOCIAL WORK (OUTPATIENT)
Dept: BEHAVIORAL/MENTAL HEALTH CLINIC | Facility: CLINIC | Age: 22
End: 2022-12-13

## 2022-12-13 ENCOUNTER — OFFICE VISIT (OUTPATIENT)
Dept: NEUROLOGY | Facility: CLINIC | Age: 22
End: 2022-12-13

## 2022-12-13 VITALS
HEIGHT: 68 IN | BODY MASS INDEX: 24.71 KG/M2 | DIASTOLIC BLOOD PRESSURE: 74 MMHG | WEIGHT: 163 LBS | SYSTOLIC BLOOD PRESSURE: 125 MMHG | TEMPERATURE: 97.3 F | HEART RATE: 70 BPM

## 2022-12-13 DIAGNOSIS — G90.A POTS (POSTURAL ORTHOSTATIC TACHYCARDIA SYNDROME): ICD-10-CM

## 2022-12-13 DIAGNOSIS — Q79.60 EDS (EHLERS-DANLOS SYNDROME): ICD-10-CM

## 2022-12-13 DIAGNOSIS — D89.40 MAST CELL ACTIVATION SYNDROME (HCC): ICD-10-CM

## 2022-12-13 DIAGNOSIS — K58.1 IRRITABLE BOWEL SYNDROME WITH CONSTIPATION: ICD-10-CM

## 2022-12-13 DIAGNOSIS — G43.709 CHRONIC MIGRAINE WITHOUT AURA WITHOUT STATUS MIGRAINOSUS, NOT INTRACTABLE: Primary | ICD-10-CM

## 2022-12-13 DIAGNOSIS — F41.1 GAD (GENERALIZED ANXIETY DISORDER): Primary | ICD-10-CM

## 2022-12-13 DIAGNOSIS — F41.1 GAD (GENERALIZED ANXIETY DISORDER): ICD-10-CM

## 2022-12-13 RX ORDER — RIZATRIPTAN BENZOATE 10 MG/1
10 TABLET ORAL AS NEEDED
Qty: 12 TABLET | Refills: 0 | Status: SHIPPED | OUTPATIENT
Start: 2022-12-13

## 2022-12-13 RX ORDER — PROCHLORPERAZINE MALEATE 10 MG
10 TABLET ORAL EVERY 6 HOURS PRN
Qty: 10 TABLET | Refills: 0 | Status: SHIPPED | OUTPATIENT
Start: 2022-12-13

## 2022-12-13 RX ORDER — DEXAMETHASONE 1 MG
1 TABLET ORAL DAILY
Qty: 5 TABLET | Refills: 0 | Status: SHIPPED | OUTPATIENT
Start: 2022-12-13

## 2022-12-13 RX ORDER — DIPHENHYDRAMINE HCL 25 MG
25 TABLET ORAL EVERY 6 HOURS PRN
Qty: 30 TABLET | Refills: 0 | Status: SHIPPED | OUTPATIENT
Start: 2022-12-13

## 2022-12-13 NOTE — ASSESSMENT & PLAN NOTE
Preventive therapy:  - magnesium oxide 400 mg  And  Vitamin B2 200 mg  For one month and see how pt does with out it  If no change in headaches then stay off of it    -  venlafaxine 75 mg and 37 5 mg in a m   -  Decrease verapamil 120 mg twice a day   -  Continue Qulipta 60 mg daily  -  Continue Botox every 91 days  -  Starting day before botox Decadron 1 mg for 3 days  Abortive therapy:   - at the onset of her migraine headache, take Rizatriptan 10 mg at onset of migraine  May repeat in 2 hours if needed  Limit of 3 in a week or 12 month   - Use prochlorperazine 10 mg with the rizatriptan    May repeat in 6-8 hours if needed  - Take naproxen with the above  - IF at home use benadryl 25-50 mg with the above rescue  Over the counter medications for headaches to less than 3 doses a week

## 2022-12-13 NOTE — PROGRESS NOTES
[facility]  PATIENT:  Jordan Daley  MRN:  [id number]  :  2000  DATE OF SERVICE:  2022      Assessment/Plan:     Chronic migraine without aura without status migrainosus, not intractable  Preventive therapy:  - magnesium oxide 400 mg  And  Vitamin B2 200 mg  For one month and see how pt does with out it  If no change in headaches then stay off of it    -  venlafaxine 75 mg and 37 5 mg in a m   -  Decrease verapamil 120 mg twice a day   -  Continue Qulipta 60 mg daily  -  Continue Botox every 91 days  -  Starting day before botox Decadron 1 mg for 3 days  Abortive therapy:   - at the onset of her migraine headache, take Rizatriptan 10 mg at onset of migraine  May repeat in 2 hours if needed  Limit of 3 in a week or 12 month   - Use prochlorperazine 10 mg with the rizatriptan  May repeat in 6-8 hours if needed  - Take naproxen with the above  - IF at home use benadryl 25-50 mg with the above rescue  Over the counter medications for headaches to less than 3 doses a week         Problem List Items Addressed This Visit        Digestive    IBS (irritable bowel syndrome)       Cardiovascular and Mediastinum    POTS (postural orthostatic tachycardia syndrome)    Chronic migraine without aura without status migrainosus, not intractable - Primary     Preventive therapy:  - magnesium oxide 400 mg  And  Vitamin B2 200 mg  For one month and see how pt does with out it  If no change in headaches then stay off of it    -  venlafaxine 75 mg and 37 5 mg in a m   -  Decrease verapamil 120 mg twice a day   -  Continue Qulipta 60 mg daily  -  Continue Botox every 91 days  -  Starting day before botox Decadron 1 mg for 3 days  Abortive therapy:   - at the onset of her migraine headache, take Rizatriptan 10 mg at onset of migraine  May repeat in 2 hours if needed  Limit of 3 in a week or 12 month   - Use prochlorperazine 10 mg with the rizatriptan    May repeat in 6-8 hours if needed  - Take naproxen with the above  - IF at home use benadryl 25-50 mg with the above rescue  Over the counter medications for headaches to less than 3 doses a week         Relevant Medications    rizatriptan (MAXALT) 10 mg tablet    dexamethasone (DECADRON) 1 mg tablet    prochlorperazine (COMPAZINE) 10 mg tablet    diphenhydrAMINE (BENADRYL) 25 mg tablet       Musculoskeletal and Integument    EDS (Malachi-Danlos syndrome)       Other    INES (generalized anxiety disorder)    Relevant Medications    prochlorperazine (COMPAZINE) 10 mg tablet    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HCC)    Relevant Medications    dexamethasone (DECADRON) 1 mg tablet           History of Present Illness: We had the pleasure of evaluating Elizabeth Limon in neurological follow up  today for headaches  As you know,  she is a 25 y o   right handed female  She is currently in school for nursing at [employer] of 2019 patient began having a new type of headache which he experienced 4 times and matter of 6-12 months   At that time she had headache that lasted 2 days   This was preceded by a [place] of light in her right eye   Headache resolved after 2-3 days on her own this headache occurred again in the winter of [id number]   Third occurrence was summer of 2020 at 1st she thought it was Pots but she had persistent photophobia and nausea          In July 4, 2021 migrainous headache occurred with shooting pain down her neck and spine   Ended up going to the ER   The headache lasted approximately 2 weeks   Since that time she has had a persistent daily headache which she described as pressure and some pain in the back of her head without any photophobia or nausea   Was taking naproxen 500 mg daily   Since this time in July has never been headache free  Fahad Lopez states she has gone to the emergency room and not much has helped  Fahad Lopez has gotten Toradol injections but only relieves it slightly for approximately 24 hours and then vignesh is a full force         Current medical illnesses:  QTc:  June 5, 2017 426 ms  5/15/2022 513 ms     POTS:  Sees cardio at [facility] Dr Argelia Elizondo sprat-yearly  Was diagnosed in 2016  Is doing very well  She is active, able to exercise   She walks regularly, does a stair stepper  Drinking lots of water, gatorade  She is on Florinef and metoprolol      IBS  mast cell activation syndrome  Malachi-Danlos syndrome  Anxiety:  Sees Carin Vallejo and a counselor  Non celiac gluten sensitivity  Celiac artery stenosis      No history of tobacco use      As of 12/13/2022:  Patient states heart has been better  Feels like her heart rate is dropping low lately  Feels like worse lately  Doing more sitting  Today is last final and gets a 4 week break  Will graduate in May 2023  Patient is working as a [employer] currently and 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    As of 5/23/2022:  Patient is having heart issues lately  First time didn't have her apple watch on and was close to passing out  Had a multitude of symptoms but resolved in 30 minutes  This past time was at a diner and didn't feel well  Stood up and started to have symptoms  Did run EKG on her watch and it said was AFib  Did wait a bit and then said sinus tachycardia  Started to have chest pain and then did a 3rd time and was in Afib again per the apple watch  Therefore went to the ER  EKG at ER only demonstrated prolonged QTC  Felt better when she recovered  Has had a visit with [facility] virtually and will be doing a 2 week Holter monitor and stress test       Interval update 1/10/2022  As of 1/10/2022 when came home from [facility] did have bad migraines   Were an 8/10   Tried Nurtec multiple days in a row   Patient stopped chocolate after she noticed that it was worsening her migraines   Has been almost a week without them   Today and yesterday felt better   For past 2 days has not been waking up with them        Interval update 12/7/2021:  Reports some improvement since last visit  Still not truly headache free but has notice a decrease int he severity of her daily headaches as well as decrease int he number of severe migraines     What medications do you take or have you taken for your headaches? Current Preventive:   Zyrtec  (mast cell related), Cyproheptadine  Fludrocortisone (POTS)  Metoprolol (POTS)  omega-3, riboflavin  venlafaxine   Verapamil  Botox  Qulipta    Current Abortive:   Naproxen, Tylenol   Decadron  Indomethacin  Depakote  Reyvow      Prior Preventive:   vitamin-D, multivitamin  Benadryl, Atarax   Zoloft, Cymbalta  Gabapentin   Robaxin   Cyproheptadine  Xanax    Prior Abortive:   Percocet, Dilaudid, fentanyl, Fioricet  Toradol As of 11/2021 only helps for 24 hours), naproxen, ibuprofen  Zofran Reglan  Benadryl   Decadron, prednisone   Tylenol  Mabel Potash, Nurtec      Current pain 5/10     Mild headaches: that has improved with the increase in metoprolol  Moderate to severe headaches:   2020:  Feb: first week but lasted for 9 day  March: first week lasted 2-3 days  April: first week - lasted 2-3 days -   2021:  Daily since 7/4/2021 averages 6-7/10 gets to an 8-9/10 3+ days a week  When first started Verapamil did decrease her baseline to 3-4/10 for 2 weeks     As of 12/7/21 has a daily headache at a 3-4/10   Gets to an 6-7/10 at least 1 day a week      As of 1/10/2022 when came home from [place] did have bad migraines  Were an 8/10   Tried Nurtec multiple days in a row   Patient stopped chocolate after she noticed that it was worsening her migraines   Has been almost a week without them   Today and yesterday felt better   For past 2 days has not been waking up with them     As of 5/23/2022 has been getting 3 a week, less intense 5-6/10,  Doing acupuncture  As of 12/13/2022:  3-4 a week, with pain at 7-10/10    Not doing acupuncture     Are you ever headache free? yes     Aura/Warning and how long does it last?   - White spots in front of her visual field, seconds  - POTS headache is usually preceded by palpitation and flushing  This was more when she was having random headaches not persistant        What time of the day do the headaches start? varies     Continuous pain since July 2021  Wakes up with an average of a 6/10   Goes down to a 3-4/10 after 1 hour  Prior visit was Wakes up with 8/10 but does go down to a 6/10 approximately 2 hours later (after Tylenol or Naproxen), starts to increase again around 4 pm        How long do the headaches last?   Moderate/severe for 1 hours in the am but takes medication to decrease to lower pain but never goes away   Moderate headaches last a few hours after medicaiton   Where is your headache located? Mild headaches: frontal  Moderate to severe headaches: frontalis, retro-orbital, occipitalis and neck bilaterally (worse when in occipitalis)      Describe your usual headache? Mild headaches: nagging and aching with pressure  Moderate to severe headaches: throbbing and pressure     What is the intensity of pain? Mild headaches: 3-4/10   Moderate to severe headaches: 7-8/10      Associated symptoms:   - Nausea, vomitting   - Photophobia, Osmophobia   - Problems with concentration   - Insomnia (waking up frequently in middle of night)   - Prefer to be in a dark room      Number of days missed per month because of headaches:   Work (or school) days: doesn't miss (has some trouble getting her work done)  Social or Family activities: occasionally, doesn't have a lot of free time with school     Headache are worse if the patient: Bending over, exertion   Headache triggers: weather changes  Barometric changes, chocolate  What time of the year do headaches occur more frequently? More summer POTS flares      Have you had trigger point injection performed and how often? No   Have you had Botox injection performed and how often?  No   Have you had epidural injections or transforaminal injections performed? No      Alternative therapies used in the past for headaches? No      Have you used CBD or THC for your headaches and how often? No      How many caffeine products to drink a day? 1-2   How much water to drink a day? 64oz      Are you current pregnant or planning on getting pregnant?  stopped depo shots  Doesn't have a partner currently     Have you ever had any Brain imaging?  Yes   2020-CT head   no acute intracranial abnormality     10/24/2021 MRI brain   No acute infarction, intracranial hemorrhage or mass   I personally reviewed these images      Reviewed old notes from physician seen in the past- see above HPI for summary of previous encounters        Past Medical History:   Diagnosis Date   • Anxiety    • Asthma    • Dizziness     at times   • GERD (gastroesophageal reflux disease)    • Hammertoe of left foot    • Headache     occ   • Hyperlipemia    • Hypermobile joints    • Irritable bowel syndrome    • 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HCC)    • Mast cell disorder    • Migraine    • PONV (postoperative nausea and vomiting)    • POTS (postural orthostatic tachycardia syndrome)    •  infant    • Wears glasses        Patient Active Problem List   Diagnosis   • INES (generalized anxiety disorder)   • Hypermobile joints   • 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HCC)   • Median arcuate ligament syndrome (HCC)   • POTS (postural orthostatic tachycardia syndrome)   • Seasonal allergic rhinitis   • Hypertriglyceridemia   • Hammertoe of left foot   • Chronic migraine without aura without status migrainosus, not intractable   • IBS (irritable bowel syndrome)   • Dyspepsia   • Seasonal allergies   • Uncomplicated asthma   • Abnormal celiac antibody panel   • Chronic idiopathic constipation   • EDS (Malachi-Danlos syndrome)   • Exertional headache       Medications:      Current Outpatient Medications   Medication Sig Dispense Refill   • acetaminophen (TYLENOL) 500 mg tablet Take 1,000 mg by mouth every 4 (four) hours as needed      • ASMANEX  MCG/ACT AERO Inhale 2 puffs every 4 (four) hours as needed (2 puffs)     • Atogepant (Qulipta) 60 MG TABS Take 60 mg by mouth in the morning 30 tablet 11   • cetirizine (ZyrTEC) 10 mg tablet Take 10 mg by mouth daily     • clindamycin (CLEOCIN T) 1 % lotion Apply topically daily To face (Patient taking differently: Apply 1 application topically daily To face) 60 mL 0   • dexamethasone (DECADRON) 1 mg tablet Take 1 tablet (1 mg total) by mouth daily 5 tablet 0   • dexamethasone (DECADRON) 2 mg tablet Take 1 tablet (2 mg total) by mouth daily with breakfast (Patient taking differently: Take 2 mg by mouth if needed) 5 tablet 1   • diphenhydrAMINE (BENADRYL) 25 mg tablet Take 1 tablet (25 mg total) by mouth every 6 (six) hours as needed for itching 30 tablet 0   • divalproex sodium (DEPAKOTE ER) 500 mg 24 hr tablet Take 1 tablet (500 mg total) by mouth daily 5 tablet 0   • Elastic Bandages & Supports (TRUFORM STOCKINGS 20-30MMHG) MISC Use as directed  • fludrocortisone (FLORINEF) 0 1 mg tablet Take 0 1 mg by mouth 2 (two) times a day     • indomethacin (INDOCIN) 25 mg capsule Take 1 capsule (25 mg total) by mouth if needed for mild pain (prior to exercise) 30 capsule 3   • lactulose (CEPHULAC) 20 g packet Take 1 packet (20 g total) by mouth 2 (two) times a day 30 each 3   • metoprolol succinate (TOPROL-XL) 50 mg 24 hr tablet Take 50 mg by mouth in the morning and 50 mg before bedtime       • naproxen (EC NAPROSYN) 500 MG EC tablet Take 1 tablet (500 mg total) by mouth 2 (two) times a day with meals (Patient taking differently: Take 500 mg by mouth if needed) 30 tablet 2   • Omega-3 Fatty Acids (FISH OIL) 1,000 mg Take 1,000 mg by mouth 2 (two) times a day      • prochlorperazine (COMPAZINE) 10 mg tablet Take 1 tablet (10 mg total) by mouth every 6 (six) hours as needed (migraine) 10 tablet 0   • Rimegepant Sulfate (Nurtec) 75 MG TBDP Take 75 mg by mouth as needed (migraine) Limit of 1 in 24 hours 8 tablet 3   • rizatriptan (MAXALT) 10 mg tablet Take 1 tablet (10 mg total) by mouth as needed for migraine May repeat in 2 hours if needed  Limit 3 a week or 9 a month 12 tablet 0   • sucralfate (CARAFATE) 1 g tablet Take 1 tablet (1 g total) by mouth 3 (three) times a day as needed (headache-take before indomethacin) 15 tablet 3   • tretinoin (RETIN-A) 0 025 % cream Apply topically daily at bedtime 45 g 2   • venlafaxine (EFFEXOR-XR) 37 5 mg 24 hr capsule Take 1 capsule (37 5 mg total) by mouth daily Take with one 75 mg capsule  Total daily dose is 112 5 mg 90 capsule 1   • venlafaxine (EFFEXOR-XR) 75 mg 24 hr capsule Take 1 capsule (75 mg total) by mouth daily Take with one 37 5 mg capsule  Total daily dose is 112 5 mg 90 capsule 1   • verapamil (CALAN) 120 mg tablet Take 2 tablets (240 mg total) by mouth in the morning and 2 tablets (240 mg total) before bedtime  360 tablet 4   • XOPENEX HFA 45 MCG/ACT inhaler Inhale 1-2 puffs every 4 (four) hours as needed     • lactulose 20 g/30 mL Take 30 mL (20 g total) by mouth 2 (two) times a day (Patient not taking: Reported on 12/13/2022) 1800 mL 3     Current Facility-Administered Medications   Medication Dose Route Frequency Provider Last Rate Last Admin   • albuterol inhalation solution 2 5 mg  2 5 mg Nebulization Q6H PRN Pop Mooney PA-C            Allergies:       Allergies   Allergen Reactions   • Nuts - Food Allergy Other (See Comments)     Avani Nuts - itchy throat   • Other Hives      At surgical site and IV site- not sure if type of cleanser used   • Pollen Extract    • Reyvow [Lasmiditan] Palpitations and Hallucinations       Family History:     Family History   Problem Relation Age of Onset   • Gestational diabetes Mother    • Migraines Mother    • Anxiety disorder Father    • Hyperlipidemia Father    • Autism Brother    • Diabetes Other    • Uterine cancer Maternal Grandmother    • Rheumatic fever Maternal Grandmother    • Diabetes Maternal Grandfather    • Hypertension Maternal Grandfather    • Heart attack Maternal Grandfather    • Stroke Maternal Grandfather    • Hyperlipidemia Maternal Grandfather    • Hypertension Paternal Grandmother    • Anxiety disorder Paternal Grandmother    • Hypertension Paternal Grandfather        Social History:     Social History     Socioeconomic History   • Marital status: Single     Spouse name: Not on file   • Number of children: Not on file   • Years of education: Not on file   • Highest education level: Not on file   Occupational History   • Not on file   Tobacco Use   • Smoking status: Never   • Smokeless tobacco: Never   Vaping Use   • Vaping Use: Never used   Substance and Sexual Activity   • Alcohol use: No   • Drug use: No     Comment: 2/9/20- not asked, parent at bedside  • Sexual activity: Never   Other Topics Concern   • Not on file   Social History Narrative    Lives with parents     Social Determinants of Health     Financial Resource Strain: Not on file   Food Insecurity: Not on file   Transportation Needs: Not on file   Physical Activity: Insufficiently Active   • Days of Exercise per Week: 3 days   • Minutes of Exercise per Session: 30 min   Stress: Not on file   Social Connections: Not on file   Intimate Partner Violence: Not on file   Housing Stability: Not on file      I have reviewed the patient's medical, social and surgical history as well as medications in detail and updated the computerized patient record        Objective:   Physical Exam:                                                                   Vitals:            /74 (BP Location: Left arm, Patient Position: Sitting, Cuff Size: Standard)   Pulse 70   Temp (!) 97 3 °F (36 3 °C) (Temporal)   Ht 5' 8" (1 727 m)   Wt 73 9 kg (163 lb)   BMI 24 78 kg/m²   BP Readings from Last 3 Encounters:   12/13/22 125/74   12/07/22 118/72   10/11/22 112/70     Pulse Readings from Last 3 Encounters: 12/13/22 70   10/06/22 66   10/03/22 64          CONSTITUTIONAL: Well developed, well nourished, well groomed  No dysmorphic features  Eyes:  EOM normal      Neck:  Normal ROM, neck supple  HEENT:  Normocephalic atraumatic  Chest:  Respirations regular and unlabored  Psychiatric:  Normal behavior and appropriate affect      MENTAL STATUS  Orientation: Alert and oriented x 3  Fund of knowledge: Intact  MOTOR (Upper and lower extremities)   Bulk/tone/abnormal movement: Normal muscle bulk and tone  COORDINATION   Station/Gait: Normal baseline gait  Review of Systems:   Review of Systems  Constitutional: Negative  HENT: Negative  Eyes: Negative  Respiratory: Negative  Cardiovascular: Negative  Gastrointestinal: Negative  Endocrine: Negative  Genitourinary: Negative  Musculoskeletal: Negative  Skin: Negative  Allergic/Immunologic: Negative  Neurological: Positive for headaches  Hematological: Negative  Psychiatric/Behavioral: Negative  I personally reviewed the ROS entered by the MA    I spent 42 minutes in total time for this visit      Author:  Ashley Varela PA-C 12/13/2022 9:06 AM

## 2022-12-13 NOTE — LETTER
December 13, 2022     Patient: Claude Curl  YOB: 2000  Date of Visit: 12/13/2022      To Whom it May Concern:    Carloz Steward is under my professional care  Juanita Martínanalisa was seen in my office on 12/13/2022  Juanitaabel Gold does need to take daily medications for her conditions which are extensive  She also requires multiple medications to help break her migraines  Please see the medication list attached to this letter  If you have any questions or concerns, please don't hesitate to call           Sincerely,          John Moreland PA-C        CC: No Recipients

## 2022-12-13 NOTE — PATIENT INSTRUCTIONS
Migraine headache with/ without aura  Preventive therapy:  - magnesium oxide 400 mg  And  Vitamin B2 200 mg  For one month and see how pt does with out it  If no change in headaches then stay off of it    -  venlafaxine 75 mg and 37 5 mg in a m   -  Decrease verapamil 120 mg twice a day   -  Continue Qulipta 60 mg daily  -  Continue Botox every 91 days  -  Starting day before botox Decadron 1 mg for 3 days  Abortive therapy:   - at the onset of her migraine headache, take Rizatriptan 10 mg at onset of migraine  May repeat in 2 hours if needed  Limit of 3 in a week or 12 month   - Use prochlorperazine 10 mg with the rizatriptan  May repeat in 6-8 hours if needed  - Take naproxen with the above  - IF at home use benadryl 25-50 mg with the above rescue  Over the counter medications for headaches to less than 3 doses a week    For exercise headaches  Take sucralfate 30 minutes prior to indomethacin 25mg    Indomethacin should be times 30-45 minutes prior to exercise    Please look up [employer] and [employer]  Do only the free portions of them

## 2022-12-14 NOTE — PSYCH
Psychotherapy Provided: Individual Psychotherapy 50 minutes     Length of time in session: 50 minutes, follow up in2 week      Encounter Diagnoses   Name Primary? 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  She shared her frustration with the process as well as how "the system" worked "against" her  Nicolle Cummings also expressed feelings re: the "talk" that has already begun re: the position she has accepted following graduation due to implications that it was "handed" to her  A:  Nicolle Cummings is managing the above well despite the frustration and associated negativity  She is also making progress in managing her migraines and has found botox injections to be helpful  P:  Upcoming sessions will be used to continue to support Nury with all of the above  Behavioral Health Treatment Plan [facility]: Diagnosis and Treatment Plan explained to Dorinda Shetty relates understanding diagnosis and is agreeable to Treatment Plan   Yes     Visit start and stop times:    12/14/22  Start Time: 1500  Stop Time: 1550  Total Visit Time: 50 minutes

## 2022-12-30 NOTE — TELEPHONE ENCOUNTER
Meredith RG complete on [facility]  Key: [id number]   Received message-  The [employer] [facility] Prior Authorization Team is unable to review this request for prior authorization as there is a clinical denial on file with duplicate information, Case ID [id number]  Please review the decision letter sent to your office on 7/6/2022 for denial reason and next steps  Called [employer] at [phone], they are currently closed due to the holiday  Called [employer] at [phone] and spoke to [facility]   States that we can do an appeal   Appeal completed over the phone  Will receive determination with in 4-15 days   Appeal #[id number]

## 2023-01-05 PROBLEM — G43.009 MIGRAINE WITHOUT AURA AND WITHOUT STATUS MIGRAINOSUS, NOT INTRACTABLE: Status: ACTIVE | Noted: 2023-01-05

## 2023-01-05 NOTE — TELEPHONE ENCOUNTER
Please appeal   Patient does meet all the requirements which they listed on the document  I will place the dx as episodic    Thanks

## 2023-01-06 NOTE — TELEPHONE ENCOUNTER
Called PA dept, spoke w/ Renita Lam and advised of all of the below  She verbalized understanding  Redetermination initiated via phone   Case [id number]    4-15 days turnaround time    Awaiting determination

## 2023-01-06 NOTE — TELEPHONE ENCOUNTER
[facility] will request a copy of updated office notes that pt has a dx of episodic migraines  Can you addend your office notes?

## 2023-01-11 NOTE — TELEPHONE ENCOUNTER
PA has been approved through 7/6/23    [facility], spoke w/ Jenni and advised of all of the below  Requesting copy of the approval letter to [phone]  States that they will update pt's account  If pt has a copay, she can use the savings card on file  Pt can call them at [phone] if she has any questions re: program      Fax sent    Called [phone] and left a detailed message on pt's answering machine of all of the below and above (ok per juliet)  And for a call back if any questions or concerns

## 2023-01-12 ENCOUNTER — PROCEDURE VISIT (OUTPATIENT)
Dept: NEUROLOGY | Facility: CLINIC | Age: 23
End: 2023-01-12

## 2023-01-12 ENCOUNTER — SOCIAL WORK (OUTPATIENT)
Dept: BEHAVIORAL/MENTAL HEALTH CLINIC | Facility: CLINIC | Age: 23
End: 2023-01-12

## 2023-01-12 VITALS — TEMPERATURE: 97.6 F | HEART RATE: 63 BPM | SYSTOLIC BLOOD PRESSURE: 122 MMHG | DIASTOLIC BLOOD PRESSURE: 66 MMHG

## 2023-01-12 DIAGNOSIS — G43.709 CHRONIC MIGRAINE WITHOUT AURA WITHOUT STATUS MIGRAINOSUS, NOT INTRACTABLE: Primary | ICD-10-CM

## 2023-01-12 DIAGNOSIS — F41.1 GAD (GENERALIZED ANXIETY DISORDER): Primary | ICD-10-CM

## 2023-01-12 DIAGNOSIS — G43.009 MIGRAINE WITHOUT AURA AND WITHOUT STATUS MIGRAINOSUS, NOT INTRACTABLE: ICD-10-CM

## 2023-01-12 RX ORDER — ATOGEPANT 60 MG/1
60 TABLET ORAL DAILY
Qty: 90 TABLET | Refills: 4 | Status: SHIPPED | OUTPATIENT
Start: 2023-01-12

## 2023-01-12 NOTE — PROGRESS NOTES
Universal Protocol   Consent: Verbal consent obtained  Written consent obtained  Risks and benefits: risks, benefits and alternatives were discussed  Consent given by: patient  Time out: Immediately prior to procedure a "time out" was called to verify the correct patient, procedure, equipment, support staff and site/side marked as required  Patient understanding: patient states understanding of the procedure being performed  Patient consent: the patient's understanding of the procedure matches consent given  Procedure consent: procedure consent matches procedure scheduled  Relevant documents: relevant documents present and verified  Patient identity confirmed: verbally with patient        Chemodenervation     Date/Time 1/12/2023 1:05 PM     Performed by  Victor Manuel Hernandez PA-C     Authorized by Victor Manuel Hernandez PA-C        Pre-procedure details      Prepped With: Alcohol     Anesthesia  (see MAR for exact dosages):      Anesthesia method:  None   Procedure details     Position:  Upright   Botox     Botox Type:  Type A    Brand:  Botox    mL's of Botulinum Toxin:  200    Final Concentration per CC:  50 units    Needle Gauge:  30 G 2 5 inch   Procedures     Botox Procedures: chronic headache      Indications: migraines     Injection Location      Head / Face:  L superior cervical paraspinal, R superior cervical paraspinal, L , R , L frontalis, R frontalis, L medial occipitalis, R medial occipitalis, procerus, R temporalis, L temporalis, R superior trapezius and L superior trapezius    L  injection amount:  5 unit(s)    R  injection amount:  5 unit(s)    L lateral frontalis:  5 unit(s)    R lateral frontalis:  5 unit(s)    L medial frontalis:  5 unit(s)    R medial frontalis:  5 unit(s)    L temporalis injection amount:  20 unit(s)    R temporalis injection amount:  20 unit(s)    Procerus injection amount:  5 unit(s)    L medial occipitalis injection amount:  15 unit(s)    R medial occipitalis injection amount:  15 unit(s)    L superior cervical paraspinal injection amount:  10 unit(s)    R superior cervical paraspinal injection amount:  10 unit(s)    L superior trapezius injection amount:  15 unit(s)    R superior trapezius injection amount:  15 unit(s)   Total Units     Total units used:  200    Total units discarded:  0   Post-procedure details      Chemodenervation:  Chronic migraine    Facial Nerve Location[de-identified]  Bilateral facial nerve    Patient tolerance of procedure:   Tolerated well, no immediate complications   Comments      20 units frontalis  5 units right splenius capitis  20 units right lower occipitalis/cervical paraspinal  All medically necessary

## 2023-01-12 NOTE — PSYCH
Psychotherapy Provided: Individual Psychotherapy 50 minutes     Length of time in session: 50 minutes, follow up in2 week    Encounter Diagnoses   Name Primary? 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

## 2023-01-31 ENCOUNTER — SOCIAL WORK (OUTPATIENT)
Dept: BEHAVIORAL/MENTAL HEALTH CLINIC | Facility: CLINIC | Age: 23
End: 2023-01-31

## 2023-01-31 DIAGNOSIS — F41.1 GAD (GENERALIZED ANXIETY DISORDER): Primary | ICD-10-CM

## 2023-01-31 NOTE — PSYCH
Behavioral Health Psychotherapy Progress Note    Psychotherapy Provided: Individual Psychotherapy     Encounter Diagnoses   Name Primary? 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  She shared that she remains concerned about her last course as peers have failed it and needed to then re-take the course  Otilia Modi also shared details re: two dates with a male peer  During this session, this clinician used the following therapeutic modalities: Motivational Interviewing and Supportive Psychotherapy    Substance Abuse was not addressed during this session  If the client is diagnosed with a co-occurring substance use disorder, please indicate any changes in the frequency or amount of use:   Stage of change for addressing substance use diagnoses: No substance use/Not applicable    ASSESSMENT:  Nicola Knight presents with a Euthymic/ normal mood  Otilia Modi was able to recognize not only her progress in therapy, but also shared how well-controlled her POTS is as a result of work she has done to improve her health  her affect is Normal range and intensity, which is congruent, with her mood and the content of the session  The client has made progress on their goals  Nicola Knight presents with a minimal risk of suicide, minimal risk of self-harm, and minimal risk of harm to others  For any risk assessment that surpasses a "low" rating, a safety plan must be developed  A safety plan was indicated: no  If yes, describe in detail     PLAN: Between sessions, Nicola Knight will continue to utilize mindfulness to manage her stress / anxiety level   At the next session, the therapist will use Supportive Psychotherapy to address the above in further detail      Behavioral Health Treatment Plan and Discharge Planning: Nicola Knight is aware of and agrees to continue to work on their treatment plan  They have identified and are working toward their discharge goals   yes    Visit start and stop times:    01/31/23  Start Time: 1600  Stop Time: 1650  Total Visit Time: 50 minutes

## 2023-02-15 ENCOUNTER — SOCIAL WORK (OUTPATIENT)
Dept: BEHAVIORAL/MENTAL HEALTH CLINIC | Facility: CLINIC | Age: 23
End: 2023-02-15

## 2023-02-15 DIAGNOSIS — F41.1 GAD (GENERALIZED ANXIETY DISORDER): Primary | ICD-10-CM

## 2023-02-15 NOTE — PSYCH
Behavioral Health Psychotherapy Progress Note    Psychotherapy Provided: Individual Psychotherapy     Encounter Diagnoses   Name Primary? 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  During this session, this clinician used the following therapeutic modalities: Motivational Interviewing and Supportive Psychotherapy    Substance Abuse was not addressed during this session  If the client is diagnosed with a co-occurring substance use disorder, please indicate any changes in the frequency or amount of use:   Stage of change for addressing substance use diagnoses: No substance use/Not applicable    ASSESSMENT:  Lucio Hamilton presents with a Euthymic/ normal mood  Larisakerri Pinedo was able to admit that her panic did not assist her, but also that she "cannot stop "  She was able to accept this worker's feedback re: her window of tolerance and ways that increasing her self compassion could assist her       her affect is Normal range and intensity, which is congruent, with her mood and the content of the session  The client has made progress on their goals  Lucio Hamilton presents with a minimal risk of suicide, minimal risk of self-harm, and minimal risk of harm to others  For any risk assessment that surpasses a "low" rating, a safety plan must be developed  A safety plan was indicated: no  If yes, describe in detail     PLAN: Between sessions, Lucio Hamilton will continue to utilize mindfulness to manage her stress / anxiety level    At the next session, the therapist will use Supportive Psychotherapy to address the above in further detail  Behavioral Health Treatment Plan and Discharge Planning: Kasey Denise is aware of and agrees to continue to work on their treatment plan  They have identified and are working toward their discharge goals   yes    Visit start and stop times:    02/15/23  Start Time: 1600  Stop Time: 1650  Total Visit Time: 50 minutes

## 2023-02-24 ENCOUNTER — APPOINTMENT (EMERGENCY)
Dept: CT IMAGING | Facility: HOSPITAL | Age: 23
End: 2023-02-24

## 2023-02-24 ENCOUNTER — HOSPITAL ENCOUNTER (EMERGENCY)
Facility: HOSPITAL | Age: 23
Discharge: HOME | End: 2023-02-24
Attending: EMERGENCY MEDICINE

## 2023-02-24 VITALS
OXYGEN SATURATION: 100 % | TEMPERATURE: 97.9 F | HEIGHT: 67 IN | WEIGHT: 161.16 LBS | HEART RATE: 64 BPM | RESPIRATION RATE: 16 BRPM | DIASTOLIC BLOOD PRESSURE: 71 MMHG | BODY MASS INDEX: 25.29 KG/M2 | SYSTOLIC BLOOD PRESSURE: 126 MMHG

## 2023-02-24 DIAGNOSIS — K59.00 CONSTIPATION: ICD-10-CM

## 2023-02-24 DIAGNOSIS — R10.9 ABDOMINAL PAIN: Primary | ICD-10-CM

## 2023-02-24 LAB
ALBUMIN SERPL BCP-MCNC: 4.4 G/DL (ref 3.5–5)
ALP SERPL-CCNC: 74 U/L (ref 34–104)
ALT SERPL W P-5'-P-CCNC: 16 U/L (ref 7–52)
ANION GAP SERPL CALCULATED.3IONS-SCNC: 8 MMOL/L (ref 4–13)
AST SERPL W P-5'-P-CCNC: 17 U/L (ref 13–39)
BASOPHILS # BLD AUTO: 0 THOUSANDS/ÂΜL (ref 0–0.1)
BASOPHILS NFR BLD AUTO: 0 % (ref 0–1)
BILIRUB DIRECT SERPL-MCNC: 0.07 MG/DL (ref 0–0.2)
BILIRUB SERPL-MCNC: 0.36 MG/DL (ref 0.2–1)
BILIRUB UR QL STRIP: NEGATIVE
BUN SERPL-MCNC: 17 MG/DL (ref 5–25)
CALCIUM SERPL-MCNC: 9.6 MG/DL (ref 8.4–10.2)
CHLORIDE SERPL-SCNC: 105 MMOL/L (ref 96–108)
CLARITY UR: CLEAR
CO2 SERPL-SCNC: 28 MMOL/L (ref 21–32)
COLOR UR: YELLOW
CREAT SERPL-MCNC: 0.82 MG/DL (ref 0.6–1.3)
EOSINOPHIL # BLD AUTO: 0 THOUSAND/ÂΜL (ref 0–0.61)
EOSINOPHIL NFR BLD AUTO: 0 % (ref 0–6)
ERYTHROCYTE [DISTWIDTH] IN BLOOD BY AUTOMATED COUNT: 11.9 % (ref 11.6–15.1)
EXT PREGNANCY TEST URINE: NEGATIVE
EXT. CONTROL: NORMAL
GFR SERPL CREATININE-BSD FRML MDRD: 102 ML/MIN/1.73SQ M
GLUCOSE SERPL-MCNC: 90 MG/DL (ref 65–140)
GLUCOSE UR STRIP-MCNC: NEGATIVE MG/DL
HCT VFR BLD AUTO: 40 % (ref 34.8–46.1)
HGB BLD-MCNC: 13.6 G/DL (ref 11.5–15.4)
HGB UR QL STRIP.AUTO: NEGATIVE
IMM GRANULOCYTES # BLD AUTO: 0.01 THOUSAND/UL (ref 0–0.2)
IMM GRANULOCYTES NFR BLD AUTO: 0 % (ref 0–2)
KETONES UR STRIP-MCNC: NEGATIVE MG/DL
LEUKOCYTE ESTERASE UR QL STRIP: NEGATIVE
LIPASE SERPL-CCNC: 17 U/L (ref 11–82)
LYMPHOCYTES # BLD AUTO: 1.3 THOUSANDS/ÂΜL (ref 0.6–4.47)
LYMPHOCYTES NFR BLD AUTO: 30 % (ref 14–44)
MCH RBC QN AUTO: 29.4 PG (ref 26.8–34.3)
MCHC RBC AUTO-ENTMCNC: 34 G/DL (ref 31.4–37.4)
MCV RBC AUTO: 87 FL (ref 82–98)
MONOCYTES # BLD AUTO: 0.28 THOUSAND/ÂΜL (ref 0.17–1.22)
MONOCYTES NFR BLD AUTO: 7 % (ref 4–12)
NEUTROPHILS # BLD AUTO: 2.75 THOUSANDS/ÂΜL (ref 1.85–7.62)
NEUTS SEG NFR BLD AUTO: 63 % (ref 43–75)
NITRITE UR QL STRIP: NEGATIVE
NRBC BLD AUTO-RTO: 0 /100 WBCS
PH UR STRIP.AUTO: 6 [PH]
PLATELET # BLD AUTO: 231 THOUSANDS/UL (ref 149–390)
PMV BLD AUTO: 9.9 FL (ref 8.9–12.7)
POTASSIUM SERPL-SCNC: 3.7 MMOL/L (ref 3.5–5.3)
PROT SERPL-MCNC: 7.5 G/DL (ref 6.4–8.4)
PROT UR STRIP-MCNC: NEGATIVE MG/DL
RBC # BLD AUTO: 4.62 MILLION/UL (ref 3.81–5.12)
SODIUM SERPL-SCNC: 141 MMOL/L (ref 135–147)
SP GR UR STRIP.AUTO: >=1.03 (ref 1–1.03)
UROBILINOGEN UR QL STRIP.AUTO: 0.2 E.U./DL
WBC # BLD AUTO: 4.34 THOUSAND/UL (ref 4.31–10.16)

## 2023-02-24 RX ORDER — METOCLOPRAMIDE 10 MG/1
10 TABLET ORAL EVERY 6 HOURS PRN
Qty: 20 TABLET | Refills: 0 | Status: SHIPPED | OUTPATIENT
Start: 2023-02-24

## 2023-02-24 RX ORDER — METOCLOPRAMIDE HYDROCHLORIDE 5 MG/ML
10 INJECTION INTRAMUSCULAR; INTRAVENOUS ONCE
Status: COMPLETED | OUTPATIENT
Start: 2023-02-24 | End: 2023-02-24

## 2023-02-24 RX ADMIN — POLYETHYLENE GLYCOL 3350, SODIUM SULFATE ANHYDROUS, SODIUM BICARBONATE, SODIUM CHLORIDE, POTASSIUM CHLORIDE 4000 ML: 236; 22.74; 6.74; 5.86; 2.97 POWDER, FOR SOLUTION ORAL at 10:15

## 2023-02-24 RX ADMIN — METOCLOPRAMIDE 10 MG: 5 INJECTION, SOLUTION INTRAMUSCULAR; INTRAVENOUS at 09:54

## 2023-02-24 RX ADMIN — SODIUM CHLORIDE 1000 ML: 0.9 INJECTION, SOLUTION INTRAVENOUS at 09:53

## 2023-02-24 RX ADMIN — IOHEXOL 100 ML: 350 INJECTION, SOLUTION INTRAVENOUS at 10:57

## 2023-02-24 NOTE — Clinical Note
Teto Mcdonald was seen and treated in our emergency department on 2/24/2023  Diagnosis:     Seble Borrero  may return to work on return date  She may return on this date: 02/27/2023         If you have any questions or concerns, please don't hesitate to call        Argelia Garnica MD    ______________________________           _______________          _______________  Hospital Representative                              Date                                Time

## 2023-02-24 NOTE — ED PROVIDER NOTES
History  Chief Complaint   Patient presents with   • Abdominal Pain     Pt with hx of constipation issues  Having pain/trouble with BMs recently  Tried several OTC remedies including enemas  Did have a BM after 2nd enema, however, now having difficulty once again and feels as if stomach is getting hard and have increased pain  73-year-old female presented for evaluation of generalized abdominal pain, distention, and constipation  She reports that she has a long history of difficulty with constipation  She reports that it is not uncommon for her to go for 5 days or even a week without a bowel movement  She was having some issues over the weekend and tried 2 enemas  She reports having a recently sized bowel movement after the second  Since then she has not passed any stool  Over the past day or 2 she seemed to notice that her abdomen feels more distended and she is having some pain in her upper abdomen  Today the pain worsened and became associated with nausea  She has not had any vomiting  She denies any fevers  No urinary symptoms  She reports that she follows with a gastroenterologist and is tried multiple different bowel regimens in the past including MiraLAX, senna, Dulcolax, enemas, etc   States that this feels somewhat similar to previous episodes with the main difference being that she is now having some pain in the upper abdomen as opposed to the lower abdomen, which is more common for her  History provided by:  Patient   used: No    Abdominal Pain  Pain location:  Generalized  Pain quality: fullness    Pain radiates to:  Does not radiate  Pain severity:  Moderate  Onset quality:  Gradual  Timing:  Constant  Progression:  Worsening  Chronicity:  Recurrent  Relieved by:  Nothing  Worsened by:  Nothing  Ineffective treatments: Dulcolax, enema    Associated symptoms: constipation and nausea    Associated symptoms: no chest pain, no chills, no cough, no dysuria, no fever, no hematuria, no shortness of breath, no sore throat and no vomiting        Prior to Admission Medications   Prescriptions Last Dose Informant Patient Reported? Taking? ASMANEX  MCG/ACT AERO   Yes No   Sig: Inhale 2 puffs every 4 (four) hours as needed (2 puffs)   Atogepant (Qulipta) 60 MG TABS   No No   Sig: Take 60 mg by mouth in the morning   Elastic Bandages & Supports (TRUFORM STOCKINGS 20-30MMHG) MISC   Yes No   Sig: Use as directed     Omega-3 Fatty Acids (FISH OIL) 1,000 mg   Yes No   Sig: Take 1,000 mg by mouth 2 (two) times a day    Rimegepant Sulfate (Nurtec) 75 MG TBDP   No No   Sig: Take 75 mg by mouth as needed (migraine) Limit of 1 in 24 hours   XOPENEX HFA 45 MCG/ACT inhaler   Yes No   Sig: Inhale 1-2 puffs every 4 (four) hours as needed   acetaminophen (TYLENOL) 500 mg tablet   Yes No   Sig: Take 1,000 mg by mouth every 4 (four) hours as needed    cetirizine (ZyrTEC) 10 mg tablet   Yes No   Sig: Take 10 mg by mouth daily   clindamycin (CLEOCIN T) 1 % lotion   No No   Sig: Apply topically daily To face   Patient taking differently: Apply 1 application topically daily To face   dexamethasone (DECADRON) 1 mg tablet   No No   Sig: Take 1 tablet (1 mg total) by mouth daily   dexamethasone (DECADRON) 2 mg tablet   No No   Sig: Take 1 tablet (2 mg total) by mouth daily with breakfast   Patient taking differently: Take 2 mg by mouth if needed   diphenhydrAMINE (BENADRYL) 25 mg tablet   No No   Sig: Take 1 tablet (25 mg total) by mouth every 6 (six) hours as needed for itching   divalproex sodium (DEPAKOTE ER) 500 mg 24 hr tablet   No No   Sig: Take 1 tablet (500 mg total) by mouth daily   fludrocortisone (FLORINEF) 0 1 mg tablet   Yes No   Sig: Take 0 1 mg by mouth 2 (two) times a day   indomethacin (INDOCIN) 25 mg capsule   No No   Sig: Take 1 capsule (25 mg total) by mouth if needed for mild pain (prior to exercise)   lactulose (CEPHULAC) 20 g packet   No No   Sig: Take 1 packet (20 g total) by mouth 2 (two) times a day   lactulose 20 g/30 mL   No No   Sig: Take 30 mL (20 g total) by mouth 2 (two) times a day   Patient not taking: Reported on 12/13/2022   metoprolol succinate (TOPROL-XL) 50 mg 24 hr tablet   Yes No   Sig: Take 50 mg by mouth in the morning and 50 mg before bedtime  naproxen (EC NAPROSYN) 500 MG EC tablet   No No   Sig: Take 1 tablet (500 mg total) by mouth 2 (two) times a day with meals   Patient taking differently: Take 500 mg by mouth if needed   prochlorperazine (COMPAZINE) 10 mg tablet   No No   Sig: Take 1 tablet (10 mg total) by mouth every 6 (six) hours as needed (migraine)   rizatriptan (MAXALT) 10 mg tablet   No No   Sig: Take 1 tablet (10 mg total) by mouth as needed for migraine May repeat in 2 hours if needed  Limit 3 a week or 9 a month   sucralfate (CARAFATE) 1 g tablet   No No   Sig: Take 1 tablet (1 g total) by mouth 3 (three) times a day as needed (headache-take before indomethacin)   tretinoin (RETIN-A) 0 025 % cream   No No   Sig: Apply topically daily at bedtime   venlafaxine (EFFEXOR-XR) 37 5 mg 24 hr capsule   No No   Sig: Take 1 capsule (37 5 mg total) by mouth daily Take with one 75 mg capsule  Total daily dose is 112 5 mg   venlafaxine (EFFEXOR-XR) 75 mg 24 hr capsule   No No   Sig: Take 1 capsule (75 mg total) by mouth daily Take with one 37 5 mg capsule  Total daily dose is 112 5 mg   verapamil (CALAN) 120 mg tablet   No No   Sig: Take 2 tablets (240 mg total) by mouth in the morning and 2 tablets (240 mg total) before bedtime        Facility-Administered Medications Last Administration Doses Remaining   albuterol inhalation solution 2 5 mg None recorded           Past Medical History:   Diagnosis Date   • Anxiety    • Asthma    • Dizziness     at times   • GERD (gastroesophageal reflux disease)    • Hammertoe of left foot    • Headache     occ   • Hyperlipemia    • Hypermobile joints    • Irritable bowel syndrome    • 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[facility] )    • Mast cell disorder    • Migraine    • PONV (postoperative nausea and vomiting)    • POTS (postural orthostatic tachycardia syndrome)    •  infant    • Wears glasses        Past Surgical History:   Procedure Laterality Date   • COLONOSCOPY     • EGD AND COLONOSCOPY N/A 1/10/2017    Procedure: EGD AND COLONOSCOPY;  Surgeon: Bessy Quick MD;  Location: [facility]; Service:    • ESOPHAGOGASTRODUODENOSCOPY      with biopsy   • FOOT SURGERY      Excision of lesion feet benign   • CT CORRECTION HAMMERTOE Left 2019    Procedure: 2nd arthrodesis; 5th arthroplasty, flexor tenotomy 2,3,4,5 ;  Surgeon: Irma Ramon DPM;  Location: [facility];  Service: Podiatry   • CT CORRECTION HAMMERTOE Left 2020    Procedure: 2ND TOE Revision hammertoe with broken implant;  Surgeon: Irma Ramon DPM;  Location: [facility];  Service: Podiatry   • UPPER GASTROINTESTINAL ENDOSCOPY  2021   • WISDOM TOOTH EXTRACTION         Family History   Problem Relation Age of Onset   • Gestational diabetes Mother    • Migraines Mother    • Anxiety disorder Father    • Hyperlipidemia Father    • Autism Brother    • Diabetes Other    • Uterine cancer Maternal Grandmother    • Rheumatic fever Maternal Grandmother    • Diabetes Maternal Grandfather    • Hypertension Maternal Grandfather    • Heart attack Maternal Grandfather    • Stroke Maternal Grandfather    • Hyperlipidemia Maternal Grandfather    • Hypertension Paternal Grandmother    • Anxiety disorder Paternal Grandmother    • Hypertension Paternal Grandfather      I have reviewed and agree with the history as documented      E-Cigarette/Vaping   • E-Cigarette Use Never User      E-Cigarette/Vaping Substances   • Nicotine No    • THC No    • CBD No    • Flavoring No    • Other No    • Unknown No      Social History     Tobacco Use   • Smoking status: Never   • Smokeless tobacco: Never   Vaping Use   • Vaping Use: Never used   Substance Use Topics   • Alcohol use: No   • Drug use: No     Comment: 2/9/20- not asked, parent at bedside  Review of Systems   Constitutional: Negative for chills and fever  HENT: Negative for sore throat  Eyes: Negative for visual disturbance  Respiratory: Negative for cough and shortness of breath  Cardiovascular: Negative for chest pain and palpitations  Gastrointestinal: Positive for abdominal pain, constipation and nausea  Negative for vomiting  Genitourinary: Negative for dysuria and hematuria  Musculoskeletal: Negative for arthralgias and back pain  Skin: Negative for color change and rash  Neurological: Negative for syncope  All other systems reviewed and are negative  Physical Exam  Physical Exam  Vitals and nursing note reviewed  Constitutional:       General: She is not in acute distress  Appearance: She is well-developed  HENT:      Head: Normocephalic and atraumatic  Eyes:      Conjunctiva/sclera: Conjunctivae normal    Cardiovascular:      Rate and Rhythm: Normal rate and regular rhythm  Heart sounds: No murmur heard  Pulmonary:      Effort: Pulmonary effort is normal  No respiratory distress  Breath sounds: Normal breath sounds  Abdominal:      Palpations: Abdomen is soft  Tenderness: There is generalized abdominal tenderness  Comments: Mild generalized distention and tenderness without focal tenderness, rebound, or guarding  Musculoskeletal:         General: No swelling  Cervical back: Neck supple  Skin:     General: Skin is warm and dry  Capillary Refill: Capillary refill takes less than 2 seconds  Neurological:      Mental Status: She is alert     Psychiatric:         Mood and Affect: Mood normal          Vital Signs  ED Triage Vitals   Temperature Pulse Respirations Blood Pressure SpO2   02/24/23 0919 02/24/23 0915 02/24/23 0915 02/24/23 0915 02/24/23 0915   97 9 °F (36 6 °C) 84 16 144/91 100 %      Temp Source Heart Rate Source Patient Position - Orthostatic VS BP Location FiO2 (%)   02/24/23 0919 -- -- -- --   Oral          Pain Score       02/24/23 0915       6           Vitals:    02/24/23 0915   BP: 144/91   Pulse: 84         Visual Acuity      ED Medications  Medications   sodium chloride 0 9 % bolus 1,000 mL (has no administration in time range)   metoclopramide (REGLAN) injection 10 mg (has no administration in time range)   polyethylene glycol (GOLYTELY) bowel prep 4,000 mL (has no administration in time range)       Diagnostic Studies  Results Reviewed     Procedure Component Value Units Date/Time    Basic metabolic panel [[id number]]     Lab Status: No result Specimen: Blood     Hepatic function panel [[id number]]     Lab Status: No result Specimen: Blood     CBC and differential [[id number]]     Lab Status: No result Specimen: Blood     POCT pregnancy, urine [[id number]]     Lab Status: No result     UA (URINE) with reflex to Scope [[id number]]     Lab Status: No result Specimen: Urine     Lipase [[id number]]     Lab Status: No result Specimen: Blood                  CT abdomen pelvis with contrast    (Results Pending)              Procedures  Procedures         ED Course                                             MDM    Disposition  Final diagnoses:   None     ED Disposition     None      Follow-up Information    None         Patient's Medications   Discharge Prescriptions    No medications on file       No discharge procedures on file      PDMP Review       Value Time User    PDMP Reviewed  Yes 8/9/2022 11:04 AM Rosales Cho MD          ED Provider  Electronically Signed by 60-89 mL/min/1 73 square meters)  •  Stage 3A Moderate CKD (GFR = 45-59 mL/min/1 73 square meters)  •  Stage 3B Moderate CKD (GFR = 30-44 mL/min/1 73 square meters)  •  Stage 4 Severe CKD (GFR = 15-29 mL/min/1 73 square meters)  •  Stage 5 End Stage CKD (GFR <15 mL/min/1 73 square meters)  Note: GFR calculation is accurate only with a steady state creatinine    Hepatic function panel [[id number]]  (Normal) Collected: 02/24/23 1003    Lab Status: Final result Specimen: Blood from Arm, Left Updated: 02/24/23 1026     Total Bilirubin 0 36 mg/dL      Bilirubin, Direct 0 07 mg/dL      Alkaline Phosphatase 74 U/L      AST 17 U/L      ALT 16 U/L      Total Protein 7 5 g/dL      Albumin 4 4 g/dL     Lipase [[id number]]  (Normal) Collected: 02/24/23 1003    Lab Status: Final result Specimen: Blood from Arm, Left Updated: 02/24/23 1026     Lipase 17 u/L     CBC and differential [[id number]] Collected: 02/24/23 1003    Lab Status: Final result Specimen: Blood from Arm, Left Updated: 02/24/23 1009     WBC 4 34 Thousand/uL      RBC 4 62 Million/uL      Hemoglobin 13 6 g/dL      Hematocrit 40 0 %      MCV 87 fL      MCH 29 4 pg      MCHC 34 0 g/dL      RDW 11 9 %      MPV 9 9 fL      Platelets 987 Thousands/uL      nRBC 0 /100 WBCs      Neutrophils Relative 63 %      Immat GRANS % 0 %      Lymphocytes Relative 30 %      Monocytes Relative 7 %      Eosinophils Relative 0 %      Basophils Relative 0 %      Neutrophils Absolute 2 75 Thousands/µL      Immature Grans Absolute 0 01 Thousand/uL      Lymphocytes Absolute 1 30 Thousands/µL      Monocytes Absolute 0 28 Thousand/µL      Eosinophils Absolute 0 00 Thousand/µL      Basophils Absolute 0 00 Thousands/µL                  CT abdomen pelvis with contrast   Final Result by El Hernandez MD (02/24 1122)      No acute CT finding in the abdomen or pelvis              Workstation performed: [id number]                    Procedures  Procedures         ED Course Medical Decision Making  Abdominal pain: acute illness or injury  Constipation: acute illness or injury  Amount and/or Complexity of Data Reviewed  External Data Reviewed: notes  Details: reviewed recent gastroenterology office visit:  Nella Red since infancy requiring medical management throughout childhood, with worsening of constipation over the past 2 years  She has had poor response to fiber supplementation, stool softeners including bisacodyl and docusate, osmotic laxatives, Amitiza   Linzess also did not provide much relief    she has had some success with magnesium supplementation although would like to trial another laxative a possible   We discussed that she has somewhat exhausted most options, and while lactulose commonly can give side effects of bloating, it may be reasonable to trial for short period of time   Bravo Menjivar has had previous imaging that does not demonstrate any colonic dilation fortunately  Bravo Menjivar has had prior partial response to pelvic floor physical therapy  Fortunately brief review of her anorectal manometry studies demonstrated normal balloon expulsion test, thus currently dyssynergic defecation is unlikely playing a huge role in her symptoms  In the future could also consider trial of Motegrity as these are the prescription laxatives she has not tried if she would be agreeable to this  Labs: ordered  Decision-making details documented in ED Course  Radiology: ordered  Decision-making details documented in ED Course  Risk  Prescription drug management            Disposition  Final diagnoses:   Abdominal pain   Constipation     Time reflects when diagnosis was documented in both MDM as applicable and the Disposition within this note     Time User Action Codes Description Comment    2/24/2023  1:43 PM Lonza Purl Add [R10 9] Abdominal pain     2/24/2023  1:43 PM Lonza Purl Add [K59 00] Constipation       ED Disposition ED Disposition   Discharge    Condition   Stable    Date/Time   Fri Feb 24, 2023  1:43 PM    Comment   Yadiel Johnson discharge to home/self care                 Follow-up Information     Follow up With Specialties Details Why Contact Info Additional Eric [facility]   [address]  [address] [facility] [address]  Sonia Knappado [address] [facility], [address], [address], [facility], [place], [address] [phone]          Discharge Medication List as of 2/24/2023  1:49 PM      START taking these medications    Details   metoclopramide (REGLAN) 10 mg tablet Take 1 tablet (10 mg total) by mouth every 6 (six) hours as needed (n/v or headache), Starting Fri 2/24/2023, Normal         CONTINUE these medications which have NOT CHANGED    Details   lactulose 20 g/30 mL Take 30 mL (20 g total) by mouth 2 (two) times a day, Starting Tue 10/11/2022, Until Thu 11/10/2022, Normal      acetaminophen (TYLENOL) 500 mg tablet Take 1,000 mg by mouth every 4 (four) hours as needed , Historical Med      ASMANEX  MCG/ACT AERO Inhale 2 puffs every 4 (four) hours as needed (2 puffs), Starting Fri 3/30/2018, Historical Med      Atogepant (Qulipta) 60 MG TABS Take 60 mg by mouth in the morning, Starting Thu 1/12/2023, Normal      cetirizine (ZyrTEC) 10 mg tablet Take 10 mg by mouth daily, Historical Med      clindamycin (CLEOCIN T) 1 % lotion Apply topically daily To face, Starting Fri 6/25/2021, Normal      !! dexamethasone (DECADRON) 2 mg tablet Take 1 tablet (2 mg total) by mouth daily with breakfast, Starting Tue 12/7/2021, Normal      diphenhydrAMINE (BENADRYL) 25 mg tablet Take 1 tablet (25 mg total) by mouth every 6 (six) hours as needed for itching, Starting Tue 12/13/2022, Normal      divalproex sodium (DEPAKOTE ER) 500 mg 24 hr tablet Take 1 tablet (500 mg total) by mouth daily, Starting Thu 11/4/2021, Normal      Elastic Bandages & Supports (TRUFORM STOCKINGS 20-30MMHG) MISC Use as directed , Historical Med      fludrocortisone (FLORINEF) 0 1 mg tablet Take 0 1 mg by mouth 2 (two) times a day, Starting Thu 9/15/2022, Historical Med      indomethacin (INDOCIN) 25 mg capsule Take 1 capsule (25 mg total) by mouth if needed for mild pain (prior to exercise), Starting Mon 5/23/2022, Normal      lactulose (CEPHULAC) 20 g packet Take 1 packet (20 g total) by mouth 2 (two) times a day, Starting Fri 9/16/2022, Normal      metoprolol succinate (TOPROL-XL) 50 mg 24 hr tablet Take 50 mg by mouth in the morning and 50 mg before bedtime  , Starting Thu 5/19/2022, Historical Med      naproxen (EC NAPROSYN) 500 MG EC tablet Take 1 tablet (500 mg total) by mouth 2 (two) times a day with meals, Starting Wed 6/15/2022, Until Thu 6/15/2023, Normal      Omega-3 Fatty Acids (FISH OIL) 1,000 mg Take 1,000 mg by mouth 2 (two) times a day , Historical Med      prochlorperazine (COMPAZINE) 10 mg tablet Take 1 tablet (10 mg total) by mouth every 6 (six) hours as needed (migraine), Starting Tue 12/13/2022, Normal      Rimegepant Sulfate (Nurtec) 75 MG TBDP Take 75 mg by mouth as needed (migraine) Limit of 1 in 24 hours, Starting Tue 7/5/2022, Normal      rizatriptan (MAXALT) 10 mg tablet Take 1 tablet (10 mg total) by mouth as needed for migraine May repeat in 2 hours if needed  Limit 3 a week or 9 a month, Starting Tue 12/13/2022, Normal      sucralfate (CARAFATE) 1 g tablet Take 1 tablet (1 g total) by mouth 3 (three) times a day as needed (headache-take before indomethacin), Starting Tue 12/7/2021, Normal      tretinoin (RETIN-A) 0 025 % cream Apply topically daily at bedtime, Starting Fri 6/25/2021, Normal      !! venlafaxine (EFFEXOR-XR) 37 5 mg 24 hr capsule Take 1 capsule (37 5 mg total) by mouth daily Take with one 75 mg capsule   Total daily dose is 112 5 mg, Starting Tue 8/9/2022, Normal      !! venlafaxine (EFFEXOR-XR) 75 mg 24 hr capsule Take 1 capsule (75 mg total) by mouth daily Take with one 37 5 mg capsule  Total daily dose is 112 5 mg, Starting Tue 8/9/2022, Normal      verapamil (CALAN) 120 mg tablet Take 2 tablets (240 mg total) by mouth in the morning and 2 tablets (240 mg total) before bedtime  , Starting Mon 5/23/2022, Normal      XOPENEX HFA 45 MCG/ACT inhaler Inhale 1-2 puffs every 4 (four) hours as needed, Starting Fri 3/23/2018, Historical Med      !! dexamethasone (DECADRON) 1 mg tablet Take 1 tablet (1 mg total) by mouth daily, Starting Tue 12/13/2022, Normal       !! - Potential duplicate medications found  Please discuss with provider  No discharge procedures on file      PDMP Review       Value Time User    PDMP Reviewed  Yes 8/9/2022 11:04 AM Ousmane Lee MD          ED Provider  Electronically Signed by           Anika Lakhani MD  03/13/23 9023

## 2023-02-27 ENCOUNTER — OFFICE VISIT (OUTPATIENT)
Dept: GASTROENTEROLOGY | Facility: MEDICAL CENTER | Age: 23
End: 2023-02-27

## 2023-02-27 VITALS
HEIGHT: 68 IN | OXYGEN SATURATION: 98 % | WEIGHT: 158.2 LBS | SYSTOLIC BLOOD PRESSURE: 122 MMHG | DIASTOLIC BLOOD PRESSURE: 74 MMHG | TEMPERATURE: 97.8 F | HEART RATE: 72 BPM | BODY MASS INDEX: 23.98 KG/M2

## 2023-02-27 DIAGNOSIS — R89.4 ABNORMAL CELIAC ANTIBODY PANEL: ICD-10-CM

## 2023-02-27 DIAGNOSIS — K59.09 OTHER CONSTIPATION: Primary | ICD-10-CM

## 2023-02-27 DIAGNOSIS — K58.1 IRRITABLE BOWEL SYNDROME WITH CONSTIPATION: ICD-10-CM

## 2023-02-27 DIAGNOSIS — Q79.60 EDS (EHLERS-DANLOS SYNDROME): ICD-10-CM

## 2023-02-27 DIAGNOSIS — G90.A POTS (POSTURAL ORTHOSTATIC TACHYCARDIA SYNDROME): ICD-10-CM

## 2023-02-27 RX ORDER — PRUCALOPRIDE 2 MG/1
2 TABLET, FILM COATED ORAL DAILY
Qty: 30 TABLET | Refills: 3 | Status: SHIPPED | OUTPATIENT
Start: 2023-02-27

## 2023-02-27 NOTE — PROGRESS NOTES
[facility] - Outpatient Consultation  Haris Ga 25 y o  female MRN: [id number]  Encounter: [id number]          ASSESSMENT AND PLAN:    Haris Ga is a 25 y o  female who presents with longstanding constipation since childhood with baseline of 1-2 bowel movements per week and recent worsening of obstipation and presentation to the ER  She has previously tried several medications for constipation but were not effective  These include but are not limited to stimulants, Linzess, Amitiza, Trulance, MiraLAX, fiber  Recent labs with normal CMP, lipase, CBC, Hgb A1c, UA  CT imaging without acute process in the abdomen or pelvis  Prior pelvic ultrasound unremarkable  The patient may be secondary to idiopathic constipation versus irritable bowel syndrome with constipation versus medication effect (such as from venlafaxine or verapamil) versus pelvic dyssynergia  Less likely from Hirschsprung's disease although this is possible  1  Other constipation    2  Irritable bowel syndrome with constipation    3  POTS (postural orthostatic tachycardia syndrome)    4  EDS (Malachi-Danlos syndrome)    5  Abnormal celiac antibody panel        No orders of the defined types were placed in this encounter  Our goal is to help you have a bowel movement 2-3 times per week  Add Motegrity to help with bowel movements  Take 1 capful of Miralax with water daily  Try to drink 8-10 cups of water (or Gatorade) daily  Try to increase fiber in your diet  Consider adding 1 serving size of oat bran or oatmeal, as well as prunes or prune juice  You can also add 1-2 serving sizes of basil seeds in oatmeal or shakes/smoothies  Try to elevate your legs while sitting to assume a squatting position  Consider defecography or sitz marker study in the future    Please keep me updated with any questions, concerns or updates     ______________________________________________________________________    Referred by Dr Carrillo Service for constipation    HPI:    Dwight Hickman is a 25 y o  female who presents with complaint of constipation  She was recently in the ED for constipation  Constipated her entire life  It has gotten worse  Past 2 weeks she has been relying on enemas  After a few days with medication she was able to go on her own  The enemas stopped working  She had bloating, pain and was uncomfortable  She did 2 enemas and no BMs on Thursday  She was in pain on Friday  She had to leave  Went to the ED at that time  She tried soap suds enemas  She held it, walked but no stool came out  She went home  She did a prep on Sunday and some stool and now some watery diarrhea  She has been having noises  It was small, flaky and light brown  Thursday night she did not have a BM, but it was small, thin and ribbon-like  + nausea when constipation  Some blood in the stool possibly fissures or hemorrhoids  Bright red  Usually [place] 1-2  Usually 1-2 BMs per week  She would have to take stuff  She has been taking a digestive cleanse  It has magnesium oxide (called oxypowder)  She tried linzess, amitiza and trulance  All of those medication just caused runny diarrhea and no stool  She will use something and then it stops working  She has some stress in graduating nursing  No change in medications  Probably not enough fluids in the day  She tries for 60 + ml  She eats fruits and vegetables  She eliminated gluten  She did a scope for celiac and it was negative  1 antibody was positive  She removed fluten and follows the celiac protocol  Her stomach pain with eating resolved  She tries to watch with gluten  She was 28 weeks when born  Trouble passing stool  Purple abdomen         REVIEW OF SYSTEMS:  10 point ROS reviewed and negative, except as above    Historical Information   Past Medical History:   Diagnosis Date   • Anxiety    • Asthma    • Dizziness     at times   • GERD (gastroesophageal reflux disease)    • Hammertoe of left foot    • Headache     occ   • Hyperlipemia    • Hypermobile joints    • Irritable bowel syndrome    • 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HCC)    • Mast cell disorder    • Migraine    • PONV (postoperative nausea and vomiting)    • POTS (postural orthostatic tachycardia syndrome)    •  infant    • Wears glasses      Past Surgical History:   Procedure Laterality Date   • COLONOSCOPY     • EGD AND COLONOSCOPY N/A 1/10/2017    Procedure: EGD AND COLONOSCOPY;  Surgeon: Nicci Hall MD;  Location: [facility]; Service:    • ESOPHAGOGASTRODUODENOSCOPY      with biopsy   • FOOT SURGERY      Excision of lesion feet benign   • NC CORRECTION HAMMERTOE Left 2019    Procedure: 2nd arthrodesis; 5th arthroplasty, flexor tenotomy 2,3,4,5 ;  Surgeon: Mattie Canales DPM;  Location: [facility];  Service: Podiatry   • NC CORRECTION HAMMERTOE Left 2020    Procedure: 2ND TOE Revision hammertoe with broken implant;  Surgeon: Mattie Canales DPM;  Location: [facility];  Service: Podiatry   • UPPER GASTROINTESTINAL ENDOSCOPY  2021   • WISDOM TOOTH EXTRACTION       Social History   Social History     Substance and Sexual Activity   Alcohol Use No     Social History     Substance and Sexual Activity   Drug Use No    Comment: 20- not asked, parent at bedside       Social History     Tobacco Use   Smoking Status Never   Smokeless Tobacco Never     Family History   Problem Relation Age of Onset   • Gestational diabetes Mother    • Migraines Mother    • Anxiety disorder Father    • Hyperlipidemia Father    • Autism Brother    • Diabetes Other    • Uterine cancer Maternal Grandmother    • Rheumatic fever Maternal Grandmother    • Diabetes Maternal Grandfather    • Hypertension Maternal Grandfather    • Heart attack Maternal Grandfather    • Stroke Maternal Grandfather    • Hyperlipidemia Maternal Grandfather    • Hypertension Paternal Grandmother    • Anxiety disorder Paternal Grandmother    • Hypertension Paternal Grandfather        Meds/Allergies       Current Outpatient Medications:   •  acetaminophen (TYLENOL) 500 mg tablet  •  ASMANEX  MCG/ACT AERO  •  Atogepant (Qulipta) 60 MG TABS  •  cetirizine (ZyrTEC) 10 mg tablet  •  clindamycin (CLEOCIN T) 1 % lotion  •  dexamethasone (DECADRON) 1 mg tablet  •  dexamethasone (DECADRON) 2 mg tablet  •  diphenhydrAMINE (BENADRYL) 25 mg tablet  •  divalproex sodium (DEPAKOTE ER) 500 mg 24 hr tablet  •  Elastic Bandages & Supports (TRUFORM STOCKINGS 20-30MMHG) MISC  •  fludrocortisone (FLORINEF) 0 1 mg tablet  •  indomethacin (INDOCIN) 25 mg capsule  •  lactulose (CEPHULAC) 20 g packet  •  metoclopramide (REGLAN) 10 mg tablet  •  metoprolol succinate (TOPROL-XL) 50 mg 24 hr tablet  •  naproxen (EC NAPROSYN) 500 MG EC tablet  •  Omega-3 Fatty Acids (FISH OIL) 1,000 mg  •  prochlorperazine (COMPAZINE) 10 mg tablet  •  Prucalopride Succinate (Motegrity) 2 MG TABS  •  Rimegepant Sulfate (Nurtec) 75 MG TBDP  •  rizatriptan (MAXALT) 10 mg tablet  •  sucralfate (CARAFATE) 1 g tablet  •  tretinoin (RETIN-A) 0 025 % cream  •  venlafaxine (EFFEXOR-XR) 37 5 mg 24 hr capsule  •  venlafaxine (EFFEXOR-XR) 75 mg 24 hr capsule  •  verapamil (CALAN) 120 mg tablet  •  XOPENEX HFA 45 MCG/ACT inhaler  •  lactulose 20 g/30 mL    Current Facility-Administered Medications:   •  albuterol inhalation solution 2 5 mg, 2 5 mg, Nebulization, Q6H PRN    Allergies   Allergen Reactions   • Nuts - Food Allergy Other (See Comments)     Avani Nuts - itchy throat   • Other Hives      At surgical site and IV site- not sure if type of cleanser used   • Pollen Extract    • Reyvow [Lasmiditan] Palpitations and Hallucinations           Objective     Blood pressure 122/74, pulse 72, temperature 97 8 °F (36 6 °C), height 5' 7 5" (1 715 m), weight 71 8 kg (158 lb 3 2 oz), SpO2 98 %, not currently breastfeeding  Body mass index is 24 41 kg/m²          PHYSICAL EXAMINATION:    General Appearance: Alert, cooperative, no distress   HEENT:  Normocephalic, atraumatic, anicteric  Neck supple, symmetrical, trachea midline  Lungs:   Equal chest rise and unlabored breathing, normal effort, no coughing  Cardiovascular:   No visualized JVD  Abdomen:   No abdominal distension  Skin:   No jaundice, rashes, or lesions  Musculoskeletal:   Normal range of motion visualized  Psych:  Normal affect and normal insight  Neuro:  Alert and appropriate  Lab Results:   No visits with results within 1 Day(s) from this visit     Latest known visit with results is:   Admission on 02/24/2023, Discharged on 02/24/2023   Component Date Value   • Sodium 02/24/2023 141    • Potassium 02/24/2023 3 7    • Chloride 02/24/2023 105    • CO2 02/24/2023 28    • ANION GAP 02/24/2023 8    • BUN 02/24/2023 17    • Creatinine 02/24/2023 0 82    • Glucose 02/24/2023 90    • Calcium 02/24/2023 9 6    • eGFR 02/24/2023 102    • Total Bilirubin 02/24/2023 0 36    • Bilirubin, Direct 02/24/2023 0 07    • Alkaline Phosphatase 02/24/2023 74    • AST 02/24/2023 17    • ALT 02/24/2023 16    • Total Protein 02/24/2023 7 5    • Albumin 02/24/2023 4 4    • WBC 02/24/2023 4 34    • RBC 02/24/2023 4 62    • Hemoglobin 02/24/2023 13 6    • Hematocrit 02/24/2023 40 0    • MCV 02/24/2023 87    • MCH 02/24/2023 29 4    • MCHC 02/24/2023 34 0    • RDW 02/24/2023 11 9    • MPV 02/24/2023 9 9    • Platelets 99/55/7192 231    • nRBC 02/24/2023 0    • Neutrophils Relative 02/24/2023 63    • Immat GRANS % 02/24/2023 0    • Lymphocytes Relative 02/24/2023 30    • Monocytes Relative 02/24/2023 7    • Eosinophils Relative 02/24/2023 0    • Basophils Relative 02/24/2023 0    • Neutrophils Absolute 02/24/2023 2 75    • Immature Grans Absolute 02/24/2023 0 01    • Lymphocytes Absolute 02/24/2023 1 30    • Monocytes Absolute 02/24/2023 0 28    • Eosinophils Absolute 02/24/2023 0 00    • Basophils Absolute 02/24/2023 0 00    • EXT Preg Test, Ur 02/24/2023 Negative    • Control 02/24/2023 Valid    • Color, UA 02/24/2023 Yellow    • Clarity, UA 02/24/2023 Clear    • Specific Gravity, UA 02/24/2023 >=1 030    • pH, UA 02/24/2023 6 0    • Leukocytes, UA 02/24/2023 Negative    • Nitrite, UA 02/24/2023 Negative    • Protein, UA 02/24/2023 Negative    • Glucose, UA 02/24/2023 Negative    • Ketones, UA 02/24/2023 Negative    • Urobilinogen, UA 02/24/2023 0 2    • Bilirubin, UA 02/24/2023 Negative    • Occult Blood, UA 02/24/2023 Negative    • Lipase 02/24/2023 17        Lab Results   Component Value Date    WBC 4 34 02/24/2023    HGB 13 6 02/24/2023    HCT 40 0 02/24/2023    MCV 87 02/24/2023     02/24/2023       Lab Results   Component Value Date    SODIUM 141 02/24/2023    K 3 7 02/24/2023     02/24/2023    CO2 28 02/24/2023    AGAP 8 02/24/2023    BUN 17 02/24/2023    CREATININE 0 82 02/24/2023    GLUC 90 02/24/2023    GLUF 92 06/23/2022    CALCIUM 9 6 02/24/2023    AST 17 02/24/2023    ALT 16 02/24/2023    ALKPHOS 74 02/24/2023    TP 7 5 02/24/2023    TBILI 0 36 02/24/2023    EGFR 102 02/24/2023       Lab Results   Component Value Date    CRP <3 0 12/14/2016       Lab Results   Component Value Date    TBA6BJVVFTXS 1 430 05/15/2022       Lab Results   Component Value Date    IRON 104 05/16/2017    TIBC 325 05/16/2017    FERRITIN 32 05/16/2017       Radiology Results:   CT abdomen pelvis with contrast    Result Date: 2/24/2023  Narrative: CT ABDOMEN AND PELVIS WITH IV CONTRAST INDICATION:   Abdominal pain, acute, nonlocalized abdominal pain  COMPARISON:  10/24/2021 TECHNIQUE:  CT examination of the abdomen and pelvis was performed  Lack of oral contrast limits the sensitivity for pathology within the bowel  Axial, sagittal, and coronal 2D reformatted images were created from the source data and submitted for interpretation  Radiation dose length product (DLP) for this visit:  519 mGy-cm     This examination, like all CT scans performed in the [facility], was performed utilizing techniques to minimize radiation dose exposure, including the use of iterative reconstruction and automated exposure control  IV Contrast:  100 mL of iohexol (OMNIPAQUE) Enteric Contrast:  Enteric contrast was not administered  FINDINGS: ABDOMEN LOWER CHEST:  No clinically significant abnormality identified in the visualized lower chest  LIVER/BILIARY TREE:  Unremarkable  GALLBLADDER:  No calcified gallstones  No pericholecystic inflammatory change  SPLEEN:  Unremarkable  PANCREAS:  Unremarkable  ADRENAL GLANDS:  Unremarkable  KIDNEYS/URETERS:  No hydronephrosis or urinary tract calculus  One or more sharply circumscribed subcentimeter renal hypodensities are present, too small to accurately characterize, and statistically most likely benign findings  According to recent literature (Radiology 2019) no further workup of these findings is recommended  STOMACH AND BOWEL:  Unremarkable  APPENDIX:  The appendix was not identified  ABDOMINOPELVIC CAVITY:  No ascites  No pneumoperitoneum  No lymphadenopathy  VESSELS:  Unremarkable for patient's age  PELVIS REPRODUCTIVE ORGANS:  Unremarkable for patient's age  URINARY BLADDER:  Unremarkable  ABDOMINAL WALL/INGUINAL REGIONS:  Unremarkable  OSSEOUS STRUCTURES:  No acute fracture or destructive osseous lesion  Impression: No acute CT finding in the abdomen or pelvis   Workstation performed: [id number]

## 2023-02-27 NOTE — PATIENT INSTRUCTIONS
Our goal is to help you have a bowel movement 2-3 times per week  Add Motegrity to help with bowel movements  Take 1 capful of Miralax with water daily  Try to drink 8-10 cups of water (or Gatorade) daily  Try to increase fiber in your diet  Consider adding 1 serving size of oat bran or oatmeal, as well as prunes or prune juice  You can also add 1-2 serving sizes of basil seeds in oatmeal or shakes/smoothies  Try to elevate your legs while sitting to assume a squatting position  Consider defecography or sitz marker study in the future    Please keep me updated with any questions, concerns or updates

## 2023-02-28 ENCOUNTER — TELEPHONE (OUTPATIENT)
Dept: GASTROENTEROLOGY | Facility: MEDICAL CENTER | Age: 23
End: 2023-02-28

## 2023-02-28 NOTE — TELEPHONE ENCOUNTER
Hi,    Can we work on [facility] for [employer] for her? She failed fiber, Miralax, Linzess, Amitiza, Trulance, lactulose, senna ,dulcolax    Thank you!

## 2023-02-28 NOTE — TELEPHONE ENCOUNTER
Left vm informing patient Renny Potts has been approved and she is able to get her prescription  Office number provided in message in case of any questions

## 2023-02-28 NOTE — TELEPHONE ENCOUNTER
Initiated PA for Motegrity 2mg tab through covermymeds   PA is pending determination  (Key: [id number])

## 2023-02-28 NOTE — TELEPHONE ENCOUNTER
Received fax from [employer] [facility] of approval for Motegrity 2mg tab  Elodia Anahy is approved from 2 28 23 to 2 28 24

## 2023-03-06 ENCOUNTER — TELEPHONE (OUTPATIENT)
Dept: NEUROLOGY | Facility: CLINIC | Age: 23
End: 2023-03-06

## 2023-03-06 DIAGNOSIS — G43.709 CHRONIC MIGRAINE WITHOUT AURA WITHOUT STATUS MIGRAINOSUS, NOT INTRACTABLE: ICD-10-CM

## 2023-03-06 DIAGNOSIS — N91.1 SECONDARY AMENORRHEA: Primary | ICD-10-CM

## 2023-03-06 RX ORDER — DEXAMETHASONE 1 MG
1 TABLET ORAL DAILY
Qty: 5 TABLET | Refills: 0 | Status: SHIPPED | OUTPATIENT
Start: 2023-03-06

## 2023-03-06 NOTE — TELEPHONE ENCOUNTER
Patient has called to re-schedule her follow up appt with Jerald Haas on 03/20/23, CTR VL, OVL, 9:01DX  Due to conflict of schedule      New appt on 06/21/23, OVL, CTR VL, [facility]

## 2023-03-07 ENCOUNTER — APPOINTMENT (OUTPATIENT)
Dept: LAB | Facility: CLINIC | Age: 23
End: 2023-03-07

## 2023-03-07 ENCOUNTER — SOCIAL WORK (OUTPATIENT)
Dept: BEHAVIORAL/MENTAL HEALTH CLINIC | Facility: CLINIC | Age: 23
End: 2023-03-07

## 2023-03-07 DIAGNOSIS — N91.1 SECONDARY AMENORRHEA: ICD-10-CM

## 2023-03-07 DIAGNOSIS — F41.1 GAD (GENERALIZED ANXIETY DISORDER): Primary | ICD-10-CM

## 2023-03-07 LAB
ESTRADIOL SERPL-MCNC: 57 PG/ML
FSH SERPL-ACNC: 7.1 MIU/ML
PROLACTIN SERPL-MCNC: 8.6 NG/ML
TSH SERPL DL<=0.05 MIU/L-ACNC: 0.86 UIU/ML (ref 0.45–4.5)

## 2023-03-07 NOTE — PSYCH
Behavioral Health Psychotherapy Progress Note    Psychotherapy Provided: Individual Psychotherapy     Encounter Diagnoses   Name Primary? • INES (generalized anxiety disorder) Yes           Goals addressed in session: Goal 1     DATA: Shaquille Patel spoke today about her feelings re: her gi issues that resulted in an ER visit last week  She shared, as well, how this impacted her participation and enjoyment at her best friend's wedding  During this session, this clinician used the following therapeutic modalities: Motivational Interviewing and Supportive Psychotherapy    Substance Abuse was not addressed during this session  If the client is diagnosed with a co-occurring substance use disorder, please indicate any changes in the frequency or amount of use:   Stage of change for addressing substance use diagnoses: No substance use/Not applicable    ASSESSMENT:  Aleksander Lowe presents with a Euthymic/ normal mood  Elinda Meals continues to do well at being "less than perfect" 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   She was able to accept this worker's support and feedback re: her health issues  her affect is Normal range and intensity, which is congruent, with her mood and the content of the session  The client has made progress on their goals  Aleksander Lowe presents with a minimal risk of suicide, minimal risk of self-harm, and minimal risk of harm to others  For any risk assessment that surpasses a "low" rating, a safety plan must be developed  A safety plan was indicated: no  If yes, describe in detail     PLAN: Between sessions, Aleksander Lowe will continue to utilize mindfulness to manage her stress / anxiety level   At the next session, the therapist will use Supportive Psychotherapy to address the above in further detail  Behavioral Health Treatment Plan and Discharge Planning: Aleksander Lowe is aware of and agrees to continue to work on their treatment plan   They have identified and are working toward their discharge goals   yes    Visit start and stop times:    03/08/23  Start Time: 1100  Stop Time: 1150  Total Visit Time: 50 minutes

## 2023-03-23 ENCOUNTER — SOCIAL WORK (OUTPATIENT)
Dept: BEHAVIORAL/MENTAL HEALTH CLINIC | Facility: CLINIC | Age: 23
End: 2023-03-23

## 2023-03-23 DIAGNOSIS — F41.1 GAD (GENERALIZED ANXIETY DISORDER): Primary | ICD-10-CM

## 2023-03-23 NOTE — PSYCH
Behavioral Health Psychotherapy Progress Note    Psychotherapy Provided: Individual Psychotherapy     Encounter Diagnoses   Name Primary? 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   She indicated that she "made herself sick" worrying about failing and is relieved that it is "over "    During this session, this clinician used the following therapeutic modalities: Motivational Interviewing and Supportive Psychotherapy    Substance Abuse was not addressed during this session  If the client is diagnosed with a co-occurring substance use disorder, please indicate any changes in the frequency or amount of use:   Stage of change for addressing substance use diagnoses: No substance use/Not applicable    ASSESSMENT:  Elizabeth Limon presents with a Euthymic/ normal mood  Kenyetta Burks continues to do well at being "less than perfect" but also acknowledged that she is aware that she will now start worrying about her [employer] exams  She was able to accept this worker's support and feedback re: the damage that this will continue to have on her health  her affect is Normal range and intensity, which is congruent, with her mood and the content of the session  The client has made progress on their goals  Elizabeth Limon presents with a minimal risk of suicide, minimal risk of self-harm, and minimal risk of harm to others  For any risk assessment that surpasses a "low" rating, a safety plan must be developed  A safety plan was indicated: no  If yes, describe in detail     PLAN: Between sessions, Elizabeth Limon will continue to utilize mindfulness to manage her stress / anxiety level   At the next session, the therapist will use Supportive Psychotherapy to address the above in further detail      Behavioral Health Treatment Plan and Discharge Planning: Kenyetta Burks Thiago Goss is aware of and agrees to continue to work on their treatment plan  They have identified and are working toward their discharge goals   yes    Visit start and stop times:    03/23/23  Start Time: 1000  Stop Time: 1050  Total Visit Time: 50 minutes

## 2023-04-05 DIAGNOSIS — F41.1 GAD (GENERALIZED ANXIETY DISORDER): ICD-10-CM

## 2023-04-06 RX ORDER — VENLAFAXINE HYDROCHLORIDE 37.5 MG/1
37.5 CAPSULE, EXTENDED RELEASE ORAL DAILY
Qty: 90 CAPSULE | Refills: 1 | Status: SHIPPED | OUTPATIENT
Start: 2023-04-06

## 2023-04-06 RX ORDER — VENLAFAXINE HYDROCHLORIDE 75 MG/1
75 CAPSULE, EXTENDED RELEASE ORAL DAILY
Qty: 90 CAPSULE | Refills: 1 | Status: SHIPPED | OUTPATIENT
Start: 2023-04-06

## 2023-04-25 ENCOUNTER — SOCIAL WORK (OUTPATIENT)
Dept: BEHAVIORAL/MENTAL HEALTH CLINIC | Facility: CLINIC | Age: 23
End: 2023-04-25

## 2023-04-25 DIAGNOSIS — F41.1 GAD (GENERALIZED ANXIETY DISORDER): Primary | ICD-10-CM

## 2023-04-25 NOTE — PSYCH
"Behavioral Health Psychotherapy Progress Note    Psychotherapy Provided: Individual Psychotherapy     Encounter Diagnoses   Name Primary? 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, she will be relocating to reside with her father  During this session, this clinician used the following therapeutic modalities: Motivational Interviewing and Supportive Psychotherapy    Substance Abuse was not addressed during this session  If the client is diagnosed with a co-occurring substance use disorder, please indicate any changes in the frequency or amount of use:   Stage of change for addressing substance use diagnoses: No substance use/Not applicable    ASSESSMENT:  Merna Gordon presents with a Euthymic/ normal mood  Nydia Pack continues to focus on her new career as graduation approaches  She was able to accept this worker's support and feedback as she admitted the irrational and illogical ways that her anxiety is manifesting in her dreams  her affect is Normal range and intensity, which is congruent, with her mood and the content of the session  The client has made progress on their goals  Merna Gordon presents with a minimal risk of suicide, minimal risk of self-harm, and minimal risk of harm to others  For any risk assessment that surpasses a \"low\" rating, a safety plan must be developed  A safety plan was indicated: no  If yes, describe in detail     PLAN: Between sessions, Merna Gordon will continue to utilize mindfulness to manage her stress / anxiety level    At the next session, the therapist will use Supportive Psychotherapy to address the above in further " detail  Behavioral Health Treatment Plan and Discharge Planning: Facundo Gibbs is aware of and agrees to continue to work on their treatment plan  They have identified and are working toward their discharge goals   yes    Visit start and stop times:    04/25/23  Start Time: 0900  Stop Time: 0950  Total Visit Time: 50 minutes

## 2023-04-28 DIAGNOSIS — N91.1 SECONDARY AMENORRHEA: Primary | ICD-10-CM

## 2023-04-28 RX ORDER — MEDROXYPROGESTERONE ACETATE 10 MG/1
10 TABLET ORAL DAILY
Qty: 10 TABLET | Refills: 0 | Status: SHIPPED | OUTPATIENT
Start: 2023-04-28

## 2023-05-08 ENCOUNTER — SOCIAL WORK (OUTPATIENT)
Dept: BEHAVIORAL/MENTAL HEALTH CLINIC | Facility: CLINIC | Age: 23
End: 2023-05-08

## 2023-05-08 DIAGNOSIS — F41.1 GAD (GENERALIZED ANXIETY DISORDER): Primary | ICD-10-CM

## 2023-05-08 NOTE — PSYCH
"Behavioral Health Psychotherapy Progress Note    Psychotherapy Provided: Individual Psychotherapy     Encounter Diagnoses   Name Primary? • INES (generalized anxiety disorder) Yes       Goals addressed in session: Goal 1     DATA:  Nury spoke today about her feelings re: having completed all of her work as well as having received a \"green light\" for her [employer] exams  She indicated that she was already [place]" last weekend but is adjusting to life outside of school  During this session, this clinician used the following therapeutic modalities: Motivational Interviewing and Supportive Psychotherapy    Substance Abuse was not addressed during this session  If the client is diagnosed with a co-occurring substance use disorder, please indicate any changes in the frequency or amount of use:   Stage of change for addressing substance use diagnoses: No substance use/Not applicable    ASSESSMENT:  Andriy Santos presents with a Euthymic/ normal mood  Syeds anxiety  continues to seek out reasons for her to be nervous about the next stage of her life  She was able to accept this worker's support and feedback as she admitted the irrational and illogical ways that her anxiety is manifesting in her life  her affect is Normal range and intensity, which is congruent, with her mood and the content of the session  The client has made progress on their goals  Andriy Santos presents with a minimal risk of suicide, minimal risk of self-harm, and minimal risk of harm to others  For any risk assessment that surpasses a \"low\" rating, a safety plan must be developed  A safety plan was indicated: no  If yes, describe in detail     PLAN: Between sessions, Andriy Santos will continue to utilize mindfulness to manage her stress / anxiety level while adjusting to a \"new normal \"   At the next session, the therapist will use Supportive Psychotherapy to address the above in further detail      Behavioral " Health Treatment Plan and Discharge Planning: Courtney Herring is aware of and agrees to continue to work on their treatment plan  They have identified and are working toward their discharge goals   yes    Visit start and stop times:    05/08/23  Start Time: 1300  Stop Time: 1350  Total Visit Time: 50 minutes

## 2023-05-18 ENCOUNTER — TRANSCRIBE ORDERS (OUTPATIENT)
Dept: LAB | Facility: CLINIC | Age: 23
End: 2023-05-18

## 2023-05-18 ENCOUNTER — APPOINTMENT (OUTPATIENT)
Dept: LAB | Facility: CLINIC | Age: 23
End: 2023-05-18

## 2023-05-18 DIAGNOSIS — Z00.8 HEALTH EXAMINATION IN POPULATION SURVEYS: ICD-10-CM

## 2023-05-18 DIAGNOSIS — Z00.8 HEALTH EXAMINATION IN POPULATION SURVEYS: Primary | ICD-10-CM

## 2023-05-18 LAB
CHOLEST SERPL-MCNC: 140 MG/DL
EST. AVERAGE GLUCOSE BLD GHB EST-MCNC: 103 MG/DL
HBA1C MFR BLD: 5.2 %
HDLC SERPL-MCNC: 55 MG/DL
LDLC SERPL CALC-MCNC: 66 MG/DL (ref 0–100)
NONHDLC SERPL-MCNC: 85 MG/DL
TRIGL SERPL-MCNC: 93 MG/DL

## 2023-05-27 ENCOUNTER — OFFICE VISIT (OUTPATIENT)
Dept: URGENT CARE | Age: 23
End: 2023-05-27

## 2023-05-27 VITALS
RESPIRATION RATE: 12 BRPM | OXYGEN SATURATION: 99 % | HEART RATE: 77 BPM | TEMPERATURE: 97.5 F | DIASTOLIC BLOOD PRESSURE: 72 MMHG | SYSTOLIC BLOOD PRESSURE: 110 MMHG

## 2023-05-27 DIAGNOSIS — J06.9 UPPER RESPIRATORY TRACT INFECTION, UNSPECIFIED TYPE: Primary | ICD-10-CM

## 2023-05-27 RX ORDER — AZITHROMYCIN 250 MG/1
TABLET, FILM COATED ORAL
Qty: 6 TABLET | Refills: 0 | Status: SHIPPED | OUTPATIENT
Start: 2023-05-27 | End: 2023-05-27

## 2023-05-27 RX ORDER — PREDNISONE 20 MG/1
20 TABLET ORAL 2 TIMES DAILY WITH MEALS
Qty: 10 TABLET | Refills: 0 | Status: SHIPPED | OUTPATIENT
Start: 2023-05-27 | End: 2023-06-01

## 2023-05-27 RX ORDER — AZITHROMYCIN 250 MG/1
TABLET, FILM COATED ORAL
Qty: 6 TABLET | Refills: 0 | Status: SHIPPED | OUTPATIENT
Start: 2023-05-27 | End: 2023-05-31

## 2023-05-27 RX ORDER — PREDNISONE 20 MG/1
20 TABLET ORAL 2 TIMES DAILY WITH MEALS
Qty: 10 TABLET | Refills: 0 | Status: SHIPPED | OUTPATIENT
Start: 2023-05-27 | End: 2023-05-27

## 2023-05-27 RX ORDER — ALBUTEROL SULFATE 90 UG/1
2 AEROSOL, METERED RESPIRATORY (INHALATION) EVERY 6 HOURS PRN
Qty: 8.5 G | Refills: 0 | Status: SHIPPED | OUTPATIENT
Start: 2023-05-27 | End: 2023-05-27

## 2023-05-27 RX ORDER — ALBUTEROL SULFATE 90 UG/1
2 AEROSOL, METERED RESPIRATORY (INHALATION) EVERY 6 HOURS PRN
Qty: 8.5 G | Refills: 0 | Status: SHIPPED | OUTPATIENT
Start: 2023-05-27

## 2023-05-27 NOTE — PROGRESS NOTES
[facility]        NAME: Kristina Ramirez is a 26 y o  female  : 2000    MRN: [id number]  DATE: May 27, 2023  TIME: 7:52 PM    Assessment and Plan   Upper respiratory tract infection, unspecified type [J06 9]  1  Upper respiratory tract infection, unspecified type  albuterol (ProAir HFA) 90 mcg/act inhaler    azithromycin (ZITHROMAX) 250 mg tablet    predniSONE 20 mg tablet    DISCONTINUED: azithromycin (ZITHROMAX) 250 mg tablet    DISCONTINUED: albuterol (ProAir HFA) 90 mcg/act inhaler    DISCONTINUED: predniSONE 20 mg tablet      Please begin antibiotics as directed  Please begin as needed inhaler every 4-6 hours for chest tightness/wheezing/shortness of breath  Follow-up with primary care provider if symptoms do not resolve within 1 to 2 weeks  Patient Instructions   Upper Respiratory Infection   WHAT YOU NEED TO KNOW:   An upper respiratory infection is also called a cold  It can affect your nose, throat, ears, and sinuses  Cold symptoms are usually worst for the first 3 to 5 days  Most people get better in 7 to 14 days  You may continue to cough for 2 to 3 weeks  Colds are caused by viruses and do not get better with antibiotics  DISCHARGE INSTRUCTIONS:   Call your local emergency number (911 in the [address] [place] [address],[address]) if:   • You have chest pain or trouble breathing         Return to the emergency department if:   • You have a fever over 102ºF (39ºC)        Call your doctor if:   • You have a low fever      • Your sore throat gets worse or you see white or yellow spots in your throat      • Your symptoms get worse after 3 to 5 days or are not better in 14 days      • You have a rash anywhere on your skin      • You have large, tender lumps in your neck      • You have thick, green, or yellow drainage from your nose      • You cough up thick yellow, green, or bloody mucus      • You have a bad earache      • You have questions or concerns about your condition or care      Medicines:   You may need any of the following:  • Decongestants  help reduce nasal congestion and help you breathe more easily  If you take decongestant pills, they may make you feel restless or cause problems with your sleep  Do not use decongestant sprays for more than a few days      • Cough suppressants  help reduce coughing  Ask your healthcare provider which type of cough medicine is best for you       • NSAIDs , such as ibuprofen, help decrease swelling, pain, and fever  NSAIDs can cause stomach bleeding or kidney problems in certain people  If you take blood thinner medicine, always ask your healthcare provider if NSAIDs are safe for you  Always read the medicine label and follow directions      • Acetaminophen  decreases pain and fever  It is available without a doctor's order  Ask how much to take and how often to take it  Follow directions  Read the labels of all other medicines you are using to see if they also contain acetaminophen, or ask your doctor or pharmacist  Acetaminophen can cause liver damage if not taken correctly      • Take your medicine as directed  Contact your healthcare provider if you think your medicine is not helping or if you have side effects  Tell your provider if you are allergic to any medicine  Keep a list of the medicines, vitamins, and herbs you take  Include the amounts, and when and why you take them  Bring the list or the pill bottles to follow-up visits  Carry your medicine list with you in case of an emergency      Self-care:   • Rest as much as possible  Slowly start to do more each day      • Drink more liquids as directed  Liquids will help thin and loosen mucus so you can cough it up  Liquids will also help prevent dehydration  Liquids that help prevent dehydration include water, fruit juice, and broth  Do not drink liquids that contain caffeine  Caffeine can increase your risk for dehydration  Ask your healthcare provider how much liquid to drink each day      • Soothe a sore throat    Gargle with warm salt water  Make salt water by dissolving ¼ teaspoon salt in 1 cup warm water  You may also suck on hard candy or throat lozenges  You may use a sore throat spray      • Use a humidifier or vaporizer  Use a cool mist humidifier or a vaporizer to increase air moisture in your home  This may make it easier for you to breathe and help decrease your cough      • Use saline nasal drops as directed  These help relieve congestion      • Apply petroleum-based jelly around the outside of your nostrils  This can decrease irritation from blowing your nose      • Do not smoke  Nicotine and other chemicals in cigarettes and cigars can make your symptoms worse  They can also cause infections such as bronchitis or pneumonia  Ask your healthcare provider for information if you currently smoke and need help to quit  E-cigarettes or smokeless tobacco still contain nicotine  Talk to your healthcare provider before you use these products      Prevent a cold:   • Wash your hands often  Use soap and water every time you wash your hands  Rub your soapy hands together, lacing your fingers  Use the fingers of one hand to scrub under the nails of the other hand  Wash for at least 20 seconds  Rinse with warm, running water for several seconds  Then dry your hands  Use hand  gel if soap and water are not available  Do not touch your eyes or mouth without washing your hands first           • Cover a sneeze or cough  Use a tissue that covers your mouth and nose  Put the used tissue in the trash right away  Use the bend of your arm if a tissue is not available  Wash your hands well with soap and water or use a hand   Do not stand close to anyone who is sneezing or coughing      • Try to stay away from others while you are sick  This is especially important during the first 2 to 3 days when the virus is more easily spread   Wait until a fever, cough, or other symptoms are gone before you return to work or other regular activities      • Do not share items while you are sick  This includes food, drinks, eating utensils, and dishes      Follow up with your doctor as directed:  Write down your questions so you remember to ask them during your visits  © Copyright Paulina [employer] 2022 Information is for End User's use only and may not be sold, redistributed or otherwise used for commercial purposes  The above information is an  only  It is not intended as medical advice for individual conditions or treatments  Talk to your doctor, nurse or pharmacist before following any medical regimen to see if it is safe and effective for you  Follow up with PCP in 3-5 days  Proceed to  ER if symptoms worsen  Chief Complaint     Chief Complaint   Patient presents with   • Cough     Cough for over 3 weeks; productive; coughing up green mucus         History of Present Illness       Patient is a 79-year-old female with past medical history significant for asthma, who presents for evaluation of cough and congestion over the past 3 weeks  She has been trying over-the-counter cough suppressants with little relief  She reports initially her symptoms improved but then returned about 1 week ago  She denies chest pain, palpitations, shortness of breath, fever, nausea/vomiting/diarrhea  Review of Systems   Review of Systems   Constitutional: Negative for fatigue and fever  HENT: Positive for congestion  Negative for ear discharge, ear pain, postnasal drip, rhinorrhea, sinus pressure, sinus pain, sneezing and sore throat  Eyes: Negative  Negative for pain, discharge, redness and itching  Respiratory: Positive for cough  Negative for apnea, choking, chest tightness, shortness of breath, wheezing and stridor  Cardiovascular: Negative  Negative for chest pain and palpitations  Gastrointestinal: Negative  Negative for diarrhea, nausea and vomiting  Endocrine: Negative    Negative for polydipsia, polyphagia and polyuria  Genitourinary: Negative  Negative for decreased urine volume and flank pain  Musculoskeletal: Negative  Negative for arthralgias, back pain, myalgias, neck pain and neck stiffness  Skin: Negative  Negative for color change and rash  Allergic/Immunologic: Negative  Negative for environmental allergies  Neurological: Negative  Negative for dizziness, facial asymmetry, light-headedness, numbness and headaches  Hematological: Negative  Negative for adenopathy  Psychiatric/Behavioral: Negative            Current Medications       Current Outpatient Medications:   •  albuterol (ProAir HFA) 90 mcg/act inhaler, Inhale 2 puffs every 6 (six) hours as needed for wheezing or shortness of breath, Disp: 8 5 g, Rfl: 0  •  azithromycin (ZITHROMAX) 250 mg tablet, Take 2 tablets today then 1 tablet daily x 4 days, Disp: 6 tablet, Rfl: 0  •  predniSONE 20 mg tablet, Take 1 tablet (20 mg total) by mouth 2 (two) times a day with meals for 5 days, Disp: 10 tablet, Rfl: 0  •  acetaminophen (TYLENOL) 500 mg tablet, Take 1,000 mg by mouth every 4 (four) hours as needed , Disp: , Rfl:   •  ASMANEX  MCG/ACT AERO, Inhale 2 puffs every 4 (four) hours as needed (2 puffs), Disp: , Rfl:   •  Atogepant (Qulipta) 60 MG TABS, Take 60 mg by mouth in the morning, Disp: 90 tablet, Rfl: 4  •  cetirizine (ZyrTEC) 10 mg tablet, Take 10 mg by mouth daily, Disp: , Rfl:   •  clindamycin (CLEOCIN T) 1 % lotion, Apply topically daily To face (Patient taking differently: Apply 1 application topically daily To face), Disp: 60 mL, Rfl: 0  •  diphenhydrAMINE (BENADRYL) 25 mg tablet, Take 1 tablet (25 mg total) by mouth every 6 (six) hours as needed for itching, Disp: 30 tablet, Rfl: 0  •  divalproex sodium (DEPAKOTE ER) 500 mg 24 hr tablet, Take 1 tablet (500 mg total) by mouth daily, Disp: 5 tablet, Rfl: 0  •  Elastic Bandages & Supports (TRUFORM STOCKINGS 20-30MMHG) MISC, Use as directed , Disp: , Rfl:   •  fludrocortisone (FLORINEF) 0 1 mg tablet, Take 0 1 mg by mouth 2 (two) times a day, Disp: , Rfl:   •  indomethacin (INDOCIN) 25 mg capsule, Take 1 capsule (25 mg total) by mouth if needed for mild pain (prior to exercise), Disp: 30 capsule, Rfl: 3  •  lactulose (CEPHULAC) 20 g packet, Take 1 packet (20 g total) by mouth 2 (two) times a day, Disp: 30 each, Rfl: 3  •  lactulose 20 g/30 mL, Take 30 mL (20 g total) by mouth 2 (two) times a day (Patient not taking: Reported on 12/13/2022), Disp: 1800 mL, Rfl: 3  •  medroxyPROGESTERone (PROVERA) 10 mg tablet, Take 1 tablet (10 mg total) by mouth daily, Disp: 10 tablet, Rfl: 0  •  metoclopramide (REGLAN) 10 mg tablet, Take 1 tablet (10 mg total) by mouth every 6 (six) hours as needed (n/v or headache), Disp: 20 tablet, Rfl: 0  •  metoprolol succinate (TOPROL-XL) 50 mg 24 hr tablet, Take 50 mg by mouth in the morning and 50 mg before bedtime  , Disp: , Rfl:   •  naproxen (EC NAPROSYN) 500 MG EC tablet, Take 1 tablet (500 mg total) by mouth 2 (two) times a day with meals (Patient taking differently: Take 500 mg by mouth if needed), Disp: 30 tablet, Rfl: 2  •  Omega-3 Fatty Acids (FISH OIL) 1,000 mg, Take 1,000 mg by mouth 2 (two) times a day , Disp: , Rfl:   •  prochlorperazine (COMPAZINE) 10 mg tablet, Take 1 tablet (10 mg total) by mouth every 6 (six) hours as needed (migraine), Disp: 10 tablet, Rfl: 0  •  Prucalopride Succinate (Motegrity) 2 MG TABS, Take 2 mg by mouth in the morning, Disp: 30 tablet, Rfl: 3  •  Rimegepant Sulfate (Nurtec) 75 MG TBDP, Take 75 mg by mouth as needed (migraine) Limit of 1 in 24 hours, Disp: 8 tablet, Rfl: 3  •  rizatriptan (MAXALT) 10 mg tablet, Take 1 tablet (10 mg total) by mouth as needed for migraine May repeat in 2 hours if needed    Limit 3 a week or 9 a month, Disp: 12 tablet, Rfl: 0  •  sucralfate (CARAFATE) 1 g tablet, Take 1 tablet (1 g total) by mouth 3 (three) times a day as needed (headache-take before indomethacin), Disp: 15 tablet, Rfl: 3  • tretinoin (RETIN-A) 0 025 % cream, Apply topically daily at bedtime, Disp: 45 g, Rfl: 2  •  venlafaxine (EFFEXOR-XR) 37 5 mg 24 hr capsule, Take 1 capsule (37 5 mg total) by mouth daily Take with one 75 mg capsule  Total daily dose is 112 5 mg, Disp: 90 capsule, Rfl: 1  •  venlafaxine (EFFEXOR-XR) 75 mg 24 hr capsule, Take 1 capsule (75 mg total) by mouth daily Take with one 37 5 mg capsule  Total daily dose is 112 5 mg, Disp: 90 capsule, Rfl: 1  •  verapamil (CALAN) 120 mg tablet, Take 2 tablets (240 mg total) by mouth in the morning and 2 tablets (240 mg total) before bedtime  , Disp: 360 tablet, Rfl: 4  •  XOPENEX HFA 45 MCG/ACT inhaler, Inhale 1-2 puffs every 4 (four) hours as needed, Disp: , Rfl:     Current Facility-Administered Medications:   •  albuterol inhalation solution 2 5 mg, 2 5 mg, Nebulization, Q6H PRN, Josh Diallo PA-C    Current Allergies     Allergies as of 2023 - Reviewed 2023   Allergen Reaction Noted   • Nuts - food allergy Other (See Comments) 2020   • Other Hives 2020   • Pollen extract  09/10/2014   • Reyvow [lasmiditan] Palpitations and Hallucinations 2022            The following portions of the patient's history were reviewed and updated as appropriate: allergies, current medications, past family history, past medical history, past social history, past surgical history and problem list      Past Medical History:   Diagnosis Date   • Anxiety    • Asthma    • Dizziness     at times   • GERD (gastroesophageal reflux disease)    • Hammertoe of left foot    • Headache     occ   • Hyperlipemia    • Hypermobile joints    • Irritable bowel syndrome    • 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HCC)    • Mast cell disorder    • Migraine    • PONV (postoperative nausea and vomiting)    • POTS (postural orthostatic tachycardia syndrome)    •  infant    • Wears glasses        Past Surgical History:   Procedure Laterality Date   • COLONOSCOPY     • EGD AND COLONOSCOPY N/A 1/10/2017    Procedure: EGD AND COLONOSCOPY;  Surgeon: Marcel Mary MD;  Location: [facility]; Service:    • ESOPHAGOGASTRODUODENOSCOPY      with biopsy   • FOOT SURGERY      Excision of lesion feet benign   • [place] CORRECTION HAMMERTOE Left 12/20/2019    Procedure: 2nd arthrodesis; 5th arthroplasty, flexor tenotomy 2,3,4,5 ;  Surgeon: Ilia Garcias DPM;  Location: [facility];  Service: Podiatry   • [place] CORRECTION HAMMERTOE Left 6/5/2020    Procedure: 2ND TOE Revision hammertoe with broken implant;  Surgeon: Ilia Garcias DPM;  Location: [facility];  Service: Podiatry   • UPPER GASTROINTESTINAL ENDOSCOPY  01/28/2021   • WISDOM TOOTH EXTRACTION         Family History   Problem Relation Age of Onset   • Gestational diabetes Mother    • Migraines Mother    • Anxiety disorder Father    • Hyperlipidemia Father    • Autism Brother    • Diabetes Other    • Uterine cancer Maternal Grandmother    • Rheumatic fever Maternal Grandmother    • Diabetes Maternal Grandfather    • Hypertension Maternal Grandfather    • Heart attack Maternal Grandfather    • Stroke Maternal Grandfather    • Hyperlipidemia Maternal Grandfather    • Hypertension Paternal Grandmother    • Anxiety disorder Paternal Grandmother    • Hypertension Paternal Grandfather          Medications have been verified  Objective   /72 (BP Location: Left arm, Patient Position: Sitting, Cuff Size: Large)   Pulse 77   Temp 97 5 °F (36 4 °C) (Temporal)   Resp 12   SpO2 99%        Physical Exam     Physical Exam  Vitals and nursing note reviewed  Constitutional:       General: She is not in acute distress  Appearance: Normal appearance  She is not ill-appearing, toxic-appearing or diaphoretic  Interventions: She is not intubated  HENT:      Head: Normocephalic and atraumatic  Right Ear: Tympanic membrane, ear canal and external ear normal  There is no impacted cerumen        Left Ear: Tympanic membrane, ear canal and external ear normal  There is no impacted cerumen  Nose: Nose normal  No congestion or rhinorrhea  Mouth/Throat:      Mouth: Mucous membranes are moist    Eyes:      Extraocular Movements: Extraocular movements intact  Conjunctiva/sclera: Conjunctivae normal       Pupils: Pupils are equal, round, and reactive to light  Cardiovascular:      Rate and Rhythm: Normal rate and regular rhythm  Pulses: Normal pulses  Heart sounds: Normal heart sounds, S1 normal and S2 normal  Heart sounds not distant  No murmur heard  No friction rub  No gallop  Pulmonary:      Effort: Pulmonary effort is normal  No tachypnea, bradypnea, accessory muscle usage, prolonged expiration, respiratory distress or retractions  She is not intubated  Breath sounds: Normal breath sounds  No stridor, decreased air movement or transmitted upper airway sounds  No decreased breath sounds, wheezing, rhonchi or rales  Abdominal:      General: Bowel sounds are normal       Palpations: Abdomen is soft  Tenderness: There is no abdominal tenderness  There is no guarding or rebound  Musculoskeletal:         General: Normal range of motion  Cervical back: Normal range of motion and neck supple  No rigidity or tenderness  Lymphadenopathy:      Cervical: No cervical adenopathy  Skin:     General: Skin is warm and dry  Capillary Refill: Capillary refill takes less than 2 seconds  Neurological:      General: No focal deficit present  Mental Status: She is alert and oriented to person, place, and time  Cranial Nerves: No cranial nerve deficit     Psychiatric:         Mood and Affect: Mood normal          Behavior: Behavior normal

## 2023-05-27 NOTE — PATIENT INSTRUCTIONS
Please begin antibiotics as directed  Please begin as needed inhaler every 4-6 hours for chest tightness/wheezing/shortness of breath  Follow-up with primary care provider if symptoms do not resolve within 1 to 2 weeks

## 2023-05-30 ENCOUNTER — SOCIAL WORK (OUTPATIENT)
Dept: BEHAVIORAL/MENTAL HEALTH CLINIC | Facility: CLINIC | Age: 23
End: 2023-05-30

## 2023-05-30 DIAGNOSIS — F41.1 GAD (GENERALIZED ANXIETY DISORDER): Primary | ICD-10-CM

## 2023-05-30 NOTE — PSYCH
"Behavioral Health Psychotherapy Progress Note    Psychotherapy Provided: Individual Psychotherapy     Encounter Diagnoses   Name Primary? • INES (generalized anxiety disorder) Yes       Goals addressed in session: Goal 1     DATA: Nino Ty spoke today about her feelings re: having successfully completed three out of four assessments to be cleared to take the NKLECTS  She shared details re: her recent trip to [place] for Spring break and her attendance at the [employer]  During this session, this clinician used the following therapeutic modalities: Motivational Interviewing and Supportive Psychotherapy    Substance Abuse was not addressed during this session  If the client is diagnosed with a co-occurring substance use disorder, please indicate any changes in the frequency or amount of use:   Stage of change for addressing substance use diagnoses: No substance use/Not applicable    ASSESSMENT:  Jacque Hui presents with a Euthymic/ normal mood  Nury's demonstrated less anxiety today although she admitted that \"some\" anxiety is \"helpful  \"     She was able to accept this worker's support and feedback on this topic, however  her affect is Normal range and intensity, which is congruent, with her mood and the content of the session  The client has made progress on their goals  Jacque Hui presents with a minimal risk of suicide, minimal risk of self-harm, and minimal risk of harm to others  For any risk assessment that surpasses a \"low\" rating, a safety plan must be developed  A safety plan was indicated: no  If yes, describe in detail     PLAN: Between sessions, Jacque Hui will continue to utilize mindfulness to manage her stress / anxiety level while adjusting to a \"new normal \"   At the next session, the therapist will use Supportive Psychotherapy to address the above in further detail      Behavioral Health Treatment Plan and Discharge Planning: Jacque Hui is " aware of and agrees to continue to work on their treatment plan  They have identified and are working toward their discharge goals   yes    Visit start and stop times:    05/30/23  Start Time: 0800  Stop Time: 0850  Total Visit Time: 50 minutes

## 2023-06-05 ENCOUNTER — TELEPHONE (OUTPATIENT)
Dept: NEUROLOGY | Facility: CLINIC | Age: 23
End: 2023-06-05

## 2023-06-05 NOTE — TELEPHONE ENCOUNTER
Called patient and I rescheduled her reauth appointment to 06/29/23 at 08:30am in the  with Jacinto Lo

## 2023-06-05 NOTE — TELEPHONE ENCOUNTER
----- Message from Abigail Morrow RN sent at 6/5/2023  9:53 AM EDT -----  Regarding: FW: Appointment 6/21  Contact: [phone]  Appt is for botox reauth and pt schedule for botox in July  Please assist  ----- Message -----  From: Prasanna Wasserman  Sent: 6/5/2023   9:51 AM EDT  To: Neurology [address] Clinical Team 5  Subject: Appointment 6/21                                 Eloy Shaikh! I have a conflict on 1/83 and cannot make my appointment  Can someone please give me a call to reschedule! ? I appreciate it!     Thank you,  Chepe Lainez

## 2023-06-12 ENCOUNTER — SOCIAL WORK (OUTPATIENT)
Dept: BEHAVIORAL/MENTAL HEALTH CLINIC | Facility: CLINIC | Age: 23
End: 2023-06-12
Payer: COMMERCIAL

## 2023-06-12 DIAGNOSIS — F41.1 GAD (GENERALIZED ANXIETY DISORDER): Primary | ICD-10-CM

## 2023-06-12 PROCEDURE — 90834 PSYTX W PT 45 MINUTES: CPT | Performed by: SOCIAL WORKER

## 2023-06-12 NOTE — PSYCH
"Behavioral Health Psychotherapy Progress Note    Psychotherapy Provided: Individual Psychotherapy     Encounter Diagnoses   Name Primary? • INES (generalized anxiety disorder) Yes       Goals addressed in session: Goal 1     DATA: Anabell Ivory spoke today about her feelings re: her increased anxiety as she awaits another week before being able to  take the NKLECTS  She shared her attempts to move the test date sooner and fear that she will fail the exam   During this session, this clinician used the following therapeutic modalities: Motivational Interviewing and Supportive Psychotherapy    Substance Abuse was not addressed during this session  If the client is diagnosed with a co-occurring substance use disorder, please indicate any changes in the frequency or amount of use:   Stage of change for addressing substance use diagnoses: No substance use/Not applicable    ASSESSMENT:  Lin Oglesby presents with a Anxious mood  Gustabo King was able to accept this worker's support and feedback on this topic, and acknowledged feeling calmer by the end of the session  her affect is Normal range and intensity, which is congruent, with her mood and the content of the session  The client has made progress on their goals  Lin Oglesby presents with a minimal risk of suicide, minimal risk of self-harm, and minimal risk of harm to others  For any risk assessment that surpasses a \"low\" rating, a safety plan must be developed  A safety plan was indicated: no  If yes, describe in detail     PLAN: Between sessions, Lin Oglesby will continue to utilize mindfulness to manage her stress / anxiety level while awaiting her test date    At the next session, the therapist will use Supportive Psychotherapy to address the above in further detail  Behavioral Health Treatment Plan and Discharge Planning: Lin Oglesby is aware of and agrees to continue to work on their treatment plan   They have identified and are " working toward their discharge goals   yes    Visit start and stop times:    06/13/23  Start Time: 1300  Stop Time: 1350  Total Visit Time: 50 minutes

## 2023-06-16 ENCOUNTER — OFFICE VISIT (OUTPATIENT)
Dept: FAMILY MEDICINE CLINIC | Facility: CLINIC | Age: 23
End: 2023-06-16

## 2023-06-16 VITALS
TEMPERATURE: 97.6 F | OXYGEN SATURATION: 96 % | HEIGHT: 67 IN | WEIGHT: 156.2 LBS | RESPIRATION RATE: 16 BRPM | SYSTOLIC BLOOD PRESSURE: 128 MMHG | BODY MASS INDEX: 24.52 KG/M2 | HEART RATE: 80 BPM | DIASTOLIC BLOOD PRESSURE: 80 MMHG

## 2023-06-16 DIAGNOSIS — L70.0 ACNE VULGARIS: ICD-10-CM

## 2023-06-16 DIAGNOSIS — G90.A POTS (POSTURAL ORTHOSTATIC TACHYCARDIA SYNDROME): ICD-10-CM

## 2023-06-16 DIAGNOSIS — K59.04 CHRONIC IDIOPATHIC CONSTIPATION: ICD-10-CM

## 2023-06-16 DIAGNOSIS — G43.709 CHRONIC MIGRAINE WITHOUT AURA WITHOUT STATUS MIGRAINOSUS, NOT INTRACTABLE: ICD-10-CM

## 2023-06-16 DIAGNOSIS — J45.909 UNCOMPLICATED ASTHMA, UNSPECIFIED ASTHMA SEVERITY, UNSPECIFIED WHETHER PERSISTENT: ICD-10-CM

## 2023-06-16 DIAGNOSIS — D89.40 MAST CELL ACTIVATION SYNDROME (HCC): ICD-10-CM

## 2023-06-16 DIAGNOSIS — Z00.00 ANNUAL PHYSICAL EXAM: Primary | ICD-10-CM

## 2023-06-16 RX ORDER — CLINDAMYCIN PHOSPHATE 10 UG/ML
LOTION TOPICAL DAILY
Qty: 60 ML | Refills: 0 | Status: SHIPPED | OUTPATIENT
Start: 2023-06-16

## 2023-06-16 NOTE — ASSESSMENT & PLAN NOTE
Goes to neurology, Dr Lara Crenshaw  She is on effexor, verapamil, quilipa, botox and prn maxalt, nurtec, compazine, reglan, indomethacin  She failed multiple meds

## 2023-06-16 NOTE — PROGRESS NOTES
[facility]    NAME: Sandoval Ji  AGE: 26 y o  SEX: female  : 2000     DATE: 2023     Assessment and Plan:     Problem List Items Addressed This Visit        Digestive    Chronic idiopathic constipation     Going to GI and is on motegrity  Symptoms determined to be due to being born prematurely at 29w  Respiratory    Uncomplicated asthma     mild intermittent  Uses albuterol and xopenex prn  Cardiovascular and Mediastinum    POTS (postural orthostatic tachycardia syndrome)     on verapamil and metoprolol  Goes to [facility] cardiology  Chronic migraine without aura without status migrainosus, not intractable     Goes to neurology, Dr Cecilia Pandey  She is on effexor, verapamil, quilipa, botox and prn maxalt, nurtec, compazine, reglan, indomethacin  She failed multiple meds  Other    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[address] )     Uses an h2 blocker during episodes  Annual physical exam - Primary     Normal exam    Labs reviewed  No FH of breast or colon cancer  Other Visit Diagnoses     Acne vulgaris        Relevant Medications    clindamycin (CLEOCIN T) 1 % lotion    tretinoin (RETIN-A) 0 025 % cream          Immunizations and preventive care screenings were discussed with patient today  Appropriate education was printed on patient's after visit summary  Counseling:  Alcohol/drug use: discussed moderation in alcohol intake, the recommendations for healthy alcohol use, and avoidance of illicit drug use  Dental Health: discussed importance of regular tooth brushing, flossing, and dental visits  Exercise: the importance of regular exercise/physical activity was discussed  Recommend exercise 3-5 times per week for at least 30 minutes  Return in 1 year (on 2024)       Chief Complaint:     Chief Complaint   Patient presents with   • Physical Exam      History of Present Illness:     Adult Annual Physical   Patient here for a comprehensive physical exam  The patient reports no problems  Pt with a ho chronic idiopathic constipation- Goes to gastroenterology  Failed linzess  Now is on Motegrity  She had an endoscopy and colonoscopy and was neg for celiac  She is on a gluten free diet       Asthma- mild intermittent  Uses albuterol and xopenex prn       POTS- on verapamil and metoprolol  Goes to [facility] cardiology       Migraines w/o aura- Goes to neurology, Dr Yessi Garcias  She is on effexor and is going through trials with different drugs  She failed multiple meds  Now is on botox with significant relief  Diet and Physical Activity  Diet/Nutrition: well balanced diet  Exercise: moderate cardiovascular exercise  Depression Screening  PHQ-2/9 Depression Screening         General Health  Sleep: sleeps well  Hearing: no issues  Vision: goes for regular eye exams and most recent eye exam <1 year ago  Dental: regular dental visits  /GYN Health  Last menstrual period: irregular after depo  Goes to gynecology  Contraceptive method: none  History of STDs?: no      Review of Systems:     Review of Systems   Constitutional: Negative for fever and unexpected weight change  HENT: Negative for ear pain, sore throat and trouble swallowing  Eyes: Negative for pain and visual disturbance  Respiratory: Negative for cough, chest tightness, shortness of breath and wheezing  Cardiovascular: Negative for chest pain  Gastrointestinal: Negative for abdominal distention, abdominal pain, blood in stool, constipation, diarrhea, nausea and vomiting  Endocrine: Negative for polydipsia and polyuria  Genitourinary: Negative for dysuria and hematuria  Musculoskeletal: Negative for back pain and myalgias  Skin: Negative for rash  Neurological: Negative for syncope and headaches  Psychiatric/Behavioral: Negative for suicidal ideas        Past Medical History: Past Medical History:   Diagnosis Date   • Anxiety    • Asthma    • Dizziness     at times   • GERD (gastroesophageal reflux disease)    • Hammertoe of left foot    • Headache     occ   • Hyperlipemia    • Hypermobile joints    • Irritable bowel syndrome    • 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HCC)    • Mast cell disorder    • Migraine    • PONV (postoperative nausea and vomiting)    • POTS (postural orthostatic tachycardia syndrome)    •  infant    • Wears glasses       Past Surgical History:     Past Surgical History:   Procedure Laterality Date   • COLONOSCOPY     • EGD AND COLONOSCOPY N/A 1/10/2017    Procedure: EGD AND COLONOSCOPY;  Surgeon: Alba Ruffin MD;  Location: [facility]; Service:    • ESOPHAGOGASTRODUODENOSCOPY      with biopsy   • FOOT SURGERY      Excision of lesion feet benign   • MD CORRECTION HAMMERTOE Left 2019    Procedure: 2nd arthrodesis; 5th arthroplasty, flexor tenotomy 2,3,4,5 ;  Surgeon: Clari Iglesias DPM;  Location: [facility];  Service: Podiatry   • MD CORRECTION HAMMERTOE Left 2020    Procedure: 2ND TOE Revision hammertoe with broken implant;  Surgeon: Clari Iglesias DPM;  Location: [facility];  Service: Podiatry   • UPPER GASTROINTESTINAL ENDOSCOPY  2021   • WISDOM TOOTH EXTRACTION        Social History:     Social History     Socioeconomic History   • Marital status: Single     Spouse name: None   • Number of children: None   • Years of education: None   • Highest education level: None   Occupational History   • None   Tobacco Use   • Smoking status: Never   • Smokeless tobacco: Never   Vaping Use   • Vaping Use: Never used   Substance and Sexual Activity   • Alcohol use: No   • Drug use: No     Comment: 20- not asked, parent at bedside     • Sexual activity: Never   Other Topics Concern   • None   Social History Narrative    Lives with parents     Social Determinants of Health     Financial Resource Strain: Not on file   Food Insecurity: Not on file   Transportation Needs: Not on file   Physical Activity: Insufficiently Active (6/15/2022)    Exercise Vital Sign    • Days of Exercise per Week: 3 days    • Minutes of Exercise per Session: 30 min   Stress: Not on file   Social Connections: Not on file   Intimate Partner Violence: Not on file   Housing Stability: Not on file      Family History:     Family History   Problem Relation Age of Onset   • Gestational diabetes Mother    • Migraines Mother    • Anxiety disorder Father    • Hyperlipidemia Father    • Autism Brother    • Diabetes Other    • Uterine cancer Maternal Grandmother    • Rheumatic fever Maternal Grandmother    • Diabetes Maternal Grandfather    • Hypertension Maternal Grandfather    • Heart attack Maternal Grandfather    • Stroke Maternal Grandfather    • Hyperlipidemia Maternal Grandfather    • Hypertension Paternal Grandmother    • Anxiety disorder Paternal Grandmother    • Hypertension Paternal Grandfather       Current Medications:     Current Outpatient Medications   Medication Sig Dispense Refill   • acetaminophen (TYLENOL) 500 mg tablet Take 1,000 mg by mouth every 4 (four) hours as needed      • albuterol (ProAir HFA) 90 mcg/act inhaler Inhale 2 puffs every 6 (six) hours as needed for wheezing or shortness of breath 8 5 g 0   • ASMANEX  MCG/ACT AERO Inhale 2 puffs every 4 (four) hours as needed (2 puffs)     • Atogepant (Qulipta) 60 MG TABS Take 60 mg by mouth in the morning 90 tablet 4   • cetirizine (ZyrTEC) 10 mg tablet Take 10 mg by mouth daily     • clindamycin (CLEOCIN T) 1 % lotion Apply topically daily To face 60 mL 0   • diphenhydrAMINE (BENADRYL) 25 mg tablet Take 1 tablet (25 mg total) by mouth every 6 (six) hours as needed for itching 30 tablet 0   • divalproex sodium (DEPAKOTE ER) 500 mg 24 hr tablet Take 1 tablet (500 mg total) by mouth daily 5 tablet 0   • Elastic Bandages & Supports (TRUFORM STOCKINGS 20-30MMHG) MISC Use as directed       • fludrocortisone (FLORINEF) 0 1 mg tablet Take 0 1 mg by mouth 2 (two) times a day     • indomethacin (INDOCIN) 25 mg capsule Take 1 capsule (25 mg total) by mouth if needed for mild pain (prior to exercise) 30 capsule 3   • metoclopramide (REGLAN) 10 mg tablet Take 1 tablet (10 mg total) by mouth every 6 (six) hours as needed (n/v or headache) 20 tablet 0   • metoprolol succinate (TOPROL-XL) 50 mg 24 hr tablet Take 50 mg by mouth in the morning and 50 mg before bedtime  • Omega-3 Fatty Acids (FISH OIL) 1,000 mg Take 1,000 mg by mouth 2 (two) times a day      • onabotulinumtoxin A (BOTOX) 100 units Inject  as directed  Injection every 91 days for migraines     • prochlorperazine (COMPAZINE) 10 mg tablet Take 1 tablet (10 mg total) by mouth every 6 (six) hours as needed (migraine) 10 tablet 0   • Prucalopride Succinate (Motegrity) 2 MG TABS Take 2 mg by mouth in the morning 30 tablet 3   • sucralfate (CARAFATE) 1 g tablet Take 1 tablet (1 g total) by mouth 3 (three) times a day as needed (headache-take before indomethacin) 15 tablet 3   • tretinoin (RETIN-A) 0 025 % cream Apply topically daily at bedtime 45 g 2   • venlafaxine (EFFEXOR-XR) 75 mg 24 hr capsule Take 1 capsule (75 mg total) by mouth daily Take with one 37 5 mg capsule  Total daily dose is 112 5 mg 90 capsule 1   • verapamil (CALAN) 120 mg tablet Take 2 tablets (240 mg total) by mouth in the morning and 2 tablets (240 mg total) before bedtime   360 tablet 4   • XOPENEX HFA 45 MCG/ACT inhaler Inhale 1-2 puffs every 4 (four) hours as needed     • naproxen (EC NAPROSYN) 500 MG EC tablet Take 1 tablet (500 mg total) by mouth 2 (two) times a day with meals (Patient taking differently: Take 500 mg by mouth if needed) 30 tablet 2   • Rimegepant Sulfate (Nurtec) 75 MG TBDP Take 75 mg by mouth as needed (migraine) Limit of 1 in 24 hours (Patient not taking: Reported on 6/16/2023) 8 tablet 3   • rizatriptan (MAXALT) 10 mg tablet Take 1 tablet (10 mg total) by mouth as needed "for migraine May repeat in 2 hours if needed  Limit 3 a week or 9 a month (Patient not taking: Reported on 6/16/2023) 12 tablet 0     Current Facility-Administered Medications   Medication Dose Route Frequency Provider Last Rate Last Admin   • albuterol inhalation solution 2 5 mg  2 5 mg Nebulization Q6H PRN Alona Estrada PA-C          Allergies: Allergies   Allergen Reactions   • Nuts - Food Allergy Other (See Comments)     Avani Nuts - itchy throat   • Other Hives      At surgical site and IV site- not sure if type of cleanser used   • Pollen Extract    • Reyvow [Lasmiditan] Palpitations and Hallucinations      Physical Exam:     /80 (BP Location: Left arm, Patient Position: Sitting, Cuff Size: Adult)   Pulse 80   Temp 97 6 °F (36 4 °C) (Temporal)   Resp 16   Ht 5' 7\" (1 702 m)   Wt 70 9 kg (156 lb 3 2 oz)   SpO2 96%   BMI 24 46 kg/m²     Physical Exam  Constitutional:       Appearance: She is well-developed  HENT:      Head: Normocephalic and atraumatic  Eyes:      General: No scleral icterus  Conjunctiva/sclera: Conjunctivae normal       Pupils: Pupils are equal, round, and reactive to light  Cardiovascular:      Rate and Rhythm: Normal rate and regular rhythm  Heart sounds: Normal heart sounds  No murmur heard  No friction rub  No gallop  Pulmonary:      Effort: Pulmonary effort is normal  No respiratory distress  Breath sounds: No wheezing or rales  Chest:      Chest wall: No tenderness  Abdominal:      General: Bowel sounds are normal  There is no distension  Palpations: Abdomen is soft  There is no mass  Tenderness: There is no abdominal tenderness  Musculoskeletal:         General: Normal range of motion  Cervical back: Normal range of motion  Skin:     General: Skin is warm and dry  Findings: No rash  Neurological:      Mental Status: She is alert and oriented to person, place, and time        Cranial Nerves: No cranial nerve " deficit            Nga Briggs MD   [address] [place] [address]

## 2023-06-19 ENCOUNTER — SOCIAL WORK (OUTPATIENT)
Dept: BEHAVIORAL/MENTAL HEALTH CLINIC | Facility: CLINIC | Age: 23
End: 2023-06-19
Payer: COMMERCIAL

## 2023-06-19 DIAGNOSIS — F41.1 GAD (GENERALIZED ANXIETY DISORDER): Primary | ICD-10-CM

## 2023-06-19 PROCEDURE — 90834 PSYTX W PT 45 MINUTES: CPT | Performed by: SOCIAL WORKER

## 2023-06-19 NOTE — PSYCH
"Behavioral Health Psychotherapy Progress Note    Psychotherapy Provided: Individual Psychotherapy     Encounter Diagnoses   Name Primary? • INES (generalized anxiety disorder) Yes       Goals addressed in session: Goal 1     DATA: Ro Hsu spoke today about her feelings re: how she has been managing her anxiety during the past week before being able to  take the NKLECTS  She shared her feelings re: moving to her father's and starting work following her upcoming vacation  During this session, this clinician used the following therapeutic modalities: Motivational Interviewing and Supportive Psychotherapy    Substance Abuse was not addressed during this session  If the client is diagnosed with a co-occurring substance use disorder, please indicate any changes in the frequency or amount of use:   Stage of change for addressing substance use diagnoses: No substance use/Not applicable    ASSESSMENT:  Martha Johnson presents with a Euthymic/ normal mood  Rollie Holter was able to accept this worker's support and feedback on this topic, and acknowledged how much more confident ans assertive she has become  her affect is Normal range and intensity, which is congruent, with her mood and the content of the session  The client has made progress on their goals  Martha Johnson presents with a minimal risk of suicide, minimal risk of self-harm, and minimal risk of harm to others  For any risk assessment that surpasses a \"low\" rating, a safety plan must be developed  A safety plan was indicated: no  If yes, describe in detail     PLAN: Between sessions, Martha Johnson will continue to utilize mindfulness to manage her stress / anxiety level while awaiting her test date    At the next session, the therapist will use Supportive Psychotherapy to address the above in further detail      Behavioral Health Treatment Plan and Discharge Planning: Martha Johnson is aware of and agrees to continue to work on their " treatment plan  They have identified and are working toward their discharge goals   yes    Visit start and stop times:    06/19/23  Start Time: 1100  Stop Time: 1150  Total Visit Time: 50 minutes

## 2023-06-20 ENCOUNTER — TELEPHONE (OUTPATIENT)
Dept: FAMILY MEDICINE CLINIC | Facility: CLINIC | Age: 23
End: 2023-06-20

## 2023-06-20 NOTE — TELEPHONE ENCOUNTER
Patient called and left this message:     Hi, my name is Ameya Hines  My [birth year]  I was just in the office last Friday  I saw Doctor Margarito Beyer for a yearly physical and I was told to call back regarding a referral that she made  I see an out of  at [facility] and we have to do yearly referrals to get it authorized for insurance and my last one was up in May  So we have to redo that referral again and I was told to call back with an appointment date once I got one  So I am just calling with that appointment date  I have an appointment scheduled with the doctor Ashley Moreno with [facility] on July seventeenth at five o'clock  So I just wanted to call you guys with that date  I think there is an out of network exception form that I have to fill out and get to insurance  But I wasn't sure if there was any other paperwork that you guys needed to fill out with that referral and appointment date  You can give me a call back at four, eight, four, nine three four four four three, zero  Thank you and have a great day   Byricardo

## 2023-06-23 ENCOUNTER — TELEPHONE (OUTPATIENT)
Dept: FAMILY MEDICINE CLINIC | Facility: CLINIC | Age: 23
End: 2023-06-23

## 2023-06-26 NOTE — TELEPHONE ENCOUNTER
Called providers office and we both agreed she does not need an insurance referral or a provider to provider referral   She has [facility] insurance

## 2023-06-28 ENCOUNTER — SOCIAL WORK (OUTPATIENT)
Dept: BEHAVIORAL/MENTAL HEALTH CLINIC | Facility: CLINIC | Age: 23
End: 2023-06-28
Payer: COMMERCIAL

## 2023-06-28 DIAGNOSIS — F41.1 GAD (GENERALIZED ANXIETY DISORDER): Primary | ICD-10-CM

## 2023-06-28 PROCEDURE — 90834 PSYTX W PT 45 MINUTES: CPT | Performed by: SOCIAL WORKER

## 2023-06-28 NOTE — PSYCH
"Behavioral Health Psychotherapy Progress Note    Psychotherapy Provided: Individual Psychotherapy     Encounter Diagnoses   Name Primary? • INES (generalized anxiety disorder) Yes       Goals addressed in session: Goal 1     DATA:  Nury spoke today about her feelings re: how she manag her anxiety during  the NKLECTS  She shared ways that she took time to re-set during the exam while also sharing her passing score  During this session, this clinician used the following therapeutic modalities: Motivational Interviewing and Supportive Psychotherapy    Substance Abuse was not addressed during this session  If the client is diagnosed with a co-occurring substance use disorder, please indicate any changes in the frequency or amount of use:   Stage of change for addressing substance use diagnoses: No substance use/Not applicable    ASSESSMENT:  Denisa Gann presents with a Euthymic/ normal mood  Anjelica Murdock was able to accept this worker's support and feedback on this topic, and agreed to work at maintaining a calm stance moving forward in her career / life  her affect is Normal range and intensity, which is congruent, with her mood and the content of the session  The client has made progress on their goals  Denisa Gann presents with a minimal risk of suicide, minimal risk of self-harm, and minimal risk of harm to others  For any risk assessment that surpasses a \"low\" rating, a safety plan must be developed  A safety plan was indicated: no  If yes, describe in detail     PLAN: Between sessions, Denisa Gann will continue to utilize mindfulness to manage her stress / anxiety level while awaiting her test date    At the next session, the therapist will use Supportive Psychotherapy to address the above in further detail  Behavioral Health Treatment Plan and Discharge Planning: Denisa Gann is aware of and agrees to continue to work on their treatment plan   They have identified and are " working toward their discharge goals   yes    Visit start and stop times:    06/28/23  Start Time: 1400  Stop Time: 1450  Total Visit Time: 50 minutes

## 2023-06-29 ENCOUNTER — OFFICE VISIT (OUTPATIENT)
Dept: NEUROLOGY | Facility: CLINIC | Age: 23
End: 2023-06-29
Payer: COMMERCIAL

## 2023-06-29 ENCOUNTER — TELEPHONE (OUTPATIENT)
Dept: OBGYN CLINIC | Facility: CLINIC | Age: 23
End: 2023-06-29

## 2023-06-29 VITALS
TEMPERATURE: 98 F | DIASTOLIC BLOOD PRESSURE: 79 MMHG | HEART RATE: 65 BPM | WEIGHT: 155.2 LBS | BODY MASS INDEX: 24.36 KG/M2 | HEIGHT: 67 IN | SYSTOLIC BLOOD PRESSURE: 125 MMHG

## 2023-06-29 DIAGNOSIS — B37.31 VULVOVAGINAL CANDIDIASIS: Primary | ICD-10-CM

## 2023-06-29 DIAGNOSIS — G43.709 CHRONIC MIGRAINE WITHOUT AURA WITHOUT STATUS MIGRAINOSUS, NOT INTRACTABLE: Primary | ICD-10-CM

## 2023-06-29 DIAGNOSIS — G43.011 INTRACTABLE MIGRAINE WITHOUT AURA AND WITH STATUS MIGRAINOSUS: ICD-10-CM

## 2023-06-29 DIAGNOSIS — G43.009 MIGRAINE WITHOUT AURA AND WITHOUT STATUS MIGRAINOSUS, NOT INTRACTABLE: ICD-10-CM

## 2023-06-29 PROCEDURE — 99214 OFFICE O/P EST MOD 30 MIN: CPT | Performed by: NURSE PRACTITIONER

## 2023-06-29 RX ORDER — FLUCONAZOLE 150 MG/1
TABLET ORAL
Qty: 2 TABLET | Refills: 0 | Status: SHIPPED | OUTPATIENT
Start: 2023-06-29 | End: 2023-07-02

## 2023-06-29 RX ORDER — VERAPAMIL HYDROCHLORIDE 120 MG/1
TABLET, FILM COATED ORAL
Qty: 180 TABLET | Refills: 3 | Status: SHIPPED | OUTPATIENT
Start: 2023-06-29

## 2023-06-29 RX ORDER — DIVALPROEX SODIUM 500 MG/1
TABLET, EXTENDED RELEASE ORAL
Qty: 20 TABLET | Refills: 0 | Status: SHIPPED | OUTPATIENT
Start: 2023-06-29

## 2023-06-29 RX ORDER — VERAPAMIL HYDROCHLORIDE 120 MG/1
TABLET, FILM COATED ORAL
Qty: 360 TABLET | Refills: 0 | Status: SHIPPED | OUTPATIENT
Start: 2023-06-29 | End: 2023-06-29 | Stop reason: SDUPTHER

## 2023-06-29 RX ORDER — RIMEGEPANT SULFATE 75 MG/75MG
75 TABLET, ORALLY DISINTEGRATING ORAL AS NEEDED
Qty: 8 TABLET | Refills: 3 | Status: SHIPPED | OUTPATIENT
Start: 2023-06-29

## 2023-06-29 NOTE — TELEPHONE ENCOUNTER
Regarding: GYN Question   Contact: [phone]  ----- Message from Franchesca Cardenas sent at 6/29/2023 11:20 AM EDT -----       ----- Message from Joce Quach to Iris Kelley MD sent at 6/28/2023  9:56 PM -----   Hi Dr Marianne Peña! For the past week, Triny Castillo been experiencing this yellow/green discharge which I don’t think is normal! It has a slight odor but nothing significant  I am still not sexually active so I can promise you it’s not an STD  I don’t have any other symptoms of BV or a yeast infection  No itching, burning, or pain  But this discharge will not go away! And Triny Castillo been cleaning myself very good  Is it possible that this is a side effect of ending the progesterone a few weeks ago! ? I never got anything more then that one day of light spotting but am experiencing this weird discharge  Let me know your thoughts! I have a picture if you need but won’t attach unless needed       Thank you,   Tonya Yusuf

## 2023-06-29 NOTE — PROGRESS NOTES
Patient ID: Vanita Mortimer is a 26 y.o. female. Assessment/Plan:    Chronic migraine without aura without status migrainosus, not intractable  Patient with improved headache control since starting Botox. Prior to Botox she had near daily headaches, however moderate to severe, early headache free. Since starting Botox she is headache free for the majority of the first 2 months following Botox. For the last month prior to Botox she will notice an increase in headaches. She has noted some improvement in these with Aureliano Cintron. With botox has had a reduction of at least 7 migraine days with less abortive medication, less ER visits which correlates to headache diary    She will continue her current migraine medications as noted below. We did using Nurtec at onset of headache rather than waiting for headache to become more severe during the 4 weeks prior to Botox. Current Preventive:   Zyrtec  (mast cell related), Cyproheptadine  Fludrocortisone (POTS)  Metoprolol (POTS)  omega-3, riboflavin  venlafaxine -has been being lowered by her psychiatrist  Verapamil  Botox  Qulipta      Abortive: At onset of mild headaches take Naproxen, Tylenol   At onset of moderate to severe headache take Nurtec   Can take above medications w/w/o Reglan or compazine  If headache persists can take depakote       Diagnoses and all orders for this visit:    Chronic migraine without aura without status migrainosus, not intractable    Migraine without aura and without status migrainosus, not intractable  -     divalproex sodium (DEPAKOTE ER) 500 mg 24 hr tablet; Take 1 tab at bedtime as needed for migraine  -     rimegepant sulfate (Nurtec) 75 mg TBDP; Take 1 tablet (75 mg total) by mouth as needed (migraine) Limit of 1 in 24 hours           Subjective:    HPI  Patient is a 26year old female who presents for follow up for migraine headaches/botox reauth    Prior to botox patient had near daily headaches ranging moderate to severe. Since starting botox increased head free days and headaches are more responsive to abortive medication. With botox has had a reduction of at least 7 migraine days with less abortive medication, less ER visits which correlates to headache diary    The first 2 months after her botox injections she will have 2 headaches per week, will respond to naproxen or tylenol. The last 4 weeks prior to botox she will have an increase in headaches, almost daily mild to moderate, rare severe headache-OTC abortive medications aren't as effective         Headaches with exercise are improved, stress levels are improved. 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       Current medical illnesses:  QTc:  June 5, 2017 426 ms  5/15/2022 513 ms     POTS:  Sees cardio at [facility] Dr. Alexandra abraham-yearly  Was diagnosed in 2016  Is doing very well  She is active, able to exercise.  She walks regularly, does a stair stepper. Drinking lots of water, gatorade  She is on Florinef and metoprolol.     IBS  mast cell activation syndrome  Malachi-Danlos syndrome  Anxiety:  Sees Carin Vallejo and a counselor  Non celiac gluten sensitivity  Celiac artery stenosis      No history of tobacco use           What medications do you take or have you taken for your headaches?    Current Preventive:   Zyrtec  (mast cell related), Cyproheptadine  Fludrocortisone (POTS)  Metoprolol (POTS)  omega-3, riboflavin  venlafaxine -has been being lowered by her psychiatrist  Verapamil  Botox  Qulipta     Current Abortive:   Naproxen, Tylenol   Indomethacin-used to break headache cycle  Depakote-used to break headache cycle  Nurtec   Reglan, compazine      Prior Preventive:   vitamin-D, multivitamin  Benadryl, Atarax   Zoloft, Cymbalta  Gabapentin   Robaxin   Cyproheptadine  Xanax     Prior Abortive:   Percocet, Dilaudid, fentanyl, Fioricet  Toradol As of 11/2021 only helps for 24 hours), naproxen, ibuprofen  Zofran Reglan  Benadryl Decadron, prednisone   Tylenol  Ubrelvy, Nurtec   Decadron   Reyvow         Current pain 3/10     Mild headaches: last 4 weeks prior to botox, almost daily  Moderate to severe headaches: 1-2 per week for the first months after botox, 4-5 per week the last month prior to botox  Prior to botox had moderate to severe headaches daily       Are you ever headache free? yes for at least the first 2 months after botox.      Aura/Warning and how long does it last?   - White spots in front of her visual field, seconds, with severe headaches only   - POTS headache is usually preceded by palpitation and flushing. This was more when she was having random headaches not persistant        What time of the day do the headaches start? varies           How long do the headaches last?   First 2 months after botox-a few hours with medications  Month prior to botox-mild headaches are continuous, moderate to severe 8 hours    Where is your headache located? Mild headaches: frontal  Moderate to severe headaches: frontalis, retro-orbital, occipitalis and neck bilaterally (worse when in occipitalis)      Describe your usual headache? Mild headaches: nagging and aching with pressure  Moderate to severe headaches: throbbing and pressure     What is the intensity of pain? Mild headaches: 3-4/10   Moderate to severe headaches: 7-9/10      Associated symptoms:   - Nausea, vomitting   - Photophobia, Osmophobia   - Problems with concentration   - Insomnia (waking up frequently in middle of night)   - Prefer to be in a dark room      Number of days missed per month because of headaches:   Work (or school) days: doesn't miss (has some trouble getting her work done)  Social or Family activities: none     Headache are worse if the patient: Bending over, exertion-improved recently, fatigue  Headache triggers: weather changes. Barometric changes, chocolate, stress/fatigue  What time of the year do headaches occur more frequently?  More summer POTS flares      Have you had trigger point injection performed and how often? No   Have you had Botox injection performed and how often? No   Have you had epidural injections or transforaminal injections performed? No      Alternative therapies used in the past for headaches? No      Have you used CBD or THC for your headaches and how often? No      How many caffeine products to drink a day? 1-2   How much water to drink a day? 64oz      Are you current pregnant or planning on getting pregnant?  is not currently on birth control, was previously on depo shots.  Doesn't have a partner currently     Have you ever had any Brain imaging? Yes   02/09/2020-CT head   no acute intracranial abnormality     10/24/2021 MRI brain   No acute infarction, intracranial hemorrhage or mass     The following portions of the patient's history were reviewed and updated as appropriate: allergies, current medications, past family history, past medical history, past social history, past surgical history and problem list.       Objective:    Blood pressure 125/79, pulse 65, temperature 98 °F (36.7 °C), temperature source Temporal, height 5' 7" (1.702 m), weight 70.4 kg (155 lb 3.2 oz), not currently breastfeeding. Physical Exam  Constitutional:       General: She is awake. HENT:      Right Ear: Hearing normal.      Left Ear: Hearing normal.   Eyes:      General: Lids are normal.      Extraocular Movements: Extraocular movements intact. Pupils: Pupils are equal, round, and reactive to light. Neurological:      Mental Status: She is alert. Motor: Motor strength is normal.     Deep Tendon Reflexes:      Reflex Scores:       Bicep reflexes are 2+ on the right side and 2+ on the left side. Brachioradialis reflexes are 2+ on the right side and 2+ on the left side. Patellar reflexes are 2+ on the right side and 2+ on the left side. Achilles reflexes are 2+ on the right side and 2+ on the left side.   Psychiatric: Speech: Speech normal.         Neurological Exam  Mental Status  Awake and alert. Oriented to person, place, time and situation. Speech is normal. Language is fluent with no aphasia. Cranial Nerves  CN III, IV, VI: Extraocular movements intact bilaterally. Normal lids and orbits bilaterally. Pupils equal round and reactive to light bilaterally. CN V:  Right: Facial sensation is normal.  Left: Facial sensation is normal on the left. CN VII:  Right: There is no facial weakness. Left: There is no facial weakness. CN VIII:  Right: Hearing is normal.  Left: Hearing is normal.  CN IX, X: Palate elevates symmetrically  CN XI:  Right: Trapezius strength is normal.  Left: Trapezius strength is normal.  CN XII: Tongue midline without atrophy or fasciculations. Motor   Strength is 5/5 throughout all four extremities. Sensory  Light touch is normal in upper and lower extremities. Temperature is normal in upper and lower extremities. Reflexes                                            Right                      Left  Brachioradialis                    2+                         2+  Biceps                                 2+                         2+  Patellar                                2+                         2+  Achilles                                2+                         2+    Coordination  Right: Finger-to-nose normal.Left: Finger-to-nose normal.    Gait  Casual gait is normal including stance, stride, and arm swing. Able to rise from chair without using arms.       I have personally reviewed the ROS performed by the MA.    ROS:    Review of Systems

## 2023-07-04 NOTE — ASSESSMENT & PLAN NOTE
Patient with improved headache control since starting Botox. Prior to Botox she had near daily headaches, however moderate to severe, early headache free. Since starting Botox she is headache free for the majority of the first 2 months following Botox. For the last month prior to Botox she will notice an increase in headaches. She has noted some improvement in these with [place]. With botox has had a reduction of at least 7 migraine days with less abortive medication, less ER visits which correlates to headache diary    She will continue her current migraine medications as noted below. We did using Nurtec at onset of headache rather than waiting for headache to become more severe during the 4 weeks prior to Botox. Current Preventive:   Zyrtec  (mast cell related), Cyproheptadine  Fludrocortisone (POTS)  Metoprolol (POTS)  omega-3, riboflavin  venlafaxine -has been being lowered by her psychiatrist  Verapamil  Botox  Qulipta      Abortive:    At onset of mild headaches take Naproxen, Tylenol   At onset of moderate to severe headache take Nurtec   Can take above medications w/w/o Reglan or compazine  If headache persists can take depakote

## 2023-07-12 DIAGNOSIS — K59.09 OTHER CONSTIPATION: ICD-10-CM

## 2023-07-12 RX ORDER — PRUCALOPRIDE 2 MG/1
2 TABLET, FILM COATED ORAL DAILY
Qty: 90 TABLET | Refills: 3 | Status: SHIPPED | OUTPATIENT
Start: 2023-07-12

## 2023-07-19 ENCOUNTER — PROCEDURE VISIT (OUTPATIENT)
Dept: NEUROLOGY | Facility: CLINIC | Age: 23
End: 2023-07-19
Payer: COMMERCIAL

## 2023-07-19 VITALS — DIASTOLIC BLOOD PRESSURE: 62 MMHG | HEART RATE: 82 BPM | TEMPERATURE: 98.3 F | SYSTOLIC BLOOD PRESSURE: 124 MMHG

## 2023-07-19 DIAGNOSIS — G43.709 CHRONIC MIGRAINE WITHOUT AURA WITHOUT STATUS MIGRAINOSUS, NOT INTRACTABLE: Primary | ICD-10-CM

## 2023-07-19 PROCEDURE — 64615 CHEMODENERV MUSC MIGRAINE: CPT | Performed by: PHYSICIAN ASSISTANT

## 2023-07-19 RX ORDER — DEXAMETHASONE 1 MG
1 TABLET ORAL
Qty: 5 TABLET | Refills: 2 | Status: SHIPPED | OUTPATIENT
Start: 2023-07-19

## 2023-07-19 NOTE — PROGRESS NOTES
Universal Protocol   Consent: Verbal consent obtained. Written consent obtained. Risks and benefits: risks, benefits and alternatives were discussed  Consent given by: patient  Time out: Immediately prior to procedure a "time out" was called to verify the correct patient, procedure, equipment, support staff and site/side marked as required. Patient understanding: patient states understanding of the procedure being performed  Patient consent: the patient's understanding of the procedure matches consent given  Procedure consent: procedure consent matches procedure scheduled  Relevant documents: relevant documents present and verified  Patient identity confirmed: verbally with patient        Chemodenervation     Date/Time 7/19/2023 9:15 AM     Performed by  Jimbo Cruz PA-C   Authorized by Jimbo Cruz PA-C       Pre-procedure details      Prepped With: Alcohol     Anesthesia  (see MAR for exact dosages):      Anesthesia method:  None   Procedure details     Position:  Upright   Botox     Botox Type:  Type A    Brand:  Botox    mL's of Botulinum Toxin:  200    Final Concentration per CC:  50 units    Needle Gauge:  30 G 2.5 inch   Procedures     Botox Procedures: chronic headache      Indications: migraines     Injection Location      Head / Face:  L superior cervical paraspinal, R superior cervical paraspinal, L , R , L frontalis, R frontalis, L medial occipitalis, R medial occipitalis, procerus, R temporalis, L temporalis, R superior trapezius and L superior trapezius    L  injection amount:  5 unit(s)    R  injection amount:  5 unit(s)    L lateral frontalis:  5 unit(s)    R lateral frontalis:  5 unit(s)    L medial frontalis:  5 unit(s)    R medial frontalis:  5 unit(s)    L temporalis injection amount:  20 unit(s)    R temporalis injection amount:  20 unit(s)    Procerus injection amount:  5 unit(s)    L medial occipitalis injection amount:  15 unit(s)    R medial occipitalis injection amount:  15 unit(s)    L superior cervical paraspinal injection amount:  10 unit(s)    R superior cervical paraspinal injection amount:  10 unit(s)    L superior trapezius injection amount:  15 unit(s)    R superior trapezius injection amount:  15 unit(s)   Total Units     Total units used:  200    Total units discarded:  0   Post-procedure details      Chemodenervation:  Chronic migraine    Facial Nerve Location[de-identified]  Bilateral facial nerve    Patient tolerance of procedure:   Tolerated well, no immediate complications   Comments      20 units frontalis  5 units right splenius capitis  20 units right lower occipitalis/cervical paraspinal  All medically necessary

## 2023-07-21 DIAGNOSIS — G43.009 MIGRAINE WITHOUT AURA AND WITHOUT STATUS MIGRAINOSUS, NOT INTRACTABLE: Primary | ICD-10-CM

## 2023-07-21 DIAGNOSIS — G47.00 INSOMNIA: ICD-10-CM

## 2023-07-21 DIAGNOSIS — G47.26 SHIFT WORK SLEEP DISORDER: ICD-10-CM

## 2023-07-22 ENCOUNTER — TELEPHONE (OUTPATIENT)
Dept: NEUROLOGY | Facility: CLINIC | Age: 23
End: 2023-07-22

## 2023-07-22 NOTE — TELEPHONE ENCOUNTER
Received fax from [facility].  Abrazo [place] Campuste PA   Key [id number]    Per enc [id number]  23

## 2023-07-26 ENCOUNTER — SOCIAL WORK (OUTPATIENT)
Dept: BEHAVIORAL/MENTAL HEALTH CLINIC | Facility: CLINIC | Age: 23
End: 2023-07-26
Payer: COMMERCIAL

## 2023-07-26 ENCOUNTER — HOSPITAL ENCOUNTER (EMERGENCY)
Facility: HOSPITAL | Age: 23
Discharge: HOME | End: 2023-07-26
Attending: EMERGENCY MEDICINE | Admitting: EMERGENCY MEDICINE
Payer: COMMERCIAL

## 2023-07-26 VITALS
RESPIRATION RATE: 16 BRPM | HEART RATE: 65 BPM | TEMPERATURE: 97.6 F | DIASTOLIC BLOOD PRESSURE: 76 MMHG | SYSTOLIC BLOOD PRESSURE: 126 MMHG | OXYGEN SATURATION: 100 %

## 2023-07-26 DIAGNOSIS — E86.0 DEHYDRATION: Primary | ICD-10-CM

## 2023-07-26 DIAGNOSIS — F41.1 GAD (GENERALIZED ANXIETY DISORDER): Primary | ICD-10-CM

## 2023-07-26 LAB
ATRIAL RATE: 68 BPM
P AXIS: 109 DEGREES
PR INTERVAL: 102 MS
QRS AXIS: 126 DEGREES
QRSD INTERVAL: 120 MS
QT INTERVAL: 418 MS
QTC INTERVAL: 444 MS
T WAVE AXIS: 71 DEGREES
VENTRICULAR RATE: 68 BPM

## 2023-07-26 PROCEDURE — 93010 ELECTROCARDIOGRAM REPORT: CPT | Performed by: INTERNAL MEDICINE

## 2023-07-26 PROCEDURE — 93005 ELECTROCARDIOGRAM TRACING: CPT

## 2023-07-26 PROCEDURE — 90834 PSYTX W PT 45 MINUTES: CPT | Performed by: SOCIAL WORKER

## 2023-07-26 RX ADMIN — SODIUM CHLORIDE 2000 ML: 0.9 INJECTION, SOLUTION INTRAVENOUS at 16:05

## 2023-07-26 NOTE — PSYCH
Treatment Plan Tracking    # 1Treatment Plan not completed within required time limits due to: Sarah Morris presented with emotional/behavioral issues that required clinical intervention.

## 2023-07-26 NOTE — BH CRISIS PLAN
Client Name: Moe Montiel       Client YOB: 2000  : 2000    Treatment Team (include name and contact information):     Psychotherapist: Raul Hernandez, [address] [place] [address] Provider  Denisse Sales, [address] [place]anda  [phone]    Type of Plan   * Child plans (children 15 yo and younger) must be completed and signed by the child's legal guardian   * Plans for all individuals 15 yo and above must be signed by the client. Plan Type: adolescent/adult (14 and over) Initial      My Personal Strengths are (in the client's own words):  Knowing my boundaries, good communicator, good problem solving,a strong self advocate, being flexible, and kind  The stressors and triggers that may put me at risk are:  work, family, miscommunication with family, conflict, not enough sleep,     Coping skills I can use to keep myself calm and safe: Other (describe) breathing, counting, mindfulness, reading    Coping skills/supports I can use to maintain abstinence from substance use:   Not Applicable    The people that provide me with help and support: (Include name, contact, and how they can help)   Support person #1: Dad    * Phone number: in cell phone    * How can they help me?  Talking me through it, actively listening   Support person #2:Mom    * Phone number: in cell phone    * How can they help me? helping me rationalize        In the past, the following has helped me in times of crisis:    Calling a family member, Breathing exercises (or other mindfulness-based activities) and Using de-escalation tools that I have learned      If it is an emergency and you need immediate help, call     If there is a possibility of danger to yourself or others, call the following crisis hotline resources:     Adult Crisis Numbers  Suicide Prevention Hotline - Dial   [facility]: [address]: [address]: [address]: [phone]  [address] [place] [address] Street: [phone]  [place]: [address]: [address]: [phone] (daytime). [phone] (after hours, weekends, holidays)     Child/Adolescent Crisis Numbers   [facility]: [address]: [phone]   [facility]: [phone]   [address] [place] [address] Street: [phone]    Please note: Some [facility] do not have a separate number for Child/Adolescent specific crisis. If your county is not listed under Child/Adolescent, please call the adult number for your county     National Talk to Text Line   [place] - [phone]    In the event your feelings become unmanageable, and you cannot reach your support system, you will call 911 immediately or go to the nearest hospital emergency room.

## 2023-07-26 NOTE — TELEPHONE ENCOUNTER
PA Case: [id number], Status: Approved, Coverage Starts on: 7/24/2023 12:00 AM, Coverage Ends on: 7/24/2024 12:00 AM.

## 2023-07-26 NOTE — ED PROVIDER NOTES
History  Chief Complaint   Patient presents with   • Dehydration     Pt c/o increased diaphoresis, hx of POTS     HPI  30-year-old girl presenting with 2 days of severe sweating episodes. Patient has history of POTS and is followed at [facility] by Dr. Nicole Soto. After speaking with Dr. Aleida Black, recommended coming in for IV fluid hydration with orthostatics via phone yesterday. 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 Prior to Admission Medications   Prescriptions Last Dose Informant Patient Reported? Taking? ASMANEX  MCG/ACT AERO  Self Yes No   Sig: Inhale 2 puffs every 4 (four) hours as needed (2 puffs)   Atogepant (Qulipta) 60 MG TABS   No No   Sig: Take 60 mg by mouth in the morning   Elastic Bandages & Supports (TRUFORM STOCKINGS 20-30MMHG) MISC  Self Yes No   Sig: Use as directed.    Omega-3 Fatty Acids (FISH OIL) 1,000 mg  Self Yes No   Sig: Take 1,000 mg by mouth 2 (two) times a day    Patient not taking: Reported on 6/29/2023   Prucalopride Succinate (Motegrity) 2 MG TABS   No No   Sig: Take 2 mg by mouth in the morning   XOPENEX HFA 45 MCG/ACT inhaler  Self Yes No   Sig: Inhale 1-2 puffs every 4 (four) hours as needed   acetaminophen (TYLENOL) 500 mg tablet  Self Yes No   Sig: Take 1,000 mg by mouth every 4 (four) hours as needed    albuterol (ProAir HFA) 90 mcg/act inhaler   No No   Sig: Inhale 2 puffs every 6 (six) hours as needed for wheezing or shortness of breath   cetirizine (ZyrTEC) 10 mg tablet  Self Yes No   Sig: Take 10 mg by mouth daily   clindamycin (CLEOCIN T) 1 % lotion   No No   Sig: Apply topically daily To face   dexamethasone (DECADRON) 1 mg tablet   No No   Sig: Take 1 tablet (1 mg total) by mouth daily with breakfast   diphenhydrAMINE (BENADRYL) 25 mg tablet   No No   Sig: Take 1 tablet (25 mg total) by mouth every 6 (six) hours as needed for itching   divalproex sodium (DEPAKOTE ER) 500 mg 24 hr tablet   No No   Sig: Take 1 tab at bedtime as needed for migraine   fludrocortisone (FLORINEF) 0.1 mg tablet  Self Yes No   Sig: Take 0.1 mg by mouth 2 (two) times a day   indomethacin (INDOCIN) 25 mg capsule  Self No No   Sig: Take 1 capsule (25 mg total) by mouth if needed for mild pain (prior to exercise)   metoclopramide (REGLAN) 10 mg tablet   No No   Sig: Take 1 tablet (10 mg total) by mouth every 6 (six) hours as needed (n/v or headache)   metoprolol succinate (TOPROL-XL) 50 mg 24 hr tablet  Self Yes No   Sig: Take 50 mg by mouth in the morning and 50 mg before bedtime. naproxen (EC NAPROSYN) 500 MG EC tablet  Self No No   Sig: Take 1 tablet (500 mg total) by mouth 2 (two) times a day with meals   Patient taking differently: Take 500 mg by mouth if needed   onabotulinumtoxin A (BOTOX) 100 units   Yes No   Sig: Inject  as directed. Injection every 91 days for migraines   prochlorperazine (COMPAZINE) 10 mg tablet   No No   Sig: Take 1 tablet (10 mg total) by mouth every 6 (six) hours as needed (migraine)   rimegepant sulfate (Nurtec) 75 mg TBDP   No No   Sig: Take 1 tablet (75 mg total) by mouth as needed (migraine) Limit of 1 in 24 hours   sucralfate (CARAFATE) 1 g tablet  Self No No   Sig: Take 1 tablet (1 g total) by mouth 3 (three) times a day as needed (headache-take before indomethacin)   tretinoin (RETIN-A) 0.025 % cream   No No   Sig: Apply topically daily at bedtime   venlafaxine (EFFEXOR-XR) 75 mg 24 hr capsule   No No   Sig: Take 1 capsule (75 mg total) by mouth daily Take with one 37.5 mg capsule.  Total daily dose is 112.5 mg   verapamil (CALAN) 120 mg tablet   No No   Sig: Take 1 tab by mouth twice a day      Facility-Administered Medications Last Administration Doses Remaining   albuterol inhalation solution 2.5 mg None recorded           Past Medical History:   Diagnosis Date   • Anxiety    • Asthma    • Dizziness     at times   • GERD (gastroesophageal reflux disease)    • Hammertoe of left foot    • Headache     occ   • Hyperlipemia    • Hypermobile joints    • Irritable bowel syndrome    • 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HCC)    • Mast cell disorder    • Migraine    • PONV (postoperative nausea and vomiting)    • POTS (postural orthostatic tachycardia syndrome)    •  infant    • Wears glasses        Past Surgical History:   Procedure Laterality Date   • COLONOSCOPY     • EGD AND COLONOSCOPY N/A 1/10/2017    Procedure: EGD AND COLONOSCOPY;  Surgeon: Carlos Manuel Singh MD;  Location: [facility]; Service:    • ESOPHAGOGASTRODUODENOSCOPY      with biopsy   • FOOT SURGERY      Excision of lesion feet benign   • VT CORRECTION HAMMERTOE Left 2019    Procedure: 2nd arthrodesis; 5th arthroplasty, flexor tenotomy 2,3,4,5.;  Surgeon: Agusto Rangel DPM;  Location: [facility];  Service: Podiatry   • VT CORRECTION HAMMERTOE Left 2020    Procedure: 2ND TOE Revision hammertoe with broken implant;  Surgeon: Agusto Rangel DPM;  Location: [facility];  Service: Podiatry   • UPPER GASTROINTESTINAL ENDOSCOPY  2021   • WISDOM TOOTH EXTRACTION         Family History   Problem Relation Age of Onset   • Gestational diabetes Mother    • Migraines Mother    • Anxiety disorder Father    • Hyperlipidemia Father    • Autism Brother    • Diabetes Other    • Uterine cancer Maternal Grandmother    • Rheumatic fever Maternal Grandmother    • Diabetes Maternal Grandfather    • Hypertension Maternal Grandfather    • Heart attack Maternal Grandfather    • Stroke Maternal Grandfather    • Hyperlipidemia Maternal Grandfather    • Hypertension Paternal Grandmother    • Anxiety disorder Paternal Grandmother    • Hypertension Paternal Grandfather      I have reviewed and agree with the history as documented.     E-Cigarette/Vaping   • E-Cigarette Use Never User      E-Cigarette/Vaping Substances   • Nicotine No    • THC No • CBD No    • Flavoring No    • Other No    • Unknown No      Social History     Tobacco Use   • Smoking status: Never   • Smokeless tobacco: Never   Vaping Use   • Vaping Use: Never used   Substance Use Topics   • Alcohol use: No   • Drug use: No     Comment: 2/9/20- not asked, parent at bedside. Review of Systems   Constitutional: Negative for activity change, chills and fever. HENT: Negative for congestion, ear pain, rhinorrhea and sore throat. Eyes: Negative for pain, discharge and visual disturbance. Respiratory: Negative for cough and shortness of breath. Cardiovascular: Positive for palpitations (short, intermittent when fatigued). Negative for chest pain. Gastrointestinal: Negative for abdominal pain, diarrhea, nausea and vomiting. Genitourinary: Negative for dysuria and hematuria. Musculoskeletal: Negative for arthralgias and back pain. Skin: Negative for color change, rash and wound. Neurological: Negative for seizures and syncope. All other systems reviewed and are negative. Physical Exam  Physical Exam  Vitals and nursing note reviewed. Constitutional:       Appearance: Normal appearance. She is well-developed and normal weight. HENT:      Head: Normocephalic and atraumatic. Right Ear: Tympanic membrane normal.      Left Ear: Tympanic membrane normal.      Nose: Nose normal.      Mouth/Throat:      Mouth: Mucous membranes are moist.      Pharynx: Oropharynx is clear. No oropharyngeal exudate or posterior oropharyngeal erythema. Eyes:      Extraocular Movements: Extraocular movements intact. Conjunctiva/sclera: Conjunctivae normal.      Pupils: Pupils are equal, round, and reactive to light. Cardiovascular:      Rate and Rhythm: Normal rate and regular rhythm. Heart sounds: No murmur heard. Pulmonary:      Effort: Pulmonary effort is normal. No respiratory distress. Breath sounds: Normal breath sounds.    Abdominal:      General: There is no distension. Palpations: Abdomen is soft. Tenderness: There is no abdominal tenderness. There is no guarding or rebound. Musculoskeletal:         General: No swelling. Normal range of motion. Cervical back: Normal range of motion and neck supple. No rigidity. Skin:     General: Skin is warm and dry. Capillary Refill: Capillary refill takes less than 2 seconds. Neurological:      General: No focal deficit present. Mental Status: She is alert and oriented to person, place, and time. Psychiatric:         Mood and Affect: Mood normal.         Vital Signs  ED Triage Vitals   Temp Pulse Resp BP SpO2   -- -- -- -- --      Temp src Heart Rate Source Patient Position - Orthostatic VS BP Location FiO2 (%)   -- -- -- -- --      Pain Score       --           There were no vitals filed for this visit. Visual Acuity      ED Medications  Medications - No data to display    Diagnostic Studies  Results Reviewed     None                 No orders to display              Procedures  Procedures         ED Course                                             Medical Decision Making  Differential diagnosis includes POTS, mild/moderate dehydration low concern for primary cardiac issue at this time. Reached out to Dr. Dickson Filter office at approximately 4:30 PM.  Told she was busy with patient. Gave callback number. Orthostatics negative, of note BPs increased with lying, sitting and standing  S/P 2 L NS, feels better, no palpitations or dizziness. Amount and/or Complexity of Data Reviewed  External Data Reviewed: ECG. Details: no STEMI, nl QT          Disposition  Final diagnoses:   None     ED Disposition     None      Follow-up Information    None         Patient's Medications   Discharge Prescriptions    No medications on file       No discharge procedures on file.     PDMP Review       Value Time User    PDMP Reviewed  Yes 4/17/2023  1:56 PM Adali Morrow MD          ED Provider  Electronically Signed by           Angela Young MD  07/26/23 [address], MD  07/26/23 4903

## 2023-07-26 NOTE — BH TREATMENT PLAN
Outpatient [address] [facility]    Date of Initial Psychotherapy Assessment:   Date of Current Treatment Plan: 07/26/23  Treatment Plan Target Date: 1/26/24  Treatment Plan Expiration Date: 1/26/24    Diagnosis:   1. INES (generalized anxiety disorder)            Area(s) of Need: for anxiety management    Long Term Goal 1 (in the client's own words): I want to be able to manage stress and anxiety without getting worked up    Stage of Change: Action    Target Date for completion: 1/26/24     Anticipated therapeutic modalities: Supportive Psychotherapy      People identified to complete this goal: Elaine Diaz       Objective 1: (identify the means of measuring success in meeting the objective): I will identify when I begin to feel stressed and anxious. Objective 2: (identify the means of measuring success in meeting the objective): I will practice implementing coping measures. I am currently under the care of a [facility] psychiatric provider: yes    My [facility] psychiatric provider is: Dr. Baron Ward    I am currently taking psychiatric medications: Yes, as prescribed    I feel that I will be ready for discharge from mental health care when I reach the following (measurable goal/objective): When I am able to successfully manage my anxiety prior to and during stressful situations        I have created my Crisis Plan and have been offered a copy of this plan    [facility]: Diagnosis and Treatment Plan explained to [address] [place] [address] [facility] [address] acknowledges an understanding of their diagnosis. Nieves Francois agrees to this treatment plan. I have been offered a copy of this Treatment Plan.  Yes

## 2023-07-26 NOTE — ED NOTES
Second 1000 mL bag of NS hung at this time in left antecubital peripheral IV.      Oliver Booker, RN  07/26/23 0225

## 2023-07-26 NOTE — PSYCH
Behavioral Health Psychotherapy Progress Note    Psychotherapy Provided: Individual Psychotherapy     Encounter Diagnoses   Name Primary? 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 She indicated that her POTS has flared up resulting in fluid loss and tachycardia. During this session, this clinician used the following therapeutic modalities: Motivational Interviewing and Supportive Psychotherapy    Substance Abuse was not addressed during this session. If the client is diagnosed with a co-occurring substance use disorder, please indicate any changes in the frequency or amount of use: . Stage of change for addressing substance use diagnoses: No substance use/Not applicable    ASSESSMENT:  Cherelle rCaig presents with a Euthymic/ normal mood. Adali Flora was able to accept this worker's support and feedback on this topic, and that her anxiety and migraines have been well maintained. her affect is Normal range and intensity, which is congruent, with her mood and the content of the session. The client has made progress on their goals. Cherelle Craig presents with a minimal risk of suicide, minimal risk of self-harm, and minimal risk of harm to others. For any risk assessment that surpasses a "low" rating, a safety plan must be developed. A safety plan was indicated: no  If yes, describe in detail     PLAN: Between sessions, Cherelle Cragi will continue to utilize mindfulness to manage her stress / anxiety level while awaiting her test date. . At the next session, the therapist will use Supportive Psychotherapy to address the above in further detail. Behavioral Health Treatment Plan and Discharge Planning: Cherelle Craig is aware of and agrees to continue to work on their treatment plan. They have identified and are working toward their discharge goals.  yes    Visit start and stop times:    07/26/23  Start Time: 1400  Stop Time: 1450  Total Visit Time: 50 minutes

## 2023-07-27 ENCOUNTER — TELEPHONE (OUTPATIENT)
Dept: NEUROLOGY | Facility: CLINIC | Age: 23
End: 2023-07-27

## 2023-07-27 NOTE — TELEPHONE ENCOUNTER
Chart reviewed  Sidra Patterson PA  23    Urgent PA initiated on CMM.  Key: [id number]    Awaiting determination

## 2023-07-27 NOTE — TELEPHONE ENCOUNTER
Please do an ASAP reauth of the qulipta. It  23    ----- Message from Nila Kraus sent at 2023  6:03 PM EDT -----  Regarding: Pina Maldonado: [phone]  Eloy Chavez! I went to refill the [place] over the weekend and the pharmacy said they could not refill it because it requires a prior authorization even though I have two refills left. I am out of medication now and they still haven’t been able to put it through. Can you help out with this! ? Thank you!!    Waldemar Osgood

## 2023-07-31 NOTE — TELEPHONE ENCOUNTER
Earnie Favre approved from 7/27/23-7/27/24    Pharm made aware of approval.  States that they already processed it and pt picked it up on 7/27

## 2023-08-08 ENCOUNTER — SOCIAL WORK (OUTPATIENT)
Dept: BEHAVIORAL/MENTAL HEALTH CLINIC | Facility: CLINIC | Age: 23
End: 2023-08-08
Payer: COMMERCIAL

## 2023-08-08 DIAGNOSIS — F41.1 GAD (GENERALIZED ANXIETY DISORDER): Primary | ICD-10-CM

## 2023-08-08 PROCEDURE — 90834 PSYTX W PT 45 MINUTES: CPT | Performed by: SOCIAL WORKER

## 2023-08-08 NOTE — PSYCH
Behavioral Health Psychotherapy Progress Note    Psychotherapy Provided: Individual Psychotherapy     Encounter Diagnoses   Name Primary? 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 She indicated that her anxiety has been more manageable than she anticipated except for  That which is attached to the purchase of a new car. During this session, this clinician used the following therapeutic modalities: Motivational Interviewing and Supportive Psychotherapy    Substance Abuse was not addressed during this session. If the client is diagnosed with a co-occurring substance use disorder, please indicate any changes in the frequency or amount of use: . Stage of change for addressing substance use diagnoses: No substance use/Not applicable    ASSESSMENT:  Nieves Parrish presents with a Euthymic/ normal mood. Estella Jimenez was able to accept this worker's support and feedback on her evident growth and progress as it appears that she has been able to normalize her experience with her expectations. her affect is Normal range and intensity, which is congruent, with her mood and the content of the session. The client has made progress on their goals. Nieves Parrish presents with a minimal risk of suicide, minimal risk of self-harm, and minimal risk of harm to others. For any risk assessment that surpasses a "low" rating, a safety plan must be developed. 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 At the next session, the therapist will use Supportive Psychotherapy to address the above in further detail.     Behavioral Health Treatment Plan and Discharge Planning: Bogdan Montero is aware of and agrees to continue to work on their treatment plan. They have identified and are working toward their discharge goals.  yes    Visit start and stop times:    08/08/23  Start Time: 1100  Stop Time: 1150  Total Visit Time: 50 minutes

## 2023-08-17 ENCOUNTER — OFFICE VISIT (OUTPATIENT)
Dept: SLEEP CENTER | Facility: CLINIC | Age: 23
End: 2023-08-17
Payer: COMMERCIAL

## 2023-08-17 VITALS
BODY MASS INDEX: 24.01 KG/M2 | HEIGHT: 67 IN | HEART RATE: 66 BPM | SYSTOLIC BLOOD PRESSURE: 124 MMHG | DIASTOLIC BLOOD PRESSURE: 70 MMHG | WEIGHT: 153 LBS

## 2023-08-17 DIAGNOSIS — G47.00 INSOMNIA, UNSPECIFIED TYPE: Primary | ICD-10-CM

## 2023-08-17 DIAGNOSIS — G43.009 MIGRAINE WITHOUT AURA AND WITHOUT STATUS MIGRAINOSUS, NOT INTRACTABLE: ICD-10-CM

## 2023-08-17 PROCEDURE — 99243 OFF/OP CNSLTJ NEW/EST LOW 30: CPT | Performed by: PSYCHIATRY & NEUROLOGY

## 2023-08-17 NOTE — PROGRESS NOTES
Sleep Consultation   Geovanna Mcgrath 26 y.o. female MRN: [id number]      Reason for consultation: Insomnia    Requesting physician: Fatmata Johnson     Assessment/Plan  Patient is a 27 yo F with PMH including INES, POTS, migraines, mast cell activation syndrome, IBS-constipation predominant, HLD, asthma, Malachi Danlos syndrome who presents for insomnia. 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 She is worried about how working nightshift will affect her migraines. She is at risk for shift work sleep disorder. Currently, her sleep schedule is somewhat erratic, although she is able to get adequate sleep with 7-8hours of sleep a night on days she is working, and sleeps 12 hours on days she is off. At this time, she is using melatonin 10mg qhs almost every night with somewhat good improvement with insomnia. She was provided sleep diary, to fill out x2 weeks for our review, she may send this in [employer] message. We discussed sleep hygiene, stimulus control and keeping a regular schedule. We discussed sleep restriction therapy to avoid laying in bed for extended periods. Information was provided to her on AVS. As she will be starting working nightshift in a few weeks, will not initiate additional sleep aide or hypnotic for her at this time, but she may continue to use melatonin 10mg to be taken 30-60 minutes prior to bedtime. She will follow up in approximately 6 weeks to re-evaluate how her sleep is doing while working nightshift. To call office sooner if she experiences any problems. 1. Insomnia, unspecified type    2.  Migraine without aura and without status migrainosus, not intractable  - Ambulatory Referral to Sleep Medicine       History of Present Illness   HPI:  Geovanna Mcgrath is a 26 y.o. female with PMHx including INES, POTS, migraines, non-Celiac gluten sensitivity, mast cell activation syndrome, IBS, HLD, asthma, Malachi Danlos syndrome. Patient was referred to Sleep Medicine by Neurology for difficulty with sleep initiation. 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 Is currently working day shift 12hour shifts 3x per week. Tries to go to sleep at 9:00PM on days she is working and days she is off. Sleep onset latency is anywhere from 15 minutes to 3 hours. If she can't sleep she will lay in bed tossing and turning, does not move to a different room, does not use electronics. On days she is working, she wakes up at 5:00AM and is usually tired the rest of the day. On days she is off, she still tries to go to bed at 9:00PM however sleeps in until 9-10:00AM and will have difficulty falling asleep the next day. She wakes up a few times 2-3x a night for nocturia, reports she also has POTS and tries to drink plenty of fluid. Does not have difficulty falling back asleep after getting up during the night. Does not take naps. She is using melatonin 10mg qhs (almost every night) to help with sleep, however states her brain "won't shut off" and is feeling "mentally stimulated" and can't "wind down" after work. Neurology recommended that she take Depakote 500mg at night to help with this however has not noticed any improvement. She is switching to night shift on Sept 8. Will be working 3x 12 hour shifts with 4x days off and is worried how her sleep will be affecting her fatigue and migraines. Admits to mild daytime fatigue, but is not falling asleep inappropriately, does not feel the need to take  naps, no driving drowsy. [place] 3/24.      Living situation: with mother, sister and brother     Sleep medications: Melatonin 10mg a few times per week, Benadryl 1-2x per year used only for severe insomnia   Caffeine use: 8oz coffee per day, 6oz tea rarely, no soda or energy drinks, or chocolate   Alcohol use: denies  Cigarettes: denies    Sleep Disordered Breathing:  Snoring:  denies  Witnessed apneas: denies  Shortness of breath or choking/gasping for air: denies  Morning dry mouth:  denies  Morning headaches: +yes, Patient has been having migraines ongoing for three years, started in college. she was getting daily migraines in the past but now on preventative Qulipta and using botox, also taking Nurtec as an abortive therapy   Nasal congestion: no    Other sleep related behaviors and symptoms:   Restless leg symptoms:  denies  Kicking legs during sleep: denies  Parasomnias: denies  Nightmares:  denies  Acid reflux during sleep:  denies  Teeth grinding: denies   Sleep paralysis, cataplexy, sleep attacks or hypnagogic or hypnopompic hallucinations: denies  REM Behavioral Sleep Disorder: denies    Daytime Symptoms:   Excessive daytime sleepiness: denies  Driving while drowsy: denies  History of motor vehicle accidents or near misses: denies  Mood problems: generalized anxiety, well controlled with medication effexor   Problems at work, school or at home: denies    History of tonsillectomy: denies    Historical Information   Past Medical History:   Diagnosis Date   • Anxiety    • Asthma    • Dizziness     at times   • GERD (gastroesophageal reflux disease)    • Hammertoe of left foot    • Headache     occ   • Hyperlipemia    • Hypermobile joints    • Irritable bowel syndrome    • 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HCC)    • Mast cell disorder    • Migraine    • PONV (postoperative nausea and vomiting)    • POTS (postural orthostatic tachycardia syndrome)    •  infant    • Wears glasses      Past Surgical History:   Procedure Laterality Date   • COLONOSCOPY     • EGD AND COLONOSCOPY N/A 1/10/2017    Procedure: EGD AND COLONOSCOPY;  Surgeon: Lara Amaral MD;  Location: [facility];   Service:    • ESOPHAGOGASTRODUODENOSCOPY      with biopsy   • FOOT SURGERY      Excision of lesion feet benign   • MO CORRECTION HAMMERTOE Left 12/20/2019    Procedure: 2nd arthrodesis; 5th arthroplasty, flexor tenotomy 2,3,4,5.;  Surgeon: Renny Benitez DPM;  Location: [facility];  Service: Podiatry   • MI CORRECTION HAMMERTOE Left 6/5/2020    Procedure: 2ND TOE Revision hammertoe with broken implant;  Surgeon: Renny Benitez DPM;  Location: [facility];  Service: Podiatry   • UPPER GASTROINTESTINAL ENDOSCOPY  01/28/2021   • WISDOM TOOTH EXTRACTION       Family History   Problem Relation Age of Onset   • Gestational diabetes Mother    • Migraines Mother    • Anxiety disorder Father    • Hyperlipidemia Father    • Autism Brother    • Diabetes Other    • Uterine cancer Maternal Grandmother    • Rheumatic fever Maternal Grandmother    • Diabetes Maternal Grandfather    • Hypertension Maternal Grandfather    • Heart attack Maternal Grandfather    • Stroke Maternal Grandfather    • Hyperlipidemia Maternal Grandfather    • Hypertension Paternal Grandmother    • Anxiety disorder Paternal Grandmother    • Hypertension Paternal Grandfather      Social History     Socioeconomic History   • Marital status: Single     Spouse name: Not on file   • Number of children: Not on file   • Years of education: Not on file   • Highest education level: Not on file   Occupational History   • Not on file   Tobacco Use   • Smoking status: Never   • Smokeless tobacco: Never   Vaping Use   • Vaping Use: Never used   Substance and Sexual Activity   • Alcohol use: No   • Drug use: No     Comment: 2/9/20- not asked, parent at bedside.    • Sexual activity: Never   Other Topics Concern   • Not on file   Social History Narrative    Lives with parents     Social Determinants of Health     Financial Resource Strain: Not on file   Food Insecurity: Not on file   Transportation Needs: Not on file   Physical Activity: Insufficiently Active (6/15/2022)    Exercise Vital Sign    • Days of Exercise per Week: 3 days    • Minutes of Exercise per Session: 30 min   Stress: Not on file Social Connections: Not on file   Intimate Partner Violence: Not on file   Housing Stability: Not on file       Meds/Allergies   Allergies   Allergen Reactions   • Nuts - Food Allergy Other (See Comments)     Avani Nuts - itchy throat   • Other Hives      At surgical site and IV site- not sure if type of cleanser used   • Pollen Extract    • [place] [Lasmiditan] Palpitations and Hallucinations       Home medications:  Prior to Admission medications    Medication Sig Start Date End Date Taking? Authorizing Provider   acetaminophen (TYLENOL) 500 mg tablet Take 1,000 mg by mouth every 4 (four) hours as needed     Historical Provider, MD   albuterol (ProAir HFA) 90 mcg/act inhaler Inhale 2 puffs every 6 (six) hours as needed for wheezing or shortness of breath 5/27/23   KETAN Major   [facility]  MCG/ACT AERO Inhale 2 puffs every 4 (four) hours as needed (2 puffs) 3/30/18   Historical Provider, MD Miguel Stiles) 60 MG TABS Take 60 mg by mouth in the morning 1/12/23   Lucas Doe PA-C   cetirizine (ZyrTEC) 10 mg tablet Take 10 mg by mouth daily    Historical Provider, MD   clindamycin (CLEOCIN T) 1 % lotion Apply topically daily To face 6/16/23   Chitra Claire MD   dexamethasone (DECADRON) 1 mg tablet Take 1 tablet (1 mg total) by mouth daily with breakfast 7/19/23   Lucas Doe PA-C   diphenhydrAMINE (BENADRYL) 25 mg tablet Take 1 tablet (25 mg total) by mouth every 6 (six) hours as needed for itching 12/13/22   Lucas Doe PA-C   divalproex sodium (DEPAKOTE ER) 500 mg 24 hr tablet Take 1 tab at bedtime as needed for migraine 6/29/23   KETAN Sanderson   Elastic Bandages & Supports (TRUFORM STOCKINGS 20-30MMHG) MISC Use as directed.  11/25/19   Historical Provider, MD   fludrocortisone (FLORINEF) 0.1 mg tablet Take 0.1 mg by mouth 2 (two) times a day 9/15/22   Historical Provider, MD   indomethacin (INDOCIN) 25 mg capsule Take 1 capsule (25 mg total) by mouth if needed for mild pain (prior to exercise) 5/23/22   Jacy Casey PA-C   metoclopramide (REGLAN) 10 mg tablet Take 1 tablet (10 mg total) by mouth every 6 (six) hours as needed (n/v or headache) 2/24/23   Vargas Walter MD   metoprolol succinate (TOPROL-XL) 50 mg 24 hr tablet Take 50 mg by mouth in the morning and 50 mg before bedtime. 5/19/22   Historical Provider, MD   naproxen (EC NAPROSYN) 500 MG EC tablet Take 1 tablet (500 mg total) by mouth 2 (two) times a day with meals  Patient taking differently: Take 500 mg by mouth if needed 6/15/22 6/15/23  Iron Abbott MD   Omega-3 Fatty Acids (FISH OIL) 1,000 mg Take 1,000 mg by mouth 2 (two) times a day   Patient not taking: Reported on 6/29/2023    Historical Provider, MD   onabotulinumtoxin A (BOTOX) 100 units Inject  as directed. Injection every 91 days for migraines    Historical Provider, MD   prochlorperazine (COMPAZINE) 10 mg tablet Take 1 tablet (10 mg total) by mouth every 6 (six) hours as needed (migraine) 12/13/22   Jacy Casey PA-C   Prucalopride Succinate (Motegrity) 2 MG TABS Take 2 mg by mouth in the morning 7/12/23   Kareem Cee MD   rimegepant sulfate (Nurtec) 75 mg TBDP Take 1 tablet (75 mg total) by mouth as needed (migraine) Limit of 1 in 24 hours 6/29/23   KETAN Raymond   sucralfate (CARAFATE) 1 g tablet Take 1 tablet (1 g total) by mouth 3 (three) times a day as needed (headache-take before indomethacin) 12/7/21   Jacy Casey PA-C   tretinoin (RETIN-A) 0.025 % cream Apply topically daily at bedtime 6/16/23   Iron Abbott MD   venlafaxine [facility] 75 mg 24 hr capsule Take 1 capsule (75 mg total) by mouth daily Take with one 37.5 mg capsule.  Total daily dose is 112.5 mg 4/6/23   Leif Bowman MD   verapamil (CALAN) 120 mg tablet Take 1 tab by mouth twice a day 6/29/23   KETAN Raymond   XOPENEX HFA 45 MCG/ACT inhaler Inhale 1-2 puffs every 4 (four) hours as needed 3/23/18   Historical Provider, MD Vitals:   Blood pressure 124/70, pulse 66, height 5' 7" (1.702 m), weight 69.4 kg (153 lb), not currently breastfeeding., Body mass index is 23.96 kg/m². Physical Exam:  General: Sitting in chair, awake alert and oriented to person, place, and time. No acute distress  HEENT: PERRL, Nares patent, no craniofacial abnormalities. Mucous membranes, moist, no oral lesions, and normal dentition. Mallampati class III, tonsils 1+, mild overbite   NECK: Neck circumference 12.5inches, Trachea midline, no accessory muscle use, and no stridor   CARDIAC: Regular rate and rhythm  PULM: CTA bilaterally no wheezing, rhonchi or rales. No conversational dyspnea  EXT: No cyanosis, no clubbing, and no peripheral edema    NEURO: No focal neurologic deficits, moving all extremities appropriately    Labs: I have personally reviewed pertinent lab results. , CBC: No results found for: "WBC", "HGB", "HCT", "MCV", "PLT", "ADJUSTEDWBC", "RBC", "MCH", "MCHC", "RDW", "MPV", "NRBC", CMP: No results found for: "SODIUM", "K", "CL", "CO2", "ANIONGAP", "BUN", "CREATININE", "GLUCOSE", "CALCIUM", "AST", "ALT", "ALKPHOS", "PROT", "BILITOT", "EGFR"  Lab Results   Component Value Date    WBC 4.34 02/24/2023    HGB 13.6 02/24/2023    HCT 40.0 02/24/2023    MCV 87 02/24/2023     02/24/2023      Lab Results   Component Value Date    CALCIUM 9.6 02/24/2023    K 3.7 02/24/2023    CO2 28 02/24/2023     02/24/2023    BUN 17 02/24/2023    CREATININE 0.82 02/24/2023     Lab Results   Component Value Date    IRON 104 05/16/2017    TIBC 325 05/16/2017    FERRITIN 32 05/16/2017     Lab Results   Component Value Date    EZASYVIB39 163 08/10/2021     No results found for: "FOLATE"    Arterial Blood Gas result:  N/A    MRI BRAIN WITH AND WITHOUT CONTRAST     INDICATION: R29.898: Other symptoms and signs involving the musculoskeletal system.    new character headache with focal symptoms     COMPARISON:  2/9/2020     TECHNIQUE:  Sagittal T1, axial T2, axial FLAIR, axial T1, axial [place], axial diffusion. Sagittal, axial T1 postcontrast.  Axial bravo postcontrast with coronal reconstructions.          IV Contrast:  7 mL of Gadobutrol injection (SINGLE-DOSE)      IMAGE QUALITY:   Diagnostic.     FINDINGS:     BRAIN PARENCHYMA:  There is no discrete mass, mass effect or midline shift. There is no intracranial hemorrhage. Normal posterior fossa. Diffusion imaging is unremarkable.       There are no white matter changes in the cerebral hemispheres.     Postcontrast imaging of the brain demonstrates no abnormal enhancement.     VENTRICLES:  Normal for the patient's age.     SELLA AND PITUITARY GLAND:  Normal.     ORBITS:  Normal.     PARANASAL SINUSES:  Normal.     VASCULATURE:  Evaluation of the major intracranial vasculature demonstrates appropriate flow voids.     CALVARIUM AND SKULL BASE:  Normal.     EXTRACRANIAL SOFT TISSUES:  Normal.     IMPRESSION:     No acute infarction, intracranial hemorrhage or mass     Exercise Stress Test:  No exercise-induced arrhythmias noted. No dizziness or near syncopal symptoms with exercise.   Fair exercise capacity       Component Ref Range & Units 3/7/23  9:47 AM 5/15/22 12:58 PM 5/1/21  8:35 AM 1/5/21 10:31 AM 5/27/20 10:19 AM 12/29/18  8:56 AM 11/7/17  9:35 AM   TSH 3RD GENERATON 0.450 - 4.500 uIU/mL 0.859  1.430 CM  1.181 R, CM  0.821 R, CM  1.210 R, CM            Sleep studies: N/A    Compliance Data:  N/A     Radha Perez   12:51 PM   [facility] Sleep Fellow

## 2023-08-17 NOTE — PATIENT INSTRUCTIONS
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 This is often challenging or not desirable for those who work overnight work as that would involve sacrifice of social/family duties. If this is not feasible for the patient, it is ideal to have "anchor sleep"  -[place] sleep entails reserving a 4-5-hour block in the daytime to always sleep. For example, even on off days, you should try to sleep in several of the same hours that you would be sleeping when working overnight.  -Can consider use of melatonin prior to sleep during the day, though realizing it takes approximately 8 hours to clear the system so she should be vigilant to avoid drowsy driving or performing other activities that require full alertness while under the effects of medication. You do not have to start this right away but if you find you struggle to fall asleep adding 3 mg of melatonin prior to sleep could be a consideration  -Being mindful of alcohol use as that can worsen sleep  -Trying to avoid caffeine around bedtime as that can also worsen sleep  -Vigilance to avoid drowsy driving as that is very dangerous    Sleep hygiene tips:    Keep a consistent bedtime and wake up time. This will lead to a more regular sleep schedule and avoid periods of sleep deprivation or periods of extended wakefulness during the night. Avoid watching TV in bed. If needing a form of media to help with sleep - can try sleep aid podcasts such Sleep With Me - a free podcast that helps with sleep initiation    Avoid napping, especially naps lasting longer than 1 hour or naps late in the day, which will likely affect your ability to fall asleep that night.     Limit caffeine, avoiding caffeine after lunch to allow it to get out of your system and not affect your ability to fall asleep or the quality of your sleep. I usually recommend avoiding caffeine at least 6 hours before bedstime    Limit alcohol, alcohol can be sedating but also activating as it metabolizes, causing you to awaken from sleep earlier than desired. It can affect the quality of your sleep by not letting you get into the more refreshing stages of sleep. Avoid nicotine, of course not smoking or vaping at all is best, but nicotine is a stimulant and should be avoided near bedtime and during the night    Exercise and daytime physical activity is encouraged, in particular 4-6 hours before bedtime, as this may help you to fall asleep more easily and quality of sleep is improved. Rigorous exercise within 3 hours of bedtime is discouraged. Keep the sleep environment quiet and dark - Noise and light exposure during the night can disrupt sleep. White noise or ear plugs are often recommended to reduce noise. Using black out shades or an eye mask is commonly recommended to reduce light. This also includes avoiding exposure to television or technology near bedtime, as this can have an impact on circadian rhythms by shifting sleep time later. Bedroom clock - Avoid checking the time at night  This includes alarm clocks and other time pieces such as watches and phones. Checking the time increases cognitive arousal and prolongs wakefulness. Evening eating - Avoid a large meal near bedtime, but don't go to bed hungry. Eat a healthy and filling meal in the evening without over-eating and avoid late night snacks. Insomnia tips: With great difficulty falling asleep or with difficulty falling back asleep, laying in bed for a prolonged period time is not ideal.  This can increase worry and rumination (having racing thoughts, not able to "turn off your brain".   After what feels like 15 minutes, if you feel wide awake, worried, anxious, or can't clear your brain, it is better to leave the bedroom to do something relaxing , The typical recommendation is to read a boring book in dim light. In general, if you leave the room, you want to do something that is relaxing, not stimulating. Avoid bright screens, doing work, doing chores, or anything that requires a lot of mental effort. Return to bed when you feel more calm, sleepy, or mentally clear. It is common in insomnia to look at the time at night to see what time it is, how long you have been awake, etc.  However, this can negatively affect sleep. I strongly recommend avoiding looking at the time at night. Here are some ways to do this  1) Turn the clock around so you cannot see the time at night  2) Avoid wearing a watch to bed. 3) Leave your phone on the other side of the room so you cannot look at it at night  4) Set an alarm if you need to wake up in he morning. If you have not heard the alarm, assume it is still time to sleep. If you practice this consistently, sleep quality and anxiety regarding sleep may improve.

## 2023-08-18 ENCOUNTER — OFFICE VISIT (OUTPATIENT)
Dept: PSYCHIATRY | Facility: CLINIC | Age: 23
End: 2023-08-18

## 2023-08-18 DIAGNOSIS — F41.1 GAD (GENERALIZED ANXIETY DISORDER): Primary | ICD-10-CM

## 2023-08-18 DIAGNOSIS — Z13.228 SCREENING FOR ENDOCRINE, NUTRITIONAL, METABOLIC AND IMMUNITY DISORDER: ICD-10-CM

## 2023-08-18 DIAGNOSIS — Z13.0 SCREENING FOR ENDOCRINE, NUTRITIONAL, METABOLIC AND IMMUNITY DISORDER: ICD-10-CM

## 2023-08-18 DIAGNOSIS — Z13.21 SCREENING FOR ENDOCRINE, NUTRITIONAL, METABOLIC AND IMMUNITY DISORDER: ICD-10-CM

## 2023-08-18 DIAGNOSIS — Z13.29 SCREENING FOR ENDOCRINE, NUTRITIONAL, METABOLIC AND IMMUNITY DISORDER: ICD-10-CM

## 2023-08-18 RX ORDER — VENLAFAXINE HYDROCHLORIDE 75 MG/1
75 CAPSULE, EXTENDED RELEASE ORAL DAILY
Qty: 90 CAPSULE | Refills: 1 | Status: SHIPPED | OUTPATIENT
Start: 2023-08-18

## 2023-08-18 RX ORDER — VENLAFAXINE HYDROCHLORIDE 75 MG/1
75 CAPSULE, EXTENDED RELEASE ORAL DAILY
Qty: 90 CAPSULE | Refills: 1 | Status: SHIPPED | OUTPATIENT
Start: 2023-08-18 | End: 2023-08-18

## 2023-08-18 NOTE — PSYCH
MEDICATION MANAGEMENT NOTE        [facility]    Name and Date of Birth:  Katelyn Cormier 26 y.o. 2000 MRN: [id number]    Date of Visit: August 18, 2023    Reason for Visit: Follow-up visit for medication management     SUBJECTIVE:    Rayna Villalobos is a 26 y.o.  Female with history of Generalized Anxiety Disorder, and PMH significant for POTS, migraines, Mast Cell Activation Syndrome, who presents for follow up for medication management. Rayna Villalobos presents reporting that at this time she is feeling well overall. Denies any acute concerns or complaints at this time. 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 Has been following up with her psychotherapist regularly. Faviola Hines notes that last month, she accidentally forgot to include her venlafaxine into her medication pill box and has missed approximately a week's worth of venlafaxine. Subsequently, she notes that she started to experience flulike symptoms, with brain zaps, irritability, diaphoresis, fatigue, and increased anxiety symptoms. She went to the emergency department thinking that she has been experiencing dehydration in the setting of starting her new position, and received IV fluids with some improvement. After realizing that she has not been taking her medications, she restarted venlafaxine at her previous dose, and within 24 hours, her symptoms resolved. This time, she would like to continue taking her venlafaxine at 75 mg pending her new transition to night shifts from day shifts. She would like to follow up in approximately 6 months to reevaluate gradual tapering and discontinuation of venlafaxine. She denies feeling sad, depressed, hopeless. She denies acute uncontrolled anxiety at this time. Rayna Villalobos currently denies SI/HI, self harming thoughts. Rayna Villalobos denies symptoms consistent with patti/hypomania.  Rayna Villalobos denies symptoms of psychosis such as hallucinations, paranoia. Current Rating Scores:     None completed today. Review Of Systems:      Constitutional negative   ENT negative   Cardiovascular negative   Respiratory negative   Gastrointestinal negative   Genitourinary negative   Musculoskeletal negative   Integumentary negative   Neurological negative   Endocrine negative   Other Symptoms none, all other systems are negative     Past Psychiatric History: (unchanged information from previous note copied and italicized) - Information that is bolded has been updated.      Inpatient psychiatric admissions: denies  Prior outpatient psychiatric linkage: [facility], Cristin Gilman MD  Past/current psychotherapy: [facility]  History of suicidal attempts/gestures: none  History of violence/aggressive behaviors: none  Psychotropic medication trials: Effexor, Zoloft (tired), Cymbalta (tiredness), Depakote, Topamax, Methylphenidate  Substance abuse inpatient/outpatient rehabilitation: none        Substance Abuse History: (unchanged information from previous note copied and italicized) - Information that is bolded has been updated.      Occasional 1-2 drinks in social situations. Generally avoids alcohol as it makes her headaches worse. Denies using tobacco products. Denies using marijuana. Denies use of illicit street drugs.     Denies history of alcohol, illict substance, or tobacco abuse. Denies past legal actions or arrests secondary to substance intoxication. The patient denies prior DWIs/DUIs. Daniel Dsouza does not exhibit objective evidence of substance withdrawal during today's examination nor does Nury appear under the influence of any psychoactive substance.       Social History: (unchanged information from previous note copied and italicized) - Information that is bolded has been updated.      Born at 26 weeks. Fraternal tiwn. Twin has ASD. Developmental: Denies a history of milestone/developmental delay. There is no documented history of IEP or need for special education. Education: nursing student  Marital history: single  Children: none  Living arrangement, social support: parents and brother  Occupational History: employed as nanny, PCA at [employer]; nursing student  Access to firearms: Denies direct access to weapons/firearms. Vanita Mortimer has no history of arrests or violence with a deadly weapon.      Traumatic History: (unchanged information from previous note copied and italicized) - Information that is bolded has been updated.      Abuse: denies  Other Traumatic Events: other traumatic events: denies    Past Medical History:    Past Medical History:   Diagnosis Date   • Anxiety    • Asthma    • Dizziness     at times   • GERD (gastroesophageal reflux disease)    • Hammertoe of left foot    • Headache     occ   • Hyperlipemia    • Hypermobile joints    • Irritable bowel syndrome    • 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HCC)    • Mast cell disorder    • Migraine    • PONV (postoperative nausea and vomiting)    • POTS (postural orthostatic tachycardia syndrome)    •  infant    • Wears glasses         Past Surgical History:   Procedure Laterality Date   • COLONOSCOPY     • EGD AND COLONOSCOPY N/A 1/10/2017    Procedure: EGD AND COLONOSCOPY;  Surgeon: Vanesa Burkett MD;  Location: [facility];   Service:    • ESOPHAGOGASTRODUODENOSCOPY      with biopsy   • FOOT SURGERY      Excision of lesion feet benign   • MT CORRECTION HAMMERTOE Left 2019    Procedure: 2nd arthrodesis; 5th arthroplasty, flexor tenotomy 2,3,4,5.;  Surgeon: Haylee Shaffer DPM;  Location: [facility];  Service: Podiatry   • MT CORRECTION HAMMERTOE Left 2020    Procedure: 2ND TOE Revision hammertoe with broken implant;  Surgeon: Haylee Shaffer DPM;  Location: [facility];  Service: Podiatry   • UPPER GASTROINTESTINAL ENDOSCOPY  2021   • WISDOM TOOTH EXTRACTION       Allergies   Allergen Reactions   • Nuts - Food Allergy Other (See Comments)     Avani Nuts - itchy throat   • Other Hives      At surgical site and IV site- not sure if type of cleanser used   • Pollen Extract    • Reyvow [Lasmiditan] Palpitations and Hallucinations       Substance Abuse History:    Social History     Substance and Sexual Activity   Alcohol Use No     Social History     Substance and Sexual Activity   Drug Use No    Comment: 2/9/20- not asked, parent at bedside. Social History:    Social History     Socioeconomic History   • Marital status: Single     Spouse name: Not on file   • Number of children: Not on file   • Years of education: Not on file   • Highest education level: Not on file   Occupational History   • Not on file   Tobacco Use   • Smoking status: Never   • Smokeless tobacco: Never   Vaping Use   • Vaping Use: Never used   Substance and Sexual Activity   • Alcohol use: No   • Drug use: No     Comment: 2/9/20- not asked, parent at bedside.    • Sexual activity: Never   Other Topics Concern   • Not on file   Social History Narrative    Lives with parents     Social Determinants of Health     Financial Resource Strain: Not on file   Food Insecurity: Not on file   Transportation Needs: Not on file   Physical Activity: Insufficiently Active (6/15/2022)    Exercise Vital Sign    • Days of Exercise per Week: 3 days    • Minutes of Exercise per Session: 30 min   Stress: Not on file   Social Connections: Not on file   Intimate Partner Violence: Not on file   Housing Stability: Not on file       Family Psychiatric History:     Family History   Problem Relation Age of Onset   • Gestational diabetes Mother    • Migraines Mother    • Anxiety disorder Father    • Hyperlipidemia Father    • Autism Brother    • Diabetes Other    • Uterine cancer Maternal Grandmother    • Rheumatic fever Maternal Grandmother    • Diabetes Maternal Grandfather    • Hypertension Maternal Grandfather    • Heart attack Maternal Grandfather    • Stroke Maternal Grandfather    • Hyperlipidemia Maternal Grandfather    • Hypertension Paternal Grandmother    • Anxiety disorder Paternal Grandmother    • Hypertension Paternal Grandfather        History Review: The following portions of the patient's history were reviewed and updated as appropriate: allergies, current medications, past family history, past medical history, past social history, past surgical history and problem list.         OBJECTIVE:     Vital signs in last 24 hours: There were no vitals filed for this visit. Mental Status Evaluation:    Appearance appears stated age and casually dressed   Behavior calm and cooperative   Speech normal rate, normal volume, normal pitch   Mood "good"   Affect normal range and intensity, appropriate   Thought Processes organized, goal directed   Associations intact associations   Thought Content no overt delusions   Perceptual Disturbances: no auditory hallucinations, no visual hallucinations, does not appear responding to internal stimuli   Abnormal Thoughts  Risk Potential Denies suicidal or homicidal ideation, plan, or intent   Orientation oriented to person, place, time/date and situation   Memory recent and remote memory grossly intact   Consciousness alert and awake   Attention Span Concentration Span attention span and concentration are age appropriate   Intellect appears to be of average intelligence   Insight intact   Judgement intact   Muscle Strength and  Gait normal muscle strength and normal muscle tone, normal gait and normal balance   Motor activity no abnormal movements   Language no difficulty naming common objects   Fund of Knowledge adequate knowledge of current events  adequate fund of knowledge regarding past history  adequate fund of knowledge regarding vocabulary        Laboratory Results: I have personally reviewed all pertinent laboratory/tests results    Recent Labs (last 6 months):    Admission on 07/26/2023, Discharged on 07/26/2023   Component Date Value   • Ventricular Rate 07/26/2023 68    • Atrial Rate 07/26/2023 68    • VT Interval 07/26/2023 102    • QRSD Interval 07/26/2023 120    • QT Interval 07/26/2023 418    • QTC Interval 07/26/2023 444    • P Axis 07/26/2023 109    • QRS Axis 07/26/2023 126    • T Wave Axis 07/26/2023 71    Appointment on 05/18/2023   Component Date Value   • Cholesterol 05/18/2023 140    • Triglycerides 05/18/2023 93    • HDL, Direct 05/18/2023 55    • LDL Calculated 05/18/2023 66    • Non-HDL-Chol (CHOL-HDL) 05/18/2023 85    • Hemoglobin A1C 05/18/2023 5.2    • EAG 05/18/2023 103    Appointment on 03/07/2023   Component Date Value   • FSH 03/07/2023 7.1    • Prolactin 03/07/2023 8.6    • Estradiol 03/07/2023 57.0    • TSH 3RD GENERATON 03/07/2023 0.859    Admission on 02/24/2023, Discharged on 02/24/2023   Component Date Value   • Sodium 02/24/2023 141    • Potassium 02/24/2023 3.7    • Chloride 02/24/2023 105    • CO2 02/24/2023 28    • ANION GAP 02/24/2023 8    • BUN 02/24/2023 17    • Creatinine 02/24/2023 0.82    • Glucose 02/24/2023 90    • Calcium 02/24/2023 9.6    • eGFR 02/24/2023 102    • Total Bilirubin 02/24/2023 0.36    • Bilirubin, Direct 02/24/2023 0.07    • Alkaline Phosphatase 02/24/2023 74    • AST 02/24/2023 17    • ALT 02/24/2023 16    • Total Protein 02/24/2023 7.5    • Albumin 02/24/2023 4.4    • WBC 02/24/2023 4.34    • RBC 02/24/2023 4.62    • Hemoglobin 02/24/2023 13.6    • Hematocrit 02/24/2023 40.0    • MCV 02/24/2023 87    • MCH 02/24/2023 29.4    • MCHC 02/24/2023 34.0    • RDW 02/24/2023 11.9    • MPV 02/24/2023 9.9    • Platelets 81/52/3494 231    • nRBC 02/24/2023 0    • Neutrophils Relative 02/24/2023 63    • Immat GRANS % 02/24/2023 0    • Lymphocytes Relative 02/24/2023 30    • Monocytes Relative 02/24/2023 7    • Eosinophils Relative 02/24/2023 0    • Basophils Relative 02/24/2023 0    • Neutrophils Absolute 02/24/2023 2.75    • Immature Grans Absolute 02/24/2023 0.01    • Lymphocytes Absolute 02/24/2023 1.30    • Monocytes Absolute 02/24/2023 0.28    • Eosinophils Absolute 02/24/2023 0.00    • Basophils Absolute 02/24/2023 0.00    • EXT Preg Test, Ur 02/24/2023 Negative    • Control 02/24/2023 Valid    • Color, UA 02/24/2023 Yellow    • Clarity, UA 02/24/2023 Clear    • Specific Gravity, UA 02/24/2023 >=1.030    • pH, UA 02/24/2023 6.0    • Leukocytes, UA 02/24/2023 Negative    • Nitrite, UA 02/24/2023 Negative    • Protein, UA 02/24/2023 Negative    • Glucose, UA 02/24/2023 Negative    • Ketones, UA 02/24/2023 Negative    • Urobilinogen, UA 02/24/2023 0.2    • Bilirubin, UA 02/24/2023 Negative    • Occult Blood, UA 02/24/2023 Negative    • Lipase 02/24/2023 17        Suicide/Homicide Risk Assessment:  Reviewed risks. Risk of Harm to Self:  • The following ratings are based on assessment at the time of the interview and review of records  • Demographic risk factors include: , never , age: young adult (15-24)  • Historical Risk Factors include: history of anxiety  • Recent Specific Risk Factors include: diagnosis of mood disorder  • Protective Factors: no current suicidal ideation, access to mental health treatment, compliant with medications, compliant with mental health treatment, stable living environment, stable job, sense of importance of health and wellness, strong relationships  • Weapons: none. The following steps have been taken to ensure weapons are properly secured: not applicable  • Based on today's assessment, Sunny Eden presents the following risk of harm to self: low      Risk of Harm to Others:  • The following ratings are based on assessment at the time of the interview and review of records  • Demographic Risk Factors include: [age] years of age. • Historical Risk Factors include: none. • Recent Specific Risk Factors include: concomitant mood disorder.   • Protective Factors: no current homicidal ideation, safe and stable living environment, strong relationships  • Weapons: none. The following steps have been taken to ensure weapons are properly secured: not applicable  • Based on today's assessment, Vernon Hines presents the following risk of harm to others: minimal    The following interventions are recommended: no intervention changes needed      Lethality Statement:  Based on today's assessment and clinical criteria, Ros Guerrero contracts for safety and is not an imminent risk of harm to self or others. Outpatient level of care is deemed appropriate at this current time. Vernon Hines understands that if they can no longer contract for safety, they need to call the office or report to their nearest Emergency Room for immediate evaluation. Assessment/Plan:     Ros Guerrero is a 26 y.o.  female, Single (never ), lives with parents, currently employed, w/ PMH of migraines, POTS, Mast Cell Activation Syndrome, and PPH of INES, no prior psychiatric admissions, no prior SA, who presented to the mental health clinic for the initial intake and psychiatric evaluation. At this time, patient's symptoms are controlled on current medication regimen. Patient will continue taking venlafaxine 75 mg in the meantime, and discuss tapering process down the line. No acute lethality concerns at this time. No acute mood concerns at this time. Check BMP. Today's Plan: At this time, will continue current dose of Effexor 75 mg daily. Risks, potential side effects discussed with patient who verbalizes agreement. DSM-5 Diagnoses:   1. INES (generalized anxiety disorder)    2.  Screening for endocrine, nutritional, metabolic and immunity disorder        Treatment Recommendations/Precautions:  • Continue Effexor XR 75 mg daily for anxiety  • Medication management follow up in 6 months  • Continue medical management with PCP  • Check routine BMP   • Aware of need to follow up with family physician for medical issues  • Aware of 24 hour and weekend coverage for urgent situations accessed by calling [facility] main practice number    Medications Risks/Benefits      Risks, Benefits And Possible Side Effects Of Medications:    Risks, benefits, and possible side effects of medications explained to Nury and she verbalizes understanding and agreement for treatment. Controlled Medication Discussion:     Not applicable - controlled prescriptions are not prescribed by this practice    Psychotherapy Provided:     Individual psychotherapy provided: Medications, treatment progress and treatment plan reviewed with Rayna Villalobos. Medication education provided to Rayna Villalobos.      Treatment Plan:    Completed and signed during the session: Not applicable - Treatment Plan not due at this session    Visit Time    Visit Start Time: 9:00 AM  Visit Stop Time: 9:20 AM  Total Visit Duration: 20 minutes    Leif Saldaña MD 08/18/23

## 2023-08-22 ENCOUNTER — SOCIAL WORK (OUTPATIENT)
Dept: BEHAVIORAL/MENTAL HEALTH CLINIC | Facility: CLINIC | Age: 23
End: 2023-08-22
Payer: COMMERCIAL

## 2023-08-22 DIAGNOSIS — F41.1 GAD (GENERALIZED ANXIETY DISORDER): Primary | ICD-10-CM

## 2023-08-22 PROCEDURE — 90834 PSYTX W PT 45 MINUTES: CPT | Performed by: SOCIAL WORKER

## 2023-08-23 NOTE — PSYCH
Behavioral Health Psychotherapy Progress Note    Psychotherapy Provided: Individual Psychotherapy     Encounter Diagnoses   Name Primary? 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 She vocalized how stressful this has been and shared ways that she will be engaging in self care during her days off. During this session, this clinician used the following therapeutic modalities: Motivational Interviewing and Supportive Psychotherapy    Substance Abuse was not addressed during this session. If the client is diagnosed with a co-occurring substance use disorder, please indicate any changes in the frequency or amount of use: . Stage of change for addressing substance use diagnoses: No substance use/Not applicable    ASSESSMENT:  Sindi Maldonado presents with a Euthymic/ normal mood. Bryan Mention was able to accept this worker's support while she expressed concern for "flipping" to night shift very soon. her affect is Normal range and intensity, which is congruent, with her mood and the content of the session. The client has made progress on their goals. Sindi Maldonado presents with a minimal risk of suicide, minimal risk of self-harm, and minimal risk of harm to others. For any risk assessment that surpasses a "low" rating, a safety plan must be developed. A safety plan was indicated: no  If yes, describe in detail     PLAN: Between sessions, Sindi Maldonado will continue to utilize mindfulness to manage her stress / anxiety level while awaiting her transition to night shift . At the next session, the therapist will use Supportive Psychotherapy to address the above in further detail. Behavioral Health Treatment Plan and Discharge Planning: Sindi Maldonado is aware of and agrees to continue to work on their treatment plan.  They have identified and are working toward their discharge goals.  yes    Visit start and stop times:    08/22/23        Start Time: 1700  Stop Time: 1750  Total Visit Time: 50 minutes

## 2023-08-30 ENCOUNTER — SOCIAL WORK (OUTPATIENT)
Dept: BEHAVIORAL/MENTAL HEALTH CLINIC | Facility: CLINIC | Age: 23
End: 2023-08-30
Payer: COMMERCIAL

## 2023-08-30 DIAGNOSIS — F41.1 GAD (GENERALIZED ANXIETY DISORDER): Primary | ICD-10-CM

## 2023-08-30 PROCEDURE — 90834 PSYTX W PT 45 MINUTES: CPT | Performed by: SOCIAL WORKER

## 2023-08-31 NOTE — PSYCH
Behavioral Health Psychotherapy Progress Note    Psychotherapy Provided: Individual Psychotherapy     Encounter Diagnoses   Name Primary? • INES (generalized anxiety disorder) Yes       Goals addressed in session: Goal 1     DATA: Luz Marina Montemayor spoke today about her feelings re: having learned that her stepmother may have cancer and not being able to discuss or process this with anyone at her stepmother's request.  She admitted to being in shock while also struggling to focus. During this session, this clinician used the following therapeutic modalities: Motivational Interviewing and Supportive Psychotherapy    Substance Abuse was not addressed during this session. If the client is diagnosed with a co-occurring substance use disorder, please indicate any changes in the frequency or amount of use: . Stage of change for addressing substance use diagnoses: No substance use/Not applicable    ASSESSMENT:  Leslie Fragoso presents with a Anxious mood. Jackie Hartmann was able to accept this worker's support while she began to process the above. She remains concerned about starting night shift and the effect that this will have on her body. her affect is Normal range and intensity, which is congruent, with her mood and the content of the session. The client has made progress on their goals. Leslie Fragoso presents with a minimal risk of suicide, minimal risk of self-harm, and minimal risk of harm to others. For any risk assessment that surpasses a "low" rating, a safety plan must be developed. A safety plan was indicated: no  If yes, describe in detail     PLAN: Between sessions, Leslie Fragoso will continue to utilize mindfulness to manage her stress / anxiety level while awaiting her transition to night shift . At the next session, the therapist will use Supportive Psychotherapy to address the above in further detail.     Behavioral Health Treatment Plan and Discharge Planning: Leslie Fragoso is aware of and agrees to continue to work on their treatment plan. They have identified and are working toward their discharge goals.  yes    Visit start and stop time    8/29/23

## 2023-09-05 ENCOUNTER — SOCIAL WORK (OUTPATIENT)
Dept: BEHAVIORAL/MENTAL HEALTH CLINIC | Facility: CLINIC | Age: 23
End: 2023-09-05
Payer: COMMERCIAL

## 2023-09-05 DIAGNOSIS — F41.1 GAD (GENERALIZED ANXIETY DISORDER): Primary | ICD-10-CM

## 2023-09-05 PROCEDURE — 90834 PSYTX W PT 45 MINUTES: CPT | Performed by: SOCIAL WORKER

## 2023-09-05 NOTE — PSYCH
Behavioral Health Psychotherapy Progress Note    Psychotherapy Provided: Individual Psychotherapy     Encounter Diagnoses   Name Primary? • INES (generalized anxiety disorder) Yes       Goals addressed in session: Goal 1     DATA: Lauren Cunningham spoke today about her feelings re: updates that have occurred in her family over the past week as additional medical appts have been scheduled for her stepmother. She indicated that "many" people now know which has been helpful. Bryan Brennan also discussed plans for "softening" her adjustment to night shift later this week. During this session, this clinician used the following therapeutic modalities: Motivational Interviewing and Supportive Psychotherapy    Substance Abuse was not addressed during this session. If the client is diagnosed with a co-occurring substance use disorder, please indicate any changes in the frequency or amount of use: . Stage of change for addressing substance use diagnoses: No substance use/Not applicable    ASSESSMENT:  Sindi Maldonado presents with a Euthymic/ normal mood. Bryan Brennan was able to accept this worker's support while acknowledging the support role that her family expects from her.     her affect is Normal range and intensity, which is congruent, with her mood and the content of the session. The client has made progress on their goals. Sindi Maldonado presents with a minimal risk of suicide, minimal risk of self-harm, and minimal risk of harm to others. For any risk assessment that surpasses a "low" rating, a safety plan must be developed. A safety plan was indicated: no  If yes, describe in detail     PLAN: Between sessions, Sindi Maldonado will continue to utilize mindfulness to manage her stress / anxiety level while awaiting her transition to night shift . At the next session, the therapist will use Supportive Psychotherapy to address the above in further detail.     Behavioral Health Treatment Plan and Discharge Planning: Armani Soto is aware of and agrees to continue to work on their treatment plan. They have identified and are working toward their discharge goals.  yes        09/05/23  Start Time: 1100  Stop Time: 1150  Total Visit Time: 50 minutes

## 2023-09-11 DIAGNOSIS — G43.009 MIGRAINE WITHOUT AURA AND WITHOUT STATUS MIGRAINOSUS, NOT INTRACTABLE: ICD-10-CM

## 2023-09-11 RX ORDER — ONDANSETRON 4 MG/1
4 TABLET, ORALLY DISINTEGRATING ORAL EVERY 6 HOURS PRN
Qty: 20 TABLET | Refills: 0 | Status: SHIPPED | OUTPATIENT
Start: 2023-09-11

## 2023-09-11 RX ORDER — RIMEGEPANT SULFATE 75 MG/75MG
75 TABLET, ORALLY DISINTEGRATING ORAL AS NEEDED
Qty: 16 TABLET | Refills: 3 | Status: SHIPPED | OUTPATIENT
Start: 2023-09-11

## 2023-09-19 ENCOUNTER — TELEPHONE (OUTPATIENT)
Dept: NEUROLOGY | Facility: CLINIC | Age: 23
End: 2023-09-19

## 2023-09-19 ENCOUNTER — SOCIAL WORK (OUTPATIENT)
Dept: BEHAVIORAL/MENTAL HEALTH CLINIC | Facility: CLINIC | Age: 23
End: 2023-09-19
Payer: COMMERCIAL

## 2023-09-19 DIAGNOSIS — F41.1 GAD (GENERALIZED ANXIETY DISORDER): Primary | ICD-10-CM

## 2023-09-19 PROCEDURE — 90834 PSYTX W PT 45 MINUTES: CPT | Performed by: SOCIAL WORKER

## 2023-09-19 NOTE — PSYCH
Behavioral Health Psychotherapy Progress Note    Psychotherapy Provided: Individual Psychotherapy     Encounter Diagnoses   Name Primary? • INES (generalized anxiety disorder) Yes       Goals addressed in session: Goal 1     DATA: Pooja Bush spoke today about her feelings re: her stepmother's visit to [facility] during which a comprehensive treatment plan was developed. 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 During this session, this clinician used the following therapeutic modalities: Motivational Interviewing and Supportive Psychotherapy    Substance Abuse was not addressed during this session. If the client is diagnosed with a co-occurring substance use disorder, please indicate any changes in the frequency or amount of use: . Stage of change for addressing substance use diagnoses: No substance use/Not applicable    ASSESSMENT:  Ousmane Velázquez presents with a Euthymic/ normal mood. Nell Louise was able to accept this worker's support while sharing ways that she has been engaging in her own needed self care.      her affect is Normal range and intensity, which is congruent, with her mood and the content of the session. The client has made progress on their goals. Ousmane Velázquez presents with a minimal risk of suicide, minimal risk of self-harm, and minimal risk of harm to others. For any risk assessment that surpasses a "low" rating, a safety plan must be developed. A safety plan was indicated: no  If yes, describe in detail     PLAN: Between sessions, Ousmane Velázquez will continue to utilize mindfulness to manage her stress / anxiety level while awaiting her transition to night shift . At the next session, the therapist will use Supportive Psychotherapy to address the above in further detail. Behavioral Health Treatment Plan and Discharge Planning: Ousmane Velázquez is aware of and agrees to continue to work on their treatment plan.  They have identified and are working toward their discharge goals.  yes        09/19/23  Start Time: 1100  Stop Time: 1150  Total Visit Time: 50 minutes

## 2023-09-25 NOTE — TELEPHONE ENCOUNTER
Received faxed approval from plan. Placed form in scanning bin at [facility]. Approved   Botox 200 UNITS  Qty.  1  Auth# [id number]  Valid: 9/29/23-3/29/24  Visits: 2     Please use  Stock

## 2023-10-03 ENCOUNTER — TELEPHONE (OUTPATIENT)
Dept: PSYCHIATRY | Facility: CLINIC | Age: 23
End: 2023-10-03

## 2023-10-03 NOTE — TELEPHONE ENCOUNTER
Called pt to inform her that her appt today with [facility] had to be cancelled. Pt will reach out to reschedule.

## 2023-10-11 ENCOUNTER — DOCUMENTATION (OUTPATIENT)
Dept: BEHAVIORAL/MENTAL HEALTH CLINIC | Facility: CLINIC | Age: 23
End: 2023-10-11

## 2023-10-16 ENCOUNTER — OFFICE VISIT (OUTPATIENT)
Dept: INTERNAL MEDICINE CLINIC | Facility: CLINIC | Age: 23
End: 2023-10-16

## 2023-10-16 VITALS
BODY MASS INDEX: 23.89 KG/M2 | HEIGHT: 67 IN | SYSTOLIC BLOOD PRESSURE: 120 MMHG | WEIGHT: 152.2 LBS | DIASTOLIC BLOOD PRESSURE: 82 MMHG | OXYGEN SATURATION: 99 % | HEART RATE: 69 BPM

## 2023-10-16 DIAGNOSIS — Z23 ENCOUNTER FOR IMMUNIZATION: ICD-10-CM

## 2023-10-16 DIAGNOSIS — Z13.1 SCREENING FOR DIABETES MELLITUS: ICD-10-CM

## 2023-10-16 DIAGNOSIS — G43.009 MIGRAINE WITHOUT AURA AND WITHOUT STATUS MIGRAINOSUS, NOT INTRACTABLE: ICD-10-CM

## 2023-10-16 DIAGNOSIS — J45.30 MILD PERSISTENT ASTHMA WITHOUT COMPLICATION: ICD-10-CM

## 2023-10-16 DIAGNOSIS — Z13.220 SCREENING, LIPID: ICD-10-CM

## 2023-10-16 DIAGNOSIS — F41.1 GAD (GENERALIZED ANXIETY DISORDER): ICD-10-CM

## 2023-10-16 DIAGNOSIS — G90.A POTS (POSTURAL ORTHOSTATIC TACHYCARDIA SYNDROME): Primary | ICD-10-CM

## 2023-10-16 DIAGNOSIS — R59.0 LYMPHADENOPATHY, POSTERIOR CERVICAL: ICD-10-CM

## 2023-10-16 NOTE — PROGRESS NOTES
Name: Tawnya Mccray      : 2000      MRN: [id number]  Encounter Provider: Jesus Alberto Stiles MD  Encounter Date: 10/16/2023   Encounter department: [facility]     1. POTS (postural orthostatic tachycardia syndrome)  Assessment & Plan:  She is on fludrocortisone, metoprolol    Orders:  -     CBC and differential; Future; Expected date: 2024  -     Comprehensive metabolic panel; Future; Expected date: 2024    2. Encounter for immunization  -     influenza vaccine, quadrivalent, 0.5 mL, preservative-free, for adult and pediatric patients 6 mos+ (AFLURIA, FLUARIX, FLULAVAL, FLUZONE)    3. Screening, lipid  -     Lipid panel; Future; Expected date: 2024    4. Screening for diabetes mellitus  -     HEMOGLOBIN A1C W/ EAG ESTIMATION; Future; Expected date: 2024    5. Lymphadenopathy, posterior cervical  -     US head neck soft tissue; Future; Expected date: 10/16/2023    6. Migraine without aura and without status migrainosus, not intractable  Assessment & Plan:  Managed by neurology on verapamil Qulipta Botox Nurtec       7. Mild persistent asthma without complication  Assessment & Plan:  She is on Asmanex and albuterol PRN      8. INES (generalized anxiety disorder)  Assessment & Plan:  She is on venlafaxine managed by psychiatry and sees a therapist             Subjective      Here to establish care. She was diagnosed with POTS in 2016 after a viral infection. She is on fludrocortisone, metoprolol and verapamil. Also with mast cell activation syndrome and presented with pruritus  Migraines controlled on verapamil, Botox, Qulpta and Nurtec PRN  INES and sees psychiatry and a therapist, She is on venlafaxine. 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 She is a nurse        Review of Systems   Constitutional:  Positive for fatigue.  Negative for unexpected weight change. Gastrointestinal:  Positive for constipation. Negative for abdominal pain. Genitourinary:  Positive for menstrual problem. Neurological:  Positive for headaches. Hematological:  Positive for adenopathy. Psychiatric/Behavioral:  Negative for sleep disturbance. The patient is not nervous/anxious. Current Outpatient Medications on File Prior to Visit   Medication Sig   • acetaminophen (TYLENOL) 500 mg tablet Take 1,000 mg by mouth every 4 (four) hours as needed    • albuterol (ProAir HFA) 90 mcg/act inhaler Inhale 2 puffs every 6 (six) hours as needed for wheezing or shortness of breath   • ASMANEX  MCG/ACT AERO Inhale 2 puffs every 4 (four) hours as needed (2 puffs)   • Atogepant (Qulipta) 60 MG TABS Take 60 mg by mouth in the morning   • cetirizine (ZyrTEC) 10 mg tablet Take 10 mg by mouth daily   • clindamycin (CLEOCIN T) 1 % lotion Apply topically daily To face   • Elastic Bandages & Supports (TRUFORM STOCKINGS 20-30MMHG) MISC Use as directed. • fludrocortisone (FLORINEF) 0.1 mg tablet Take 0.1 mg by mouth 2 (two) times a day   • metoprolol succinate (TOPROL-XL) 50 mg 24 hr tablet Take 50 mg by mouth in the morning and 50 mg before bedtime. • onabotulinumtoxin A (BOTOX) 100 units Inject  as directed.  Injection every 91 days for migraines   • ondansetron (Zofran ODT) 4 mg disintegrating tablet Take 1 tablet (4 mg total) by mouth every 6 (six) hours as needed for nausea or vomiting   • Prucalopride Succinate (Motegrity) 2 MG TABS Take 2 mg by mouth in the morning   • rimegepant sulfate (Nurtec) 75 mg TBDP Take 1 tablet (75 mg total) by mouth as needed (migraine) Limit of 1 in 24 hours   • tretinoin (RETIN-A) 0.025 % cream Apply topically daily at bedtime   • venlafaxine (EFFEXOR-XR) 75 mg 24 hr capsule Take 1 capsule (75 mg total) by mouth daily   • verapamil (CALAN) 120 mg tablet Take 1 tab by mouth twice a day   • diphenhydrAMINE (BENADRYL) 25 mg tablet Take 1 tablet (25 mg total) by mouth every 6 (six) hours as needed for itching (Patient not taking: Reported on 10/16/2023)       Objective     /82 (BP Location: Left arm, Patient Position: Sitting, Cuff Size: Standard)   Pulse 69   Ht 5' 7" (1.702 m)   Wt 69 kg (152 lb 3.2 oz)   SpO2 99%   BMI 23.84 kg/m²     Physical Exam  Constitutional:       General: She is not in acute distress. Appearance: She is well-developed. She is not ill-appearing, toxic-appearing or diaphoretic. Eyes:      Conjunctiva/sclera: Conjunctivae normal.   Cardiovascular:      Rate and Rhythm: Normal rate and regular rhythm. Heart sounds: Normal heart sounds. No murmur heard. Pulmonary:      Effort: Pulmonary effort is normal. No respiratory distress. Breath sounds: Normal breath sounds. No wheezing or rales. Abdominal:      General: There is no distension. Palpations: Abdomen is soft. There is no mass. Tenderness: There is no abdominal tenderness. There is no guarding or rebound. Musculoskeletal:      Cervical back: Neck supple. Right lower leg: No edema. Left lower leg: No edema. Lymphadenopathy:      Cervical: Cervical adenopathy present. Right cervical: Posterior cervical adenopathy present. Neurological:      Mental Status: She is alert and oriented to person, place, and time. Psychiatric:         Mood and Affect: Mood normal.         Behavior: Behavior normal.         Thought Content:  Thought content normal.         Judgment: Judgment normal.       Jim Oliva MD

## 2023-10-17 ENCOUNTER — SOCIAL WORK (OUTPATIENT)
Dept: BEHAVIORAL/MENTAL HEALTH CLINIC | Facility: CLINIC | Age: 23
End: 2023-10-17
Payer: COMMERCIAL

## 2023-10-17 DIAGNOSIS — F41.1 GAD (GENERALIZED ANXIETY DISORDER): Primary | ICD-10-CM

## 2023-10-17 PROCEDURE — 90834 PSYTX W PT 45 MINUTES: CPT | Performed by: SOCIAL WORKER

## 2023-10-17 NOTE — PSYCH
Behavioral Health Psychotherapy Progress Note    Psychotherapy Provided: Individual Psychotherapy     Encounter Diagnoses   Name Primary? • INES (generalized anxiety disorder) Yes         Goals addressed in session: Goal 1     DATA:  Bryan Brennan spoke today about her feelings re: her stepmother's upcoming scheduled surgery. She also reported that there was an incident at work that was upsetting to her. During this session, this clinician used the following therapeutic modalities: Motivational Interviewing and Supportive Psychotherapy    Substance Abuse was not addressed during this session. If the client is diagnosed with a co-occurring substance use disorder, please indicate any changes in the frequency or amount of use: . Stage of change for addressing substance use diagnoses: No substance use/Not applicable    ASSESSMENT:  Sindi Maldonado presents with a Euthymic/ normal mood. Bryan Brennan appears to be managing the transition to night shift. She has not regressed in functioning or experienced any debilitating migraines. her affect is Normal range and intensity, which is congruent, with her mood and the content of the session. The client has made progress on their goals. Sindi Maldonado presents with a minimal risk of suicide, minimal risk of self-harm, and minimal risk of harm to others. For any risk assessment that surpasses a "low" rating, a safety plan must be developed. A safety plan was indicated: no  If yes, describe in detail     PLAN: Between sessions, Sindi Maldonado will continue to utilize mindfulness to manage her stress / anxiety level while awaiting her transition to night shift . At the next session, the therapist will use Supportive Psychotherapy to address the above in further detail. Behavioral Health Treatment Plan and Discharge Planning: Sindi Maldonado is aware of and agrees to continue to work on their treatment plan.  They have identified and are working toward their discharge goals.  yes        10/17/23  Start Time: 1000  Stop Time: 1050  Total Visit Time: 50 minutes

## 2023-10-18 ENCOUNTER — PROCEDURE VISIT (OUTPATIENT)
Dept: NEUROLOGY | Facility: CLINIC | Age: 23
End: 2023-10-18
Payer: COMMERCIAL

## 2023-10-18 ENCOUNTER — OFFICE VISIT (OUTPATIENT)
Dept: OBGYN CLINIC | Facility: CLINIC | Age: 23
End: 2023-10-18
Payer: COMMERCIAL

## 2023-10-18 VITALS — HEART RATE: 71 BPM | TEMPERATURE: 98.7 F | DIASTOLIC BLOOD PRESSURE: 66 MMHG | SYSTOLIC BLOOD PRESSURE: 110 MMHG

## 2023-10-18 VITALS
WEIGHT: 151 LBS | DIASTOLIC BLOOD PRESSURE: 66 MMHG | HEIGHT: 67 IN | SYSTOLIC BLOOD PRESSURE: 100 MMHG | BODY MASS INDEX: 23.7 KG/M2

## 2023-10-18 DIAGNOSIS — N93.9 ABNORMAL UTERINE BLEEDING (AUB): Primary | ICD-10-CM

## 2023-10-18 DIAGNOSIS — G43.709 CHRONIC MIGRAINE WITHOUT AURA WITHOUT STATUS MIGRAINOSUS, NOT INTRACTABLE: Primary | ICD-10-CM

## 2023-10-18 PROCEDURE — 99213 OFFICE O/P EST LOW 20 MIN: CPT | Performed by: STUDENT IN AN ORGANIZED HEALTH CARE EDUCATION/TRAINING PROGRAM

## 2023-10-18 PROCEDURE — 64615 CHEMODENERV MUSC MIGRAINE: CPT | Performed by: PHYSICIAN ASSISTANT

## 2023-10-18 NOTE — PROGRESS NOTES
Assessment/Plan:       Problem List Items Addressed This Visit    None  Visit Diagnoses       Abnormal uterine bleeding (AUB)    -  Primary            - reassured. Reviewed options for evaluation, but most likely irregular cycles with return of menses    RTO annual    Subjective:      Patient ID: Emogene Nageotte is a 26 y.o. female. HPI  She presents today for evaluation of heavy and frequent menstrual cycles. With amenorrhea after d/c depo provera until June 9th, when she got her first cycle. Cycles have come more than once per month after this, sometimes light and sometimes heavy. She has an increased break between the last few cycles. She is hoping to avoid any intervention, but wants to make sure none is necessary. Notably, she has lost a significant amount of weight since stopping depo provera. Did not change lifestyle, weight just fell off. The following portions of the patient's history were reviewed and updated as appropriate: allergies, current medications, past family history, past medical history, past social history, past surgical history, and problem list.    Review of Systems  as above    Objective:  /66 (BP Location: Left arm, Patient Position: Sitting, Cuff Size: Standard)   Ht 5' 7" (1.702 m)   Wt 68.5 kg (151 lb)   LMP 10/14/2023 (Exact Date)   BMI 23.65 kg/m²      Physical Exam  Vitals reviewed. Constitutional:       Appearance: She is well-developed. HENT:      Head: Normocephalic. Pulmonary:      Effort: Pulmonary effort is normal.   Musculoskeletal:         General: Normal range of motion. Cervical back: Normal range of motion. Neurological:      Mental Status: She is alert and oriented to person, place, and time.    Psychiatric:         Behavior: Behavior normal.

## 2023-10-21 PROBLEM — K58.9 IBS (IRRITABLE BOWEL SYNDROME): Status: RESOLVED | Noted: 2020-05-12 | Resolved: 2023-10-21

## 2023-10-21 PROBLEM — G43.009 MIGRAINE WITHOUT AURA AND WITHOUT STATUS MIGRAINOSUS, NOT INTRACTABLE: Status: ACTIVE | Noted: 2020-04-07

## 2023-10-21 PROBLEM — R10.13 DYSPEPSIA: Status: RESOLVED | Noted: 2021-01-05 | Resolved: 2023-10-21

## 2023-10-21 PROBLEM — Z00.00 ANNUAL PHYSICAL EXAM: Status: RESOLVED | Noted: 2019-04-08 | Resolved: 2023-10-21

## 2023-10-21 PROBLEM — K90.41 NON-CELIAC GLUTEN SENSITIVITY: Status: ACTIVE | Noted: 2021-08-10

## 2023-10-21 PROBLEM — G44.84 EXERTIONAL HEADACHE: Status: RESOLVED | Noted: 2022-05-23 | Resolved: 2023-10-21

## 2023-10-21 PROBLEM — E78.1 HYPERTRIGLYCERIDEMIA: Status: RESOLVED | Noted: 2019-06-12 | Resolved: 2023-10-21

## 2023-10-23 ENCOUNTER — HOSPITAL ENCOUNTER (OUTPATIENT)
Dept: RADIOLOGY | Facility: HOSPITAL | Age: 23
Discharge: HOME | End: 2023-10-23
Payer: COMMERCIAL

## 2023-10-23 DIAGNOSIS — R59.0 LYMPHADENOPATHY, POSTERIOR CERVICAL: ICD-10-CM

## 2023-10-23 PROCEDURE — 76536 US EXAM OF HEAD AND NECK: CPT

## 2023-10-30 ENCOUNTER — SOCIAL WORK (OUTPATIENT)
Dept: BEHAVIORAL/MENTAL HEALTH CLINIC | Facility: CLINIC | Age: 23
End: 2023-10-30
Payer: COMMERCIAL

## 2023-10-30 DIAGNOSIS — F41.1 GAD (GENERALIZED ANXIETY DISORDER): Primary | ICD-10-CM

## 2023-10-30 PROCEDURE — 90834 PSYTX W PT 45 MINUTES: CPT | Performed by: SOCIAL WORKER

## 2023-10-30 NOTE — PSYCH
Behavioral Health Psychotherapy Progress Note    Psychotherapy Provided: Individual Psychotherapy     Encounter Diagnoses   Name Primary? • INES (generalized anxiety disorder) Yes           Goals addressed in session: Goal 1     DATA:  Zeus Rojas spoke today about her feelings re: her stepmother's recent surgery and difficult recovery. 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 During this session, this clinician used the following therapeutic modalities: Motivational Interviewing and Supportive Psychotherapy    Substance Abuse was not addressed during this session. If the client is diagnosed with a co-occurring substance use disorder, please indicate any changes in the frequency or amount of use: . Stage of change for addressing substance use diagnoses: No substance use/Not applicable    ASSESSMENT:  Emogene Nageotte presents with a Euthymic/ normal mood. Zeus Rojas appears to be interested in beginning to discuss intimacy issues in therapy as encouraged by her ob/gyn.      her affect is Normal range and intensity, which is congruent, with her mood and the content of the session. The client has made progress on their goals. Emogene Nageotte presents with a minimal risk of suicide, minimal risk of self-harm, and minimal risk of harm to others. For any risk assessment that surpasses a "low" rating, a safety plan must be developed. A safety plan was indicated: no  If yes, describe in detail     PLAN: Between sessions, Emogene Nageotte will delve further into the above topic and reson for her attachement fears. . At the next session, the therapist will use Supportive Psychotherapy to address the above in further detail. Behavioral Health Treatment Plan and Discharge Planning: Emogene Nageotte is aware of and agrees to continue to work on their treatment plan. They have identified and are working toward their discharge goals. yes        10/30/23  Start Time: 1100  Stop Time: 1150  Total Visit Time: 50 minutes

## 2023-11-14 ENCOUNTER — SOCIAL WORK (OUTPATIENT)
Dept: BEHAVIORAL/MENTAL HEALTH CLINIC | Facility: CLINIC | Age: 23
End: 2023-11-14
Payer: COMMERCIAL

## 2023-11-14 DIAGNOSIS — F41.1 GAD (GENERALIZED ANXIETY DISORDER): Primary | ICD-10-CM

## 2023-11-14 PROCEDURE — 90834 PSYTX W PT 45 MINUTES: CPT | Performed by: SOCIAL WORKER

## 2023-11-15 NOTE — PSYCH
Behavioral Health Psychotherapy Progress Note    Psychotherapy Provided: Individual Psychotherapy     Encounter Diagnoses   Name Primary? • INES (generalized anxiety disorder) Yes     Goals addressed in session: Goal 1     DATA:  Veronika Sanchez spoke today about her feelings re: her interactions with her mother  particularly the past week following her decision to put their home on the market and relocate to an apt. Veronika Sanchez also discussed her continued adjustment to her job. During this session, this clinician used the following therapeutic modalities: Motivational Interviewing and Supportive Psychotherapy    Substance Abuse was not addressed during this session. If the client is diagnosed with a co-occurring substance use disorder, please indicate any changes in the frequency or amount of use: . Stage of change for addressing substance use diagnoses: No substance use/Not applicable    ASSESSMENT:  Jess Buckner presents with a Euthymic/ normal mood. Veronika Sanchez admits that addressing and discussing  intimacy issues in therapy remains her goal although is quite difficult and she is aware that she avoids the topic. However, she remains uncertain as to her fear re: this issue. her affect is Normal range and intensity, which is congruent, with her mood and the content of the session. The client has made progress on their goals. Jess Buckner presents with a minimal risk of suicide, minimal risk of self-harm, and minimal risk of harm to others. For any risk assessment that surpasses a "low" rating, a safety plan must be developed. A safety plan was indicated: no  If yes, describe in detail     PLAN: Between sessions, Jess Buckner will delve further into the above topic and reson for her attachement fears. . At the next session, the therapist will use Supportive Psychotherapy to address the above in further detail.     Behavioral Health Treatment Plan and Discharge Planning: Jess Buckner is aware of and agrees to continue to work on their treatment plan. They have identified and are working toward their discharge goals.  yes        11/14/23  Start Time: 1400  Stop Time: 1450  Total Visit Time: 50 [id number]   1450   50 mins

## 2023-11-20 ENCOUNTER — SOCIAL WORK (OUTPATIENT)
Dept: BEHAVIORAL/MENTAL HEALTH CLINIC | Facility: CLINIC | Age: 23
End: 2023-11-20
Payer: COMMERCIAL

## 2023-11-20 DIAGNOSIS — F41.1 GAD (GENERALIZED ANXIETY DISORDER): Primary | ICD-10-CM

## 2023-11-20 PROCEDURE — 90834 PSYTX W PT 45 MINUTES: CPT | Performed by: SOCIAL WORKER

## 2023-11-20 NOTE — PSYCH
Behavioral Health Psychotherapy Progress Note    Psychotherapy Provided: Individual Psychotherapy     Encounter Diagnoses   Name Primary? • INES (generalized anxiety disorder) Yes       Goals addressed in session: Goal 1     DATA:  Anni Bowser spoke today about her feelings re: the essay she wrote since her last session about her experiences as a child surrounding the time of her parents' divorce. During this session, this clinician used the following therapeutic modalities: Motivational Interviewing and Supportive Psychotherapy    Substance Abuse was not addressed during this session. If the client is diagnosed with a co-occurring substance use disorder, please indicate any changes in the frequency or amount of use: . Stage of change for addressing substance use diagnoses: No substance use/Not applicable    ASSESSMENT:  Loan Luevano presents with a Euthymic/ normal mood. Anni Bowser appears to recognize that she is "ready" to do this work however acknowledged concerns that doing so may change this worker's (her) opinions of her parents. her affect is Normal range and intensity, which is congruent, with her mood and the content of the session. The client has made progress on their goals. Loan Luevano presents with a minimal risk of suicide, minimal risk of self-harm, and minimal risk of harm to others. For any risk assessment that surpasses a "low" rating, a safety plan must be developed. A safety plan was indicated: no  If yes, describe in detail     PLAN: Between sessions, Loan Luevano will delve further into the above topic and reson for her attachement fears. . At the next session, the therapist will use Supportive Psychotherapy to address the above in further detail. Behavioral Health Treatment Plan and Discharge Planning: Loan Luevano is aware of and agrees to continue to work on their treatment plan. They have identified and are working toward their discharge goals. yes        11/14/23  Start Time: 0900  Stop Time: 0950  Total Visit Time: 50 [id number]   1450   50 mins

## 2023-11-27 ENCOUNTER — OFFICE VISIT (OUTPATIENT)
Dept: URGENT CARE | Facility: CLINIC | Age: 23
End: 2023-11-27
Payer: COMMERCIAL

## 2023-11-27 VITALS
BODY MASS INDEX: 23.78 KG/M2 | DIASTOLIC BLOOD PRESSURE: 70 MMHG | TEMPERATURE: 97.6 F | RESPIRATION RATE: 18 BRPM | OXYGEN SATURATION: 99 % | HEART RATE: 90 BPM | WEIGHT: 151.8 LBS | SYSTOLIC BLOOD PRESSURE: 124 MMHG

## 2023-11-27 DIAGNOSIS — J01.00 ACUTE NON-RECURRENT MAXILLARY SINUSITIS: Primary | ICD-10-CM

## 2023-11-27 DIAGNOSIS — J06.9 UPPER RESPIRATORY TRACT INFECTION, UNSPECIFIED TYPE: ICD-10-CM

## 2023-11-27 PROCEDURE — 99213 OFFICE O/P EST LOW 20 MIN: CPT

## 2023-11-27 RX ORDER — ALBUTEROL SULFATE 2.5 MG/3ML
2.5 SOLUTION RESPIRATORY (INHALATION) EVERY 6 HOURS PRN
Qty: 30 ML | Refills: 0 | Status: SHIPPED | OUTPATIENT
Start: 2023-11-27

## 2023-11-27 RX ORDER — AMOXICILLIN AND CLAVULANATE POTASSIUM 875; 125 MG/1; MG/1
1 TABLET, FILM COATED ORAL EVERY 12 HOURS SCHEDULED
Qty: 14 TABLET | Refills: 0 | Status: SHIPPED | OUTPATIENT
Start: 2023-11-27 | End: 2023-12-04

## 2023-11-27 NOTE — LETTER
November 27, 2023     Patient: Lowell Black   YOB: 2000   Date of Visit: 11/27/2023       To Whom it May Concern:    Christ Howell was seen in my clinic on 11/27/2023. She may return to work on 11/30/2023 . If you have any questions or concerns, please don't hesitate to call.          Sincerely,          Annabel Ghotra PA-C        CC: No Recipients

## 2023-11-27 NOTE — PATIENT INSTRUCTIONS
Antibiotics given for sinusitis, take as prescribed. Recommend rest, hydration, and humidifier for comfort. Albuterol nebulizer solution given for chest tightness. Recommend continuing albuterol inhaler as needed. Follow up with PCP in 3-5 days if no improvement. Proceed to ER if symptoms worsen. Sinusitis   AMBULATORY CARE:   Sinusitis  is inflammation or infection of your sinuses. Sinusitis is most often caused by a virus. Acute sinusitis may last up to 12 weeks. Chronic sinusitis lasts longer than 12 weeks. Recurrent sinusitis means you have 4 or more infections in 1 year. Common signs and symptoms:   Fever    Pain, pressure, redness, or swelling around the forehead, cheeks, or eyes    Thick yellow or green discharge from your nose    Tenderness when you touch your face over your sinuses    Dry cough that happens mostly at night or when you lie down    Headache and face pain that is worse when you lean forward    Tooth pain, or pain when you chew    Seek care immediately if:   You have trouble breathing or wheezing that is getting worse. You have a stiff neck, a fever, or a bad headache. You cannot open your eye. Your eyeball bulges out or you cannot move your eye. You are more sleepy than normal, or you notice changes in your ability to think, move, or talk. You have swelling of your forehead or scalp. Call your doctor if:   You have vision changes, such as double vision. Your eye and eyelid are red, swollen, and painful. Your symptoms do not improve or go away after 10 days. You have nausea and are vomiting. Your nose is bleeding. You have questions or concerns about your condition or care. Medicines: Your symptoms may go away on their own. Your healthcare provider may recommend watchful waiting for up to 10 days before starting antibiotics. You may need any of the following:  Acetaminophen  decreases pain and fever.  It is available without a doctor's order. Ask how much to take and how often to take it. Follow directions. Read the labels of all other medicines you are using to see if they also contain acetaminophen, or ask your doctor or pharmacist. Acetaminophen can cause liver damage if not taken correctly. NSAIDs , such as ibuprofen, help decrease swelling, pain, and fever. This medicine is available with or without a doctor's order. NSAIDs can cause stomach bleeding or kidney problems in certain people. If you take blood thinner medicine, always ask your healthcare provider if NSAIDs are safe for you. Always read the medicine label and follow directions. Nasal steroid sprays  may help decrease inflammation in your nose and sinuses. Decongestants  help reduce swelling and drain mucus in the nose and sinuses. They may help you breathe easier. Antihistamines  help dry mucus in the nose and relieve sneezing. Antibiotics  help treat or prevent a bacterial infection. Self-care:   Rinse your sinuses as directed. Use a sinus rinse device to rinse your nasal passages with a saline (salt water) solution or distilled water. Do not use tap water. This will help thin the mucus in your nose and rinse away pollen and dirt. It will also help reduce swelling so you can breathe normally. Use a humidifier  to increase air moisture in your home. This may make it easier for you to breathe and help decrease your cough. Sleep with your head elevated. Place an extra pillow under your head before you go to sleep to help your sinuses drain. Drink liquids as directed. Ask your healthcare provider how much liquid to drink each day and which liquids are best for you. Liquids will thin the mucus in your nose and help it drain. Avoid drinks that contain alcohol or caffeine. Do not smoke, and avoid secondhand smoke. Nicotine and other chemicals in cigarettes and cigars can make your symptoms worse.  Ask your healthcare provider for information if you currently smoke and need help to quit. E-cigarettes or smokeless tobacco still contain nicotine. Talk to your healthcare provider before you use these products. Prevent the spread of germs:   Wash your hands often with soap and water. Wash your hands after you use the bathroom, change a child's diaper, or sneeze. Wash your hands before you prepare or eat food. Stay away from people who are sick. Some germs spread easily and quickly through contact. Follow up with your doctor as directed: You may be referred to an ear, nose, and throat specialist. Write down your questions so you remember to ask them during your visits. © Copyright [facility] 2023 Information is for End User's use only and may not be sold, redistributed or otherwise used for commercial purposes. The above information is an  only. It is not intended as medical advice for individual conditions or treatments. Talk to your doctor, nurse or pharmacist before following any medical regimen to see if it is safe and effective for you.

## 2023-11-27 NOTE — PROGRESS NOTES
[facility]        NAME: Yusuf Crain is a 26 y.o. female  : 2000    MRN: [id number]  DATE: 2023  TIME: 4:33 PM    Assessment and Plan   Acute non-recurrent maxillary sinusitis [J01.00]  1. Acute non-recurrent maxillary sinusitis  amoxicillin-clavulanate (AUGMENTIN) 875-125 mg per tablet      2. Upper respiratory tract infection, unspecified type  albuterol (2.5 mg/3 mL) 0.083 % nebulizer solution        Patient Instructions   Antibiotics given for sinusitis, take as prescribed. Recommend rest, hydration, and humidifier for comfort. Albuterol nebulizer solution given for chest tightness. Recommend continuing albuterol inhaler as needed. Follow up with PCP in 3-5 days if no improvement. Proceed to ER if symptoms worsen. Chief Complaint     Chief Complaint   Patient presents with    Nasal Congestion    chest congestion    Cough     Patient reports around 7 days ago symptoms started of what she believes to be a upper respiratory infection. Denies any other symptoms at this time. History of Present Illness     Yusuf Crain is a 26 y.o. female presenting to the office today for upper respiratory complaints. Symptoms have been present for 10 days, and include cold symptoms, chest pressure, chest pressure, mucus. She has tried Dayquil, Nyquil, Sudafed, Mucinex for her symptoms, no relief. Patient states albuterol is not helping with chest tightness. Sick contacts include: hospital     Review of Systems     Review of Systems   Constitutional:  Positive for fatigue. Negative for chills and fever. HENT:  Positive for sinus pressure, sinus pain and voice change. Negative for ear pain, sore throat and trouble swallowing. Eyes:  Negative for pain and redness. Respiratory:  Positive for cough and chest tightness. Negative for shortness of breath and wheezing. Cardiovascular:  Negative for chest pain and palpitations.    Gastrointestinal:  Negative for abdominal pain, diarrhea, nausea and vomiting. Genitourinary: Negative. Negative for difficulty urinating. Musculoskeletal:  Positive for myalgias. Negative for arthralgias and back pain. Skin:  Negative for color change and rash. Neurological:  Negative for seizures and syncope. All other systems reviewed and are negative. Current Medications       Current Outpatient Medications:     albuterol (2.5 mg/3 mL) 0.083 % nebulizer solution, Take 3 mL (2.5 mg total) by nebulization every 6 (six) hours as needed for wheezing or shortness of breath, Disp: 30 mL, Rfl: 0    amoxicillin-clavulanate (AUGMENTIN) 875-125 mg per tablet, Take 1 tablet by mouth every 12 (twelve) hours for 7 days, Disp: 14 tablet, Rfl: 0    acetaminophen (TYLENOL) 500 mg tablet, Take 1,000 mg by mouth every 4 (four) hours as needed , Disp: , Rfl:     albuterol (ProAir HFA) 90 mcg/act inhaler, Inhale 2 puffs every 6 (six) hours as needed for wheezing or shortness of breath, Disp: 8.5 g, Rfl: 0    ASMANEX  MCG/ACT AERO, Inhale 2 puffs every 4 (four) hours as needed (2 puffs), Disp: , Rfl:     Atogepant (Qulipta) 60 MG TABS, Take 60 mg by mouth in the morning, Disp: 90 tablet, Rfl: 4    cetirizine (ZyrTEC) 10 mg tablet, Take 10 mg by mouth daily, Disp: , Rfl:     clindamycin (CLEOCIN T) 1 % lotion, Apply topically daily To face, Disp: 60 mL, Rfl: 0    diphenhydrAMINE (BENADRYL) 25 mg tablet, Take 1 tablet (25 mg total) by mouth every 6 (six) hours as needed for itching (Patient not taking: Reported on 10/16/2023), Disp: 30 tablet, Rfl: 0    Elastic Bandages & Supports (TRUFORM STOCKINGS 20-30MMHG) MISC, Use as directed., Disp: , Rfl:     fludrocortisone (FLORINEF) 0.1 mg tablet, Take 0.1 mg by mouth 2 (two) times a day, Disp: , Rfl:     metoprolol succinate (TOPROL-XL) 50 mg 24 hr tablet, Take 50 mg by mouth in the morning and 50 mg before bedtime. , Disp: , Rfl:     onabotulinumtoxin A (BOTOX) 100 units, Inject  as directed. Injection every 91 days for migraines, Disp: , Rfl:     ondansetron (Zofran ODT) 4 mg disintegrating tablet, Take 1 tablet (4 mg total) by mouth every 6 (six) hours as needed for nausea or vomiting, Disp: 20 tablet, Rfl: 0    Prucalopride Succinate (Motegrity) 2 MG TABS, Take 2 mg by mouth in the morning, Disp: 90 tablet, Rfl: 3    rimegepant sulfate (Nurtec) 75 mg TBDP, Take 1 tablet (75 mg total) by mouth as needed (migraine) Limit of 1 in 24 hours, Disp: 16 tablet, Rfl: 3    tretinoin (RETIN-A) 0.025 % cream, Apply topically daily at bedtime, Disp: 45 g, Rfl: 2    venlafaxine (EFFEXOR-XR) 75 mg 24 hr capsule, Take 1 capsule (75 mg total) by mouth daily, Disp: 90 capsule, Rfl: 1    verapamil (CALAN) 120 mg tablet, Take 1 tab by mouth twice a day, Disp: 180 tablet, Rfl: 3  No current facility-administered medications for this visit.     Current Allergies     Allergies as of 11/27/2023 - Reviewed 11/27/2023   Allergen Reaction Noted    Nuts - food allergy Other (See Comments) 02/11/2020    Other Hives 05/29/2020    Pollen extract  09/10/2014    Reyvow [lasmiditan] Palpitations and Hallucinations 12/13/2022            The following portions of the patient's history were reviewed and updated as appropriate: allergies, current medications, past family history, past medical history, past social history, past surgical history and problem list.     Past Medical History:   Diagnosis Date    Annual physical exam 04/08/2019    Anxiety     Asthma     Dizziness     at times    Dyspepsia 01/05/2021    Exertional headache 05/23/2022    GERD (gastroesophageal reflux disease)     Hammertoe of left foot     Headache     occ    Hyperlipemia     Hypermobile joints     Hypertriglyceridemia 06/12/2019    IBS (irritable bowel syndrome) 05/12/2020    Irritable bowel syndrome     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HCC)     Mast cell disorder     Migraine     PONV (postoperative nausea and vomiting)     POTS (postural orthostatic tachycardia syndrome)      infant     Wears glasses        Past Surgical History:   Procedure Laterality Date    COLONOSCOPY      EGD AND COLONOSCOPY N/A 1/10/2017    Procedure: EGD AND COLONOSCOPY;  Surgeon: Marcello Madison MD;  Location: [facility]; Service:     ESOPHAGOGASTRODUODENOSCOPY      with biopsy    FOOT SURGERY      Excision of lesion feet benign    [place] CORRECTION HAMMERTOE Left 2019    Procedure: 2nd arthrodesis; 5th arthroplasty, flexor tenotomy 2,3,4,5.;  Surgeon: Huyen Girard DPM;  Location: [facility];  Service: Podiatry    [place] CORRECTION HAMMERTOE Left 2020    Procedure: 2ND TOE Revision hammertoe with broken implant;  Surgeon: Huyen Girard DPM;  Location: [facility];  Service: Podiatry    UPPER GASTROINTESTINAL ENDOSCOPY  2021    WISDOM TOOTH EXTRACTION         Family History   Problem Relation Age of Onset    Gestational diabetes Mother     Migraines Mother     Anxiety disorder Father     Hyperlipidemia Father     Autism Brother     Diabetes Other     Uterine cancer Maternal Grandmother     Rheumatic fever Maternal Grandmother     Diabetes Maternal Grandfather     Hypertension Maternal Grandfather     Heart attack Maternal Grandfather     Stroke Maternal Grandfather     Hyperlipidemia Maternal Grandfather     Hypertension Paternal Grandmother     Anxiety disorder Paternal Grandmother     Hypertension Paternal Grandfather        Medications have been verified. Objective     /70   Pulse 90   Temp 97.6 °F (36.4 °C) (Tympanic)   Resp 18   Wt 68.9 kg (151 lb 12.8 oz)   SpO2 99%   BMI 23.78 kg/m²   No LMP recorded. Physical Exam     Physical Exam  Constitutional:       General: She is not in acute distress. Appearance: Normal appearance. She is normal weight. She is not ill-appearing, toxic-appearing or diaphoretic. HENT:      Head: Normocephalic and atraumatic.       Right Ear: Tympanic membrane, ear canal and external ear normal. There is no impacted cerumen. Left Ear: Tympanic membrane, ear canal and external ear normal. There is no impacted cerumen. Nose: Congestion and rhinorrhea present. Mouth/Throat:      Mouth: Mucous membranes are moist.      Pharynx: Posterior oropharyngeal erythema present. No oropharyngeal exudate. Eyes:      General:         Right eye: No discharge. Left eye: No discharge. Conjunctiva/sclera: Conjunctivae normal.   Cardiovascular:      Rate and Rhythm: Normal rate and regular rhythm. Pulses: Normal pulses. Heart sounds: Normal heart sounds. No murmur heard. No friction rub. No gallop. Pulmonary:      Effort: Pulmonary effort is normal. No respiratory distress. Breath sounds: Normal breath sounds. No stridor. No wheezing, rhonchi or rales. Chest:      Chest wall: No tenderness. Musculoskeletal:         General: Normal range of motion. Cervical back: Normal range of motion and neck supple. No rigidity or tenderness. Lymphadenopathy:      Cervical: No cervical adenopathy. Skin:     General: Skin is warm and dry. Capillary Refill: Capillary refill takes less than 2 seconds. Coloration: Skin is not jaundiced. Findings: No bruising or rash. Neurological:      General: No focal deficit present. Mental Status: She is alert. Mental status is at baseline.    Psychiatric:         Mood and Affect: Mood normal.         Behavior: Behavior normal.

## 2023-11-28 ENCOUNTER — SOCIAL WORK (OUTPATIENT)
Dept: BEHAVIORAL/MENTAL HEALTH CLINIC | Facility: CLINIC | Age: 23
End: 2023-11-28
Payer: COMMERCIAL

## 2023-11-28 DIAGNOSIS — F41.1 GAD (GENERALIZED ANXIETY DISORDER): Primary | ICD-10-CM

## 2023-11-28 PROCEDURE — 90837 PSYTX W PT 60 MINUTES: CPT | Performed by: SOCIAL WORKER

## 2023-11-28 NOTE — PSYCH
Behavioral Health Psychotherapy Progress Note    Psychotherapy Provided: Individual Psychotherapy     Encounter Diagnoses   Name Primary? • INES (generalized anxiety disorder) Yes       Goals addressed in session: Goal 1     DATA:  Vera Donis spoke further today about her feelings re: the essay she wrote about her experiences as a child surrounding the time of her parents' divorce. She also discussed her dad's emergency appendectomy immediately following her last session. During this session, this clinician used the following therapeutic modalities: Motivational Interviewing and Supportive Psychotherapy    Substance Abuse was not addressed during this session. If the client is diagnosed with a co-occurring substance use disorder, please indicate any changes in the frequency or amount of use: . Stage of change for addressing substance use diagnoses: No substance use/Not applicable    ASSESSMENT:  Lety Barajas presents with a Euthymic/ normal mood. Vera Donis appears less concerned today about how the work that she needs to do will change either this worker's or her own opinions re: her parents. She is invested in healing, despite the discomfort that is associated with doing so.      her affect is Normal range and intensity, which is congruent, with her mood and the content of the session. The client has made progress on their goals. Lety Barajas presents with a minimal risk of suicide, minimal risk of self-harm, and minimal risk of harm to others. For any risk assessment that surpasses a "low" rating, a safety plan must be developed. A safety plan was indicated: no  If yes, describe in detail     PLAN: Between sessions, Lety Barajas will delve further into the above topic and reson for her attachement fears. . At the next session, the therapist will use Supportive Psychotherapy to address the above in further detail.     Behavioral Health Treatment Plan and Discharge Planning: Lola Pantoja Jorge Briseno is aware of and agrees to continue to work on their treatment plan. They have identified and are working toward their discharge goals.  yes        11/28/23  Start Time: 1100  Stop Time: 1230  Total Visit Time: 90 [id number]   1450   50 mins

## 2023-12-07 ENCOUNTER — SOCIAL WORK (OUTPATIENT)
Dept: BEHAVIORAL/MENTAL HEALTH CLINIC | Facility: CLINIC | Age: 23
End: 2023-12-07
Payer: COMMERCIAL

## 2023-12-07 DIAGNOSIS — Z00.6 ENCOUNTER FOR EXAMINATION FOR NORMAL COMPARISON OR CONTROL IN CLINICAL RESEARCH PROGRAM: ICD-10-CM

## 2023-12-07 DIAGNOSIS — F41.1 GAD (GENERALIZED ANXIETY DISORDER): Primary | ICD-10-CM

## 2023-12-07 PROCEDURE — 90834 PSYTX W PT 45 MINUTES: CPT | Performed by: SOCIAL WORKER

## 2023-12-07 NOTE — PSYCH
Behavioral Health Psychotherapy Progress Note    Psychotherapy Provided: Individual Psychotherapy     Encounter Diagnoses   Name Primary? • INES (generalized anxiety disorder) Yes       Goals addressed in session: Goal 1     DATA:  Yaeklin Obando spoke further today about her feelings re: her experiences as a child surrounding the time of her parents' divorce. She also reported that since her last session, she has had conversations about this time period with both her father and stepmother. During this session, this clinician used the following therapeutic modalities: Motivational Interviewing and Supportive Psychotherapy    Substance Abuse was not addressed during this session. If the client is diagnosed with a co-occurring substance use disorder, please indicate any changes in the frequency or amount of use: . Stage of change for addressing substance use diagnoses: No substance use/Not applicable    ASSESSMENT:  Keyla Mcconnell presents with a Euthymic/ normal mood. Yakelin Obando appears fearful of having a similar discussion with her mother. Reasons for this were processed. In addition, she does not want to discuss her concerns with er ob at this time. her affect is Normal range and intensity, which is congruent, with her mood and the content of the session. The client has made progress on their goals. Keyla Mcconnell presents with a minimal risk of suicide, minimal risk of self-harm, and minimal risk of harm to others. For any risk assessment that surpasses a "low" rating, a safety plan must be developed. A safety plan was indicated: no  If yes, describe in detail     PLAN: Between sessions, Keyla Mcconnell will delve further into the above topic and reson for her attachement fears. . At the next session, the therapist will use Supportive Psychotherapy to address the above in further detail.     Behavioral Health Treatment Plan and Discharge Planning: Keyla Mcconnell is aware of and agrees to continue to work on their treatment plan. They have identified and are working toward their discharge goals.  yes        12/7/23  Start Time: 0800  Stop Time: 0850  Total Visit Time: 50 minutes

## 2023-12-12 ENCOUNTER — AMB VIDEO VISIT (OUTPATIENT)
Dept: OTHER | Facility: HOSPITAL | Age: 23
End: 2023-12-12
Payer: COMMERCIAL

## 2023-12-12 VITALS — HEART RATE: 60 BPM | TEMPERATURE: 98.6 F | RESPIRATION RATE: 12 BRPM

## 2023-12-12 DIAGNOSIS — J01.40 ACUTE NON-RECURRENT PANSINUSITIS: Primary | ICD-10-CM

## 2023-12-12 PROBLEM — J30.2 SEASONAL ALLERGIES: Status: RESOLVED | Noted: 2021-05-27 | Resolved: 2023-12-12

## 2023-12-12 PROCEDURE — 99212 OFFICE O/P EST SF 10 MIN: CPT | Performed by: PHYSICIAN ASSISTANT

## 2023-12-12 RX ORDER — DOXYCYCLINE HYCLATE 100 MG
100 TABLET ORAL 2 TIMES DAILY
Qty: 14 TABLET | Refills: 0 | Status: SHIPPED | OUTPATIENT
Start: 2023-12-12 | End: 2023-12-19

## 2023-12-12 NOTE — PATIENT INSTRUCTIONS
As discussed, sinus infections are typically viral and will get better on their own in 7-10 days. You should try symptomatic relief in the meantime with OTC (Claritin or Zyrtec), Afrin up to 3 days then switch to Flonase, and mucinex. You can also try sinus rinses with a neti pot or nasal lavage (be sure to use distilled water.) If your symptoms do not improve after 7-10 days, you may need antibiotics because it is more likely to be bacterial at that point. Follow-up with your primary care physician for recheck in 2-3 business days. Go to the ER for sudden severe headache, high fevers, change in vision, seizure activity or anything else that is concerning.

## 2023-12-12 NOTE — CARE ANYWHERE EVISITS
Visit Summary for Gaby Villafuerte . [facility] - Gender: Female - Date of Birth: [birth year]  Date: [id number] - Duration: 9 minutes  Patient: Gaby Villafuerte . [facility]  Provider: Ayo Espinosa PA-C    Patient Contact Information  Address  [address] [place] [address]; [address]  [phone]    Visit Topics  Cold [Added By: Self - 2023-12-12]    Triage Questions   What is your current physical address in the event of a medical emergency? Answer []  Are you allergic to any medications? Answer []  Are you now or could you be pregnant? Answer []  Do you have any immune system compromise or chronic lung   disease? Answer []  Do you have any vulnerable family members in the home (infant, pregnant, cancer, elderly)? Answer []     Conversation Transcripts  [0A][0A] [Notification] You are connected with Ayo Espinosa PA-C, Urgent Care Specialist.[0A][Notification] Julissa Fu is located in [place]. [0A][Notification] Julissa Fu has shared health history. Lorena French .[0A]    Diagnosis  Acute pansinusitis    Procedures  Value: [id number] Code: CPT-4 UNLISTED E&M SERVICE    Medications Prescribed    No prescriptions ordered    Electronically signed by: Sandra Garcia(NPI [id number])

## 2023-12-13 ENCOUNTER — ANNUAL EXAM (OUTPATIENT)
Dept: OBGYN CLINIC | Facility: CLINIC | Age: 23
End: 2023-12-13
Payer: COMMERCIAL

## 2023-12-13 VITALS
SYSTOLIC BLOOD PRESSURE: 124 MMHG | BODY MASS INDEX: 23.23 KG/M2 | WEIGHT: 148 LBS | DIASTOLIC BLOOD PRESSURE: 88 MMHG | HEIGHT: 67 IN

## 2023-12-13 DIAGNOSIS — N93.9 ABNORMAL UTERINE BLEEDING (AUB): ICD-10-CM

## 2023-12-13 DIAGNOSIS — Z01.419 ENCOUNTER FOR GYNECOLOGICAL EXAMINATION (GENERAL) (ROUTINE) WITHOUT ABNORMAL FINDINGS: Primary | ICD-10-CM

## 2023-12-13 PROCEDURE — S0612 ANNUAL GYNECOLOGICAL EXAMINA: HCPCS | Performed by: STUDENT IN AN ORGANIZED HEALTH CARE EDUCATION/TRAINING PROGRAM

## 2023-12-13 NOTE — PROGRESS NOTES
Zohra Poag  2000    Assessment and Plan:  Yearly exam without abnormality.     -Pap pending intercourse. We reviewed ASCCP guidelines for Pap testing today. -AUB: pelvic exam wnl, TVUS ordered. Reviewed available treatment options. Would defer treatment unless overly bothersome given prior prolonged amenorrhea with depo    RTO one year for yearly exam or sooner as needed. CC:  Yearly exam    S:  26 y.o. female here for yearly exam.     Menarche: 16years old. G0 - virginal  LMP 11/26  Last Pap: none    No smoking, alcohol, drugs    Doing well overall. Excited for the new NICU to open at [facility]. Her cycles are irregular, usually coming more frequently than q28. Not heavy or crampy, sometimes very light. Sexual activity: She is not sexually. Gardasil:  She has had the Gardasil series. Family hx of breast cancer: denies  Family hx of ovarian cancer: denies  Family hx of colon cancer: denies  MGM uterine cancer     Denies hot flushes, dyspareunia, abnormal uterine bleeding, urinary/fecal incontinence, changes in energy levels, mood.        Current Outpatient Medications:     acetaminophen (TYLENOL) 500 mg tablet, Take 1,000 mg by mouth every 4 (four) hours as needed , Disp: , Rfl:     albuterol (2.5 mg/3 mL) 0.083 % nebulizer solution, Take 3 mL (2.5 mg total) by nebulization every 6 (six) hours as needed for wheezing or shortness of breath, Disp: 30 mL, Rfl: 0    albuterol (ProAir HFA) 90 mcg/act inhaler, Inhale 2 puffs every 6 (six) hours as needed for wheezing or shortness of breath, Disp: 8.5 g, Rfl: 0    ASMANEX  MCG/ACT AERO, Inhale 2 puffs every 4 (four) hours as needed (2 puffs), Disp: , Rfl:     Atogepant (Qulipta) 60 MG TABS, Take 60 mg by mouth in the morning, Disp: 90 tablet, Rfl: 4    cetirizine (ZyrTEC) 10 mg tablet, Take 10 mg by mouth daily, Disp: , Rfl:     clindamycin (CLEOCIN T) 1 % lotion, Apply topically daily To face, Disp: 60 mL, Rfl: 0 diphenhydrAMINE (BENADRYL) 25 mg tablet, Take 1 tablet (25 mg total) by mouth every 6 (six) hours as needed for itching, Disp: 30 tablet, Rfl: 0    doxycycline hyclate (VIBRA-TABS) 100 mg tablet, Take 1 tablet (100 mg total) by mouth 2 (two) times a day for 7 days, Disp: 14 tablet, Rfl: 0    Elastic Bandages & Supports (Rosalina Sear 20-30MMHG) MISC, Use as directed., Disp: , Rfl:     fludrocortisone (FLORINEF) 0.1 mg tablet, Take 0.1 mg by mouth 2 (two) times a day, Disp: , Rfl:     metoprolol succinate (TOPROL-XL) 50 mg 24 hr tablet, Take 50 mg by mouth in the morning and 50 mg before bedtime. , Disp: , Rfl:     onabotulinumtoxin A (BOTOX) 100 units, Inject  as directed. Injection every 91 days for migraines, Disp: , Rfl:     ondansetron (Zofran ODT) 4 mg disintegrating tablet, Take 1 tablet (4 mg total) by mouth every 6 (six) hours as needed for nausea or vomiting, Disp: 20 tablet, Rfl: 0    Prucalopride Succinate (Motegrity) 2 MG TABS, Take 2 mg by mouth in the morning, Disp: 90 tablet, Rfl: 3    rimegepant sulfate (Nurtec) 75 mg TBDP, Take 1 tablet (75 mg total) by mouth as needed (migraine) Limit of 1 in 24 hours, Disp: 16 tablet, Rfl: 3    tretinoin (RETIN-A) 0.025 % cream, Apply topically daily at bedtime, Disp: 45 g, Rfl: 2    venlafaxine (EFFEXOR-XR) 75 mg 24 hr capsule, Take 1 capsule (75 mg total) by mouth daily, Disp: 90 capsule, Rfl: 1    verapamil (CALAN) 120 mg tablet, Take 1 tab by mouth twice a day, Disp: 180 tablet, Rfl: 3  Social History     Socioeconomic History    Marital status: Single     Spouse name: Not on file    Number of children: Not on file    Years of education: Not on file    Highest education level: Not on file   Occupational History    Not on file   Tobacco Use    Smoking status: Never    Smokeless tobacco: Never   Vaping Use    Vaping status: Never Used   Substance and Sexual Activity    Alcohol use: No    Drug use: No     Comment: 2/9/20- not asked, parent at bedside. Sexual activity: Never   Other Topics Concern    Not on file   Social History Narrative    Lives with parents     Social Determinants of Health     Financial Resource Strain: Not on file   Food Insecurity: Not on file   Transportation Needs: Not on file   Physical Activity: Insufficiently Active (6/15/2022)    Exercise Vital Sign     Days of Exercise per Week: 3 days     Minutes of Exercise per Session: 30 min   Stress: Not on file   Social Connections: Not on file   Intimate Partner Violence: Not on file   Housing Stability: Not on file     Family History   Problem Relation Age of Onset    Gestational diabetes Mother     Migraines Mother     Anxiety disorder Father     Hyperlipidemia Father     Autism Brother     Diabetes Other     Uterine cancer Maternal Grandmother     Rheumatic fever Maternal Grandmother     Diabetes Maternal Grandfather     Hypertension Maternal Grandfather     Heart attack Maternal Grandfather     Stroke Maternal Grandfather     Hyperlipidemia Maternal Grandfather     Hypertension Paternal Grandmother     Anxiety disorder Paternal Grandmother     Hypertension Paternal Grandfather       Past Medical History:   Diagnosis Date    Annual physical exam 2019    Anxiety     Asthma     Dizziness     at times    Dyspepsia 2021    Exertional headache 2022    GERD (gastroesophageal reflux disease)     Hammertoe of left foot     Headache     occ    Hyperlipemia     Hypermobile joints     Hypertriglyceridemia 2019    IBS (irritable bowel syndrome) 2020    Irritable bowel syndrome     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HCC)     Mast cell disorder     Migraine     PONV (postoperative nausea and vomiting)     POTS (postural orthostatic tachycardia syndrome)      infant     Wears glasses         Review of Systems   Respiratory: Negative. Cardiovascular: Negative. Gastrointestinal: Negative for constipation and diarrhea.    Genitourinary: Negative for difficulty urinating, pelvic pain, vaginal bleeding, vaginal discharge, itching or odor. O:  Blood pressure 124/88, height 5' 7" (1.702 m), weight 67.1 kg (148 lb), last menstrual period 11/26/2023, not currently breastfeeding. Patient appears well and is not in distress  Neck is supple without masses  Breasts are symmetrical without mass, tenderness, nipple discharge, skin changes or adenopathy. Abdomen is soft and nontender without masses. External genitals are normal without lesions or rashes. Urethral meatus and urethra are normal  Bladder is normal to palpation  Vagina is normal without discharge or bleeding. Cervix is normal without discharge or lesion. Uterus is normal, mobile, nontender without palpable mass. Adnexa are normal, nontender, without palpable mass.

## 2023-12-14 ENCOUNTER — SOCIAL WORK (OUTPATIENT)
Dept: BEHAVIORAL/MENTAL HEALTH CLINIC | Facility: CLINIC | Age: 23
End: 2023-12-14
Payer: COMMERCIAL

## 2023-12-14 DIAGNOSIS — F41.1 GAD (GENERALIZED ANXIETY DISORDER): Primary | ICD-10-CM

## 2023-12-14 PROCEDURE — 90834 PSYTX W PT 45 MINUTES: CPT | Performed by: SOCIAL WORKER

## 2023-12-14 NOTE — PSYCH
Behavioral Health Psychotherapy Progress Note    Psychotherapy Provided: Individual Psychotherapy     Encounter Diagnoses   Name Primary? • INES (generalized anxiety disorder) Yes     Goals addressed in session: Goal 1     DATA:  Heri First spoke further today about her feelings re: her experiences as a child surrounding the time of her parents' divorce. She also reported that since her last session, she was seen by her ob/gyn and that the visit was positive and validating. During this session, this clinician used the following therapeutic modalities: Motivational Interviewing and Supportive Psychotherapy    Substance Abuse was not addressed during this session. If the client is diagnosed with a co-occurring substance use disorder, please indicate any changes in the frequency or amount of use: . Stage of change for addressing substance use diagnoses: No substance use/Not applicable    ASSESSMENT:  Lisa Hunt presents with a Euthymic/ normal mood. Heri First remains fearful of having a similar discussion with her mother. However, she appears to feel relieved that she has spoken with her father and he is now aware of how much the past has impacted. her.    her affect is Normal range and intensity, which is congruent, with her mood and the content of the session. The client has made progress on their goals. Lisa Hunt presents with a minimal risk of suicide, minimal risk of self-harm, and minimal risk of harm to others. For any risk assessment that surpasses a "low" rating, a safety plan must be developed. A safety plan was indicated: no  If yes, describe in detail     PLAN: Between sessions, Lisa Hunt will delve further into the above topic and reson for her attachement fears. . At the next session, the therapist will use Supportive Psychotherapy to address the above in further detail.     Behavioral Health Treatment Plan and Discharge Planning: Lisa Hunt is aware of and agrees to continue to work on their treatment plan. They have identified and are working toward their discharge goals.  yes        12/7/23  Start Time: 0800  Stop Time: 0850  Total Visit Time: 50 minutes

## 2023-12-20 ENCOUNTER — HOSPITAL ENCOUNTER (OUTPATIENT)
Dept: RADIOLOGY | Facility: HOSPITAL | Age: 23
Discharge: HOME | End: 2023-12-20
Attending: STUDENT IN AN ORGANIZED HEALTH CARE EDUCATION/TRAINING PROGRAM
Payer: COMMERCIAL

## 2023-12-20 ENCOUNTER — SOCIAL WORK (OUTPATIENT)
Dept: BEHAVIORAL/MENTAL HEALTH CLINIC | Facility: CLINIC | Age: 23
End: 2023-12-20
Payer: COMMERCIAL

## 2023-12-20 ENCOUNTER — OFFICE VISIT (OUTPATIENT)
Dept: INTERNAL MEDICINE CLINIC | Facility: CLINIC | Age: 23
End: 2023-12-20
Payer: COMMERCIAL

## 2023-12-20 VITALS — DIASTOLIC BLOOD PRESSURE: 74 MMHG | SYSTOLIC BLOOD PRESSURE: 116 MMHG | WEIGHT: 150.2 LBS | BODY MASS INDEX: 23.52 KG/M2

## 2023-12-20 DIAGNOSIS — N93.9 ABNORMAL UTERINE BLEEDING (AUB): ICD-10-CM

## 2023-12-20 DIAGNOSIS — F41.1 GAD (GENERALIZED ANXIETY DISORDER): Primary | ICD-10-CM

## 2023-12-20 DIAGNOSIS — J01.40 ACUTE NON-RECURRENT PANSINUSITIS: Primary | ICD-10-CM

## 2023-12-20 PROCEDURE — 76830 TRANSVAGINAL US NON-OB: CPT

## 2023-12-20 PROCEDURE — 76856 US EXAM PELVIC COMPLETE: CPT

## 2023-12-20 PROCEDURE — 99213 OFFICE O/P EST LOW 20 MIN: CPT | Performed by: INTERNAL MEDICINE

## 2023-12-20 PROCEDURE — 90834 PSYTX W PT 45 MINUTES: CPT | Performed by: SOCIAL WORKER

## 2023-12-20 RX ORDER — AZITHROMYCIN 250 MG/1
TABLET, FILM COATED ORAL
Qty: 6 TABLET | Refills: 0 | Status: SHIPPED | OUTPATIENT
Start: 2023-12-20 | End: 2023-12-24

## 2023-12-20 RX ORDER — METHYLPREDNISOLONE 4 MG/1
TABLET ORAL
Qty: 21 TABLET | Refills: 0 | Status: SHIPPED | OUTPATIENT
Start: 2023-12-20

## 2023-12-20 NOTE — PSYCH
"Behavioral Health Psychotherapy Progress Note    Psychotherapy Provided: Individual Psychotherapy     Encounter Diagnoses   Name Primary?   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  She presented today as exhausted.  Nury also discussed insights she has gleaned listening to a podcast on healing.  During this session, this clinician used the following therapeutic modalities: Motivational Interviewing and Supportive Psychotherapy    Substance Abuse was not addressed during this session. If the client is diagnosed with a co-occurring substance use disorder, please indicate any changes in the frequency or amount of use: . Stage of change for addressing substance use diagnoses: No substance use/Not applicable    ASSESSMENT:  Nury Hernandez presents with a Euthymic/ normal mood.  Nury has been invested in both the therapy process as well as engaging in work outside of sessions.  her affect is Normal range and intensity, which is congruent, with her mood and the content of the session. The client has made progress on their goals.     Nury Hernandez presents with a minimal risk of suicide, minimal risk of self-harm, and minimal risk of harm to others.    For any risk assessment that surpasses a \"low\" rating, a safety plan must be developed.    A safety plan was indicated: no  If yes, describe in detail     PLAN: Between sessions, Nury Hernandez will delve further into the above topic and reson for her attachement fears.  . At the next session, the therapist will use Supportive Psychotherapy to address the above in further detail.    Behavioral Health Treatment Plan and Discharge Planning: Nury Hernandez is aware of and agrees to continue to work on their treatment plan. They have identified and are working toward " their discharge goals. yes        12/20/23  Start Time: 1400  Stop Time: 1450  Total Visit Time: 50 minutes

## 2023-12-20 NOTE — PROGRESS NOTES
Name: Nury Hernandez      : 2000      MRN: [id number]  Encounter Provider: Lea Reyes, MD  Encounter Date: 2023   Enc[facility]: [facility]    Assessment & Plan     1. Acute non-recurrent pansinusitis  -     methylPREDNISolone 4 MG tablet therapy pack; Use as directed on package  -     azithromycin (ZITHROMAX) 250 mg tablet; Take 2 tablets today then 1 tablet daily x 4 days    Start medrol dose ack and if she does not continue to improve, will start Zpack  Continue OTC meds PRN       Subjective      She has been on a week of Augmentin and a week of doxycycline which she just finished yesterday  Symptoms seem to improve with each antibiotic but return after a few days  Mother had the same symptoms and immediately improved on the Zpack  Nasal discharge is clear now    URI   This is a recurrent problem. The current episode started more than 1 month ago. The problem has been waxing and waning. There has been no fever. Associated symptoms include congestion, coughing and rhinorrhea. Pertinent negatives include no diarrhea, nausea, sore throat, vomiting or wheezing. She has tried decongestant for the symptoms. The treatment provided mild relief.     Review of Systems   Constitutional:  Negative for chills and fever.   HENT:  Positive for congestion, postnasal drip and rhinorrhea. Negative for sore throat.    Respiratory:  Positive for cough. Negative for shortness of breath and wheezing.    Gastrointestinal:  Negative for diarrhea, nausea and vomiting.   Musculoskeletal:  Negative for myalgias.       Current Outpatient Medications on File Prior to Visit   Medication Sig   • acetaminophen (TYLENOL) 500 mg tablet Take 1,000 mg by mouth every 4 (four) hours as needed    • albuterol (2.5 mg/3 mL) 0.083 % nebulizer solution Take 3 mL (2.5 mg total) by nebulization every 6 (six) hours as needed for wheezing or shortness of breath   • albuterol (ProAir HFA) 90 mcg/act inhaler Inhale  2 puffs every 6 (six) hours as needed for wheezing or shortness of breath   • ASMANEX  MCG/ACT AERO Inhale 2 puffs every 4 (four) hours as needed (2 puffs)   • Atogepant (Qulipta) 60 MG TABS Take 60 mg by mouth in the morning   • cetirizine (ZyrTEC) 10 mg tablet Take 10 mg by mouth daily   • clindamycin (CLEOCIN T) 1 % lotion Apply topically daily To face   • diphenhydrAMINE (BENADRYL) 25 mg tablet Take 1 tablet (25 mg total) by mouth every 6 (six) hours as needed for itching   • [] doxycycline hyclate (VIBRA-TABS) 100 mg tablet Take 1 tablet (100 mg total) by mouth 2 (two) times a day for 7 days   • Elastic Bandages & Supports (TRUFORM STOCKINGS 20-30MMHG) MISC Use as directed.   • fludrocortisone (FLORINEF) 0.1 mg tablet Take 0.1 mg by mouth 2 (two) times a day   • metoprolol succinate (TOPROL-XL) 50 mg 24 hr tablet Take 50 mg by mouth in the morning and 50 mg before bedtime.   • onabotulinumtoxin A (BOTOX) 100 units Inject  as directed. Injection every 91 days for migraines   • ondansetron (Zofran ODT) 4 mg disintegrating tablet Take 1 tablet (4 mg total) by mouth every 6 (six) hours as needed for nausea or vomiting   • Prucalopride Succinate (Motegrity) 2 MG TABS Take 2 mg by mouth in the morning   • rimegepant sulfate (Nurtec) 75 mg TBDP Take 1 tablet (75 mg total) by mouth as needed (migraine) Limit of 1 in 24 hours   • tretinoin (RETIN-A) 0.025 % cream Apply topically daily at bedtime   • venlafaxine (EFFEXOR-XR) 75 mg 24 hr capsule Take 1 capsule (75 mg total) by mouth daily   • verapamil (CALAN) 120 mg tablet Take 1 tab by mouth twice a day       Objective     /74 (BP Location: Left arm, Patient Position: Sitting, Cuff Size: Standard)   Wt 68.1 kg (150 lb 3.2 oz)   LMP 2023 (Exact Date)   BMI 23.52 kg/m²     Physical Exam  Constitutional:       Appearance: She is ill-appearing.   HENT:      Right Ear: There is no impacted cerumen.      Left Ear: There is no impacted cerumen.       Mouth/Throat:      Pharynx: No posterior oropharyngeal erythema.   Cardiovascular:      Rate and Rhythm: Regular rhythm.      Heart sounds: Normal heart sounds.   Pulmonary:      Breath sounds: Normal breath sounds.   Musculoskeletal:      Cervical back: Neck supple.   Psychiatric:         Mood and Affect: Mood normal.         Behavior: Behavior normal.       Lea Reyes, MD

## 2023-12-29 ENCOUNTER — TELEPHONE (OUTPATIENT)
Dept: OBGYN CLINIC | Facility: CLINIC | Age: 23
End: 2023-12-29

## 2023-12-29 NOTE — TELEPHONE ENCOUNTER
----- Message from Nury Hernandez sent at 12/29/2023  2:42 AM EST -----  Regarding: Ultrasound Results  Contact: [phone]  Hi Dr. Urias!     I saw my ultrasound came back normal as we thought but I was just curious if you got to look at it at all!? Are you able to take a closer look at my ovaries to see if they mimic PCOS!? I know it won’t always show up on an ultrasound but I just don’t want it to get missed!     Thank you,  Nury Hernandez

## 2024-01-02 ENCOUNTER — SOCIAL WORK (OUTPATIENT)
Dept: BEHAVIORAL/MENTAL HEALTH CLINIC | Facility: CLINIC | Age: 24
End: 2024-01-02
Payer: COMMERCIAL

## 2024-01-02 DIAGNOSIS — F41.1 GAD (GENERALIZED ANXIETY DISORDER): Primary | ICD-10-CM

## 2024-01-02 PROCEDURE — 90834 PSYTX W PT 45 MINUTES: CPT | Performed by: SOCIAL WORKER

## 2024-01-03 DIAGNOSIS — G43.011 INTRACTABLE MIGRAINE WITHOUT AURA AND WITH STATUS MIGRAINOSUS: ICD-10-CM

## 2024-01-03 RX ORDER — VERAPAMIL HYDROCHLORIDE 120 MG/1
TABLET, FILM COATED ORAL
Qty: 180 TABLET | Refills: 0 | Status: CANCELLED | OUTPATIENT
Start: 2024-01-03

## 2024-01-03 NOTE — PSYCH
"Behavioral Health Psychotherapy Progress Note    Psychotherapy Provided: Individual Psychotherapy     Encounter Diagnoses   Name Primary?   • INES (generalized anxiety disorder) Yes         Goals addressed in session: Goal 1     DATA:  Nury spoke  today about her feelings re: her time working over the recent holidays while also sharing ways that she has begun to practice mindfulness.  .  During this session, this clinician used the following therapeutic modalities: Motivational Interviewing and Supportive Psychotherapy    Substance Abuse was not addressed during this session. If the client is diagnosed with a co-occurring substance use disorder, please indicate any changes in the frequency or amount of use: . Stage of change for addressing substance use diagnoses: No substance use/Not applicable    ASSESSMENT:  Nury Hernandez presents with a Euthymic/ normal mood.  Nury has remained invested in both the therapy process as well as engaging in work outside of sessions.  her affect is Normal range and intensity, which is congruent, with her mood and the content of the session. The client has made progress on their goals.     Nury Hernandez presents with a minimal risk of suicide, minimal risk of self-harm, and minimal risk of harm to others.    For any risk assessment that surpasses a \"low\" rating, a safety plan must be developed.    A safety plan was indicated: no  If yes, describe in detail     PLAN: Between sessions, Nury Hernandez will delve further into the above topic and reson for her attachement fears.  . At the next session, the therapist will use Supportive Psychotherapy to address the above in further detail.    Behavioral Health Treatment Plan and Discharge Planning: Nury Hernandez is aware of and agrees to continue to work on their treatment plan. They have identified and are working toward their discharge goals. yes        1/2/24  Start Time: 1600  Stop Time: 1650  Total Visit Time: 50 " minutes

## 2024-01-05 ENCOUNTER — APPOINTMENT (OUTPATIENT)
Dept: LAB | Facility: CLINIC | Age: 24
End: 2024-01-05

## 2024-01-05 DIAGNOSIS — Z00.6 ENCOUNTER FOR EXAMINATION FOR NORMAL COMPARISON OR CONTROL IN CLINICAL RESEARCH PROGRAM: ICD-10-CM

## 2024-01-05 PROCEDURE — 36415 COLL VENOUS BLD VENIPUNCTURE: CPT

## 2024-01-08 ENCOUNTER — SOCIAL WORK (OUTPATIENT)
Dept: BEHAVIORAL/MENTAL HEALTH CLINIC | Facility: CLINIC | Age: 24
End: 2024-01-08
Payer: COMMERCIAL

## 2024-01-08 DIAGNOSIS — F41.1 GAD (GENERALIZED ANXIETY DISORDER): Primary | ICD-10-CM

## 2024-01-08 PROCEDURE — 90834 PSYTX W PT 45 MINUTES: CPT | Performed by: SOCIAL WORKER

## 2024-01-09 ENCOUNTER — TELEPHONE (OUTPATIENT)
Dept: PSYCHIATRY | Facility: CLINIC | Age: 24
End: 2024-01-09

## 2024-01-09 DIAGNOSIS — F41.1 GAD (GENERALIZED ANXIETY DISORDER): Primary | ICD-10-CM

## 2024-01-09 RX ORDER — VENLAFAXINE HYDROCHLORIDE 37.5 MG/1
37.5 CAPSULE, EXTENDED RELEASE ORAL DAILY
Qty: 7 CAPSULE | Refills: 0 | Status: SHIPPED | OUTPATIENT
Start: 2024-01-09 | End: 2024-01-16

## 2024-01-09 NOTE — TELEPHONE ENCOUNTER
----- Message from Clementina Sky sent at 1/9/2024  7:53 AM EST -----  Regarding: pt is ready to wean off venlafaxine, doing really well  Hello,     Pt would like to wean off this medication as discussed during last appt--would you be able to call in 37.5 mg script for her?  Please reach out with any questions or concerns!    Thanks!

## 2024-01-09 NOTE — TELEPHONE ENCOUNTER
"Returned call and spoke with Nury. Notes that she is doing well and would like to taper off Venlafaxine 75 mg which she takes daily. Instructed to take 37.5 mg for 7 days and to stop/ Alternatively, can space out the last 2 doses by skipping a day in between to lessen potential withdrawal symptoms. Withdrawal symptoms can manifest as flu-like symptoms with body aches, rhinorrhea, muscle aches, fatigue, and \"brain zaps\", among others. Instructed to call the office if feeling unwell or unable to tolerate taper. Nury agrees with proposed plan.  "

## 2024-01-09 NOTE — PSYCH
"Behavioral Health Psychotherapy Progress Note    Psychotherapy Provided: Individual Psychotherapy     Encounter Diagnoses   Name Primary?   • INES (generalized anxiety disorder) Yes           Goals addressed in session: Goal 1     DATA:  Nury spoke  today about her feelings re: her struggle to limit her commitments so that she can sleep.  She also shared her desire to start weaning off her SSRI as she is doing well emotionally.  During this session, this clinician used the following therapeutic modalities: Motivational Interviewing and Supportive Psychotherapy    Substance Abuse was not addressed during this session. If the client is diagnosed with a co-occurring substance use disorder, please indicate any changes in the frequency or amount of use: . Stage of change for addressing substance use diagnoses: No substance use/Not applicable    ASSESSMENT:  Nury Hernandez presents with a Euthymic/ normal mood.  Nury appears to recognize the progress that she has made in therapy and will be encouraged this year to continue challenging herself and her growth.  her affect is Normal range and intensity, which is congruent, with her mood and the content of the session. The client has made progress on their goals.     Nury Hernandez presents with a minimal risk of suicide, minimal risk of self-harm, and minimal risk of harm to others.    For any risk assessment that surpasses a \"low\" rating, a safety plan must be developed.    A safety plan was indicated: no  If yes, describe in detail     PLAN: Between sessions, Nury Hernandez will delve further into the above topic and reson for her attachement fears.  . At the next session, the therapist will use Supportive Psychotherapy to address the above in further detail.    Behavioral Health Treatment Plan and Discharge Planning: Nury Hernandez is aware of and agrees to continue to work on their treatment plan. They have identified and are working toward their " discharge goals. yes        1/8/24  Start Time: 1500  Stop Time: 1550  Total Visit Time: 50 minutes

## 2024-01-11 DIAGNOSIS — B00.1 COLD SORE: Primary | ICD-10-CM

## 2024-01-11 RX ORDER — ACYCLOVIR 50 MG/G
OINTMENT TOPICAL
Qty: 15 G | Refills: 1 | Status: SHIPPED | OUTPATIENT
Start: 2024-01-11 | End: 2024-01-15

## 2024-01-15 ENCOUNTER — SOCIAL WORK (OUTPATIENT)
Dept: BEHAVIORAL/MENTAL HEALTH CLINIC | Facility: CLINIC | Age: 24
End: 2024-01-15
Payer: COMMERCIAL

## 2024-01-15 DIAGNOSIS — F41.1 GAD (GENERALIZED ANXIETY DISORDER): Primary | ICD-10-CM

## 2024-01-15 PROCEDURE — 90834 PSYTX W PT 45 MINUTES: CPT | Performed by: SOCIAL WORKER

## 2024-01-15 NOTE — PSYCH
"Behavioral Health Psychotherapy Progress Note    Psychotherapy Provided: Individual Psychotherapy     Encounter Diagnoses   Name Primary?   • INES (generalized anxiety disorder) Yes           Goals addressed in session: Goal 1     DATA:  Nury spoke  today about her feelings re: her continued struggle to limit her commitments so that she can sleep.  She also shared her experience in weaning off her SSRI as she acknowledged the slight increase in both anxiety and OCD-related thoughts.  During this session, this clinician used the following therapeutic modalities: Motivational Interviewing and Supportive Psychotherapy    Substance Abuse was not addressed during this session. If the client is diagnosed with a co-occurring substance use disorder, please indicate any changes in the frequency or amount of use: . Stage of change for addressing substance use diagnoses: No substance use/Not applicable    ASSESSMENT:  Nury Hernandez presents with a Euthymic/ normal mood.  Nury presented today with 3 new journals as she has committed to increasing her self care during the upcoming year.   her affect is Normal range and intensity, which is congruent, with her mood and the content of the session. The client has made progress on their goals.     Nury Hernandez presents with a minimal risk of suicide, minimal risk of self-harm, and minimal risk of harm to others.    For any risk assessment that surpasses a \"low\" rating, a safety plan must be developed.    A safety plan was indicated: no  If yes, describe in detail     PLAN: Between sessions, Nury Hernandez will continue to utilize therapy sessions to increase her mindfulness and self compassion. . At the next session, the therapist will use Supportive Psychotherapy to address the above in further detail.    Behavioral Health Treatment Plan and Discharge Planning: Nury Hernandez is aware of and agrees to continue to work on their treatment plan. They have " identified and are working toward their discharge goals. yes        1/15/24  Start Time: 1300  Stop Time: 1350  Total Visit Time: 50 minutes

## 2024-01-17 ENCOUNTER — PROCEDURE VISIT (OUTPATIENT)
Dept: NEUROLOGY | Facility: CLINIC | Age: 24
End: 2024-01-17
Payer: COMMERCIAL

## 2024-01-17 VITALS — TEMPERATURE: 98.7 F | HEART RATE: 63 BPM | DIASTOLIC BLOOD PRESSURE: 57 MMHG | SYSTOLIC BLOOD PRESSURE: 98 MMHG

## 2024-01-17 DIAGNOSIS — G43.709 CHRONIC MIGRAINE WITHOUT AURA WITHOUT STATUS MIGRAINOSUS, NOT INTRACTABLE: Primary | ICD-10-CM

## 2024-01-17 PROCEDURE — 64615 CHEMODENERV MUSC MIGRAINE: CPT | Performed by: PHYSICIAN ASSISTANT

## 2024-01-17 NOTE — PROGRESS NOTES
"Universal Protocol   Consent: Verbal consent obtained. Written consent obtained.  Risks and benefits: risks, benefits and alternatives were discussed  Consent given by: patient  Time out: Immediately prior to procedure a \"time out\" was called to verify the correct patient, procedure, equipment, support staff and site/side marked as required.  Patient understanding: patient states understanding of the procedure being performed  Patient consent: the patient's understanding of the procedure matches consent given  Procedure consent: procedure consent matches procedure scheduled  Relevant documents: relevant documents present and verified  Patient identity confirmed: verbally with patient      Chemodenervation     Date/Time  1/17/2024 9:15 AM     Performed by  Monique Coffey PA-C   Authorized by  Monique Coffey PA-C     Pre-procedure details      Prepped With: Alcohol     Anesthesia  (see MAR for exact dosages):     Anesthesia method:  None   Procedure details      Position:  Upright   Botox      Botox Type:  Type A    Brand:  Botox    mL's of Botulinum Toxin:  200    Final Concentration per CC:  50 units    Needle Gauge:  30 G 2.5 inch   Procedures      Botox Procedures: chronic headache      Indications: migraines     Injection Location      Head / Face:  L superior cervical paraspinal, R superior cervical paraspinal, L , R , L frontalis, R frontalis, L medial occipitalis, R medial occipitalis, procerus, R temporalis, L temporalis, R superior trapezius and L superior trapezius    L  injection amount:  5 unit(s)    R  injection amount:  5 unit(s)    L lateral frontalis:  5 unit(s)    R lateral frontalis:  5 unit(s)    L medial frontalis:  5 unit(s)    R medial frontalis:  5 unit(s)    L temporalis injection amount:  20 unit(s)    R temporalis injection amount:  20 unit(s)    Procerus injection amount:  5 unit(s)    L medial occipitalis injection amount:  15 unit(s)    R medial " occipitalis injection amount:  15 unit(s)    L superior cervical paraspinal injection amount:  10 unit(s)    R superior cervical paraspinal injection amount:  10 unit(s)    L superior trapezius injection amount:  15 unit(s)    R superior trapezius injection amount:  15 unit(s)   Total Units      Total units used:  200    Total units discarded:  0   Post-procedure details      Chemodenervation:  Chronic migraine    Facial Nerve Location::  Bilateral facial nerve    Patient tolerance of procedure:  Tolerated well, no immediate complications   Comments       20 units frontalis  5 units right splenius capitis  20 units right lower occipitalis/cervical paraspinal  All medically necessary

## 2024-01-18 ENCOUNTER — TELEPHONE (OUTPATIENT)
Dept: PSYCHIATRY | Facility: CLINIC | Age: 24
End: 2024-01-18

## 2024-01-18 NOTE — TELEPHONE ENCOUNTER
Patient called in stating she was told to taper off of a medication and Wednesday was her last and she is feeling symptomatic. She wasn't sure if it could be handled by going on e pill everyday or a new med added before tapering off. Requested a call back to discuss. Writer made provider aware and transferred patient to the nurses line.

## 2024-01-19 NOTE — TELEPHONE ENCOUNTER
Nury message that she is tapering off venlafaxine.  The past week she was on 37.5 mg, last dose was on Tuesday 1/16.   She is having brain zapps and vertigo every time she moves her eyes. Wanted to know if there is something she she can to off set the symptoms.     She is requesting provider call back.

## 2024-01-19 NOTE — TELEPHONE ENCOUNTER
Returned call, spoke with Nury. She notes experiencing brain zaps and dizziness when she moves her eyes in a certain direction since stopping Effexor earlier this week. Was doing well until yesterday when she woke up at night with these symptoms. She took 1 dose of Effexor 37.5 mg and in the morning she notes that symptoms improved. Symptoms appear to be SSRI/SNRI withdrawal. At this time, recommend switching to Effexor 37.5 mg every other day for a week and reassess response. Will consider alternative management if symptoms do not improve or return. Nury agrees with proposed plan.

## 2024-01-24 ENCOUNTER — SOCIAL WORK (OUTPATIENT)
Dept: BEHAVIORAL/MENTAL HEALTH CLINIC | Facility: CLINIC | Age: 24
End: 2024-01-24
Payer: COMMERCIAL

## 2024-01-24 DIAGNOSIS — F41.1 GAD (GENERALIZED ANXIETY DISORDER): Primary | ICD-10-CM

## 2024-01-24 PROCEDURE — 90834 PSYTX W PT 45 MINUTES: CPT | Performed by: SOCIAL WORKER

## 2024-01-24 NOTE — PSYCH
"Behavioral Health Psychotherapy Progress Note    Psychotherapy Provided: Individual Psychotherapy     Encounter Diagnoses   Name Primary?   • INES (generalized anxiety disorder) Yes       Goals addressed in session: Goal 1     DATA:  Nury spoke  today about her feelings re: her continued struggle to wean off her SSRI as she shared how the vertigo and brain zaps made it difficult for her to go to work.  She shared the stressful events that occurred during her most recent work shift, as well as the way she handled herself.  During this session, this clinician used the following therapeutic modalities: Motivational Interviewing and Supportive Psychotherapy    Substance Abuse was not addressed during this session. If the client is diagnosed with a co-occurring substance use disorder, please indicate any changes in the frequency or amount of use: . Stage of change for addressing substance use diagnoses: No substance use/Not applicable    ASSESSMENT:  Nury Hernandez presents with a Euthymic/ normal mood.  Nury presented today with not only her homework, but also an additional assignment to process re: how her parents' divorce, trauma affect(ed) her.  her affect is Normal range and intensity, which is congruent, with her mood and the content of the session. The client has made progress on their goals.     Nury Hernandez presents with a minimal risk of suicide, minimal risk of self-harm, and minimal risk of harm to others.    For any risk assessment that surpasses a \"low\" rating, a safety plan must be developed.    A safety plan was indicated: no  If yes, describe in detail     PLAN: Between sessions, Nury Hernandez will continue to utilize therapy sessions to increase her mindfulness and self compassion. . At the next session, the therapist will use Supportive Psychotherapy to address the above in further detail.    Behavioral Health Treatment Plan and Discharge Planning: Nury Hernandez is aware of and " agrees to continue to work on their treatment plan. They have identified and are working toward their discharge goals. yes        1/24/24  Start Time: 1500  Stop Time: 1550  Total Visit Time: 50 minutes

## 2024-01-29 ENCOUNTER — OFFICE VISIT (OUTPATIENT)
Dept: OBGYN CLINIC | Facility: CLINIC | Age: 24
End: 2024-01-29
Payer: COMMERCIAL

## 2024-01-29 ENCOUNTER — TELEPHONE (OUTPATIENT)
Dept: PSYCHIATRY | Facility: CLINIC | Age: 24
End: 2024-01-29

## 2024-01-29 VITALS
HEART RATE: 94 BPM | BODY MASS INDEX: 22.99 KG/M2 | DIASTOLIC BLOOD PRESSURE: 60 MMHG | WEIGHT: 146.8 LBS | SYSTOLIC BLOOD PRESSURE: 112 MMHG

## 2024-01-29 DIAGNOSIS — B37.9 YEAST INFECTION: Primary | ICD-10-CM

## 2024-01-29 DIAGNOSIS — N90.89 VULVAR IRRITATION: ICD-10-CM

## 2024-01-29 DIAGNOSIS — R35.0 URINARY FREQUENCY: ICD-10-CM

## 2024-01-29 LAB
BACTERIA UR QL AUTO: NORMAL /HPF
BILIRUB UR QL STRIP: NEGATIVE
BV WHIFF TEST VAG QL: ABNORMAL
CLARITY UR: CLEAR
CLUE CELLS SPEC QL WET PREP: ABNORMAL
COLOR UR: COLORLESS
GLUCOSE UR STRIP-MCNC: NEGATIVE MG/DL
HGB UR QL STRIP.AUTO: NEGATIVE
KETONES UR STRIP-MCNC: NEGATIVE MG/DL
LEUKOCYTE ESTERASE UR QL STRIP: NEGATIVE
NITRITE UR QL STRIP: NEGATIVE
NON-SQ EPI CELLS URNS QL MICRO: NORMAL /HPF
PH SMN: 4.5 [PH]
PH UR STRIP.AUTO: 7 [PH]
PROT UR STRIP-MCNC: NEGATIVE MG/DL
RBC #/AREA URNS AUTO: NORMAL /HPF
SP GR UR STRIP.AUTO: 1.01 (ref 1–1.03)
T VAGINALIS VAG QL WET PREP: ABNORMAL
UROBILINOGEN UR STRIP-ACNC: <2 MG/DL
WBC #/AREA URNS AUTO: NORMAL /HPF
YEAST VAG QL WET PREP: POSITIVE

## 2024-01-29 PROCEDURE — 87086 URINE CULTURE/COLONY COUNT: CPT | Performed by: PHYSICIAN ASSISTANT

## 2024-01-29 PROCEDURE — 81001 URINALYSIS AUTO W/SCOPE: CPT | Performed by: PHYSICIAN ASSISTANT

## 2024-01-29 PROCEDURE — 99214 OFFICE O/P EST MOD 30 MIN: CPT | Performed by: PHYSICIAN ASSISTANT

## 2024-01-29 PROCEDURE — 87210 SMEAR WET MOUNT SALINE/INK: CPT | Performed by: PHYSICIAN ASSISTANT

## 2024-01-29 RX ORDER — MODAFINIL 100 MG/1
100 TABLET ORAL DAILY
COMMUNITY
Start: 2024-01-04

## 2024-01-29 NOTE — TELEPHONE ENCOUNTER
Called patient to follow-up regarding antidepressant discontinuation symptoms.  Nury states that she continues to experience SSRI/SNRI withdrawal symptoms characterized by brain zaps, dizziness, irritability after more than 48 hours and not taking her venlafaxine 37.5 mg.  She notes that when she is taking it every other day, her symptoms are controlled.  Would like to continue to pursue tapering.  She is unable to work due to the symptoms.      At this time, will prescribe venlafaxine 25 mg immediate release tablet to be taken once daily for a week, followed by decreased to half a tablet at 12.5 mg to be taken for 7 days. Patient also instructed to take a low dose Benadryl that she has at home in the evening to decrease the effect of withdrawal symptoms. Will follow-up. Patient is instructed to call the office if symptoms continue or worsen.

## 2024-01-29 NOTE — PROGRESS NOTES
"Nury E David  2000      S:  23 y.o. female with c/o \"vaginal mass\" x 5 days. Began with a mass of left labia majora that was small and tender. Has slightly increased in size and appears mildly red. Treated with sitz baths without much change. Yesterday began with vulvar irritation and urinary frequency. No discharge or odor. Sexually active. LMP 1/26/24. Declines STD testing.   On 3 separate antibiotic courses recently - once due to impetigo, and then due to 2 separate URI.       Current Outpatient Medications:     acetaminophen (TYLENOL) 500 mg tablet, Take 1,000 mg by mouth every 4 (four) hours as needed , Disp: , Rfl:     albuterol (2.5 mg/3 mL) 0.083 % nebulizer solution, Take 3 mL (2.5 mg total) by nebulization every 6 (six) hours as needed for wheezing or shortness of breath, Disp: 30 mL, Rfl: 0    albuterol (ProAir HFA) 90 mcg/act inhaler, Inhale 2 puffs every 6 (six) hours as needed for wheezing or shortness of breath, Disp: 8.5 g, Rfl: 0    ASMANEX  MCG/ACT AERO, Inhale 2 puffs every 4 (four) hours as needed (2 puffs), Disp: , Rfl:     Atogepant (Qulipta) 60 MG TABS, Take 60 mg by mouth in the morning, Disp: 90 tablet, Rfl: 4    cetirizine (ZyrTEC) 10 mg tablet, Take 10 mg by mouth daily, Disp: , Rfl:     clindamycin (CLEOCIN T) 1 % lotion, Apply topically daily To face, Disp: 60 mL, Rfl: 0    diphenhydrAMINE (BENADRYL) 25 mg tablet, Take 1 tablet (25 mg total) by mouth every 6 (six) hours as needed for itching, Disp: 30 tablet, Rfl: 0    Elastic Bandages & Supports (TRUFORM STOCKINGS 20-30MMHG) MISC, Use as directed., Disp: , Rfl:     fludrocortisone (FLORINEF) 0.1 mg tablet, Take 0.1 mg by mouth 2 (two) times a day, Disp: , Rfl:     metoprolol succinate (TOPROL-XL) 50 mg 24 hr tablet, Take 50 mg by mouth in the morning and 50 mg before bedtime., Disp: , Rfl:     modafinil (PROVIGIL) 100 mg tablet, Take 100 mg by mouth daily, Disp: , Rfl:     onabotulinumtoxin A (BOTOX) 100 units, Inject  " as directed. Injection every 91 days for migraines, Disp: , Rfl:     ondansetron (Zofran ODT) 4 mg disintegrating tablet, Take 1 tablet (4 mg total) by mouth every 6 (six) hours as needed for nausea or vomiting, Disp: 20 tablet, Rfl: 0    Prucalopride Succinate (Motegrity) 2 MG TABS, Take 2 mg by mouth in the morning, Disp: 90 tablet, Rfl: 3    rimegepant sulfate (Nurtec) 75 mg TBDP, Take 1 tablet (75 mg total) by mouth as needed (migraine) Limit of 1 in 24 hours, Disp: 16 tablet, Rfl: 3    terconazole (TERAZOL 7) 0.4 % vaginal cream, Insert 1 applicator into the vagina daily at bedtime, Disp: 45 g, Rfl: 0    tretinoin (RETIN-A) 0.025 % cream, Apply topically daily at bedtime, Disp: 45 g, Rfl: 2    verapamil (CALAN) 120 mg tablet, Take 1 tab by mouth twice a day, Disp: 180 tablet, Rfl: 3    acyclovir (ZOVIRAX) 5 % ointment, Apply topically 5 (five) times a day for 4 days, Disp: 15 g, Rfl: 1    methylPREDNISolone 4 MG tablet therapy pack, Use as directed on package (Patient not taking: Reported on 1/29/2024), Disp: 21 tablet, Rfl: 0    venlafaxine (EFFEXOR) 25 mg tablet, Take 1 tablet (25 mg) daily for 7 days, and then take 0.5 tablet (12.5 mg) for 7 days, and then stop., Disp: 14 tablet, Rfl: 0  Social History     Socioeconomic History    Marital status: Single     Spouse name: Not on file    Number of children: Not on file    Years of education: Not on file    Highest education level: Not on file   Occupational History    Not on file   Tobacco Use    Smoking status: Never    Smokeless tobacco: Never   Vaping Use    Vaping status: Never Used   Substance and Sexual Activity    Alcohol use: No    Drug use: No    Sexual activity: Never   Other Topics Concern    Not on file   Social History Narrative    Lives with parents     Social Determinants of Health     Financial Resource Strain: Not on file   Food Insecurity: Not on file   Transportation Needs: Not on file   Physical Activity: Insufficiently Active (6/15/2022)     Exercise Vital Sign     Days of Exercise per Week: 3 days     Minutes of Exercise per Session: 30 min   Stress: Not on file   Social Connections: Not on file   Intimate Partner Violence: Not on file   Housing Stability: Not on file     Family History   Problem Relation Age of Onset    Gestational diabetes Mother     Migraines Mother     Anxiety disorder Father     Hyperlipidemia Father     Autism Brother     Diabetes Other     Uterine cancer Maternal Grandmother     Rheumatic fever Maternal Grandmother     Diabetes Maternal Grandfather     Hypertension Maternal Grandfather     Heart attack Maternal Grandfather     Stroke Maternal Grandfather     Hyperlipidemia Maternal Grandfather     Hypertension Paternal Grandmother     Anxiety disorder Paternal Grandmother     Hypertension Paternal Grandfather      Past Medical History:   Diagnosis Date    Annual physical exam 2019    Anxiety     Asthma     Dizziness     at times    Dyspepsia 2021    Exertional headache 2022    GERD (gastroesophageal reflux disease)     Hammertoe of left foot     Headache     occ    Hyperlipemia     Hypermobile joints     Hypertriglyceridemia 2019    IBS (irritable bowel syndrome) 2020    Irritable bowel syndrome     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HCC)     Mast cell disorder     Migraine     PONV (postoperative nausea and vomiting)     POTS (postural orthostatic tachycardia syndrome)      infant     Wears glasses          O:  Blood pressure 112/60, pulse 94, weight 66.6 kg (146 lb 12.8 oz), last menstrual period 2024, not currently breastfeeding.    She appears well and is in no distress  Normocephalic, atraumatic.   Normal respiratory effort   Abdomen is soft and nontender  External genitals: Pea sized mass of the medial left labia majora. No overlying erythema, warmth, fluctuance. Mild TTP.  Vagina - mild erythema of the vaginal opening, without discharge or bleeding.   Cervix is without lesions or  discharge. No CMT.   Uterus is nontender, no masses  Adnexa are nontender, no pelvic masses appreciated  No focal neurological deficits.   Normal mood, affect, and behavior.       A/P:  Diagnoses and all orders for this visit:    Yeast infection  -     terconazole (TERAZOL 7) 0.4 % vaginal cream; Insert 1 applicator into the vagina daily at bedtime    Reviewed findings of yeast on wet mount  Terazole 7 sent to pharmacy on file. Use reviewed.  Reviewed vulvar hygiene   RTO in 1 week if sx persist, sooner as needed.     Urinary frequency  - Urinalysis with microscopic  - Urine culture    Urine dip in office neg. Will send for culture. Supportive therapies encouraged.     Vulvar irritation  -     POCT wet mount

## 2024-01-30 LAB
APOB+LDLR+PCSK9 GENE MUT ANL BLD/T: NOT DETECTED
BACTERIA UR CULT: ABNORMAL
BRCA1+BRCA2 DEL+DUP + FULL MUT ANL BLD/T: NOT DETECTED
MLH1+MSH2+MSH6+PMS2 GN DEL+DUP+FUL M: NOT DETECTED

## 2024-01-31 ENCOUNTER — SOCIAL WORK (OUTPATIENT)
Dept: BEHAVIORAL/MENTAL HEALTH CLINIC | Facility: CLINIC | Age: 24
End: 2024-01-31
Payer: COMMERCIAL

## 2024-01-31 ENCOUNTER — PATIENT MESSAGE (OUTPATIENT)
Dept: OBGYN CLINIC | Facility: CLINIC | Age: 24
End: 2024-01-31

## 2024-01-31 DIAGNOSIS — F41.1 GAD (GENERALIZED ANXIETY DISORDER): Primary | ICD-10-CM

## 2024-01-31 PROCEDURE — 90834 PSYTX W PT 45 MINUTES: CPT | Performed by: SOCIAL WORKER

## 2024-01-31 NOTE — PSYCH
"Behavioral Health Psychotherapy Progress Note    Psychotherapy Provided: Individual Psychotherapy     Encounter Diagnoses   Name Primary?   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  She shared details re: the stressful events that occurred during her most recent work shift, as well as the way she handled herself.  During this session, this clinician used the following therapeutic modalities: Motivational Interviewing and Supportive Psychotherapy    Substance Abuse was not addressed during this session. If the client is diagnosed with a co-occurring substance use disorder, please indicate any changes in the frequency or amount of use: . Stage of change for addressing substance use diagnoses: No substance use/Not applicable    ASSESSMENT:  Nury Hernandez presents with a Euthymic/ normal mood.  Nury again presented today with  her homework, while also suggesting an additional assignment.   her affect is Normal range and intensity, which is congruent, with her mood and the content of the session. The client has made progress on their goals.     Nury Hernandez presents with a minimal risk of suicide, minimal risk of self-harm, and minimal risk of harm to others.    For any risk assessment that surpasses a \"low\" rating, a safety plan must be developed.    A safety plan was indicated: no  If yes, describe in detail     PLAN: Between sessions, Nury Hernandez will continue to utilize therapy sessions to increase her mindfulness and self compassion. . At the next session, the therapist will use Supportive Psychotherapy to address the above in further detail.    Behavioral Health Treatment Plan and Discharge Planning: Nury Hernandez is aware of and agrees " to continue to work on their treatment plan. They have identified and are working toward their discharge goals. yes        1/31/24  Start Time: 1500  Stop Time: 1550  Total Visit Time: 50 minutes

## 2024-02-07 ENCOUNTER — SOCIAL WORK (OUTPATIENT)
Dept: BEHAVIORAL/MENTAL HEALTH CLINIC | Facility: CLINIC | Age: 24
End: 2024-02-07
Payer: COMMERCIAL

## 2024-02-07 DIAGNOSIS — F41.1 GAD (GENERALIZED ANXIETY DISORDER): ICD-10-CM

## 2024-02-07 DIAGNOSIS — F41.1 GAD (GENERALIZED ANXIETY DISORDER): Primary | ICD-10-CM

## 2024-02-07 PROCEDURE — 90834 PSYTX W PT 45 MINUTES: CPT | Performed by: SOCIAL WORKER

## 2024-02-07 RX ORDER — VENLAFAXINE 25 MG/1
TABLET ORAL
Qty: 14 TABLET | Refills: 0 | Status: SHIPPED | OUTPATIENT
Start: 2024-02-07

## 2024-02-07 NOTE — PSYCH
"Behavioral Health Psychotherapy Progress Note    Psychotherapy Provided: Individual Psychotherapy     Encounter Diagnoses   Name Primary?   • INES (generalized anxiety disorder) Yes         Goals addressed in session: Goal 1     DATA:  Nury spoke  further today about her feelings re: her continued struggle to wean off her SSRI as she shared that the vertigo and brain zaps have continued.  She also shared insights re: how both she and her stepmother deplete themselves , their resources in caring for others.  She provided multiple examples of this while indicating a desire to work on decreasing this.  During this session, this clinician used the following therapeutic modalities: Motivational Interviewing and Supportive Psychotherapy    Substance Abuse was not addressed during this session. If the client is diagnosed with a co-occurring substance use disorder, please indicate any changes in the frequency or amount of use: . Stage of change for addressing substance use diagnoses: No substance use/Not applicable    ASSESSMENT:  Nury Hernandez presents with a Euthymic/ normal mood.  Nury did not complete her homework as she volunteered to work more than 50% of her 8 days off.   her affect is Normal range and intensity, which is congruent, with her mood and the content of the session. The client has made progress on their goals.     Nury Hernandez presents with a minimal risk of suicide, minimal risk of self-harm, and minimal risk of harm to others.    For any risk assessment that surpasses a \"low\" rating, a safety plan must be developed.    A safety plan was indicated: no  If yes, describe in detail     PLAN: Between sessions, Nury Hernandez will continue to utilize therapy sessions to increase her mindfulness and self compassion. . At the next session, the therapist will use Supportive Psychotherapy to address the above in further detail.    Behavioral Health Treatment Plan and Discharge Planning: Nury MCCLELLAN" David is aware of and agrees to continue to work on their treatment plan. They have identified and are working toward their discharge goals. yes        2/7/24  Start Time: 1400  Stop Time: 1450  Total Visit Time: 50 minutes

## 2024-02-07 NOTE — BH TREATMENT PLAN
"Outpatient Behavioral Health Psychotherapy Treatment Plan    Nury Hernandez    Date of Initial Psychotherapy Assessment:   Date of Current Treatment Plan: 02/07/24  Treatment Plan Target Date:8/7/24  Treatment Plan Expiration Date: 8/7/24    Diagnosis:   1. INES (generalized anxiety disorder)              Area(s) of Need: tapering off anxiety medication    Long Term Goal 1 (in the client's own words): I want to be able to manage stress and anxiety without getting worked up    Stage of Change: Action    Target Date for completion: 8/7/24     Anticipated therapeutic modalities: Supportive Psychotherapy      People identified to complete this goal: Clementina Arrington       Objective 1: (identify the means of measuring success in meeting the objective): I will notice and begin to \"push into\" my discomfort.       Objective 2: (identify the means of measuring success in meeting the objective): I will continue to journal about my past.          I am currently under the care of a [facility] psychiatric provider: yes    My [facility] psychiatric provider is: Dr. Jessica    I am currently taking psychiatric medications: Yes, as prescribed    I feel that I will be ready for discharge from mental health care when I reach the following (measurable goal/objective): When I am able to successfully manage my anxiety prior to and during stressful situations        I have created my Crisis Plan and have been offered a copy of this plan    Behavioral Health Treatment Plan [facility]: Diagnosis and Treatment Plan explained to Nury Hernandez acknowledges an understanding of their diagnosis. Nury Hernandez agrees to this treatment plan.    I have been offered a copy of this Treatment Plan. Yes                                                                     "

## 2024-02-07 NOTE — TELEPHONE ENCOUNTER
Received [employer] from Nury Caldera's psychotherapist. Nury is on Day 2/7 of 12.5 mg of Venlafaxine daily. Nury has requested additional refills for slow taper as she has been experiencing withdrawal symptoms prior to taking her medications.     Will send refill.

## 2024-02-08 NOTE — TELEPHONE ENCOUNTER
Follow up call made and spoke with Nury to review refill sent. She had gotten a notification from pharmacy.

## 2024-02-09 ENCOUNTER — HOSPITAL ENCOUNTER (OUTPATIENT)
Dept: NON INVASIVE DIAGNOSTICS | Facility: CLINIC | Age: 24
Discharge: HOME | End: 2024-02-09
Payer: COMMERCIAL

## 2024-02-09 VITALS
SYSTOLIC BLOOD PRESSURE: 112 MMHG | BODY MASS INDEX: 22.91 KG/M2 | HEART RATE: 94 BPM | HEIGHT: 67 IN | WEIGHT: 146 LBS | DIASTOLIC BLOOD PRESSURE: 60 MMHG

## 2024-02-09 DIAGNOSIS — R55 NEAR SYNCOPE: ICD-10-CM

## 2024-02-09 DIAGNOSIS — I45.4 IVCD (INTRAVENTRICULAR CONDUCTION DEFECT): ICD-10-CM

## 2024-02-09 DIAGNOSIS — R00.0 TACHYCARDIA, UNSPECIFIED: ICD-10-CM

## 2024-02-09 LAB
AORTIC ROOT: 2.2 CM
APICAL FOUR CHAMBER EJECTION FRACTION: 57 %
ASCENDING AORTA: 2.3 CM
BSA FOR ECHO PROCEDURE: 1.77 M2
FRACTIONAL SHORTENING: 32 (ref 28–44)
INTERVENTRICULAR SEPTUM IN DIASTOLE (PARASTERNAL SHORT AXIS VIEW): 0.6 CM
INTERVENTRICULAR SEPTUM: 0.6 CM (ref 0.6–1.1)
IVC: 20 MM
LAAS-AP2: 18.4 CM2
LAAS-AP4: 20.6 CM2
LEFT ATRIUM SIZE: 3.4 CM
LEFT ATRIUM VOLUME (MOD BIPLANE): 59 ML
LEFT ATRIUM VOLUME INDEX (MOD BIPLANE): 33.3 ML/M2
LEFT INTERNAL DIMENSION IN SYSTOLE: 3 CM (ref 2.1–4)
LEFT VENTRICLE DIASTOLIC VOLUME (MOD BIPLANE): 58 ML
LEFT VENTRICLE DIASTOLIC VOLUME INDEX (MOD BIPLANE): 32.8 ML/M2
LEFT VENTRICLE SYSTOLIC VOLUME (MOD BIPLANE): 24 ML
LEFT VENTRICLE SYSTOLIC VOLUME INDEX (MOD BIPLANE): 13.6 ML/M2
LEFT VENTRICULAR INTERNAL DIMENSION IN DIASTOLE: 4.4 CM (ref 3.5–6)
LEFT VENTRICULAR POSTERIOR WALL IN END DIASTOLE: 0.6 CM
LEFT VENTRICULAR STROKE VOLUME: 52 ML
LV EF: 59 %
LVSV (TEICH): 52 ML
RA PRESSURE ESTIMATED: 8 MMHG
RIGHT ATRIUM AREA SYSTOLE A4C: 11.6 CM2
RIGHT VENTRICLE ID DIMENSION: 2.9 CM
RV PSP: 23 MMHG
SL CV LEFT ATRIUM LENGTH A2C: 5 CM
SL CV LV EF: 60
SL CV PED ECHO LEFT VENTRICLE DIASTOLIC VOLUME (MOD BIPLANE) 2D: 86 ML
SL CV PED ECHO LEFT VENTRICLE SYSTOLIC VOLUME (MOD BIPLANE) 2D: 34 ML
TR MAX PG: 15 MMHG
TR PEAK VELOCITY: 2 M/S
TRICUSPID ANNULAR PLANE SYSTOLIC EXCURSION: 1.9 CM
TRICUSPID VALVE PEAK REGURGITATION VELOCITY: 1.95 M/S

## 2024-02-09 PROCEDURE — 93306 TTE W/DOPPLER COMPLETE: CPT | Performed by: INTERNAL MEDICINE

## 2024-02-09 PROCEDURE — 93306 TTE W/DOPPLER COMPLETE: CPT

## 2024-02-11 ENCOUNTER — NURSE TRIAGE (OUTPATIENT)
Dept: OTHER | Facility: OTHER | Age: 24
End: 2024-02-11

## 2024-02-11 DIAGNOSIS — N30.00 ACUTE CYSTITIS WITHOUT HEMATURIA: Primary | ICD-10-CM

## 2024-02-11 RX ORDER — NITROFURANTOIN 25; 75 MG/1; MG/1
100 CAPSULE ORAL 2 TIMES DAILY
Qty: 10 CAPSULE | Refills: 0 | Status: SHIPPED | OUTPATIENT
Start: 2024-02-11 | End: 2024-02-16

## 2024-02-11 RX ORDER — PHENAZOPYRIDINE HYDROCHLORIDE 100 MG/1
100 TABLET, FILM COATED ORAL 3 TIMES DAILY PRN
Qty: 10 TABLET | Refills: 0 | Status: SHIPPED | OUTPATIENT
Start: 2024-02-11

## 2024-02-11 NOTE — TELEPHONE ENCOUNTER
"Regarding: uti symtpoms/ yeast infection  ----- Message from Enid Love sent at 2/11/2024  8:46 AM EST -----  \"I having reoccurring symptoms that are getting worse. MY yeast infection has gotten worse. I have severe frequent urination and its getting worse.\"    "

## 2024-02-11 NOTE — TELEPHONE ENCOUNTER
On call provider contacted. Sending abx script and pyridium script. If symptoms not improved after first dose of pyridium needs to go to ER for eval.  Reason for Disposition  • [1] Unable to urinate (or only a few drops) > 4 hours AND [2] bladder feels very full (e.g., palpable bladder or strong urge to urinate)    Protocols used: Urinary Symptoms-ADULT-AH

## 2024-02-11 NOTE — TELEPHONE ENCOUNTER
"Answer Assessment - Initial Assessment Questions  1. SYMPTOM: \"What's the main symptom you're concerned about?\" (e.g., frequency, incontinence)      Frequency, inability to empty bladder. Around 0400 last decent amount of urine, since then just a trickle. Now overwhelming urge to urinate but not coming out.     2. ONSET: \"When did the  s/s  start?\"      Friday    3. PAIN: \"Is there any pain?\" If Yes, ask: \"How bad is it?\" (Scale: 1-10; mild, moderate, severe)      Pressure but no pain    4. CAUSE: \"What do you think is causing the symptoms?\"      Unsure, recently treated with cream for yeast. Symptoms improved but since returned.     5. OTHER SYMPTOMS: \"Do you have any other symptoms?\" (e.g., fever, flank pain, blood in urine, pain with urination)      Denies    6. PREGNANCY: \"Is there any chance you are pregnant?\" \"When was your last menstrual period?\"      Denies    Protocols used: Urinary Symptoms-ADULT-    "

## 2024-02-12 ENCOUNTER — SOCIAL WORK (OUTPATIENT)
Dept: BEHAVIORAL/MENTAL HEALTH CLINIC | Facility: CLINIC | Age: 24
End: 2024-02-12
Payer: COMMERCIAL

## 2024-02-12 DIAGNOSIS — F41.1 GAD (GENERALIZED ANXIETY DISORDER): Primary | ICD-10-CM

## 2024-02-12 PROCEDURE — 90834 PSYTX W PT 45 MINUTES: CPT | Performed by: SOCIAL WORKER

## 2024-02-13 ENCOUNTER — HOSPITAL ENCOUNTER (EMERGENCY)
Facility: HOSPITAL | Age: 24
Discharge: HOME | End: 2024-02-13
Attending: EMERGENCY MEDICINE
Payer: COMMERCIAL

## 2024-02-13 ENCOUNTER — APPOINTMENT (EMERGENCY)
Dept: CT IMAGING | Facility: HOSPITAL | Age: 24
End: 2024-02-13
Payer: COMMERCIAL

## 2024-02-13 VITALS
BODY MASS INDEX: 23.07 KG/M2 | OXYGEN SATURATION: 99 % | TEMPERATURE: 97.6 F | HEART RATE: 71 BPM | DIASTOLIC BLOOD PRESSURE: 78 MMHG | WEIGHT: 147.27 LBS | RESPIRATION RATE: 18 BRPM | SYSTOLIC BLOOD PRESSURE: 134 MMHG

## 2024-02-13 DIAGNOSIS — K59.00 CONSTIPATION: ICD-10-CM

## 2024-02-13 DIAGNOSIS — R10.9 ABDOMINAL PAIN: Primary | ICD-10-CM

## 2024-02-13 DIAGNOSIS — R33.9 URINARY RETENTION: ICD-10-CM

## 2024-02-13 LAB
ALBUMIN SERPL BCP-MCNC: 4.2 G/DL (ref 3.5–5)
ALP SERPL-CCNC: 60 U/L (ref 34–104)
ALT SERPL W P-5'-P-CCNC: 20 U/L (ref 7–52)
ANION GAP SERPL CALCULATED.3IONS-SCNC: 5 MMOL/L
AST SERPL W P-5'-P-CCNC: 15 U/L (ref 13–39)
BACTERIA UR QL AUTO: ABNORMAL /HPF
BASOPHILS # BLD AUTO: 0 THOUSANDS/ÂΜL (ref 0–0.1)
BASOPHILS NFR BLD AUTO: 0 % (ref 0–1)
BILIRUB SERPL-MCNC: 0.4 MG/DL (ref 0.2–1)
BILIRUB UR QL STRIP: NEGATIVE
BUN SERPL-MCNC: 16 MG/DL (ref 5–25)
CALCIUM SERPL-MCNC: 9.5 MG/DL (ref 8.4–10.2)
CHLORIDE SERPL-SCNC: 106 MMOL/L (ref 96–108)
CLARITY UR: CLEAR
CO2 SERPL-SCNC: 28 MMOL/L (ref 21–32)
COLOR UR: YELLOW
CREAT SERPL-MCNC: 0.71 MG/DL (ref 0.6–1.3)
EOSINOPHIL # BLD AUTO: 0.01 THOUSAND/ÂΜL (ref 0–0.61)
EOSINOPHIL NFR BLD AUTO: 0 % (ref 0–6)
ERYTHROCYTE [DISTWIDTH] IN BLOOD BY AUTOMATED COUNT: 11.9 % (ref 11.6–15.1)
EXT PREGNANCY TEST URINE: NEGATIVE
EXT. CONTROL: NORMAL
GFR SERPL CREATININE-BSD FRML MDRD: 121 ML/MIN/1.73SQ M
GLUCOSE SERPL-MCNC: 89 MG/DL (ref 65–140)
GLUCOSE UR STRIP-MCNC: NEGATIVE MG/DL
HCT VFR BLD AUTO: 37.7 % (ref 34.8–46.1)
HGB BLD-MCNC: 13 G/DL (ref 11.5–15.4)
HGB UR QL STRIP.AUTO: NEGATIVE
IMM GRANULOCYTES # BLD AUTO: 0.02 THOUSAND/UL (ref 0–0.2)
IMM GRANULOCYTES NFR BLD AUTO: 0 % (ref 0–2)
KETONES UR STRIP-MCNC: NEGATIVE MG/DL
LEUKOCYTE ESTERASE UR QL STRIP: NEGATIVE
LIPASE SERPL-CCNC: 22 U/L (ref 11–82)
LYMPHOCYTES # BLD AUTO: 1.14 THOUSANDS/ÂΜL (ref 0.6–4.47)
LYMPHOCYTES NFR BLD AUTO: 21 % (ref 14–44)
MCH RBC QN AUTO: 29.9 PG (ref 26.8–34.3)
MCHC RBC AUTO-ENTMCNC: 34.5 G/DL (ref 31.4–37.4)
MCV RBC AUTO: 87 FL (ref 82–98)
MONOCYTES # BLD AUTO: 0.35 THOUSAND/ÂΜL (ref 0.17–1.22)
MONOCYTES NFR BLD AUTO: 6 % (ref 4–12)
MUCOUS THREADS UR QL AUTO: ABNORMAL
NEUTROPHILS # BLD AUTO: 3.92 THOUSANDS/ÂΜL (ref 1.85–7.62)
NEUTS SEG NFR BLD AUTO: 73 % (ref 43–75)
NITRITE UR QL STRIP: NEGATIVE
NON-SQ EPI CELLS URNS QL MICRO: ABNORMAL /HPF
NRBC BLD AUTO-RTO: 0 /100 WBCS
PH UR STRIP.AUTO: 7 [PH] (ref 4.5–8)
PLATELET # BLD AUTO: 241 THOUSANDS/UL (ref 149–390)
PMV BLD AUTO: 9.8 FL (ref 8.9–12.7)
POTASSIUM SERPL-SCNC: 3.7 MMOL/L (ref 3.5–5.3)
PROT SERPL-MCNC: 7.3 G/DL (ref 6.4–8.4)
PROT UR STRIP-MCNC: ABNORMAL MG/DL
RBC # BLD AUTO: 4.35 MILLION/UL (ref 3.81–5.12)
RBC #/AREA URNS AUTO: ABNORMAL /HPF
SODIUM SERPL-SCNC: 139 MMOL/L (ref 135–147)
SP GR UR STRIP.AUTO: 1.01 (ref 1–1.03)
UROBILINOGEN UR QL STRIP.AUTO: 0.2 E.U./DL
WBC # BLD AUTO: 5.44 THOUSAND/UL (ref 4.31–10.16)
WBC #/AREA URNS AUTO: ABNORMAL /HPF

## 2024-02-13 PROCEDURE — 96374 THER/PROPH/DIAG INJ IV PUSH: CPT

## 2024-02-13 PROCEDURE — 36415 COLL VENOUS BLD VENIPUNCTURE: CPT | Performed by: PHYSICIAN ASSISTANT

## 2024-02-13 PROCEDURE — 96375 TX/PRO/DX INJ NEW DRUG ADDON: CPT

## 2024-02-13 PROCEDURE — 99285 EMERGENCY DEPT VISIT HI MDM: CPT | Performed by: PHYSICIAN ASSISTANT

## 2024-02-13 PROCEDURE — 99284 EMERGENCY DEPT VISIT MOD MDM: CPT

## 2024-02-13 PROCEDURE — 87086 URINE CULTURE/COLONY COUNT: CPT | Performed by: PHYSICIAN ASSISTANT

## 2024-02-13 PROCEDURE — 81025 URINE PREGNANCY TEST: CPT | Performed by: PHYSICIAN ASSISTANT

## 2024-02-13 PROCEDURE — 96361 HYDRATE IV INFUSION ADD-ON: CPT

## 2024-02-13 PROCEDURE — 80053 COMPREHEN METABOLIC PANEL: CPT | Performed by: PHYSICIAN ASSISTANT

## 2024-02-13 PROCEDURE — 83690 ASSAY OF LIPASE: CPT | Performed by: PHYSICIAN ASSISTANT

## 2024-02-13 PROCEDURE — G1004 CDSM NDSC: HCPCS

## 2024-02-13 PROCEDURE — 85025 COMPLETE CBC W/AUTO DIFF WBC: CPT | Performed by: PHYSICIAN ASSISTANT

## 2024-02-13 PROCEDURE — 81001 URINALYSIS AUTO W/SCOPE: CPT

## 2024-02-13 PROCEDURE — 74177 CT ABD & PELVIS W/CONTRAST: CPT

## 2024-02-13 RX ORDER — KETOROLAC TROMETHAMINE 30 MG/ML
15 INJECTION, SOLUTION INTRAMUSCULAR; INTRAVENOUS ONCE
Status: COMPLETED | OUTPATIENT
Start: 2024-02-13 | End: 2024-02-13

## 2024-02-13 RX ORDER — ONDANSETRON 2 MG/ML
4 INJECTION INTRAMUSCULAR; INTRAVENOUS ONCE
Status: COMPLETED | OUTPATIENT
Start: 2024-02-13 | End: 2024-02-13

## 2024-02-13 RX ADMIN — IOHEXOL 100 ML: 350 INJECTION, SOLUTION INTRAVENOUS at 16:02

## 2024-02-13 RX ADMIN — SODIUM CHLORIDE 1000 ML: 0.9 INJECTION, SOLUTION INTRAVENOUS at 14:52

## 2024-02-13 RX ADMIN — KETOROLAC TROMETHAMINE 15 MG: 30 INJECTION, SOLUTION INTRAMUSCULAR; INTRAVENOUS at 14:54

## 2024-02-13 RX ADMIN — ONDANSETRON 4 MG: 2 INJECTION INTRAMUSCULAR; INTRAVENOUS at 14:52

## 2024-02-13 NOTE — ED PROVIDER NOTES
History  Chief Complaint   Patient presents with    Pelvic Pain     Worsening pelvic pain. Currently being treated with Macrobid and pyridium for a UTI.      Patient is a 23-year-old female with past medical history of POTS, migraines, asthma, GERD, constipation, IBS who is accompanied to emergency department by her parents for evaluation of suprapubic/pelvic pain, urinary frequency, and nausea.  Patient states that her symptoms initially started approximately 2 weeks ago on 1/29/24.   She states at that time she was seen at OB/GYN office and diagnosed with a yeast infection.  She had a UA and urine culture done at that visit which was negative.  She states that she was prescribed  terconazole cream which she used as directed and had some improvement of the yeast infection symptoms. She continued with her feelings of urinary frequency/urgency and states that she had worsening suprapubic/pelvic pain over these past few days. She states she called the OBGYN office and spoke with on call provider this weekend (4 days ago) and was treated empirically for a cystitis/UTI with Pyridium and Macrobid.  She states that she has been taking this as directed and has 1 more day left.  She states that she does not feel any improvement. She states she has a follow up tomorrow with her OBGYN however she felt like she could not wait until then. She states no hx of similar or urinary retention. No hx of kidney stones or ovarian cysts. No abdominal surgeries.Suffers from chronic constipation and IBS. States she takes motegrity for constipation as other medications do not work for her. Her last BM was 4 days ago which she states is normal for her. She denies any new medications. No injury or trauma. No fever, chills, vomiting, diarrhea, dysuria, CP, SOB, flank or back pain, vaginal complaints, recent URI symptoms. She states she has never been sexually active (despite prior OBGYN note).        Prior to Admission Medications    Prescriptions Last Dose Informant Patient Reported? Taking?   ASMANEX  MCG/ACT AERO  Self Yes No   Sig: Inhale 2 puffs every 4 (four) hours as needed (2 puffs)   Atogepant (Qulipta) 60 MG TABS  Self No No   Sig: Take 60 mg by mouth in the morning   Elastic Bandages & Supports (TRUFORM STOCKINGS 20-30MMHG) MISC  Self Yes No   Sig: Use as directed.   Prucalopride Succinate (Motegrity) 2 MG TABS  Self No No   Sig: Take 2 mg by mouth in the morning   acetaminophen (TYLENOL) 500 mg tablet  Self Yes No   Sig: Take 1,000 mg by mouth every 4 (four) hours as needed    acyclovir (ZOVIRAX) 5 % ointment   No No   Sig: Apply topically 5 (five) times a day for 4 days   albuterol (2.5 mg/3 mL) 0.083 % nebulizer solution  Self No No   Sig: Take 3 mL (2.5 mg total) by nebulization every 6 (six) hours as needed for wheezing or shortness of breath   albuterol (ProAir HFA) 90 mcg/act inhaler  Self No No   Sig: Inhale 2 puffs every 6 (six) hours as needed for wheezing or shortness of breath   cetirizine (ZyrTEC) 10 mg tablet  Self Yes No   Sig: Take 10 mg by mouth daily   clindamycin (CLEOCIN T) 1 % lotion  Self No No   Sig: Apply topically daily To face   diphenhydrAMINE (BENADRYL) 25 mg tablet  Self No No   Sig: Take 1 tablet (25 mg total) by mouth every 6 (six) hours as needed for itching   fludrocortisone (FLORINEF) 0.1 mg tablet  Self Yes No   Sig: Take 0.1 mg by mouth 2 (two) times a day   methylPREDNISolone 4 MG tablet therapy pack   No No   Sig: Use as directed on package   Patient not taking: Reported on 1/29/2024   metoprolol succinate (TOPROL-XL) 50 mg 24 hr tablet  Self Yes No   Sig: Take 50 mg by mouth in the morning and 50 mg before bedtime.   modafinil (PROVIGIL) 100 mg tablet   Yes No   Sig: Take 100 mg by mouth daily   nitrofurantoin (MACROBID) 100 mg capsule   No No   Sig: Take 1 capsule (100 mg total) by mouth 2 (two) times a day for 5 days   onabotulinumtoxin A (BOTOX) 100 units  Self Yes No   Sig: Inject   as directed. Injection every 91 days for migraines   ondansetron (Zofran ODT) 4 mg disintegrating tablet  Self No No   Sig: Take 1 tablet (4 mg total) by mouth every 6 (six) hours as needed for nausea or vomiting   phenazopyridine (PYRIDIUM) 100 mg tablet   No No   Sig: Take 1 tablet (100 mg total) by mouth 3 (three) times a day as needed for bladder spasms   rimegepant sulfate (Nurtec) 75 mg TBDP  Self No No   Sig: Take 1 tablet (75 mg total) by mouth as needed (migraine) Limit of 1 in 24 hours   terconazole (TERAZOL 7) 0.4 % vaginal cream   No No   Sig: Insert 1 applicator into the vagina daily at bedtime   tretinoin (RETIN-A) 0.025 % cream  Self No No   Sig: Apply topically daily at bedtime   venlafaxine (EFFEXOR) 25 mg tablet   No No   Sig: Take 0.5 tablet (12.5 mg total) daily for slow taper.   verapamil (CALAN) 120 mg tablet   No No   Sig: Take 1 tab by mouth twice a day      Facility-Administered Medications: None       Past Medical History:   Diagnosis Date    Annual physical exam 2019    Anxiety     Asthma     Dizziness     at times    Dyspepsia 2021    Exertional headache 2022    GERD (gastroesophageal reflux disease)     Hammertoe of left foot     Headache     occ    Hyperlipemia     Hypermobile joints     Hypertriglyceridemia 2019    IBS (irritable bowel syndrome) 2020    Irritable bowel syndrome     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HCC)     Mast cell disorder     Migraine     PONV (postoperative nausea and vomiting)     POTS (postural orthostatic tachycardia syndrome)      infant     Wears glasses        Past Surgical History:   Procedure Laterality Date    COLONOSCOPY      EGD AND COLONOSCOPY N/A 1/10/2017    Procedure: EGD AND COLONOSCOPY;  Surgeon: Ozzy Billingsley MD;  Location: [facility];  Service:     ESOPHAGOGASTRODUODENOSCOPY      with biopsy    FOOT SURGERY      Excision of lesion feet benign    MN CORRECTION HAMMERTOE Left 2019    Procedure: 2nd  arthrodesis; 5th arthroplasty, flexor tenotomy 2,3,4,5.;  Surgeon: Odilon Berger DPM;  Location: [facility];  Service: Podiatry    SC CORRECTION EDWINERTOE Left 6/5/2020    Procedure: 2ND TOE Revision devi with broken implant;  Surgeon: Odilon Berger DPM;  Location: [facility];  Service: Podiatry    UPPER GASTROINTESTINAL ENDOSCOPY  01/28/2021    WISDOM TOOTH EXTRACTION         Family History   Problem Relation Age of Onset    Gestational diabetes Mother     Migraines Mother     Anxiety disorder Father     Hyperlipidemia Father     Autism Brother     Diabetes Other     Uterine cancer Maternal Grandmother     Rheumatic fever Maternal Grandmother     Diabetes Maternal Grandfather     Hypertension Maternal Grandfather     Heart attack Maternal Grandfather     Stroke Maternal Grandfather     Hyperlipidemia Maternal Grandfather     Hypertension Paternal Grandmother     Anxiety disorder Paternal Grandmother     Hypertension Paternal Grandfather      I have reviewed and agree with the history as documented.    E-Cigarette/Vaping    E-Cigarette Use Never User      E-Cigarette/Vaping Substances    Nicotine No     THC No     CBD No     Flavoring No     Other No     Unknown No      Social History     Tobacco Use    Smoking status: Never    Smokeless tobacco: Never   Vaping Use    Vaping status: Never Used   Substance Use Topics    Alcohol use: No    Drug use: No       Review of Systems   Constitutional:  Negative for chills and fever.   HENT:  Negative for ear pain and sore throat.    Eyes:  Negative for pain and visual disturbance.   Respiratory:  Negative for cough and shortness of breath.    Cardiovascular:  Negative for chest pain and palpitations.   Gastrointestinal:  Positive for constipation and nausea. Negative for abdominal pain, diarrhea and vomiting.   Genitourinary:  Positive for difficulty urinating, frequency, pelvic pain and urgency. Negative for dysuria, flank pain, hematuria, vaginal  bleeding, vaginal discharge and vaginal pain.   Musculoskeletal:  Negative for arthralgias and back pain.   Skin:  Negative for color change and rash.   Neurological:  Negative for seizures, syncope, weakness, numbness and headaches.   All other systems reviewed and are negative.      Physical Exam  Physical Exam  Vitals and nursing note reviewed.   Constitutional:       General: She is not in acute distress.     Appearance: Normal appearance. She is well-developed. She is not ill-appearing or diaphoretic.   HENT:      Head: Normocephalic and atraumatic.      Right Ear: External ear normal.      Left Ear: External ear normal.   Eyes:      Conjunctiva/sclera: Conjunctivae normal.   Cardiovascular:      Rate and Rhythm: Normal rate and regular rhythm.      Heart sounds: Normal heart sounds. No murmur heard.  Pulmonary:      Effort: Pulmonary effort is normal. No respiratory distress.      Breath sounds: Normal breath sounds. No stridor. No wheezing, rhonchi or rales.   Abdominal:      General: Abdomen is flat. Bowel sounds are normal. There is no distension.      Palpations: Abdomen is soft.      Tenderness: There is abdominal tenderness. There is no right CVA tenderness, left CVA tenderness or guarding.       Musculoskeletal:         General: No swelling. Normal range of motion.      Cervical back: Normal range of motion and neck supple.   Skin:     General: Skin is warm and dry.      Capillary Refill: Capillary refill takes less than 2 seconds.   Neurological:      Mental Status: She is alert.   Psychiatric:         Mood and Affect: Mood normal.         Vital Signs  ED Triage Vitals   Temperature Pulse Respirations Blood Pressure SpO2   02/13/24 1414 02/13/24 1414 02/13/24 1414 02/13/24 1414 02/13/24 1414   97.6 °F (36.4 °C) 87 18 125/64 97 %      Temp src Heart Rate Source Patient Position - Orthostatic VS BP Location FiO2 (%)   -- 02/13/24 1649 02/13/24 1649 02/13/24 1649 --    Monitor Sitting Right arm       Pain  Score       02/13/24 1454       6           Vitals:    02/13/24 1414 02/13/24 1649   BP: 125/64 134/78   Pulse: 87 71   Patient Position - Orthostatic VS:  Sitting         Visual Acuity      ED Medications  Medications   sodium chloride 0.9 % bolus 1,000 mL (0 mL Intravenous Stopped 2/13/24 1615)   ondansetron (ZOFRAN) injection 4 mg (4 mg Intravenous Given 2/13/24 1452)   ketorolac (TORADOL) injection 15 mg (15 mg Intravenous Given 2/13/24 1454)   iohexol (OMNIPAQUE) 350 MG/ML injection (SINGLE-DOSE) 100 mL (100 mL Intravenous Given 2/13/24 1602)       Diagnostic Studies  Results Reviewed       Procedure Component Value Units Date/Time    Urine culture [[id number]] Collected: 02/13/24 1441    Lab Status: In process Specimen: Urine, Clean Catch Updated: 02/13/24 1556    Comprehensive metabolic panel [[id number]] Collected: 02/13/24 1446    Lab Status: Final result Specimen: Blood from Arm, Left Updated: 02/13/24 1505     Sodium 139 mmol/L      Potassium 3.7 mmol/L      Chloride 106 mmol/L      CO2 28 mmol/L      ANION GAP 5 mmol/L      BUN 16 mg/dL      Creatinine 0.71 mg/dL      Glucose 89 mg/dL      Calcium 9.5 mg/dL      AST 15 U/L      ALT 20 U/L      Alkaline Phosphatase 60 U/L      Total Protein 7.3 g/dL      Albumin 4.2 g/dL      Total Bilirubin 0.40 mg/dL      eGFR 121 ml/min/1.73sq m     Narrative:      National Kid[employer] Disease Foundation guidelines for Chronic Kidney Disease (CKD):     Stage 1 with normal or high GFR (GFR > 90 mL/min/1.73 square meters)    Stage 2 Mild CKD (GFR = 60-89 mL/min/1.73 square meters)    Stage 3A Moderate CKD (GFR = 45-59 mL/min/1.73 square meters)    Stage 3B Moderate CKD (GFR = 30-44 mL/min/1.73 square meters)    Stage 4 Severe CKD (GFR = 15-29 mL/min/1.73 square meters)    Stage 5 End Stage CKD (GFR <15 mL/min/1.73 square meters)  Note: GFR calculation is accurate only with a steady state creatinine    Lipase [[id number]]  (Normal) Collected: 02/13/24 1446    Lab Status: Final  result Specimen: Blood from Arm, Left Updated: 02/13/24 1505     Lipase 22 u/L     Urine Microscopic [[id number]]  (Abnormal) Collected: 02/13/24 1441    Lab Status: Final result Specimen: Urine, Clean Catch Updated: 02/13/24 1458     RBC, UA 1-2 /hpf      WBC, UA 1-2 /hpf      Epithelial Cells Occasional /hpf      Bacteria, UA Occasional /hpf      MUCUS THREADS Innumerable    CBC and differential [[id number]] Collected: 02/13/24 1446    Lab Status: Final result Specimen: Blood from Arm, Left Updated: 02/13/24 1451     WBC 5.44 Thousand/uL      RBC 4.35 Million/uL      Hemoglobin 13.0 g/dL      Hematocrit 37.7 %      MCV 87 fL      MCH 29.9 pg      MCHC 34.5 g/dL      RDW 11.9 %      MPV 9.8 fL      Platelets 241 Thousands/uL      nRBC 0 /100 WBCs      Neutrophils Relative 73 %      Immat GRANS % 0 %      Lymphocytes Relative 21 %      Monocytes Relative 6 %      Eosinophils Relative 0 %      Basophils Relative 0 %      Neutrophils Absolute 3.92 Thousands/µL      Immature Grans Absolute 0.02 Thousand/uL      Lymphocytes Absolute 1.14 Thousands/µL      Monocytes Absolute 0.35 Thousand/µL      Eosinophils Absolute 0.01 Thousand/µL      Basophils Absolute 0.00 Thousands/µL     POCT pregnancy, urine [[id number]]  (Normal) Resulted: 02/13/24 1446    Lab Status: Final result Updated: 02/13/24 1446     EXT Preg Test, Ur Negative     Control Valid    Urine Macroscopic, POC [[id number]]  (Abnormal) Collected: 02/13/24 1441    Lab Status: Final result Specimen: Urine Updated: 02/13/24 1443     Color, UA Yellow     Clarity, UA Clear     pH, UA 7.0     Leukocytes, UA Negative     Nitrite, UA Negative     Protein, UA Trace mg/dl      Glucose, UA Negative mg/dl      Ketones, UA Negative mg/dl      Urobilinogen, UA 0.2 E.U./dl      Bilirubin, UA Negative     Occult Blood, UA Negative     Specific Gravity, UA 1.015    Narrative:      CLINITEK RESULT                   CT abdomen pelvis with contrast   Final Result by Maxx Jean Baptiste,  MD (02/13 1613)      No acute abnormality in the abdomen or pelvis.         Workstation performed: [id number]                    Procedures  Procedures         ED Course                               SBIRT 20yo+      Flowsheet Row Most Recent Value   Initial Alcohol Screen: US AUDIT-C     1. How often do you have a drink containing alcohol? 0 Filed at: 02/13/2024 1426   2. How many drinks containing alcohol do you have on a typical day you are drinking?  0 Filed at: 02/13/2024 1426   3a. Male UNDER 65: How often do you have five or more drinks on one occasion? 0 Filed at: 02/13/2024 1426   3b. FEMALE Any Age, or MALE 65+: How often do you have 4 or more drinks on one occassion? 0 Filed at: 02/13/2024 1426   Audit-C Score 0 Filed at: 02/13/2024 1426   GLORIA: How many times in the past year have you...    Used an illegal drug or used a prescription medication for non-medical reasons? Never Filed at: 02/13/2024 1426                      Medical Decision Making  DDx including but not limited to: appendicitis, gastroenteritis, gastritis, PUD, GERD, gastroparesis, hepatitis, pancreatitis, colitis, enteritis, food poisoning, mesenteric adenitis, epiploic appendagitis, IBD, IBS, ileus, bowel obstruction, volvulus, cholecystitis, biliary colic, choledocholithiasis, perforated viscus, tumor, splenic etiology, diverticulitis, internal hernia, constipation, pelvic pathology, renal colic, pyelonephritis, UTI.     Plan- will check basic labs, UA, preg, PVR bladder scan and CT abd/pelvis. Will give IVF, zofran, and toradol here.   UA without sign of infection. Will send urine culture as patient currently being treated for cystitis/ UTI.   Preg negative.   Labs unremarkable.   PVR initially was 241. Patient feels like she can still void again. Will have her attempt to urinate/void again and recheck. Second PVR bladder scan was 215. Still elevated, will await CT scan results and reach out to urology.   Discussed all results and plan  with patient and family who is agreeable. Patient states feeling some improvement after medications here.     CT scan - No acute abnormality in the abdomen or pelvis. Moderate amount stool throughout the colon, suggesting constipation.    Discussed all results with patient and family. Possible urinary retention/symptoms due to constipation.   Discussed with urology on call, Clayton Grace-would not recommend catheterization at her age.  Would recommend continued and further treatment for constipation and urology can see her outpatient.    Discussed plan for outpatient follow-up with urology with patient and her family.  Would add MiraLAX in addition to her current constipation regimen.  Patient states that she has taken most over-the-counter medications for constipation in the past without relief.  Patient does have a GI doctor.  Would recommend contacting them tomorrow to discuss her ED visit and review all results from today's visit.  Also discussed further treatment for constipation as this may be causing her urinary retention.  Will place ambulatory referral for urology.  Instructed patient and family to call tomorrow to set up outpatient appointment.  Patient already has follow-up appointment with OB/GYN tomorrow and states will have a recheck pelvic exam/vaginal swab for yeast at that time and declines further in the ED.  Discussed strict return precautions if symptoms worsen or new symptoms arise.  Patient and family state understanding and agree with plan.  Patient is stable at time of discharge.    Amount and/or Complexity of Data Reviewed  Labs: ordered. Decision-making details documented in ED Course.  Radiology: ordered. Decision-making details documented in ED Course.    Risk  Prescription drug management.             Disposition  Final diagnoses:   Abdominal pain   Constipation   Urinary retention     Time reflects when diagnosis was documented in both MDM as applicable and the Disposition within this note        Time User Action Codes Description Comment    2/13/2024  5:10 PM Shanta Walker Add [R10.9] Abdominal pain     2/13/2024  5:10 PM Shanta Walker Add [K59.00] Constipation     2/13/2024  5:10 PM Shanta Walker Add [R33.9] Urinary retention           ED Disposition       ED Disposition   Discharge    Condition   Stable    Date/Time   Tue Feb 13, 2024 1710    Comment   Nury MCCLELLAN David discharge to home/self care.                   Follow-up Information       Follow up With Specialties Details Why Contact Info Additional Information    Lea Reyes, MD Internal Medicine Schedule an appointment as soon as possible for a visit in 1 day  [address] [place] [address]  [phone]       Follow up with your OBGYN and GI doctor as discussed         [facility] Schedule an appointment as soon as possible for a visit in 1 day  [address] [place] [id number]  [phone] [facility] [address], [place], [place], [address]   [phone]    [facility] Emergency Department Emergency Medicine  If symptoms worsen [address] [place] [id number]  [phone] [facility] Emergency Department, [address], [place], [address]            Discharge Medication List as of 2/13/2024  5:11 PM        CONTINUE these medications which have NOT CHANGED    Details   acetaminophen (TYLENOL) 500 mg tablet Take 1,000 mg by mouth every 4 (four) hours as needed , Historical Med      acyclovir (ZOVIRAX) 5 % ointment Apply topically 5 (five) times a day for 4 days, Starting Thu 1/11/2024, Until Mon 1/15/2024, Normal      albuterol (2.5 mg/3 mL) 0.083 % nebulizer solution Take 3 mL (2.5 mg total) by nebulization every 6 (six) hours as needed for wheezing or shortness of breath, Starting Mon 11/27/2023, Normal      albuterol (ProAir HFA) 90 mcg/act  inhaler Inhale 2 puffs every 6 (six) hours as needed for wheezing or shortness of breath, Starting Sat 5/27/2023, Normal      ASMANEX  MCG/ACT AERO Inhale 2 puffs every 4 (four) hours as needed (2 puffs), Starting Fri 3/30/2018, Historical Med      Atogepant (Qulipta) 60 MG TABS Take 60 mg by mouth in the morning, Starting Thu 1/12/2023, Normal      cetirizine (ZyrTEC) 10 mg tablet Take 10 mg by mouth daily, Historical Med      clindamycin (CLEOCIN T) 1 % lotion Apply topically daily To face, Starting Fri 6/16/2023, Normal      diphenhydrAMINE (BENADRYL) 25 mg tablet Take 1 tablet (25 mg total) by mouth every 6 (six) hours as needed for itching, Starting Tue 12/13/2022, Normal      Elastic Bandages & Supports (TRUFORM STOCKINGS 20-30MMHG) MISC Use as directed., Historical Med      fludrocortisone (FLORINEF) 0.1 mg tablet Take 0.1 mg by mouth 2 (two) times a day, Starting Thu 9/15/2022, Historical Med      methylPREDNISolone 4 MG tablet therapy pack Use as directed on package, Normal      metoprolol succinate (TOPROL-XL) 50 mg 24 hr tablet Take 50 mg by mouth in the morning and 50 mg before bedtime., Starting Thu 5/19/2022, Historical Med      modafinil (PROVIGIL) 100 mg tablet Take 100 mg by mouth daily, Starting Thu 1/4/2024, Historical Med      nitrofurantoin (MACROBID) 100 mg capsule Take 1 capsule (100 mg total) by mouth 2 (two) times a day for 5 days, Starting Sun 2/11/2024, Until Fri 2/16/2024, Normal      onabotulinumtoxin A (BOTOX) 100 units Inject  as directed. Injection every 91 days for migraines, Historical Med      ondansetron (Zofran ODT) 4 mg disintegrating tablet Take 1 tablet (4 mg total) by mouth every 6 (six) hours as needed for nausea or vomiting, Starting Mon 9/11/2023, Normal      phenazopyridine (PYRIDIUM) 100 mg tablet Take 1 tablet (100 mg total) by mouth 3 (three) times a day as needed for bladder spasms, Starting Sun 2/11/2024, Normal      Prucalopride Succinate (Motegrity) 2 MG  TABS Take 2 mg by mouth in the morning, Starting Wed 7/12/2023, Normal      rimegepant sulfate (Nurtec) 75 mg TBDP Take 1 tablet (75 mg total) by mouth as needed (migraine) Limit of 1 in 24 hours, Starting Mon 9/11/2023, Normal      terconazole (TERAZOL 7) 0.4 % vaginal cream Insert 1 applicator into the vagina daily at bedtime, Starting Mon 1/29/2024, Normal      tretinoin (RETIN-A) 0.025 % cream Apply topically daily at bedtime, Starting Fri 6/16/2023, Normal      venlafaxine (EFFEXOR) 25 mg tablet Take 0.5 tablet (12.5 mg total) daily for slow taper., Normal      verapamil (CALAN) 120 mg tablet Take 1 tab by mouth twice a day, Normal                 PDMP Review         Value Time User    PDMP Reviewed  Yes 8/18/2023  9:05 AM Leif Bowman MD            ED Provider  Electronically Signed by             Shanta Walker PA-C  02/13/24 [id number]

## 2024-02-13 NOTE — ED NOTES
Pt states she was recently treated for yeast infection. Urine specimen was tested and was negative for UTI.  Pt states she currently has continued pelvic pain and sometimes feels as though she is not completely emptying when she voids.  Pt did start a course of Macrobid and Pyridium and states urinary symptoms have improved but she is still having pelvic pain.  Pt eating and drinking normally and states she had b.m. on Sunday.       Donna Mauricio RN  02/13/24 2752

## 2024-02-13 NOTE — Clinical Note
Nury Hernandez was seen and treated in our emergency department on 2/13/2024.                Diagnosis:     Nury  may return to work on return date.    She may return on this date: 02/14/2024         If you have any questions or concerns, please don't hesitate to call.      Shanta Walker PA-C    ______________________________           _______________          _______________  Hospital Representative                              Date                                Time
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
## 2024-02-14 ENCOUNTER — TELEPHONE (OUTPATIENT)
Age: 24
End: 2024-02-14

## 2024-02-14 ENCOUNTER — TELEPHONE (OUTPATIENT)
Dept: UROLOGY | Facility: MEDICAL CENTER | Age: 24
End: 2024-02-14

## 2024-02-14 ENCOUNTER — OFFICE VISIT (OUTPATIENT)
Dept: OBGYN CLINIC | Facility: CLINIC | Age: 24
End: 2024-02-14
Payer: COMMERCIAL

## 2024-02-14 VITALS — BODY MASS INDEX: 23.02 KG/M2 | WEIGHT: 147 LBS | SYSTOLIC BLOOD PRESSURE: 120 MMHG | DIASTOLIC BLOOD PRESSURE: 70 MMHG

## 2024-02-14 DIAGNOSIS — B37.31 VULVOVAGINITIS DUE TO YEAST: Primary | ICD-10-CM

## 2024-02-14 DIAGNOSIS — R33.9 URINARY RETENTION: ICD-10-CM

## 2024-02-14 LAB
BACTERIA UR CULT: NORMAL
BV WHIFF TEST VAG QL: ABNORMAL
CLUE CELLS SPEC QL WET PREP: ABNORMAL
T VAGINALIS VAG QL WET PREP: ABNORMAL
YEAST VAG QL WET PREP: ABNORMAL

## 2024-02-14 PROCEDURE — 99214 OFFICE O/P EST MOD 30 MIN: CPT | Performed by: STUDENT IN AN ORGANIZED HEALTH CARE EDUCATION/TRAINING PROGRAM

## 2024-02-14 PROCEDURE — 87210 SMEAR WET MOUNT SALINE/INK: CPT | Performed by: STUDENT IN AN ORGANIZED HEALTH CARE EDUCATION/TRAINING PROGRAM

## 2024-02-14 RX ORDER — FLUCONAZOLE 150 MG/1
150 TABLET ORAL DAILY
Qty: 2 TABLET | Refills: 0 | Status: SHIPPED | OUTPATIENT
Start: 2024-02-14 | End: 2024-02-16

## 2024-02-14 NOTE — TELEPHONE ENCOUNTER
Called patient and was able to connect with her. Pt scheduled fu appt for 2.26 @ 12pm for Virtual.

## 2024-02-14 NOTE — TELEPHONE ENCOUNTER
Patients GI provider:  Dr. Livingston     Number to return call:      Reason for call: Pt called and stated  she was in the ER and need to have a f/u with Dr Livingston, she asked if we can please see if she can be seen sooner that 07/23? Please review and reach out thank you     Scheduled procedure/appointment date if applicable: Appt 07/23/2024

## 2024-02-14 NOTE — TELEPHONE ENCOUNTER
Practice Administrator contact nursing team in regards to this patient and scheduling urgent appointment with MD for discussion of recent ER visit.     Pt scheduled on 2- at 7:30am with Dr. Vasquez.    Call placed to patient to inform her of this appointment. She did not answer. LMOM in detail informing patient of the tentatively scheduled appointment and to contact the office to confirm. Number was provided for call back.

## 2024-02-14 NOTE — PROGRESS NOTES
Assessment/Plan:       Problem List Items Addressed This Visit    None  Visit Diagnoses       Vulvovaginitis due to yeast    -  Primary    Relevant Medications    fluconazole (DIFLUCAN) 150 mg tablet    Other Relevant Orders    POCT wet mount (Completed)    Urinary retention              - reviewed complex relationship between vaginitis, urinary symptoms, UTI, urethral irritation, use of antibiotics. Unclear precipitating event.   - no urethral irritation visible today, so less likely urinary retention/symptoms due to this. HOWEVER, with yeast on wet mount, will treat. This is very possibly secondary to abx course. Hopefully, with simultaneous treatment of both, will have resolution of vaginitis    Subjective:      Patient ID: Nury Hernandez is a 23 y.o. female.    HPI  She presents today for follow up from ED visit, as well as regarding vaginitis, urinary retention.     See prior notes, but in short, Nury was treated for yeast vaginitis with terazol 7, though she continued to be symptomatic, with urinary frequency, pelvic pressure. I prescribed antibiotics over the weekend, which she did start taking. Continues to have symptoms, so presented to ED yesterday, bladder scan with >200 on several attempts.     ED note from yesterday reviewed in detail.     The following portions of the patient's history were reviewed and updated as appropriate: allergies, current medications, past family history, past medical history, past social history, past surgical history, and problem list.    Review of Systems  as above    Objective:  /70 (BP Location: Left arm, Patient Position: Sitting, Cuff Size: Standard)   Wt 66.7 kg (147 lb)   LMP 01/26/2024   BMI 23.02 kg/m²      Physical Exam  Vitals reviewed.   Constitutional:       Appearance: She is well-developed.   HENT:      Head: Normocephalic.   Cardiovascular:      Rate and Rhythm: Normal rate.   Pulmonary:      Effort: Pulmonary effort is normal.   Abdominal:       General: There is no distension.      Palpations: Abdomen is soft.      Tenderness: There is no abdominal tenderness. There is no guarding or rebound.   Genitourinary:     General: Normal vulva.      Exam position: Lithotomy position.      Vagina: Vaginal discharge present. No bleeding.      Cervix: No cervical motion tenderness.      Comments: No urethral erythema  Vagina with small amount of white discharge  Musculoskeletal:         General: Normal range of motion.      Cervical back: Normal range of motion.   Skin:     General: Skin is warm and dry.   Neurological:      Mental Status: She is alert and oriented to person, place, and time.   Psychiatric:         Behavior: Behavior normal.

## 2024-02-16 ENCOUNTER — TELEPHONE (OUTPATIENT)
Dept: PSYCHIATRY | Facility: CLINIC | Age: 24
End: 2024-02-16

## 2024-02-16 ENCOUNTER — OFFICE VISIT (OUTPATIENT)
Dept: UROLOGY | Facility: MEDICAL CENTER | Age: 24
End: 2024-02-16
Payer: COMMERCIAL

## 2024-02-16 VITALS
HEIGHT: 67 IN | BODY MASS INDEX: 23.07 KG/M2 | WEIGHT: 147 LBS | DIASTOLIC BLOOD PRESSURE: 70 MMHG | HEART RATE: 67 BPM | OXYGEN SATURATION: 97 % | SYSTOLIC BLOOD PRESSURE: 118 MMHG

## 2024-02-16 DIAGNOSIS — R33.9 URINARY RETENTION: Primary | ICD-10-CM

## 2024-02-16 LAB — POST-VOID RESIDUAL VOLUME, ML POC: 44 ML

## 2024-02-16 PROCEDURE — 51798 US URINE CAPACITY MEASURE: CPT | Performed by: UROLOGY

## 2024-02-16 PROCEDURE — 99203 OFFICE O/P NEW LOW 30 MIN: CPT | Performed by: UROLOGY

## 2024-02-16 NOTE — ASSESSMENT & PLAN NOTE
PVRs in ER were >200  Today in office PVR was 44  UA negative  Avoid bladder irritants such as caffeine and alcohol  Maintain adequate hydration and fluid intake  Complete Diflucan course for yeast infection  Follow up in six months with symptom check

## 2024-02-16 NOTE — ASSESSMENT & PLAN NOTE
Continue with current bowel regimen of stool softeners to help with constipation  Discussed how constipation can be related to urinary symptoms  Maintain adequate hydration and fluid intake  Follow up with GI

## 2024-02-16 NOTE — PROGRESS NOTES
Assessment/Plan:    Chronic idiopathic constipation  Continue with current bowel regimen of stool softeners to help with constipation  Discussed how constipation can be related to urinary symptoms  Maintain adequate hydration and fluid intake  Follow up with GI     Urinary retention  PVRs in ER were >200  Today in office PVR was 44  UA negative  Avoid bladder irritants such as caffeine and alcohol  Maintain adequate hydration and fluid intake  Complete Diflucan course for yeast infection  Follow up in six months with symptom check       Diagnoses and all orders for this visit:    Urinary retention  -     Ambulatory Referral to Urology  -     POCT Measure PVR          Subjective:      Patient ID: Nury Hernandez is a 23 y.o. female.    Nury is new to our practice and presents today with complaints of urinary retention. She was seen in the ER on 2/13/2024 and had urinary frequency, urgency, and feeling like she was not emptying her bladder fully. She was also seen in the OBGYN office and was treated for yeast infection and vaginitis due to long courses of antibiotics for an URI she had a few weeks ago.     Today in the office she still reports having suprapubic pain, urgency and frequency.  She feels when she goes to the bathroom she is dribbling and not emptying her bladder fully. There is history of constipation which she sees GI for. After she was discharged from the ER she did use an enema and started using some stool softeners and her constipation has resolved and she has been having bowel movements every day. She reports using Benadryl occasionally for sleep but does not use it every day. Has also been weaning off her effexor over the last month and is currently taking 12.5 mg daily.      The following portions of the patient's history were reviewed and updated as appropriate: allergies, current medications, past family history, past medical history, past social history, past surgical history, and problem  "list.    Review of Systems   Constitutional:  Negative for chills and fever.   Respiratory: Negative.  Negative for cough and shortness of breath.    Cardiovascular:  Negative for chest pain and leg swelling.   Gastrointestinal:  Positive for constipation. Negative for abdominal distention, abdominal pain, diarrhea, nausea and vomiting.   Genitourinary:  Positive for difficulty urinating, dyspareunia, frequency and urgency. Negative for dysuria, flank pain, hematuria, menstrual problem, pelvic pain, vaginal bleeding, vaginal discharge and vaginal pain.   Skin:  Negative for rash.   Neurological: Negative.    Hematological:  Negative for adenopathy. Does not bruise/bleed easily.         Objective:      /70 (BP Location: Left arm, Patient Position: Sitting, Cuff Size: Standard)   Pulse 67   Ht 5' 7\" (1.702 m)   Wt 66.7 kg (147 lb)   LMP 01/26/2024   SpO2 97%   BMI 23.02 kg/m²          Physical Exam  Vitals reviewed.   Constitutional:       Appearance: Normal appearance.   HENT:      Head: Normocephalic and atraumatic.   Eyes:      Pupils: Pupils are equal, round, and reactive to light.   Cardiovascular:      Rate and Rhythm: Normal rate.   Pulmonary:      Effort: Pulmonary effort is normal.   Abdominal:      General: Abdomen is flat. There is no distension.      Palpations: Abdomen is soft.      Tenderness: There is no abdominal tenderness. There is no right CVA tenderness, left CVA tenderness or guarding.   Skin:     General: Skin is warm and dry.   Neurological:      Mental Status: She is alert and oriented to person, place, and time.   Psychiatric:         Mood and Affect: Mood normal.         Behavior: Behavior normal.         Thought Content: Thought content normal.         Judgment: Judgment normal.           "

## 2024-02-16 NOTE — TELEPHONE ENCOUNTER
Called and left message for patient to inform 2/20/24 5PM was cancelled due to provider being out of the office. Requested return call to reschedule. Informed of next scheduled appt.

## 2024-02-19 ENCOUNTER — OFFICE VISIT (OUTPATIENT)
Dept: PSYCHIATRY | Facility: CLINIC | Age: 24
End: 2024-02-19

## 2024-02-19 DIAGNOSIS — F41.1 GAD (GENERALIZED ANXIETY DISORDER): Primary | ICD-10-CM

## 2024-02-19 DIAGNOSIS — G43.009 MIGRAINE WITHOUT AURA AND WITHOUT STATUS MIGRAINOSUS, NOT INTRACTABLE: ICD-10-CM

## 2024-02-19 NOTE — PSYCH
MEDICATION MANAGEMENT NOTE        [facility]      Name and Date of Birth:  Nury Hernandez 23 y.o. 2000 MRN: [id number]    Date of Visit: February 19, 2024    Reason for Visit: Follow-up visit for medication management     SUBJECTIVE:    Nury is a 23 y.o.  Female with history of Generalized Anxiety Disorder, and PMH significant for POTS, migraines, Mast Cell Activation Syndrome, who presents for follow up for medication management.  Last appointment date was 8/18/2023.    Nury presents reporting that overall, she feels stable from psychiatric symptoms standpoint.  She is down to venlafaxine 12.5 mg once a day to once every other day.  She notes that her last dose was on Saturday.  States that although she has been experiencing brain zaps, it is decreasing in frequency and intensity, and she would like to see if she will be able to no longer take it.  If she feels that the withdrawal symptoms are worsening, she will take 1 dose.  Overall, she feels better now than compared to few weeks ago when her withdrawal symptoms were more intense.  She has returned to work, and has been able to function appropriately and meeting demands of her job.    At this time, she denies acute mood concerns, including feeling down, depressed, hopeless.  Denies uncontrolled anxiety symptoms.  Reports stable weight, appetite, energy levels.  She has been working night shift, but is managing well.  Nury denies self-harming thoughts, SI.  Denies HI.  Denies perceptual disturbances.  Denies symptoms consistent with paranoia.  Denies symptoms consistent with delusional thought content.  Denies symptoms consistent with patti or hypomania.    Current Rating Scores:     Current PHQ-9   PHQ-2/9 Depression Screening    Little interest or pleasure in doing things: 0 - not at all  Feeling down, depressed, or hopeless: 0 - not at all  Trouble falling or staying asleep, or sleeping  too much: 1 - several days  Feeling tired or having little energy: 1 - several days  Poor appetite or overeatin - not at all  Feeling bad about yourself - or that you are a failure or have let yourself or your family down: 0 - not at all  Trouble concentrating on things, such as reading the newspaper or watching television: 0 - not at all  Moving or speaking so slowly that other people could have noticed. Or the opposite - being so fidgety or restless that you have been moving around a lot more than usual: 0 - not at all  Thoughts that you would be better off dead, or of hurting yourself in some way: 0 - not at all  PHQ-9 Score: 2  PHQ-9 Interpretation: No or Minimal depression         Review Of Systems:      Constitutional negative   ENT negative   Cardiovascular negative   Respiratory negative   Gastrointestinal negative   Genitourinary negative   Musculoskeletal negative   Integumentary negative   Neurological negative   Endocrine negative   Other Symptoms none, all other systems are negative     Past Psychiatric History: (unchanged information from previous note copied and italicized) - Information that is bolded has been updated.      Inpatient psychiatric admissions: denies  Prior outpatient psychiatric linkage: ARCENIO Monique, Fred Jessica MD  Past/current psychotherapy: Clementina Sky  History of suicidal attempts/gestures: none  History of violence/aggressive behaviors: none  Psychotropic medication trials: Effexor, Zoloft (tired), Cymbalta (tiredness), Depakote, Topamax, Methylphenidate  Substance abuse inpatient/outpatient rehabilitation: none        Substance Abuse History: (unchanged information from previous note copied and italicized) - Information that is bolded has been updated.      Occasional 1-2 drinks in social situations. Generally avoids alcohol as it makes her headaches worse. Denies using tobacco products. Denies using marijuana. Denies use of illicit street drugs.     Denies history of  alcohol, illict substance, or tobacco abuse. Denies past legal actions or arrests secondary to substance intoxication. The patient denies prior DWIs/DUIs. Nury does not exhibit objective evidence of substance withdrawal during today's examination nor does Nury appear under the influence of any psychoactive substance.       Social History: (unchanged information from previous note copied and italicized) - Information that is bolded has been updated.      Born at 26 weeks. Fraternal tiwn. Twin has ASD.  Developmental: Denies a history of milestone/developmental delay. There is no documented history of IEP or need for special education.  Education: nursing student  Marital history: single  Children: none  Living arrangement, social support: parents and brother  Occupational History: employed as , [employer] at [employer]; nursing student  Access to firearms: Denies direct access to weapons/firearms. Nury Hernandez has no history of arrests or violence with a deadly weapon.      Traumatic History: (unchanged information from previous note copied and italicized) - Information that is bolded has been updated.      Abuse: denies  Other Traumatic Events: other traumatic events: denies      Past Medical History:    Past Medical History:   Diagnosis Date    Annual physical exam 2019    Anxiety     Asthma     Dizziness     at times    Dyspepsia 2021    Exertional headache 2022    GERD (gastroesophageal reflux disease)     Hammertoe of left foot     Headache     occ    Hyperlipemia     Hypermobile joints     Hypertriglyceridemia 2019    IBS (irritable bowel syndrome) 2020    Irritable bowel syndrome     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HCC)     Mast cell disorder     Migraine     PONV (postoperative nausea and vomiting)     POTS (postural orthostatic tachycardia syndrome)      infant     Wears glasses         Past Surgical History:   Procedure Laterality Date    COLONOSCOPY      EGD AND  COLONOSCOPY N/A 1/10/2017    Procedure: EGD AND COLONOSCOPY;  Surgeon: Ozzy Billingsley MD;  Location: [facility];  Service:     ESOPHAGOGASTRODUODENOSCOPY      with biopsy    FOOT SURGERY      Excision of lesion feet benign    CT CORRECTION HAMMERTOE Left 12/20/2019    Procedure: 2nd arthrodesis; 5th arthroplasty, flexor tenotomy 2,3,4,5.;  Surgeon: Odilon Berger DPM;  Location: [facility];  Service: Podiatry    CT CORRECTION HAMMERTOE Left 6/5/2020    Procedure: 2ND TOE Revision hammertoe with broken implant;  Surgeon: Odilon Berger DPM;  Location: [facility];  Service: Podiatry    UPPER GASTROINTESTINAL ENDOSCOPY  01/28/2021    WISDOM TOOTH EXTRACTION       Allergies   Allergen Reactions    Nuts - Food Allergy Other (See Comments)     Avani Nuts - itchy throat    Other Hives      At surgical site and IV site- not sure if type of cleanser used    Pollen Extract     Reyvow [Lasmiditan] Palpitations and Hallucinations       Substance Abuse History:    Social History     Substance and Sexual Activity   Alcohol Use No     Social History     Substance and Sexual Activity   Drug Use No       Social History:    Social History     Socioeconomic History    Marital status: Single     Spouse name: Not on file    Number of children: Not on file    Years of education: Not on file    Highest education level: Not on file   Occupational History    Not on file   Tobacco Use    Smoking status: Never    Smokeless tobacco: Never   Vaping Use    Vaping status: Never Used   Substance and Sexual Activity    Alcohol use: No    Drug use: No    Sexual activity: Never   Other Topics Concern    Not on file   Social History Narrative    Lives with parents     Social Determinants of Health     Financial Resource Strain: Not on file   Food Insecurity: Not on file   Transportation Needs: Not on file   Physical Activity: Insufficiently Active (6/15/2022)    Exercise Vital Sign     Days of Exercise per Week: 3 days     Minutes of  "Exercise per Session: 30 min   Stress: Not on file   Social Connections: Not on file   Intimate Partner Violence: Not on file   Housing Stability: Not on file       Family Psychiatric History:     Family History   Problem Relation Age of Onset    Gestational diabetes Mother     Migraines Mother     Anxiety disorder Father     Hyperlipidemia Father     Autism Brother     Diabetes Other     Uterine cancer Maternal Grandmother     Rheumatic fever Maternal Grandmother     Diabetes Maternal Grandfather     Hypertension Maternal Grandfather     Heart attack Maternal Grandfather     Stroke Maternal Grandfather     Hyperlipidemia Maternal Grandfather     Hypertension Paternal Grandmother     Anxiety disorder Paternal Grandmother     Hypertension Paternal Grandfather        History Review: The following portions of the patient's history were reviewed and updated as appropriate: allergies, current medications, past family history, past medical history, past social history, past surgical history, and problem list.         OBJECTIVE:     Vital signs in last 24 hours:    There were no vitals filed for this visit.    Mental Status Evaluation:    Appearance appears stated age and dressed in [facility] attire.    Behavior calm, cooperative, and good eye contact   Speech normal rate, normal volume, normal pitch   Mood \"ok\"   Affect normal range and intensity, appropriate   Thought Processes organized, goal directed   Associations intact associations   Thought Content no overt delusions   Perceptual Disturbances: no auditory hallucinations, no visual hallucinations, does not appear responding to internal stimuli   Abnormal Thoughts  Risk Potential Denies suicidal or homicidal ideation, plan, or intent   Orientation oriented to person, place, time/date, and situation   Memory recent and remote memory grossly intact   Consciousness alert and awake   Attention Span Concentration Span attention span and concentration are age " appropriate   Intellect Appears to be of average range intelligence   Insight intact   Judgement intact   Muscle Strength and  Gait normal muscle strength and normal muscle tone, normal gait and normal balance   Motor activity no abnormal movements   Language no difficulty naming common objects   Fund of Knowledge adequate knowledge of current events  adequate fund of knowledge regarding past history  adequate fund of knowledge regarding vocabulary        Laboratory Results: I have personally reviewed all pertinent laboratory/tests results    Recent Labs (last 6 months):   Office Visit on 02/16/2024   Component Date Value    POST-VOID RESIDUAL VOLUM* 02/16/2024 44    Office Visit on 02/14/2024   Component Date Value    Yeast, Wet Prep 02/14/2024 rare     Whiff Test 02/14/2024 neg     Clue Cells 02/14/2024 neg     Trich, Wet Prep 02/14/2024 neg    Admission on 02/13/2024, Discharged on 02/13/2024   Component Date Value    WBC 02/13/2024 5.44     RBC 02/13/2024 4.35     Hemoglobin 02/13/2024 13.0     Hematocrit 02/13/2024 37.7     MCV 02/13/2024 87     MCH 02/13/2024 29.9     MCHC 02/13/2024 34.5     RDW 02/13/2024 11.9     MPV 02/13/2024 9.8     Platelets 02/13/2024 241     nRBC 02/13/2024 0     Neutrophils Relative 02/13/2024 73     Immat GRANS % 02/13/2024 0     Lymphocytes Relative 02/13/2024 21     Monocytes Relative 02/13/2024 6     Eosinophils Relative 02/13/2024 0     Basophils Relative 02/13/2024 0     Neutrophils Absolute 02/13/2024 3.92     Immature Grans Absolute 02/13/2024 0.02     Lymphocytes Absolute 02/13/2024 1.14     Monocytes Absolute 02/13/2024 0.35     Eosinophils Absolute 02/13/2024 0.01     Basophils Absolute 02/13/2024 0.00     Sodium 02/13/2024 139     Potassium 02/13/2024 3.7     Chloride 02/13/2024 106     CO2 02/13/2024 28     ANION GAP 02/13/2024 5     BUN 02/13/2024 16     Creatinine 02/13/2024 0.71     Glucose 02/13/2024 89     Calcium 02/13/2024 9.5     AST 02/13/2024 15     ALT  02/13/2024 20     Alkaline Phosphatase 02/13/2024 60     Total Protein 02/13/2024 7.3     Albumin 02/13/2024 4.2     Total Bilirubin 02/13/2024 0.40     eGFR 02/13/2024 121     Lipase 02/13/2024 22     EXT Preg Test, Ur 02/13/2024 Negative     Control 02/13/2024 Valid     Color, UA 02/13/2024 Yellow     Clarity, UA 02/13/2024 Clear     pH, UA 02/13/2024 7.0     Leukocytes, UA 02/13/2024 Negative     Nitrite, UA 02/13/2024 Negative     Protein, UA 02/13/2024 Trace (A)     Glucose, UA 02/13/2024 Negative     Ketones, UA 02/13/2024 Negative     Urobilinogen, UA 02/13/2024 0.2     Bilirubin, UA 02/13/2024 Negative     Occult Blood, UA 02/13/2024 Negative     Specific Gravity, UA 02/13/2024 1.015     RBC, UA 02/13/2024 1-2     WBC, UA 02/13/2024 1-2     Epithelial Cells 02/13/2024 Occasional     Bacteria, UA 02/13/2024 Occasional     MUCUS THREADS 02/13/2024 Innumerable (A)     Urine Culture 02/13/2024 No Growth <1000 cfu/mL    Hospital Outpatient Visit on 02/09/2024   Component Date Value    BSA 02/09/2024 1.77     A4C EF 02/09/2024 57     LV Diastolic Volume (BP) 02/09/2024 58     LV Systolic Volume (BP) 02/09/2024 24     EF 02/09/2024 59     LVIDd 02/09/2024 4.40     LVIDS 02/09/2024 3.00     IVSd 02/09/2024 0.60     LVPWd 02/09/2024 0.60     FS 02/09/2024 32     LA Volume Index (BP) 02/09/2024 33.3     RVID d 02/09/2024 2.9     Tricuspid annular plane * 02/09/2024 1.90     LA size 02/09/2024 3.4     LA length (A2C) 02/09/2024 5.00     LA volume (BP) 02/09/2024 59     RAA A4C 02/09/2024 11.6     TR Peak Dannie 02/09/2024 2.0     Triscuspid Valve Regurgi* 02/09/2024 15.0     Ao root 02/09/2024 2.20     Asc Ao 02/09/2024 2.3     Tricuspid valve peak reg* 02/09/2024 1.95     Left ventricular stroke * 02/09/2024 52.00     IVS 02/09/2024 0.6     LEFT VENTRICLE SYSTOLIC * 02/09/2024 34     LV DIASTOLIC VOLUME (MOD* 02/09/2024 86     Left Atrium Area-systoli* 02/09/2024 20.6     Left Atrium Area-systoli* 02/09/2024 18.4      LVSV, 2D 02/09/2024 52     LV Diastolic Volume Inde* 02/09/2024 32.8     LV Systolic Volume Index* 02/09/2024 13.6     LV EF 02/09/2024 60     Est. RA pres 02/09/2024 8.0     Right Ventricular Peak S* 02/09/2024 23.00     IVC 02/09/2024 20.0    Office Visit on 01/29/2024   Component Date Value    Yeast, Wet Prep 01/29/2024 positive     pH 01/29/2024 4.5     Whiff Test 01/29/2024 Neg     Clue Cells 01/29/2024 Neg     Trich, Wet Prep 01/29/2024 Neg     Color, UA 01/29/2024 Colorless     Clarity, UA 01/29/2024 Clear     Specific Gravity, UA 01/29/2024 1.007     pH, UA 01/29/2024 7.0     Leukocytes, UA 01/29/2024 Negative     Nitrite, UA 01/29/2024 Negative     Protein, UA 01/29/2024 Negative     Glucose, UA 01/29/2024 Negative     Ketones, UA 01/29/2024 Negative     Urobilinogen, UA 01/29/2024 <2.0     Bilirubin, UA 01/29/2024 Negative     Occult Blood, UA 01/29/2024 Negative     RBC, UA 01/29/2024 None Seen     WBC, UA 01/29/2024 None Seen     Epithelial Cells 01/29/2024 None Seen     Bacteria, UA 01/29/2024 None Seen     Urine Culture 01/29/2024 <10,000 cfu/ml Gram Negative Gatito Enteric Like (A)    Appointment on 01/05/2024   Component Date Value    DE JESUS SYNDROME DNA SOBEIDA* 01/05/2024 Not Detected     HEREDITARY BREAST & OVAR* 01/05/2024 Not Detected     FAMILIAL HYPERCHOLESTERO* 01/05/2024 Not Detected        Suicide/Homicide Risk Assessment:  Reviewed risks.   The following ratings are based on assessment at the time of the interview and review of records  ? Demographic risk factors include: , never , age: young adult (15-24)  ? Historical Risk Factors include: history of anxiety  ? Recent Specific Risk Factors include: diagnosis of mood disorder  ? Protective Factors: no current suicidal ideation, access to mental health treatment, compliant with medications, compliant with mental health treatment, stable living environment, stable job, sense of importance of health and wellness, strong  relationships  ? Weapons: none. The following steps have been taken to ensure weapons are properly secured: not applicable  ? Based on today's assessment, Nury presents the following risk of harm to self: low        Risk of Harm to Others:  ? The following ratings are based on assessment at the time of the interview and review of records  ? Demographic Risk Factors include: 16-25 years of age.  ? Historical Risk Factors include: none.  ? Recent Specific Risk Factors include: concomitant mood disorder.  ? Protective Factors: no current homicidal ideation, safe and stable living environment, strong relationships  ? Weapons: none. The following steps have been taken to ensure weapons are properly secured: not applicable  ? Based on today's assessment, Nury presents the following risk of harm to others: minimal      The following interventions are recommended: no intervention changes needed      Lethality Statement:  Based on today's assessment and clinical criteria, Nury Hernandez contracts for safety and is not an imminent risk of harm to self or others. Outpatient level of care is deemed appropriate at this current time. Nury understands that if they can no longer contract for safety, they need to call the office or report to their nearest Emergency Room for immediate evaluation.       Assessment/Plan:     Nury Hernandez is a 23 y.o.  female, Single (never ), lives with parents, currently employed, w/ PMH of migraines, POTS, Mast Cell Activation Syndrome, and PPH of INES, no prior psychiatric admissions, no prior SA, who presented to the mental health clinic for follow up.    At this time, Josette is stable from psychiatric symptoms standpoint, aside from experiencing some symptoms of SNRI withdrawal from venlafaxine.  She is taking Effexor 12.5 mg, with the last dose on Saturday.  Currently has some brain zaps, but appears to be improving.  No other acute concerns at this time.  No acute  lethality concerns.  No objective evidence of patti/hypomania or psychosis.  She states she does not read refill for the venlafaxine. Nury should call once she is able to be off the venlafaxine.    Today's Plan:  At this time, will continue tapering from venlafaxine. Risks, potential side effects discussed with patient who verbalizes agreement.     DSM-5 Diagnoses:   Generalized anxiety disorder      Treatment Recommendations/Precautions:  Continue tapering Effexor with 12.5 mg tablets.  Medication management follow up in 8 weeks  Continue medical management with PCP  Continue psychotherapy with own therapist  Aware of need to follow up with family physician for medical issues  Aware of 24 hour and weekend coverage for urgent situations accessed by calling [facility] main practice number    Medications Risks/Benefits      Risks, Benefits And Possible Side Effects Of Medications:    Risks, benefits, and possible side effects of medications explained to Nury and she verbalizes understanding and agreement for treatment.      Controlled Medication Discussion:     No recent records found for controlled prescriptions according to [place] Prescription Drug Monitoring Program    Psychotherapy Provided:     Individual psychotherapy provided: Medications, treatment progress and treatment plan reviewed with Nury.     Treatment Plan:    Completed and signed during the session: Yes - Treatment Plan done but not signed at time of office visit.  Plan reviewed in person and verbal consent given.    Visit Time    Visit Start Time: 8:30 AM  Visit Stop Time: 8:55 AM  Total Visit Duration:  25 minutes    Leif Bowman MD 02/19/24

## 2024-02-19 NOTE — BH CRISIS PLAN
Client Name: Nury Hernandez       Client YOB: 2000    [facility] Safety Plan      Creation Date: 2/19/24 Update Date: 2/19/24   Created By: Leif Bowman MD Last Updated By: Leif Bowman MD      Step 1: Warning Signs:   Warning Signs   panic attack symptoms            Step 2: Internal Coping Strategies:   Internal Coping Strategies   deep breathing, music, mindfulness            Step 3: People and social settings that provide distraction:   Name Contact Information   Hortencia Shaffertraub [phone]    Places   Go outside, gym           Step 4: People whom I can ask for help during a crisis:      Name Contact Information    Hortencia Shaffertraub [phone]      Step 5: Professionals or agencies I can contact during a crisis:      Clinican/Agency Name Phone Emergency Contact    Clement[facility] Sky [phone]     Leif Bowman [phone]       Local Emergency Department Emergency Department Phone Emergency Department Address    [facility]          Crisis Phone Numbers:   Suicide Prevention Lifeline: Call or Text  695 Crisis Text Line: Text HOME to 788-638   Please note: Some [facility] do not have a separate number for Child/Adolescent specific crisis. If your county is not listed under Child/Adolescent, please call the adult number for your county      Adult Crisis Numbers: Child/Adolescent Crisis Numbers   [facility]: [phone] [facility]: [phone]   [facility]: [phone] [facility]: [phone]   [facility]: [phone] [place]: [phone]   [facility]: [phone] [place]: [phone]   [place] [facility]: [phone]   [facility]: [phone]   [facility]: [phone]   [facility]: [phone] (daytime) [phone] (after hours, weekends, holidays)      Step 6: Making the environment safer (plan for lethal means safety):   Patient did not identify any lethal methods: Yes      Optional: What is most important to me and worth living for?   Career, family     [facility]ta Safety Plan. Selma Arroyo and Capo Ramirez. Used with permission of the authors.

## 2024-02-19 NOTE — BH TREATMENT PLAN
TREATMENT PLAN        [facility]    Name and Date of Birth:  Nury ELEUTERIO Hernandez 23 y.o. 2000  Date of Treatment Plan: February 19, 2024  Diagnosis/Diagnoses:    1. INES (generalized anxiety disorder)        Strengths/Personal Resources for Self-Care: supportive family, supportive friends, taking medications as prescribed, ability to adapt to life changes, ability to communicate needs, ability to communicate well, ability to listen, ability to reason, ability to understand psychiatric illness, average or above intelligence, financial security, good physical health, good understanding of illness, independence, motivation for treatment, ability to negotiate basic needs, stable employment, well educated, willingness to work on problems    Area/Areas of need: anxiety symptoms    Long Term Goal: maintain mood stability  Target Date: 6 months - August 19, 2024  Person/Persons responsible for completion of goal: Nury and Leif Bowman MD     Short Term Objective (s) - How will we reach this goal?:   Take medications as prescribed  Attend psychiatry appointments regularly  Continue psychotherapy regularly  Eat a healthy diet   Exercise regularly   Target Date: 6 months - August 19, 2024  Person/Persons Responsible for Completion of Goal: Nury     Progress Towards Goals: Continuing treatment    Treatment Modality: medication management every 1-3 months as needed and continue psychotherapy  Review due 180 days from date of this plan: August 17, 2024   Expected length of service: Ongoing treatment    My physician and I have developed this plan together, and I agree to work on the goals and objectives. I understand the treatment goals that were developed for my treatment.    The treatment plan was created between Leif Bowman MD and Nury Hernandez on 02/19/24 at 8:55 AM but not signed at the time of the visit. The plan was reviewed, and verbal consent was given.  Plan made available for signature on [employer].

## 2024-02-20 ENCOUNTER — PATIENT MESSAGE (OUTPATIENT)
Dept: OBGYN CLINIC | Facility: CLINIC | Age: 24
End: 2024-02-20

## 2024-02-20 RX ORDER — ATOGEPANT 60 MG/1
60 TABLET ORAL DAILY
Qty: 90 TABLET | Refills: 4 | Status: SHIPPED | OUTPATIENT
Start: 2024-02-20

## 2024-02-21 ENCOUNTER — NURSE TRIAGE (OUTPATIENT)
Age: 24
End: 2024-02-21

## 2024-02-21 NOTE — TELEPHONE ENCOUNTER
"Patient reports vaginal, white, thick discharge that has not been resolved since finishing the course of diflucan on 2/16/24.  She reports having two yeast infection within a month period of time. Patient was advised to wait 7-10 for diflucan to go through body and to call office back if symptoms do not improve.  Advised to let vaginal area open to air and to avoid wearing tight fitting clothing and a diet high in sugars and carbs.  Pt verbalized understanding.        Reason for Disposition   Symptoms of a vaginal yeast infection (i.e., white, thick, cottage-cheese-like, itchy, not bad smelling discharge)    Answer Assessment - Initial Assessment Questions  1. DISCHARGE: \"Describe the discharge.\" (e.g., white, yellow, green, gray, foamy, cottage cheese-like)      White thick vaginal discharge   2. ODOR: \"Is there a bad odor?\"      Denies   3. ONSET: \"When did the discharge begin?\"  Had yeast infection 2-3 times now   4. RASH: \"Is there a rash in that area?\" If Yes, ask: \"Describe it.\" (e.g., redness, blisters, sores, bumps)      Denies   5. ABDOMINAL PAIN: \"Are you having any abdominal pain?\" If Yes, ask: \"What does it feel like? \" (e.g., crampy, dull, intermittent, constant)       Denies   6. ABDOMINAL PAIN SEVERITY: If present, ask: \"How bad is it?\"  (e.g., mild, moderate, severe)   - MILD - doesn't interfere with normal activities    - MODERATE - interferes with normal activities or awakens from sleep    - SEVERE - patient doesn't want to move (R/O peritonitis)       Denies   7. CAUSE: \"What do you think is causing the discharge?\" \"Have you had the same problem before? What happened then?\"      Yeast infection   8. OTHER SYMPTOMS: \"Do you have any other symptoms?\" (e.g., fever, itching, vaginal bleeding, pain with urination, injury to genital area, vaginal foreign body)      Itching   9. PREGNANCY: \"Is there any chance you are pregnant?\" \"When was your last menstrual period?\"      N/a    Protocols used: Vaginal " Discharge-ADULT-[place]

## 2024-02-23 ENCOUNTER — TELEPHONE (OUTPATIENT)
Dept: GASTROENTEROLOGY | Facility: CLINIC | Age: 24
End: 2024-02-23

## 2024-02-23 NOTE — TELEPHONE ENCOUNTER
Hi,    On Monday, went for his case at [facility] is a 1230pm.  I am scheduled for a virtual visit with Nury at 12 but it will be too close.  Can you make Nury's virtual visit at 730am if that is okay with her?

## 2024-02-26 ENCOUNTER — OFFICE VISIT (OUTPATIENT)
Dept: GASTROENTEROLOGY | Facility: CLINIC | Age: 24
End: 2024-02-26
Payer: COMMERCIAL

## 2024-02-26 VITALS
HEIGHT: 67 IN | WEIGHT: 149 LBS | SYSTOLIC BLOOD PRESSURE: 116 MMHG | HEART RATE: 66 BPM | TEMPERATURE: 97.8 F | OXYGEN SATURATION: 99 % | DIASTOLIC BLOOD PRESSURE: 69 MMHG | BODY MASS INDEX: 23.39 KG/M2

## 2024-02-26 DIAGNOSIS — R11.0 NAUSEA: ICD-10-CM

## 2024-02-26 DIAGNOSIS — G90.A POTS (POSTURAL ORTHOSTATIC TACHYCARDIA SYNDROME): ICD-10-CM

## 2024-02-26 DIAGNOSIS — K59.04 CHRONIC IDIOPATHIC CONSTIPATION: Primary | ICD-10-CM

## 2024-02-26 DIAGNOSIS — G43.009 MIGRAINE WITHOUT AURA AND WITHOUT STATUS MIGRAINOSUS, NOT INTRACTABLE: ICD-10-CM

## 2024-02-26 PROCEDURE — 99214 OFFICE O/P EST MOD 30 MIN: CPT | Performed by: INTERNAL MEDICINE

## 2024-02-26 RX ORDER — ONDANSETRON 4 MG/1
4 TABLET, ORALLY DISINTEGRATING ORAL EVERY 6 HOURS PRN
Qty: 30 TABLET | Refills: 0 | Status: SHIPPED | OUTPATIENT
Start: 2024-02-26

## 2024-02-26 NOTE — PROGRESS NOTES
[facility] - Outpatient Follow-up Note  Nury Hernandez 23 y.o. female MRN: [id number]  Encounter: [id number]          ASSESSMENT AND PLAN:    Nury Hernandez is a 23 y.o. female with longstanding constipation since childhood, last seen February 2023 and at that time with baseline of 1-2 bowel movements per week, who now presents for follow-up.  In the past she has tried laxatives, MiraLAX, fiber, bowel stimulants such as senna, Linzess, Amitiza, Trulance and at the last visit she was prescribed Motegrity. She continues to have constipation but feels well and it is not causing significant symptoms.     Echocardiogram from February 2024 with normal systolic function and EF of 60%.  CT abdomen pelvis from February 2024 with no acute abnormality noted in the abdomen or pelvis, but moderate amount of stool throughout the colon suggesting constipation.  Pelvic ultrasound from December 2023 normal.  Ultrasound head and neck soft tissue with normal-appearing lymph node underlying the region of interest.  Recent blood work notable for CMP with normal electrolytes, creatinine, liver test.  CBC with normal white blood cell count, hemoglobin, MCV, platelets.  Prior celiac blood work from October 2021 normal.  Prior H. pylori stool testing from 2021 normal.  Prior TSH normal.    Prior anorectal manometry from September 2022 with normal anal tone with adequate squeeze response, mildly low sensation and deprecatory urge unlikely to be clinically significant.  Normal balloon expulsion time and low suspicion for ongoing dyssynergic defecation at this time.    Endoscopy from January 2021 normal in appearance.  Biopsies overall benign aside from chronic inactive gastritis.    Differential for constipation includes chronic idiopathic constipation versus irritable bowel syndrome with constipation versus pelvic dyssynergia versus colonic dysmotility versus medication effect versus other.    1. Chronic  idiopathic constipation    2. Migraine without aura and without status migrainosus, not intractable    3. POTS (postural orthostatic tachycardia syndrome)    4. Nausea        No orders of the defined types were placed in this encounter.    Current goal is 3 bowel movements per week  Recommend motegrity but depending on the effect can trial ibsrela.   Try to drink at least 8 cups of water per day  High fiber diet  Zofran as needed for nausea  Try to have a squatting position while having a bowel movement  Consider a sits marker test or defecography in the future  ______________________________________________________________________    SUBJECTIVE:    Nury Hernandez is a 23 y.o. female who presents with complaint of constipation.     She did well on motegrity. She stopped it.   She had a recent yeast infection. She was in the hospital for pelvic pain, urinary retention. She saw urology. She was told it was probably the yeast infection.   Bms are okay. 1-2 times per week. It is typical for her. Some weeks are better than others. Some weeks are more of a struggle. She is not on motegrity. On it she was going 2-3 times a week. She did slow down after a while. She is not on anything else. [place] 1-4. Not straining. Water intake is good. She gets close to 60-90 ounces of water. She still avoids gluten. No routine abdominal pain. No blood in the stool. No melena.   No heartburn, dysphagia, odynophagia.   Slightly increased nausea but no vomiting  No weight loss.     Wt Readings from Last 3 Encounters:   02/26/24 67.6 kg (149 lb)   02/16/24 66.7 kg (147 lb)   02/14/24 66.7 kg (147 lb)       REVIEW OF SYSTEMS IS OTHERWISE NEGATIVE.  10 point ROS reviewed and negative, except as above      Historical Information   Past Medical History:   Diagnosis Date    Annual physical exam 04/08/2019    Anxiety     Asthma     Dizziness     at times    Dyspepsia 01/05/2021    Exertional headache 05/23/2022    GERD (gastroesophageal  reflux disease)     Hammertoe of left foot     Headache     occ    Hyperlipemia     Hypermobile joints     Hypertriglyceridemia 2019    IBS (irritable bowel syndrome) 2020    Irritable bowel syndrome     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HCC)     Mast cell disorder     Migraine     PONV (postoperative nausea and vomiting)     POTS (postural orthostatic tachycardia syndrome)      infant     Wears glasses      Past Surgical History:   Procedure Laterality Date    COLONOSCOPY      EGD AND COLONOSCOPY N/A 1/10/2017    Procedure: EGD AND COLONOSCOPY;  Surgeon: Ozzy Billingsley MD;  Location: [facility];  Service:     ESOPHAGOGASTRODUODENOSCOPY      with biopsy    FOOT SURGERY      Excision of lesion feet benign    MA CORRECTION HAMMERTOE Left 2019    Procedure: 2nd arthrodesis; 5th arthroplasty, flexor tenotomy 2,3,4,5.;  Surgeon: Odilon Berger DPM;  Location: [facility];  Service: Podiatry    MA CORRECTION HAMMERTOE Left 2020    Procedure: 2ND TOE Revision hammertoe with broken implant;  Surgeon: Odilon Berger DPM;  Location: [facility];  Service: Podiatry    UPPER GASTROINTESTINAL ENDOSCOPY  2021    WISDOM TOOTH EXTRACTION       Social History   Social History     Substance and Sexual Activity   Alcohol Use No     Social History     Substance and Sexual Activity   Drug Use No     Social History     Tobacco Use   Smoking Status Never   Smokeless Tobacco Never     Family History   Problem Relation Age of Onset    Gestational diabetes Mother     Migraines Mother     Anxiety disorder Father     Hyperlipidemia Father     Autism Brother     Diabetes Other     Uterine cancer Maternal Grandmother     Rheumatic fever Maternal Grandmother     Diabetes Maternal Grandfather     Hypertension Maternal Grandfather     Heart attack Maternal Grandfather     Stroke Maternal Grandfather     Hyperlipidemia Maternal Grandfather     Hypertension Paternal Grandmother     Anxiety disorder Paternal  "Grandmother     Hypertension Paternal Grandfather        Meds/Allergies       Current Outpatient Medications:     acetaminophen (TYLENOL) 500 mg tablet    albuterol (2.5 mg/3 mL) 0.083 % nebulizer solution    albuterol (ProAir HFA) 90 mcg/act inhaler    ASMANEX  MCG/ACT AERO    Atogepant (Qulipta) 60 MG TABS    clindamycin (CLEOCIN T) 1 % lotion    diphenhydrAMINE (BENADRYL) 25 mg tablet    Elastic Bandages & Supports (TRUFORM STOCKINGS 20-30MMHG) MISC    fludrocortisone (FLORINEF) 0.1 mg tablet    metoprolol succinate (TOPROL-XL) 50 mg 24 hr tablet    modafinil (PROVIGIL) 100 mg tablet    onabotulinumtoxin A (BOTOX) 100 units    ondansetron (Zofran ODT) 4 mg disintegrating tablet    phenazopyridine (PYRIDIUM) 100 mg tablet    Prucalopride Succinate (Motegrity) 2 MG TABS    rimegepant sulfate (Nurtec) 75 mg TBDP    tretinoin (RETIN-A) 0.025 % cream    venlafaxine (EFFEXOR) 25 mg tablet    verapamil (CALAN) 120 mg tablet    terconazole (TERAZOL 7) 0.4 % vaginal cream    Allergies   Allergen Reactions    Nuts - Food Allergy Other (See Comments)     Avani Nuts - itchy throat    Other Hives      At surgical site and IV site- not sure if type of cleanser used    Pollen Extract     Reyvow [Lasmiditan] Palpitations and Hallucinations           Objective     Blood pressure 116/69, pulse 66, temperature 97.8 °F (36.6 °C), temperature source Tympanic, height 5' 7\" (1.702 m), weight 67.6 kg (149 lb), last menstrual period 01/26/2024, SpO2 99%, not currently breastfeeding. Body mass index is 23.34 kg/m².      PHYSICAL EXAMINATION:    General Appearance:   Alert, cooperative, no distress   HEENT:  Normocephalic, atraumatic, anicteric. Neck supple, symmetrical, trachea midline.   Lungs:   Equal chest rise and unlabored breathing, normal effort, no coughing.   Cardiovascular:   No visualized JVD.   Abdomen:   No abdominal distension.   Skin:   No jaundice, rashes, or lesions.    Musculoskeletal:   Normal range of motion " visualized.   Psych:  Normal affect and normal insight.   Neuro:  Alert and appropriate.         Lab Results:   No visits with results within 1 Day(s) from this visit.   Latest known visit with results is:   Office Visit on 02/16/2024   Component Date Value    POST-VOID RESIDUAL VOLUM* 02/16/2024 44        Lab Results   Component Value Date    WBC 5.44 02/13/2024    HGB 13.0 02/13/2024    HCT 37.7 02/13/2024    MCV 87 02/13/2024     02/13/2024       Lab Results   Component Value Date    SODIUM 139 02/13/2024    K 3.7 02/13/2024     02/13/2024    CO2 28 02/13/2024    AGAP 5 02/13/2024    BUN 16 02/13/2024    CREATININE 0.71 02/13/2024    GLUC 89 02/13/2024    GLUF 92 06/23/2022    CALCIUM 9.5 02/13/2024    AST 15 02/13/2024    ALT 20 02/13/2024    ALKPHOS 60 02/13/2024    TP 7.3 02/13/2024    TBILI 0.40 02/13/2024    EGFR 121 02/13/2024       Lab Results   Component Value Date    CRP <3.0 12/14/2016       Lab Results   Component Value Date    YQD5ACSXDBPB 0.859 03/07/2023       Lab Results   Component Value Date    IRON 104 05/16/2017    TIBC 325 05/16/2017    FERRITIN 32 05/16/2017       Radiology Results:   CT abdomen pelvis with contrast    Result Date: 2/13/2024  Narrative: CT ABDOMEN AND PELVIS WITH IV CONTRAST INDICATION: abdominal and pelvic pain, urinary complaints, nausea. COMPARISON: CT abdomen/pelvis 2/24/2023. TECHNIQUE: CT examination of the abdomen and pelvis was performed. Multiplanar 2D reformatted images were created from the source data. This examination, like all CT scans performed in the [facility], was performed utilizing techniques to minimize radiation dose exposure, including the use of iterative reconstruction and automated exposure control. Radiation dose length product (DLP) for this visit: 419 mGy-cm IV Contrast: 100 mL of iohexol (OMNIPAQUE) Enteric Contrast: Not administered. FINDINGS: ABDOMEN LOWER CHEST: No clinically significant abnormality in the  visualized lower chest. LIVER/BILIARY TREE: Subcentimeter hypoattenuating lesion(s), too small to characterize but statistically likely benign, which do not require follow-up (ACR White Paper 2017). No suspicious mass. Normal hepatic contours. No biliary dilation. GALLBLADDER: No calcified gallstones. No pericholecystic inflammatory change. SPLEEN: Unremarkable. PANCREAS: Unremarkable. ADRENAL GLANDS: Unremarkable. KIDNEYS/URETERS: Unremarkable. No hydronephrosis. STOMACH AND BOWEL: Moderate amount stool throughout the colon, suggesting constipation. Otherwise, within normal limits. APPENDIX: No findings to suggest appendicitis. ABDOMINOPELVIC CAVITY: No ascites. No pneumoperitoneum. No lymphadenopathy. VESSELS: Unremarkable for patient's age. PELVIS REPRODUCTIVE ORGANS: Unremarkable for patient's age. URINARY BLADDER: Unremarkable. ABDOMINAL WALL/INGUINAL REGIONS: Unremarkable. BONES: No acute fracture or suspicious osseous lesion.     Impression: No acute abnormality in the abdomen or pelvis. Workstation performed: [id number]     Echo complete w/ contrast if indicated    Result Date: 2/9/2024  Narrative:   Left Ventricle: Left ventricular cavity size is normal. Wall thickness is normal. The left ventricular ejection fraction is 60%. Systolic function is normal. Wall motion is normal.   Left Atrium: The atrium is upper normal in size.   Mitral Valve: There is mild regurgitation.   Tricuspid Valve: There is mild regurgitation.   Pulmonary Artery: The pulmonary artery systolic pressure is normal.

## 2024-02-27 ENCOUNTER — TELEMEDICINE (OUTPATIENT)
Dept: NEUROLOGY | Facility: CLINIC | Age: 24
End: 2024-02-27
Payer: COMMERCIAL

## 2024-02-27 DIAGNOSIS — G43.009 MIGRAINE WITHOUT AURA AND WITHOUT STATUS MIGRAINOSUS, NOT INTRACTABLE: ICD-10-CM

## 2024-02-27 DIAGNOSIS — G43.709 CHRONIC MIGRAINE WITHOUT AURA WITHOUT STATUS MIGRAINOSUS, NOT INTRACTABLE: Primary | ICD-10-CM

## 2024-02-27 DIAGNOSIS — G43.829 MENSTRUAL MIGRAINE WITHOUT STATUS MIGRAINOSUS, NOT INTRACTABLE: ICD-10-CM

## 2024-02-27 PROCEDURE — 99213 OFFICE O/P EST LOW 20 MIN: CPT | Performed by: PHYSICIAN ASSISTANT

## 2024-02-27 RX ORDER — RIMEGEPANT SULFATE 75 MG/75MG
75 TABLET, ORALLY DISINTEGRATING ORAL AS NEEDED
Qty: 16 TABLET | Refills: 3 | Status: SHIPPED | OUTPATIENT
Start: 2024-02-27

## 2024-02-27 NOTE — PROGRESS NOTES
Virtual Regular Visit    Verification of patient location:    Patient is located at Home in the following state in which I hold an active license [place]      Assessment/Plan:    Problem List Items Addressed This Visit        Cardiovascular and Mediastinum    Chronic migraine without aura without status migrainosus, not intractable - Primary     Preventive therapy:  - magnesium oxide 400 mg  And  Vitamin B2 200 mg  For one month and see how pt does with out it. If no change in headaches then stay off of it.   -  Decrease verapamil 120 mg twice a day   -  Continue Qulipta 60 mg daily  -  Continue Botox every 91 days  -  Starting day before botox Decadron 1 mg for 3 days  Abortive therapy:   - at the onset of her migraine headache, take Nurtec 75 mg.  Limit of 1 in 24 hours.  If this fails then use Rizatriptan 10 mg at onset of migraine.  May repeat in 2 hours if needed.  Limit of 3 in a week or 12 month   - Use prochlorperazine 10 mg with the rizatriptan.  May repeat in 6-8 hours if needed  - Take naproxen with the above  - IF at home use benadryl 25-50 mg with the above rescue  Over the counter medications for headaches to less than 3 doses a week         Relevant Medications    rimegepant sulfate (Nurtec) 75 mg TBDP    Menstrual migraine without status migrainosus, not intractable     For menstrual migraines, start Nurtec the day before you are to get your cycle and take daily until you feel your typical migraines happen.  IF this doesn't help start a day before this for the next cycle.         Relevant Medications    rimegepant sulfate (Nurtec) 75 mg TBDP              Reason for visit is   Chief Complaint   Patient presents with   • Virtual Regular Visit          Encounter provider Monique Coffey PA-C    Provider located at [facility]UR[facility]  NEUROLOGY [address] [address] [phone]      Recent Visits  No visits were found meeting these  conditions.  Showing recent visits within past 7 days and meeting all other requirements  Today's Visits  Date Type Provider Dept   02/27/24 Telemedicine Monique Coffey PA-C [facility]   Showing today's visits and meeting all other requirements  Future Appointments  No visits were found meeting these conditions.  Showing future appointments within next 150 days and meeting all other requirements       The patient was identified by name and date of birth. Nury Hernandez was informed that this is a telemedicine visit and that the visit is being conducted through the Epic Embedded platform. She agrees to proceed..  My office door was closed. The patient was notified the following individuals were present in the room Eugenio BOYER.  She acknowledged consent and understanding of privacy and security of the video platform. The patient has agreed to participate and understands they can discontinue the visit at any time.    Patient is aware this is a billable service.     Subjective  Nury Hernandez is a 23 y.o. female       Prior to botox patient had near daily headaches ranging moderate to severe. Since starting botox increased head free days and headaches are more responsive to abortive medication. With botox has had a reduction of at least 7 migraine days with less abortive medication, less ER visits which correlates to headache diary        Headaches with exercise are improved, stress levels are improved. 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       Current medical illnesses:  QTc:  June 5, 2017 426 ms  5/15/2022 513 ms     POTS:  Sees cardio at [place] [address]. Rhea abraham-yearly  Was diagnosed in 2016  Is doing very well  She is active, able to exercise.  She walks regularly, does a stair stepper.  Drinking lots of water, gatorade  She is on Florinef and metoprolol.     IBS  mast cell activation syndrome  Malachi-Danlos syndrome  Anxiety:  Sees Carin Vallejo and a counselor  Non  celiac gluten sensitivity  Celiac artery stenosis      No history of tobacco use         What medications do you take or have you taken for your headaches?   Current Preventive:   Zyrtec  (mast cell related), Cyproheptadine  Fludrocortisone (POTS)  Metoprolol (POTS)  Motegrity  omega-3, riboflavin  modafinil  Verapamil  Botox  Qulipta     Current Abortive:   Naproxen, Tylenol   Nurtec   Reglan, compazine      Prior Preventive:   vitamin-D, multivitamin  Benadryl, Atarax   Zoloft, Cymbalta, venlafaxine -has been being lowered by her psychiatrist,   Gabapentin   Robaxin   Cyproheptadine  Xanax     Prior Abortive:   Percocet, Dilaudid, fentanyl, Fioricet  Toradol As of 11/2021 only helps for 24 hours), naproxen, ibuprofen  Zofran Reglan  Indomethacin-used to break headache cycle  Depakote-used to break headache cycle  Benadryl   Decadron, prednisone   Tylenol  Ubrelvy, Nurtec   Decadron   Reyvow      Current pain 2/10     Mild headaches: 1 a week, prior to Botox almost daily  Moderate to severe headaches: 1per week for the first months after botox, 4-5 per week the last month prior to botox  Prior to botox had moderate to severe headaches daily        Are you ever headache free? yes for at least the first 2 months after botox.      Aura/Warning and how long does it last?   - White spots in front of her visual field, seconds, with severe headaches only   - POTS headache is usually preceded by palpitation and flushing.  This was more when she was having random headaches not persistant        What time of the day do the headaches start? varies--typically late afternoon/evening       How long do the headaches last?   First 2 months after botox-a few hours with medications (6+ hours)  Month prior to botox-mild headaches are continuous, moderate to severe 8 hours     Where is your headache located?   Mild headaches: frontal  Moderate to severe headaches: frontalis, retro-orbital, occipitalis and neck bilaterally (worse when  in occipitalis)      Describe your usual headache?   Mild headaches: nagging and aching with pressure  Moderate to severe headaches: throbbing and pressure     What is the intensity of pain?   Mild headaches: 3-4/10   Moderate to severe headaches: 7-9/10      Associated symptoms:   - Nausea, vomitting   - Photophobia, Osmophobia   - Problems with concentration   - Insomnia (waking up frequently in middle of night)   - Prefer to be in a dark room      Number of days missed per month because of headaches:   Work (or school) days: doesn't miss  Social or Family activities: none     Headache are worse if the patient: Bending over, exertion-improved recently, fatigue  Headache triggers: weather changes. Barometric changes, chocolate, stress/fatigue, hormonal  What time of the year do headaches occur more frequently? More summer POTS flares      Have you had trigger point injection performed and how often? No   Have you had Botox injection performed and how often? No   Have you had epidural injections or transforaminal injections performed? No      Alternative therapies used in the past for headaches? No      Have you used CBD or THC for your headaches and how often? No      How many caffeine products to drink a day? 1-2   How much water to drink a day? 64oz      Are you current pregnant or planning on getting pregnant?  is not currently on birth control, was previously on depo shots.  Doesn't have a partner currently     Have you ever had any Brain imaging? Yes   02/09/2020-CT head   no acute intracranial abnormality     10/24/2021 MRI brain   No acute infarction, intracranial hemorrhage or mass .      Past Medical History:   Diagnosis Date   • Annual physical exam 04/08/2019   • Anxiety    • Asthma    • Dizziness     at times   • Dyspepsia 01/05/2021   • Exertional headache 05/23/2022   • GERD (gastroesophageal reflux disease)    • Hammertoe of left foot    • Headache     occ   • Hyperlipemia    • Hypermobile joints     • Hypertriglyceridemia 2019   • IBS (irritable bowel syndrome) 2020   • Irritable bowel syndrome    • 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HCC)    • Mast cell disorder    • Migraine    • PONV (postoperative nausea and vomiting)    • POTS (postural orthostatic tachycardia syndrome)    •  infant    • Wears glasses        Past Surgical History:   Procedure Laterality Date   • COLONOSCOPY     • EGD AND COLONOSCOPY N/A 1/10/2017    Procedure: EGD AND COLONOSCOPY;  Surgeon: Ozzy Billingsley MD;  Location: [facility];  Service:    • ESOPHAGOGASTRODUODENOSCOPY      with biopsy   • FOOT SURGERY      Excision of lesion feet benign   • NM CORRECTION HAMMERTOE Left 2019    Procedure: 2nd arthrodesis; 5th arthroplasty, flexor tenotomy 2,3,4,5.;  Surgeon: Odilon Berger DPM;  Location: [facility];  Service: Podiatry   • NM CORRECTION HAMMERTOE Left 2020    Procedure: 2ND TOE Revision hammertoe with broken implant;  Surgeon: Odilon Berger DPM;  Location: [facility];  Service: Podiatry   • UPPER GASTROINTESTINAL ENDOSCOPY  2021   • WISDOM TOOTH EXTRACTION         Current Outpatient Medications   Medication Sig Dispense Refill   • acetaminophen (TYLENOL) 500 mg tablet Take 1,000 mg by mouth every 4 (four) hours as needed      • albuterol (2.5 mg/3 mL) 0.083 % nebulizer solution Take 3 mL (2.5 mg total) by nebulization every 6 (six) hours as needed for wheezing or shortness of breath 30 mL 0   • albuterol (ProAir HFA) 90 mcg/act inhaler Inhale 2 puffs every 6 (six) hours as needed for wheezing or shortness of breath 8.5 g 0   • ASMANEX  MCG/ACT AERO Inhale 2 puffs every 4 (four) hours as needed (2 puffs)     • Atogepant (Qulipta) 60 MG TABS Take 60 mg by mouth in the morning 90 tablet 4   • clindamycin (CLEOCIN T) 1 % lotion Apply topically daily To face 60 mL 0   • diphenhydrAMINE (BENADRYL) 25 mg tablet Take 1 tablet (25 mg total) by mouth every 6 (six) hours as needed for itching 30  tablet 0   • Elastic Bandages & Supports (TRUFORM STOCKINGS 20-30MMHG) MISC      • fludrocortisone (FLORINEF) 0.1 mg tablet Take 0.1 mg by mouth 2 (two) times a day     • metoprolol succinate (TOPROL-XL) 50 mg 24 hr tablet Take 50 mg by mouth in the morning and 50 mg before bedtime.     • modafinil (PROVIGIL) 100 mg tablet Take 100 mg by mouth daily     • onabotulinumtoxin A (BOTOX) 100 units Inject  as directed. Injection every 91 days for migraines     • ondansetron (Zofran ODT) 4 mg disintegrating tablet Take 1 tablet (4 mg total) by mouth every 6 (six) hours as needed for nausea or vomiting 30 tablet 0   • Prucalopride Succinate (Motegrity) 2 MG TABS Take 2 mg by mouth in the morning 90 tablet 0   • rimegepant sulfate (Nurtec) 75 mg TBDP Take 1 tablet (75 mg total) by mouth as needed (migraine) Limit of 1 in 24 hours 16 tablet 3   • tretinoin (RETIN-A) 0.025 % cream Apply topically daily at bedtime 45 g 2   • verapamil (CALAN) 120 mg tablet Take 1 tab by mouth twice a day 180 tablet 3   • phenazopyridine (PYRIDIUM) 100 mg tablet Take 1 tablet (100 mg total) by mouth 3 (three) times a day as needed for bladder spasms (Patient not taking: Reported on 2/27/2024) 10 tablet 0   • terconazole (TERAZOL 7) 0.4 % vaginal cream Insert 1 applicator into the vagina daily at bedtime (Patient not taking: Reported on 2/19/2024) 45 g 0   • venlafaxine (EFFEXOR) 25 mg tablet Take 0.5 tablet (12.5 mg total) daily for slow taper. (Patient not taking: Reported on 2/27/2024) 14 tablet 0     No current facility-administered medications for this visit.        Allergies   Allergen Reactions   • Nuts - Food Allergy Other (See Comments)     Avani Nuts - itchy throat   • Other Hives      At surgical site and IV site- not sure if type of cleanser used   • Pollen Extract    • Reyvow [Lasmiditan] Palpitations and Hallucinations       Review of Systems   Constitutional: Negative.    HENT: Negative.     Eyes: Negative.    Respiratory:  Negative.     Cardiovascular: Negative.    Gastrointestinal: Negative.    Endocrine: Negative.    Genitourinary: Negative.    Musculoskeletal: Negative.    Skin: Negative.    Allergic/Immunologic: Negative.    Neurological:  Positive for headaches.   Hematological: Negative.    Psychiatric/Behavioral: Negative.     I personally reviewed and updated the ROS that was entered by the medical assistant      Video Exam    There were no vitals filed for this visit.    Physical Exam   CONSTITUTIONAL: Well developed, well nourished, well groomed. No dysmorphic features.     HEENT:  Normocephalic atraumatic.    Chest:  Respirations regular and unlabored.    Psychiatric:  Normal behavior and appropriate affect      MENTAL STATUS  Orientation: Alert and oriented x 3  Fund of knowledge: Intact.    Visit Time  I have spent a total time of 23 minutes on 02/27/24 in caring for this patient including Prognosis, Instructions for management, Patient and family education, Impressions, Counseling / Coordination of care, Documenting in the medical record, Reviewing / ordering tests, medicine, procedures  , and Obtaining or reviewing history  .

## 2024-02-27 NOTE — PATIENT INSTRUCTIONS
Migraine headache with/ without aura  Preventive therapy:  - magnesium oxide 400 mg  And  Vitamin B2 200 mg  For one month and see how pt does with out it. If no change in headaches then stay off of it.   -  Decrease verapamil 120 mg twice a day   -  Continue Qulipta 60 mg daily  -  Continue Botox every 91 days  -  Starting day before botox Decadron 1 mg for 3 days  Abortive therapy:   - at the onset of her migraine headache, take Nurtec 75 mg.  Limit of 1 in 24 hours.  If this fails then use Rizatriptan 10 mg at onset of migraine.  May repeat in 2 hours if needed.  Limit of 3 in a week or 12 month   - Use prochlorperazine 10 mg with the rizatriptan.  May repeat in 6-8 hours if needed  - Take naproxen with the above  - IF at home use benadryl 25-50 mg with the above rescue  Over the counter medications for headaches to less than 3 doses a week    For menstrual migraines, start Nurtec the day before you are to get your cycle and take daily until you feel your typical migraines happen.  IF this doesn't help start a day before this for the next cycle.      Please look up Curable and Mindspace  Do only the free portions of them

## 2024-02-27 NOTE — ASSESSMENT & PLAN NOTE
Preventive therapy:  - magnesium oxide 400 mg  And  Vitamin B2 200 mg  For one month and see how pt does with out it. If no change in headaches then stay off of it.   -  Decrease verapamil 120 mg twice a day   -  Continue Qulipta 60 mg daily  -  Continue Botox every 91 days  -  Starting day before botox Decadron 1 mg for 3 days  Abortive therapy:   - at the onset of her migraine headache, take Nurtec 75 mg.  Limit of 1 in 24 hours.  If this fails then use Rizatriptan 10 mg at onset of migraine.  May repeat in 2 hours if needed.  Limit of 3 in a week or 12 month   - Use prochlorperazine 10 mg with the rizatriptan.  May repeat in 6-8 hours if needed  - Take naproxen with the above  - IF at home use benadryl 25-50 mg with the above rescue  Over the counter medications for headaches to less than 3 doses a week

## 2024-02-27 NOTE — PROGRESS NOTES
Prior to botox patient had near daily headaches ranging moderate to severe. Since starting botox increased head free days and headaches are more responsive to abortive medication. With botox has had a reduction of at least 7 migraine days with less abortive medication, less ER visits which correlates to headache diary        Headaches with exercise are improved, stress levels are improved. 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       Current medical illnesses:  QTc:  June 5, 2017 426 ms  5/15/2022 513 ms     POTS:  Sees cardio at [place] [address]. Rhea abraham-yearly  Was diagnosed in 2016  Is doing very well  She is active, able to exercise.  She walks regularly, does a stair stepper.  Drinking lots of water, gatorade  She is on Florinef and metoprolol.     IBS  mast cell activation syndrome  Malachi-Danlos syndrome  Anxiety:  Sees Carin Vallejo and a counselor  Non celiac gluten sensitivity  Celiac artery stenosis      No history of tobacco use         What medications do you take or have you taken for your headaches?   Current Preventive:   Zyrtec  (mast cell related), Cyproheptadine  Fludrocortisone (POTS)  Metoprolol (POTS)  Motegrity  omega-3, riboflavin  venlafaxine -has been being lowered by her psychiatrist, modafinil  Verapamil  Botox  Qulipta     Current Abortive:   Naproxen, Tylenol     Nurtec   Reglan, compazine      Prior Preventive:   vitamin-D, multivitamin  Benadryl, Atarax   Zoloft, Cymbalta  Gabapentin   Robaxin   Cyproheptadine  Xanax     Prior Abortive:   Percocet, Dilaudid, fentanyl, Fioricet  Toradol As of 11/2021 only helps for 24 hours), naproxen, ibuprofen  Zofran Reglan  Indomethacin-used to break headache cycle  Depakote-used to break headache cycle  Benadryl   Decadron, prednisone   Tylenol  Ubrelvy, Nurtec   Decadron   Reyvow      Current pain 3/10     Mild headaches: last 4 weeks prior to botox, almost daily  Moderate to severe headaches: 1-2 per week for the first months  after botox, 4-5 per week the last month prior to botox  Prior to botox had moderate to severe headaches daily        Are you ever headache free? yes for at least the first 2 months after botox.      Aura/Warning and how long does it last?   - White spots in front of her visual field, seconds, with severe headaches only   - POTS headache is usually preceded by palpitation and flushing.  This was more when she was having random headaches not persistant        What time of the day do the headaches start? varies           How long do the headaches last?   First 2 months after botox-a few hours with medications  Month prior to botox-mild headaches are continuous, moderate to severe 8 hours     Where is your headache located?   Mild headaches: frontal  Moderate to severe headaches: frontalis, retro-orbital, occipitalis and neck bilaterally (worse when in occipitalis)      Describe your usual headache?   Mild headaches: nagging and aching with pressure  Moderate to severe headaches: throbbing and pressure     What is the intensity of pain?   Mild headaches: 3-4/10   Moderate to severe headaches: 7-9/10      Associated symptoms:   - Nausea, vomitting   - Photophobia, Osmophobia   - Problems with concentration   - Insomnia (waking up frequently in middle of night)   - Prefer to be in a dark room      Number of days missed per month because of headaches:   Work (or school) days: doesn't miss (has some trouble getting her work done)  Social or Family activities: none     Headache are worse if the patient: Bending over, exertion-improved recently, fatigue  Headache triggers: weather changes. Barometric changes, chocolate, stress/fatigue  What time of the year do headaches occur more frequently? More summer POTS flares      Have you had trigger point injection performed and how often? No   Have you had Botox injection performed and how often? No   Have you had epidural injections or transforaminal injections performed? No       Alternative therapies used in the past for headaches? No      Have you used CBD or THC for your headaches and how often? No      How many caffeine products to drink a day? 1-2   How much water to drink a day? 64oz      Are you current pregnant or planning on getting pregnant?  is not currently on birth control, was previously on depo shots.  Doesn't have a partner currently     Have you ever had any Brain imaging? Yes   02/09/2020-CT head   no acute intracranial abnormality     10/24/2021 MRI brain   No acute infarction, intracranial hemorrhage or mass

## 2024-02-27 NOTE — ASSESSMENT & PLAN NOTE
For menstrual migraines, start Nurtec the day before you are to get your cycle and take daily until you feel your typical migraines happen.  IF this doesn't help start a day before this for the next cycle.

## 2024-02-27 NOTE — PROGRESS NOTES
Virtual Regular Visit    Verification of patient location:    Patient is located at {[facility] Patient Location:09723} in the following state in which I hold an active license {[facility] virtual patient location:[address]}      Assessment/Plan:    Problem List Items Addressed This Visit        Cardiovascular and Mediastinum    Chronic migraine without aura without status migrainosus, not intractable - Primary     Preventive therapy:  - magnesium oxide 400 mg  And  Vitamin B2 200 mg  For one month and see how pt does with out it. If no change in headaches then stay off of it.   -  Decrease verapamil 120 mg twice a day   -  Continue Qulipta 60 mg daily  -  Continue Botox every 91 days  -  Starting day before botox Decadron 1 mg for 3 days  Abortive therapy:   - at the onset of her migraine headache, take Nurtec 75 mg.  Limit of 1 in 24 hours.  If this fails then use Rizatriptan 10 mg at onset of migraine.  May repeat in 2 hours if needed.  Limit of 3 in a week or 12 month   - Use prochlorperazine 10 mg with the rizatriptan.  May repeat in 6-8 hours if needed  - Take naproxen with the above  - IF at home use benadryl 25-50 mg with the above rescue  Over the counter medications for headaches to less than 3 doses a week         Relevant Medications    rimegepant sulfate (Nurtec) 75 mg TBDP    Menstrual migraine without status migrainosus, not intractable     For menstrual migraines, start Nurtec the day before you are to get your cycle and take daily until you feel your typical migraines happen.  IF this doesn't help start a day before this for the next cycle.         Relevant Medications    rimegepant sulfate (Nurtec) 75 mg TBDP            Reason for visit is   Chief Complaint   Patient presents with   • Virtual Regular Visit          Encounter provider Monique Coffey PA-C    Provider located at [facility]  NEUROLOGY [address]  [address]  [phone]      Recent Visits  No visits were found meeting these conditions.  Showing recent visits within past 7 days and meeting all other requirements  Today's Visits  Date Type Provider Dept   24 Telemedicine Monique Coffey PA-C [facility]   Showing today's visits and meeting all other requirements  Future Appointments  No visits were found meeting these conditions.  Showing future appointments within next 150 days and meeting all other requirements       The patient was identified by name and date of birth. Nury Hernandez was informed that this is a telemedicine visit and that the visit is being conducted through {[facility] [facility] VIRTUAL VISIT MEDIUM:93473}.  {Telemedicine confidentiality :56545} {Telemedicine participants:86617}  She acknowledged consent and understanding of privacy and security of the video platform. The patient has agreed to participate and understands they can discontinue the visit at any time.    Patient is aware this is a billable service.     Subjective  Nury Hernandez is a 23 y.o. female *** .      HPI     Past Medical History:   Diagnosis Date   • Annual physical exam 2019   • Anxiety    • Asthma    • Dizziness     at times   • Dyspepsia 2021   • Exertional headache 2022   • GERD (gastroesophageal reflux disease)    • Hammertoe of left foot    • Headache     occ   • Hyperlipemia    • Hypermobile joints    • Hypertriglyceridemia 2019   • IBS (irritable bowel syndrome) 2020   • Irritable bowel syndrome    • 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HCC)    • Mast cell disorder    • Migraine    • PONV (postoperative nausea and vomiting)    • POTS (postural orthostatic tachycardia syndrome)    •  infant    • Wears glasses        Past Surgical History:   Procedure Laterality Date   • COLONOSCOPY     • EGD AND COLONOSCOPY N/A 1/10/2017    Procedure: EGD AND COLONOSCOPY;  Surgeon: Ozzy Billingsley MD;  Location: [facility];  Service:    •  ESOPHAGOGASTRODUODENOSCOPY      with biopsy   • FOOT SURGERY      Excision of lesion feet benign   • ME CORRECTION HAMMERTOE Left 12/20/2019    Procedure: 2nd arthrodesis; 5th arthroplasty, flexor tenotomy 2,3,4,5.;  Surgeon: Odilon Berger DPM;  Location: [facility];  Service: Podiatry   • ME CORRECTION EDWINERTOE Left 6/5/2020    Procedure: 2ND TOE Revision hammertoe with broken implant;  Surgeon: Odilon Berger DPM;  Location: [facility];  Service: Podiatry   • UPPER GASTROINTESTINAL ENDOSCOPY  01/28/2021   • WISDOM TOOTH EXTRACTION         Current Outpatient Medications   Medication Sig Dispense Refill   • acetaminophen (TYLENOL) 500 mg tablet Take 1,000 mg by mouth every 4 (four) hours as needed      • albuterol (2.5 mg/3 mL) 0.083 % nebulizer solution Take 3 mL (2.5 mg total) by nebulization every 6 (six) hours as needed for wheezing or shortness of breath 30 mL 0   • albuterol (ProAir HFA) 90 mcg/act inhaler Inhale 2 puffs every 6 (six) hours as needed for wheezing or shortness of breath 8.5 g 0   • ASMANEX  MCG/ACT AERO Inhale 2 puffs every 4 (four) hours as needed (2 puffs)     • Atogepant (Qulipta) 60 MG TABS Take 60 mg by mouth in the morning 90 tablet 4   • clindamycin (CLEOCIN T) 1 % lotion Apply topically daily To face 60 mL 0   • diphenhydrAMINE (BENADRYL) 25 mg tablet Take 1 tablet (25 mg total) by mouth every 6 (six) hours as needed for itching 30 tablet 0   • Elastic Bandages & Supports (TRUFORM STOCKINGS 20-30MMHG) MISC      • fludrocortisone (FLORINEF) 0.1 mg tablet Take 0.1 mg by mouth 2 (two) times a day     • metoprolol succinate (TOPROL-XL) 50 mg 24 hr tablet Take 50 mg by mouth in the morning and 50 mg before bedtime.     • modafinil (PROVIGIL) 100 mg tablet Take 100 mg by mouth daily     • onabotulinumtoxin A (BOTOX) 100 units Inject  as directed. Injection every 91 days for migraines     • ondansetron (Zofran ODT) 4 mg disintegrating tablet Take 1 tablet (4 mg total) by  mouth every 6 (six) hours as needed for nausea or vomiting 30 tablet 0   • Prucalopride Succinate (Motegrity) 2 MG TABS Take 2 mg by mouth in the morning 90 tablet 0   • rimegepant sulfate (Nurtec) 75 mg TBDP Take 1 tablet (75 mg total) by mouth as needed (migraine) Limit of 1 in 24 hours 16 tablet 3   • tretinoin (RETIN-A) 0.025 % cream Apply topically daily at bedtime 45 g 2   • verapamil (CALAN) 120 mg tablet Take 1 tab by mouth twice a day 180 tablet 3   • phenazopyridine (PYRIDIUM) 100 mg tablet Take 1 tablet (100 mg total) by mouth 3 (three) times a day as needed for bladder spasms (Patient not taking: Reported on 2/27/2024) 10 tablet 0   • terconazole (TERAZOL 7) 0.4 % vaginal cream Insert 1 applicator into the vagina daily at bedtime (Patient not taking: Reported on 2/19/2024) 45 g 0   • venlafaxine (EFFEXOR) 25 mg tablet Take 0.5 tablet (12.5 mg total) daily for slow taper. (Patient not taking: Reported on 2/27/2024) 14 tablet 0     No current facility-administered medications for this visit.        Allergies   Allergen Reactions   • Nuts - Food Allergy Other (See Comments)     Avani Nuts - itchy throat   • Other Hives      At surgical site and IV site- not sure if type of cleanser used   • Pollen Extract    • Reyvow [Lasmiditan] Palpitations and Hallucinations       Review of Systems    Video Exam    There were no vitals filed for this visit.    Physical Exam     Visit Time  Total Visit Duration: ***

## 2024-02-29 ENCOUNTER — SOCIAL WORK (OUTPATIENT)
Dept: BEHAVIORAL/MENTAL HEALTH CLINIC | Facility: CLINIC | Age: 24
End: 2024-02-29
Payer: COMMERCIAL

## 2024-02-29 DIAGNOSIS — F41.1 GAD (GENERALIZED ANXIETY DISORDER): Primary | ICD-10-CM

## 2024-02-29 PROCEDURE — 90837 PSYTX W PT 60 MINUTES: CPT | Performed by: SOCIAL WORKER

## 2024-02-29 NOTE — PSYCH
"Behavioral Health Psychotherapy Progress Note    Psychotherapy Provided: Individual Psychotherapy     Encounter Diagnoses   Name Primary?   • INES (generalized anxiety disorder) Yes           Goals addressed in session: Goal 1     DATA:  Nury spoke today about her feelings re: her stressful shifts at work during the past two nights as well as her concern for her best friend's anxiety since giving birth.    During this session, this clinician used the following therapeutic modalities: Motivational Interviewing and Supportive Psychotherapy    Substance Abuse was not addressed during this session. If the client is diagnosed with a co-occurring substance use disorder, please indicate any changes in the frequency or amount of use: . Stage of change for addressing substance use diagnoses: No substance use/Not applicable    ASSESSMENT:  Nury Hernandez presents with a Euthymic/ normal mood.  Nury has made considerable progress in managing her anxiedty and has successfully weaned herself from venlafaxine.   her affect is Normal range and intensity, which is congruent, with her mood and the content of the session. The client has made progress on their goals.     Nury Hernanedz presents with a minimal risk of suicide, minimal risk of self-harm, and minimal risk of harm to others.    For any risk assessment that surpasses a \"low\" rating, a safety plan must be developed.    A safety plan was indicated: no  If yes, describe in detail     PLAN: Between sessions, Nury Hernandez will continue to utilize therapy sessions to increase her mindfulness and self compassion. . At the next session, the therapist will use Supportive Psychotherapy to address the above in further detail.    Behavioral Health Treatment Plan and Discharge Planning: Nury Hernandez is aware of and agrees to continue to work on their treatment plan. They have identified and are working toward their discharge goals. yes        2/29/24  Start Time: " 0810  Stop Time: 0910  Total Visit Time: 60 minutes

## 2024-03-04 ENCOUNTER — SOCIAL WORK (OUTPATIENT)
Dept: BEHAVIORAL/MENTAL HEALTH CLINIC | Facility: CLINIC | Age: 24
End: 2024-03-04
Payer: COMMERCIAL

## 2024-03-04 DIAGNOSIS — F41.1 GAD (GENERALIZED ANXIETY DISORDER): Primary | ICD-10-CM

## 2024-03-04 PROCEDURE — 90834 PSYTX W PT 45 MINUTES: CPT | Performed by: SOCIAL WORKER

## 2024-03-05 NOTE — PSYCH
"Behavioral Health Psychotherapy Progress Note    Psychotherapy Provided: Individual Psychotherapy     Encounter Diagnoses   Name Primary?   • INES (generalized anxiety disorder) Yes           Goals addressed in session: Goal 1     DATA:  Nury spoke today about her feelings re: her continued  concern for her best friend's struggles / anxiety since giving birth.    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 During this session, this clinician used the following therapeutic modalities: Motivational Interviewing and Supportive Psychotherapy    Substance Abuse was not addressed during this session. If the client is diagnosed with a co-occurring substance use disorder, please indicate any changes in the frequency or amount of use: . Stage of change for addressing substance use diagnoses: No substance use/Not applicable    ASSESSMENT:  Nury Hernandez presents with a Euthymic/ normal mood.  Nury has made considerable progress in managing her anxiety and is no longer taking venlafaxine.  However, she has not implemented any additional strategies to help her manage this increased anxiety   her affect is Normal range and intensity, which is congruent, with her mood and the content of the session. The client has made progress on their goals.     Nury Hernandez presents with a minimal risk of suicide, minimal risk of self-harm, and minimal risk of harm to others.    For any risk assessment that surpasses a \"low\" rating, a safety plan must be developed.    A safety plan was indicated: no  If yes, describe in detail     PLAN: Between sessions, Nury Hernandez will continue to utilize therapy sessions to increase her mindfulness and self compassion. . At the next session, the therapist will use Supportive Psychotherapy to address the above in further detail.    Behavioral Health Treatment Plan and Discharge Planning: Nury Hernandez is aware of and " agrees to continue to work on their treatment plan. They have identified and are working toward their discharge goals. yes        3/4/24  Start Time: 1300  Stop Time: 1350  Total Visit Time: 50 minutes

## 2024-03-07 ENCOUNTER — DOCUMENTATION (OUTPATIENT)
Dept: BEHAVIORAL/MENTAL HEALTH CLINIC | Facility: CLINIC | Age: 24
End: 2024-03-07

## 2024-03-07 NOTE — PROGRESS NOTES
Th left a voicemail message to Pt with a brief description about the [employer] and the direct contact # where she could leave a message about her availability on Fridays.

## 2024-03-11 ENCOUNTER — SOCIAL WORK (OUTPATIENT)
Dept: BEHAVIORAL/MENTAL HEALTH CLINIC | Facility: CLINIC | Age: 24
End: 2024-03-11
Payer: COMMERCIAL

## 2024-03-11 ENCOUNTER — OFFICE VISIT (OUTPATIENT)
Dept: OBGYN CLINIC | Facility: CLINIC | Age: 24
End: 2024-03-11
Payer: COMMERCIAL

## 2024-03-11 VITALS — BODY MASS INDEX: 23.02 KG/M2 | SYSTOLIC BLOOD PRESSURE: 102 MMHG | DIASTOLIC BLOOD PRESSURE: 68 MMHG | WEIGHT: 147 LBS

## 2024-03-11 DIAGNOSIS — F41.1 GAD (GENERALIZED ANXIETY DISORDER): Primary | ICD-10-CM

## 2024-03-11 DIAGNOSIS — B37.31 VULVOVAGINITIS DUE TO YEAST: Primary | ICD-10-CM

## 2024-03-11 LAB
BV WHIFF TEST VAG QL: NORMAL
CLUE CELLS SPEC QL WET PREP: NORMAL
PH SMN: 4.5 [PH]
T VAGINALIS VAG QL WET PREP: NORMAL
YEAST VAG QL WET PREP: NORMAL

## 2024-03-11 PROCEDURE — 90834 PSYTX W PT 45 MINUTES: CPT | Performed by: SOCIAL WORKER

## 2024-03-11 PROCEDURE — 99213 OFFICE O/P EST LOW 20 MIN: CPT | Performed by: STUDENT IN AN ORGANIZED HEALTH CARE EDUCATION/TRAINING PROGRAM

## 2024-03-11 PROCEDURE — 87210 SMEAR WET MOUNT SALINE/INK: CPT | Performed by: STUDENT IN AN ORGANIZED HEALTH CARE EDUCATION/TRAINING PROGRAM

## 2024-03-11 NOTE — PROGRESS NOTES
Assessment/Plan:     Problem List Items Addressed This Visit    None  Visit Diagnoses       Vulvovaginitis due to yeast    -  Primary    Relevant Orders    POCT wet mount (Completed)            - wet mount normal, no sign of continued yeast vaginitis  - continued itching potential remnant of infection. Could be related to mast cell activation syndrome vs. Itch scratch syndrome. Recommend benadryl/claritin for sleep if she finds herself itching while sleeping, to avoid development of lichen simplex chronicus    Subjective:      Patient ID: Nury Hernandez is a 23 y.o. female.    HPI  She presents today for repeat wet mount for yeast. Was treated and symptoms improved, but pruritus not completely resolved. Wants to ensure clearance of infection.     The following portions of the patient's history were reviewed and updated as appropriate: allergies, current medications, past family history, past medical history, past social history, past surgical history, and problem list.    Review of Systems  neg    Objective:  /68 (BP Location: Left arm, Patient Position: Sitting, Cuff Size: Standard)   Wt 66.7 kg (147 lb)   LMP 02/22/2024 (Exact Date)   BMI 23.02 kg/m²      Physical Exam  Vitals reviewed.   Constitutional:       Appearance: She is well-developed.   HENT:      Head: Normocephalic.   Cardiovascular:      Rate and Rhythm: Normal rate.   Pulmonary:      Effort: Pulmonary effort is normal.   Abdominal:      General: There is no distension.      Palpations: Abdomen is soft.      Tenderness: There is no abdominal tenderness. There is no guarding or rebound.   Genitourinary:     General: Normal vulva.      Exam position: Lithotomy position.      Vagina: No bleeding.      Cervix: No cervical motion tenderness.   Musculoskeletal:         General: Normal range of motion.      Cervical back: Normal range of motion.   Skin:     General: Skin is warm and dry.   Neurological:      Mental Status: She is alert and  oriented to person, place, and time.   Psychiatric:         Behavior: Behavior normal.

## 2024-03-18 ENCOUNTER — SOCIAL WORK (OUTPATIENT)
Dept: BEHAVIORAL/MENTAL HEALTH CLINIC | Facility: CLINIC | Age: 24
End: 2024-03-18
Payer: COMMERCIAL

## 2024-03-18 ENCOUNTER — TELEPHONE (OUTPATIENT)
Dept: NEUROLOGY | Facility: CLINIC | Age: 24
End: 2024-03-18

## 2024-03-18 DIAGNOSIS — F41.1 GAD (GENERALIZED ANXIETY DISORDER): Primary | ICD-10-CM

## 2024-03-18 PROCEDURE — 90834 PSYTX W PT 45 MINUTES: CPT | Performed by: SOCIAL WORKER

## 2024-03-18 NOTE — PSYCH
"Behavioral Health Psychotherapy Progress Note    Psychotherapy Provided: Individual Psychotherapy     Encounter Diagnoses   Name Primary?   • INES (generalized anxiety disorder) Yes     Goals addressed in session: Goal 1     DATA:  Nury spoke today about her feelings re: her recent work stress.  She also shared that she has begun speaking to someone online and this has resulted in a considerable increase in her anxiety.   During this session, this clinician used the following therapeutic modalities: Motivational Interviewing and Supportive Psychotherapy    Substance Abuse was not addressed during this session. If the client is diagnosed with a co-occurring substance use disorder, please indicate any changes in the frequency or amount of use: . Stage of change for addressing substance use diagnoses: No substance use/Not applicable    ASSESSMENT:  Nury Hernandez presents with a Euthymic/ normal mood.  Nury has made considerable progress in therapy and is challenging herself to \"take next steps\" despite her resistance / reluctance to do so.  She will continue to be encouraged to vocalize her fears so that she can work to challenge them.     her affect is Normal range and intensity, which is congruent, with her mood and the content of the session. The client has made progress on their goals.     Nury Hernandez presents with a minimal risk of suicide, minimal risk of self-harm, and minimal risk of harm to others.    For any risk assessment that surpasses a \"low\" rating, a safety plan must be developed.    A safety plan was indicated: no  If yes, describe in detail     PLAN: Between sessions, Nury Hernandez will continue to utilize therapy sessions to increase her mindfulness and self compassion. . At the next session, the therapist will use Supportive Psychotherapy to address the above in further detail.    Behavioral Health Treatment Plan and Discharge Planning: Nury Hernandez is aware of and agrees " to continue to work on their treatment plan. They have identified and are working toward their discharge goals. yes        3/18/24  Start Time: 1100  Stop Time: 1150  Total Visit Time: 50 minutes

## 2024-03-20 NOTE — PSYCH
"Behavioral Health Psychotherapy Progress Note    Psychotherapy Provided: Individual Psychotherapy     Encounter Diagnoses   Name Primary?   • INES (generalized anxiety disorder) Yes             Goals addressed in session: Goal 1     DATA:  Nury spoke today about her feelings re: her continued stressful shifts at work during the past week as well as ways she remains concerned about her best friend.  She expressed excitement about a trip with her family, as well as ways she has continued to struggle with gyno symptoms.   During this session, this clinician used the following therapeutic modalities: Motivational Interviewing and Supportive Psychotherapy    Substance Abuse was not addressed during this session. If the client is diagnosed with a co-occurring substance use disorder, please indicate any changes in the frequency or amount of use: . Stage of change for addressing substance use diagnoses: No substance use/Not applicable    ASSESSMENT:  Nury Hernandez presents with a Euthymic/ normal mood.  Nury has made considerable progress in managing her anxiedty and is interested in challenging her fear of dating. .   her affect is Normal range and intensity, which is congruent, with her mood and the content of the session. The client has made progress on their goals.     Nury Hernandez presents with a minimal risk of suicide, minimal risk of self-harm, and minimal risk of harm to others.    For any risk assessment that surpasses a \"low\" rating, a safety plan must be developed.    A safety plan was indicated: no  If yes, describe in detail     PLAN: Between sessions, Nury Hernandez will continue to utilize therapy sessions to increase her mindfulness and self compassion. . At the next session, the therapist will use Supportive Psychotherapy to address the above in further detail.    Behavioral Health Treatment Plan and Discharge Planning: Nury Hernandez is aware of and agrees to continue to work on " their treatment plan. They have identified and are working toward their discharge goals. yes        3/13/24  Start Time: 1600  Stop Time: 1650  Total Visit Time: 50 minutes

## 2024-03-25 NOTE — TELEPHONE ENCOUNTER
Pt current auth on file is set to  3/29/2024 before pt next appt 2024. New pa sent to pt plan [id number]     Will await approval/denial determination  
Received fax from [facility] with approval details below for pt.     Approved   Botox 200 UNITS  Qty.1  Auth# [id number]  Valid:4/1/2024-4/1/2025  Visits: 4    Please use Stock   
No

## 2024-03-26 ENCOUNTER — SOCIAL WORK (OUTPATIENT)
Dept: BEHAVIORAL/MENTAL HEALTH CLINIC | Facility: CLINIC | Age: 24
End: 2024-03-26
Payer: COMMERCIAL

## 2024-03-26 DIAGNOSIS — F41.1 GAD (GENERALIZED ANXIETY DISORDER): Primary | ICD-10-CM

## 2024-03-26 PROCEDURE — 90837 PSYTX W PT 60 MINUTES: CPT | Performed by: SOCIAL WORKER

## 2024-03-27 NOTE — PSYCH
"Behavioral Health Psychotherapy Progress Note    Psychotherapy Provided: Individual Psychotherapy     Encounter Diagnoses   Name Primary?   • INES (generalized anxiety disorder) Yes       Goals addressed in session: Goal 1     DATA:  Nury spoke further  today about her feelings re: her recent work stress.  She also shared that she has decided not to pursue the contact with the male peer she met online and reasons for this decision were processed at length.    During this session, this clinician used the following therapeutic modalities: Motivational Interviewing and Supportive Psychotherapy    Substance Abuse was not addressed during this session. If the client is diagnosed with a co-occurring substance use disorder, please indicate any changes in the frequency or amount of use: . Stage of change for addressing substance use diagnoses: No substance use/Not applicable    ASSESSMENT:  Nury Hernandez presents with a Euthymic/ normal mood.  Nury has made considerable progress in therapy and and it does not seem  to be a regression that she is not pursuing this relationship.  She clearly articulated her desire to have relationships with people whose values align with hers.   her affect is Normal range and intensity, which is congruent, with her mood and the content of the session. The client has made progress on their goals.     Nury Hernandez presents with a minimal risk of suicide, minimal risk of self-harm, and minimal risk of harm to others.    For any risk assessment that surpasses a \"low\" rating, a safety plan must be developed.    A safety plan was indicated: no  If yes, describe in detail     PLAN: Between sessions, Nury Hernandez will continue to utilize therapy sessions to increase her mindfulness and self compassion. . At the next session, the therapist will use Supportive Psychotherapy to address the above in further detail.    Behavioral Health Treatment Plan and Discharge Planning: Nury MCCLELLAN" David is aware of and agrees to continue to work on their treatment plan. They have identified and are working toward their discharge goals. yes        3/27/24  Start Time: 1500  Stop Time: 1555  Total Visit Time: 55 minutes

## 2024-04-02 ENCOUNTER — TRANSCRIBE ORDERS (OUTPATIENT)
Dept: LAB | Facility: CLINIC | Age: 24
End: 2024-04-02

## 2024-04-02 ENCOUNTER — APPOINTMENT (OUTPATIENT)
Dept: LAB | Facility: CLINIC | Age: 24
End: 2024-04-02
Payer: COMMERCIAL

## 2024-04-02 DIAGNOSIS — Z00.8 HEALTH EXAMINATION IN POPULATION SURVEY: Primary | ICD-10-CM

## 2024-04-02 DIAGNOSIS — Z00.8 HEALTH EXAMINATION IN POPULATION SURVEY: ICD-10-CM

## 2024-04-05 NOTE — PROGRESS NOTES
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   Started clinicals this semester      In summer of 2019 patient began having a new type of headache which he experienced 4 times and matter of 6-12 months   At that time she had headache that lasted 2 days   This was preceded by a [place] of light in her right eye   Headache resolved after 2-3 days on her own this headache occurred again in the winter of [id number]   Third occurrence was summer of 2020 at 1st she thought it was Pots but she had persistent photophobia and nausea          In July 4, 2021 migrainous headache occurred with shooting pain down her neck and spine   Ended up going to the ER   The headache lasted approximately 2 weeks   Since that time she has had a persistent daily headache which she described as pressure and some pain in the back of her head without any photophobia or nausea   Was taking naproxen 500 mg daily      Since this time in July has never been headache free  Omar Ramon states she has gone to the emergency room and not much has helped  Orrupesh Ramon has gotten Toradol injections but only relieves it slightly for approximately 24 hours and then resume is a full force     Current medical illnesses:  QTc:  June 5, 2017 426 ms     POTS:  Sees cardio at [facility] Dr Carmen Crowder sprat-yearly  Was diagnosed in 2016  Is doing very well  She is active, able to exercise   She walks regularly, does a stair stepper  Drinking lots of water, gatorade  She is on Florinef and metoprolol      IBS  mast cell activation syndrome  Malachi-Danlos syndrome  Anxiety:  Sees Carin Vallejo and a counselor  Non celiac gluten sensitivity  Celiac artery stenosis      No history of tobacco use      Interval update 12/7/2021:  Reports some improvement since last visit   Still not truly headache free but has notice a decrease int he severity of her daily headaches as well as decrease int he number of severe migraines     What medications do you take or have you taken for your headaches? Current Preventive:   Zyrtec  (mast cell related), Cyproheptadine  Fludrocortisone (POTS)  Metoprolol (POTS)  omega-3, riboflavin  venlafaxine   Verapamil  Cyproheptadine  Current Abortive:   Fioricet   Reglan   Naproxen, Tylenol   Decadron  Indomethacin  Nurtec     Prior Preventive:   vitamin-D, multivitamin  Benadryl, Atarax   Zoloft, Cymbalta  Gabapentin   Robaxin   Xanax  Prior Abortive:   Percocet, Dilaudid, fentanyl   Toradol As of 11/2021 only helps for 24 hours), naproxen, ibuprofen  Zofran  Benadryl   Decadron, prednisone   Tylenol  Ubrelvy      Current pain 3-4/10     Mild headaches: that has improved with the increase in metoprolol  Moderate to severe headaches:   2020:  Feb: first week but lasted for 9 day  March: first week lasted 2-3 days  April: first week - lasted 2-3 days -   2021:  Daily since 7/4/2021 averages 6-7/10 gets to an 8-9/10 3+ days a week  When first started Verapamil did decrease her baseline to 3-4/10 for 2 weeks     As of 12/7/21 has a daily headache at a 3-4/10  Gets to an 6-7/10 at least 1 day a week      Are you ever headache free? No     Aura/Warning and how long does it last?   - White spots in front of her visual field, seconds  - POTS headache is usually preceded by palpitation and flushing  This was more when she was having random headaches not persistant        What time of the day do the headaches start?   Continuous pain since July 2021  Wakes up with an average of a 6/10  Goes down to a 3-4/10 after 1 hour  Prior visit was Wakes up with 8/10 but does go down to a 6/10 approximately 2 hours later (after Tylenol or Naproxen), starts to increase again around 4 pm        How long do the headaches last?   Moderate/severe for 1 hours in the am but takes medication to decrease to lower pain but never goes away   Moderate headaches last the full day when doesn't go down to 3-4/10      Where is your headache located?    Mild headaches: frontal  Moderate to severe headaches: frontalis, retro-orbital, occipitalis and neck bilaterally (worse when in occipitalis)      Describe your usual headache? Mild headaches: nagging and aching with pressure  Moderate to severe headaches: throbbing and pressure     What is the intensity of pain? Mild headaches: 3-4/10   Moderate to severe headaches: 7-8/10      Associated symptoms:   - Nausea, vomitting   - Photophobia, Osmophobia   - Problems with concentration   - Insomnia (waking up frequently in middle of night)   - Prefer to be in a dark room      Number of days missed per month because of headaches:   Work (or school) days: doesn't miss (has some trouble getting her work done)  Social or Family activities: occasionally, doesn't have a lot of free time with school     Headache are worse if the patient: Bending over, exertion   Headache triggers: Unknown   What time of the year do headaches occur more frequently? More summer POTS flares      Have you had trigger point injection performed and how often? No   Have you had Botox injection performed and how often? No   Have you had epidural injections or transforaminal injections performed? No      Alternative therapies used in the past for headaches? No      Have you used CBD or THC for your headaches and how often? No      How many caffeine products to drink a day? 1-2   How much water to drink a day? 64oz      Are you current pregnant or planning on getting pregnant?  Currently on a birth control pill     Have you ever had any Brain imaging?  Yes   02/09/2020-CT head   no acute intracranial abnormality     10/24/2021 MRI brain   No acute infarction, intracranial hemorrhage or mass   I personally reviewed these images      Reviewed old notes from physician seen in the past- see above HPI for summary of previous encounters   EKG completed

## 2024-04-10 ENCOUNTER — SOCIAL WORK (OUTPATIENT)
Dept: BEHAVIORAL/MENTAL HEALTH CLINIC | Facility: CLINIC | Age: 24
End: 2024-04-10

## 2024-04-10 DIAGNOSIS — F41.1 GAD (GENERALIZED ANXIETY DISORDER): Primary | ICD-10-CM

## 2024-04-10 NOTE — PSYCH
"Behavioral Health Psychotherapy Progress Note    Psychotherapy Provided: Individual Psychotherapy     Encounter Diagnoses   Name Primary?   • INES (generalized anxiety disorder) Yes         Goals addressed in session: Goal 1     DATA:  Nury spoke further  today about her feelings re: her recent work stress.  She also discussed details re: her mother's attempt to talk to her about her \"dating life,\" and her discomfort, unwillingness to do so.  Reasons for both were processed today at length.   During this session, this clinician used the following therapeutic modalities: Motivational Interviewing and Supportive Psychotherapy    Substance Abuse was not addressed during this session. If the client is diagnosed with a co-occurring substance use disorder, please indicate any changes in the frequency or amount of use: . Stage of change for addressing substance use diagnoses: No substance use/Not applicable    ASSESSMENT:  Nury Hernandez presents with a Euthymic/ normal mood.  Nury has made considerable progress in therapy and and has adjusted to being off medication but does admit that she has been adjusting to an increase in anxiety.     her affect is Normal range and intensity, which is congruent, with her mood and the content of the session. The client has made progress on their goals.     Nury Hernandez presents with a minimal risk of suicide, minimal risk of self-harm, and minimal risk of harm to others.    For any risk assessment that surpasses a \"low\" rating, a safety plan must be developed.    A safety plan was indicated: no  If yes, describe in detail     PLAN: Between sessions, Nury Hernandez will continue to utilize therapy sessions to increase her mindfulness and self compassion. . At the next session, the therapist will use Supportive Psychotherapy to address the above in further detail.    Behavioral Health Treatment Plan and Discharge Planning: Nury Hernandez is aware of and agrees to " continue to work on their treatment plan. They have identified and are working toward their discharge goals. yes        4/10/24  Start Time: 1500  Stop Time: 1555  Total Visit Time: 55 minutes

## 2024-04-15 DIAGNOSIS — G43.011 INTRACTABLE MIGRAINE WITHOUT AURA AND WITH STATUS MIGRAINOSUS: Primary | ICD-10-CM

## 2024-04-15 RX ORDER — DEXAMETHASONE 4 MG/1
TABLET ORAL
Qty: 9 TABLET | Refills: 0 | Status: SHIPPED | OUTPATIENT
Start: 2024-04-15

## 2024-04-15 RX ORDER — PROCHLORPERAZINE MALEATE 10 MG
10 TABLET ORAL EVERY 6 HOURS PRN
Qty: 10 TABLET | Refills: 0 | Status: SHIPPED | OUTPATIENT
Start: 2024-04-15

## 2024-04-16 ENCOUNTER — OFFICE VISIT (OUTPATIENT)
Dept: INTERNAL MEDICINE CLINIC | Facility: CLINIC | Age: 24
End: 2024-04-16
Payer: COMMERCIAL

## 2024-04-16 ENCOUNTER — SOCIAL WORK (OUTPATIENT)
Dept: BEHAVIORAL/MENTAL HEALTH CLINIC | Facility: CLINIC | Age: 24
End: 2024-04-16

## 2024-04-16 VITALS
WEIGHT: 151.2 LBS | SYSTOLIC BLOOD PRESSURE: 116 MMHG | DIASTOLIC BLOOD PRESSURE: 80 MMHG | BODY MASS INDEX: 23.73 KG/M2 | HEIGHT: 67 IN

## 2024-04-16 DIAGNOSIS — F41.1 GAD (GENERALIZED ANXIETY DISORDER): Primary | ICD-10-CM

## 2024-04-16 DIAGNOSIS — Z00.00 ANNUAL PHYSICAL EXAM: Primary | ICD-10-CM

## 2024-04-16 DIAGNOSIS — G90.A POTS (POSTURAL ORTHOSTATIC TACHYCARDIA SYNDROME): ICD-10-CM

## 2024-04-16 DIAGNOSIS — L70.9 ACNE, UNSPECIFIED ACNE TYPE: ICD-10-CM

## 2024-04-16 PROCEDURE — 99395 PREV VISIT EST AGE 18-39: CPT | Performed by: INTERNAL MEDICINE

## 2024-04-16 NOTE — PSYCH
"Behavioral Health Psychotherapy Progress Note    Psychotherapy Provided: Individual Psychotherapy     Encounter Diagnoses   Name Primary?   • INES (generalized anxiety disorder) Yes           Goals addressed in session: Goal 1     DATA:  Nury spoke today about her feelings re: her work at learning about boundaries, their \"absence\" in her life.  She engaged in dialogue about her need / desire / reluctance to talk with her mother, and ways to prepare were explored.  During this session, this clinician used the following therapeutic modalities: Motivational Interviewing and Supportive Psychotherapy    Substance Abuse was not addressed during this session. If the client is diagnosed with a co-occurring substance use disorder, please indicate any changes in the frequency or amount of use: . Stage of change for addressing substance use diagnoses: No substance use/Not applicable    ASSESSMENT:  Nury Hernandez presents with a Euthymic/ normal mood.  Nury has made considerable progress in therapy and and has begun to more actively challenge her self-imposed limitations.  her affect is Normal range and intensity, which is congruent, with her mood and the content of the session. The client has made progress on their goals.     Nury Hernandez presents with a minimal risk of suicide, minimal risk of self-harm, and minimal risk of harm to others.    For any risk assessment that surpasses a \"low\" rating, a safety plan must be developed.    A safety plan was indicated: no  If yes, describe in detail     PLAN: Between sessions, Nury Hernandez will continue to utilize therapy sessions to increase her mindfulness and self compassion. . At the next session, the therapist will use Supportive Psychotherapy to address the above in further detail.    Behavioral Health Treatment Plan and Discharge Planning: Nury Hernandez is aware of and agrees to continue to work on their treatment plan. They have identified and are " working toward their discharge goals. yes        4/16/24  Start Time: 1100  Stop Time: 1155  Total Visit Time: 55 minutes

## 2024-04-16 NOTE — PROGRESS NOTES
ADULT ANNUAL PHYSICAL  [facility]    NAME: Nury Hernandez  AGE: 24 y.o. SEX: female  : 2000     DATE: 2024     Assessment and Plan:     Problem List Items Addressed This Visit        Cardiovascular and Mediastinum    POTS (postural orthostatic tachycardia syndrome)    Relevant Orders    High sensitivity CRP   Other Visit Diagnoses     Annual physical exam    -  Primary    Acne, unspecified acne type        ?PCOS  considering spironolactone    Relevant Orders    DHEA    Testosterone, free, total    TSH, 3rd generation with Free T4 reflex            Immunizations and preventive care screenings were discussed with patient today. Appropriate education was printed on patient's after visit summary.    Counseling:  Exercise: the importance of regular exercise/physical activity was discussed. Recommend exercise 3-5 times per week for at least 30 minutes.          Return in about 1 year (around 2025).     Chief Complaint:     Chief Complaint   Patient presents with   • Annual Exam      History of Present Illness:     Adult Annual Physical   Patient here for a comprehensive physical exam. The patient reports problems - constipation acne .    Diet and Physical Activity  Diet/Nutrition: well balanced diet and gluten free .   Exercise: 5-7 times a week on average.      Depression Screening  PHQ-2/9 Depression Screening         General Health  Sleep: sleeps well and 8h on ave .   Hearing: normal - bilateral.  Vision: goes for regular eye exams, wears glasses, and wears contacts.   Dental: regular dental visits.       /GYN Health  Follows with gynecology? yes   Last menstrual period: regular but length of cycle varies LMP   She is not sexually active      Review of Systems:     Review of Systems   Constitutional:  Negative for chills, fever and unexpected weight change.   Respiratory:  Negative for shortness of breath.    Cardiovascular:   Negative for chest pain and palpitations.   Gastrointestinal:  Positive for constipation.   Genitourinary:  Positive for frequency, menstrual problem and urgency. Negative for difficulty urinating, dysuria and hematuria.   Neurological:  Positive for headaches.      Past Medical History:     Past Medical History:   Diagnosis Date   • Annual physical exam 2019   • Anxiety    • Asthma    • Dizziness     at times   • Dyspepsia 2021   • Exertional headache 2022   • GERD (gastroesophageal reflux disease)    • Hammertoe of left foot    • Headache     occ   • Hyperlipemia    • Hypermobile joints    • Hypertriglyceridemia 2019   • IBS (irritable bowel syndrome) 2020   • Irritable bowel syndrome    • 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HCC)    • Mast cell disorder    • Migraine    • PONV (postoperative nausea and vomiting)    • POTS (postural orthostatic tachycardia syndrome)    •  infant    • Wears glasses       Past Surgical History:     Past Surgical History:   Procedure Laterality Date   • COLONOSCOPY     • EGD AND COLONOSCOPY N/A 1/10/2017    Procedure: EGD AND COLONOSCOPY;  Surgeon: Ozzy Billingsley MD;  Location: [facility];  Service:    • ESOPHAGOGASTRODUODENOSCOPY      with biopsy   • FOOT SURGERY      Excision of lesion feet benign   • TX CORRECTION HAMMERTOE Left 2019    Procedure: 2nd arthrodesis; 5th arthroplasty, flexor tenotomy 2,3,4,5.;  Surgeon: Odilon Berger DPM;  Location: [facility];  Service: Podiatry   • TX CORRECTION HAMMERTOE Left 2020    Procedure: 2ND TOE Revision hammertoe with broken implant;  Surgeon: Odilon Berger DPM;  Location: [facility];  Service: Podiatry   • UPPER GASTROINTESTINAL ENDOSCOPY  2021   • WISDOM TOOTH EXTRACTION        Social History:     Social History     Socioeconomic History   • Marital status: Single     Spouse name: None   • Number of children: None   • Years of education: None   • Highest education level: None    Occupational History   • None   Tobacco Use   • Smoking status: Never   • Smokeless tobacco: Never   Vaping Use   • Vaping status: Never Used   Substance and Sexual Activity   • Alcohol use: No   • Drug use: No   • Sexual activity: Never   Other Topics Concern   • None   Social History Narrative    Lives with parents     Social Determinants of Health     Financial Resource Strain: Not on file   Food Insecurity: Not on file   Transportation Needs: Not on file   Physical Activity: Insufficiently Active (6/15/2022)    Exercise Vital Sign    • Days of Exercise per Week: 3 days    • Minutes of Exercise per Session: 30 min   Stress: Not on file   Social Connections: Not on file   Intimate Partner Violence: Not on file   Housing Stability: Not on file      Family History:     Family History   Problem Relation Age of Onset   • Gestational diabetes Mother    • Migraines Mother    • Anxiety disorder Father    • Hyperlipidemia Father    • Autism Brother    • Diabetes Other    • Uterine cancer Maternal Grandmother    • Rheumatic fever Maternal Grandmother    • Diabetes Maternal Grandfather    • Hypertension Maternal Grandfather    • Heart attack Maternal Grandfather    • Stroke Maternal Grandfather    • Hyperlipidemia Maternal Grandfather    • Hypertension Paternal Grandmother    • Anxiety disorder Paternal Grandmother    • Hypertension Paternal Grandfather       Current Medications:     Current Outpatient Medications   Medication Sig Dispense Refill   • Atogepant (Qulipta) 60 MG TABS Take 60 mg by mouth in the morning 90 tablet 4   • clindamycin (CLEOCIN T) 1 % lotion Apply topically daily To face 60 mL 0   • fludrocortisone (FLORINEF) 0.1 mg tablet Take 0.1 mg by mouth 2 (two) times a day     • metoprolol succinate (TOPROL-XL) 50 mg 24 hr tablet Take 50 mg by mouth in the morning and 50 mg before bedtime.     • onabotulinumtoxin A (BOTOX) 100 units Inject  as directed. Injection every 91 days for migraines     •  Prucalopride Succinate (Motegrity) 2 MG TABS Take 2 mg by mouth in the morning 90 tablet 0   • rimegepant sulfate (Nurtec) 75 mg TBDP Take 1 tablet (75 mg total) by mouth as needed (migraine) Limit of 1 in 24 hours 16 tablet 3   • tretinoin (RETIN-A) 0.025 % cream Apply topically daily at bedtime 45 g 2   • verapamil (CALAN) 120 mg tablet Take 1 tab by mouth twice a day 180 tablet 3   • acetaminophen (TYLENOL) 500 mg tablet Take 1,000 mg by mouth every 4 (four) hours as needed      • albuterol (2.5 mg/3 mL) 0.083 % nebulizer solution Take 3 mL (2.5 mg total) by nebulization every 6 (six) hours as needed for wheezing or shortness of breath 30 mL 0   • albuterol (ProAir HFA) 90 mcg/act inhaler Inhale 2 puffs every 6 (six) hours as needed for wheezing or shortness of breath 8.5 g 0   • ASMANEX  MCG/ACT AERO Inhale 2 puffs every 4 (four) hours as needed (2 puffs)     • dexamethasone (DECADRON) 4 mg tablet Take 2 tabs for 2 days then take 1 tab for 3-5 days (until headaches break then stop) 9 tablet 0   • diphenhydrAMINE (BENADRYL) 25 mg tablet Take 1 tablet (25 mg total) by mouth every 6 (six) hours as needed for itching 30 tablet 0   • Elastic Bandages & Supports (TRUFORM STOCKINGS 20-30MMHG) MISC      • modafinil (PROVIGIL) 100 mg tablet Take 100 mg by mouth daily     • ondansetron (Zofran ODT) 4 mg disintegrating tablet Take 1 tablet (4 mg total) by mouth every 6 (six) hours as needed for nausea or vomiting 30 tablet 0   • prochlorperazine (COMPAZINE) 10 mg tablet Take 1 tablet (10 mg total) by mouth every 6 (six) hours as needed (migraine) 10 tablet 0     No current facility-administered medications for this visit.      Allergies:     Allergies   Allergen Reactions   • Nuts - Food Allergy Other (See Comments)     Avani Nuts - itchy throat   • Other Hives      At surgical site and IV site- not sure if type of cleanser used   • Pollen Extract    • Reyvow [Lasmiditan] Palpitations and Hallucinations       "Physical Exam:     /80 (BP Location: Left arm, Patient Position: Sitting, Cuff Size: Standard)   Ht 5' 7\" (1.702 m)   Wt 68.6 kg (151 lb 3.2 oz)   BMI 23.68 kg/m²     Physical Exam  Constitutional:       General: She is not in acute distress.     Appearance: She is well-developed. She is not ill-appearing, toxic-appearing or diaphoretic.   HENT:      Right Ear: Tympanic membrane and ear canal normal.      Left Ear: Tympanic membrane, ear canal and external ear normal.   Eyes:      Conjunctiva/sclera: Conjunctivae normal.   Cardiovascular:      Rate and Rhythm: Normal rate and regular rhythm.      Heart sounds: Normal heart sounds. No murmur heard.  Pulmonary:      Effort: Pulmonary effort is normal. No respiratory distress.      Breath sounds: Normal breath sounds. No stridor. No wheezing or rales.   Abdominal:      General: There is no distension.      Palpations: Abdomen is soft. There is no mass.      Tenderness: There is no abdominal tenderness. There is no guarding or rebound.   Musculoskeletal:      Cervical back: Neck supple.      Right lower leg: No edema.      Left lower leg: No edema.   Skin:     Comments: Acne on her back   Neurological:      Mental Status: She is alert and oriented to person, place, and time.   Psychiatric:         Mood and Affect: Mood normal.         Behavior: Behavior normal.         Thought Content: Thought content normal.         Judgment: Judgment normal.          Lea Reyes, MD   [facility]"

## 2024-04-17 ENCOUNTER — PROCEDURE VISIT (OUTPATIENT)
Dept: NEUROLOGY | Facility: CLINIC | Age: 24
End: 2024-04-17
Payer: COMMERCIAL

## 2024-04-17 ENCOUNTER — OFFICE VISIT (OUTPATIENT)
Dept: DERMATOLOGY | Facility: CLINIC | Age: 24
End: 2024-04-17
Payer: COMMERCIAL

## 2024-04-17 VITALS — HEART RATE: 67 BPM | TEMPERATURE: 97.7 F | SYSTOLIC BLOOD PRESSURE: 109 MMHG | DIASTOLIC BLOOD PRESSURE: 67 MMHG

## 2024-04-17 DIAGNOSIS — G43.709 CHRONIC MIGRAINE WITHOUT AURA WITHOUT STATUS MIGRAINOSUS, NOT INTRACTABLE: Primary | ICD-10-CM

## 2024-04-17 DIAGNOSIS — L70.0 ACNE VULGARIS: ICD-10-CM

## 2024-04-17 PROCEDURE — 64615 CHEMODENERV MUSC MIGRAINE: CPT | Performed by: PHYSICIAN ASSISTANT

## 2024-04-17 PROCEDURE — 99204 OFFICE O/P NEW MOD 45 MIN: CPT

## 2024-04-17 RX ORDER — CLINDAMYCIN PHOSPHATE 10 UG/ML
LOTION TOPICAL DAILY
Qty: 60 ML | Refills: 3 | Status: SHIPPED | OUTPATIENT
Start: 2024-04-17

## 2024-04-17 NOTE — PATIENT INSTRUCTIONS
"   TODAY'S PLAN:     PRESCRIPTION MANAGEMENT:  Several treatment options were discussed including topical retinoids and their side effects.     Skin Hygiene:      Wash affected areas (face, chest, and back) TWICE A DAY with a mild cleanser such as Cerave .  Use only mild cleansers (hypoallergenic and without fragrances) and fragrance free detergent (not \"unscented\" products which contain a masking agent); we discussed avoiding irritants/fragranced products.  Apply a good oil-free facial moisturizer AT LEAST TWO TIMES A DAY \" such as Cerave.  Minimize the application of oils and cosmetics to the affected skin.  This includes HAIR PRODUCTS such as \"leave in\" conditioners.  Unless the product specifically states that it \"won't cause acne,\" \"won't clog pores,\" and/or \"is non-comedogenic\" then it may actually CAUSE acne.  If you smoke, STOP. Nicotine increases sebum retention and increased scale within the follicles, forming comedones (blackheads and whiteheads).  Abrasive treatments such as dermabrasion and spa facials may aggravate inflammatory acne.  Do NOT scratch or pick your acne bumps.  The evidence that diet directly affects acne remains weak.  However, diet does affect your overall health.  Eat plenty of fresh fruit and vegetables.  Avoid protein or amino acid supplements, particularly if they contain leucine. Consider a low-glycemic, low-protein and low-dairy diet.  Be mindful that certain medications may cause of aggravate acne.  Make sure to tell your Dermatologist if you start a new prescription, nutritional supplement, and/or herbal remedy.  Stop Differin   Okay to use topicals on chest and back       MORNING Topical Regimen:      Clindamycin 1% lotion IN THE MORNING:  After gently washing and drying your skin, apply this TOPICAL medication evenly over your entire face, avoiding the eyes and corners of the mouth  CLN wash for face, chest, back      EVENING Topical Regimen:      Gentle Neutrogena hydrating " cleanser  Tretinoin 0.025% cream AT LEAST 1 HOUR BEFORE BEDTIME:  Evenly spread a SINGLE pea-sized amount of this medication over your entire face after moisturizing, avoiding the eyes and corners of the mouth.      SYSTEMIC Strategies:      NONE

## 2024-04-17 NOTE — PROGRESS NOTES
"Universal Protocol   Consent: Verbal consent obtained. Written consent obtained.  Risks and benefits: risks, benefits and alternatives were discussed  Consent given by: patient  Time out: Immediately prior to procedure a \"time out\" was called to verify the correct patient, procedure, equipment, support staff and site/side marked as required.  Patient understanding: patient states understanding of the procedure being performed  Patient consent: the patient's understanding of the procedure matches consent given  Procedure consent: procedure consent matches procedure scheduled  Relevant documents: relevant documents present and verified  Patient identity confirmed: verbally with patient      Chemodenervation     Date/Time  4/17/2024 9:00 AM     Performed by  Monique Coffey PA-C   Authorized by  Monique Coffey PA-C     Pre-procedure details      Prepped With: Alcohol     Anesthesia  (see MAR for exact dosages):     Anesthesia method:  None   Procedure details      Position:  Upright   Botox      Botox Type:  Type A    Brand:  Botox    mL's of Botulinum Toxin:  200    Final Concentration per CC:  50 units    Needle Gauge:  30 G 2.5 inch   Procedures      Botox Procedures: chronic headache      Indications: migraines     Injection Location      Head / Face:  L superior cervical paraspinal, R superior cervical paraspinal, L , R , L frontalis, R frontalis, L medial occipitalis, R medial occipitalis, procerus, R temporalis, L temporalis, R superior trapezius and L superior trapezius    L  injection amount:  5 unit(s)    R  injection amount:  5 unit(s)    L lateral frontalis:  5 unit(s)    R lateral frontalis:  5 unit(s)    L medial frontalis:  5 unit(s)    R medial frontalis:  5 unit(s)    L temporalis injection amount:  20 unit(s)    R temporalis injection amount:  20 unit(s)    Procerus injection amount:  5 unit(s)    L medial occipitalis injection amount:  15 unit(s)    R medial " occipitalis injection amount:  15 unit(s)    L superior cervical paraspinal injection amount:  10 unit(s)    R superior cervical paraspinal injection amount:  10 unit(s)    L superior trapezius injection amount:  15 unit(s)    R superior trapezius injection amount:  15 unit(s)   Total Units      Total units used:  200    Total units discarded:  0   Post-procedure details      Chemodenervation:  Chronic migraine    Facial Nerve Location::  Bilateral facial nerve    Patient tolerance of procedure:  Tolerated well, no immediate complications   Comments          20 units frontalis  5 units right splenius capitis  20 units right lower occipitalis/cervical paraspinal  All medically necessary

## 2024-04-17 NOTE — PROGRESS NOTES
"[facility] Dermatology Clinic Note     Patient Name: Nury Hernandez  Encounter Date: 04/17/2024     Have you been cared for by a [facility] Dermatologist in the last 3 years and, if so, which description applies to you?    Yes.  I have been here within the last 3 years, and my medical history has NOT changed since that time.  I am FEMALE/of child-bearing potential.    REVIEW OF SYSTEMS:  Have you recently had or currently have any of the following? No changes in my recent health.   PAST MEDICAL HISTORY:  Have you personally ever had or currently have any of the following?  If \"YES,\" then please provide more detail. No changes in my medical history.   HISTORY OF IMMUNOSUPPRESSION: Do you have a history of any of the following:  Systemic Immunosuppression such as Diabetes, Biologic or Immunotherapy, Chemotherapy, Organ Transplantation, Bone Marrow Transplantation?  No     Answering \"YES\" requires the addition of the dotphrase \"IMMUNOSUPPRESSED\" as the first diagnosis of the patient's visit.   FAMILY HISTORY:  Any \"first degree relatives\" (parent, brother, sister, or child) with the following?    No changes in my family's known health.   PATIENT EXPERIENCE:    Do you want the Dermatologist to perform a COMPLETE skin exam today including a clinical examination under the \"bra and underwear\" areas?  NO  If necessary, do we have your permission to call and leave a detailed message on your Preferred Phone number that includes your specific medical information?  Yes      Allergies   Allergen Reactions    Nuts - Food Allergy Other (See Comments)     Avani Nuts - itchy throat    Other Hives      At surgical site and IV site- not sure if type of cleanser used    Pollen Extract     Reyvow [Lasmiditan] Palpitations and Hallucinations      Current Outpatient Medications:     acetaminophen (TYLENOL) 500 mg tablet, Take 1,000 mg by mouth every 4 (four) hours as needed , Disp: , Rfl:     albuterol (2.5 mg/3 mL) 0.083 % nebulizer " solution, Take 3 mL (2.5 mg total) by nebulization every 6 (six) hours as needed for wheezing or shortness of breath, Disp: 30 mL, Rfl: 0    albuterol (ProAir HFA) 90 mcg/act inhaler, Inhale 2 puffs every 6 (six) hours as needed for wheezing or shortness of breath, Disp: 8.5 g, Rfl: 0    ASMANEX  MCG/ACT AERO, Inhale 2 puffs every 4 (four) hours as needed (2 puffs), Disp: , Rfl:     Atogepant (Qulipta) 60 MG TABS, Take 60 mg by mouth in the morning, Disp: 90 tablet, Rfl: 4    clindamycin (CLEOCIN T) 1 % lotion, Apply topically daily To face, Disp: 60 mL, Rfl: 0    dexamethasone (DECADRON) 4 mg tablet, Take 2 tabs for 2 days then take 1 tab for 3-5 days (until headaches break then stop), Disp: 9 tablet, Rfl: 0    diphenhydrAMINE (BENADRYL) 25 mg tablet, Take 1 tablet (25 mg total) by mouth every 6 (six) hours as needed for itching, Disp: 30 tablet, Rfl: 0    Elastic Bandages & Supports (TRUFORM STOCKINGS 20-30MMHG) MISC, , Disp: , Rfl:     fludrocortisone (FLORINEF) 0.1 mg tablet, Take 0.1 mg by mouth 2 (two) times a day, Disp: , Rfl:     metoprolol succinate (TOPROL-XL) 50 mg 24 hr tablet, Take 50 mg by mouth in the morning and 50 mg before bedtime., Disp: , Rfl:     modafinil (PROVIGIL) 100 mg tablet, Take 100 mg by mouth daily, Disp: , Rfl:     onabotulinumtoxin A (BOTOX) 100 units, Inject  as directed. Injection every 91 days for migraines, Disp: , Rfl:     ondansetron (Zofran ODT) 4 mg disintegrating tablet, Take 1 tablet (4 mg total) by mouth every 6 (six) hours as needed for nausea or vomiting, Disp: 30 tablet, Rfl: 0    prochlorperazine (COMPAZINE) 10 mg tablet, Take 1 tablet (10 mg total) by mouth every 6 (six) hours as needed (migraine), Disp: 10 tablet, Rfl: 0    Prucalopride Succinate (Motegrity) 2 MG TABS, Take 2 mg by mouth in the morning, Disp: 90 tablet, Rfl: 0    rimegepant sulfate (Nurtec) 75 mg TBDP, Take 1 tablet (75 mg total) by mouth as needed (migraine) Limit of 1 in 24 hours, Disp: 16  "tablet, Rfl: 3    tretinoin (RETIN-A) 0.025 % cream, Apply topically daily at bedtime, Disp: 45 g, Rfl: 2    verapamil (CALAN) 120 mg tablet, Take 1 tab by mouth twice a day, Disp: 180 tablet, Rfl: 3  No current facility-administered medications for this visit.          Whom besides the patient is providing clinical information about today's encounter?   NO ADDITIONAL HISTORIAN (patient alone provided history)    Physical Exam and Assessment/Plan by Diagnosis:      ACNE VULGARIS    Physical Exam:  Anatomic Locations Involved: Face, Chest, and Back  Global Assessment: MILD:  LESS THAN half the face is involved. Some comedones and some papules and/or pustules.  Scarring Present? NONE  Pertinent Positives:  Pertinent Negatives:    Additional History of Present Condition:  last seen 6/25/21. Currently using differin the morning and tretinoin twice a week. Neutrogena wash with sal acid. Clindamycin lotion done in the past with no improvement. Also tried doxycycline which helped. She states her acne is seems to be more hormonal. Period stopped while depo for 3 years. She states 1-2 years ago she stopped and recently started to get her menstrual. Breakouts are worse during that time but not consistent        TODAY'S PLAN:     PRESCRIPTION MANAGEMENT:  Several treatment options were discussed including topical retinoids and their side effects.     Skin Hygiene:      Wash affected areas (face, chest, and back) TWICE A DAY with a mild cleanser such as Cerave .  Use only mild cleansers (hypoallergenic and without fragrances) and fragrance free detergent (not \"unscented\" products which contain a masking agent); we discussed avoiding irritants/fragranced products.  Apply a good oil-free facial moisturizer AT LEAST TWO TIMES A DAY \" such as Cerave.  Minimize the application of oils and cosmetics to the affected skin.  This includes HAIR PRODUCTS such as \"leave in\" conditioners.  Unless the product specifically states that it " "\"won't cause acne,\" \"won't clog pores,\" and/or \"is non-comedogenic\" then it may actually CAUSE acne.  If you smoke, STOP. Nicotine increases sebum retention and increased scale within the follicles, forming comedones (blackheads and whiteheads).  Abrasive treatments such as dermabrasion and spa facials may aggravate inflammatory acne.  Do NOT scratch or pick your acne bumps.  The evidence that diet directly affects acne remains weak.  However, diet does affect your overall health.  Eat plenty of fresh fruit and vegetables.  Avoid protein or amino acid supplements, particularly if they contain leucine. Consider a low-glycemic, low-protein and low-dairy diet.  Be mindful that certain medications may cause of aggravate acne.  Make sure to tell your Dermatologist if you start a new prescription, nutritional supplement, and/or herbal remedy.  Stop Differin in the morning  Okay to use topicals on chest and back   Advise \"sandwiching\" moisturizer, tretinoin, and then moisturizer if still too dry.      MORNING Topical Regimen:      Clindamycin 1% lotion IN THE MORNING:  After gently washing and drying your skin, apply this TOPICAL medication evenly over your entire face, avoiding the eyes and corners of the mouth  CLN wash for face, chest, back      EVENING Topical Regimen:      Gentle Neutrogena hydrating cleanser  Tretinoin 0.025% cream AT LEAST 1 HOUR BEFORE BEDTIME:  Evenly spread a SINGLE pea-sized amount of this medication over your entire face after moisturizing, avoiding the eyes and corners of the mouth.      SYSTEMIC Strategies:      NONE        MEDICAL DECISION MAKING  Treatment Goal:  Resolution of the CHRONIC condition.       Chronic condition is NOT at treatment goal.  It is progressing along its expected course OR is poorly-controlled.           Scribe Attestation      I,:  Ale Mathur am acting as a scribe while in the presence of the attending physician.:       I,:  Francia Kinsey PA-C personally " performed the services described in this documentation    as scribed in my presence.:

## 2024-04-22 ENCOUNTER — SOCIAL WORK (OUTPATIENT)
Dept: BEHAVIORAL/MENTAL HEALTH CLINIC | Facility: CLINIC | Age: 24
End: 2024-04-22
Payer: COMMERCIAL

## 2024-04-22 DIAGNOSIS — F41.1 GAD (GENERALIZED ANXIETY DISORDER): Primary | ICD-10-CM

## 2024-04-22 PROCEDURE — 90837 PSYTX W PT 60 MINUTES: CPT | Performed by: SOCIAL WORKER

## 2024-04-23 NOTE — PSYCH
"Behavioral Health Psychotherapy Progress Note    Psychotherapy Provided: Individual Psychotherapy     Encounter Diagnoses   Name Primary?   • INES (generalized anxiety disorder) Yes         Goals addressed in session: Goal 1     DATA:  Nury spoke further today about her feelings re: her  need / desire / reluctance to talk with her mother, and ways that she is being \"stalled\" in her healing until this occurs. .  During this session, this clinician used the following therapeutic modalities: Motivational Interviewing and Supportive Psychotherapy    Substance Abuse was not addressed during this session. If the client is diagnosed with a co-occurring substance use disorder, please indicate any changes in the frequency or amount of use: . Stage of change for addressing substance use diagnoses: No substance use/Not applicable    ASSESSMENT:  Nury Hernandez presents with a Euthymic/ normal mood.  Nury has made considerable progress in therapy and and has begun to more actively challenge her self-imposed limitations.  her affect is Normal range and intensity, which is congruent, with her mood and the content of the session. The client has made progress on their goals.     Nury Hernandez presents with a minimal risk of suicide, minimal risk of self-harm, and minimal risk of harm to others.    For any risk assessment that surpasses a \"low\" rating, a safety plan must be developed.    A safety plan was indicated: no  If yes, describe in detail     PLAN: Between sessions, Nury Hernandez will continue to utilize therapy sessions to increase her mindfulness and self compassion. . At the next session, the therapist will use Supportive Psychotherapy to address the above in further detail.    Behavioral Health Treatment Plan and Discharge Planning: Nury Hernandez is aware of and agrees to continue to work on their treatment plan. They have identified and are working toward their discharge goals. " yes        4/22/24  Start Time: 1600  Stop Time: 1655  Total Visit Time: 55 minutes

## 2024-04-27 ENCOUNTER — TRANSCRIBE ORDERS (OUTPATIENT)
Dept: LAB | Facility: CLINIC | Age: 24
End: 2024-04-27

## 2024-04-27 ENCOUNTER — APPOINTMENT (OUTPATIENT)
Dept: LAB | Facility: CLINIC | Age: 24
End: 2024-04-27
Payer: COMMERCIAL

## 2024-04-27 DIAGNOSIS — R55 NEAR SYNCOPE: ICD-10-CM

## 2024-04-27 DIAGNOSIS — R00.0 TACHYCARDIA: Primary | ICD-10-CM

## 2024-04-27 DIAGNOSIS — R00.2 PALPITATIONS: ICD-10-CM

## 2024-04-27 DIAGNOSIS — R00.0 TACHYCARDIA: ICD-10-CM

## 2024-04-27 DIAGNOSIS — D89.40 MAST CELL ACTIVATION SYNDROME (HCC): ICD-10-CM

## 2024-04-27 LAB
CHOLEST SERPL-MCNC: 144 MG/DL
CRP SERPL HS-MCNC: 0.58 MG/L
CRP SERPL QL: <1 MG/L
HDLC SERPL-MCNC: 59 MG/DL
LDLC SERPL CALC-MCNC: 67 MG/DL (ref 0–100)
NONHDLC SERPL-MCNC: 85 MG/DL
T4 FREE SERPL-MCNC: 0.85 NG/DL (ref 0.61–1.12)
TRIGL SERPL-MCNC: 88 MG/DL
TSH SERPL DL<=0.05 MIU/L-ACNC: 0.45 UIU/ML (ref 0.45–4.5)

## 2024-04-27 PROCEDURE — 36415 COLL VENOUS BLD VENIPUNCTURE: CPT | Performed by: INTERNAL MEDICINE

## 2024-04-27 PROCEDURE — 86141 C-REACTIVE PROTEIN HS: CPT | Performed by: INTERNAL MEDICINE

## 2024-04-27 PROCEDURE — 82626 DEHYDROEPIANDROSTERONE: CPT | Performed by: INTERNAL MEDICINE

## 2024-04-27 PROCEDURE — 80061 LIPID PANEL: CPT

## 2024-04-27 PROCEDURE — 84443 ASSAY THYROID STIM HORMONE: CPT | Performed by: INTERNAL MEDICINE

## 2024-04-27 PROCEDURE — 84445 ASSAY OF TSI GLOBULIN: CPT

## 2024-04-27 PROCEDURE — 84403 ASSAY OF TOTAL TESTOSTERONE: CPT | Performed by: INTERNAL MEDICINE

## 2024-04-27 PROCEDURE — 84402 ASSAY OF FREE TESTOSTERONE: CPT | Performed by: INTERNAL MEDICINE

## 2024-04-27 PROCEDURE — 84439 ASSAY OF FREE THYROXINE: CPT | Performed by: INTERNAL MEDICINE

## 2024-04-29 LAB
TESTOST FREE SERPL-MCNC: 4.8 PG/ML (ref 0–4.2)
TESTOST SERPL-MCNC: 45 NG/DL (ref 13–71)
TSI SER-ACNC: <0.1 IU/L (ref 0–0.55)

## 2024-04-30 ENCOUNTER — SOCIAL WORK (OUTPATIENT)
Dept: BEHAVIORAL/MENTAL HEALTH CLINIC | Facility: CLINIC | Age: 24
End: 2024-04-30

## 2024-04-30 DIAGNOSIS — F41.1 GAD (GENERALIZED ANXIETY DISORDER): Primary | ICD-10-CM

## 2024-05-01 NOTE — PSYCH
"Behavioral Health Psychotherapy Progress Note    Psychotherapy Provided: Individual Psychotherapy     Encounter Diagnoses   Name Primary?   • INES (generalized anxiety disorder) Yes           Goals addressed in session: Goal 1     DATA:  Nury spoke further today about her feelings re: her  need / desire / reluctance to talk with her mother, and ways that she fears that she will hurt her mother by opening up to her.  A first draft of a letter was also reviewed.   During this session, this clinician used the following therapeutic modalities: Motivational Interviewing and Supportive Psychotherapy    Substance Abuse was not addressed during this session. If the client is diagnosed with a co-occurring substance use disorder, please indicate any changes in the frequency or amount of use: . Stage of change for addressing substance use diagnoses: No substance use/Not applicable    ASSESSMENT:  Nury Hernandez presents with a Euthymic/ normal mood.  Nury has made considerable progress in therapy and and has begun to more actively challenge her self-imposed limitations.  her affect is Normal range and intensity, which is congruent, with her mood and the content of the session. The client has made progress on their goals.     Nury Hernandez presents with a minimal risk of suicide, minimal risk of self-harm, and minimal risk of harm to others.    For any risk assessment that surpasses a \"low\" rating, a safety plan must be developed.    A safety plan was indicated: no  If yes, describe in detail     PLAN: Between sessions, Nury Hernandez will continue to utilize therapy sessions to increase her mindfulness and self compassion. . At the next session, the therapist will use Supportive Psychotherapy to address the above in further detail.    Behavioral Health Treatment Plan and Discharge Planning: Nury Hernandez is aware of and agrees to continue to work on their treatment plan. They have identified and are " working toward their discharge goals. yes        4/30/24  Start Time: 1100  Stop Time: 1155  Total Visit Time: 55 minutes

## 2024-05-06 LAB — DHEA SERPL-MCNC: 995 NG/DL (ref 31–701)

## 2024-05-07 ENCOUNTER — SOCIAL WORK (OUTPATIENT)
Dept: BEHAVIORAL/MENTAL HEALTH CLINIC | Facility: CLINIC | Age: 24
End: 2024-05-07

## 2024-05-07 DIAGNOSIS — F41.1 GAD (GENERALIZED ANXIETY DISORDER): Primary | ICD-10-CM

## 2024-05-08 ENCOUNTER — PATIENT MESSAGE (OUTPATIENT)
Dept: OBGYN CLINIC | Facility: CLINIC | Age: 24
End: 2024-05-08

## 2024-05-08 NOTE — PSYCH
"Behavioral Health Psychotherapy Progress Note    Psychotherapy Provided: Individual Psychotherapy     Encounter Diagnoses   Name Primary?   • INES (generalized anxiety disorder) Yes       Goals addressed in session: Goal 1     DATA:  Nury spoke further today about her feelings re: her  uncertainty / reluctance to talk with her mother about how her past relationships have impacted Nury and continue to make it difficult for her to date. Nury also expressed considerable concern for her bloodwork.  During this session, this clinician used the following therapeutic modalities: Motivational Interviewing and Supportive Psychotherapy    Substance Abuse was not addressed during this session. If the client is diagnosed with a co-occurring substance use disorder, please indicate any changes in the frequency or amount of use: . Stage of change for addressing substance use diagnoses: No substance use/Not applicable    ASSESSMENT:  Nury Hernandez presents with a Euthymic/ normal mood.  Nury has made considerable progress in therapy and and has begun to more actively challenge her self-imposed limitations.  her affect is Normal range and intensity, which is congruent, with her mood and the content of the session. The client has made progress on their goals.     Nury Hernandez presents with a minimal risk of suicide, minimal risk of self-harm, and minimal risk of harm to others.    For any risk assessment that surpasses a \"low\" rating, a safety plan must be developed.    A safety plan was indicated: no  If yes, describe in detail     PLAN: Between sessions, Nury Hernandez will continue to utilize therapy sessions to increase her mindfulness and self compassion. . At the next session, the therapist will use Supportive Psychotherapy to address the above in further detail.    Behavioral Health Treatment Plan and Discharge Planning: Nury Hernandez is aware of and agrees to continue to work on their treatment " plan. They have identified and are working toward their discharge goals. yes        4/30/24  Start Time: 1400  Stop Time: 1450  Total Visit Time: 50 minutes

## 2024-05-13 ENCOUNTER — SOCIAL WORK (OUTPATIENT)
Dept: BEHAVIORAL/MENTAL HEALTH CLINIC | Facility: CLINIC | Age: 24
End: 2024-05-13

## 2024-05-13 DIAGNOSIS — F41.1 GAD (GENERALIZED ANXIETY DISORDER): Primary | ICD-10-CM

## 2024-05-13 NOTE — PATIENT COMMUNICATION
I spoke to patient and scheduled her an appointment on 5/15 in [place] with dr lea please advise

## 2024-05-14 NOTE — PSYCH
"Behavioral Health Psychotherapy Progress Note    Psychotherapy Provided: Individual Psychotherapy     Encounter Diagnoses   Name Primary?   • INES (generalized anxiety disorder) Yes         Goals addressed in session: Goal 1     DATA:  Nury spoke further today about her feelings re: interactions with her mother during the past week that contributed to her reluctance to dialogue with her about the letter she drafted.  Nury also shared potential treatment options to assist with her PCOS.   During this session, this clinician used the following therapeutic modalities: Motivational Interviewing and Supportive Psychotherapy    Substance Abuse was not addressed during this session. If the client is diagnosed with a co-occurring substance use disorder, please indicate any changes in the frequency or amount of use: . Stage of change for addressing substance use diagnoses: No substance use/Not applicable    ASSESSMENT:  Nury Hernandez presents with a Euthymic/ normal mood.  Nury has made considerable progress in therapy and and has begun to more actively challenge her self-imposed limitations.  Despite this, she quickly becomes triggered with fear when someone expresses potential interest in dating her.  her affect is Normal range and intensity, which is congruent, with her mood and the content of the session. The client has made progress on their goals.     Nury Hernandez presents with a minimal risk of suicide, minimal risk of self-harm, and minimal risk of harm to others.    For any risk assessment that surpasses a \"low\" rating, a safety plan must be developed.    A safety plan was indicated: no  If yes, describe in detail     PLAN: Between sessions, Nury Hernandez will continue to utilize therapy sessions to increase her mindfulness and self compassion. . At the next session, the therapist will use Supportive Psychotherapy to address the above in further detail.    Behavioral Health Treatment Plan and " Discharge Planning: Nury Hernandez is aware of and agrees to continue to work on their treatment plan. They have identified and are working toward their discharge goals. yes        5/13/24  Start Time: 1400  Stop Time: 1450  Total Visit Time: 50 minutes

## 2024-05-15 ENCOUNTER — OFFICE VISIT (OUTPATIENT)
Dept: OBGYN CLINIC | Facility: CLINIC | Age: 24
End: 2024-05-15
Payer: COMMERCIAL

## 2024-05-15 VITALS
WEIGHT: 151 LBS | DIASTOLIC BLOOD PRESSURE: 76 MMHG | HEIGHT: 67 IN | BODY MASS INDEX: 23.7 KG/M2 | SYSTOLIC BLOOD PRESSURE: 124 MMHG

## 2024-05-15 DIAGNOSIS — G43.829 MENSTRUAL MIGRAINE WITHOUT STATUS MIGRAINOSUS, NOT INTRACTABLE: ICD-10-CM

## 2024-05-15 DIAGNOSIS — N92.6 IRREGULAR MENSES: Primary | ICD-10-CM

## 2024-05-15 PROCEDURE — 99214 OFFICE O/P EST MOD 30 MIN: CPT | Performed by: STUDENT IN AN ORGANIZED HEALTH CARE EDUCATION/TRAINING PROGRAM

## 2024-05-15 RX ORDER — NORETHINDRONE ACETATE AND ETHINYL ESTRADIOL 1MG-20(21)
1 KIT ORAL DAILY
Qty: 84 TABLET | Refills: 0 | Status: SHIPPED | OUTPATIENT
Start: 2024-05-15

## 2024-05-20 DIAGNOSIS — E28.2 PCOS (POLYCYSTIC OVARIAN SYNDROME): Primary | ICD-10-CM

## 2024-05-28 ENCOUNTER — SOCIAL WORK (OUTPATIENT)
Dept: BEHAVIORAL/MENTAL HEALTH CLINIC | Facility: CLINIC | Age: 24
End: 2024-05-28
Payer: COMMERCIAL

## 2024-05-28 DIAGNOSIS — F41.1 GAD (GENERALIZED ANXIETY DISORDER): Primary | ICD-10-CM

## 2024-05-28 PROCEDURE — 90837 PSYTX W PT 60 MINUTES: CPT | Performed by: SOCIAL WORKER

## 2024-05-28 NOTE — PSYCH
"Behavioral Health Psychotherapy Progress Note    Psychotherapy Provided: Individual Psychotherapy     Encounter Diagnoses   Name Primary?   • INES (generalized anxiety disorder) Yes           Goals addressed in session: Goal 1     DATA:  Nury spoke today about her feelings re: her recent family vacation and the stress of her two sisters' fighting.  She also indicated her plans to pursue a second opinion for treatment options for her elevated DHEA level.  Lastly, Nury shared that she bought a house and will be closing on it later this week.     During this session, this clinician used the following therapeutic modalities: Motivational Interviewing and Supportive Psychotherapy    Substance Abuse was not addressed during this session. If the client is diagnosed with a co-occurring substance use disorder, please indicate any changes in the frequency or amount of use: . Stage of change for addressing substance use diagnoses: No substance use/Not applicable    ASSESSMENT:  Nury Hernandez presents with a Euthymic/ normal mood.  Nury a[[ears to be excited about the above mentioned purchase however admitted that her father, stepmother have led her through this process and that the home will be used as a rental unit until she is ready to move out.    her affect is Normal range and intensity, which is congruent, with her mood and the content of the session. The client has made progress on their goals.     Nury Hernandez presents with a minimal risk of suicide, minimal risk of self-harm, and minimal risk of harm to others.    For any risk assessment that surpasses a \"low\" rating, a safety plan must be developed.    A safety plan was indicated: no  If yes, describe in detail     PLAN: Between sessions, Nury Hernandez will continue to utilize therapy sessions to increase her mindfulness and self compassion. . At the next session, the therapist will use Supportive Psychotherapy to address the above in further " detail.    Behavioral Health Treatment Plan and Discharge Planning: Nury Hernandez is aware of and agrees to continue to work on their treatment plan. They have identified and are working toward their discharge goals. yes        5/13/24  Start Time: 0800  Stop Time: 0855  Total Visit Time: 55 minutes

## 2024-06-03 ENCOUNTER — TELEPHONE (OUTPATIENT)
Age: 24
End: 2024-06-03

## 2024-06-03 DIAGNOSIS — E28.2 PCOS (POLYCYSTIC OVARIAN SYNDROME): Primary | ICD-10-CM

## 2024-06-03 NOTE — TELEPHONE ENCOUNTER
"LM on VM for patient to call back. Need the insurance company fax number where she would like the referral faxed. I also advised that the referral has been marked \"ASAP'. The diagnosis code is included on the referral but we do not have a procedure code. She will need to obtain that from the Endo office.    Dr. Reyes, please advise if you think it's necessary to lady the referral \"STAT\", if so, please correct.   "

## 2024-06-03 NOTE — TELEPHONE ENCOUNTER
The patient called she needed the referral for the endocrinologist to be faxed to her insurance company   per the insurance company they did get the referral but they need the  diagnosis code  , the procedure code and she needs it marked urgent and have it refaxed to the insurance company  any questions please call then patient

## 2024-06-06 ENCOUNTER — SOCIAL WORK (OUTPATIENT)
Dept: BEHAVIORAL/MENTAL HEALTH CLINIC | Facility: CLINIC | Age: 24
End: 2024-06-06

## 2024-06-06 DIAGNOSIS — F41.1 GAD (GENERALIZED ANXIETY DISORDER): Primary | ICD-10-CM

## 2024-06-06 NOTE — TELEPHONE ENCOUNTER
LM on VM for the patient x 2. Advised referral has been placed. Will await call back and then fax.

## 2024-06-12 ENCOUNTER — SOCIAL WORK (OUTPATIENT)
Dept: BEHAVIORAL/MENTAL HEALTH CLINIC | Facility: CLINIC | Age: 24
End: 2024-06-12

## 2024-06-12 DIAGNOSIS — F41.1 GAD (GENERALIZED ANXIETY DISORDER): Primary | ICD-10-CM

## 2024-06-13 NOTE — PSYCH
"Behavioral Health Psychotherapy Progress Note    Psychotherapy Provided: Individual Psychotherapy     Encounter Diagnoses   Name Primary?   • INES (generalized anxiety disorder) Yes           Goals addressed in session: Goal 1     DATA:  Nury spoke today about her feelings re: her recent family vacation and the stress of her two sisters' fighting.  She also indicated her plans to pursue a second opinion for treatment options for her elevated DHEA level.  Lastly, Nury shared that she bought a house and will be closing on it later this week.     During this session, this clinician used the following therapeutic modalities: Motivational Interviewing and Supportive Psychotherapy    Substance Abuse was not addressed during this session. If the client is diagnosed with a co-occurring substance use disorder, please indicate any changes in the frequency or amount of use: . Stage of change for addressing substance use diagnoses: No substance use/Not applicable    ASSESSMENT:  Nury Hernandez presents with a Euthymic/ normal mood.  Nury a[[ears to be excited about the above mentioned purchase however admitted that her father, stepmother have led her through this process and that the home will be used as a rental unit until she is ready to move out.    her affect is Normal range and intensity, which is congruent, with her mood and the content of the session. The client has made progress on their goals.     Nury Hernandez presents with a minimal risk of suicide, minimal risk of self-harm, and minimal risk of harm to others.    For any risk assessment that surpasses a \"low\" rating, a safety plan must be developed.    A safety plan was indicated: no  If yes, describe in detail     PLAN: Between sessions, Nury Hernandez will continue to utilize therapy sessions to increase her mindfulness and self compassion. . At the next session, the therapist will use Supportive Psychotherapy to address the above in further " detail.    Behavioral Health Treatment Plan and Discharge Planning: Nury Hernandez is aware of and agrees to continue to work on their treatment plan. They have identified and are working toward their discharge goals. yes        6/6/24  Start Time: 1200  Stop Time: 1255  Total Visit Time: 55 minutes

## 2024-06-14 ENCOUNTER — APPOINTMENT (OUTPATIENT)
Dept: LAB | Facility: HOSPITAL | Age: 24
End: 2024-06-14
Payer: COMMERCIAL

## 2024-06-14 DIAGNOSIS — N92.6 IRREGULAR MENSTRUAL CYCLE: ICD-10-CM

## 2024-06-14 LAB — TSH SERPL DL<=0.05 MIU/L-ACNC: 1.66 UIU/ML (ref 0.45–4.5)

## 2024-06-14 PROCEDURE — 82627 DEHYDROEPIANDROSTERONE: CPT

## 2024-06-14 PROCEDURE — 83498 ASY HYDROXYPROGESTERONE 17-D: CPT

## 2024-06-14 PROCEDURE — 84443 ASSAY THYROID STIM HORMONE: CPT

## 2024-06-14 PROCEDURE — 36415 COLL VENOUS BLD VENIPUNCTURE: CPT

## 2024-06-15 LAB — DHEA-S SERPL-MCNC: 461 UG/DL (ref 110–431.7)

## 2024-06-17 ENCOUNTER — SOCIAL WORK (OUTPATIENT)
Dept: BEHAVIORAL/MENTAL HEALTH CLINIC | Facility: CLINIC | Age: 24
End: 2024-06-17
Payer: COMMERCIAL

## 2024-06-17 ENCOUNTER — APPOINTMENT (OUTPATIENT)
Dept: LAB | Facility: CLINIC | Age: 24
End: 2024-06-17
Payer: COMMERCIAL

## 2024-06-17 DIAGNOSIS — F41.1 GAD (GENERALIZED ANXIETY DISORDER): Primary | ICD-10-CM

## 2024-06-17 DIAGNOSIS — N92.6 IRREGULAR MENSTRUAL CYCLE: ICD-10-CM

## 2024-06-17 LAB
GLUCOSE 2H P 75 G GLC PO SERPL-MCNC: 119 MG/DL (ref 70–139)
GLUCOSE P FAST SERPL-MCNC: 93 MG/DL (ref 65–99)
INSULIN SERPL-ACNC: 4.02 UIU/ML (ref 1.9–23)

## 2024-06-17 PROCEDURE — 90837 PSYTX W PT 60 MINUTES: CPT | Performed by: SOCIAL WORKER

## 2024-06-17 PROCEDURE — 82947 ASSAY GLUCOSE BLOOD QUANT: CPT

## 2024-06-17 PROCEDURE — 36415 COLL VENOUS BLD VENIPUNCTURE: CPT

## 2024-06-17 PROCEDURE — 82950 GLUCOSE TEST: CPT

## 2024-06-17 PROCEDURE — 83525 ASSAY OF INSULIN: CPT

## 2024-06-17 NOTE — PSYCH
"Behavioral Health Psychotherapy Progress Note    Psychotherapy Provided: Individual Psychotherapy     Encounter Diagnoses   Name Primary?   • INES (generalized anxiety disorder) Yes         Goals addressed in session: Goal 1     DATA:  Nury spoke today about her feelings re: her appt at [facility] and the tests that have been ordered to determine the cause of her ob/gyn issues.  She reported that she has not been active on her [employer] acct and that she closed on the home that she purchased, as well.    During this session, this clinician used the following therapeutic modalities: Motivational Interviewing and Supportive Psychotherapy    Substance Abuse was not addressed during this session. If the client is diagnosed with a co-occurring substance use disorder, please indicate any changes in the frequency or amount of use: . Stage of change for addressing substance use diagnoses: No substance use/Not applicable    ASSESSMENT:  Nury Hernandez presents with a Euthymic/ normal mood.  Nury admitted to having increased anxiety over the past several shifts at work. She denied any angst about the purchase of her home other than being uncertain that her neighbor approves of this decision.   her affect is Normal range and intensity, which is congruent, with her mood and the content of the session. The client has made progress on their goals.     Nury Hernandez presents with a minimal risk of suicide, minimal risk of self-harm, and minimal risk of harm to others.    For any risk assessment that surpasses a \"low\" rating, a safety plan must be developed.    A safety plan was indicated: no  If yes, describe in detail     PLAN: Between sessions, Nury Hernandez will continue to utilize therapy sessions to increase her mindfulness and self compassion. . At the next session, the therapist will use Supportive Psychotherapy to address the above in further detail.    Behavioral Health Treatment Plan and Discharge Planning: " Nury Hernandez is aware of and agrees to continue to work on their treatment plan. They have identified and are working toward their discharge goals. yes        6/17/24  Start Time: 1100  Stop Time: 1155  Total Visit Time: 55 minutes

## 2024-06-18 DIAGNOSIS — R11.0 NAUSEA: Primary | ICD-10-CM

## 2024-06-18 RX ORDER — ONDANSETRON HYDROCHLORIDE 8 MG/1
8 TABLET, FILM COATED ORAL EVERY 8 HOURS PRN
Qty: 20 TABLET | Refills: 0 | Status: SHIPPED | OUTPATIENT
Start: 2024-06-18

## 2024-06-18 RX ORDER — SCOLOPAMINE TRANSDERMAL SYSTEM 1 MG/1
1 PATCH, EXTENDED RELEASE TRANSDERMAL
Qty: 13 PATCH | Refills: 3 | Status: SHIPPED | OUTPATIENT
Start: 2024-06-18

## 2024-06-20 LAB — 17OHP SERPL-MCNC: 42 NG/DL

## 2024-06-26 ENCOUNTER — SOCIAL WORK (OUTPATIENT)
Dept: BEHAVIORAL/MENTAL HEALTH CLINIC | Facility: CLINIC | Age: 24
End: 2024-06-26
Payer: COMMERCIAL

## 2024-06-26 DIAGNOSIS — F41.1 GAD (GENERALIZED ANXIETY DISORDER): Primary | ICD-10-CM

## 2024-06-26 PROCEDURE — 90837 PSYTX W PT 60 MINUTES: CPT | Performed by: SOCIAL WORKER

## 2024-06-26 NOTE — PSYCH
"Behavioral Health Psychotherapy Progress Note    Psychotherapy Provided: Individual Psychotherapy     Encounter Diagnoses   Name Primary?   • INES (generalized anxiety disorder) Yes         Goals addressed in session: Goal 1     DATA:  Nury spoke today about her feelings re: her follow up appt at [facility] and the plan that she developed with her dr to decrease her stress and increase her exercise over the next three months.     During this session, this clinician used the following therapeutic modalities: Motivational Interviewing and Supportive Psychotherapy    Substance Abuse was not addressed during this session. If the client is diagnosed with a co-occurring substance use disorder, please indicate any changes in the frequency or amount of use: . Stage of change for addressing substance use diagnoses: No substance use/Not applicable    ASSESSMENT:  Nury Hernandez presents with a Euthymic/ normal mood.  Nury admitted to having ongoing anxiety prior to each of her past several shifts at work. She is also very upset and concerned with the hospitalization of her grandmother for leukemia.  her affect is Normal range and intensity, which is congruent, with her mood and the content of the session. The client has made progress on their goals.     Nury Hernandez presents with a minimal risk of suicide, minimal risk of self-harm, and minimal risk of harm to others.    For any risk assessment that surpasses a \"low\" rating, a safety plan must be developed.    A safety plan was indicated: no  If yes, describe in detail     PLAN: Between sessions, Nury Hernandez will continue to utilize therapy sessions to increase her mindfulness and self compassion. . At the next session, the therapist will use Supportive Psychotherapy to address the above in further detail.    Behavioral Health Treatment Plan and Discharge Planning: Nury Hernandez is aware of and agrees to continue to work on their treatment plan. They have " identified and are working toward their discharge goals. yes        6/26/24  Start Time: 0800  Stop Time: 0855  Total Visit Time: 55 minutes

## 2024-06-27 ENCOUNTER — TELEPHONE (OUTPATIENT)
Dept: PSYCHIATRY | Facility: CLINIC | Age: 24
End: 2024-06-27

## 2024-06-27 NOTE — TELEPHONE ENCOUNTER
Contacted patient off of [facility]  to verify needs of services in attempts to offer patient an appointment at [facility]. spoke with patient whom stated they were interested.     Pt scheduled on 7/17 at 8:30am with Dr. Dee

## 2024-07-03 ENCOUNTER — SOCIAL WORK (OUTPATIENT)
Dept: BEHAVIORAL/MENTAL HEALTH CLINIC | Facility: CLINIC | Age: 24
End: 2024-07-03
Payer: COMMERCIAL

## 2024-07-03 DIAGNOSIS — F41.1 GAD (GENERALIZED ANXIETY DISORDER): Primary | ICD-10-CM

## 2024-07-03 PROCEDURE — 90834 PSYTX W PT 45 MINUTES: CPT | Performed by: SOCIAL WORKER

## 2024-07-03 NOTE — PSYCH
"Behavioral Health Psychotherapy Progress Note    Psychotherapy Provided: Individual Psychotherapy     Encounter Diagnoses   Name Primary?   • INES (generalized anxiety disorder) Yes           Goals addressed in session: Goal 1     DATA:  Nury spoke today about her feelings re: her grandmother's hospitalization and diagnosis with leukemia.  She expressed how worried she is about her care while also vocalizing concern for her best friend.       During this session, this clinician used the following therapeutic modalities: Motivational Interviewing and Supportive Psychotherapy    Substance Abuse was not addressed during this session. If the client is diagnosed with a co-occurring substance use disorder, please indicate any changes in the frequency or amount of use: . Stage of change for addressing substance use diagnoses: No substance use/Not applicable    ASSESSMENT:  Nury Hernandez presents with a Anxious mood.  Nury also appears extremely concerned for her father as his job is at risk.   her affect is Normal range and intensity, which is congruent, with her mood and the content of the session. The client has made progress on their goals.     Nury Hernandez presents with a minimal risk of suicide, minimal risk of self-harm, and minimal risk of harm to others.    For any risk assessment that surpasses a \"low\" rating, a safety plan must be developed.    A safety plan was indicated: no  If yes, describe in detail     PLAN: Between sessions, Nury Hernandez will continue to utilize therapy sessions to increase her mindfulness and self compassion. . At the next session, the therapist will use Supportive Psychotherapy to address the above in further detail.    Behavioral Health Treatment Plan and Discharge Planning: Nury Hernandez is aware of and agrees to continue to work on their treatment plan. They have identified and are working toward their discharge goals. yes        7/3/24  Start Time: 1400  Stop " Time: 1000  Total Visit Time: 50 minutes

## 2024-07-05 DIAGNOSIS — G43.009 MIGRAINE WITHOUT AURA AND WITHOUT STATUS MIGRAINOSUS, NOT INTRACTABLE: ICD-10-CM

## 2024-07-05 RX ORDER — ONDANSETRON 4 MG/1
4 TABLET, ORALLY DISINTEGRATING ORAL EVERY 6 HOURS PRN
Qty: 30 TABLET | Refills: 0 | Status: SHIPPED | OUTPATIENT
Start: 2024-07-05

## 2024-07-16 ENCOUNTER — SOCIAL WORK (OUTPATIENT)
Dept: BEHAVIORAL/MENTAL HEALTH CLINIC | Facility: CLINIC | Age: 24
End: 2024-07-16
Payer: COMMERCIAL

## 2024-07-16 DIAGNOSIS — F41.1 GAD (GENERALIZED ANXIETY DISORDER): Primary | ICD-10-CM

## 2024-07-16 PROCEDURE — 90837 PSYTX W PT 60 MINUTES: CPT | Performed by: SOCIAL WORKER

## 2024-07-17 ENCOUNTER — OFFICE VISIT (OUTPATIENT)
Dept: PSYCHIATRY | Facility: CLINIC | Age: 24
End: 2024-07-17
Payer: COMMERCIAL

## 2024-07-17 DIAGNOSIS — F41.1 GAD (GENERALIZED ANXIETY DISORDER): Primary | ICD-10-CM

## 2024-07-17 DIAGNOSIS — Z56.6 STRESS AT WORK: ICD-10-CM

## 2024-07-17 PROCEDURE — 96127 BRIEF EMOTIONAL/BEHAV ASSMT: CPT | Performed by: PSYCHIATRY & NEUROLOGY

## 2024-07-17 PROCEDURE — 90792 PSYCH DIAG EVAL W/MED SRVCS: CPT | Performed by: PSYCHIATRY & NEUROLOGY

## 2024-07-17 SDOH — HEALTH STABILITY - MENTAL HEALTH: OTHER PHYSICAL AND MENTAL STRAIN RELATED TO WORK: Z56.6

## 2024-07-17 NOTE — PATIENT INSTRUCTIONS
Treatment Recommendations/Precautions:  Continue psychotherapy services with [facility] therapist Clementina Sky  Will defer initiation of psychotropic medications at this time as patient endorsed maintaining appropriate level of anxiety after discontinuation of Effexor in early Spring 2024. Counseled patient on risks and benefits of deferring psychotropic agents and discussed different treatment options for anxiety, to which she expressed understanding.   In setting of ongoing work stress and acute stressors in the workplace, discussed that it would be appropriate to have another appointment to follow up on patient's symptoms.   Aware of 24 hour and weekend coverage for urgent situations accessed by calling [facility] main practice number  Medication management follow-up in 6 months to monitor patient symptomatology

## 2024-07-17 NOTE — PSYCH
"Behavioral Health Psychotherapy Progress Note    Psychotherapy Provided: Individual Psychotherapy     Encounter Diagnoses   Name Primary?   • INES (generalized anxiety disorder) Yes       Goals addressed in session: Goal 1     DATA:  Nury spoke today about her feelings re: her time at the beach and her subsequent return to work which was extremely stressful.  She also expressed ongoing concern for her grandmother.   During this session, this clinician used the following therapeutic modalities: Motivational Interviewing and Supportive Psychotherapy    Substance Abuse was not addressed during this session. If the client is diagnosed with a co-occurring substance use disorder, please indicate any changes in the frequency or amount of use: . Stage of change for addressing substance use diagnoses: No substance use/Not applicable    ASSESSMENT:  Nury Hernandez presents with a Euthymic/ normal mood.  Nury acknowledged relief re: improved circumstances for her father while sharing plans to rent out the home she purchased.   her affect is Normal range and intensity, which is congruent, with her mood and the content of the session. The client has made progress on their goals.     Nury Hernandez presents with a minimal risk of suicide, minimal risk of self-harm, and minimal risk of harm to others.    For any risk assessment that surpasses a \"low\" rating, a safety plan must be developed.    A safety plan was indicated: no  If yes, describe in detail     PLAN: Between sessions, Nury Hernandez will continue to utilize therapy sessions to increase her mindfulness and self compassion. . At the next session, the therapist will use Supportive Psychotherapy to address the above in further detail.    Behavioral Health Treatment Plan and Discharge Planning: Nury Hernandez is aware of and agrees to continue to work on their treatment plan. They have identified and are working toward their discharge goals. " yes        7/16/24  Start Time: 1300  Stop Time: 1355  Total Visit Time: 55 minutes

## 2024-07-17 NOTE — PSYCH
" PSYCHIATRIC EVALUATION     [facility]    Name and Date of Birth:  Nury Hernandez 24 y.o. 2000 MRN: [id number]    Date of Visit: July 17, 2024    Reason for visit: Comprehensive psychiatric assessment for medication management (transition of care)    Chief Complaint: \"stay connected with psychiatry\"    History of Present Illness     Nury Hernandez is a 24 y.o. 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), migraines (follows with neurology), Malachi Danlos syndrome, mast cell activation syndome, and PCOS, and past psychiatric history of generalized anxiety disorder (Psychiatric Hospitalizations: denied, Outpatient Treatment History: Leif Bowman MD, ARCENIO Monique, Fred Jessica MD, Suicide Attempts: denied, History of non-suicidal self injury: denied, Violence History: denied), who presents to the [facility] outpatient clinic for comprehensive psychiatric assessment due to anxiety symptoms and for psychiatric medication management. Patient was referred by Dr. Leif Bowman for transition of care.     In review of Nury Hernandez's past psychiatric history per chart review and discussion with patient, she reported psychiatric symptoms first started gradually in childhood following parents' divorce and have improved and remained stable over the last few months. Stressors preceding today's visit included stress at work, health issues, social difficulties, everyday stressors, and occasional anxiety.     Today on evaluation, Nury presents reporting:  ANXIETY SYMPTOMS: worrying about everyday issues related to work, worrying daily for the past few or two related to work stress " due to increased intubated neonates at work, panic attack about a week ago due to hearing ventilator alarms that wakes her up from sleep  7/17 INES-7: 4, somewhat difficult  7/17 PHQ-9: 3, no or minimal depression    Nury currently denies neurovegetative symptomatology suggestive of major depressive disorder or dysthymia. Nury denies fragmented or non-restorative sleep. Nury endorses robust appetite, baseline energy, and no impairment of motivation. Nury reports adequate concentration/memory and denies new-onset forgetfulness or inattentiveness. Nury does not experience daily crying spells or limited pleasure in activities previously found pleasurable. Nury adamantly denies acute thoughts of suicide or self-harm. Nury has no plans to harm others. There is no documented history of prior suicidal gestures or suicidal attempts. Nury denies historical non-suicidal self injurious behavior. Nury is future-oriented and demonstrates self preservation as evidenced by today's evaluation in which Nury is seeking psychiatric intervention to improve overall mental health and outlook on life. Nury denies a pervasive history of worthlessness, hopelessness, or guilt.      Nury vehemently denies any acute or chronic history suggestive of an underlying affective (bipolar) organization. Nury denies previous episodes of elevated/expansive mood, lengthy periods without sleep, grandiosity, or intense and prolonged irritability. Nury denies atypical periods of increased goal-directed behavior, excessive spending, or sexual promiscuity. The patient has no history of pathologic impulsivity or extreme mood lability. During today's evaluation, Nury does not exhibit objective evidence of hypomania/patti. Nury is mostly organized in thought without flight of ideas or loosening of associations. Speech does not appear to be pressured or rapid and Nury responds well to verbal redirecting.    Nury denies  "historical symptomatology suggestive of an underlying psychotic process. Nury does not currently endorse acute perceptual disturbances such as A/V hallucinations, paranoia, referential ideation, or delusions. Nury denies acute and chronic Schneiderian symptoms, including: thought-broadcasting, thought-insertion, thought-withdrawal or audible thoughts. During today's evaluation, Nury does not exhibit objective evidence of delores psychosis as the patient does not appear internally preoccupied or easily distracted. Nury's thoughts are organized, linear, and reality-based.    Nury denies historical symptomatology suggestive of PTSD, OCD, or disordered eating.    She reports fluctuating sleep pattern in setting of frequent shift schedule changes between days and nights in the NICU, fluctuating appetite, fluctuating energy levels. Patient reported not being on psychotropic medications currently. She reports that medications were slowly discontinued and she has not taken them since March or April. She reports feeling well and not seeking psychotropic medications at this time. However, she hopes to continue to follow at the clinic due to medical comorbidities and concerns about recurrence of symptoms.    Presently, patient adamantly denies suicidal ideation, intent or plan at present in addition to thoughts of self-injury, citing \"work, sense of purpose, family, friends\" as deterrents against self-harm; contracts for safety, see risk assessment for further detail. She denies homicidal ideation, intent or plan at present. At conclusion of evaluation, patient is amenable to the recommendations of this writer including: medications as prescribed, attending routine appointments, attending therapy sessions.  Also, patient is amenable to calling/contacting the outpatient office including this writer if any acute adverse effects of their medication regimen arise in addition to any comments or concerns pertaining to their " "psychiatric management.  Patient is amenable to calling/contacting crisis and/or attending to the nearest emergency department if their clinical condition deteriorates to assure their safety and stability, stating that they are able to appropriately confide in their \"Clementina, best friend, parents\" regarding their psychiatric state.    Current Rating Scores:     Current PHQ-9   PHQ-2/9 Depression Screening    Little interest or pleasure in doing things: 0 - not at all  Feeling down, depressed, or hopeless: 0 - not at all  Trouble falling or staying asleep, or sleeping too much: 1 - several days  Feeling tired or having little energy: 2 - more than half the days  Poor appetite or overeatin - not at all  Feeling bad about yourself - or that you are a failure or have let yourself or your family down: 0 - not at all  Trouble concentrating on things, such as reading the newspaper or watching television: 0 - not at all  Moving or speaking so slowly that other people could have noticed. Or the opposite - being so fidgety or restless that you have been moving around a lot more than usual: 0 - not at all  Thoughts that you would be better off dead, or of hurting yourself in some way: 0 - not at all  PHQ-9 Score: 3  PHQ-9 Interpretation: No or Minimal depression       Current INES-7 is   INES-7 Flowsheet Screening      Flowsheet Row Most Recent Value   Over the last two weeks, how often have you been bothered by the following problems?     Feeling nervous, anxious, or on edge 1   Not being able to stop or control worrying 1   Worrying too much about different things 1   Trouble relaxing  1   Being so restless that it's hard to sit still 0   Becoming easily annoyed or irritable  0   Feeling afraid as if something awful might happen 0   How difficult have these problems made it for you to do your work, take care of things at home, or get along with other people?  Somewhat difficult   INES Score  4        .    Psychiatric Review " Of Systems:    Sleep change:  fluctuating  Interest change: no  Interests include: hang out with friends and family, traveling  Guilt/Hopelessness/helplessness/worthlessness: no  Energy change:  fluctuating  Concentration/Attention change: no  Appetite change: no, fluctuating  Weight changes & timeframe: no  Psychomotor agitation/retardation: no  Somatic symptoms: no  Suicidal ideation: no  Homicidal ideation: no  Beti/hypomania: no    Anxiety/panic attack: yes  INES symptoms: fatigue  Panic Disorder symptoms: palpitations/racing heart  Social Anxiety symptoms: social anxiety due to fear of judgment or embarassment related to performance at work  Obsessive/compulsive symptoms:  denies OCD symptoms; reported preference for structure  Eating Disorder symptoms: no historical or current eating disorder. no binge eating disorder; no anorexia nervosa. no symptoms of bulimia    Auditory hallucinations: no  Visual hallucinations: no  Other perceptual disturbances: no  Delusional thinking: no    Review Of Systems:    Constitutional: negative  ENT:  negative  Cardiovascular: negative  Respiratory: negative  Gastrointestinal: negative  Genitourinary: negative  Musculoskeletal: negative  Integumentary: negative  Neurological: negative  Endocrine: negative  Other Symptoms: none, all other systems are negative    Past Psychiatric History:     Past psychiatric diagnoses: INES  Inpatient psychiatric admissions: denied  Prior outpatient psychiatric treatment: Leif Bowman MD ([phone]), ARCENIO Monique ([phone], switched between LINDSEY and Dr. Jessica), Fred Jessica MD (approximately [phone])  Past/current psychotherapy: follows at [facility] with therapist Clementina Sky ([phone])  Other Services: patient denies    History of suicidal attempts/gestures: denied   History of non-suicidal self-injurious behavior: denied  History of violence/aggressive behaviors: denied    Psychotropic medication trials:   Antidepressants: Effexor  (effective, later discontinued due to symptom improvement, had significant discontinuation syndrome symptoms); Zoloft (ADR: fatigue); Cymbalta (ADR: fatigue)  Antipsychotics: denied  Mood stabilizers: Depakote (for migraines as a rescue; discontinued after improvement); Topamax (for migraines as a rescue; discontinued after improvement)  Anxiolytics: Xanax (short-term for anxiety attacks and panic attacks while being titrated on antidepressant)  Other: Methylphenidate (for energy by [facility] provider due to fatigue during POTS work up; discontinued); Modafinil (for energy in setting of sleep-wake cycle changes by [employer]; discontinued due to lack of benefit for circadian rhythm and patient preference)    Substance Abuse History:    Alcohol: occasional, social use   Denies history of illict substance, or tobacco abuse., Patient denies previous legal actions or arrests related to substance intoxication including prior DWIs/DUIs., Patient does not exhibit objective evidence of substance withdrawal during today's examination nor do they appear under the influence of any psychoactive substance.      History of Inpatient/Outpatient rehabilitation program: no  Longest clean time: not applicable      Family Psychiatric History:     Family History   Problem Relation Age of Onset    Gestational diabetes Mother     Migraines Mother     Anxiety disorder Father     Hyperlipidemia Father     Autism Brother     Diabetes Other     Uterine cancer Maternal Grandmother     Rheumatic fever Maternal Grandmother     Diabetes Maternal Grandfather     Hypertension Maternal Grandfather     Heart attack Maternal Grandfather     Stroke Maternal Grandfather     Hyperlipidemia Maternal Grandfather     Hypertension Paternal Grandmother     Anxiety disorder Paternal Grandmother     Hypertension Paternal Grandfather        Psychiatric Illness:  no family history of psychiatric illness  Substance Abuse:  no family history of substance abuse  Suicide  Attempts:  no family history of suicide attempts    Patient otherwise denies known family history of psychiatric illness, substance use, or suicide attempts.    Social History:    Birth history: in [facility]  Raised: in [place] by father and step-mother with sister, twin brother, step sister; parents  when patient was 7 yo  Developmental: There is no documented history of 504/IEP or need for special education., Patient denies any known in-utero exposure to toxins or illicit substances., Reports a history of premature delivery (at 29 weeks), required speech and physical therapy, caught up to developmental milestones by age 4 years.  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 , Patient denies history of arrests or violence with a deadly weapon.    history: None  Legal history: None    Traumatic History:     Abuse: no history of sexual abuse, no history of physical abuse, no history of emotional abuse, no history of verbal abuse  Other Traumatic Events:  related to work in NICU; otherwise denies    Past Medical History:    Past Medical History:   Diagnosis Date    Annual physical exam 04/08/2019    Anxiety     Asthma     Dizziness     at times    Dyspepsia 01/05/2021    Exertional headache 05/23/2022    GERD (gastroesophageal reflux disease)     Hammertoe of left foot     Headache     occ    Hyperlipemia     Hypermobile joints     Hypertriglyceridemia 06/12/2019    IBS (irritable bowel syndrome) 05/12/2020    Irritable bowel syndrome     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HCC)     Mast cell disorder     Migraine     PONV (postoperative nausea and vomiting)  "    POTS (postural orthostatic tachycardia syndrome)      infant     Wears glasses         Hx of seizures: no  Hx of concussions & ongoing symptoms: no  Past Surgical History:   Procedure Laterality Date    COLONOSCOPY      EGD AND COLONOSCOPY N/A 1/10/2017    Procedure: EGD AND COLONOSCOPY;  Surgeon: Ozzy Billingsley MD;  Location:  GI LAB;  Service:     ESOPHAGOGASTRODUODENOSCOPY      with biopsy    FOOT SURGERY      Excision of lesion feet benign    MI CORRECTION HAMMERTOE Left 2019    Procedure: 2nd arthrodesis; 5th arthroplasty, flexor tenotomy 2,3,4,5.;  Surgeon: Odilon Berger DPM;  Location: [facility];  Service: Podiatry    MI CORRECTION HAMMERTOE Left 2020    Procedure: 2ND TOE Revision hammertoe with broken implant;  Surgeon: Odilon Berger DPM;  Location: [facility];  Service: Podiatry    UPPER GASTROINTESTINAL ENDOSCOPY  2021    WISDOM TOOTH EXTRACTION       Allergies   Allergen Reactions    Nuts - Food Allergy Other (See Comments)     Avani Nuts - itchy throat    Other Hives      At surgical site and IV site- not sure if type of cleanser used    Pollen Extract     Reyvow [Lasmiditan] Palpitations and Hallucinations       History Review:    The following portions of the patient's history were reviewed and updated as appropriate: allergies, current medications, past family history, past medical history, past social history, past surgical history, and problem list.    OBJECTIVE:    Vital signs in last 24 hours:    There were no vitals filed for this visit.    Mental Status Evaluation:    Appearance age appropriate, casually dressed   Behavior cooperative, calm   Speech normal rate, normal volume, normal pitch   Mood \"good\"   Affect normal range and intensity, appropriate   Thought Processes organized, goal directed   Associations intact associations   Thought Content no overt delusions   Perceptual Disturbances: Denies auditory or visual hallucinations   Abnormal " Thoughts  Risk Potential Denies suicidal or homicidal ideation, plan, or intent   Orientation oriented to person, place, time/date, and situation   Memory recent and remote memory grossly intact   Consciousness alert and awake   Attention Span Concentration Span attention span and concentration are age appropriate   Intellect appears to be of average intelligence   Insight intact   Judgement intact   Muscle Strength and  Gait normal muscle strength and normal muscle tone, normal gait and normal balance   Motor Activity no abnormal movements   Language no difficulty naming common objects, no difficulty repeating a phrase, no difficulty writing a sentence   Fund of Knowledge adequate knowledge of current events  adequate fund of knowledge regarding past history  adequate fund of knowledge regarding vocabulary      Laboratory Results: I have personally reviewed all pertinent laboratory/tests results    Appointment on 06/17/2024   Component Date Value Ref Range Status    Insulin, Fasting 06/17/2024 4.02  1.90 - 23.00 uIU/mL Final    Glucose, GTT - Fasting 06/17/2024 93  65 - 99 mg/dL Final    Glucose, 2 Hour 75 gm Gluc Ch[place]en* 06/17/2024 119  70 - 139 mg/dL Final    <140 Normal Glucose  140-199 Impaired glucose  >=200 Diagnosis Diabetes Mellitus   Appointment on 06/14/2024   Component Date Value Ref Range Status    17-OH PROGESTERONE 06/14/2024 42  ng/dL Final                              Adult Female                             Follicular        15 -  70                             Luteal            35 - 290    TSH 3RD GENERATON 06/14/2024 1.655  0.450 - 4.500 uIU/mL Final    The recommended reference ranges for TSH during pregnancy are as follows:   First trimester 0.100 to 2.500 uIU/mL   Second trimester  0.200 to 3.000 uIU/mL   Third trimester 0.300 to 3.000 uIU/m    Note: Normal ranges may not apply to patients who are transgender, non-binary, or whose legal sex, sex at birth, and gender identity differ.  Adult  TSH (3rd generation) reference range follows the recommended guidelines of the American Thyroid Association, January, 2020.    DHEA-SO4 06/14/2024 461.0 (H)  110.0 - 431.7 ug/dL Final   Appointment on 04/27/2024   Component Date Value Ref Range Status    CRP 04/27/2024 <1.0  <3.0 mg/L Final    TSI 04/27/2024 <0.10  0.00 - 0.55 IU/L Final    Cholesterol 04/27/2024 144  See Comment mg/dL Final    Cholesterol:         Pediatric <18 Years        Desirable          <170 mg/dL      Borderline High    170-199 mg/dL      High               >=200 mg/dL        Adult >=18 Years            Desirable         <200 mg/dL      Borderline High   200-239 mg/dL      High              >239 mg/dL      Triglycerides 04/27/2024 88  See Comment mg/dL Final    Triglyceride:     0-9Y            <75mg/dL     10Y-17Y         <90 mg/dL       >=18Y     Normal          <150 mg/dL     Borderline High 150-199 mg/dL     High            200-499 mg/dL        Very High       >499 mg/dL    Specimen collection should occur prior to Metamizole administration due to the potential for falsely depressed results.    HDL, Direct 04/27/2024 59  >=50 mg/dL Final    LDL Calculated 04/27/2024 67  0 - 100 mg/dL Final    LDL Cholesterol:     Optimal           <100 mg/dl     Near Optimal      100-129 mg/dl     Above Optimal       Borderline High 130-159 mg/dl       High            160-189 mg/dl       Very High       >189 mg/dl         This screening LDL is a calculated result.   It does not have the accuracy of the Direct Measured LDL in the monitoring of patients with hyperlipidemia and/or statin therapy.   Direct Measure LDL (QDK484) must be ordered separately in these patients.    Non-HDL-Chol (CHOL-HDL) 04/27/2024 85  mg/dl Final   Office Visit on 04/16/2024   Component Date Value Ref Range Status    DHEA 04/27/2024 995 (H)  31 - 701 ng/dL Final    Testosterone, Free 04/27/2024 4.8 (H)  0.0 - 4.2 pg/mL Final    TESTOSTERONE TOTAL 04/27/2024 45  13 - 71 ng/dL  Final    CRP, High Sensitivity 04/27/2024 0.58  <3.00 mg/L Final    TSH 3RD GENERATON 04/27/2024 0.447 (L)  0.450 - 4.500 uIU/mL Final    The recommended reference ranges for TSH during pregnancy are as follows:   First trimester 0.100 to 2.500 uIU/mL   Second trimester  0.200 to 3.000 uIU/mL   Third trimester 0.300 to 3.000 uIU/m    Note: Normal ranges may not apply to patients who are transgender, non-binary, or whose legal sex, sex at birth, and gender identity differ.  Adult TSH (3rd generation) reference range follows the recommended guidelines of the American Thyroid Association, January, 2020.    Free T4 04/27/2024 0.85  0.61 - 1.12 ng/dL Final    Specimens with biotin concentrations > 10 ng/mL can lead to significant (> 10%) positive bias in result.   Office Visit on 03/11/2024   Component Date Value Ref Range Status    Yeast, Wet Prep 03/11/2024 neg   Final    pH 03/11/2024 4.5   Final    Whiff Test 03/11/2024 neg   Final    Clue Cells 03/11/2024 neg   Final    Trich, Wet Prep 03/11/2024 neg   Final   Office Visit on 02/16/2024   Component Date Value Ref Range Status    POST-VOID RESIDUAL VOLUME, ML POC 02/16/2024 44  mL Final   Office Visit on 02/14/2024   Component Date Value Ref Range Status    Yeast, Wet Prep 02/14/2024 rare   Final    Whiff Test 02/14/2024 neg   Final    Clue Cells 02/14/2024 neg   Final    Trich, Wet Prep 02/14/2024 neg   Final   Admission on 02/13/2024, Discharged on 02/13/2024   Component Date Value Ref Range Status    WBC 02/13/2024 5.44  4.31 - 10.16 Thousand/uL Final    RBC 02/13/2024 4.35  3.81 - 5.12 Million/uL Final    Hemoglobin 02/13/2024 13.0  11.5 - 15.4 g/dL Final    Hematocrit 02/13/2024 37.7  34.8 - 46.1 % Final    MCV 02/13/2024 87  82 - 98 fL Final    MCH 02/13/2024 29.9  26.8 - 34.3 pg Final    MCHC 02/13/2024 34.5  31.4 - 37.4 g/dL Final    RDW 02/13/2024 11.9  11.6 - 15.1 % Final    MPV 02/13/2024 9.8  8.9 - 12.7 fL Final    Platelets 02/13/2024 241  149 - 390  Thousands/uL Final    nRBC 02/13/2024 0  /100 WBCs Final    Segmented % 02/13/2024 73  43 - 75 % Final    Immature Grans % 02/13/2024 0  0 - 2 % Final    Lymphocytes % 02/13/2024 21  14 - 44 % Final    Monocytes % 02/13/2024 6  4 - 12 % Final    Eosinophils Relative 02/13/2024 0  0 - 6 % Final    Basophils Relative 02/13/2024 0  0 - 1 % Final    Absolute Neutrophils 02/13/2024 3.92  1.85 - 7.62 Thousands/µL Final    Absolute Immature Grans 02/13/2024 0.02  0.00 - 0.20 Thousand/uL Final    Absolute Lymphocytes 02/13/2024 1.14  0.60 - 4.47 Thousands/µL Final    Absolute Monocytes 02/13/2024 0.35  0.17 - 1.22 Thousand/µL Final    Eosinophils Absolute 02/13/2024 0.01  0.00 - 0.61 Thousand/µL Final    Basophils Absolute 02/13/2024 0.00  0.00 - 0.10 Thousands/µL Final    Sodium 02/13/2024 139  135 - 147 mmol/L Final    Potassium 02/13/2024 3.7  3.5 - 5.3 mmol/L Final    Chloride 02/13/2024 106  96 - 108 mmol/L Final    CO2 02/13/2024 28  21 - 32 mmol/L Final    ANION GAP 02/13/2024 5  mmol/L Final    BUN 02/13/2024 16  5 - 25 mg/dL Final    Creatinine 02/13/2024 0.71  0.60 - 1.30 mg/dL Final    Standardized to IDMS reference method    Glucose 02/13/2024 89  65 - 140 mg/dL Final    If the patient is fasting, the ADA then defines impaired fasting glucose as > 100 mg/dL and diabetes as > or equal to 123 mg/dL.    Calcium 02/13/2024 9.5  8.4 - 10.2 mg/dL Final    AST 02/13/2024 15  13 - 39 U/L Final    ALT 02/13/2024 20  7 - 52 U/L Final    Specimen collection should occur prior to Sulfasalazine administration due to the potential for falsely depressed results.     Alkaline Phosphatase 02/13/2024 60  34 - 104 U/L Final    Total Protein 02/13/2024 7.3  6.4 - 8.4 g/dL Final    Albumin 02/13/2024 4.2  3.5 - 5.0 g/dL Final    Total Bilirubin 02/13/2024 0.40  0.20 - 1.00 mg/dL Final    Use of this assay is not recommended for patients undergoing treatment with eltrombopag due to the potential for falsely elevated  results.  N-acetyl-p-benzoquinone imine (metabolite of Acetaminophen) will generate erroneously low results in samples for patients that have taken an overdose of Acetaminophen.    eGFR 02/13/2024 121  ml/min/1.73sq m Final    Lipase 02/13/2024 22  11 - 82 u/L Final    EXT Preg Test, Ur 02/13/2024 Negative   Final    Control 02/13/2024 Valid   Final    Color, UA 02/13/2024 Yellow   Final    Clarity, UA 02/13/2024 Clear   Final    pH, UA 02/13/2024 7.0  4.5 - 8.0 Final    Leukocytes, UA 02/13/2024 Negative  Negative Final    Nitrite, UA 02/13/2024 Negative  Negative Final    Protein, UA 02/13/2024 Trace (A)  Negative mg/dl Final    Glucose, UA 02/13/2024 Negative  Negative mg/dl Final    Ketones, UA 02/13/2024 Negative  Negative mg/dl Final    Urobilinogen, UA 02/13/2024 0.2  0.2, 1.0 E.U./dl E.U./dl Final    Bilirubin, UA 02/13/2024 Negative  Negative Final    Occult Blood, UA 02/13/2024 Negative  Negative Final    Specific Gravity, UA 02/13/2024 1.015  1.003 - 1.030 Final    RBC, UA 02/13/2024 1-2  None Seen, 1-2 /hpf Final    WBC, UA 02/13/2024 1-2  None Seen, 1-2 /hpf Final    Epithelial Cells 02/13/2024 Occasional  None Seen, Occasional /hpf Final    Bacteria, UA 02/13/2024 Occasional  None Seen, Occasional /hpf Final    MUCUS THREADS 02/13/2024 Innumerable (A)  None Seen Final    Urine Culture 02/13/2024 No Growth <1000 cfu/mL   Final   Hospital Outpatient Visit on 02/09/2024   Component Date Value Ref Range Status    BSA 02/09/2024 1.77  m2 Final    A4C EF 02/09/2024 57  % Final    LV Diastolic Volume (BP) 02/09/2024 58  mL Final    LV Systolic Volume (BP) 02/09/2024 24  mL Final    EF 02/09/2024 59  % Final    LVIDd 02/09/2024 4.40  cm Final    LVIDS 02/09/2024 3.00  cm Final    IVSd 02/09/2024 0.60  cm Final    LVPWd 02/09/2024 0.60  cm Final    FS 02/09/2024 32  28 - 44 Final    LA Volume Index (BP) 02/09/2024 33.3  mL/m2 Final    RVID d 02/09/2024 2.9  cm Final    Tricuspid annular plane systolic e*  02/09/2024 1.90  cm Final    LA size 02/09/2024 3.4  cm Final    LA length (A2C) 02/09/2024 5.00  cm Final    LA volume (BP) 02/09/2024 59  mL Final    RAA A4C 02/09/2024 11.6  cm2 Final    TR Peak Dannie 02/09/2024 2.0  m/s Final    Triscuspid Valve Regurgitation Pea* 02/09/2024 15.0  mmHg Final    Ao root 02/09/2024 2.20  cm Final    Asc Ao 02/09/2024 2.3  cm Final    Tricuspid valve peak regurgitation* 02/09/2024 1.95  m/s Final    Left ventricular stroke volume (2D) 02/09/2024 52.00  mL Final    IVS 02/09/2024 0.6  cm Final    LEFT VENTRICLE SYSTOLIC VOLUME (MO* 02/09/2024 34  mL Final    LV DIASTOLIC VOLUME (MOD BIPLANE) * 02/09/2024 86  mL Final    Left Atrium Area-systolic Four Yahaira* 02/09/2024 20.6  cm2 Final    Left Atrium Area-systolic Apical T* 02/09/2024 18.4  cm2 Final    LVSV, 2D 02/09/2024 52  mL Final    LV Diastolic Volume Index (BP) 02/09/2024 32.8  mL/m2 Final    LV Systolic Volume Index (BP) 02/09/2024 13.6  mL/m2 Final    LV EF 02/09/2024 60   Final    Est. RA pres 02/09/2024 8.0  mmHg Final    Right Ventricular Peak Systolic Pr* 02/09/2024 23.00  mmHg Final    IVC 02/09/2024 20.0  mm Final   Office Visit on 01/29/2024   Component Date Value Ref Range Status    Yeast, Wet Prep 01/29/2024 positive   Final    pH 01/29/2024 4.5   Final    Whiff Test 01/29/2024 Neg   Final    Clue Cells 01/29/2024 Neg   Final    Trich, Wet Prep 01/29/2024 Neg   Final    Color, UA 01/29/2024 Colorless   Final    Clarity, UA 01/29/2024 Clear   Final    Specific Gravity, UA 01/29/2024 1.007  1.003 - 1.030 Final    pH, UA 01/29/2024 7.0  4.5, 5.0, 5.5, 6.0, 6.5, 7.0, 7.5, 8.0 Final    Leukocytes, UA 01/29/2024 Negative  Negative Final    Nitrite, UA 01/29/2024 Negative  Negative Final    Protein, UA 01/29/2024 Negative  Negative mg/dl Final    Glucose, UA 01/29/2024 Negative  Negative mg/dl Final    Ketones, UA 01/29/2024 Negative  Negative mg/dl Final    Urobilinogen, UA 01/29/2024 <2.0  <2.0 mg/dl mg/dl Final     Bilirubin, UA 01/29/2024 Negative  Negative Final    Occult Blood, UA 01/29/2024 Negative  Negative Final    RBC, UA 01/29/2024 None Seen  None Seen, 1-2 /hpf Final    WBC, UA 01/29/2024 None Seen  None Seen, 1-2 /hpf Final    Epithelial Cells 01/29/2024 None Seen  None Seen, Occasional /hpf Final    Bacteria, UA 01/29/2024 None Seen  None Seen, Occasional /hpf Final    Urine Culture 01/29/2024 <10,000 cfu/ml Gram Negative Gatito Enteric Like (A)   Final   There may be more visits with results that are not included.       Suicide/Homicide Risk Assessment:    Risk of Harm to Self:  The following ratings are based on assessment at the time of the interview and review of records  Demographic risk factors include: , never , age: young adult (15-24)  Historical Risk Factors include: history of anxiety  Recent Specific Risk Factors include: mental illness diagnosis, current anxiety symptoms  Protective Factors: no current suicidal ideation, ability to adapt to change, able to manage anger well, access to mental health treatment, compliant with mental health treatment, cultural beliefs discouraging suicide, good health, good self-esteem, having a desire to be alive, having a sense of purpose or meaning in life, personal beliefs about the meaning and value of life, responsibilities and duties to others, restricted access to lethal means  Weapons: none. The following steps have been taken to ensure weapons are properly secured: not applicable  Based on today's assessment, Nury presents the following risk of harm to self: low    Risk of Harm to Others:  The following ratings are based on assessment at the time of the interview and review of records  Demographic Risk Factors include: 16-25 years of age, under age 40.  Historical Risk Factors include: none.  Recent Specific Risk Factors include: none.  Protective Factors: no current homicidal ideation, ability to adapt to change, able to manage anger well, access  to mental health treatment, compliant with mental health treatment, contact with caregivers, no substance use problems, personal beliefs, restricted access to lethal means  Weapons: none. The following steps have been taken to ensure weapons are properly secured: not applicable  Based on today's assessment, Nury presents the following risk of harm to others: low    The following interventions are recommended: no intervention changes needed. Although patient's acute lethality risk is LOW, long-term/chronic lethality risk is mildly elevated given ongoing stressors., However, at the current moment, patient is future-oriented, forward-thinking, and demonstrates ability to act in a self-preserving manner as evidenced by volitionally seeking psychiatric evaluation and treatment today.. Nury Hernandez contracts for safety and is not an imminent risk of harm to self or others. Outpatient level of care is deemed appropriate at this current time. Patient understands that if they can no longer contract for safety, they need to call the office or report to their nearest Emergency Room for immediate evaluation. At this juncture, inpatient hospitalization is not currently warranted. To mitigate future risk, patient should adhere to treatment recommendations, avoid alcohol/illicit substance use, utilize community-based resources and familiar support, and prioritize mental health treatment.       Assessment/Plan:     Nury Hernandez is a 24 y.o. 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), with past medical history of POTS (managed at [facility]), migraines (follows with neurology), Malachi Danlos syndrome, mast cell activation syndome, and PCOS, and past psychiatric history of generalized anxiety disorder (Psychiatric  Hospitalizations: denied, Outpatient Treatment History: Leif Bowman MD, ARCENIO Monique, Fred Jessica MD, Suicide Attempts: denied, History of non-suicidal self injury: denied, Violence History: denied), who presents to the [facility] outpatient clinic for comprehensive psychiatric assessment due to anxiety symptoms and for psychiatric medication management. Patient was referred by Dr. Leif Bowman for transition of care.     In review of Nury Hernandez's past psychiatric history per chart review and discussion with patient, she reported psychiatric symptoms first started gradually in childhood following parents' divorce and have improved and remained stable over the last few months. Stressors preceding today's visit included stress at work, health issues, social difficulties, everyday stressors, and occasional anxiety.     Today on evaluation, Nury presents reporting: worrying about everyday issues related to work, worrying daily for the past few or two related to work stress due to increased intubated neonates at work, panic attack about a week ago due to hearing ventilator alarms that wakes her up from sleep.7/17 INES-7: 4, somewhat difficult. 7/17 PHQ-9: 3, no or minimal depression    On evaluation today, patient continues to report adequate control of anxiety symptoms. Denied SI, HI, and AVH. No anticipated psychopharmacologic changes at this juncture.Will defer initiation of psychotropic medications at this time as patient endorsed maintaining appropriate level of anxiety after discontinuation of Effexor in early Spring 2024. Counseled patient on risks and benefits of deferring psychotropic agents and discussed different treatment options for anxiety, to which she expressed understanding. In setting of ongoing work stress and acute stressors in the workplace, discussed that it would be appropriate to have another appointment to follow up on patient's symptoms; should patient  continue to report continued improvement at that time, then may consider discharge to care of PCP with outpatient psychotherapy services, which patient reports have been beneficial for managing current anxiety symptoms.     DSM-5 Diagnoses:     1. INES (generalized anxiety disorder)    2. Stress at work        Treatment Recommendations/Precautions:  Continue psychotherapy services with [facility] therapist Clementina Sky  Will defer initiation of psychotropic medications at this time as patient endorsed maintaining appropriate level of anxiety after discontinuation of Effexor in early Spring 2024. Counseled patient on risks and benefits of deferring psychotropic agents and discussed different treatment options for anxiety, to which she expressed understanding.   In setting of ongoing work stress and acute stressors in the workplace, discussed that it would be appropriate to have another appointment to follow up on patient's symptoms.   Aware of 24 hour and weekend coverage for urgent situations accessed by calling [facility] main practice number  Medication management follow-up in 6 months to monitor patient symptomatology  Should patient continue to report continued symptom improvement at that time, then may consider discharge to care of PCP with outpatient psychotherapy services, which patient reports have been beneficial for managing current anxiety symptoms.     Controlled Medication Discussion:     Not applicable    Treatment Plan:    Completed and signed during the session: Not applicable - Treatment Plan not due at this session      Visit Time    Visit Start Time: 0835  Visit Stop Time: 1000  Total Visit Duration:  85 minutes    Debora Dee MD   07/17/24

## 2024-07-23 ENCOUNTER — PROCEDURE VISIT (OUTPATIENT)
Dept: NEUROLOGY | Facility: CLINIC | Age: 24
End: 2024-07-23
Payer: COMMERCIAL

## 2024-07-23 ENCOUNTER — SOCIAL WORK (OUTPATIENT)
Dept: BEHAVIORAL/MENTAL HEALTH CLINIC | Facility: CLINIC | Age: 24
End: 2024-07-23
Payer: COMMERCIAL

## 2024-07-23 VITALS — SYSTOLIC BLOOD PRESSURE: 112 MMHG | TEMPERATURE: 98.1 F | HEART RATE: 70 BPM | DIASTOLIC BLOOD PRESSURE: 68 MMHG

## 2024-07-23 DIAGNOSIS — G43.709 CHRONIC MIGRAINE WITHOUT AURA WITHOUT STATUS MIGRAINOSUS, NOT INTRACTABLE: Primary | ICD-10-CM

## 2024-07-23 DIAGNOSIS — F41.1 GAD (GENERALIZED ANXIETY DISORDER): Primary | ICD-10-CM

## 2024-07-23 PROCEDURE — 64615 CHEMODENERV MUSC MIGRAINE: CPT | Performed by: PHYSICIAN ASSISTANT

## 2024-07-23 PROCEDURE — 90837 PSYTX W PT 60 MINUTES: CPT | Performed by: SOCIAL WORKER

## 2024-07-23 NOTE — PROGRESS NOTES
"Universal Protocol   Consent: Verbal consent obtained. Written consent obtained.  Risks and benefits: risks, benefits and alternatives were discussed  Consent given by: patient  Time out: Immediately prior to procedure a \"time out\" was called to verify the correct patient, procedure, equipment, support staff and site/side marked as required.  Patient understanding: patient states understanding of the procedure being performed  Patient consent: the patient's understanding of the procedure matches consent given  Procedure consent: procedure consent matches procedure scheduled  Relevant documents: relevant documents present and verified  Patient identity confirmed: verbally with patient      Chemodenervation     Date/Time  7/23/2024 9:00 AM     Performed by  Monique Coffey PA-C   Authorized by  Monique Coffey PA-C     Pre-procedure details      Prepped With: Alcohol     Anesthesia  (see MAR for exact dosages):     Anesthesia method:  None   Procedure details      Position:  Upright   Botox      Botox Type:  Type A    Brand:  Botox    mL's of Botulinum Toxin:  200    Final Concentration per CC:  50 units    Needle Gauge:  30 G 2.5 inch   Procedures      Botox Procedures: chronic headache      Indications: migraines     Injection Location      Head / Face:  L superior cervical paraspinal, R superior cervical paraspinal, L , R , L frontalis, R frontalis, L medial occipitalis, R medial occipitalis, procerus, R temporalis, L temporalis, R superior trapezius and L superior trapezius    L  injection amount:  5 unit(s)    R  injection amount:  5 unit(s)    L lateral frontalis:  5 unit(s)    R lateral frontalis:  5 unit(s)    L medial frontalis:  5 unit(s)    R medial frontalis:  5 unit(s)    L temporalis injection amount:  20 unit(s)    R temporalis injection amount:  20 unit(s)    Procerus injection amount:  5 unit(s)    L medial occipitalis injection amount:  15 unit(s)    R medial " occipitalis injection amount:  15 unit(s)    L superior cervical paraspinal injection amount:  10 unit(s)    R superior cervical paraspinal injection amount:  10 unit(s)    L superior trapezius injection amount:  15 unit(s)    R superior trapezius injection amount:  15 unit(s)   Total Units      Total units used:  200    Total units discarded:  0   Post-procedure details      Chemodenervation:  Chronic migraine    Facial Nerve Location::  Bilateral facial nerve    Patient tolerance of procedure:  Tolerated well, no immediate complications   Comments          20 units frontalis  5 units right splenius capitis  20 units right lower occipitalis/cervical paraspinal  All medically necessary

## 2024-07-23 NOTE — PSYCH
"Behavioral Health Psychotherapy Progress Note    Psychotherapy Provided: Individual Psychotherapy     Encounter Diagnoses   Name Primary?   • INES (generalized anxiety disorder) Yes         Goals addressed in session: Goal 1     DATA:  Nury spoke today about her feelings re: her overwhelming anxiety about talking to her mother about the possibility of going on a date with someone.  She admitted that her fear is unsubstantiated but all encompassing.  Ways to challenge these fears were discussed at length.    During this session, this clinician used the following therapeutic modalities: Motivational Interviewing and Supportive Psychotherapy    Substance Abuse was not addressed during this session. If the client is diagnosed with a co-occurring substance use disorder, please indicate any changes in the frequency or amount of use: . Stage of change for addressing substance use diagnoses: No substance use/Not applicable    ASSESSMENT:  Nury Hernandez presents with a Euthymic/ normal mood.  Nury has not followed through with dialogue previously discussed re: the way that she has been impacted by her parent's divorce and this remains a \"block\" in moving forward with allowing herself to date.    her affect is Normal range and intensity, which is congruent, with her mood and the content of the session. The client has made progress on their goals.     Nury Hernandez presents with a minimal risk of suicide, minimal risk of self-harm, and minimal risk of harm to others.    For any risk assessment that surpasses a \"low\" rating, a safety plan must be developed.    A safety plan was indicated: no  If yes, describe in detail     PLAN: Between sessions, Nury Hernandez will continue to utilize therapy sessions to increase her mindfulness and self compassion. . At the next session, the therapist will use Supportive Psychotherapy to address the above in further detail.    Behavioral Health Treatment Plan and Discharge " Planning: Nury Hernandez is aware of and agrees to continue to work on their treatment plan. They have identified and are working toward their discharge goals. yes        7/23/24  Start Time: 1300  Stop Time: 1355  Total Visit Time: 55 minutes  1355

## 2024-08-14 ENCOUNTER — SOCIAL WORK (OUTPATIENT)
Dept: BEHAVIORAL/MENTAL HEALTH CLINIC | Facility: CLINIC | Age: 24
End: 2024-08-14
Payer: COMMERCIAL

## 2024-08-14 DIAGNOSIS — F41.1 GAD (GENERALIZED ANXIETY DISORDER): Primary | ICD-10-CM

## 2024-08-14 PROCEDURE — 90837 PSYTX W PT 60 MINUTES: CPT | Performed by: SOCIAL WORKER

## 2024-08-14 NOTE — PSYCH
"Behavioral Health Psychotherapy Progress Note    Psychotherapy Provided: Individual Psychotherapy     Encounter Diagnoses   Name Primary?   • INES (generalized anxiety disorder) Yes           Goals addressed in session: Goal 1     DATA:  Nury spoke today about her feelings re: her interest in returning to school.  She reported her plan to wait until transitioning to day shift before enrolling in a program of study.  Nury also expressed her reluctance to engage in conflict with anyone and ways that this will be necessary in her life were discussed.     During this session, this clinician used the following therapeutic modalities: Motivational Interviewing and Supportive Psychotherapy    Substance Abuse was not addressed during this session. If the client is diagnosed with a co-occurring substance use disorder, please indicate any changes in the frequency or amount of use: . Stage of change for addressing substance use diagnoses: No substance use/Not applicable    ASSESSMENT:  Nury Hernandez presents with a Euthymic/ normal mood.  Nury demonstrated considerable insight and willingness to allow this worker to challenge her today. her affect is Normal range and intensity, which is congruent, with her mood and the content of the session. The client has made progress on their goals.     Nury Hernandez presents with a minimal risk of suicide, minimal risk of self-harm, and minimal risk of harm to others.    For any risk assessment that surpasses a \"low\" rating, a safety plan must be developed.    A safety plan was indicated: no  If yes, describe in detail     PLAN: Between sessions, Nury Hernandez will continue to utilize therapy sessions to increase her mindfulness and self compassion. . At the next session, the therapist will use Supportive Psychotherapy to address the above in further detail.    Behavioral Health Treatment Plan and Discharge Planning: Nury Hernandez is aware of and agrees to " continue to work on their treatment plan. They have identified and are working toward their discharge goals. yes        8/14/24  Start Time: 1500  Stop Time: 1558  Total Visit Time: 58 minutes

## 2024-08-14 NOTE — BH TREATMENT PLAN
Outpatient Behavioral Health Psychotherapy Treatment Plan    Nury Hernandez    Date of Initial Psychotherapy Assessment:   Date of Current Treatment Plan: 08/14/24  Treatment Plan Target Date:2/14/25  Treatment Plan Expiration Date: 2/14/25    Diagnosis:   1. INES (generalized anxiety disorder)              Area(s) of Need: tapering off anxiety medication    Long Term Goal 1 (in the client's own words): I want to overcome my fears of being in a relationship.     Stage of Change: Action    Target Date for completion: 2/14/25     Anticipated therapeutic modalities: Supportive Psychotherapy      People identified to complete this goal: Clementina Arrington       Objective 1: (identify the means of measuring success in meeting the objective): - I will continue to work in therapy to identify past experiences that have caused me to be fearful.   .         Objective 2: (identify the means of measuring success in meeting the objective):  I will work in therapy to challenge and reframe those memories into a more positive experience to overcome my fears         I am currently under the care of a [facility] psychiatric provider: yes    My [facility] psychiatric provider is: Dr. Jessica    I am currently taking psychiatric medications: Yes, as prescribed    I feel that I will be ready for discharge from mental health care when I reach the following (measurable goal/objective): When I am able to successfully manage my anxiety prior to and during stressful situations        I have created my Crisis Plan and have been offered a copy of this plan    Behavioral Health Treatment Plan [facility]: Diagnosis and Treatment Plan explained to Nury Hernandez acknowledges an understanding of their diagnosis. Nury Hernandez agrees to this treatment plan.    I have been offered a copy of this Treatment Plan. Yes

## 2024-08-14 NOTE — BH CRISIS PLAN
Client Name: Nury Hernandez       Client YOB: 2000  : 2000    Treatment Team (include name and contact information):     Psychotherapist: Clementina Sky LCSW      Healthcare Provider  Lea Reyes, MD  [address] [place] [address]  [phone]    Type of Plan   * Child plans (children 12 yo and younger) must be completed and signed by the child's legal guardian   * Plans for all individuals 13 yo and above must be signed by the client.     Plan Type: adolescent/adult (14 and over) Initial      My Personal Strengths are (in the client's own words):  Knowing my boundaries, good communicator, good problem solving,a strong self advocate, being flexible, and kind  The stressors and triggers that may put me at risk are:  work, family, miscommunication with family, conflict, not enough sleep,     Coping skills I can use to keep myself calm and safe:  Other (describe) breathing, counting, mindfulness, reading    Coping skills/supports I can use to maintain abstinence from substance use:   Not Applicable    The people that provide me with help and support: (Include name, contact, and how they can help)   Support person #1: Dad    * Phone number: in cell phone    * How can they help me? Talking me through it, actively listening   Support person #2:Mom    * Phone number: in cell phone    * How can they help me? helping me rationalize        In the past, the following has helped me in times of crisis:    Calling a family member, Breathing exercises (or other mindfulness-based activities) and Using de-escalation tools that I have learned      If it is an emergency and you need immediate help, call     If there is a possibility of danger to yourself or others, call the following crisis hotline resources:     Adult Crisis Numbers  Suicide Prevention Hotline - Dial   [facility]: [phone]  [facility]: [phone]  [facility]: [phone]  [facility]:  [phone]  [facility]: [phone]  [facility]: [phone]  [facility]: [phone]  [facility]: [phone] (daytime).       [phone] (after hours, weekends, holidays)     Child/Adolescent Crisis Numbers   [facility]: [phone]   [facility]: [phone]   [place]: [phone]   [facility]: [phone]    Please note: Some [facility] do not have a separate number for Child/Adolescent specific crisis. If your county is not listed under Child/Adolescent, please call the adult number for your county     National Talk to Text Line   [employer] - [phone]    In the event your feelings become unmanageable, and you cannot reach your support system, you will call [phone] immediately or go to the nearest hospital emergency room.

## 2024-08-21 ENCOUNTER — SOCIAL WORK (OUTPATIENT)
Dept: BEHAVIORAL/MENTAL HEALTH CLINIC | Facility: CLINIC | Age: 24
End: 2024-08-21
Payer: COMMERCIAL

## 2024-08-21 DIAGNOSIS — F41.1 GAD (GENERALIZED ANXIETY DISORDER): Primary | ICD-10-CM

## 2024-08-21 PROCEDURE — 90837 PSYTX W PT 60 MINUTES: CPT | Performed by: SOCIAL WORKER

## 2024-08-21 NOTE — PSYCH
"Behavioral Health Psychotherapy Progress Note    Psychotherapy Provided: Individual Psychotherapy     Encounter Diagnoses   Name Primary?   • INES (generalized anxiety disorder) Yes       Goals addressed in session: Goal 1     DATA:  Nury spoke today about her feelings of anxiety re: having agreed to go on a first date this weekend.  She voiced equal amounts of fear in telling \"people\" that she will be going on a date.  Nury acknowledged that she is also fearful of not going out with anyone.       During this session, this clinician used the following therapeutic modalities: Motivational Interviewing and Supportive Psychotherapy    Substance Abuse was not addressed during this session. If the client is diagnosed with a co-occurring substance use disorder, please indicate any changes in the frequency or amount of use: . Stage of change for addressing substance use diagnoses: No substance use/Not applicable    ASSESSMENT:  Nury Hernandez presents with a Euthymic/ normal mood.  Nury once again  demonstrated considerable insight and willingness to allow this worker to challenge her today. her affect is Normal range and intensity, which is congruent, with her mood and the content of the session. The client has made progress on their goals.     Nury Hernandez presents with a minimal risk of suicide, minimal risk of self-harm, and minimal risk of harm to others.    For any risk assessment that surpasses a \"low\" rating, a safety plan must be developed.    A safety plan was indicated: no  If yes, describe in detail     PLAN: Between sessions, Nury Hernandez will continue to utilize therapy sessions to increase her mindfulness and self compassion. . At the next session, the therapist will use Supportive Psychotherapy to address the above in further detail.    Behavioral Health Treatment Plan and Discharge Planning: Nury Hernandez is aware of and agrees to continue to work on their treatment plan. They " have identified and are working toward their discharge goals. yes        8/21/24  Start Time: 1500  Stop Time: 1555  Total Visit Time: 55 minutes

## 2024-08-28 ENCOUNTER — SOCIAL WORK (OUTPATIENT)
Dept: BEHAVIORAL/MENTAL HEALTH CLINIC | Facility: CLINIC | Age: 24
End: 2024-08-28
Payer: COMMERCIAL

## 2024-08-28 DIAGNOSIS — F41.1 GAD (GENERALIZED ANXIETY DISORDER): Primary | ICD-10-CM

## 2024-08-28 PROCEDURE — 90837 PSYTX W PT 60 MINUTES: CPT | Performed by: SOCIAL WORKER

## 2024-08-29 ENCOUNTER — OFFICE VISIT (OUTPATIENT)
Dept: DERMATOLOGY | Facility: CLINIC | Age: 24
End: 2024-08-29
Payer: COMMERCIAL

## 2024-08-29 VITALS — BODY MASS INDEX: 24.2 KG/M2 | WEIGHT: 154.2 LBS | HEIGHT: 67 IN

## 2024-08-29 DIAGNOSIS — L70.0 ACNE VULGARIS: Primary | ICD-10-CM

## 2024-08-29 PROCEDURE — 99214 OFFICE O/P EST MOD 30 MIN: CPT

## 2024-08-29 RX ORDER — DOXYCYCLINE 100 MG/1
100 CAPSULE ORAL EVERY 12 HOURS SCHEDULED
Qty: 60 CAPSULE | Refills: 2 | Status: SHIPPED | OUTPATIENT
Start: 2024-08-29 | End: 2024-11-27

## 2024-08-29 RX ORDER — TRETINOIN 0.5 MG/G
CREAM TOPICAL
Qty: 45 G | Refills: 3 | Status: SHIPPED | OUTPATIENT
Start: 2024-08-29

## 2024-08-29 NOTE — PROGRESS NOTES
"[facility] Note     Patient Name: Nury Hernandez  Encounter Date: 08/29/2024     Have you been cared for by a [facility] Dermatologist in the last 3 years and, if so, which description applies to you?    Yes.  I have been here within the last 3 years, and my medical history has NOT changed since that time.  I am FEMALE/of child-bearing potential.    REVIEW OF SYSTEMS:  Have you recently had or currently have any of the following? No changes in my recent health.   PAST MEDICAL HISTORY:  Have you personally ever had or currently have any of the following?  If \"YES,\" then please provide more detail. No changes in my medical history.   HISTORY OF IMMUNOSUPPRESSION: Do you have a history of any of the following:  Systemic Immunosuppression such as Diabetes, Biologic or Immunotherapy, Chemotherapy, Organ Transplantation, Bone Marrow Transplantation or Prednisone?  No     Answering \"YES\" requires the addition of the dotphrase \"IMMUNOSUPPRESSED\" as the first diagnosis of the patient's visit.   FAMILY HISTORY:  Any \"first degree relatives\" (parent, brother, sister, or child) with the following?    No changes in my family's known health.   PATIENT EXPERIENCE:    Do you want the Dermatologist to perform a COMPLETE skin exam today including a clinical examination under the \"bra and underwear\" areas?  NO  If necessary, do we have your permission to call and leave a detailed message on your Preferred Phone number that includes your specific medical information?  Yes      Allergies   Allergen Reactions    Nuts - Food Allergy Other (See Comments)     Avani Nuts - itchy throat    Other Hives      At surgical site and IV site- not sure if type of cleanser used    Pollen Extract     Reyvow [Lasmiditan] Palpitations and Hallucinations      Current Outpatient Medications:     acetaminophen (TYLENOL) 500 mg tablet, Take 1,000 mg by mouth every 4 (four) hours as needed , Disp: , Rfl:     albuterol (2.5 mg/3 mL) " 0.083 % nebulizer solution, Take 3 mL (2.5 mg total) by nebulization every 6 (six) hours as needed for wheezing or shortness of breath, Disp: 30 mL, Rfl: 0    albuterol (ProAir HFA) 90 mcg/act inhaler, Inhale 2 puffs every 6 (six) hours as needed for wheezing or shortness of breath, Disp: 8.5 g, Rfl: 0    ASMANEX  MCG/ACT AERO, Inhale 2 puffs every 4 (four) hours as needed (2 puffs), Disp: , Rfl:     Atogepant (Qulipta) 60 MG TABS, Take 60 mg by mouth in the morning, Disp: 90 tablet, Rfl: 4    clindamycin (CLEOCIN T) 1 % lotion, Apply topically in the morning To face, chest and back, Disp: 60 mL, Rfl: 3    diphenhydrAMINE (BENADRYL) 25 mg tablet, Take 1 tablet (25 mg total) by mouth every 6 (six) hours as needed for itching, Disp: 30 tablet, Rfl: 0    Elastic Bandages & Supports (TRUFORM STOCKINGS 20-30MMHG) MISC, , Disp: , Rfl:     fludrocortisone (FLORINEF) 0.1 mg tablet, Take 0.1 mg by mouth 2 (two) times a day, Disp: , Rfl:     metoprolol succinate (TOPROL-XL) 50 mg 24 hr tablet, Take 50 mg by mouth in the morning and 50 mg before bedtime., Disp: , Rfl:     onabotulinumtoxin A (BOTOX) 100 units, Inject  as directed. Injection every 91 days for migraines, Disp: , Rfl:     ondansetron (Zofran ODT) 4 mg disintegrating tablet, Take 1 tablet (4 mg total) by mouth every 6 (six) hours as needed for nausea or vomiting, Disp: 30 tablet, Rfl: 0    ondansetron (ZOFRAN) 8 mg tablet, Take 1 tablet (8 mg total) by mouth every 8 (eight) hours as needed for nausea or vomiting, Disp: 20 tablet, Rfl: 0    prochlorperazine (COMPAZINE) 10 mg tablet, Take 1 tablet (10 mg total) by mouth every 6 (six) hours as needed (migraine), Disp: 10 tablet, Rfl: 0    Prucalopride Succinate (Motegrity) 2 MG TABS, Take 2 mg by mouth in the morning, Disp: 90 tablet, Rfl: 0    rimegepant sulfate (Nurtec) 75 mg TBDP, Take 1 tablet (75 mg total) by mouth as needed (migraine) Limit of 1 in 24 hours, Disp: 16 tablet, Rfl: 3    scopolamine  "(TRANSDERM-SCOP) 1 mg/3 days TD 72 hr patch, Place 1 patch on the skin over 72 hours every third day, Disp: 13 patch, Rfl: 3    tretinoin (RETIN-A) 0.025 % cream, Apply a pea sized amount to face one hour before bedtime after moisturizing. Start with 1-2 nights per week and build up to nightly as tolerated., Disp: 45 g, Rfl: 2    verapamil (CALAN) 120 mg tablet, Take 1 tab by mouth twice a day, Disp: 180 tablet, Rfl: 3    dexamethasone (DECADRON) 4 mg tablet, Take 2 tabs for 2 days then take 1 tab for 3-5 days (until headaches break then stop) (Patient not taking: Reported on 7/17/2024), Disp: 9 tablet, Rfl: 0    modafinil (PROVIGIL) 100 mg tablet, Take 100 mg by mouth daily (Patient not taking: Reported on 7/17/2024), Disp: , Rfl:     norethindrone-ethinyl estradiol (Loestrin Fe 1/20) 1-20 MG-MCG per tablet, Take 1 tablet by mouth daily (Patient not taking: Reported on 7/17/2024), Disp: 84 tablet, Rfl: 0          Whom besides the patient is providing clinical information about today's encounter?   NO ADDITIONAL HISTORIAN (patient alone provided history)    Physical Exam and Assessment/Plan by Diagnosis:    ACNE VULGARIS (\"COMMON ACNE\")    Physical Exam:  Anatomic Location Affected:  cheeks, chin, forehead, upper back  Morphological Description: Scattered erythematous papules, pustules, cystic lesions  Pertinent Positives:  Pertinent Negatives:    Additional History of Present Condition:  patient has hx of POTS and also had elevated DHEA-S and testosterone. Possible diagnosis of PCOS considered. Unable to go on spironolactone d/t POTS. Has been on depoprovera shot in the past but it caused significant weight gain and acne flaring so she has discontinued.      History notable for acne that flares around menses  Discussed that history and physical are consistent with hormonal acne  Educated that hormonal acne, including hormonal acne related to PCOS, does particularly well with medications that targets hormones, " "including spironolactone and OCP. Spironolactone not an option given hx of POTS     Plan today:  LABS REVIEWED:  DHEAS, total/free testosterone-both high  Recommended patient follow up with OBGyn to discuss initiation of Ortho-Tri-Cyclen Lo if they feel this would be appropriate. Patient had been on depoprovera shot in past with much weight gain and acne increase     AM:  - Start benzoyl peroxide cleanser (over the counter products like Panoxyl, Cetaphil, CeraVe and Neutrogena contain this product listed under \"active ingredients\". Try to find Benzoyl Peroxide 4% or less)  Apply clindamycin 1% lotion to entire face  - SPF 30 or greater (can be a lotion with SPF like CeraVe AM)  - Start non-comedogenic moisturizer such as CeraVe, Cetaphil or Vanicream.      PM:  Increase to tretinoin 0.05% cream nightly to face followed by non-comedogenic moisturizer. If retinoid is too drying, may employ the \"sandwich method.\" To do this, apply layer of non-comedogenic moisturizer, followed by layer of retinoid, followed by another layer of non-comedogenic moisturizer.     SYSTEMIC:  Start doxycycline 100mg BID. Take with water and something to eat if not tolerated on empty stomach. Discussed possible side effects such as stomach upset and sun sensitivity.     Call with any questions or concerns before next follow-up visit; do not stop medications abruptly without consulting provider. Call our office at [phone] (SKIN).  It is better to be safe than to be sorry!    Note-Spironolactone not an option given patient's history of POTS.   Discussed consideration for accutane but did discuss need for two forms of contraception. Urged patient to discuss with GYN regarding starting Ortho-Tri-Cyclen Lo and to check with cardiologist regarding accutane and POTS  Scribe Attestation      I,:  Nury Lopes MA am acting as a scribe while in the presence of the attending physician.:       I,:  Francia Gates PA-C personally performed the " services described in this documentation    as scribed in my presence.:

## 2024-08-29 NOTE — PSYCH
"Behavioral Health Psychotherapy Progress Note    Psychotherapy Provided: Individual Psychotherapy     Encounter Diagnoses   Name Primary?   • INES (generalized anxiety disorder) Yes         Goals addressed in session: Goal 1     DATA:  Nury spoke today about her feelings of anxiety re: having only heard back from a male peer since she reached out first.  She reported her success at challenging the anxiety she experienced in going on this date and also informing her parents of her date.     During this session, this clinician used the following therapeutic modalities: Motivational Interviewing and Supportive Psychotherapy    Substance Abuse was not addressed during this session. If the client is diagnosed with a co-occurring substance use disorder, please indicate any changes in the frequency or amount of use: . Stage of change for addressing substance use diagnoses: No substance use/Not applicable    ASSESSMENT:  Nury Hernandez presents with a Euthymic/ normal mood.  Nury once again  demonstrated considerable insight and willingness to allow this worker to challenge her today. her affect is Normal range and intensity, which is congruent, with her mood and the content of the session. The client has made progress on their goals.     Nury Hernandez presents with a minimal risk of suicide, minimal risk of self-harm, and minimal risk of harm to others.    For any risk assessment that surpasses a \"low\" rating, a safety plan must be developed.    A safety plan was indicated: no  If yes, describe in detail     PLAN: Between sessions, Nury Hernandez will continue to utilize therapy sessions to increase her mindfulness and self compassion. . At the next session, the therapist will use Supportive Psychotherapy to address the above in further detail.    Behavioral Health Treatment Plan and Discharge Planning: Nury Hernandez is aware of and agrees to continue to work on their treatment plan. They have " identified and are working toward their discharge goals. yes        8/28/24  Start Time: 1500  Stop Time: 1555  Total Visit Time: 55 minutes

## 2024-08-29 NOTE — PATIENT INSTRUCTIONS
"ACNE VULGARIS     TODAY'S PLAN:     PRESCRIPTION MANAGEMENT:  Several treatment options were discussed including topical retinoids and their side effects.     Skin Hygiene:      Wash affected areas (face, chest, and back) TWICE A DAY with a mild cleanser such as CeraVe.  Use only mild cleansers (hypoallergenic and without fragrances) and fragrance free detergent (not \"unscented\" products which contain a masking agent); we discussed avoiding irritants/fragranced products.  Apply a good oil-free facial moisturizer AT LEAST TWO TIMES A DAY \" such as CeraVe AM and CeraVe PM.  Minimize the application of oils and cosmetics to the affected skin.  This includes HAIR PRODUCTS such as \"leave in\" conditioners.  Unless the product specifically states that it \"won't cause acne,\" \"won't clog pores,\" and/or \"is non-comedogenic\" then it may actually CAUSE acne.  If you smoke, STOP. Nicotine increases sebum retention and increased scale within the follicles, forming comedones (blackheads and whiteheads).  Abrasive treatments such as dermabrasion and spa facials may aggravate inflammatory acne.  Do NOT scratch or pick your acne bumps.  The evidence that diet directly affects acne remains weak.  However, diet does affect your overall health.  Eat plenty of fresh fruit and vegetables.  Avoid protein or amino acid supplements, particularly if they contain leucine. Consider a low-glycemic, low-protein and low-dairy diet.  Be mindful that certain medications may cause of aggravate acne.  Make sure to tell your Dermatologist if you start a new prescription, nutritional supplement, and/or herbal remedy.  Okay to use topicals on chest and back   Advise \"sandwiching\" moisturizer, tretinoin, and then moisturizer if still too dry.  FOLLOW UP IN 3 MONTHS      MORNING Topical Regimen:      Clindamycin 1% lotion IN THE MORNING:  After gently washing and drying your skin, apply this TOPICAL medication evenly over your entire face, avoiding the " eyes and corners of the mouth  CLN wash for face, chest, back       EVENING Topical Regimen:      Tretinoin 0.05% cream AT LEAST 1 HOUR BEFORE BEDTIME:  Evenly spread a SINGLE pea-sized amount of this medication over your entire face, avoiding the eyes and corners of the mouth.      SYSTEMIC Strategies:      ORAL Doxycycline (MONOhydrate preferred over HYCLATE)  WITH A FULL GLASS OF WATER:  Take 100 mg AFTER BREAKFAST and 100 mg AFTER DINNER.  Do not lie down for at least 30 minutes after taking.  If you feel ill or overly tired, stop taking and call us immediately.  Practice excellent sun protection.  Discussed starting Accutane. Would need to be on two forms of birth control. Talk to your OBGYN and Cardiologist  You can read about Accutane on [employer]

## 2024-09-03 ENCOUNTER — TELEPHONE (OUTPATIENT)
Dept: NEUROLOGY | Facility: CLINIC | Age: 24
End: 2024-09-03

## 2024-09-04 ENCOUNTER — SOCIAL WORK (OUTPATIENT)
Dept: BEHAVIORAL/MENTAL HEALTH CLINIC | Facility: CLINIC | Age: 24
End: 2024-09-04
Payer: COMMERCIAL

## 2024-09-04 DIAGNOSIS — F41.1 GAD (GENERALIZED ANXIETY DISORDER): Primary | ICD-10-CM

## 2024-09-04 PROCEDURE — 90837 PSYTX W PT 60 MINUTES: CPT | Performed by: SOCIAL WORKER

## 2024-09-04 NOTE — PSYCH
"Behavioral Health Psychotherapy Progress Note    Psychotherapy Provided: Individual Psychotherapy     Encounter Diagnoses   Name Primary?   • INES (generalized anxiety disorder) Yes           Goals addressed in session: Goal 1     DATA:  Nury spoke today about her feelings re: having not heard back from a male peer since she reached out after her date.   She reported that she has not attempted further contact as she \"deserves to be with someone who puts in effort.\"  Nury also expressed uncertainty about her future career path.   During this session, this clinician used the following therapeutic modalities: Motivational Interviewing and Supportive Psychotherapy    Substance Abuse was not addressed during this session. If the client is diagnosed with a co-occurring substance use disorder, please indicate any changes in the frequency or amount of use: . Stage of change for addressing substance use diagnoses: No substance use/Not applicable    ASSESSMENT:  Nury Hernandez presents with a Euthymic/ normal mood.  Nury once again  demonstrated considerable insight and willingness to allow this worker to challenge her today. her affect is Normal range and intensity, which is congruent, with her mood and the content of the session. The client has made progress on their goals.     Nury Hernandez presents with a minimal risk of suicide, minimal risk of self-harm, and minimal risk of harm to others.    For any risk assessment that surpasses a \"low\" rating, a safety plan must be developed.    A safety plan was indicated: no  If yes, describe in detail     PLAN: Between sessions, Nury Hernandez will continue to utilize therapy sessions to increase her mindfulness and self compassion. . At the next session, the therapist will use Supportive Psychotherapy to address the above in further detail.    Behavioral Health Treatment Plan and Discharge Planning: Nury Hernandez is aware of and agrees to continue to work " on their treatment plan. They have identified and are working toward their discharge goals. yes        9/4/24  Start Time: 1500  Stop Time: 1555  Total Visit Time: 55 minutes

## 2024-09-10 ENCOUNTER — SOCIAL WORK (OUTPATIENT)
Dept: BEHAVIORAL/MENTAL HEALTH CLINIC | Facility: CLINIC | Age: 24
End: 2024-09-10
Payer: COMMERCIAL

## 2024-09-10 DIAGNOSIS — F41.1 GAD (GENERALIZED ANXIETY DISORDER): Primary | ICD-10-CM

## 2024-09-10 PROCEDURE — 90837 PSYTX W PT 60 MINUTES: CPT | Performed by: SOCIAL WORKER

## 2024-09-11 NOTE — PSYCH
"Behavioral Health Psychotherapy Progress Note    Psychotherapy Provided: Individual Psychotherapy     Encounter Diagnoses   Name Primary?    INES (generalized anxiety disorder) Yes           Goals addressed in session: Goal 1     DATA:  Nury spoke today about her feelings re: having inconsistent contact with a male peer since she reached out after her date.   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  .   During this session, this clinician used the following therapeutic modalities: Motivational Interviewing and Supportive Psychotherapy    Substance Abuse was not addressed during this session. If the client is diagnosed with a co-occurring substance use disorder, please indicate any changes in the frequency or amount of use: . Stage of change for addressing substance use diagnoses: No substance use/Not applicable    ASSESSMENT:  Nury Hernandez presents with a Euthymic/ normal mood.  Nury once again  demonstrated considerable insight into how her anxiety had misled her re: how her family would react to learning that she has begun dating. her affect is Normal range and intensity, which is congruent, with her mood and the content of the session. The client has made progress on their goals.     Nury Hernandez presents with a minimal risk of suicide, minimal risk of self-harm, and minimal risk of harm to others.    For any risk assessment that surpasses a \"low\" rating, a safety plan must be developed.    A safety plan was indicated: no  If yes, describe in detail     PLAN: Between sessions, Nury Hernandez will continue to utilize therapy sessions to increase her mindfulness and self compassion. . At the next session, the therapist will use Supportive Psychotherapy to address the above in further detail.    Behavioral Health Treatment Plan and Discharge Planning: Nury Hernandez is " aware of and agrees to continue to work on their treatment plan. They have identified and are working toward their discharge goals. yes        9/10/24

## 2024-09-16 ENCOUNTER — SOCIAL WORK (OUTPATIENT)
Dept: BEHAVIORAL/MENTAL HEALTH CLINIC | Facility: CLINIC | Age: 24
End: 2024-09-16
Payer: COMMERCIAL

## 2024-09-16 DIAGNOSIS — F41.1 GAD (GENERALIZED ANXIETY DISORDER): Primary | ICD-10-CM

## 2024-09-16 PROCEDURE — 90834 PSYTX W PT 45 MINUTES: CPT | Performed by: SOCIAL WORKER

## 2024-09-16 NOTE — PSYCH
"Behavioral Health Psychotherapy Progress Note    Psychotherapy Provided: Individual Psychotherapy     No diagnosis found.          Goals addressed in session: Goal 1     DATA:  Nury spoke today about her feelings re: discontinuing contact with a male peer due to his lack of effort   She agreed that her focus needs to be on improving her self care and setting limits and boundaries in her time with others.  During this session, this clinician used the following therapeutic modalities: Motivational Interviewing and Supportive Psychotherapy    Substance Abuse was not addressed during this session. If the client is diagnosed with a co-occurring substance use disorder, please indicate any changes in the frequency or amount of use: . Stage of change for addressing substance use diagnoses: No substance use/Not applicable    ASSESSMENT:  Nury Hernandez presents with a Euthymic/ normal mood.  Nury once again  demonstrated considerable insight into how her health is impacted by her anxiety. her affect is Normal range and intensity, which is congruent, with her mood and the content of the session. The client has made progress on their goals.     Nury Hernandez presents with a minimal risk of suicide, minimal risk of self-harm, and minimal risk of harm to others.    For any risk assessment that surpasses a \"low\" rating, a safety plan must be developed.    A safety plan was indicated: no  If yes, describe in detail     PLAN: Between sessions, Nury Hernandez will continue to utilize therapy sessions to increase her mindfulness and self compassion. . At the next session, the therapist will use Supportive Psychotherapy to address the above in further detail.    Behavioral Health Treatment Plan and Discharge Planning: Nury Hernandez is aware of and agrees to continue to work on their treatment plan. They have identified and are working toward their discharge goals. yes        9/10/24      "

## 2024-09-25 ENCOUNTER — SOCIAL WORK (OUTPATIENT)
Dept: BEHAVIORAL/MENTAL HEALTH CLINIC | Facility: CLINIC | Age: 24
End: 2024-09-25
Payer: COMMERCIAL

## 2024-09-25 DIAGNOSIS — F41.1 GAD (GENERALIZED ANXIETY DISORDER): Primary | ICD-10-CM

## 2024-09-25 PROCEDURE — 90837 PSYTX W PT 60 MINUTES: CPT | Performed by: SOCIAL WORKER

## 2024-09-29 NOTE — PSYCH
"Behavioral Health Psychotherapy Progress Note    Psychotherapy Provided: Individual Psychotherapy     No diagnosis found.          Goals addressed in session: Goal 1     DATA:  Nury spoke today about her feelings re: her ongoing work to balance her work stress with self care. She vocalized  uncertainty with her upcoming transition to day shift.  During this session, this clinician used the following therapeutic modalities: Motivational Interviewing and Supportive Psychotherapy    Substance Abuse was not addressed during this session. If the client is diagnosed with a co-occurring substance use disorder, please indicate any changes in the frequency or amount of use: . Stage of change for addressing substance use diagnoses: No substance use/Not applicable    ASSESSMENT:  Nury Hernandez presents with a Euthymic/ normal mood.  Nury once again  demonstrated considerable insight into how her health is impacted by her anxiety. her affect is Normal range and intensity, which is congruent, with her mood and the content of the session. The client has made progress on their goals.     Nury Hernandez presents with a minimal risk of suicide, minimal risk of self-harm, and minimal risk of harm to others.    For any risk assessment that surpasses a \"low\" rating, a safety plan must be developed.    A safety plan was indicated: no  If yes, describe in detail     PLAN: Between sessions, Nury Hernandez will continue to utilize therapy sessions to increase her mindfulness and self compassion. . At the next session, the therapist will use Supportive Psychotherapy to address the above in further detail.    Behavioral Health Treatment Plan and Discharge Planning: Nury Hernandez is aware of and agrees to continue to work on their treatment plan. They have identified and are working toward their discharge goals. yes        9/24/24      "

## 2024-10-03 ENCOUNTER — SOCIAL WORK (OUTPATIENT)
Dept: BEHAVIORAL/MENTAL HEALTH CLINIC | Facility: CLINIC | Age: 24
End: 2024-10-03
Payer: COMMERCIAL

## 2024-10-03 DIAGNOSIS — F41.1 GAD (GENERALIZED ANXIETY DISORDER): Primary | ICD-10-CM

## 2024-10-03 PROCEDURE — 90837 PSYTX W PT 60 MINUTES: CPT | Performed by: SOCIAL WORKER

## 2024-10-03 NOTE — PSYCH
"Behavioral Health Psychotherapy Progress Note    Psychotherapy Provided: Individual Psychotherapy     No diagnosis found.    Goals addressed in session: Goal 1     DATA:  Nury spoke today about her feelings re: her \"failure\" to regulate her hormones resulting in her dr's decision that she return to [facility].  She also shared feelings re: several texts she received from her mother in which she questioned ways that she parented Nury.    During this session, this clinician used the following therapeutic modalities: Motivational Interviewing and Supportive Psychotherapy    Substance Abuse was not addressed during this session. If the client is diagnosed with a co-occurring substance use disorder, please indicate any changes in the frequency or amount of use: . Stage of change for addressing substance use diagnoses: No substance use/Not applicable    ASSESSMENT:  Nury Hernandez presents with a Anxious mood.  Nury allowed this worker to provide her with feedback on her reluctance to feel her feelings or to \"contribute\" to the discomfort of her mother by not alleviating her uncertainty re: the above. . her affect is Normal range and intensity, which is congruent, with her mood and the content of the session. The client has made progress on their goals.     Nury Hernandez presents with a minimal risk of suicide, minimal risk of self-harm, and minimal risk of harm to others.    For any risk assessment that surpasses a \"low\" rating, a safety plan must be developed.    A safety plan was indicated: no  If yes, describe in detail     PLAN: Between sessions, Nury Hernandez will continue to utilize therapy sessions to increase her mindfulness and self compassion. . At the next session, the therapist will use Supportive Psychotherapy to address the above in further detail.    Behavioral Health Treatment Plan and Discharge Planning: Nury Hernandez is aware of and agrees to continue to work on their treatment plan. " They have identified and are working toward their discharge goals. yes        Visit start and stop times:    10/03/24  Start Time: 0900  Stop Time: 0955  Total Visit Time: 55 minutes

## 2024-10-10 ENCOUNTER — SOCIAL WORK (OUTPATIENT)
Dept: BEHAVIORAL/MENTAL HEALTH CLINIC | Facility: CLINIC | Age: 24
End: 2024-10-10
Payer: COMMERCIAL

## 2024-10-10 DIAGNOSIS — F41.1 GAD (GENERALIZED ANXIETY DISORDER): Primary | ICD-10-CM

## 2024-10-10 PROCEDURE — 90834 PSYTX W PT 45 MINUTES: CPT | Performed by: SOCIAL WORKER

## 2024-10-14 NOTE — PSYCH
"Behavioral Health Psychotherapy Progress Note    Psychotherapy Provided: Individual Psychotherapy     No diagnosis found.    Goals addressed in session: Goal 1     DATA:  Nury spoke today about her feelings re: her mom's increasing comments about dating.  She admitted that her mom's comments have not been as critical as she anticipated however Nury remains reluctant to engage fully with her on this topic.     During this session, this clinician used the following therapeutic modalities: Motivational Interviewing and Supportive Psychotherapy    Substance Abuse was not addressed during this session. If the client is diagnosed with a co-occurring substance use disorder, please indicate any changes in the frequency or amount of use: . Stage of change for addressing substance use diagnoses: No substance use/Not applicable    ASSESSMENT:  Nury Hernandez presents with a Euthymic/ normal mood.  Nury allowed this worker to provide her with feedback on her increasing ability to tolerate discomfort . She is eagerly anticipating her transition to day shift where she will have more energy and better sleep. her affect is Normal range and intensity, which is congruent, with her mood and the content of the session. The client has made progress on their goals.     Nury Hernandez presents with a minimal risk of suicide, minimal risk of self-harm, and minimal risk of harm to others.    For any risk assessment that surpasses a \"low\" rating, a safety plan must be developed.    A safety plan was indicated: no  If yes, describe in detail     PLAN: Between sessions, Nury Hernandez will continue to utilize therapy sessions to increase her mindfulness and self compassion. . At the next session, the therapist will use Supportive Psychotherapy to address the above in further detail.    Behavioral Health Treatment Plan and Discharge Planning: Nury Hernandez is aware of and agrees to continue to work on their treatment " plan. They have identified and are working toward their discharge goals. yes        Visit start and stop times:    10/10/24

## 2024-10-15 ENCOUNTER — TELEPHONE (OUTPATIENT)
Age: 24
End: 2024-10-15

## 2024-10-15 ENCOUNTER — OFFICE VISIT (OUTPATIENT)
Age: 24
End: 2024-10-15
Payer: COMMERCIAL

## 2024-10-15 VITALS
SYSTOLIC BLOOD PRESSURE: 116 MMHG | OXYGEN SATURATION: 99 % | HEART RATE: 97 BPM | HEIGHT: 67 IN | TEMPERATURE: 97.6 F | WEIGHT: 155 LBS | DIASTOLIC BLOOD PRESSURE: 76 MMHG | BODY MASS INDEX: 24.33 KG/M2

## 2024-10-15 DIAGNOSIS — R11.0 NAUSEA: ICD-10-CM

## 2024-10-15 DIAGNOSIS — K59.04 CHRONIC IDIOPATHIC CONSTIPATION: Primary | ICD-10-CM

## 2024-10-15 DIAGNOSIS — R10.13 DYSPEPSIA: ICD-10-CM

## 2024-10-15 PROCEDURE — 99214 OFFICE O/P EST MOD 30 MIN: CPT | Performed by: INTERNAL MEDICINE

## 2024-10-15 RX ORDER — NORGESTIMATE AND ETHINYL ESTRADIOL 0.25-0.035
1 KIT ORAL DAILY
COMMUNITY
Start: 2024-09-26

## 2024-10-15 RX ORDER — OMEPRAZOLE 40 MG/1
40 CAPSULE, DELAYED RELEASE ORAL
Qty: 30 CAPSULE | Refills: 6 | Status: SHIPPED | OUTPATIENT
Start: 2024-10-15

## 2024-10-15 RX ORDER — FAMOTIDINE 40 MG/1
40 TABLET, FILM COATED ORAL
Qty: 30 TABLET | Refills: 6 | Status: SHIPPED | OUTPATIENT
Start: 2024-10-15

## 2024-10-15 NOTE — PROGRESS NOTES
Ambulatory Visit  Name: Nury Hernandez      : 2000      MRN: [id number]  Encounter Provider: Wilian Livingston MD  Encounter Date: 10/15/2024   Encounter department: [facility]        Assessment & Plan  Chronic idiopathic constipation  Patient is a 24-year-old woman with longstanding constipation since childhood who presents for follow-up.  Notably at baseline she has 1-2 bowel movements per week and she has tried multiple medications in the past including over-the-counter laxatives, MiraLAX, fiber, senna, Linzess, Amitiza, Trulance most recently she has been on Motegrity which has allowed her to maintain a somewhat regular pattern of bowel movements although it is less frequent than ideal. She has been having dyspepsia and worsening constipation.     Echocardiogram 2024 with EF of 60%    CT abdomen pelvis from 2024 notable for no acute abnormality in the abdomen or pelvis.    Pelvic ultrasound from 2023 normal.    Prior anorectal manometry 2022 with normal anal tone with adequate squeeze response but mildly low sensation and deprecatory urge unlikely to be clinically significant, normal balloon expulsion time and low suspicion for ongoing dyssynergic defecation.  Endoscopy 2021 normal in appearance and biopsies with him chronic inactive gastritis.    Most recent CMP normal.  High-sensitivity CRP normal.  Lipid profile normal.  CBC with low white blood cell count but otherwise normal.  Hemoglobin A1c normal.  TSH most recently normal.  CRP normal.    Maintain goal of approximately 3 bowel movements per week  Stop Motegrity  Switch to Ibsrela  Continue MiraLAX as needed  Continue to drink at least 8 cups of water per day  Squatting position to help with bowel movement    Orders:    Tenapanor HCl 50 MG TABS; Take 50 mg by mouth 2 (two) times a day    Dyspepsia  Trial of prescription dose omeprazole in the morning and  pepcid in the evening    Orders:    omeprazole (PriLOSEC) 40 MG capsule; Take 1 capsule (40 mg total) by mouth daily before breakfast    famotidine (PEPCID) 40 MG tablet; Take 1 tablet (40 mg total) by mouth daily at bedtime    Nausea  Continue Zofran as needed for nausea  Continue scopolamine patch if needed to help with nausea         History of Present Illness     Nury Hernandez is a 24 y.o. female who presents with constipation and gastritis.   She did probiotics and she is on omeprazole and pepcid. She is doing better and the pain is not consistent but it came out of the blue. She had this before when she had POTS. It got better and she went gluten free and it helped. She also has the MALS diagnosis. She overall feels 40-50% better. Only 2-3 episodes in the past week. She is not stooling as well as she could. She is on motegrity but no stool in 5 days. Today is day 5 and she feels it coming. She is only having a stool 1-2 times per week.    History obtained from : patient    Review of systems:  10 point ROS reviewed and negative, except as above      Pertinent Medical History      Medical History Reviewed by provider this encounter:       Past Medical History   Past Medical History:   Diagnosis Date    Annual physical exam 2019    Anxiety     Asthma     Dizziness     at times    Dyspepsia 2021    Exertional headache 2022    GERD (gastroesophageal reflux disease)     Hammertoe of left foot     Headache     occ    Hyperlipemia     Hypermobile joints     Hypertriglyceridemia 2019    IBS (irritable bowel syndrome) 2020    Irritable bowel syndrome     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HCC)     Mast cell disorder     Migraine     PCOS (polycystic ovarian syndrome) 2024    PONV (postoperative nausea and vomiting)     POTS (postural orthostatic tachycardia syndrome)      infant     Wears glasses      Past Surgical History:   Procedure Laterality Date    COLONOSCOPY      EGD AND  COLONOSCOPY N/A 1/10/2017    Procedure: EGD AND COLONOSCOPY;  Surgeon: Ozzy Billingsley MD;  Location: [facility];  Service:     ESOPHAGOGASTRODUODENOSCOPY      with biopsy    FOOT SURGERY      Excision of lesion feet benign    ID CORRECTION HAMMERTOE Left 12/20/2019    Procedure: 2nd arthrodesis; 5th arthroplasty, flexor tenotomy 2,3,4,5.;  Surgeon: Odilon Berger DPM;  Location: [facility];  Service: Podiatry    ID CORRECTION HAMMERTOE Left 6/5/2020    Procedure: 2ND TOE Revision hammertoe with broken implant;  Surgeon: Odilon Berger DPM;  Location: [facility];  Service: Podiatry    UPPER GASTROINTESTINAL ENDOSCOPY  01/28/2021    WISDOM TOOTH EXTRACTION       Family History   Problem Relation Age of Onset    Gestational diabetes Mother     Migraines Mother     Anxiety disorder Father     Hyperlipidemia Father     Autism Brother     Diabetes Other     Uterine cancer Maternal Grandmother     Rheumatic fever Maternal Grandmother     Diabetes Maternal Grandfather     Hypertension Maternal Grandfather     Heart attack Maternal Grandfather     Stroke Maternal Grandfather     Hyperlipidemia Maternal Grandfather     Hypertension Paternal Grandmother     Anxiety disorder Paternal Grandmother     Hypertension Paternal Grandfather      Current Outpatient Medications on File Prior to Visit   Medication Sig Dispense Refill    acetaminophen (TYLENOL) 500 mg tablet Take 1,000 mg by mouth every 4 (four) hours as needed       albuterol (2.5 mg/3 mL) 0.083 % nebulizer solution Take 3 mL (2.5 mg total) by nebulization every 6 (six) hours as needed for wheezing or shortness of breath 30 mL 0    albuterol (ProAir HFA) 90 mcg/act inhaler Inhale 2 puffs every 6 (six) hours as needed for wheezing or shortness of breath 8.5 g 0    ASMANEX  MCG/ACT AERO Inhale 2 puffs every 4 (four) hours as needed (2 puffs)      Atogepant (Qulipta) 60 MG TABS Take 60 mg by mouth in the morning 90 tablet 4    clindamycin (CLEOCIN T) 1 %  lotion Apply topically in the morning To face, chest and back 60 mL 3    diphenhydrAMINE (BENADRYL) 25 mg tablet Take 1 tablet (25 mg total) by mouth every 6 (six) hours as needed for itching 30 tablet 0    doxycycline hyclate (VIBRAMYCIN) 100 mg capsule Take 1 capsule (100 mg total) by mouth every 12 (twelve) hours With a glass of water and food. Practice strict sun protection to avoid sunburn. 60 capsule 2    Elastic Bandages & Supports (TRUFORM STOCKINGS 20-30MMHG) MISC       fludrocortisone (FLORINEF) 0.1 mg tablet Take 0.1 mg by mouth 2 (two) times a day      metoprolol succinate (TOPROL-XL) 50 mg 24 hr tablet Take 50 mg by mouth in the morning and 50 mg before bedtime.      norgestimate-ethinyl estradiol (ORTHO-CYCLEN) 0.25-35 MG-MCG per tablet Take 1 tablet by mouth daily      onabotulinumtoxin A (BOTOX) 100 units Inject  as directed. Injection every 91 days for migraines      ondansetron (Zofran ODT) 4 mg disintegrating tablet Take 1 tablet (4 mg total) by mouth every 6 (six) hours as needed for nausea or vomiting 30 tablet 0    ondansetron (ZOFRAN) 8 mg tablet Take 1 tablet (8 mg total) by mouth every 8 (eight) hours as needed for nausea or vomiting 20 tablet 0    prochlorperazine (COMPAZINE) 10 mg tablet Take 1 tablet (10 mg total) by mouth every 6 (six) hours as needed (migraine) 10 tablet 0    Prucalopride Succinate (Motegrity) 2 MG TABS Take 2 mg by mouth in the morning 90 tablet 0    rimegepant sulfate (Nurtec) 75 mg TBDP Take 1 tablet (75 mg total) by mouth as needed (migraine) Limit of 1 in 24 hours 16 tablet 3    scopolamine (TRANSDERM-SCOP) 1 mg/3 days TD 72 hr patch Place 1 patch on the skin over 72 hours every third day 13 patch 3    tretinoin (RETIN-A) 0.025 % cream Apply a pea sized amount to face one hour before bedtime after moisturizing. Start with 1-2 nights per week and build up to nightly as tolerated. 45 g 2    tretinoin (RETIN-A) 0.05 % cream Spread one pea-sized amount of medication  over entire face about one hour before bedtime. 45 g 3    verapamil (CALAN) 120 mg tablet Take 1 tab by mouth twice a day 180 tablet 3    dexamethasone (DECADRON) 4 mg tablet Take 2 tabs for 2 days then take 1 tab for 3-5 days (until headaches break then stop) (Patient not taking: Reported on 7/17/2024) 9 tablet 0    modafinil (PROVIGIL) 100 mg tablet Take 100 mg by mouth daily (Patient not taking: Reported on 7/17/2024)      norethindrone-ethinyl estradiol (Loestrin Fe 1/20) 1-20 MG-MCG per tablet Take 1 tablet by mouth daily (Patient not taking: Reported on 7/17/2024) 84 tablet 0     No current facility-administered medications on file prior to visit.     Allergies   Allergen Reactions    Nuts - Food Allergy Other (See Comments)     Avani Nuts - itchy throat    Other Hives      At surgical site and IV site- not sure if type of cleanser used    Pollen Extract     Reyvow [Lasmiditan] Palpitations and Hallucinations      Current Outpatient Medications on File Prior to Visit   Medication Sig Dispense Refill    acetaminophen (TYLENOL) 500 mg tablet Take 1,000 mg by mouth every 4 (four) hours as needed       albuterol (2.5 mg/3 mL) 0.083 % nebulizer solution Take 3 mL (2.5 mg total) by nebulization every 6 (six) hours as needed for wheezing or shortness of breath 30 mL 0    albuterol (ProAir HFA) 90 mcg/act inhaler Inhale 2 puffs every 6 (six) hours as needed for wheezing or shortness of breath 8.5 g 0    ASMANEX  MCG/ACT AERO Inhale 2 puffs every 4 (four) hours as needed (2 puffs)      Atogepant (Qulipta) 60 MG TABS Take 60 mg by mouth in the morning 90 tablet 4    clindamycin (CLEOCIN T) 1 % lotion Apply topically in the morning To face, chest and back 60 mL 3    diphenhydrAMINE (BENADRYL) 25 mg tablet Take 1 tablet (25 mg total) by mouth every 6 (six) hours as needed for itching 30 tablet 0    doxycycline hyclate (VIBRAMYCIN) 100 mg capsule Take 1 capsule (100 mg total) by mouth every 12 (twelve) hours With a  glass of water and food. Practice strict sun protection to avoid sunburn. 60 capsule 2    Elastic Bandages & Supports (TRUFORM STOCKINGS 20-30MMHG) MISC       fludrocortisone (FLORINEF) 0.1 mg tablet Take 0.1 mg by mouth 2 (two) times a day      metoprolol succinate (TOPROL-XL) 50 mg 24 hr tablet Take 50 mg by mouth in the morning and 50 mg before bedtime.      norgestimate-ethinyl estradiol (ORTHO-CYCLEN) 0.25-35 MG-MCG per tablet Take 1 tablet by mouth daily      onabotulinumtoxin A (BOTOX) 100 units Inject  as directed. Injection every 91 days for migraines      ondansetron (Zofran ODT) 4 mg disintegrating tablet Take 1 tablet (4 mg total) by mouth every 6 (six) hours as needed for nausea or vomiting 30 tablet 0    ondansetron (ZOFRAN) 8 mg tablet Take 1 tablet (8 mg total) by mouth every 8 (eight) hours as needed for nausea or vomiting 20 tablet 0    prochlorperazine (COMPAZINE) 10 mg tablet Take 1 tablet (10 mg total) by mouth every 6 (six) hours as needed (migraine) 10 tablet 0    Prucalopride Succinate (Motegrity) 2 MG TABS Take 2 mg by mouth in the morning 90 tablet 0    rimegepant sulfate (Nurtec) 75 mg TBDP Take 1 tablet (75 mg total) by mouth as needed (migraine) Limit of 1 in 24 hours 16 tablet 3    scopolamine (TRANSDERM-SCOP) 1 mg/3 days TD 72 hr patch Place 1 patch on the skin over 72 hours every third day 13 patch 3    tretinoin (RETIN-A) 0.025 % cream Apply a pea sized amount to face one hour before bedtime after moisturizing. Start with 1-2 nights per week and build up to nightly as tolerated. 45 g 2    tretinoin (RETIN-A) 0.05 % cream Spread one pea-sized amount of medication over entire face about one hour before bedtime. 45 g 3    verapamil (CALAN) 120 mg tablet Take 1 tab by mouth twice a day 180 tablet 3    dexamethasone (DECADRON) 4 mg tablet Take 2 tabs for 2 days then take 1 tab for 3-5 days (until headaches break then stop) (Patient not taking: Reported on 7/17/2024) 9 tablet 0     "modafinil (PROVIGIL) 100 mg tablet Take 100 mg by mouth daily (Patient not taking: Reported on 7/17/2024)      norethindrone-ethinyl estradiol (Loestrin Fe 1/20) 1-20 MG-MCG per tablet Take 1 tablet by mouth daily (Patient not taking: Reported on 7/17/2024) 84 tablet 0     No current facility-administered medications on file prior to visit.      Social History     Tobacco Use    Smoking status: Never    Smokeless tobacco: Never   Vaping Use    Vaping status: Never Used   Substance and Sexual Activity    Alcohol use: No    Drug use: No    Sexual activity: Never         Objective     /76 (BP Location: Left arm, Patient Position: Sitting, Cuff Size: Adult)   Pulse 97   Temp 97.6 °F (36.4 °C) (Tympanic)   Ht 5' 7\" (1.702 m)   Wt 70.3 kg (155 lb)   SpO2 99%   BMI 24.28 kg/m²     PHYSICAL EXAMINATION:    General Appearance:   Alert, cooperative, no distress   HEENT:  Normocephalic, atraumatic, anicteric. Neck supple, symmetrical, trachea midline.   Lungs:   Equal chest rise and unlabored breathing, normal effort, no coughing.   Cardiovascular:   No visualized JVD.   Abdomen:   No abdominal distension.   Skin:   No jaundice, rashes, or lesions.    Musculoskeletal:   Normal range of motion visualized.   Psych:  Normal affect and normal insight.   Neuro:  Alert and appropriate.       Administrative Statements   I have spent a total time of 25 minutes in caring for this patient on the day of the visit/encounter including Diagnostic results, Prognosis, Risks and benefits of tx options, Instructions for management, Patient and family education, Importance of tx compliance, Risk factor reductions, Impressions, Counseling / Coordination of care, Documenting in the medical record, Reviewing / ordering tests, medicine, procedures  , and Obtaining or reviewing history  .  "

## 2024-10-15 NOTE — TELEPHONE ENCOUNTER
PA for Ibsrela 50 mg SUBMITTED     via    []CMM-KEY:   [x][facility]-Case ID # [id number]:[facility] RxPhone:[phone]   []Availity-Auth ID # NDC #   []Faxed to plan   []Other website   []Phone call Case ID #     Office notes sent, clinical questions answered. Awaiting determination    Turnaround time for your insurance to make a decision on your Prior Authorization can take 7-21 business days.

## 2024-10-15 NOTE — ASSESSMENT & PLAN NOTE
Patient is a 24-year-old woman with longstanding constipation since childhood who presents for follow-up.  Notably at baseline she has 1-2 bowel movements per week and she has tried multiple medications in the past including over-the-counter laxatives, MiraLAX, fiber, senna, Linzess, Amitiza, Trulance most recently she has been on Motegrity which has allowed her to maintain a somewhat regular pattern of bowel movements although it is less frequent than ideal. She has been having dyspepsia and worsening constipation.     Echocardiogram February 2024 with EF of 60%    CT abdomen pelvis from February 2024 notable for no acute abnormality in the abdomen or pelvis.    Pelvic ultrasound from December 2023 normal.    Prior anorectal manometry September 2022 with normal anal tone with adequate squeeze response but mildly low sensation and deprecatory urge unlikely to be clinically significant, normal balloon expulsion time and low suspicion for ongoing dyssynergic defecation.  Endoscopy January 2021 normal in appearance and biopsies with him chronic inactive gastritis.    Most recent CMP normal.  High-sensitivity CRP normal.  Lipid profile normal.  CBC with low white blood cell count but otherwise normal.  Hemoglobin A1c normal.  TSH most recently normal.  CRP normal.    Maintain goal of approximately 3 bowel movements per week  Stop Motegrity  Switch to Ibsrela  Continue MiraLAX as needed  Continue to drink at least 8 cups of water per day  Squatting position to help with bowel movement    Orders:    Tenapanor HCl 50 MG TABS; Take 50 mg by mouth 2 (two) times a day

## 2024-10-16 ENCOUNTER — TELEPHONE (OUTPATIENT)
Dept: NEUROLOGY | Facility: CLINIC | Age: 24
End: 2024-10-16

## 2024-10-16 ENCOUNTER — SOCIAL WORK (OUTPATIENT)
Dept: BEHAVIORAL/MENTAL HEALTH CLINIC | Facility: CLINIC | Age: 24
End: 2024-10-16
Payer: COMMERCIAL

## 2024-10-16 DIAGNOSIS — F41.1 GAD (GENERALIZED ANXIETY DISORDER): Primary | ICD-10-CM

## 2024-10-16 PROCEDURE — 90837 PSYTX W PT 60 MINUTES: CPT | Performed by: SOCIAL WORKER

## 2024-10-16 NOTE — TELEPHONE ENCOUNTER
Pt called wants to move up botox appointment with Monique to 1/20/25 instead of 1/21. It will be better for her schedule if she can come on 1/20/25. Please assiist and call pt back.

## 2024-10-16 NOTE — PSYCH
"Behavioral Health Psychotherapy Progress Note    Psychotherapy Provided: Individual Psychotherapy     No diagnosis found.    Goals addressed in session: Goal 1     DATA:  Nury spoke today about her feelings re: her job, and the pride that she experienced following an extremely difficult shift as she recognized her improved ability to remain calm and with little anxiety, self doubt.   During this session, this clinician used the following therapeutic modalities: Motivational Interviewing and Supportive Psychotherapy    Substance Abuse was not addressed during this session. If the client is diagnosed with a co-occurring substance use disorder, please indicate any changes in the frequency or amount of use: . Stage of change for addressing substance use diagnoses: No substance use/Not applicable    ASSESSMENT:  Nury Hernandez presents with a Euthymic/ normal mood.  Nury again allowed this worker to provide her with support and feedback on her increasing ability to tolerate discomfort . She is eagerly anticipating her transition to day shift where she will have more energy and better sleep. her affect is Normal range and intensity, which is congruent, with her mood and the content of the session. The client has made progress on their goals.     Nury Hernandez presents with a minimal risk of suicide, minimal risk of self-harm, and minimal risk of harm to others.    For any risk assessment that surpasses a \"low\" rating, a safety plan must be developed.    A safety plan was indicated: no  If yes, describe in detail     PLAN: Between sessions, Nury Hernandez will continue to utilize therapy sessions to increase her mindfulness and self compassion. . At the next session, the therapist will use Supportive Psychotherapy to address the above in further detail.    Behavioral Health Treatment Plan and Discharge Planning: Nury Hernandez is aware of and agrees to continue to work on their treatment plan. They " have identified and are working toward their discharge goals. yes        Visit start and stop times:       10/17/24

## 2024-10-16 NOTE — TELEPHONE ENCOUNTER
Unfortunately we will not be able to move up appointment as that will be her 91 days. I can reschedule appointment to a later day that will work when the patient comes in next week.

## 2024-10-21 NOTE — TELEPHONE ENCOUNTER
PA for Ibsrela reSUBMITTED     via    [x][facility]-KEY: [id number]                 The [employer] Prior Authorization Team is unable to review this request for prior authorization as there is an ongoing prior authorization case in review for this request, Case ID: [id number]. No further action is needed at this time.

## 2024-10-22 ENCOUNTER — TELEPHONE (OUTPATIENT)
Dept: NEUROLOGY | Facility: CLINIC | Age: 24
End: 2024-10-22

## 2024-10-22 ENCOUNTER — PROCEDURE VISIT (OUTPATIENT)
Dept: NEUROLOGY | Facility: CLINIC | Age: 24
End: 2024-10-22
Payer: COMMERCIAL

## 2024-10-22 VITALS — HEART RATE: 72 BPM | DIASTOLIC BLOOD PRESSURE: 83 MMHG | SYSTOLIC BLOOD PRESSURE: 124 MMHG | TEMPERATURE: 98.5 F

## 2024-10-22 DIAGNOSIS — G43.709 CHRONIC MIGRAINE WITHOUT AURA WITHOUT STATUS MIGRAINOSUS, NOT INTRACTABLE: Primary | ICD-10-CM

## 2024-10-22 PROCEDURE — 64615 CHEMODENERV MUSC MIGRAINE: CPT | Performed by: PHYSICIAN ASSISTANT

## 2024-10-22 NOTE — TELEPHONE ENCOUNTER
Nurtec approved per CMM  Approved today by [facility] 2017  PA Case: [id number], Status: Approved, Coverage Starts on: 10/22/2024 12:00 AM, Coverage Ends on: 10/22/2025 12:00 AM. Questions? Contact [phone].  Authorization Expiration Date: 10/21/2025    Called pharm and made them aware of approval.  They were able to process this and will get it ready for her.

## 2024-10-22 NOTE — TELEPHONE ENCOUNTER
While in the office patient stated that the pharmacy notified her that she will need a new PA for her Nurtec.     Team 5,   Please assist.   Thank you!

## 2024-10-22 NOTE — PROGRESS NOTES
"Universal Protocol   procedure performed by consultantConsent: Verbal consent obtained. Written consent obtained.  Risks and benefits: risks, benefits and alternatives were discussed  Consent given by: patient  Time out: Immediately prior to procedure a \"time out\" was called to verify the correct patient, procedure, equipment, support staff and site/side marked as required.  Patient understanding: patient states understanding of the procedure being performed  Patient consent: the patient's understanding of the procedure matches consent given  Procedure consent: procedure consent matches procedure scheduled  Relevant documents: relevant documents present and verified  Patient identity confirmed: verbally with patient      Chemodenervation     Date/Time  10/22/2024 9:00 AM     Performed by  Monique Coffey PA-C   Authorized by  Monique Coffey PA-C     Pre-procedure details      Prepped With: Alcohol     Anesthesia  (see MAR for exact dosages):     Anesthesia method:  None   Procedure details      Position:  Upright   Botox      Botox Type:  Type A    Brand:  Botox    mL's of Botulinum Toxin:  200    Final Concentration per CC:  50 units    Needle Gauge:  30 G 2.5 inch   Procedures      Botox Procedures: chronic headache      Indications: migraines     Injection Location      Head / Face:  L superior cervical paraspinal, R superior cervical paraspinal, L , R , L frontalis, R frontalis, L medial occipitalis, R medial occipitalis, procerus, R temporalis, L temporalis, R superior trapezius and L superior trapezius    L  injection amount:  5 unit(s)    R  injection amount:  5 unit(s)    L lateral frontalis:  5 unit(s)    R lateral frontalis:  5 unit(s)    L medial frontalis:  5 unit(s)    R medial frontalis:  5 unit(s)    L temporalis injection amount:  20 unit(s)    R temporalis injection amount:  20 unit(s)    Procerus injection amount:  5 unit(s)    L medial occipitalis injection " amount:  15 unit(s)    R medial occipitalis injection amount:  15 unit(s)    L superior cervical paraspinal injection amount:  10 unit(s)    R superior cervical paraspinal injection amount:  10 unit(s)    L superior trapezius injection amount:  15 unit(s)    R superior trapezius injection amount:  15 unit(s)   Total Units      Total units used:  200    Total units discarded:  0   Post-procedure details      Chemodenervation:  Chronic migraine    Facial Nerve Location::  Bilateral facial nerve    Patient tolerance of procedure:  Tolerated well, no immediate complications   Comments          20 units frontalis  5 units right splenius capitis  20 units right lower occipitalis/cervical paraspinal  All medically necessary

## 2024-10-23 ENCOUNTER — SOCIAL WORK (OUTPATIENT)
Dept: BEHAVIORAL/MENTAL HEALTH CLINIC | Facility: CLINIC | Age: 24
End: 2024-10-23
Payer: COMMERCIAL

## 2024-10-23 DIAGNOSIS — F41.1 GAD (GENERALIZED ANXIETY DISORDER): Primary | ICD-10-CM

## 2024-10-23 PROCEDURE — 90837 PSYTX W PT 60 MINUTES: CPT | Performed by: SOCIAL WORKER

## 2024-10-23 NOTE — PSYCH
"Behavioral Health Psychotherapy Progress Note    Psychotherapy Provided: Individual Psychotherapy     No diagnosis found.    Goals addressed in session: Goal 1     DATA:  Nury spoke today about her feelings re: her job, and a disagreement with her best friend.   During this session, this clinician used the following therapeutic modalities: Motivational Interviewing and Supportive Psychotherapy    Substance Abuse was not addressed during this session. If the client is diagnosed with a co-occurring substance use disorder, please indicate any changes in the frequency or amount of use: . Stage of change for addressing substance use diagnoses: No substance use/Not applicable    ASSESSMENT:  uNry Hernandez presents with a Euthymic/ normal mood.  Nury again allowed this worker to provide her with support and feedback on her need for self compassion. She is eagerly anticipating her transition to day shift where she will have more energy and better sleep. her affect is Normal range and intensity, which is congruent, with her mood and the content of the session. Nury Hernandez was oriented to person, place, and time. Grooming and Hygiene were good  and clothing was casually dressed and appropriate for weather. Ability to perform ADLs has not changed. she was cooperative. Nury Hernandez denied suicidal thoughts, homicidal thoughts, and self injurious behaviors.   The client has made progress on their goals.     Nury Hernandez presents with a minimal risk of suicide, minimal risk of self-harm, and minimal risk of harm to others.    For any risk assessment that surpasses a \"low\" rating, a safety plan must be developed.    A safety plan was indicated: no  If yes, describe in detail     PLAN: Between sessions, Nury Hernandez will continue to utilize therapy sessions to increase her mindfulness and self compassion. . At the next session, the therapist will use Supportive Psychotherapy to address the above in " further detail.    Behavioral Health Treatment Plan and Discharge Planning: Nury Hernandez is aware of and agrees to continue to work on their treatment plan. They have identified and are working toward their discharge goals. yes        Visit start and stop times:       10/23/24

## 2024-10-28 ENCOUNTER — SOCIAL WORK (OUTPATIENT)
Dept: BEHAVIORAL/MENTAL HEALTH CLINIC | Facility: CLINIC | Age: 24
End: 2024-10-28
Payer: COMMERCIAL

## 2024-10-28 DIAGNOSIS — F41.1 GAD (GENERALIZED ANXIETY DISORDER): Primary | ICD-10-CM

## 2024-10-28 PROCEDURE — 90834 PSYTX W PT 45 MINUTES: CPT | Performed by: SOCIAL WORKER

## 2024-10-28 NOTE — PSYCH
"Behavioral Health Psychotherapy Progress Note    Psychotherapy Provided: Individual Psychotherapy     No diagnosis found.    Goals addressed in session: Goal 1     DATA:  Nury spoke today about her feelings re: her future as she expressed the anxiety associated with her uncertainty re: the pursuit of an advanced degree and which field of interest she would enjoy.     During this session, this clinician used the following therapeutic modalities: Motivational Interviewing and Supportive Psychotherapy    Substance Abuse was not addressed during this session. If the client is diagnosed with a co-occurring substance use disorder, please indicate any changes in the frequency or amount of use: . Stage of change for addressing substance use diagnoses: No substance use/Not applicable    ASSESSMENT:  Nury Hernandez presents with a Euthymic/ normal mood.  Nury again allowed this worker to provide her with support and feedback on her growth and progress.  In addition, it was noted that her anxiety was extremely elevated throughout the first two decades of her life despite \"knowing\" her direction. her affect is Normal range and intensity, which is congruent, with her mood and the content of the session. Nury Hernandez was oriented to person, place, and time. Grooming and Hygiene were good  and clothing was casually dressed and appropriate for weather. Ability to perform ADLs has not changed. she was cooperative. Nury Hernandez denied suicidal thoughts, homicidal thoughts, and self injurious behaviors.   The client has made progress on their goals.     Nury Hernandez presents with a minimal risk of suicide, minimal risk of self-harm, and minimal risk of harm to others.    For any risk assessment that surpasses a \"low\" rating, a safety plan must be developed.    A safety plan was indicated: no  If yes, describe in detail     PLAN: Between sessions, Nury Hernandez will continue to utilize therapy sessions to " increase her mindfulness and self compassion. . At the next session, the therapist will use Supportive Psychotherapy to address the above in further detail.    Behavioral Health Treatment Plan and Discharge Planning: Nury Hernandez is aware of and agrees to continue to work on their treatment plan. They have identified and are working toward their discharge goals. yes        Visit start and stop times:       10/28/24

## 2024-11-04 ENCOUNTER — SOCIAL WORK (OUTPATIENT)
Dept: BEHAVIORAL/MENTAL HEALTH CLINIC | Facility: CLINIC | Age: 24
End: 2024-11-04
Payer: COMMERCIAL

## 2024-11-04 DIAGNOSIS — F41.1 GAD (GENERALIZED ANXIETY DISORDER): Primary | ICD-10-CM

## 2024-11-04 DIAGNOSIS — L70.0 ACNE VULGARIS: Primary | ICD-10-CM

## 2024-11-04 PROCEDURE — 90837 PSYTX W PT 60 MINUTES: CPT | Performed by: SOCIAL WORKER

## 2024-11-04 RX ORDER — CLINDAMYCIN PHOSPHATE 10 MG/G
GEL TOPICAL DAILY
Qty: 75 ML | Refills: 5 | Status: SHIPPED | OUTPATIENT
Start: 2024-11-04

## 2024-11-04 NOTE — PSYCH
"Behavioral Health Psychotherapy Progress Note    Psychotherapy Provided: Individual Psychotherapy     No diagnosis found.    Goals addressed in session: Goal 1     DATA:  Nury spoke today about her feelings re: having attended a Women's Conference with her neighbor during which she was able to come to several realizations about her perfectionism and healing.  She shared these realizations and several journal entries with this worker today.  Homework was also assigned.   During this session, this clinician used the following therapeutic modalities: Motivational Interviewing and Supportive Psychotherapy    Substance Abuse was not addressed during this session. If the client is diagnosed with a co-occurring substance use disorder, please indicate any changes in the frequency or amount of use: . Stage of change for addressing substance use diagnoses: No substance use/Not applicable    ASSESSMENT:  Nury Hernandez presents with a Euthymic/ normal mood.  Nury again allowed this worker to provide her with support and feedback on her growth and progress. She reported that she is working to notice, manage her anxiety and has been (more) aware of when she becomes perseverative.  her affect is Normal range and intensity, which is congruent, with her mood and the content of the session. Nury Hernandez was oriented to person, place, and time. Grooming and Hygiene were good  and clothing was casually dressed and appropriate for weather. Ability to perform ADLs has not changed. she was cooperative. Nury Hernandez denied suicidal thoughts, homicidal thoughts, and self injurious behaviors.   The client has made progress on their goals.     Nury Hernandez presents with a minimal risk of suicide, minimal risk of self-harm, and minimal risk of harm to others.    For any risk assessment that surpasses a \"low\" rating, a safety plan must be developed.    A safety plan was indicated: no  If yes, describe in detail "     PLAN: Between sessions, Nury Hernandez will continue to utilize therapy sessions to increase her mindfulness and self compassion. . At the next session, the therapist will use Supportive Psychotherapy to address the above in further detail.    Behavioral Health Treatment Plan and Discharge Planning: Nury Hernandez is aware of and agrees to continue to work on their treatment plan. They have identified and are working toward their discharge goals. yes        Visit start and stop times:       11/04/24

## 2024-11-06 ENCOUNTER — APPOINTMENT (OUTPATIENT)
Dept: LAB | Facility: CLINIC | Age: 24
End: 2024-11-06
Payer: COMMERCIAL

## 2024-11-06 DIAGNOSIS — G90.A POSTURAL ORTHOSTATIC TACHYCARDIA SYNDROME: ICD-10-CM

## 2024-11-06 DIAGNOSIS — D89.40 MAST CELL ACTIVATION (HCC): ICD-10-CM

## 2024-11-06 DIAGNOSIS — Q79.60 EHLERS-DANLOS SYNDROME: ICD-10-CM

## 2024-11-06 DIAGNOSIS — R00.0 TACHYCARDIA, UNSPECIFIED: ICD-10-CM

## 2024-11-06 DIAGNOSIS — R55 SYNCOPE AND COLLAPSE: ICD-10-CM

## 2024-11-06 LAB
ALBUMIN SERPL BCG-MCNC: 3.7 G/DL (ref 3.5–5)
ALP SERPL-CCNC: 42 U/L (ref 34–104)
ALT SERPL W P-5'-P-CCNC: 9 U/L (ref 7–52)
ANION GAP SERPL CALCULATED.3IONS-SCNC: 6 MMOL/L (ref 4–13)
AST SERPL W P-5'-P-CCNC: 10 U/L (ref 13–39)
BILIRUB SERPL-MCNC: 0.22 MG/DL (ref 0.2–1)
BUN SERPL-MCNC: 14 MG/DL (ref 5–25)
CALCIUM SERPL-MCNC: 8.7 MG/DL (ref 8.4–10.2)
CHLORIDE SERPL-SCNC: 104 MMOL/L (ref 96–108)
CHOLEST SERPL-MCNC: 143 MG/DL
CO2 SERPL-SCNC: 29 MMOL/L (ref 21–32)
CREAT SERPL-MCNC: 0.74 MG/DL (ref 0.6–1.3)
CRP SERPL HS-MCNC: 4.4 MG/L
ERYTHROCYTE [DISTWIDTH] IN BLOOD BY AUTOMATED COUNT: 11.7 % (ref 11.6–15.1)
GFR SERPL CREATININE-BSD FRML MDRD: 114 ML/MIN/1.73SQ M
GLUCOSE P FAST SERPL-MCNC: 83 MG/DL (ref 65–99)
HCT VFR BLD AUTO: 34.6 % (ref 34.8–46.1)
HDLC SERPL-MCNC: 44 MG/DL
HGB BLD-MCNC: 11.5 G/DL (ref 11.5–15.4)
LDLC SERPL CALC-MCNC: 68 MG/DL (ref 0–100)
MCH RBC QN AUTO: 29.3 PG (ref 26.8–34.3)
MCHC RBC AUTO-ENTMCNC: 33.2 G/DL (ref 31.4–37.4)
MCV RBC AUTO: 88 FL (ref 82–98)
NONHDLC SERPL-MCNC: 99 MG/DL
PLATELET # BLD AUTO: 259 THOUSANDS/UL (ref 149–390)
PMV BLD AUTO: 10.9 FL (ref 8.9–12.7)
POTASSIUM SERPL-SCNC: 3.8 MMOL/L (ref 3.5–5.3)
PROT SERPL-MCNC: 6.7 G/DL (ref 6.4–8.4)
RBC # BLD AUTO: 3.93 MILLION/UL (ref 3.81–5.12)
SODIUM SERPL-SCNC: 139 MMOL/L (ref 135–147)
TRIGL SERPL-MCNC: 156 MG/DL
WBC # BLD AUTO: 4.26 THOUSAND/UL (ref 4.31–10.16)

## 2024-11-06 PROCEDURE — 85027 COMPLETE CBC AUTOMATED: CPT

## 2024-11-06 PROCEDURE — 80053 COMPREHEN METABOLIC PANEL: CPT

## 2024-11-06 PROCEDURE — 36415 COLL VENOUS BLD VENIPUNCTURE: CPT

## 2024-11-06 PROCEDURE — 86141 C-REACTIVE PROTEIN HS: CPT

## 2024-11-06 PROCEDURE — 80061 LIPID PANEL: CPT

## 2024-11-11 ENCOUNTER — SOCIAL WORK (OUTPATIENT)
Dept: BEHAVIORAL/MENTAL HEALTH CLINIC | Facility: CLINIC | Age: 24
End: 2024-11-11
Payer: COMMERCIAL

## 2024-11-11 DIAGNOSIS — R10.13 DYSPEPSIA: Primary | ICD-10-CM

## 2024-11-11 DIAGNOSIS — F41.1 GAD (GENERALIZED ANXIETY DISORDER): Primary | ICD-10-CM

## 2024-11-11 PROCEDURE — 90837 PSYTX W PT 60 MINUTES: CPT | Performed by: SOCIAL WORKER

## 2024-11-11 RX ORDER — FAMOTIDINE 20 MG/1
20 TABLET, FILM COATED ORAL
Qty: 30 TABLET | Refills: 3 | Status: SHIPPED | OUTPATIENT
Start: 2024-11-11

## 2024-11-11 NOTE — PSYCH
"Behavioral Health Psychotherapy Progress Note    Psychotherapy Provided: Individual Psychotherapy     No diagnosis found.    Goals addressed in session: Goal 1     DATA:  Nury spoke today about her feelings re: the recent election outcome and the sadness that followed.  Several journal entries and her previously assigned homework was reviewed.   During this session, this clinician used the following therapeutic modalities: Motivational Interviewing and Supportive Psychotherapy    Substance Abuse was not addressed during this session. If the client is diagnosed with a co-occurring substance use disorder, please indicate any changes in the frequency or amount of use: . Stage of change for addressing substance use diagnoses: No substance use/Not applicable    ASSESSMENT:  Nury Hernandez presents with a Euthymic/ normal mood.  Nury again allowed this worker to provide her with support and feedback on her growth and progress. She struggled to understand ways that she can incorporate iz[place] into her life while still growing, remaining accountable.   her affect is Normal range and intensity, which is congruent, with her mood and the content of the session. Nury Hernandez was oriented to person, place, and time. Grooming and Hygiene were good  and clothing was casually dressed and appropriate for weather. Ability to perform ADLs has not changed. she was cooperative. Nury Hernandez denied suicidal thoughts, homicidal thoughts, and self injurious behaviors.   The client has made progress on their goals.     Nury Hernandez presents with a minimal risk of suicide, minimal risk of self-harm, and minimal risk of harm to others.    For any risk assessment that surpasses a \"low\" rating, a safety plan must be developed.    A safety plan was indicated: no  If yes, describe in detail     PLAN: Between sessions, Nury Hernandez will continue to utilize therapy sessions to increase her mindfulness and self " compassion. . At the next session, the therapist will use Supportive Psychotherapy to address the above in further detail.    Behavioral Health Treatment Plan and Discharge Planning: Nury Hernandez is aware of and agrees to continue to work on their treatment plan. They have identified and are working toward their discharge goals. yes        Visit start and stop times:       11/11/24

## 2024-11-18 ENCOUNTER — SOCIAL WORK (OUTPATIENT)
Dept: BEHAVIORAL/MENTAL HEALTH CLINIC | Facility: CLINIC | Age: 24
End: 2024-11-18
Payer: COMMERCIAL

## 2024-11-18 DIAGNOSIS — F41.1 GAD (GENERALIZED ANXIETY DISORDER): Primary | ICD-10-CM

## 2024-11-18 PROCEDURE — 90834 PSYTX W PT 45 MINUTES: CPT | Performed by: SOCIAL WORKER

## 2024-11-18 NOTE — PSYCH
"Behavioral Health Psychotherapy Progress Note    Psychotherapy Provided: Individual Psychotherapy     Encounter Diagnoses   Name Primary?    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  She reported that her body has already begun to adjust to day shift and she is able to utilize her days off for activities other than sleep.     During this session, this clinician used the following therapeutic modalities: Motivational Interviewing and Supportive Psychotherapy    Substance Abuse was not addressed during this session. If the client is diagnosed with a co-occurring substance use disorder, please indicate any changes in the frequency or amount of use: . Stage of change for addressing substance use diagnoses: No substance use/Not applicable    ASSESSMENT:  Nury Hernandez presents with a Euthymic/ normal mood.  Nury again allowed this worker to provide her with support and feedback on her growth and progress. She acknowledged ways that her trama responses have decreased over the last 6 months and agreed to write in her journal about the required qualities of her future relationship.  her affect is Normal range and intensity, which is congruent, with her mood and the content of the session. Nury Hernandez was oriented to person, place, and time. Grooming and Hygiene were good  and clothing was casually dressed and appropriate for weather. Ability to perform ADLs has not changed. she was cooperative. Nury Hernandez denied suicidal thoughts, homicidal thoughts, and self injurious behaviors.   The client has made progress on their goals.     Nury Hernandez presents with a minimal risk of suicide, minimal risk of self-harm, and minimal risk of harm to others.    For any risk assessment that surpasses a \"low\" rating, " a safety plan must be developed.    A safety plan was indicated: no  If yes, describe in detail     PLAN: Between sessions, Nury Hernandez will continue to utilize therapy sessions to increase her mindfulness and self compassion. . At the next session, the therapist will use Supportive Psychotherapy to address the above in further detail.    Behavioral Health Treatment Plan and Discharge Planning: Nury Hernandez is aware of and agrees to continue to work on their treatment plan. They have identified and are working toward their discharge goals. yes        Visit start and stop times:       11/18/24

## 2024-11-26 ENCOUNTER — TELEPHONE (OUTPATIENT)
Age: 24
End: 2024-11-26

## 2024-11-27 ENCOUNTER — SOCIAL WORK (OUTPATIENT)
Dept: BEHAVIORAL/MENTAL HEALTH CLINIC | Facility: CLINIC | Age: 24
End: 2024-11-27
Payer: COMMERCIAL

## 2024-11-27 DIAGNOSIS — F41.1 GAD (GENERALIZED ANXIETY DISORDER): Primary | ICD-10-CM

## 2024-11-27 PROCEDURE — 90834 PSYTX W PT 45 MINUTES: CPT | Performed by: SOCIAL WORKER

## 2024-11-27 NOTE — PSYCH
"Behavioral Health Psychotherapy Progress Note    Psychotherapy Provided: Individual Psychotherapy     No diagnosis found.      Goals addressed in session: Goal 1     DATA:  Nury spoke today about her feelings re: her \"inability\" to cry.  She shared multiple examples while acknowledging that it was rare to see either of her parents doing so throughout the course of her life.    Nury expressed the desire to gain more access to her emotions while also fearing that she may not be \"capable\" of this.  During this session, this clinician used the following therapeutic modalities: Motivational Interviewing and Supportive Psychotherapy    Substance Abuse was not addressed during this session. If the client is diagnosed with a co-occurring substance use disorder, please indicate any changes in the frequency or amount of use: . Stage of change for addressing substance use diagnoses: No substance use/Not applicable    ASSESSMENT:  Nury Hernandez presents with a Euthymic/ normal mood.  Nury again allowed this worker to provide her with support and feedback on this topic while she also expressed ways that her anxiety impacts this process.  She appears to be upset that she is \"about to be 25\" and has never been invovled in a relationship.    her affect is Normal range and intensity, which is congruent, with her mood and the content of the session. Nury Hernandez was oriented to person, place, and time. Grooming and Hygiene were good  and clothing was casually dressed and appropriate for weather. Ability to perform ADLs has not changed. she was cooperative. Nury Hernandez denied suicidal thoughts, homicidal thoughts, and self injurious behaviors.   The client has made progress on their goals.     Nury Hernandez presents with a minimal risk of suicide, minimal risk of self-harm, and minimal risk of harm to others.    For any risk assessment that surpasses a \"low\" rating, a safety plan must be developed.    A " safety plan was indicated: no  If yes, describe in detail     PLAN: Between sessions, Nury Hernandez will continue to utilize therapy sessions to increase her mindfulness and self compassion. . At the next session, the therapist will use Supportive Psychotherapy to address the above in further detail.    Behavioral Health Treatment Plan and Discharge Planning: Nury Hernandez is aware of and agrees to continue to work on their treatment plan. They have identified and are working toward their discharge goals. yes        Visit start and stop times:       11/27/24

## 2024-12-04 ENCOUNTER — SOCIAL WORK (OUTPATIENT)
Dept: BEHAVIORAL/MENTAL HEALTH CLINIC | Facility: CLINIC | Age: 24
End: 2024-12-04
Payer: COMMERCIAL

## 2024-12-04 DIAGNOSIS — F41.1 GAD (GENERALIZED ANXIETY DISORDER): Primary | ICD-10-CM

## 2024-12-04 PROCEDURE — 90837 PSYTX W PT 60 MINUTES: CPT | Performed by: SOCIAL WORKER

## 2024-12-05 NOTE — PSYCH
"Behavioral Health Psychotherapy Progress Note    Psychotherapy Provided: Individual Psychotherapy     No diagnosis found.      Goals addressed in session: Goal 1     DATA:  Nury spoke further today about her feelings re: her \"inability\" to cry.  She shared details from a recent work incident that exemplified this struggle while also expressing her fear of \"losing it.\"  She acknowledged ways that her anxiety \"overrides\" her feelings of sadness.   During this session, this clinician used the following therapeutic modalities: Motivational Interviewing and Supportive Psychotherapy    Substance Abuse was not addressed during this session. If the client is diagnosed with a co-occurring substance use disorder, please indicate any changes in the frequency or amount of use: . Stage of change for addressing substance use diagnoses: No substance use/Not applicable    ASSESSMENT:  Nury Hernandez presents with a Euthymic/ normal mood.  Nury again allowed this worker to provide her with support and feedback on this topic.  She remains committed to healing and working towards being ready to seek out a life partner.   her affect is Normal range and intensity, which is congruent, with her mood and the content of the session. Nury Hernandez was oriented to person, place, and time. Grooming and Hygiene were good  and clothing was casually dressed and appropriate for weather. Ability to perform ADLs has not changed. she was cooperative. Nury Hernandez denied suicidal thoughts, homicidal thoughts, and self injurious behaviors.   The client has made progress on their goals.     Nury Hernandez presents with a minimal risk of suicide, minimal risk of self-harm, and minimal risk of harm to others.    For any risk assessment that surpasses a \"low\" rating, a safety plan must be developed.    A safety plan was indicated: no  If yes, describe in detail     PLAN: Between sessions, Nury Hernandez will continue to utilize " therapy sessions to increase her mindfulness and self compassion. . At the next session, the therapist will use Supportive Psychotherapy to address the above in further detail.    Behavioral Health Treatment Plan and Discharge Planning: Nury Hernandez is aware of and agrees to continue to work on their treatment plan. They have identified and are working toward their discharge goals. yes        Visit start and stop times:       12/04/24

## 2024-12-06 ENCOUNTER — TELEPHONE (OUTPATIENT)
Dept: NEUROLOGY | Facility: CLINIC | Age: 24
End: 2024-12-06

## 2024-12-06 NOTE — TELEPHONE ENCOUNTER
Pt called if she can move up the appointment on 2/21/25 with Monique. She will be away that weekend. Can she get an appointment sooner if its possible? Please assist

## 2024-12-09 NOTE — TELEPHONE ENCOUNTER
Called patient and I left a message to call back. Her 02/21/25 appointment is to reauth her Botox so she will just need to be seen prior to her 04/22/25 [facility] appointment.

## 2024-12-11 ENCOUNTER — SOCIAL WORK (OUTPATIENT)
Dept: BEHAVIORAL/MENTAL HEALTH CLINIC | Facility: CLINIC | Age: 24
End: 2024-12-11
Payer: COMMERCIAL

## 2024-12-11 DIAGNOSIS — F41.1 GAD (GENERALIZED ANXIETY DISORDER): Primary | ICD-10-CM

## 2024-12-11 PROCEDURE — 90837 PSYTX W PT 60 MINUTES: CPT | Performed by: SOCIAL WORKER

## 2024-12-11 NOTE — PSYCH
"Behavioral Health Psychotherapy Progress Note    Psychotherapy Provided: Individual Psychotherapy     Encounter Diagnoses   Name Primary?   • INES (generalized anxiety disorder) Yes         Goals addressed in session: Goal 1     DATA:  Nury spoke further today about her feelings re: the abandonment fears that she continues to experience as a child of divorce.  She expressed concern that there is \"no possible resolution\" to this damage, but agreed to continue to utilize therapy sessions to process it, nonetheless.   During this session, this clinician used the following therapeutic modalities: Motivational Interviewing and Supportive Psychotherapy    Substance Abuse was not addressed during this session. If the client is diagnosed with a co-occurring substance use disorder, please indicate any changes in the frequency or amount of use: . Stage of change for addressing substance use diagnoses: No substance use/Not applicable    ASSESSMENT:  Nury Hernandez presents with a Euthymic/ normal mood.  Nury again allowed this worker to provide her with support and feedback on this topic.  She remains committed to healing and working towards being ready to seek out a life partner.   her affect is Normal range and intensity, which is congruent, with her mood and the content of the session. Nury Hernandez was oriented to person, place, and time. Grooming and Hygiene were good  and clothing was casually dressed and appropriate for weather. Ability to perform ADLs has not changed. she was cooperative. Nury Hernandez denied suicidal thoughts, homicidal thoughts, and self injurious behaviors.   The client has made progress on their goals.     Nury Hernandez presents with a minimal risk of suicide, minimal risk of self-harm, and minimal risk of harm to others.    For any risk assessment that surpasses a \"low\" rating, a safety plan must be developed.    A safety plan was indicated: no  If yes, describe in detail "     PLAN: Between sessions, Nury Hernandez will continue to utilize therapy sessions to increase her mindfulness and self compassion. . At the next session, the therapist will use Supportive Psychotherapy to address the above in further detail.    Behavioral Health Treatment Plan and Discharge Planning: Nury Hernandez is aware of and agrees to continue to work on their treatment plan. They have identified and are working toward their discharge goals. yes        Visit start and stop times:    12/11/24  Start Time: 1400  Stop Time: 1455  Total Visit Time: 55 minutes

## 2024-12-16 ENCOUNTER — OFFICE VISIT (OUTPATIENT)
Dept: DERMATOLOGY | Facility: CLINIC | Age: 24
End: 2024-12-16
Payer: COMMERCIAL

## 2024-12-16 ENCOUNTER — TELEPHONE (OUTPATIENT)
Dept: PSYCHIATRY | Facility: CLINIC | Age: 24
End: 2024-12-16

## 2024-12-16 ENCOUNTER — SOCIAL WORK (OUTPATIENT)
Dept: BEHAVIORAL/MENTAL HEALTH CLINIC | Facility: CLINIC | Age: 24
End: 2024-12-16
Payer: COMMERCIAL

## 2024-12-16 ENCOUNTER — TELEPHONE (OUTPATIENT)
Age: 24
End: 2024-12-16

## 2024-12-16 VITALS — BODY MASS INDEX: 23.65 KG/M2 | WEIGHT: 151 LBS | TEMPERATURE: 97.6 F

## 2024-12-16 DIAGNOSIS — L70.0 ACNE VULGARIS: Primary | ICD-10-CM

## 2024-12-16 DIAGNOSIS — F41.1 GAD (GENERALIZED ANXIETY DISORDER): Primary | ICD-10-CM

## 2024-12-16 DIAGNOSIS — G43.011 INTRACTABLE MIGRAINE WITHOUT AURA AND WITH STATUS MIGRAINOSUS: ICD-10-CM

## 2024-12-16 DIAGNOSIS — R61 HYPERHIDROSIS: ICD-10-CM

## 2024-12-16 PROCEDURE — 99214 OFFICE O/P EST MOD 30 MIN: CPT

## 2024-12-16 PROCEDURE — 90837 PSYTX W PT 60 MINUTES: CPT | Performed by: SOCIAL WORKER

## 2024-12-16 RX ORDER — DEXAMETHASONE 4 MG/1
TABLET ORAL
Qty: 9 TABLET | Refills: 0 | Status: SHIPPED | OUTPATIENT
Start: 2024-12-16

## 2024-12-16 RX ORDER — CYPROHEPTADINE HYDROCHLORIDE 4 MG/1
4 TABLET ORAL
Qty: 30 TABLET | Refills: 0 | Status: SHIPPED | OUTPATIENT
Start: 2024-12-16

## 2024-12-16 NOTE — TELEPHONE ENCOUNTER
From: Nury Hernandez   Sent: 12/13/2024   4:15 PM EST   To: [facility]   Subject: Appointment canceled                              Appointment canceled for Nury Hernandez ([id number])   Visit type: MEDICATION MANAGEMENT [facility]   1/22/2025 8:15 AM (30 minutes) with Debora Dee MD in [facility]      Reason for cancellation: Canceled via MyChart      Patient comments: Need to work 7a-7p

## 2024-12-16 NOTE — TELEPHONE ENCOUNTER
Writer called patient and LVM to call office to r/s c/x appt. Transfer call to resident MR to make appt

## 2024-12-16 NOTE — PSYCH
"Behavioral Health Psychotherapy Progress Note    Psychotherapy Provided: Individual Psychotherapy     Encounter Diagnoses   Name Primary?   • INES (generalized anxiety disorder) Yes           Goals addressed in session: Goal 1     DATA:  Nury spoke further today about her feelings re: the abandonment fears that she continues to experience as a child of divorce.  She shared two books that she has purchased on attachment and divorce while also discussing insight into her attachment style.   During this session, this clinician used the following therapeutic modalities: Motivational Interviewing and Supportive Psychotherapy    Substance Abuse was not addressed during this session. If the client is diagnosed with a co-occurring substance use disorder, please indicate any changes in the frequency or amount of use: . Stage of change for addressing substance use diagnoses: No substance use/Not applicable    ASSESSMENT:  Nury Hernandez presents with a Euthymic/ normal mood.  Nury again allowed this worker to provide her with support and feedback on this topic.  She remains committed to healing and is more open to working towards seek ingout a life partner.   her affect is Normal range and intensity, which is congruent, with her mood and the content of the session. Nury Hernandez was oriented to person, place, and time. Grooming and Hygiene were good  and clothing was casually dressed and appropriate for weather. Ability to perform ADLs has not changed. she was cooperative. Nury Hernandez denied suicidal thoughts, homicidal thoughts, and self injurious behaviors.   The client has made progress on their goals.     Nury Hernandez presents with a minimal risk of suicide, minimal risk of self-harm, and minimal risk of harm to others.    For any risk assessment that surpasses a \"low\" rating, a safety plan must be developed.    A safety plan was indicated: no  If yes, describe in detail     PLAN: Between " sessions, Nury Hernandez will continue to utilize therapy sessions to increase her mindfulness and self compassion. . At the next session, the therapist will use Supportive Psychotherapy to address the above in further detail.    Behavioral Health Treatment Plan and Discharge Planning: Nury Hernandez is aware of and agrees to continue to work on their treatment plan. They have identified and are working toward their discharge goals. yes        Visit start and stop times:    12/16/24  Start Time: 1255  Stop Time: 1350  Total Visit Time: 55 minutes

## 2024-12-16 NOTE — TELEPHONE ENCOUNTER
PA for Qbrexza 2.4% pads SUBMITTED to [employer]    via    []CMM-KEY:   [x]Surescripts-Case ID # [id number]   []Availity-Auth ID # NDC #   []Faxed to plan   []Other website   []Phone call Case ID #     [x]PA sent as URGENT    All office notes, labs and other pertaining documents and studies sent. Clinical questions answered. Awaiting determination from insurance company.     Turnaround time for your insurance to make a decision on your Prior Authorization can take 7-21 business days.

## 2024-12-16 NOTE — PROGRESS NOTES
"[facility] Note     Patient Name: Nury Hernandez  Encounter Date: 12/16/2024     Have you been cared for by a [facility] in the last 3 years and, if so, which description applies to you?    Yes.  I have been here within the last 3 years, and my medical history has NOT changed since that time.  I am FEMALE/of child-bearing potential.    REVIEW OF SYSTEMS:  Have you recently had or currently have any of the following? No changes in my recent health.   PAST MEDICAL HISTORY:  Have you personally ever had or currently have any of the following?  If \"YES,\" then please provide more detail. No changes in my medical history.   HISTORY OF IMMUNOSUPPRESSION: Do you have a history of any of the following:  Systemic Immunosuppression such as Diabetes, Biologic or Immunotherapy, Chemotherapy, Organ Transplantation, Bone Marrow Transplantation or Prednisone?  No     Answering \"YES\" requires the addition of the dotphrase \"IMMUNOSUPPRESSED\" as the first diagnosis of the patient's visit.   FAMILY HISTORY:  Any \"first degree relatives\" (parent, brother, sister, or child) with the following?    No changes in my family's known health.   PATIENT EXPERIENCE:    Do you want the Dermatologist to perform a COMPLETE skin exam today including a clinical examination under the \"bra and underwear\" areas?  NO  If necessary, do we have your permission to call and leave a detailed message on your Preferred Phone number that includes your specific medical information?  Yes      Allergies   Allergen Reactions   • Nuts - Food Allergy Other (See Comments)     Avani Nuts - itchy throat   • Other Hives      At surgical site and IV site- not sure if type of cleanser used   • Pollen Extract    • Reyvow [Lasmiditan] Palpitations and Hallucinations      Current Outpatient Medications:   •  acetaminophen (TYLENOL) 500 mg tablet, Take 1,000 mg by mouth every 4 (four) hours as needed , Disp: , Rfl:   •  albuterol (2.5 mg/3 " mL) 0.083 % nebulizer solution, Take 3 mL (2.5 mg total) by nebulization every 6 (six) hours as needed for wheezing or shortness of breath, Disp: 30 mL, Rfl: 0  •  albuterol (ProAir HFA) 90 mcg/act inhaler, Inhale 2 puffs every 6 (six) hours as needed for wheezing or shortness of breath, Disp: 8.5 g, Rfl: 0  •  ASMANEX  MCG/ACT AERO, Inhale 2 puffs every 4 (four) hours as needed (2 puffs), Disp: , Rfl:   •  Atogepant (Qulipta) 60 MG TABS, Take 60 mg by mouth in the morning, Disp: 90 tablet, Rfl: 4  •  clindamycin (CLEOCIN T) 1 % lotion, Apply topically in the morning To face, chest and back, Disp: 60 mL, Rfl: 3  •  clindamycin 1 % gel, Apply topically in the morning To face chest and back, Disp: 75 mL, Rfl: 5  •  cyproheptadine (PERIACTIN) 4 mg tablet, Take 1 tablet (4 mg total) by mouth daily at bedtime as needed for allergies (headaches), Disp: 30 tablet, Rfl: 0  •  dexamethasone (DECADRON) 4 mg tablet, Take 2 tabs for 2 days then take 1 tab for 3-5 days (until headaches break then stop), Disp: 9 tablet, Rfl: 0  •  diphenhydrAMINE (BENADRYL) 25 mg tablet, Take 1 tablet (25 mg total) by mouth every 6 (six) hours as needed for itching, Disp: 30 tablet, Rfl: 0  •  Elastic Bandages & Supports (TRUFORM STOCKINGS 20-30MMHG) MISC, , Disp: , Rfl:   •  famotidine (PEPCID) 20 mg tablet, Take 1 tablet (20 mg total) by mouth daily at bedtime, Disp: 30 tablet, Rfl: 3  •  fludrocortisone (FLORINEF) 0.1 mg tablet, Take 0.1 mg by mouth 2 (two) times a day, Disp: , Rfl:   •  Glycopyrronium Tosylate 2.4 % PADS, Apply 1 Application topically in the morning, Disp: 30 each, Rfl: 5  •  metoprolol succinate (TOPROL-XL) 50 mg 24 hr tablet, Take 50 mg by mouth in the morning and 50 mg before bedtime., Disp: , Rfl:   •  norgestimate-ethinyl estradiol (ORTHO-CYCLEN) 0.25-35 MG-MCG per tablet, Take 1 tablet by mouth daily, Disp: , Rfl:   •  omeprazole (PriLOSEC) 20 mg delayed release capsule, Take 1 capsule (20 mg total) by mouth  daily, Disp: 30 capsule, Rfl: 3  •  onabotulinumtoxin A (BOTOX) 100 units, Inject  as directed. Injection every 91 days for migraines, Disp: , Rfl:   •  ondansetron (Zofran ODT) 4 mg disintegrating tablet, Take 1 tablet (4 mg total) by mouth every 6 (six) hours as needed for nausea or vomiting, Disp: 30 tablet, Rfl: 0  •  ondansetron (ZOFRAN) 8 mg tablet, Take 1 tablet (8 mg total) by mouth every 8 (eight) hours as needed for nausea or vomiting, Disp: 20 tablet, Rfl: 0  •  prochlorperazine (COMPAZINE) 10 mg tablet, Take 1 tablet (10 mg total) by mouth every 6 (six) hours as needed (migraine), Disp: 10 tablet, Rfl: 0  •  Prucalopride Succinate (Motegrity) 2 MG TABS, Take 2 mg by mouth in the morning, Disp: 90 tablet, Rfl: 0  •  rimegepant sulfate (Nurtec) 75 mg TBDP, Take 1 tablet (75 mg total) by mouth as needed (migraine) Limit of 1 in 24 hours, Disp: 16 tablet, Rfl: 3  •  scopolamine (TRANSDERM-SCOP) 1 mg/3 days TD 72 hr patch, Place 1 patch on the skin over 72 hours every third day, Disp: 13 patch, Rfl: 3  •  Tenapanor HCl 50 MG TABS, Take 50 mg by mouth 2 (two) times a day, Disp: 60 tablet, Rfl: 3  •  tretinoin (RETIN-A) 0.025 % cream, Apply a pea sized amount to face one hour before bedtime after moisturizing. Start with 1-2 nights per week and build up to nightly as tolerated., Disp: 45 g, Rfl: 2  •  tretinoin (RETIN-A) 0.05 % cream, Spread one pea-sized amount of medication over entire face about one hour before bedtime., Disp: 45 g, Rfl: 3  •  verapamil (CALAN) 120 mg tablet, Take 1 tab by mouth twice a day, Disp: 180 tablet, Rfl: 3  •  modafinil (PROVIGIL) 100 mg tablet, Take 100 mg by mouth daily (Patient not taking: Reported on 7/17/2024), Disp: , Rfl:   •  norethindrone-ethinyl estradiol (Loestrin Fe 1/20) 1-20 MG-MCG per tablet, Take 1 tablet by mouth daily (Patient not taking: Reported on 7/17/2024), Disp: 84 tablet, Rfl: 0          Whom besides the patient is providing clinical information about  "today's encounter?   NO ADDITIONAL HISTORIAN (patient alone provided history)    Physical Exam and Assessment/Plan by Diagnosis:    ACNE VULGARIS (\"COMMON ACNE\")     Physical Exam:  Anatomic Location Affected:  upper back, right cheek  Morphological Description: resolving papule on right cheek; few papules and residual post inflammatory erythema on upper back    Additional History of Present Condition:  patient has POTS and PCOS. Unable to go on spironolactone d/t POTS. Has been on depoprovera shot in the past but it caused significant weight gain and acne flaring so she has discontinued and saw gyn and was started on Ortho-tri-cyclen. Has been on OCP for four months now and tolerating well and has seen improvement in acne.  Patient took 1 mo of doxycycline at last visit in August but started OCP around same time so not sure if doxycycline helped.      History notable for acne that flares around menses  Discussed that history and physical are consistent with hormonal acne  Educated that hormonal acne, including hormonal acne related to PCOS, does particularly well with medications that targets hormones, including spironolactone and OCP. Spironolactone not an option given hx of POTS but patient is now doing well on ortho tri cyclen and seeing specialist for PCOS     Plan today:  Patient to start otc 10% azelaic acid (ex brand-the ordinary) for redness on upper back     AM:  - Continue benzoyl peroxide cleanser for face and back. Wash off thoroughly to avoid bleaching towels/clothes  Apply clindamycin 1% lotion to entire face and may use on back as well  - SPF 30 or greater (can be a lotion with SPF like CeraVe AM)  - Start non-comedogenic moisturizer such as CeraVe, Cetaphil or Vanicream.      PM:  Continue tretinoin 0.05% cream nightly to face followed by non-comedogenic moisturizer. If retinoid is too drying, may employ the \"sandwich method.\" To do this, apply layer of non-comedogenic moisturizer, followed by layer " of retinoid, followed by another layer of non-comedogenic moisturizer.   Start OTC 10% Azelaic Acid for redness on upper back     SYSTEMIC:  Continue Ortho-tri-cyclen from outside source. Continue routine follow up with gyn     Call with any questions or concerns before next follow-up visit; do not stop medications abruptly without consulting provider. Call our office at [phone] (SKIN).  It is better to be safe than to be sorry!     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 It is not as apparent when she is off from work. Patient also has hx of POTS. She has tried multiple brands of clinical strength antiperspirant without improvement.     Assessment and Plan:  Based on a thorough discussion of this condition and the management approach to it (including a comprehensive discussion of the known risks, side effects and potential benefits of treatment), the patient (family) agrees to implement the following specific plan:  Start glycopyrrolate 1% topical spray from [place] daily to armpits   You should be contacted within a few days by the pharmacy as discussed via phone, text, or email. They will take your payment information & your medication will be mailed to you.   If you need to contact the pharmacy, their contact information is as follows: [facility] [phone].    What is hyperhidrosis?  Hyperhidrosis is the name given to excessive and uncontrollable sweating.  Sweat is a weak salt solution produced by the eccrine sweat glands. These are distributed over the entire body but are most numerous on the palms and soles (with about 700 glands per square  centimetre).    Who gets hyperhidrosis?  Primary hyperhidrosis is reported to affect 1-3% of the [place] population and nearly always starts during childhood or adolescence. The tendency may be inherited, and it is reported to be particularly prevalent in [place] people.  Secondary hyperhidrosis is less common and can present at any age.    What causes hyperhidrosis?  Primary hyperhidrosis appears to be due to overactivity of the hypothalamic thermoregulatory centre in the brain and is transmitted via the sympathetic nervous system to the eccrine sweat gland.    Triggers to attacks of sweating may include:  Hot weather   Exercise   Fever   Anxiety   Spicy food    Causes of secondary localised hyperhidrosis include:  Stroke   Spinal nerve damage   Peripheral nerve damage   Surgical sympathectomy   Neuropathy   A brain tumour   Chronic anxiety disorder    Causes of secondary generalised hyperhidrosis include:  Obesity   Diabetes   Menopause   Overactive thyroid   Cardiovascular disorders   Respiratory failure   Other endocrine tumours, eg pheochromocytoma   Parkinson disease   Hodgkin lymphoma   Drugs: alcohol, caffeine, corticosteroids, cholinesterase inhibitors, tricyclic antidepressants, selective serotonin reuptake inhibitors, nicotinamide and opioids    What are the clinical features of hyperhidrosis?  Hyperhidrosis can be localised or generalised.  Localised hyperhidrosis affects armpits, palms, soles, face or other sites   Generalised hyperhidrosis affects most or all of the body    It can be primary or secondary.    Primary hyperhidrosis  Starts in childhood or adolescence   May persist lifelong or improve with age   There may be a family history   Tends to involve armpits, palms and or soles symmetrically   Usually, sweating reduces at night and disappears during sleep    Secondary hyperhidrosis  Less common than primary hyperhidrosis   More likely to be unilateral and asymmetrical, or generalised   Can occur at  "night or during sleep.   Due to endocrine or neurological conditions    What is the impact of excessive sweating?  Hyperhidrosis is embarrassing and interferes with many daily activities.    How is hyperhidrosis diagnosed?  Hyperhidrosis is usually diagnosed clinically. Tests relate to the potential underlying cause of hyperhidrosis and are rarely necessary for primary hyperhidrosis.  The precise site of localised hyperhidrosis can be revealed using the Minor test.  Iodine (orange) is painted onto the skin and air dried.   Starch (white) is dusted on the iodine.   Sweating is revealed by a change to dark blue/black colour.    Screening tests in secondary generalised hyperhidrosis depend on other clinical features but should include as a minimum:  Blood sugar / glycosylated haemoglobin   Thyroid function    What is the treatment of hyperhidrosis?  General measures  Wear loose-fitting, stain-resistant, sweat-proof garments.   Change clothing and footwear when damp.   Socks containing silver or copper reduce infection and odour.   Use absorbent insoles in shoes and replace them frequently.   Use a non-soap cleanser.   Apply corn starch powder after bathing.   Avoid caffeinated food and drink.   Discontinue any drug that may be causing hyperhidrosis.   Apply antiperspirant.    Topical antiperspirants  Deodorants are fragrances or antiseptics to disguise unpleasant smells; on their own, they do not reduce perspiration.   Antiperspirants contain 10-25% aluminium salts to reduce sweating; \"clinical strength\" aluminium zirconium salts are more effective than aluminium chloride.   Topical anticholinergics such as glycopyrrolate and oxybutynin gel have been successful in reducing sweating; cloths containing glycopyrronium tosylate (Qbrexza™) were approved by the FDA in July 2018 for axillary hyperhidrosis in adults and children 9 years of age and older. Dusting powder is available containing the anticholinergic drug, " diphemanil 2%.   Antiperspirants are available as a cream, aerosol spray, stick, roll-on, wipe, powder, and paint.   Specific products are available for different body sites such as underarms, other skin folds, face, hands and feet.   They are best applied when skin is dry, after a cool shower just before sleep.   Wash off in the morning if tending to irritate.   Use from once weekly to daily if necessary.   If irritating, apply hydrocortisone cream short-term.    Iontophoresis  Iontophoresis is used for hyperhidrosis of palms, soles and armpits.   Des and battery-powered units are available.   The affected area is immersed in water, or, with a more significant effect, glycopyrronium solution.   A gentle electrical current is passed across the skin surface for 10-20 minutes.   Repeated daily for several weeks then less frequently as required   Iontophoresis may cause discomfort, irritation or irritant contact dermatitis.   The treatment requires a long-term commitment.   It is not always effective.    Oral medications  Oral anticholinergic drugs  Available drugs are propantheline 15-30 mg up to three times daily, oxybutynin 2.5-7.5 mg daily, benztropine, glycopyrrolate (unapproved).   They can cause dry mouth, and less often, blurred vision, constipation, dizziness, palpitations and other side effects.   People with glaucoma or urinary retention should not take them.   Caution in elderly patients: increased risk of side effects is reported, including dementia.   Oral anticholinergics may interact with other medications.  Beta-blockers  Beta blockers block the physical effects of anxiety.   They may aggravate asthma or symptoms of peripheral vascular disease.  Calcium channel blockers, alpha adrenergic agonists (clonidine) nonsteroidal anti-inflammatory drugs and anxiolytics may also be useful for some patients.    Botulinum toxin injections  Botulinum toxin injections are approved for hyperhidrosis affecting the  armpits.   The injections reduce or stop sweating for three to six months.   Botulinum toxins are used off-license for localised hyperhidrosis in other sites such as palms.   Topical botulinum toxin gel is under investigation for hyperhidrosis.    Surgical removal of axillary sweat glands  Overactive sweat glands in the armpits may be removed by several methods, usually under local anaesthetic.  Tumescent liposuction (sucking them out)   Subcutaneous curettage (scraping them out)   Microwave thermolysis (the FollicumaDGutenbergz® system approved by FDA in 2011)   Subdermal Nd:YAG laser   High-intensity micro-focused ultrasound (experimental)   Surgery to cut out the sweat gland-bearing skin of the armpits. If a large area needs to be removed, it may be repaired using a skin graft    Sympathectomy  Division of the sympathetic spinal nerves by chemical or surgical endoscopic thoracic sympathectomy (ETS) may reduce sweating of face (T2 ganglion) or armpit and hand (T3 or T4 ganglion) but is reserved for the most severely affected individuals due to potential risks and complications.  Hyperhidrosis may recur in up to 15% of cases.   Sympathectomy is often accompanied by undesirable skin warmth and dryness.   New-onset hyperhidrosis of other sites occurs in 50-90% of patients, and is severe in 2%. It is reported to be less frequent after T4 ganglion sympathectomy compared with T2 ganglion sympathectomy.   Serious complications include Mitch syndrome, pneumothorax (in up to 10%), pneumonia and persistent pain (in fewer than 2%).  Lumbar sympathectomy is not recommended for hyperhidrosis affecting the feet, as it can interfere with sexual function.    What is the outlook for hyperhidrosis?  Localised primary hyperhidrosis tends to improve with age. The outlook for secondary localised or generalised hyperhidrosis depends on the cause.    Future treatments for hyperhidrosis  Several research projects are underway to find safer and more  effective treatments for hyperhidrosis. These include:  Topical anticholinergic DRM04   Combination of oxybutynin and pilocarpine (to counteract the adverse effects of the anticholinergic, oxybutynin) VD-102      Scribe Attestation    I,:  Jorge Cohn MA am acting as a scribe while in the presence of the attending physician.:       I,:  Francia Gates PA-C personally performed the services described in this documentation    as scribed in my presence.:

## 2024-12-19 NOTE — TELEPHONE ENCOUNTER
PA for Qbrexza 2.4% pads APPROVED     Date(s) approved 12/16/2024 - 03/16/2025    Case #[id number]     Patient advised by          []Check-Caphart Message  []Phone call   [x]LMOM  []L/M to call office as no active Communication consent on file  []Unable to leave detailed message as VM not approved on Communication consent       Pharmacy advised by    [x]Fax  []Phone call    Approval letter scanned into Media Yes

## 2024-12-23 ENCOUNTER — OFFICE VISIT (OUTPATIENT)
Dept: PODIATRY | Facility: CLINIC | Age: 24
End: 2024-12-23
Payer: COMMERCIAL

## 2024-12-23 ENCOUNTER — SOCIAL WORK (OUTPATIENT)
Dept: BEHAVIORAL/MENTAL HEALTH CLINIC | Facility: CLINIC | Age: 24
End: 2024-12-23
Payer: COMMERCIAL

## 2024-12-23 VITALS — HEIGHT: 67 IN | BODY MASS INDEX: 23.86 KG/M2 | WEIGHT: 152 LBS

## 2024-12-23 DIAGNOSIS — M20.42 HAMMERTOE OF SECOND TOE OF LEFT FOOT: Primary | ICD-10-CM

## 2024-12-23 DIAGNOSIS — F41.1 GAD (GENERALIZED ANXIETY DISORDER): Primary | ICD-10-CM

## 2024-12-23 PROCEDURE — 90837 PSYTX W PT 60 MINUTES: CPT | Performed by: SOCIAL WORKER

## 2024-12-23 PROCEDURE — 99213 OFFICE O/P EST LOW 20 MIN: CPT | Performed by: PODIATRIST

## 2024-12-23 NOTE — PROGRESS NOTES
"Name: Nury Hernandez      : 2000      MRN: [id number]  Encounter Provider: Odilon Berger DPM  Encounter Date: 2024   Encounter department: [facility] [place]  :  Assessment & Plan  Hammertoe of second toe of left foot  Diagnosis and options discussed with patient  Patient agreeable to the plan as stated below  XR discussed with patient, no acute concerns, the 2nd toe is arthritic but stable. Revision would involve a fusion of the remaining DIPJ but at this point she is going to try conservative care.   Orders:  •  XR foot 3+ vw left; Future    XRay 3 views of the left foot personally read by Dr. Berger in office today and discussed with patient:    There is no acute fracture or dislocation.  Previous 2nd arthrodesis noted  No lytic or blastic osseous lesion.  Soft tissues are unremarkable.      History of Present Illness   HPI  Nury Hernandez is a 24 y.o. female who presents with toe pain. 5 years ago she had hammertoe surgery. The implant got fractured and she had to have it revised a few months later. That healed fine. The toe is still straight but it's been getting sore and swollen. No new trauma      Review of Systems  As stated in HPI, otherwise normal    Medical History Reviewed by provider this encounter:  Tobacco  Allergies  Meds  Problems  Med Hx  Surg Hx  Fam Hx           Objective   Ht 5' 7\" (1.702 m)   Wt 68.9 kg (152 lb)   BMI 23.81 kg/m²      Physical Exam  Vitals reviewed.   Constitutional:       General: She is not in acute distress.  Cardiovascular:      Rate and Rhythm: Normal rate.      Pulses: Normal pulses.   Pulmonary:      Effort: No respiratory distress.   Musculoskeletal:         General: Swelling and tenderness present.   Skin:     Capillary Refill: Capillary refill takes less than 2 seconds.      Findings: No bruising.   Neurological:      Mental Status: She is alert. She is disoriented.      Sensory: No sensory deficit.     Left " 2nd toe anatomically straight. Hypertrophy at previous PIPJ fusion site. No MTPJ instability

## 2024-12-23 NOTE — PSYCH
"Behavioral Health Psychotherapy Progress Note    Psychotherapy Provided: Individual Psychotherapy     Encounter Diagnoses   Name Primary?   • INES (generalized anxiety disorder) Yes             Goals addressed in session: Goal 1     DATA:  Nury spoke further today about her feelings re: the  fears that she continues to experience as a child of divorce.  She shared two journal entries on this topic and expressed ways that she has not felt safe enough to be vulnerable or emotionally reliant on her mother. During this session, this clinician used the following therapeutic modalities: Motivational Interviewing and Supportive Psychotherapy    Substance Abuse was not addressed during this session. If the client is diagnosed with a co-occurring substance use disorder, please indicate any changes in the frequency or amount of use: . Stage of change for addressing substance use diagnoses: No substance use/Not applicable    ASSESSMENT:  Nury Hernandez presents with a Euthymic/ normal mood.  Nury again allowed this worker to provide her with support and feedback on this topic.  She remains committed to healing and is more open to working towards seek ingout a life partner but is uncertain about her ability to open up to her mother.   her affect is Normal range and intensity, which is congruent, with her mood and the content of the session. Nury Hernandez was oriented to person, place, and time. Grooming and Hygiene were good  and clothing was casually dressed and appropriate for weather. Ability to perform ADLs has not changed. she was cooperative. Nury Hernandez denied suicidal thoughts, homicidal thoughts, and self injurious behaviors.   The client has made progress on their goals.     Nury Hernandez presents with a minimal risk of suicide, minimal risk of self-harm, and minimal risk of harm to others.    For any risk assessment that surpasses a \"low\" rating, a safety plan must be developed.    A safety " plan was indicated: no  If yes, describe in detail     PLAN: Between sessions, Nury Hernandez will continue to utilize therapy sessions to increase her mindfulness and self compassion. . At the next session, the therapist will use Supportive Psychotherapy to address the above in further detail.    Behavioral Health Treatment Plan and Discharge Planning: Nury Hernandez is aware of and agrees to continue to work on their treatment plan. They have identified and are working toward their discharge goals. yes        Visit start and stop times:    12/16/24  Start Time: 1100  Stop Time: 1155  Total Visit Time: 55 minutes

## 2025-01-06 ENCOUNTER — SOCIAL WORK (OUTPATIENT)
Dept: BEHAVIORAL/MENTAL HEALTH CLINIC | Facility: CLINIC | Age: 25
End: 2025-01-06
Payer: COMMERCIAL

## 2025-01-06 DIAGNOSIS — F41.1 GAD (GENERALIZED ANXIETY DISORDER): Primary | ICD-10-CM

## 2025-01-06 PROCEDURE — 90837 PSYTX W PT 60 MINUTES: CPT | Performed by: SOCIAL WORKER

## 2025-01-06 NOTE — PSYCH
"Behavioral Health Psychotherapy Progress Note    Psychotherapy Provided: Individual Psychotherapy     Encounter Diagnoses   Name Primary?   • INES (generalized anxiety disorder) Yes               Goals addressed in session: Goal 1     DATA:  Nury spoke  today about her feelings re: the  goals that she accomplished during 2024 and the ones that she has identified for 2025.  She shared details re: two books she has been reading on healing and also reviewed her therapy homework . During this session, this clinician used the following therapeutic modalities: Motivational Interviewing and Supportive Psychotherapy    Substance Abuse was not addressed during this session. If the client is diagnosed with a co-occurring substance use disorder, please indicate any changes in the frequency or amount of use: . Stage of change for addressing substance use diagnoses: No substance use/Not applicable    ASSESSMENT:  Nury Hernandez presents with a Euthymic/ normal mood.  Nury again allowed this worker to provide her with support and feedback on this topic.  She remains committed to healing and is more open to working towards seek ingout a life partner but remains uncertain about her ability to open up to her mother.   her affect is Normal range and intensity, which is congruent, with her mood and the content of the session. Nury Hernandez was oriented to person, place, and time. Grooming and Hygiene were good  and clothing was casually dressed and appropriate for weather. Ability to perform ADLs has not changed. she was cooperative. Nury Hernandez denied suicidal thoughts, homicidal thoughts, and self injurious behaviors.   The client has made progress on their goals.     uNry Hernandez presents with a minimal risk of suicide, minimal risk of self-harm, and minimal risk of harm to others.    For any risk assessment that surpasses a \"low\" rating, a safety plan must be developed.    A safety plan was indicated: " no  If yes, describe in detail     PLAN: Between sessions, Nury Hernandez will continue to utilize therapy sessions to increase her mindfulness and self compassion. . At the next session, the therapist will use Supportive Psychotherapy to address the above in further detail.    Behavioral Health Treatment Plan and Discharge Planning: Nury Hernandez is aware of and agrees to continue to work on their treatment plan. They have identified and are working toward their discharge goals. yes        Visit start and stop times:      01/06/25  Start Time: 0900  Stop Time: 0955  Total Visit Time: 55 minutes  Start Time: 0900  Stop Time: 0955  Total Visit Time: 55 minutes

## 2025-01-13 ENCOUNTER — SOCIAL WORK (OUTPATIENT)
Dept: BEHAVIORAL/MENTAL HEALTH CLINIC | Facility: CLINIC | Age: 25
End: 2025-01-13
Payer: COMMERCIAL

## 2025-01-13 DIAGNOSIS — F41.1 GAD (GENERALIZED ANXIETY DISORDER): Primary | ICD-10-CM

## 2025-01-13 PROCEDURE — 90837 PSYTX W PT 60 MINUTES: CPT | Performed by: SOCIAL WORKER

## 2025-01-13 NOTE — PSYCH
"Behavioral Health Psychotherapy Progress Note    Psychotherapy Provided: Individual Psychotherapy     Encounter Diagnoses   Name Primary?   • INES (generalized anxiety disorder) Yes                 Goals addressed in session: Goal 1     DATA:  Nury spoke  today about her feelings re: the  resistance she is experiencing in being willing to share a letter that she has written with her mom.  Reasons for this fear and resistance were explored at length.  Nury shared ways that this experience correlates with her fear at age 17 to drive.   During this session, this clinician used the following therapeutic modalities: Motivational Interviewing and Supportive Psychotherapy    Substance Abuse was not addressed during this session. If the client is diagnosed with a co-occurring substance use disorder, please indicate any changes in the frequency or amount of use: . Stage of change for addressing substance use diagnoses: No substance use/Not applicable    ASSESSMENT:  Nury Hernandez presents with a Euthymic/ normal mood.  Nury again allowed this worker to provide her with support and feedback on this topic.  She remains committed to healing and is more open to working towards seeking out a life partner but remains uncertain about her ability to open up to her mother.   her affect is Normal range and intensity, which is congruent, with her mood and the content of the session. Nury Hernandez was oriented to person, place, and time. Grooming and Hygiene were good  and clothing was casually dressed and appropriate for weather. Ability to perform ADLs has not changed. she was cooperative. Nury Hernandez denied suicidal thoughts, homicidal thoughts, and self injurious behaviors.   The client has made progress on their goals.     Nury Hernandez presents with a minimal risk of suicide, minimal risk of self-harm, and minimal risk of harm to others.    For any risk assessment that surpasses a \"low\" rating, a safety " plan must be developed.    A safety plan was indicated: no  If yes, describe in detail     PLAN: Between sessions, Nury Hernandez will continue to utilize therapy sessions to increase her mindfulness and self compassion. . At the next session, the therapist will use Supportive Psychotherapy to address the above in further detail.    Behavioral Health Treatment Plan and Discharge Planning: Nury Hernandez is aware of and agrees to continue to work on their treatment plan. They have identified and are working toward their discharge goals. yes        Visit start and stop times:      01/13/25  Start Time: 1100  Stop Time: 1155  Total Visit Time: 55 minutes  Start Time: 1100  Stop Time: 1155  Total Visit Time: 55 minutes

## 2025-01-14 ENCOUNTER — APPOINTMENT (OUTPATIENT)
Dept: LAB | Facility: CLINIC | Age: 25
End: 2025-01-14
Payer: COMMERCIAL

## 2025-01-14 DIAGNOSIS — E28.2 PCOS (POLYCYSTIC OVARIAN SYNDROME): ICD-10-CM

## 2025-01-14 LAB — TSH SERPL DL<=0.05 MIU/L-ACNC: 0.41 UIU/ML (ref 0.45–4.5)

## 2025-01-14 PROCEDURE — 36415 COLL VENOUS BLD VENIPUNCTURE: CPT

## 2025-01-14 PROCEDURE — 82627 DEHYDROEPIANDROSTERONE: CPT

## 2025-01-14 PROCEDURE — 84443 ASSAY THYROID STIM HORMONE: CPT

## 2025-01-15 LAB — DHEA-S SERPL-MCNC: 169 UG/DL (ref 110–431.7)

## 2025-01-20 ENCOUNTER — TELEMEDICINE (OUTPATIENT)
Dept: BEHAVIORAL/MENTAL HEALTH CLINIC | Facility: CLINIC | Age: 25
End: 2025-01-20
Payer: COMMERCIAL

## 2025-01-20 ENCOUNTER — ANNUAL EXAM (OUTPATIENT)
Dept: OBGYN CLINIC | Facility: CLINIC | Age: 25
End: 2025-01-20
Payer: COMMERCIAL

## 2025-01-20 VITALS
BODY MASS INDEX: 24.77 KG/M2 | HEIGHT: 67 IN | DIASTOLIC BLOOD PRESSURE: 88 MMHG | SYSTOLIC BLOOD PRESSURE: 138 MMHG | WEIGHT: 157.8 LBS

## 2025-01-20 DIAGNOSIS — E28.2 PCOS (POLYCYSTIC OVARIAN SYNDROME): ICD-10-CM

## 2025-01-20 DIAGNOSIS — Z01.419 ENCOUNTER FOR GYNECOLOGICAL EXAMINATION (GENERAL) (ROUTINE) WITHOUT ABNORMAL FINDINGS: Primary | ICD-10-CM

## 2025-01-20 DIAGNOSIS — F41.1 GAD (GENERALIZED ANXIETY DISORDER): Primary | ICD-10-CM

## 2025-01-20 PROCEDURE — S0612 ANNUAL GYNECOLOGICAL EXAMINA: HCPCS | Performed by: STUDENT IN AN ORGANIZED HEALTH CARE EDUCATION/TRAINING PROGRAM

## 2025-01-20 PROCEDURE — 90837 PSYTX W PT 60 MINUTES: CPT | Performed by: SOCIAL WORKER

## 2025-01-20 NOTE — PSYCH
"Behavioral Health Psychotherapy Progress Note    Psychotherapy Provided: Individual Psychotherapy     Encounter Diagnoses   Name Primary?   • INES (generalized anxiety disorder) Yes                   Goals addressed in session: Goal 1     DATA:  Nury spoke  today about her feelings re: the  plan to share a letter that she has written with her mom.  Reasons for her fear and resistance in doing this were further explored at length.  Nury shared how quickly she was able to recover from a panic attack this week, as well.  During this session, this clinician used the following therapeutic modalities: Motivational Interviewing and Supportive Psychotherapy    Substance Abuse was not addressed during this session. If the client is diagnosed with a co-occurring substance use disorder, please indicate any changes in the frequency or amount of use: . Stage of change for addressing substance use diagnoses: No substance use/Not applicable    ASSESSMENT:  Nury Hernandez presents with a Euthymic/ normal mood.  Nury again allowed this worker to provide her with support and feedback on this topic.  She was able to consider goals for the session as well as possible outcomes.   her affect is Normal range and intensity, which is congruent, with her mood and the content of the session. Nury Hernandez was oriented to person, place, and time. Grooming and Hygiene were good  and clothing was casually dressed and appropriate for weather. Ability to perform ADLs has not changed. she was cooperative. Nury Hernandez denied suicidal thoughts, homicidal thoughts, and self injurious behaviors.   The client has made progress on their goals.     Nury Hernandez presents with a minimal risk of suicide, minimal risk of self-harm, and minimal risk of harm to others.    For any risk assessment that surpasses a \"low\" rating, a safety plan must be developed.    A safety plan was indicated: no  If yes, describe in detail     PLAN: " Between sessions, Nury Hernandez will continue to utilize therapy sessions to increase her mindfulness and self compassion. . At the next session, the therapist will use Supportive Psychotherapy to address the above in further detail.    Behavioral Health Treatment Plan and Discharge Planning: Nury Hernandez is aware of and agrees to continue to work on their treatment plan. They have identified and are working toward their discharge goals. yes        Visit start and stop times:      01/20/25  Start Time: 1500  Stop Time: 1555  Total Visit Time: 55 minutes  Start Time: 1500  Stop Time: 1555  Total Visit Time: 55 minutes    Virtual Regular Visit    Verification of patient location:    Patient is located at Home in the following state in which I hold an active license [place]      Assessment/Plan:    Problem List Items Addressed This Visit        Behavioral Health    INES (generalized anxiety disorder) - Primary       Goals addressed in session: Goal 1     Depression Follow-up Plan Completed: Yes    Reason for visit is   Chief Complaint   Patient presents with   • Virtual Regular Visit          Encounter provider Clementina Sky      Recent Visits  No visits were found meeting these conditions.  Showing recent visits within past 7 days and meeting all other requirements  Today's Visits  Date Type Provider Dept   01/20/25 Telemedicine Clementina Sky [facility] Therapist [place]   Showing today's visits and meeting all other requirements  Future Appointments  No visits were found meeting these conditions.  Showing future appointments within next 150 days and meeting all other requirements       The patient was identified by name and date of birth. Nury Hernandez was informed that this is a telemedicine visit and that the visit is being conducted throughthe Epic Embedded platform. She agrees to proceed..  My office door was closed. No one else was in the room.  She acknowledged consent and understanding of  privacy and security of the video platform. The patient has agreed to participate and understands they can discontinue the visit at any time.    Patient is aware this is a billable service.     Subjective  Nury Hernandez is a 24 y.o. female  .      HPI     Past Medical History:   Diagnosis Date   • Annual physical exam 2019   • Anxiety    • Asthma    • Dizziness     at times   • Dyspepsia 2021   • Exertional headache 2022   • GERD (gastroesophageal reflux disease)    • Hammertoe of left foot    • Headache     occ   • Hyperlipemia    • Hypermobile joints    • Hypertriglyceridemia 2019   • IBS (irritable bowel syndrome) 2020   • Irritable bowel syndrome    • 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HCC)    • Mast cell disorder    • Migraine    • PCOS (polycystic ovarian syndrome) 2024   • PONV (postoperative nausea and vomiting)    • POTS (postural orthostatic tachycardia syndrome)    •  infant    • Wears glasses        Past Surgical History:   Procedure Laterality Date   • COLONOSCOPY     • EGD AND COLONOSCOPY N/A 1/10/2017    Procedure: EGD AND COLONOSCOPY;  Surgeon: Ozzy Billingsley MD;  Location: [facility];  Service:    • ESOPHAGOGASTRODUODENOSCOPY      with biopsy   • FOOT SURGERY      Excision of lesion feet benign   • DC CORRECTION HAMMERTOE Left 2019    Procedure: 2nd arthrodesis; 5th arthroplasty, flexor tenotomy 2,3,4,5.;  Surgeon: Odilon Berger DPM;  Location: [facility];  Service: Podiatry   • DC CORRECTION HAMMERTOE Left 2020    Procedure: 2ND TOE Revision hammertoe with broken implant;  Surgeon: Odilon Berger DPM;  Location: [facility];  Service: Podiatry   • UPPER GASTROINTESTINAL ENDOSCOPY  2021   • WISDOM TOOTH EXTRACTION         Current Outpatient Medications   Medication Sig Dispense Refill   • acetaminophen (TYLENOL) 500 mg tablet Take 1,000 mg by mouth every 4 (four) hours as needed      • albuterol (2.5 mg/3 mL) 0.083 % nebulizer  solution Take 3 mL (2.5 mg total) by nebulization every 6 (six) hours as needed for wheezing or shortness of breath 30 mL 0   • albuterol (ProAir HFA) 90 mcg/act inhaler Inhale 2 puffs every 6 (six) hours as needed for wheezing or shortness of breath 8.5 g 0   • ASMANEX  MCG/ACT AERO Inhale 2 puffs every 4 (four) hours as needed (2 puffs)     • Atogepant (Qulipta) 60 MG TABS Take 60 mg by mouth in the morning 90 tablet 4   • clindamycin (CLEOCIN T) 1 % lotion Apply topically in the morning To face, chest and back 60 mL 3   • clindamycin 1 % gel Apply topically in the morning To face chest and back 75 mL 5   • cyproheptadine (PERIACTIN) 4 mg tablet Take 1 tablet (4 mg total) by mouth daily at bedtime as needed for allergies (headaches) 30 tablet 0   • dexamethasone (DECADRON) 4 mg tablet Take 2 tabs for 2 days then take 1 tab for 3-5 days (until headaches break then stop) 9 tablet 0   • diphenhydrAMINE (BENADRYL) 25 mg tablet Take 1 tablet (25 mg total) by mouth every 6 (six) hours as needed for itching 30 tablet 0   • Elastic Bandages & Supports (TRUFORM STOCKINGS 20-30MMHG) MISC      • famotidine (PEPCID) 20 mg tablet Take 1 tablet (20 mg total) by mouth daily at bedtime 30 tablet 3   • fludrocortisone (FLORINEF) 0.1 mg tablet Take 0.1 mg by mouth 2 (two) times a day     • Glycopyrronium Tosylate 2.4 % PADS Apply 1 Application topically in the morning 30 each 5   • metoprolol succinate (TOPROL-XL) 50 mg 24 hr tablet Take 50 mg by mouth in the morning and 50 mg before bedtime.     • norgestimate-ethinyl estradiol (ORTHO-CYCLEN) 0.25-35 MG-MCG per tablet Take 1 tablet by mouth daily     • omeprazole (PriLOSEC) 20 mg delayed release capsule Take 1 capsule (20 mg total) by mouth daily 30 capsule 3   • onabotulinumtoxin A (BOTOX) 100 units Inject  as directed. Injection every 91 days for migraines     • ondansetron (Zofran ODT) 4 mg disintegrating tablet Take 1 tablet (4 mg total) by mouth every 6 (six) hours as  needed for nausea or vomiting 30 tablet 0   • ondansetron (ZOFRAN) 8 mg tablet Take 1 tablet (8 mg total) by mouth every 8 (eight) hours as needed for nausea or vomiting 20 tablet 0   • prochlorperazine (COMPAZINE) 10 mg tablet Take 1 tablet (10 mg total) by mouth every 6 (six) hours as needed (migraine) 10 tablet 0   • Prucalopride Succinate (Motegrity) 2 MG TABS Take 2 mg by mouth in the morning 90 tablet 0   • rimegepant sulfate (Nurtec) 75 mg TBDP Take 1 tablet (75 mg total) by mouth as needed (migraine) Limit of 1 in 24 hours 16 tablet 3   • scopolamine (TRANSDERM-SCOP) 1 mg/3 days TD 72 hr patch Place 1 patch on the skin over 72 hours every third day 13 patch 3   • Tenapanor HCl 50 MG TABS Take 50 mg by mouth 2 (two) times a day 60 tablet 3   • tretinoin (RETIN-A) 0.025 % cream Apply a pea sized amount to face one hour before bedtime after moisturizing. Start with 1-2 nights per week and build up to nightly as tolerated. 45 g 2   • tretinoin (RETIN-A) 0.05 % cream Spread one pea-sized amount of medication over entire face about one hour before bedtime. 45 g 3   • verapamil (CALAN) 120 mg tablet Take 1 tab by mouth twice a day 180 tablet 3     No current facility-administered medications for this visit.        Allergies   Allergen Reactions   • Nuts - Food Allergy Other (See Comments)     Avani Nuts - itchy throat   • Other Hives      At surgical site and IV site- not sure if type of cleanser used   • Pollen Extract    • Reyvow [Lasmiditan] Palpitations and Hallucinations

## 2025-01-20 NOTE — PROGRESS NOTES
Nury Hernandez  2000    Assessment and Plan:  Yearly exam without abnormality.     -Pap with intercourse. We reviewed ASCCP guidelines for Pap testing today.    -Happy with OCP    RTO one year for yearly exam or sooner as needed.        CC:  Yearly exam    S:  24 y.o. female here for yearly exam.     G0-virginal   LMP 12/14  Contraception: OCP  Last Pap: None     No smoking,alcohol   Exercises irregularly    Doing ok overall. Earlier this year, at some point after our visit in May, her cycles continued to shorten to just over every 2 weeks. Became very frustrated by this, was seen by gyn at [facility], who recommended OCPs. She had not started those that I had prescribed in May, was hoping to manage her PCOS with diet and exercise. Became frustrated however, eventually started in September. Now has started continuous cycling. Notes that acne has much improved, is happy with menses.     Sexual activity: She is not sexually active. Virginal.      Gardasil:  She has had the Gardasil series.     Family hx of breast cancer: denies   Family hx of ovarian cancer: denies   Family hx of colon cancer: denies  MGM uterine cancer     Denies hot flushes, dyspareunia, abnormal uterine bleeding, urinary/fecal incontinence, changes in energy levels, mood.       Current Outpatient Medications:     acetaminophen (TYLENOL) 500 mg tablet, Take 1,000 mg by mouth every 4 (four) hours as needed , Disp: , Rfl:     albuterol (2.5 mg/3 mL) 0.083 % nebulizer solution, Take 3 mL (2.5 mg total) by nebulization every 6 (six) hours as needed for wheezing or shortness of breath, Disp: 30 mL, Rfl: 0    albuterol (ProAir HFA) 90 mcg/act inhaler, Inhale 2 puffs every 6 (six) hours as needed for wheezing or shortness of breath, Disp: 8.5 g, Rfl: 0    ASMANEX  MCG/ACT AERO, Inhale 2 puffs every 4 (four) hours as needed (2 puffs), Disp: , Rfl:     Atogepant (Qulipta) 60 MG TABS, Take 60 mg by mouth in the morning, Disp: 90 tablet, Rfl: 4     clindamycin (CLEOCIN T) 1 % lotion, Apply topically in the morning To face, chest and back, Disp: 60 mL, Rfl: 3    clindamycin 1 % gel, Apply topically in the morning To face chest and back, Disp: 75 mL, Rfl: 5    cyproheptadine (PERIACTIN) 4 mg tablet, Take 1 tablet (4 mg total) by mouth daily at bedtime as needed for allergies (headaches), Disp: 30 tablet, Rfl: 0    dexamethasone (DECADRON) 4 mg tablet, Take 2 tabs for 2 days then take 1 tab for 3-5 days (until headaches break then stop), Disp: 9 tablet, Rfl: 0    diphenhydrAMINE (BENADRYL) 25 mg tablet, Take 1 tablet (25 mg total) by mouth every 6 (six) hours as needed for itching, Disp: 30 tablet, Rfl: 0    Elastic Bandages & Supports (TRUFORM STOCKINGS 20-30MMHG) MISC, , Disp: , Rfl:     famotidine (PEPCID) 20 mg tablet, Take 1 tablet (20 mg total) by mouth daily at bedtime, Disp: 30 tablet, Rfl: 3    fludrocortisone (FLORINEF) 0.1 mg tablet, Take 0.1 mg by mouth 2 (two) times a day, Disp: , Rfl:     Glycopyrronium Tosylate 2.4 % PADS, Apply 1 Application topically in the morning, Disp: 30 each, Rfl: 5    metoprolol succinate (TOPROL-XL) 50 mg 24 hr tablet, Take 50 mg by mouth in the morning and 50 mg before bedtime., Disp: , Rfl:     norgestimate-ethinyl estradiol (ORTHO-CYCLEN) 0.25-35 MG-MCG per tablet, Take 1 tablet by mouth daily, Disp: , Rfl:     omeprazole (PriLOSEC) 20 mg delayed release capsule, Take 1 capsule (20 mg total) by mouth daily, Disp: 30 capsule, Rfl: 3    onabotulinumtoxin A (BOTOX) 100 units, Inject  as directed. Injection every 91 days for migraines, Disp: , Rfl:     ondansetron (Zofran ODT) 4 mg disintegrating tablet, Take 1 tablet (4 mg total) by mouth every 6 (six) hours as needed for nausea or vomiting, Disp: 30 tablet, Rfl: 0    ondansetron (ZOFRAN) 8 mg tablet, Take 1 tablet (8 mg total) by mouth every 8 (eight) hours as needed for nausea or vomiting, Disp: 20 tablet, Rfl: 0    prochlorperazine (COMPAZINE) 10 mg tablet, Take 1  tablet (10 mg total) by mouth every 6 (six) hours as needed (migraine), Disp: 10 tablet, Rfl: 0    Prucalopride Succinate (Motegrity) 2 MG TABS, Take 2 mg by mouth in the morning, Disp: 90 tablet, Rfl: 0    rimegepant sulfate (Nurtec) 75 mg TBDP, Take 1 tablet (75 mg total) by mouth as needed (migraine) Limit of 1 in 24 hours, Disp: 16 tablet, Rfl: 3    scopolamine (TRANSDERM-SCOP) 1 mg/3 days TD 72 hr patch, Place 1 patch on the skin over 72 hours every third day, Disp: 13 patch, Rfl: 3    Tenapanor HCl 50 MG TABS, Take 50 mg by mouth 2 (two) times a day, Disp: 60 tablet, Rfl: 3    tretinoin (RETIN-A) 0.025 % cream, Apply a pea sized amount to face one hour before bedtime after moisturizing. Start with 1-2 nights per week and build up to nightly as tolerated., Disp: 45 g, Rfl: 2    tretinoin (RETIN-A) 0.05 % cream, Spread one pea-sized amount of medication over entire face about one hour before bedtime., Disp: 45 g, Rfl: 3    verapamil (CALAN) 120 mg tablet, Take 1 tab by mouth twice a day, Disp: 180 tablet, Rfl: 3  Social History     Socioeconomic History    Marital status: Single     Spouse name: Not on file    Number of children: Not on file    Years of education: Not on file    Highest education level: Not on file   Occupational History    Not on file   Tobacco Use    Smoking status: Never    Smokeless tobacco: Never   Vaping Use    Vaping status: Never Used   Substance and Sexual Activity    Alcohol use: No    Drug use: No    Sexual activity: Never   Other Topics Concern    Not on file   Social History Narrative    Lives with parents     Social Drivers of Health     Financial Resource Strain: Not on file   Food Insecurity: Not on file   Transportation Needs: Not on file   Physical Activity: Insufficiently Active (6/15/2022)    Exercise Vital Sign     Days of Exercise per Week: 3 days     Minutes of Exercise per Session: 30 min   Stress: Not on file   Social Connections: Not on file   Intimate Partner Violence:  "Not on file   Housing Stability: Not on file     Family History   Problem Relation Age of Onset    Gestational diabetes Mother     Migraines Mother     Anxiety disorder Father     Hyperlipidemia Father     Autism Brother     Diabetes Other     Uterine cancer Maternal Grandmother     Rheumatic fever Maternal Grandmother     Diabetes Maternal Grandfather     Hypertension Maternal Grandfather     Heart attack Maternal Grandfather     Stroke Maternal Grandfather     Hyperlipidemia Maternal Grandfather     Hypertension Paternal Grandmother     Anxiety disorder Paternal Grandmother     Hypertension Paternal Grandfather       Past Medical History:   Diagnosis Date    Annual physical exam 2019    Anxiety     Asthma     Dizziness     at times    Dyspepsia 2021    Exertional headache 2022    GERD (gastroesophageal reflux disease)     Hammertoe of left foot     Headache     occ    Hyperlipemia     Hypermobile joints     Hypertriglyceridemia 2019    IBS (irritable bowel syndrome) 2020    Irritable bowel syndrome     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HCC)     Mast cell disorder     Migraine     PCOS (polycystic ovarian syndrome) 2024    PONV (postoperative nausea and vomiting)     POTS (postural orthostatic tachycardia syndrome)      infant     Wears glasses       O:  Blood pressure 138/88, height 5' 7\" (1.702 m), weight 71.6 kg (157 lb 12.8 oz), last menstrual period 2024, not currently breastfeeding.    Patient appears well and is not in distress  Neck is supple without masses  Breasts are symmetrical without mass, tenderness, nipple discharge, skin changes or adenopathy.   Abdomen is soft and nontender without masses.   Pelvic exam deferred today  " no

## 2025-01-21 ENCOUNTER — PROCEDURE VISIT (OUTPATIENT)
Dept: NEUROLOGY | Facility: CLINIC | Age: 25
End: 2025-01-21
Payer: COMMERCIAL

## 2025-01-21 VITALS — SYSTOLIC BLOOD PRESSURE: 132 MMHG | DIASTOLIC BLOOD PRESSURE: 86 MMHG | TEMPERATURE: 97.9 F | HEART RATE: 86 BPM

## 2025-01-21 DIAGNOSIS — G43.709 CHRONIC MIGRAINE WITHOUT AURA WITHOUT STATUS MIGRAINOSUS, NOT INTRACTABLE: Primary | ICD-10-CM

## 2025-01-21 PROCEDURE — 64615 CHEMODENERV MUSC MIGRAINE: CPT | Performed by: PHYSICIAN ASSISTANT

## 2025-01-21 NOTE — PROGRESS NOTES
"Universal Protocol   procedure performed by consultantConsent: Verbal consent obtained. Written consent obtained.  Risks and benefits: risks, benefits and alternatives were discussed  Consent given by: patient  Time out: Immediately prior to procedure a \"time out\" was called to verify the correct patient, procedure, equipment, support staff and site/side marked as required.  Patient understanding: patient states understanding of the procedure being performed  Patient consent: the patient's understanding of the procedure matches consent given  Procedure consent: procedure consent matches procedure scheduled  Relevant documents: relevant documents present and verified  Patient identity confirmed: verbally with patient      Chemodenervation     Date/Time  1/21/2025 10:30 AM     Performed by  Monique Coffey PA-C   Authorized by  Monique Coffey PA-C     Pre-procedure details      Prepped With: Alcohol     Anesthesia  (see MAR for exact dosages):     Anesthesia method:  None   Procedure details      Position:  Upright   Botox      Botox Type:  Type A    Brand:  Botox    mL's of Botulinum Toxin:  200    Final Concentration per CC:  50 units    Needle Gauge:  30 G 2.5 inch   Procedures      Botox Procedures: chronic headache      Indications: migraines     Injection Location      Head / Face:  L superior cervical paraspinal, R superior cervical paraspinal, L , R , L frontalis, R frontalis, L medial occipitalis, R medial occipitalis, procerus, R temporalis, L temporalis, R superior trapezius and L superior trapezius    L  injection amount:  5 unit(s)    R  injection amount:  5 unit(s)    L lateral frontalis:  5 unit(s)    R lateral frontalis:  5 unit(s)    L medial frontalis:  5 unit(s)    R medial frontalis:  5 unit(s)    L temporalis injection amount:  20 unit(s)    R temporalis injection amount:  20 unit(s)    Procerus injection amount:  5 unit(s)    L medial occipitalis injection " amount:  15 unit(s)    R medial occipitalis injection amount:  15 unit(s)    L superior cervical paraspinal injection amount:  10 unit(s)    R superior cervical paraspinal injection amount:  10 unit(s)    L superior trapezius injection amount:  15 unit(s)    R superior trapezius injection amount:  15 unit(s)   Total Units      Total units used:  200    Total units discarded:  0   Post-procedure details      Chemodenervation:  Chronic migraine    Facial Nerve Location::  Bilateral facial nerve    Patient tolerance of procedure:  Tolerated well, no immediate complications   Comments          20 units frontalis  5 units right splenius capitis  20 units right lower occipitalis/cervical paraspinal  All medically necessary

## 2025-01-22 ENCOUNTER — TELEPHONE (OUTPATIENT)
Dept: PSYCHIATRY | Facility: CLINIC | Age: 25
End: 2025-01-22

## 2025-01-24 ENCOUNTER — APPOINTMENT (OUTPATIENT)
Dept: LAB | Facility: HOSPITAL | Age: 25
End: 2025-01-24
Payer: COMMERCIAL

## 2025-01-24 DIAGNOSIS — R68.89 HEAT INTOLERANCE: ICD-10-CM

## 2025-01-24 LAB
T3 SERPL-MCNC: 1.9 NG/ML (ref 0.9–1.8)
T4 SERPL-MCNC: 12.49 UG/DL (ref 6.09–12.23)
TSH SERPL DL<=0.05 MIU/L-ACNC: 1.04 UIU/ML (ref 0.45–4.5)

## 2025-01-24 PROCEDURE — 84443 ASSAY THYROID STIM HORMONE: CPT

## 2025-01-24 PROCEDURE — 86376 MICROSOMAL ANTIBODY EACH: CPT

## 2025-01-24 PROCEDURE — 36415 COLL VENOUS BLD VENIPUNCTURE: CPT

## 2025-01-24 PROCEDURE — 84436 ASSAY OF TOTAL THYROXINE: CPT

## 2025-01-24 PROCEDURE — 86800 THYROGLOBULIN ANTIBODY: CPT

## 2025-01-24 PROCEDURE — 84480 ASSAY TRIIODOTHYRONINE (T3): CPT

## 2025-01-25 LAB
THYROGLOB AB SERPL-ACNC: <1 IU/ML (ref 0–0.9)
THYROPEROXIDASE AB SERPL-ACNC: 14 IU/ML (ref 0–34)

## 2025-01-27 ENCOUNTER — TELEPHONE (OUTPATIENT)
Age: 25
End: 2025-01-27

## 2025-01-27 ENCOUNTER — SOCIAL WORK (OUTPATIENT)
Dept: BEHAVIORAL/MENTAL HEALTH CLINIC | Facility: CLINIC | Age: 25
End: 2025-01-27
Payer: COMMERCIAL

## 2025-01-27 DIAGNOSIS — F41.1 GAD (GENERALIZED ANXIETY DISORDER): Primary | ICD-10-CM

## 2025-01-27 DIAGNOSIS — R79.89 ABNORMAL THYROID BLOOD TEST: Primary | ICD-10-CM

## 2025-01-27 PROCEDURE — 90837 PSYTX W PT 60 MINUTES: CPT | Performed by: SOCIAL WORKER

## 2025-01-27 NOTE — BH TREATMENT PLAN
"Outpatient Behavioral Health Psychotherapy Treatment Plan    Nury Hernandez    Date of Initial Psychotherapy Assessment:   Date of Current Treatment Plan: 01/27/25  Treatment Plan Target Date:7/27/25  Treatment Plan Expiration Date: 7/27/25  Diagnosis:   1. INES (generalized anxiety disorder)                Area(s) of Need: tapering off anxiety medication    Long Term Goal 1 (in the client's own words): I want to continue my work to overcome my fears of being in a relationship.     Stage of Change: Action    Target Date for completion: 7/27/25     Anticipated therapeutic modalities: Supportive Psychotherapy      People identified to complete this goal: Clementina Arrington       Objective 1: (identify the means of measuring success in meeting the objective): - I will continue to work in therapy to develop standards as I do not ever want to \"settle.\"  .   .         Objective 2: (identify the means of measuring success in meeting the objective):  I will continue to practice expressing myself, establishing and maintaining boundaries through assertive and vulnerable communication.        I am currently under the care of a [facility] psychiatric provider: yes    My [facility] psychiatric provider is: Dr. Jessica    I am currently taking psychiatric medications: Yes, as prescribed    I feel that I will be ready for discharge from mental health care when I reach the following (measurable goal/objective): When I am able to successfully manage my anxiety prior to and during stressful situations        I have created my Crisis Plan and have been offered a copy of this plan    Behavioral Health Treatment Plan [facility]: Diagnosis and Treatment Plan explained to Nury Hernandez acknowledges an understanding of their diagnosis. Nury Hernandez agrees to this treatment plan.    I have been offered a copy of this Treatment Plan. Yes                                                                   Outpatient " Behavioral Health Psychotherapy Treatment Plan    Nury Hernandez

## 2025-01-27 NOTE — PSYCH
"Behavioral Health Psychotherapy Progress Note    Psychotherapy Provided: Individual Psychotherapy     Encounter Diagnoses   Name Primary?   • INES (generalized anxiety disorder) Yes                     Goals addressed in session: Goal 1     DATA:  Nury spoke  further today about her feelings re: the  plan to share a letter that she has written with her mom during a therapy session here in two weeks.  Reasons for her fear and resistance in doing this were further explored at length.   During this session, this clinician used the following therapeutic modalities: Motivational Interviewing and Supportive Psychotherapy    Substance Abuse was not addressed during this session. If the client is diagnosed with a co-occurring substance use disorder, please indicate any changes in the frequency or amount of use: . Stage of change for addressing substance use diagnoses: No substance use/Not applicable    ASSESSMENT:  Nury Hernandez presents with a Euthymic/ normal mood.  Nury again allowed this worker to provide her with support and feedback on this topic.  She was able to consider goals for the session as well as possible outcomes.   her affect is Normal range and intensity, which is congruent, with her mood and the content of the session. Nury Hernandez was oriented to person, place, and time. Grooming and Hygiene were good  and clothing was casually dressed and appropriate for weather. Ability to perform ADLs has not changed. she was cooperative. Nury Hernandez denied suicidal thoughts, homicidal thoughts, and self injurious behaviors.   The client has made progress on their goals.     Nury Hernandez presents with a minimal risk of suicide, minimal risk of self-harm, and minimal risk of harm to others.    For any risk assessment that surpasses a \"low\" rating, a safety plan must be developed.    A safety plan was indicated: no  If yes, describe in detail     PLAN: Between sessions, Nury Hernandez will " continue to utilize therapy sessions to increase her mindfulness and self compassion. . At the next session, the therapist will use Supportive Psychotherapy to address the above in further detail.    Behavioral Health Treatment Plan and Discharge Planning: Nury Hernandez is aware of and agrees to continue to work on their treatment plan. They have identified and are working toward their discharge goals. yes        Visit start and stop times:      01/27/25  Start Time: 1050  Stop Time: 1145  Total Visit Time: 55 minutes

## 2025-01-27 NOTE — TELEPHONE ENCOUNTER
The patient would like a call from Dr. Reyes regarding some elevated results and wants to know if she needs an appointment with her or if she needs to go see the endocrinologist.    Please Advice

## 2025-01-27 NOTE — TELEPHONE ENCOUNTER
Spoke with the patient whose most recent TSH was normal with slightly elevated total T3 and total T4.  These were ordered by her GYN endocrine that treats her for PCOS.  Her TSH has been low or low normal with normal free T4.she has had symptoms pointing to hyperthyroidism.  They recommended she see endocrinology and I agree.  Referral entered

## 2025-02-03 ENCOUNTER — SOCIAL WORK (OUTPATIENT)
Dept: BEHAVIORAL/MENTAL HEALTH CLINIC | Facility: CLINIC | Age: 25
End: 2025-02-03
Payer: COMMERCIAL

## 2025-02-03 DIAGNOSIS — F41.1 GAD (GENERALIZED ANXIETY DISORDER): Primary | ICD-10-CM

## 2025-02-03 PROCEDURE — 90837 PSYTX W PT 60 MINUTES: CPT | Performed by: SOCIAL WORKER

## 2025-02-03 NOTE — PSYCH
"Behavioral Health Psychotherapy Progress Note    Psychotherapy Provided: Individual Psychotherapy     Encounter Diagnoses   Name Primary?   • INES (generalized anxiety disorder) Yes                       Goals addressed in session: Goal 1     DATA:  Nury spoke  further today about her feelings re: the  plan to share a letter that she has written with her mom during a therapy session here next week.  Reasons for her fear and resistance in doing this were further explored again at length.   During this session, this clinician used the following therapeutic modalities: Motivational Interviewing and Supportive Psychotherapy    Substance Abuse was not addressed during this session. If the client is diagnosed with a co-occurring substance use disorder, please indicate any changes in the frequency or amount of use: . Stage of change for addressing substance use diagnoses: No substance use/Not applicable    ASSESSMENT:  Nury Hernandez presents with a Euthymic/ normal mood.  Nury again allowed this worker to provide her with support and feedback on this topic.  She was able to consider goals for the session as well as possible outcomes.   her affect is Normal range and intensity, which is congruent, with her mood and the content of the session. Nury Hernandez was oriented to person, place, and time. Grooming and Hygiene were good  and clothing was casually dressed and appropriate for weather. Ability to perform ADLs has not changed. she was cooperative. Nury Hernandez denied suicidal thoughts, homicidal thoughts, and self injurious behaviors.   The client has made progress on their goals.     Nury Hernandez presents with a minimal risk of suicide, minimal risk of self-harm, and minimal risk of harm to others.    For any risk assessment that surpasses a \"low\" rating, a safety plan must be developed.    A safety plan was indicated: no  If yes, describe in detail     PLAN: Between sessions, Nury Hernandez " will continue to utilize therapy sessions to increase her mindfulness and self compassion. . At the next session, the therapist will use Supportive Psychotherapy to address the above in further detail.    Behavioral Health Treatment Plan and Discharge Planning: Nury Hernandez is aware of and agrees to continue to work on their treatment plan. They have identified and are working toward their discharge goals. yes        Visit start and stop times:      02/03/25  Start Time: 1100  Stop Time: 1155  Total Visit Time: 55 minutes

## 2025-02-07 NOTE — MISCELLANEOUS
Message   Recorded as Task   Date: 03/13/2017 10:48 AM, Created By: Deena Ahmadi   Task Name: Medical Complaint Callback   Assigned To: Jess Perla   Regarding Patient: Lakesha Chin, Status: Active   Bertha Mendes - 13 Mar 2017 10:48 AM     TASK CREATED  Medical Complaint; [phone]  MOM IS CONCERNED W/ PHYSCH DR( DR Nicole Mathur) PRESCRIBING NEW MEDICATIONS AND REQESTING HER TO STOP AMITRPTYLINE AND START SYMBALTA  WOULD LIKE TO DISCUSS IF THIS IS OKAY OR WILL ABD PAIN RETURN   Jess Perla - 13 Mar 2017 11:05 AM     TASK REASSIGNED: Previously Assigned To Jess Perla Leo - 13 Mar 2017 12:39 PM     TASK REPLIED TO: Previously Assigned To Price Meter  will need to taper off to transition to Cymbalta  OK to do, if pain comes back we revise plan   Jess Perla - 13 Mar 2017 1:56 PM     TASK EDITED  MOM AWARE OF PLAN THAT SHE NEEDS TO SPEAK WITH PSYCH DR REGARDING TAPERING MED  Active Problems    1  Abdominal pain, epigastric (789 06) (R10 13)   2  Abnormal weight loss (783 21) (R63 4)   3  Acute sinusitis (461 9) (J01 90)   4  Cough (786 2) (R05)   5  Nausea (787 02) (R11 0)   6  Regurgitation (787 03) (R11 10)    Current Meds   1  Amitriptyline HCl - 10 MG Oral Tablet; TAKE 25 mg ( 2 1/2  tabs ) at bedtime; Therapy: [id number] to (Evaluate:[id number])  Requested for: 47RNV3242; Last   Rx:[id number] Ordered   2  Amoxicillin-Pot Clavulanate 875-125 MG Oral Tablet (Augmentin); TAKE 1 TABLET   EVERY 12 HOURS DAILY; Therapy: [id number] to (Dmitriy Sands)  Requested for: 93Zqt8618; Last   Rx:[id number] Ordered   3  Omeprazole 40 MG Oral Capsule Delayed Release; TAKE ONE CAPSULE BY MOUTH   EVERY DAY; Therapy: 79KPD1696 to (Last Rx:81Egw1846)  Requested for: 37Uav3042 Ordered   4  Zoloft 25 MG Oral Tablet (Sertraline HCl); Therapy: (Recorded:22Jqg0619) to Recorded    Allergies    1  No Known Drug Allergies    2   Seasonal    Signatures   Electronically signed by Normal gait and station , no tenderness or deformities present. Upper and lower extremities normal. Pinky Meckel, ; Mar 13 2017  1:57PM EST                       (Author)

## 2025-02-11 ENCOUNTER — SOCIAL WORK (OUTPATIENT)
Dept: BEHAVIORAL/MENTAL HEALTH CLINIC | Facility: CLINIC | Age: 25
End: 2025-02-11
Payer: COMMERCIAL

## 2025-02-11 DIAGNOSIS — F41.1 GAD (GENERALIZED ANXIETY DISORDER): Primary | ICD-10-CM

## 2025-02-11 PROCEDURE — 90847 FAMILY PSYTX W/PT 50 MIN: CPT | Performed by: SOCIAL WORKER

## 2025-02-11 NOTE — PSYCH
"Behavioral Health Psychotherapy Progress Note    Psychotherapy Provided: Family Therapy    Encounter Diagnoses   Name Primary?   • INES (generalized anxiety disorder) Yes       Goals addressed in session: Goal 1     DATA:  Nury was seen jointly with her mother today for the purpose of sharing a letter that she has written about how she has been impacted by her parents' divorce.   During this session, this clinician used the following therapeutic modalities: Motivational Interviewing and Supportive Psychotherapy    Substance Abuse was not addressed during this session. If the client is diagnosed with a co-occurring substance use disorder, please indicate any changes in the frequency or amount of use: . Stage of change for addressing substance use diagnoses: No substance use/Not applicable    ASSESSMENT:  Nury Hernandez presents with a Anxious mood.  Nury again allowed this worker to provide her with support and feedback on this topic.  She was able to consider goals for the session as well as possible outcomes.   her affect is Tearful, which is congruent, with her mood and the content of the session. Nury Hernandez was oriented to person, place, and time. Grooming and Hygiene were good  and clothing was casually dressed and appropriate for weather. Ability to perform ADLs has not changed. she was cooperative. Nury Hernandez denied suicidal thoughts, homicidal thoughts, and self injurious behaviors.   The client has made progress on their goals.     Nury Hernandez presents with a minimal risk of suicide, minimal risk of self-harm, and minimal risk of harm to others.    For any risk assessment that surpasses a \"low\" rating, a safety plan must be developed.    A safety plan was indicated: no  If yes, describe in detail     PLAN: Between sessions, Nury Hernandez will continue to utilize therapy sessions to increase her mindfulness and self compassion. . At the next session, the therapist will use " Supportive Psychotherapy to address the above in further detail.    Behavioral Health Treatment Plan and Discharge Planning: Nury Hernandez is aware of and agrees to continue to work on their treatment plan. They have identified and are working toward their discharge goals. yes        Visit start and stop times:      02/11/25  Start Time: 1100  Stop Time: 1155  Total Visit Time: 55 minutes

## 2025-02-20 ENCOUNTER — SOCIAL WORK (OUTPATIENT)
Dept: BEHAVIORAL/MENTAL HEALTH CLINIC | Facility: CLINIC | Age: 25
End: 2025-02-20
Payer: COMMERCIAL

## 2025-02-20 DIAGNOSIS — F41.1 GAD (GENERALIZED ANXIETY DISORDER): Primary | ICD-10-CM

## 2025-02-20 PROCEDURE — 90837 PSYTX W PT 60 MINUTES: CPT | Performed by: SOCIAL WORKER

## 2025-02-20 NOTE — PSYCH
"Behavioral Health Psychotherapy Progress Note    Psychotherapy Provided: Individual Psychotherapy     Encounter Diagnoses   Name Primary?   • INES (generalized anxiety disorder) Yes         Goals addressed in session: Goal 1     DATA:  Nury utilized today's session to process the reflections that she journaled about following last week's therapy session with her mother.  She shared ways that she wished her mother had been able to accept responsibility for the things that occurred following the divorce while also admitting that her anxiety about sharing this letter with her mother was irrational.    During this session, this clinician used the following therapeutic modalities: Motivational Interviewing and Supportive Psychotherapy    Substance Abuse was not addressed during this session. If the client is diagnosed with a co-occurring substance use disorder, please indicate any changes in the frequency or amount of use: . Stage of change for addressing substance use diagnoses: No substance use/Not applicable    ASSESSMENT:  Nury Henrandez presents with a Euthymic/ normal mood.  Nury again allowed this worker to provide her with support and feedback on this topic.  She was able to consider potential future conversations with her mother as a follow up to last week.   her affect is Normal range and intensity, which is congruent, with her mood and the content of the session. Nury Hernandez was oriented to person, place, and time. Grooming and Hygiene were good  and clothing was casually dressed and appropriate for weather. Ability to perform ADLs has not changed. she was cooperative. Nury Hernandez denied suicidal thoughts, homicidal thoughts, and self injurious behaviors.   The client has made progress on their goals.     Nury Hernandez presents with a minimal risk of suicide, minimal risk of self-harm, and minimal risk of harm to others.    For any risk assessment that surpasses a \"low\" rating, a " safety plan must be developed.    A safety plan was indicated: no  If yes, describe in detail     PLAN: Between sessions, Nury Hernandez will continue to utilize therapy sessions to increase her mindfulness and self compassion. . At the next session, the therapist will use Supportive Psychotherapy to address the above in further detail.    Behavioral Health Treatment Plan and Discharge Planning: Nury Hernandez is aware of and agrees to continue to work on their treatment plan. They have identified and are working toward their discharge goals. yes        Visit start and stop times:      02/20/25  Start Time: 1100  Stop Time: 1155  Total Visit Time: 55 minutes

## 2025-02-24 ENCOUNTER — TELEPHONE (OUTPATIENT)
Age: 25
End: 2025-02-24

## 2025-02-24 NOTE — TELEPHONE ENCOUNTER
Pt was told by provider Adonay to call in sched appt due to their discussion soonest available is 4/14/25. I did try to offer pt other providers and locations but she was unable to take any due to work so she took first available with provider. I did add pt to wait list but pt would like a call back to be sched sooner if possible.

## 2025-02-26 ENCOUNTER — SOCIAL WORK (OUTPATIENT)
Dept: BEHAVIORAL/MENTAL HEALTH CLINIC | Facility: CLINIC | Age: 25
End: 2025-02-26
Payer: COMMERCIAL

## 2025-02-26 DIAGNOSIS — F41.1 GAD (GENERALIZED ANXIETY DISORDER): Primary | ICD-10-CM

## 2025-02-26 PROCEDURE — 90837 PSYTX W PT 60 MINUTES: CPT | Performed by: SOCIAL WORKER

## 2025-02-26 NOTE — TELEPHONE ENCOUNTER
Spoke with patient, scheduled earlier appt for Monday. Will keep appt in April just in case for follow-up please advise

## 2025-02-26 NOTE — PSYCH
"Behavioral Health Psychotherapy Progress Note    Psychotherapy Provided: Individual Psychotherapy     Encounter Diagnoses   Name Primary?   • INES (generalized anxiety disorder) Yes           Goals addressed in session: Goal 1     DATA:  Nury utilized today's session to process the reflections that she journaled about following a text conversation with her father about the money that she \"owes him\" for the purchase of her house.  She shared ways that this correlates with \"money fights\" between her parents since the divorce while also addressing residual feelings about her family session.    During this session, this clinician used the following therapeutic modalities: Motivational Interviewing and Supportive Psychotherapy    Substance Abuse was not addressed during this session. If the client is diagnosed with a co-occurring substance use disorder, please indicate any changes in the frequency or amount of use: . Stage of change for addressing substance use diagnoses: No substance use/Not applicable    ASSESSMENT:  Nury Hernandez presents with a Euthymic/ normal mood.  Nury again allowed this worker to provide her with support and feedback on this topic.  She was able to plan for the conversation she will have this weekend with her father as a follow up to these texts. .   her affect is Normal range and intensity, which is congruent, with her mood and the content of the session. Nury Hernandez was oriented to person, place, and time. Grooming and Hygiene were good  and clothing was casually dressed and appropriate for weather. Ability to perform ADLs has not changed. she was cooperative. Nury Hernandez denied suicidal thoughts, homicidal thoughts, and self injurious behaviors.   The client has made progress on their goals.     Nury Hernandez presents with a minimal risk of suicide, minimal risk of self-harm, and minimal risk of harm to others.    For any risk assessment that surpasses a \"low\" " rating, a safety plan must be developed.    A safety plan was indicated: no  If yes, describe in detail     PLAN: Between sessions, Nury Hernandez will continue to utilize therapy sessions to increase her mindfulness and self compassion. . At the next session, the therapist will use Supportive Psychotherapy to address the above in further detail.    Behavioral Health Treatment Plan and Discharge Planning: Nury Hernandez is aware of and agrees to continue to work on their treatment plan. They have identified and are working toward their discharge goals. yes        Visit start and stop times:      02/26/25  Start Time: 0800  Stop Time: 0855  Total Visit Time: 55 minutes

## 2025-03-03 ENCOUNTER — OFFICE VISIT (OUTPATIENT)
Dept: OBGYN CLINIC | Facility: CLINIC | Age: 25
End: 2025-03-03
Payer: COMMERCIAL

## 2025-03-03 ENCOUNTER — OFFICE VISIT (OUTPATIENT)
Dept: NEUROLOGY | Facility: CLINIC | Age: 25
End: 2025-03-03
Payer: COMMERCIAL

## 2025-03-03 VITALS — SYSTOLIC BLOOD PRESSURE: 116 MMHG | BODY MASS INDEX: 24.75 KG/M2 | WEIGHT: 158 LBS | DIASTOLIC BLOOD PRESSURE: 68 MMHG

## 2025-03-03 VITALS
TEMPERATURE: 97.6 F | SYSTOLIC BLOOD PRESSURE: 134 MMHG | DIASTOLIC BLOOD PRESSURE: 84 MMHG | HEART RATE: 90 BPM | BODY MASS INDEX: 24.33 KG/M2 | HEIGHT: 67 IN | WEIGHT: 155 LBS

## 2025-03-03 DIAGNOSIS — G43.709 CHRONIC MIGRAINE WITHOUT AURA WITHOUT STATUS MIGRAINOSUS, NOT INTRACTABLE: Primary | ICD-10-CM

## 2025-03-03 DIAGNOSIS — M62.89 PELVIC FLOOR DYSFUNCTION: ICD-10-CM

## 2025-03-03 DIAGNOSIS — G43.011 INTRACTABLE MIGRAINE WITHOUT AURA AND WITH STATUS MIGRAINOSUS: ICD-10-CM

## 2025-03-03 DIAGNOSIS — G43.009 MIGRAINE WITHOUT AURA AND WITHOUT STATUS MIGRAINOSUS, NOT INTRACTABLE: ICD-10-CM

## 2025-03-03 DIAGNOSIS — B37.31 YEAST VAGINITIS: Primary | ICD-10-CM

## 2025-03-03 LAB
BV WHIFF TEST VAG QL: ABNORMAL
CLUE CELLS SPEC QL WET PREP: ABNORMAL
PH SMN: 4 [PH]
T VAGINALIS VAG QL WET PREP: ABNORMAL
YEAST VAG QL WET PREP: ABNORMAL

## 2025-03-03 PROCEDURE — 87210 SMEAR WET MOUNT SALINE/INK: CPT | Performed by: STUDENT IN AN ORGANIZED HEALTH CARE EDUCATION/TRAINING PROGRAM

## 2025-03-03 PROCEDURE — 99214 OFFICE O/P EST MOD 30 MIN: CPT | Performed by: PHYSICIAN ASSISTANT

## 2025-03-03 PROCEDURE — 99214 OFFICE O/P EST MOD 30 MIN: CPT | Performed by: STUDENT IN AN ORGANIZED HEALTH CARE EDUCATION/TRAINING PROGRAM

## 2025-03-03 RX ORDER — VERAPAMIL HYDROCHLORIDE 120 MG/1
TABLET ORAL
Qty: 180 TABLET | Refills: 3 | Status: SHIPPED | OUTPATIENT
Start: 2025-03-03

## 2025-03-03 RX ORDER — RIMEGEPANT SULFATE 75 MG/75MG
75 TABLET, ORALLY DISINTEGRATING ORAL AS NEEDED
Qty: 16 TABLET | Refills: 11 | Status: SHIPPED | OUTPATIENT
Start: 2025-03-03

## 2025-03-03 RX ORDER — PROCHLORPERAZINE MALEATE 10 MG
10 TABLET ORAL EVERY 6 HOURS PRN
Qty: 10 TABLET | Refills: 0 | Status: SHIPPED | OUTPATIENT
Start: 2025-03-03

## 2025-03-03 RX ORDER — ATOGEPANT 60 MG/1
60 TABLET ORAL DAILY
Qty: 90 TABLET | Refills: 4 | Status: SHIPPED | OUTPATIENT
Start: 2025-03-03

## 2025-03-03 RX ORDER — FLUCONAZOLE 150 MG/1
150 TABLET ORAL DAILY
Qty: 2 TABLET | Refills: 0 | Status: SHIPPED | OUTPATIENT
Start: 2025-03-03 | End: 2025-03-05

## 2025-03-03 NOTE — PROGRESS NOTES
Name: Nury Hernandez      : 2000      MRN: [id number]  Encounter Provider: Padmini Urias MD  Encounter Date: 3/3/2025   Encounter department: [facility] [place]  :  Assessment & Plan  Yeast vaginitis  - yeast on wet mount. With prior recurrent vs. Resistant yeast, but not in appx 1 year, so no need for suppression at this time, will ctm for same    Orders:    fluconazole (DIFLUCAN) 150 mg tablet; Take 1 tablet (150 mg total) by mouth daily for 2 doses Take one today then another in 72 hours    POCT wet mount    Pelvic floor dysfunction  - visible on exam with retraction of perineum. Bimanual not performed  Orders:    Ambulatory referral to Physical Therapy; Future        History of Present Illness   HPI  Nury Hernandez is a 24 y.o. female who presents for evaluation for possible yeast vaginitis. She has a history of the same, with significant sequelae, so she wanted to be evaluated ASAP. She notes discharge, which varies between yellow and white, but is often very thick. Slight irritation, but no odor. These are the same symptoms as her previous yeast infections. Started since she started taking OCP continuously.        Review of Systems as above       Objective   /68 (BP Location: Left arm, Patient Position: Sitting, Cuff Size: Standard)   Wt 71.7 kg (158 lb)   LMP 02/15/2024 (Exact Date)   BMI 24.75 kg/m²      Physical Exam  Vitals and nursing note reviewed.   Constitutional:       General: She is not in acute distress.     Appearance: She is well-developed.   HENT:      Head: Normocephalic and atraumatic.   Cardiovascular:      Rate and Rhythm: Normal rate.   Pulmonary:      Effort: Pulmonary effort is normal. No respiratory distress.   Abdominal:      Palpations: Abdomen is soft.   Genitourinary:     General: Normal vulva.      Vagina: Vaginal discharge present.   Musculoskeletal:         General: No swelling, tenderness or deformity. Normal range  of motion.   Skin:     General: Skin is warm and dry.      Capillary Refill: Capillary refill takes less than 2 seconds.   Neurological:      Mental Status: She is alert.   Psychiatric:         Mood and Affect: Mood normal.

## 2025-03-03 NOTE — PROGRESS NOTES
Name: Nury Hernandez      : 2000      MRN: [id number]  Encounter Provider: Monique Coffey PA-C  Encounter Date: 3/3/2025   Enc[facility]: [facility]  :  Assessment & Plan  Chronic migraine without aura without status migrainosus, not intractable  Preventive therapy:  - magnesium oxide 400 mg  And  Vitamin B2 200 mg  For one month and see how pt does with out it. If no change in headaches then stay off of it.   -  Verapamil 120 mg twice a day   -  Continue Qulipta 60 mg daily  -  Continue Botox every 12 weeks  -  Starting day before botox Decadron 1 mg for 3 days  Abortive therapy:   - at the onset of her migraine headache, take Nurtec 75 mg.  Limit of 1 in 24 hours.  If this fails then use Rizatriptan 10 mg at onset of migraine.  May repeat in 2 hours if needed.  Limit of 3 in a week or 12 month   - Use prochlorperazine 10 mg with the rizatriptan.  May repeat in 6-8 hours if needed  - Take naproxen with the above  - IF at home use benadryl 25-50 mg with the above rescue  Over the counter medications for headaches to less than 3 doses a week       Migraine without aura and without status migrainosus, not intractable    Orders:  •  Atogepant (Qulipta) 60 MG TABS; Take 60 mg by mouth in the morning  •  rimegepant sulfate (Nurtec) 75 mg TBDP; Take 1 tablet (75 mg total) by mouth as needed (migraine) Limit of 1 in 24 hours    Intractable migraine without aura and with status migrainosus    Orders:  •  prochlorperazine (COMPAZINE) 10 mg tablet; Take 1 tablet (10 mg total) by mouth every 6 (six) hours as needed (migraine)  •  verapamil (CALAN) 120 mg tablet; Take 1 tab by mouth twice a day          History of Present Illness   HPI   Nury Hernandez is a 24 y.o. female         Prior to botox patient had near daily headaches ranging moderate to severe. Since starting botox increased head free days and headaches are more responsive to abortive medication. With botox has had a  reduction of at least 7 migraine days with less abortive medication, less ER visits which correlates to headache diary      Headaches with exercise are improved, stress levels are improved. 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       Current medical illnesses:  QTc:  June 5, 2017 426 ms  5/15/2022 513 ms     POTS:  Sees cardio at [place] Dr. Rhea abraham-yearly  Was diagnosed in 2016  Is doing very well  She is active, able to exercise.  She walks regularly, does a stair stepper.  Drinking lots of water, gatorade  She is on Florinef and metoprolol.     IBS  mast cell activation syndrome  Malachi-Danlos syndrome  Anxiety:  Sees Carin Vallejo and a counselor  Non celiac gluten sensitivity  Celiac artery stenosis      No history of tobacco use         What medications do you take or have you taken for your headaches?   Current Preventive:   Zyrtec  (mast cell related), Cyproheptadine  Fludrocortisone (POTS)  Metoprolol (POTS), verapamil  Motegrity  omega-3, riboflavin  modafinil  Botox  Qulipta     Current Abortive:   Naproxen, Tylenol   Nurtec   Dexamethasone  Benadryl  Reglan, compazine      Prior Preventive:   vitamin-D, multivitamin  Benadryl, Atarax   Zoloft, Cymbalta, venlafaxine -has been being lowered by her psychiatrist,   Gabapentin   Robaxin   Cyproheptadine  Xanax     Prior Abortive:   Percocet, Dilaudid, fentanyl, Fioricet  Toradol As of 11/2021 only helps for 24 hours), naproxen, ibuprofen  Zofran Reglan  Indomethacin-used to break headache cycle  Depakote-used to break headache cycle  Benadryl   Decadron, prednisone   Tylenol  Ubrelvy,   Decadron   Reyvow      Current pain 0/10     Mild headaches: 2-3 a month, prior to Botox almost daily  Moderate to severe headaches: 1-3 a month with her botox, 4-5 per week the last month prior to botox  Prior to botox had moderate to severe headaches daily        Are you ever headache free? yes for at least the first 2 months after botox.       Aura/Warning and how long does it last?   - White spots in front of her visual field, seconds, with severe headaches only   - POTS headache is usually preceded by palpitation and flushing.  This was more when she was having random headaches not persistant        What time of the day do the headaches start? varies--typically late afternoon/evening       How long do the headaches last?   First 2 months after botox-a few hours with medications (6+ hours)  Month prior to botox-mild headaches are continuous, moderate to severe 8 hours     Where is your headache located?   Mild headaches: frontal  Moderate to severe headaches: frontalis, retro-orbital, occipitalis and neck bilaterally (worse when in occipitalis)      Describe your usual headache?   Mild headaches: nagging and aching with pressure  Moderate to severe headaches: throbbing and pressure     What is the intensity of pain?   Mild headaches: 3-4/10   Moderate to severe headaches: 7-9/10      Associated symptoms:   - Nausea, vomitting   - Photophobia, Osmophobia   - Problems with concentration   - Insomnia (waking up frequently in middle of night)   - Prefer to be in a dark room      Number of days missed per month because of headaches:   Work (or school) days: doesn't miss  Social or Family activities: none     Headache are worse if the patient: Bending over, exertion-improved recently, fatigue  Headache triggers: weather changes. Barometric changes, chocolate, stress/fatigue, hormonal  What time of the year do headaches occur more frequently? More summer POTS flares      Have you had trigger point injection performed and how often? No   Have you had Botox injection performed and how often? No   Have you had epidural injections or transforaminal injections performed? No      Alternative therapies used in the past for headaches? No      Have you used CBD or THC for your headaches and how often? No      How many caffeine products to drink a day? 1-2   How much  "water to drink a day? 64oz      Are you current pregnant or planning on getting pregnant?  is not currently on birth control, was previously on depo shots.  Doesn't have a partner currently     Have you ever had any Brain imaging? Yes   02/09/2020-CT head   no acute intracranial abnormality     10/24/2021 MRI brain   No acute infarction, intracranial hemorrhage or mass .     Review of Systems I have personally reviewed the MA's review of systems and made changes as necessary.         Objective   /84 (BP Location: Left arm, Patient Position: Sitting, Cuff Size: Standard)   Pulse 90   Temp 97.6 °F (36.4 °C) (Temporal)   Ht 5' 7\" (1.702 m)   Wt 70.3 kg (155 lb)   BMI 24.28 kg/m²     Physical Exam  Neurological Exam  CONSTITUTIONAL: Well developed, well nourished, well groomed. No dysmorphic features.     HEENT:  Normocephalic atraumatic.    Chest:  Respirations regular and unlabored.    Psychiatric:  Normal behavior and appropriate affect      MENTAL STATUS  Orientation: Alert and oriented x 3  Fund of knowledge: Intact.        Administrative Statements   I have spent a total time of 34 minutes in caring for this patient on the day of the visit/encounter including Prognosis, Impressions, Counseling / Coordination of care, Documenting in the medical record, Reviewing/placing orders in the medical record (including tests, medications, and/or procedures), and Obtaining or reviewing history  .  "

## 2025-03-03 NOTE — ASSESSMENT & PLAN NOTE
Preventive therapy:  - magnesium oxide 400 mg  And  Vitamin B2 200 mg  For one month and see how pt does with out it. If no change in headaches then stay off of it.   -  Verapamil 120 mg twice a day   -  Continue Qulipta 60 mg daily  -  Continue Botox every 12 weeks  -  Starting day before botox Decadron 1 mg for 3 days  Abortive therapy:   - at the onset of her migraine headache, take Nurtec 75 mg.  Limit of 1 in 24 hours.  If this fails then use Rizatriptan 10 mg at onset of migraine.  May repeat in 2 hours if needed.  Limit of 3 in a week or 12 month   - Use prochlorperazine 10 mg with the rizatriptan.  May repeat in 6-8 hours if needed  - Take naproxen with the above  - IF at home use benadryl 25-50 mg with the above rescue  Over the counter medications for headaches to less than 3 doses a week

## 2025-03-03 NOTE — ASSESSMENT & PLAN NOTE
Orders:  •  Atogepant (Qulipta) 60 MG TABS; Take 60 mg by mouth in the morning  •  rimegepant sulfate (Nurtec) 75 mg TBDP; Take 1 tablet (75 mg total) by mouth as needed (migraine) Limit of 1 in 24 hours

## 2025-03-06 ENCOUNTER — SOCIAL WORK (OUTPATIENT)
Dept: BEHAVIORAL/MENTAL HEALTH CLINIC | Facility: CLINIC | Age: 25
End: 2025-03-06
Payer: COMMERCIAL

## 2025-03-06 DIAGNOSIS — F41.1 GAD (GENERALIZED ANXIETY DISORDER): Primary | ICD-10-CM

## 2025-03-06 PROCEDURE — 90837 PSYTX W PT 60 MINUTES: CPT | Performed by: SOCIAL WORKER

## 2025-03-06 NOTE — PSYCH
Behavioral Health Psychotherapy Progress Note    Psychotherapy Provided: Individual Psychotherapy     Encounter Diagnoses   Name Primary?   • INES (generalized anxiety disorder) Yes             Goals addressed in session: Goal 1     DATA:  Nury spoke during today's session about the avoidant response that both she and her father experience when conflicts arise.  She stated that she did not discuss the financial issues with her father as a result of this, but instead has been focused on pelvic floor dysfunction and the associated shame and embarrasment surrounding this topic.    During this session, this clinician used the following therapeutic modalities: Motivational Interviewing and Supportive Psychotherapy    Substance Abuse was not addressed during this session. If the client is diagnosed with a co-occurring substance use disorder, please indicate any changes in the frequency or amount of use: . Stage of change for addressing substance use diagnoses: No substance use/Not applicable    ASSESSMENT:  Nury Hernandez presents with a Euthymic/ normal mood.  Nury again allowed this worker to provide her with support and feedback on this topic.  She was able to plan for the conversation she will have tnext week when she attends her PT assessment and agreed to do further research on the topic.  her affect is Normal range and intensity, which is congruent, with her mood and the content of the session. Nury Hernandez was oriented to person, place, and time. Grooming and Hygiene were good  and clothing was casually dressed and appropriate for weather. Ability to perform ADLs has not changed. she was cooperative. Nury Hernandez denied suicidal thoughts, homicidal thoughts, and self injurious behaviors.   The client has made progress on their goals.     Nury Hernandez presents with a minimal risk of suicide, minimal risk of self-harm, and minimal risk of harm to others.    For any risk assessment that  "surpasses a \"low\" rating, a safety plan must be developed.    A safety plan was indicated: no  If yes, describe in detail     PLAN: Between sessions, Nury Hernandez will continue to utilize therapy sessions to increase her mindfulness and self compassion. . At the next session, the therapist will use Supportive Psychotherapy to address the above in further detail.    Behavioral Health Treatment Plan and Discharge Planning: Nury Hernandez is aware of and agrees to continue to work on their treatment plan. They have identified and are working toward their discharge goals. yes        Visit start and stop times:      03/06/25  Start Time: 1100  Stop Time: 1200  Total Visit Time: 60 minutes        "

## 2025-03-07 ENCOUNTER — TELEPHONE (OUTPATIENT)
Dept: GASTROENTEROLOGY | Facility: AMBULARY SURGERY CENTER | Age: 25
End: 2025-03-07

## 2025-03-07 NOTE — TELEPHONE ENCOUNTER
Called pt/left msg to contact office to r/s appt w/ Dr. Livingston. Also sent MyChart letter to patient. Pt sees Dr. Husain.

## 2025-03-12 NOTE — PROGRESS NOTES
PT Evaluation     Today's Date: 3/13/2025  Patient name: Nury Hernandez  : 2000  MRN: [id number]  Referring provider: Padmini Urias MD  Dx:  Encounter Diagnosis     ICD-10-CM    1. High-tone pelvic floor dysfunction  M62.89       2. Pelvic floor dysfunction  M62.89 Ambulatory referral to Physical Therapy          Start Time: 1230  Stop Time: 1330  Total time in clinic (min): 60 minutes       Assessment/Plan    Assessment:     Nury Hernandez is a 24 y.o. nulliparous female seen for pelvic pain, bladder dysfunction, bowel dysfunction.  Patient's presentation consistent with high tone PFMD. Notable impairments upon evaluation consist of:  mod PFM weakness, poor PFM endurance, impaired PFM coordination, diminished PFM active lengthening , mod high resting tone , impaired tolerance to penetration, poor bladder practices, and impaired bear down mechanics. These impairments limiting functional activities such as gynecologic care, extended walking, extended standing, extended travel, exercise, lifting, increased pt distress, transfer function, bowel function, bladder emptying, and impaired QOL. Nury Hernandez is a good candidate for physical therapy and will benefit from skilled care to address impairments and achieve goals. POC: urge deferral, PFM coordination, PFM strengthening, PFM down training, dilator training, manual cueing, breathing mechanics, functional strengthening, and PFM stretching.    During initial evaluation, education was provided on anatomy and function of the pelvic floor muscles and provided written and verbal consent for pelvic floor muscle exam. Patient also educated on diagnosis, plan of care and prognosis. Pt is in agreement with recommended plan of care and goals for therapy.    Goals: :   Bladder:  In 5 weeks, patient will reduce nocturia from 1 to 0 per night for 3/7 nights to indicate improved sleep quality/quantity.   In 5 weeks, patient will report ability to  successfully suppress an urge to empty for at least 20' or greater w/o no UI.   In 10 weeks, patient will demonstrate normalized daytime void interval of 2-4 hours with adequate fluid intake.   In 10 weeks, patient will reduce nocturia to 0-1 per night to indicate improve sleep quality/ quantity.   In 10 weeks, patient will implement urge suppression strategies throughout the day and reports that her average voiding interval is 2+ hours upon discharge.     Bowel:  In 5 weeks, patient will perform x2 consecutive bear downs with proper breathing mechanics and eccentric lengthening present.   In 10 weeks, patient will report improvement in defecatory function as evidenced by straining w/ 50% or less w/ all BM.    Dysfunction:   In 5 weeks, patient will be able to perform 5 or more consecutive PFMCs w/ proper breathing mechanics w/ 3 sec hold to indicate improved PFM activation.   In 10 weeks, patient will demonstrate improved PFM strength to at least 3/5.  In 10 weeks, patient will demonstrate improved PFM endurance to 8 sec of 3/5 strength or greater.   In 10 weeks, patient will demonstrate improved PFM relaxation to at least 90%.     Function:   In 5 weeks, patient will report at least 50% improvement in subjective sxs presentation since start of PT care.   In 10 weeks, patient will report at least 90% improvement in subjective sxs presentation since start of PT care.   In 10 weeks, pt will report improvement in tolerance to pain free speculum exam to indicate improve penetration tolerance.  10 weeks, patient will be able to insert full-size dilator into 100%.    Pain:   In 5 weeks, patient will report 1/10 or less supra pubic discomfort following 20 minutes or greater of job progression.  In 5 weeks, patient will report painfree intravaginal PFM exam.  In 10 weeks, patient will report 0/10 or less suprapubic pain with running for1 mile or greater.  In 10 weeks, patient will be able to perform 30 min of exercise w/  "no pain and no cues required for proper breathing mechanics to indicate improved abdominal pressure mechanics and activity tolerance.     Outcome Measure:  In 10 weeks, patient will score at least 20 or less on PFDI to indicate meaningful change from 93 at IE.    Plan    Frequency: 1x week  Duration in weeks: 10  Plan of Care beginning date: 3/13/2025  Plan of Care expiration date: 5/22/2025     Subjective    Chief Complaint: Pelvic pain  HPI: Nury Hernandez is a 24 y.o. nulliparous female referred by OBGYN for complaints of pelvic pain.  Patient reports symptoms have been present for years going through flares and remissions.  Currently presents with bladder symptoms including sensation of incomplete evacuation, frequency, hesitancy and 100% pushing to pee.  Additionally notes history of constipation since childhood.  Is currently evacuating 2-3 times a week with [place] stool type II/III.  Recent medication (2 months) has been helpful however, requires mod/max straining with 95% of BM.    Occupation: Nurse - NICU        Urinary:     Currently presenting with: sensation of incomplete evacuation, frequency, hesitancy, and pushing to pee, \"bladder pressure\" constant feeling, nocturia (x1). Void interval of ~2 hours.   Hx of yeast infection (~2 weeks).   Fluid intake: Water 60 oz, Coffee 24 oz (creamer) - has not noticed improvement w/p limiting caffeine   Denies: AUGUSTINE , UUI, and post-void   Bowel:     Longstanding hx of constipation since childhood. Evacuates 2-3x a week. [place] stool type 2-3. Daily medication that has improved sxs to current. Uses squatty. Potty - has improved. Rare to have no straining, 95% mod-max (50% max straining).   Motility test revealed low gut motility (3-4 years ago).   Denies bleeding, hemorrhoids, and painful evacuation   GYN:     Nulliparous - OCB  Menstruation: high levels of pain prior to OCB, but has been improving. Recently diagnosed with PCOS.  Hx of uncomfortable speculum " "exams, wand US. Notes several US experiences that have been tolerable, however single instance very painful where it felt like \"something popped\".  OB/GYN currently uses pediatric speculum.   Sexual Function:     Sexually Active: Has never been, has not been limited by pain.  However does feel like she avoids certain activities because of the pain.   MSK:      Relevant Medical Hx: HOLMAN - well managed.   Current exercise: Training for 5 K in June. Running (20 min - walk-jog progression), strength training (x3), pelaton   Pain:      Pelvic Pain   5/10 current; 0/10 at best; 10/10 at worst, Aggravated by exercise (notably running/biking)  Relieved by stretching, heating pad, NSAIDs   Goals:     Improved bladder emptying  Improved bowel function (decrease training  Improved pelvic pain             Objective           Precautions: Standard , Holman  Patient Active Problem List   Diagnosis    INES (generalized anxiety disorder)    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HCC)    Median arcuate ligament syndrome (HCC)    POTS (postural orthostatic tachycardia syndrome)    Seasonal allergic rhinitis    Hammertoe of left foot    Uncomplicated asthma    Non-celiac gluten sensitivity    Chronic idiopathic constipation    EDS (Malachi-Danlos syndrome)    Chronic migraine without aura without status migrainosus, not intractable    Urinary retention    Menstrual migraine without status migrainosus, not intractable    Stress at work    Migraine without aura and without status migrainosus, not intractable         Diagnosis:    POC expires (Date that your POC expires) Auth Status? (BOMN, approved, pending) Unit limit (Daily) Auth Start date Expiration date PT/OT + Visit Limit?   5/22/2025  BOMN         Date of Service 03/13/25        Visits Used 1        [facility]        [facility]                  Neuro Re-Ed         Urge deferral         Defecation mechanics         Breathing Mechanics Inhale and relax 5'        PFM " Coordination         PFM Down Training         Internal Cueing          Biofeedback                  Ther Ex         PFM Strengthening         Hip strengthening         Functional Strengthening         Abdominal Strengthening         Dilator Training  **       Aerobic         Therapeutic Rest Breaks         Mobility          High Impact         UE Strengthening                   Ther Activity         Voiding Diaries         Review of sxs         Fluid Intake          Defecation mechanics  10'  HEP        Manual Ther         PFM exam Performed         Ortho exam         Dynamometer Testing         Fascial Decompression         Bowel Massage         PFM stretching                  Modalities                           Outcome Measure          PFDI - 93

## 2025-03-13 ENCOUNTER — EVALUATION (OUTPATIENT)
Dept: PHYSICAL THERAPY | Facility: REHABILITATION | Age: 25
End: 2025-03-13
Payer: COMMERCIAL

## 2025-03-13 ENCOUNTER — SOCIAL WORK (OUTPATIENT)
Dept: BEHAVIORAL/MENTAL HEALTH CLINIC | Facility: CLINIC | Age: 25
End: 2025-03-13
Payer: COMMERCIAL

## 2025-03-13 DIAGNOSIS — M62.89 HIGH-TONE PELVIC FLOOR DYSFUNCTION: Primary | ICD-10-CM

## 2025-03-13 DIAGNOSIS — F41.1 GAD (GENERALIZED ANXIETY DISORDER): Primary | ICD-10-CM

## 2025-03-13 DIAGNOSIS — M62.89 PELVIC FLOOR DYSFUNCTION: ICD-10-CM

## 2025-03-13 PROCEDURE — 97530 THERAPEUTIC ACTIVITIES: CPT

## 2025-03-13 PROCEDURE — 97162 PT EVAL MOD COMPLEX 30 MIN: CPT

## 2025-03-13 PROCEDURE — 90837 PSYTX W PT 60 MINUTES: CPT | Performed by: SOCIAL WORKER

## 2025-03-14 NOTE — PROGRESS NOTES
Name: Nury Hernandez      : 2000      MRN: [id number]  Encounter Provider: Nilay Su MD  Encounter Date: 3/17/2025   Encounter department: [facility]  :  Assessment & Plan  Abnormal thyroid blood test  TSH levels historically lower half normal, w normal free T4s  Now w minimally elevated total T3 and T4  On OCP, do expect total T3 and T4 levels to be elevated due to increased production binding proteins     Do not expect patient's symptoms to related to thyroid  They do not seem to be standard side effects of her medications   Has not been on scopolamine in months  Patient reports hypoglycemia-like symptoms, but has never been able to confirm a low blood glucose-- recommended discussing a glucometer with PCP or obtaining OTC    Will obtain TSH, free T4, free T3, and TSI now  Recommended NOT ordering total T4 and T3 levels as long as on OCP  Free T3 levels also do fluctuate and are not a recommended screening test  TSH w reflex to fT4 may be obtained every 12 months as a routine screen    Orders:    Ambulatory Referral to Endocrinology    TSH + Free T4; Future    Thyroid stimulating immunoglobulin; Future    T3, free; Future    PCOS (polycystic ovarian syndrome)  Managed by GYN/Josue THORPE  W improvement sxs and DHEA-S on OCP       POTS (postural orthostatic tachycardia syndrome)  Dx ~2017           Fu prn pending labs    History of Present Illness   HPI  Nury Hernandez is a 24 y.o. female with past medical history significant for POTS, migraine, asthma, EDS, anxiety, mast cell activation, nonceliac gluten sensitivity, PCOS, presenting in consult for abnormal thyroid labs.    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   Had gained weight on Depo, gained 50 lbs to 190s, quickly lost coming off, now regained 10 lbs stablizied in 150s    1/24/2025 TSH 1.038, T31.9, T4 12.49, TPO negative, TgAb negative  1/14/2025 TSH 0.408, DHEA-S 169 (on OCP)   9/2024 OCP  started  6/17/2024 insulin 4.03, OGGT 119  6/14/2024 TSH 1.655, DHEA-S 461, 17 OHP 42, dhea-s 461.0  4/27/2024 TSH 0.447, free T4 0.85, testosterone 45, free T4.8, DHEA 995  3/7/2023 TSH 0.859  Historically TSH 0.8-1.8 with normal free T4, going back to 2017  Never PC ovaries   Does get times where she feels she's hypoglycemic, sometime after eating, sometomes after not eating a few hours .     Mom w some hyperthyroid condition while pregnant w patient (twin gestation), resolved, does not think it was a Graves dx   Also another pregnancy after, which was ok   MGF DM1   No unusual radiation exposure   No immediate fertility plans       History obtained from: patient    Review of Systems   Constitutional:  Negative for unexpected weight change.        Prone to hair shed  No hair skin changes    Gastrointestinal:  Positive for constipation (lifelifelong).   Endocrine: Positive for heat intolerance. Negative for cold intolerance.   Genitourinary:         +now on OCP, previously periods were [id number] , heavy   Hematological:  Negative for adenopathy. Does not bruise/bleed easily.   Psychiatric/Behavioral:  Negative for sleep disturbance.      Pertinent Medical History         Medical History Reviewed by provider this encounter:  Tobacco  Allergies  Meds  Problems  Med Hx  Surg Hx  Fam Hx     .  Current Outpatient Medications on File Prior to Visit   Medication Sig Dispense Refill    acetaminophen (TYLENOL) 500 mg tablet Take 1,000 mg by mouth every 4 (four) hours as needed       albuterol (2.5 mg/3 mL) 0.083 % nebulizer solution Take 3 mL (2.5 mg total) by nebulization every 6 (six) hours as needed for wheezing or  shortness of breath 30 mL 0    albuterol (ProAir HFA) 90 mcg/act inhaler Inhale 2 puffs every 6 (six) hours as needed for wheezing or shortness of breath 8.5 g 0    ASMANEX  MCG/ACT AERO Inhale 2 puffs every 4 (four) hours as needed (2 puffs)      Atogepant (Qulipta) 60 MG TABS Take 60 mg by mouth in the morning 90 tablet 4    clindamycin (CLEOCIN T) 1 % lotion Apply topically in the morning To face, chest and back 60 mL 3    diphenhydrAMINE (BENADRYL) 25 mg tablet Take 1 tablet (25 mg total) by mouth every 6 (six) hours as needed for itching 30 tablet 0    Elastic Bandages & Supports (TRUFORM STOCKINGS 20-30MMHG) MISC       famotidine (PEPCID) 20 mg tablet Take 1 tablet (20 mg total) by mouth daily at bedtime 30 tablet 3    fludrocortisone (FLORINEF) 0.1 mg tablet Take 0.1 mg by mouth 2 (two) times a day      metoprolol succinate (TOPROL-XL) 50 mg 24 hr tablet Take 50 mg by mouth in the morning and 50 mg before bedtime.      norgestimate-ethinyl estradiol (ORTHO-CYCLEN) 0.25-35 MG-MCG per tablet Take 1 tablet by mouth daily      omeprazole (PriLOSEC) 20 mg delayed release capsule Take 1 capsule (20 mg total) by mouth daily 30 capsule 3    onabotulinumtoxin A (BOTOX) 100 units Inject  as directed. Injection every 91 days for migraines      ondansetron (Zofran ODT) 4 mg disintegrating tablet Take 1 tablet (4 mg total) by mouth every 6 (six) hours as needed for nausea or vomiting 30 tablet 0    ondansetron (ZOFRAN) 8 mg tablet Take 1 tablet (8 mg total) by mouth every 8 (eight) hours as needed for nausea or vomiting 20 tablet 0    prochlorperazine (COMPAZINE) 10 mg tablet Take 1 tablet (10 mg total) by mouth every 6 (six) hours as needed (migraine) 10 tablet 0    rimegepant sulfate (Nurtec) 75 mg TBDP Take 1 tablet (75 mg total) by mouth as needed (migraine) Limit of 1 in 24 hours 16 tablet 11    scopolamine (TRANSDERM-SCOP) 1 mg/3 days TD 72 hr patch Place 1 patch on the skin over 72 hours every third day 13  "patch 3    Tenapanor HCl 50 MG TABS Take 50 mg by mouth 2 (two) times a day 60 tablet 3    tretinoin (RETIN-A) 0.025 % cream Apply a pea sized amount to face one hour before bedtime after moisturizing. Start with 1-2 nights per week and build up to nightly as tolerated. 45 g 2    tretinoin (RETIN-A) 0.05 % cream Spread one pea-sized amount of medication over entire face about one hour before bedtime. 45 g 3    verapamil (CALAN) 120 mg tablet Take 1 tab by mouth twice a day 180 tablet 3    [DISCONTINUED] Glycopyrronium Tosylate 2.4 % PADS Apply 1 Application topically in the morning (Patient not taking: Reported on 3/3/2025) 30 each 5    [DISCONTINUED] Prucalopride Succinate (Motegrity) 2 MG TABS Take 2 mg by mouth in the morning (Patient not taking: Reported on 3/3/2025) 90 tablet 0     No current facility-administered medications on file prior to visit.      Social History     Tobacco Use    Smoking status: Never    Smokeless tobacco: Never   Vaping Use    Vaping status: Never Used   Substance and Sexual Activity    Alcohol use: No    Drug use: No    Sexual activity: Never        Objective   /76   Pulse 76   Ht 5' 7\" (1.702 m)   Wt 71.6 kg (157 lb 12.8 oz)   LMP 02/15/2024 (Exact Date)   SpO2 96%   BMI 24.71 kg/m²      Physical Exam  Constitutional:       General: She is not in acute distress.     Appearance: Normal appearance. She is well-developed. She is not ill-appearing or toxic-appearing.   HENT:      Head: Normocephalic and atraumatic.   Eyes:      General: No scleral icterus.     Conjunctiva/sclera: Conjunctivae normal.   Neck:      Thyroid: No thyroid mass, thyromegaly or thyroid tenderness.   Cardiovascular:      Rate and Rhythm: Normal rate.   Pulmonary:      Effort: Pulmonary effort is normal. No respiratory distress.   Abdominal:      General: There is no distension.      Palpations: Abdomen is soft.   Musculoskeletal:         General: No swelling.      Cervical back: Neck supple.      " Right lower leg: No edema.      Left lower leg: No edema.   Skin:     General: Skin is warm and dry.   Neurological:      General: No focal deficit present.      Mental Status: She is alert. Mental status is at baseline.   Psychiatric:         Mood and Affect: Mood normal.         Behavior: Behavior normal.         Thought Content: Thought content normal.

## 2025-03-15 NOTE — PSYCH
"Behavioral Health Psychotherapy Progress Note    Psychotherapy Provided: Individual Psychotherapy     Encounter Diagnoses   Name Primary?   • INES (generalized anxiety disorder) Yes             Goals addressed in session: Goal 1     DATA:  Nury spoke during today's session about the upcoming appt she has today for re-assessment of her pelvic floor dysfunction and the associated shame and embarrasment surrounding this topic.   Reasons for these strong feelings were discussed at length as Nury admitted that the issue \"never really went away\" following her last referral to PT.  During this session, this clinician used the following therapeutic modalities: Motivational Interviewing and Supportive Psychotherapy    Substance Abuse was not addressed during this session. If the client is diagnosed with a co-occurring substance use disorder, please indicate any changes in the frequency or amount of use: . Stage of change for addressing substance use diagnoses: No substance use/Not applicable    ASSESSMENT:  Nury Hernandez presents with a Euthymic/ normal mood.  Nury again allowed this worker to provide her with support and feedback on this topic.  She was able to plan for the conversation she will have when she attends her PT assessment and agreed to engage with support groups for the topic.  her affect is Normal range and intensity, which is congruent, with her mood and the content of the session. Nury Hernandez was oriented to person, place, and time. Grooming and Hygiene were good  and clothing was casually dressed and appropriate for weather. Ability to perform ADLs has not changed. she was cooperative. Nury Hernandez denied suicidal thoughts, homicidal thoughts, and self injurious behaviors.   The client has made progress on their goals.     Nury Hernandez presents with a minimal risk of suicide, minimal risk of self-harm, and minimal risk of harm to others.    For any risk assessment that surpasses " "a \"low\" rating, a safety plan must be developed.    A safety plan was indicated: no  If yes, describe in detail     PLAN: Between sessions, Nury Hernandez will continue to utilize therapy sessions to increase her mindfulness and self compassion. . At the next session, the therapist will use Supportive Psychotherapy to address the above in further detail.    Behavioral Health Treatment Plan and Discharge Planning: Nury Hernandez is aware of and agrees to continue to work on their treatment plan. They have identified and are working toward their discharge goals. yes        Visit start and stop times:      3/13/25        "

## 2025-03-17 ENCOUNTER — RESULTS FOLLOW-UP (OUTPATIENT)
Dept: ENDOCRINOLOGY | Facility: CLINIC | Age: 25
End: 2025-03-17

## 2025-03-17 ENCOUNTER — APPOINTMENT (OUTPATIENT)
Dept: LAB | Age: 25
End: 2025-03-17
Payer: COMMERCIAL

## 2025-03-17 ENCOUNTER — OFFICE VISIT (OUTPATIENT)
Dept: ENDOCRINOLOGY | Facility: CLINIC | Age: 25
End: 2025-03-17
Payer: COMMERCIAL

## 2025-03-17 ENCOUNTER — SOCIAL WORK (OUTPATIENT)
Dept: BEHAVIORAL/MENTAL HEALTH CLINIC | Facility: CLINIC | Age: 25
End: 2025-03-17
Payer: COMMERCIAL

## 2025-03-17 ENCOUNTER — APPOINTMENT (OUTPATIENT)
Dept: LAB | Facility: CLINIC | Age: 25
End: 2025-03-17
Payer: COMMERCIAL

## 2025-03-17 VITALS
OXYGEN SATURATION: 96 % | WEIGHT: 157.8 LBS | DIASTOLIC BLOOD PRESSURE: 76 MMHG | SYSTOLIC BLOOD PRESSURE: 122 MMHG | BODY MASS INDEX: 24.77 KG/M2 | HEIGHT: 67 IN | HEART RATE: 76 BPM

## 2025-03-17 DIAGNOSIS — F41.1 GAD (GENERALIZED ANXIETY DISORDER): Primary | ICD-10-CM

## 2025-03-17 DIAGNOSIS — E28.2 PCOS (POLYCYSTIC OVARIAN SYNDROME): ICD-10-CM

## 2025-03-17 DIAGNOSIS — R79.89 ABNORMAL THYROID BLOOD TEST: Primary | ICD-10-CM

## 2025-03-17 DIAGNOSIS — R79.89 ABNORMAL THYROID BLOOD TEST: ICD-10-CM

## 2025-03-17 DIAGNOSIS — G90.A POTS (POSTURAL ORTHOSTATIC TACHYCARDIA SYNDROME): ICD-10-CM

## 2025-03-17 DIAGNOSIS — R79.89 ABNORMAL THYROID SCREEN (BLOOD): Primary | ICD-10-CM

## 2025-03-17 LAB
T3FREE SERPL-MCNC: 3.28 PG/ML (ref 2.5–3.9)
T4 FREE SERPL-MCNC: 0.8 NG/DL (ref 0.61–1.12)
TSH SERPL DL<=0.05 MIU/L-ACNC: 0.52 UIU/ML (ref 0.45–4.5)

## 2025-03-17 PROCEDURE — 36415 COLL VENOUS BLD VENIPUNCTURE: CPT

## 2025-03-17 PROCEDURE — 84445 ASSAY OF TSI GLOBULIN: CPT

## 2025-03-17 PROCEDURE — 84481 FREE ASSAY (FT-3): CPT

## 2025-03-17 PROCEDURE — 90834 PSYTX W PT 45 MINUTES: CPT | Performed by: SOCIAL WORKER

## 2025-03-17 PROCEDURE — 84443 ASSAY THYROID STIM HORMONE: CPT

## 2025-03-17 PROCEDURE — 99244 OFF/OP CNSLTJ NEW/EST MOD 40: CPT | Performed by: INTERNAL MEDICINE

## 2025-03-17 PROCEDURE — 84439 ASSAY OF FREE THYROXINE: CPT

## 2025-03-19 ENCOUNTER — RESULTS FOLLOW-UP (OUTPATIENT)
Dept: ENDOCRINOLOGY | Facility: CLINIC | Age: 25
End: 2025-03-19

## 2025-03-19 LAB — TSI SER-ACNC: <0.1 IU/L (ref 0–0.55)

## 2025-03-21 ENCOUNTER — OFFICE VISIT (OUTPATIENT)
Dept: PHYSICAL THERAPY | Facility: REHABILITATION | Age: 25
End: 2025-03-21
Payer: COMMERCIAL

## 2025-03-21 DIAGNOSIS — M62.89 PELVIC FLOOR DYSFUNCTION: Primary | ICD-10-CM

## 2025-03-21 DIAGNOSIS — M62.89 HIGH-TONE PELVIC FLOOR DYSFUNCTION: ICD-10-CM

## 2025-03-21 DIAGNOSIS — B37.31 YEAST VAGINITIS: Primary | ICD-10-CM

## 2025-03-21 PROCEDURE — 97110 THERAPEUTIC EXERCISES: CPT

## 2025-03-21 PROCEDURE — 97112 NEUROMUSCULAR REEDUCATION: CPT

## 2025-03-21 RX ORDER — FLUCONAZOLE 150 MG/1
150 TABLET ORAL
Qty: 2 TABLET | Refills: 0 | Status: SHIPPED | OUTPATIENT
Start: 2025-03-21 | End: 2025-03-28

## 2025-03-21 NOTE — PROGRESS NOTES
Daily Note     Today's date: 3/21/2025  Patient name: Nury Hernandez  : 2000  MRN: [id number]  Referring provider: Padmini Urias MD  Dx:   Encounter Diagnosis     ICD-10-CM    1. Pelvic floor dysfunction  M62.89       2. High-tone pelvic floor dysfunction  M62.89           Start Time: 1030  Stop Time: 1115  Total time in clinic (min): 45 minutes    Subjective: Pt feels her bowels were slightly better this week with the breathing strategies.      Objective: See treatment diary below      Assessment: Tolerated treatment well. Patient demonstrated fatigue post treatment. Prior to dilators, she was agreeable to PFM stretching.  Initially more guarding but then was able to relax better.  Unable to contract at the beginning of the session but by the end improved ability to contract and could feel it.  Suggested dilators 3x a week with dilator no. 2.  Gliding motion was more uncomfortable.  Used a pillow under her hips and suggested she rest her feet on the wall or on a chair.     Goals: :   Bladder:  In 5 weeks, patient will reduce nocturia from 1 to 0 per night for 3/7 nights to indicate improved sleep quality/quantity.   In 5 weeks, patient will report ability to successfully suppress an urge to empty for at least 20' or greater w/o no UI.   In 10 weeks, patient will demonstrate normalized daytime void interval of 2-4 hours with adequate fluid intake.   In 10 weeks, patient will reduce nocturia to 0-1 per night to indicate improve sleep quality/ quantity.   In 10 weeks, patient will implement urge suppression strategies throughout the day and reports that her average voiding interval is 2+ hours upon discharge.      Bowel:  In 5 weeks, patient will perform x2 consecutive bear downs with proper breathing mechanics and eccentric lengthening present.   In 10 weeks, patient will report improvement in defecatory function as evidenced by straining w/ 50% or less w/ all BM.     Dysfunction:   In 5 weeks,  patient will be able to perform 5 or more consecutive PFMCs w/ proper breathing mechanics w/ 3 sec hold to indicate improved PFM activation.   In 10 weeks, patient will demonstrate improved PFM strength to at least 3/5.  In 10 weeks, patient will demonstrate improved PFM endurance to 8 sec of 3/5 strength or greater.   In 10 weeks, patient will demonstrate improved PFM relaxation to at least 90%.      Function:   In 5 weeks, patient will report at least 50% improvement in subjective sxs presentation since start of PT care.   In 10 weeks, patient will report at least 90% improvement in subjective sxs presentation since start of PT care.   In 10 weeks, pt will report improvement in tolerance to pain free speculum exam to indicate improve penetration tolerance.  10 weeks, patient will be able to insert full-size dilator into 100%.     Pain:   In 5 weeks, patient will report 1/10 or less supra pubic discomfort following 20 minutes or greater of job progression.  In 5 weeks, patient will report painfree intravaginal PFM exam.  In 10 weeks, patient will report 0/10 or less suprapubic pain with running for1 mile or greater.  In 10 weeks, patient will be able to perform 30 min of exercise w/ no pain and no cues required for proper breathing mechanics to indicate improved abdominal pressure mechanics and activity tolerance.      Outcome Measure:  In 10 weeks, patient will score at least 20 or less on PFDI to indicate meaningful change from 93 at IE.         Plan: Continue per plan of care.      Precautions: Standard , Loja  Patient Active Problem List   Diagnosis    INES (generalized anxiety disorder)    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HCC)    Median arcuate ligament syndrome (HCC)    POTS (postural orthostatic tachycardia syndrome)    Seasonal allergic rhinitis    Hammertoe of left foot    Uncomplicated asthma    Non-celiac gluten sensitivity    Chronic idiopathic constipation    EDS (Malachi-Danlos syndrome)    Chronic  migraine without aura without status migrainosus, not intractable    Urinary retention    Menstrual migraine without status migrainosus, not intractable    Stress at work    Migraine without aura and without status migrainosus, not intractable         Diagnosis:    POC expires (Date that your POC expires) Auth Status? (BOMN, approved, pending) Unit limit (Daily) Auth Start date Expiration date PT/OT + Visit Limit?   5/22/2025  BOMN         Date of Service 03/13/25 03/21       Visits Used 1 2       Visits Remaining BOMN        [employer] Created                  Neuro Re-Ed         Urge deferral         Defecation mechanics         Breathing Mechanics Inhale and relax 5' 10'       PFM Coordination  10' contract relax bear down       PFM Down Training  10'       Internal Cueing          Biofeedback                  Ther Ex         PFM Strengthening         Hip strengthening         Functional Strengthening         Abdominal Strengthening         Dilator Training  15'       Aerobic         Therapeutic Rest Breaks         Mobility          High Impact         UE Strengthening                   Ther Activity         Voiding Diaries         Review of sxs         Fluid Intake          Defecation mechanics  10'  HEP        Manual Ther         PFM exam Performed         Ortho exam         Dynamometer Testing         Fascial Decompression         Bowel Massage         PFM stretching                  Modalities                           Outcome Measure          PFDI - 93

## 2025-03-25 ENCOUNTER — SOCIAL WORK (OUTPATIENT)
Dept: BEHAVIORAL/MENTAL HEALTH CLINIC | Facility: CLINIC | Age: 25
End: 2025-03-25
Payer: COMMERCIAL

## 2025-03-25 DIAGNOSIS — F41.1 GAD (GENERALIZED ANXIETY DISORDER): Primary | ICD-10-CM

## 2025-03-25 PROCEDURE — 90837 PSYTX W PT 60 MINUTES: CPT | Performed by: SOCIAL WORKER

## 2025-03-25 NOTE — PSYCH
"Behavioral Health Psychotherapy Progress Note    Psychotherapy Provided: Individual Psychotherapy     Encounter Diagnoses   Name Primary?   • INES (generalized anxiety disorder) Yes           Goals addressed in session: Goal 1     DATA:  Nury spoke during today's session about her recent appt for pelvic floor dysfunction and the way that she was disappointed in her regression.  An article on this topic was reviewed at length for the remainder of today's session.   During this session, this clinician used the following therapeutic modalities: Motivational Interviewing and Supportive Psychotherapy    Substance Abuse was not addressed during this session. If the client is diagnosed with a co-occurring substance use disorder, please indicate any changes in the frequency or amount of use: . Stage of change for addressing substance use diagnoses: No substance use/Not applicable    ASSESSMENT:  Nury Hernandez presents with a Euthymic/ normal mood.  Nury again allowed this worker to provide her with support and feedback on this topic.  She was able to acknowledge her distorted thoughts on this topic and agreed to continue to gently challenge these beliefs.   her affect is Normal range and intensity, which is congruent, with her mood and the content of the session. Nury Hernandez was oriented to person, place, and time. Grooming and Hygiene were good  and clothing was casually dressed and appropriate for weather. Ability to perform ADLs has not changed. she was cooperative. Nury Hernandez denied suicidal thoughts, homicidal thoughts, and self injurious behaviors.   The client has made progress on their goals.     Nury Hernandez presents with a minimal risk of suicide, minimal risk of self-harm, and minimal risk of harm to others.    For any risk assessment that surpasses a \"low\" rating, a safety plan must be developed.    A safety plan was indicated: no  If yes, describe in detail     PLAN: Between " sessions, Nury Hernandez will continue to utilize therapy sessions to increase her mindfulness and self compassion. . At the next session, the therapist will use Supportive Psychotherapy to address the above in further detail.    Behavioral Health Treatment Plan and Discharge Planning: Nury Hernandez is aware of and agrees to continue to work on their treatment plan. They have identified and are working toward their discharge goals. yes        Visit start and stop times:      3/25/25

## 2025-03-26 ENCOUNTER — OFFICE VISIT (OUTPATIENT)
Dept: PHYSICAL THERAPY | Facility: REHABILITATION | Age: 25
End: 2025-03-26
Payer: COMMERCIAL

## 2025-03-26 DIAGNOSIS — M62.89 PELVIC FLOOR DYSFUNCTION: Primary | ICD-10-CM

## 2025-03-26 DIAGNOSIS — M62.89 HIGH-TONE PELVIC FLOOR DYSFUNCTION: ICD-10-CM

## 2025-03-26 PROCEDURE — 97112 NEUROMUSCULAR REEDUCATION: CPT

## 2025-03-26 PROCEDURE — 97140 MANUAL THERAPY 1/> REGIONS: CPT

## 2025-03-26 NOTE — PROGRESS NOTES
Daily Note     Today's date: 3/26/2025  Patient name: Nury Hernandez  : 2000  MRN: [id number]  Referring provider: Padmini Urias MD  Dx:   Encounter Diagnosis     ICD-10-CM    1. Pelvic floor dysfunction  M62.89       2. High-tone pelvic floor dysfunction  M62.89             Start Time: 0800  Stop Time: 0900  Total time in clinic (min): 60 minutes    Subjective: Adherence to dilator training x2 this week, #2. Noted difficulty w/ removal w/ first session, but improved next attempt.       Objective: See treatment diary below      Assessment: Tolerated treatment well. Patient demonstrated fatigue post treatment. Patient tolerated PFM stretching well w/ 3/10 or less pain noted throughout. Noted increased soreness in L side, notably layer 1 and medial portions of MT/PC. Good mobility w/ inhale + relaxation. Continues to be challenged w/ eccentric lengthening  as 50% paradoxical contraction during bear down and 50% able to maintain resting tone. Performed bowel massage today w/ good tolerance. Provided HEP handout. Next visit, orthopedic exam.     Goals: :   Bladder:  In 5 weeks, patient will reduce nocturia from 1 to 0 per night for 3/7 nights to indicate improved sleep quality/quantity.   In 5 weeks, patient will report ability to successfully suppress an urge to empty for at least 20' or greater w/o no UI.   In 10 weeks, patient will demonstrate normalized daytime void interval of 2-4 hours with adequate fluid intake.   In 10 weeks, patient will reduce nocturia to 0-1 per night to indicate improve sleep quality/ quantity.   In 10 weeks, patient will implement urge suppression strategies throughout the day and reports that her average voiding interval is 2+ hours upon discharge.      Bowel:  In 5 weeks, patient will perform x2 consecutive bear downs with proper breathing mechanics and eccentric lengthening present.   In 10 weeks, patient will report improvement in defecatory function as evidenced by  straining w/ 50% or less w/ all BM.     Dysfunction:   In 5 weeks, patient will be able to perform 5 or more consecutive PFMCs w/ proper breathing mechanics w/ 3 sec hold to indicate improved PFM activation.   In 10 weeks, patient will demonstrate improved PFM strength to at least 3/5.  In 10 weeks, patient will demonstrate improved PFM endurance to 8 sec of 3/5 strength or greater.   In 10 weeks, patient will demonstrate improved PFM relaxation to at least 90%.      Function:   In 5 weeks, patient will report at least 50% improvement in subjective sxs presentation since start of PT care.   In 10 weeks, patient will report at least 90% improvement in subjective sxs presentation since start of PT care.   In 10 weeks, pt will report improvement in tolerance to pain free speculum exam to indicate improve penetration tolerance.  10 weeks, patient will be able to insert full-size dilator into 100%.     Pain:   In 5 weeks, patient will report 1/10 or less supra pubic discomfort following 20 minutes or greater of job progression.  In 5 weeks, patient will report painfree intravaginal PFM exam.  In 10 weeks, patient will report 0/10 or less suprapubic pain with running for1 mile or greater.  In 10 weeks, patient will be able to perform 30 min of exercise w/ no pain and no cues required for proper breathing mechanics to indicate improved abdominal pressure mechanics and activity tolerance.      Outcome Measure:  In 10 weeks, patient will score at least 20 or less on PFDI to indicate meaningful change from 93 at IE.         Plan: Continue per plan of care.      Precautions: Standard , Loja  Patient Active Problem List   Diagnosis    INES (generalized anxiety disorder)    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HCC)    Median arcuate ligament syndrome (HCC)    POTS (postural orthostatic tachycardia syndrome)    Seasonal allergic rhinitis    Hammertoe of left foot    Uncomplicated asthma    Non-celiac gluten sensitivity    Chronic  idiopathic constipation    EDS (Malachi-Danlos syndrome)    Chronic migraine without aura without status migrainosus, not intractable    Urinary retention    Menstrual migraine without status migrainosus, not intractable    Stress at work    Migraine without aura and without status migrainosus, not intractable         Diagnosis:    POC expires (Date that your POC expires) Auth Status? (BOMN, approved, pending) Unit limit (Daily) Auth Start date Expiration date PT/OT + Visit Limit?   5/22/2025  BOMN         Date of Service 03/13/25 03/21 3/26      Visits Used 1 2 3      Visits Remaining BOMN        [employer] Created                  Neuro Re-Ed         Urge deferral         Defecation mechanics         Breathing Mechanics Inhale and relax 5' 10' 10'       PFM Coordination  10' contract relax bear down 5'       PFM Down Training  10'       Internal Cueing          Biofeedback                  Ther Ex         PFM Strengthening         Hip strengthening         Functional Strengthening         Abdominal Strengthening         Dilator Training  15'       Aerobic         Therapeutic Rest Breaks         Mobility          High Impact         UE Strengthening                   Ther Activity         Voiding Diaries         Review of sxs         Fluid Intake          Defecation mechanics  10'  HEP        Manual Ther         PFM exam Performed         Ortho exam         Dynamometer Testing         Fascial Decompression         Bowel Massage   30'       PFM stretching   15'                Modalities                           Outcome Measure          PFDI - 93

## 2025-03-31 ENCOUNTER — SOCIAL WORK (OUTPATIENT)
Dept: BEHAVIORAL/MENTAL HEALTH CLINIC | Facility: CLINIC | Age: 25
End: 2025-03-31
Payer: COMMERCIAL

## 2025-03-31 ENCOUNTER — NURSE TRIAGE (OUTPATIENT)
Age: 25
End: 2025-03-31

## 2025-03-31 DIAGNOSIS — F41.1 GAD (GENERALIZED ANXIETY DISORDER): Primary | ICD-10-CM

## 2025-03-31 PROCEDURE — 90837 PSYTX W PT 60 MINUTES: CPT | Performed by: SOCIAL WORKER

## 2025-03-31 NOTE — TELEPHONE ENCOUNTER
"FOLLOW UP: Message to Dr. Urias    REASON FOR CONVERSATION: Vaginitis    SYMPTOMS: Persistent vaginal irritation. Report recent [place] treated 2 doses of diflucan x2 . Last dose 3/24. Symptoms felt like they were mostly improved but returning now. Note discharge is now a gray color and irritating. She denies vaginal odor or fever.     OTHER: attempted to schedule sooner follow up - prefers Dr. Urias - currently schedule for 4/14. Offered appointment 4/9 but patient is unable to make appointment due to her work hours. Message to Dr. Urias for review.    DISPOSITION: See Within 2 Weeks in Office      Reason for Disposition   All other vaginal symptoms  (Exceptions: Feels like prior yeast infection, minor abrasion, mild rash < 24 hour duration, mild itching, vaginal dryness during sex.)    Answer Assessment - Initial Assessment Questions  1. SYMPTOM: \"What's the main symptom you're concerned about?\" (e.g., pain, itching, dryness)      Gray discharge and irritation, unsure of odor  2. LOCATION: \"Where is the  s/s located?\" (e.g., inside/outside, left/right)      vaginal  3. ONSET: \"When did the  s/s  start?\"      3/3  4. PAIN: \"Is there any pain?\" If Yes, ask: \"How bad is it?\" (Scale: 1-10; mild, moderate, severe)      denies  5. ITCHING: \"Is there any itching?\" If Yes, ask: \"How bad is it?\" (Scale: 1-10; mild, moderate, severe)      mild  6. CAUSE: \"What do you think is causing the discharge?\" \"Have you had the same problem before?\" \"What happened then?\"      unsure  7. OTHER SYMPTOMS: \"Do you have any other symptoms?\" (e.g., fever, itching, vaginal bleeding, pain with urination, injury to genital area, vaginal foreign body)      Denies fever, urinary concerns  8. PREGNANCY: \"Is there any chance you are pregnant?\" \"When was your last menstrual period?\"      Denies    Protocols used: Vaginal Symptoms-Adult-[place]    "

## 2025-03-31 NOTE — PSYCH
"Behavioral Health Psychotherapy Progress Note    Psychotherapy Provided: Individual Psychotherapy     Encounter Diagnoses   Name Primary?   • INES (generalized anxiety disorder) Yes             Goals addressed in session: Goal 1     DATA:  Nury spoke during today's session about her feelings re: her younger self and the emotional impact that she experienced during the time of her parents' separation / divorce.   An article on the topic of pelvic floor dysfunction and early childhood issues was reviewed at length for the remainder of today's session.   During this session, this clinician used the following therapeutic modalities: Motivational Interviewing and Supportive Psychotherapy    Substance Abuse was not addressed during this session. If the client is diagnosed with a co-occurring substance use disorder, please indicate any changes in the frequency or amount of use: . Stage of change for addressing substance use diagnoses: No substance use/Not applicable    ASSESSMENT:  Nury Hernandez presents with a Euthymic/ normal mood.  Nury again allowed this worker to provide her with support and feedback on this topic.  She was again able to acknowledge her distorted thoughts on this topic and agreed to continue to gently challenge these beliefs.   her affect is Normal range and intensity, which is congruent, with her mood and the content of the session. Nury Hernandez was oriented to person, place, and time. Grooming and Hygiene were good  and clothing was casually dressed and appropriate for weather. Ability to perform ADLs has not changed. she was cooperative. Nury Hernandez denied suicidal thoughts, homicidal thoughts, and self injurious behaviors.   The client has made progress on their goals.     Nury Hernandez presents with a minimal risk of suicide, minimal risk of self-harm, and minimal risk of harm to others.    For any risk assessment that surpasses a \"low\" rating, a safety plan must be " developed.    A safety plan was indicated: no  If yes, describe in detail     PLAN: Between sessions, Nury Hernandez will continue to utilize therapy sessions to increase her mindfulness and self compassion. . At the next session, the therapist will use Supportive Psychotherapy to address the above in further detail.    Behavioral Health Treatment Plan and Discharge Planning: Nury Hernandez is aware of and agrees to continue to work on their treatment plan. They have identified and are working toward their discharge goals. yes        Visit start and stop times:      3/31/25

## 2025-04-02 ENCOUNTER — OFFICE VISIT (OUTPATIENT)
Dept: OBGYN CLINIC | Facility: CLINIC | Age: 25
End: 2025-04-02
Payer: COMMERCIAL

## 2025-04-02 VITALS — SYSTOLIC BLOOD PRESSURE: 126 MMHG | DIASTOLIC BLOOD PRESSURE: 80 MMHG | WEIGHT: 159 LBS | BODY MASS INDEX: 24.9 KG/M2

## 2025-04-02 DIAGNOSIS — R30.0 DYSURIA: Primary | ICD-10-CM

## 2025-04-02 LAB
BACTERIA UR QL AUTO: ABNORMAL /HPF
BILIRUB UR QL STRIP: NEGATIVE
BV WHIFF TEST VAG QL: NORMAL
CLARITY UR: CLEAR
CLUE CELLS SPEC QL WET PREP: NORMAL
COLOR UR: ABNORMAL
GLUCOSE UR STRIP-MCNC: NEGATIVE MG/DL
HGB UR QL STRIP.AUTO: NEGATIVE
KETONES UR STRIP-MCNC: NEGATIVE MG/DL
LEUKOCYTE ESTERASE UR QL STRIP: NEGATIVE
MUCOUS THREADS UR QL AUTO: ABNORMAL
NITRITE UR QL STRIP: NEGATIVE
NON-SQ EPI CELLS URNS QL MICRO: ABNORMAL /HPF
PH SMN: 4 [PH]
PH UR STRIP.AUTO: 6.5 [PH]
PROT UR STRIP-MCNC: ABNORMAL MG/DL
RBC #/AREA URNS AUTO: ABNORMAL /HPF
SP GR UR STRIP.AUTO: 1.02 (ref 1–1.03)
T VAGINALIS VAG QL WET PREP: NORMAL
UROBILINOGEN UR STRIP-ACNC: <2 MG/DL
WBC #/AREA URNS AUTO: ABNORMAL /HPF
YEAST VAG QL WET PREP: NORMAL

## 2025-04-02 PROCEDURE — 87210 SMEAR WET MOUNT SALINE/INK: CPT | Performed by: STUDENT IN AN ORGANIZED HEALTH CARE EDUCATION/TRAINING PROGRAM

## 2025-04-02 PROCEDURE — 99214 OFFICE O/P EST MOD 30 MIN: CPT | Performed by: STUDENT IN AN ORGANIZED HEALTH CARE EDUCATION/TRAINING PROGRAM

## 2025-04-02 PROCEDURE — 81001 URINALYSIS AUTO W/SCOPE: CPT | Performed by: STUDENT IN AN ORGANIZED HEALTH CARE EDUCATION/TRAINING PROGRAM

## 2025-04-02 PROCEDURE — 87086 URINE CULTURE/COLONY COUNT: CPT | Performed by: STUDENT IN AN ORGANIZED HEALTH CARE EDUCATION/TRAINING PROGRAM

## 2025-04-02 NOTE — PROGRESS NOTES
Name: Nury Hernandez      : 2000      MRN: [id number]  Encounter Provider: Padmini Urias MD  Encounter Date: 2025   Encounter department: [facility]  :  Assessment & Plan  Dysuria  - likely secondary to small abrasion, given negative wet mount, but will check urine also. If this recurs, would recommend vaginal estrogen for presumptive lack of estrogen to vulva with OCP    Orders:    POCT wet mount    Urine culture    Urinalysis with microscopic        History of Present Illness   HPI  Nury Hernandez is a 25 y.o. female who presents for evaluation for dysuria in setting of recent yeast infection and initiation of pelvic floor physical therapy. She notes that her yeast infection improved, then she began to experience severe dysuria. She denies any vaginal odor, does note some thicker discharge that is grey in appearance.         Review of Systems as above       Objective   /80 (BP Location: Left arm, Patient Position: Sitting, Cuff Size: Standard)   Wt 72.1 kg (159 lb)   LMP 02/15/2024 (Exact Date)   BMI 24.90 kg/m²      Physical Exam  Vitals and nursing note reviewed.   Constitutional:       General: She is not in acute distress.     Appearance: She is well-developed.   HENT:      Head: Normocephalic and atraumatic.   Cardiovascular:      Rate and Rhythm: Normal rate.   Pulmonary:      Effort: Pulmonary effort is normal. No respiratory distress.   Abdominal:      Palpations: Abdomen is soft.   Genitourinary:     General: Normal vulva.      Vagina: No vaginal discharge.   Musculoskeletal:         General: No swelling, tenderness or deformity. Normal range of motion.   Skin:     General: Skin is warm and dry.      Capillary Refill: Capillary refill takes less than 2 seconds.   Neurological:      Mental Status: She is alert.   Psychiatric:         Mood and Affect: Mood normal.

## 2025-04-02 NOTE — PROGRESS NOTES
Daily Note     Today's date: 4/3/2025  Patient name: Nury Hernandez  : 2000  MRN: [id number]  Referring provider: Padmini Urias MD  Dx:   Encounter Diagnosis     ICD-10-CM    1. Pelvic floor dysfunction  M62.89       2. High-tone pelvic floor dysfunction  M62.89               Start Time: 1100  Stop Time: 1200  Total time in clinic (min): 60 minutes    Subjective: Adherence to dilator training this week, however visit office of OBGYN and found small abrasion on vulva, however no contraindications to dilator training or manual stretching.       Objective: See treatment diary below    Spinal ROM: flexion WFL   Extension: WFL, reproduced abdominal stretch and pain developed following  R lateral flexion: WFL  L Lateral Flexion: WFL    Hip ROM: WFL, negative scour   Hip flexion: L 4-/ 5 R 4-/5   Hip abduction L 4/5 R 4-/5      Assessment: Tolerated treatment well. Patient demonstrated fatigue post treatment. Performed orthopedic evaluation - abdominal pain reproduced following spinal mobility, notably extension she felt abdominal soreness. Squat positive for bilateral varus and limited ROM limited by hip flexor. Added TA activation to various exercises for abdominal support. Updated HEP. Next visit, continue PFM stretching/ dilator training.       Plan: Continue per plan of care.      Precautions: Standard , Loja  Patient Active Problem List   Diagnosis    INES (generalized anxiety disorder)    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HCC)    Median arcuate ligament syndrome (HCC)    POTS (postural orthostatic tachycardia syndrome)    Seasonal allergic rhinitis    Hammertoe of left foot    Uncomplicated asthma    Non-celiac gluten sensitivity    Chronic idiopathic constipation    EDS (Malachi-Danlos syndrome)    Chronic migraine without aura without status migrainosus, not intractable    Urinary retention    Menstrual migraine without status migrainosus, not intractable    Stress at work    Migraine without aura and  without status migrainosus, not intractable         Diagnosis:    POC expires (Date that your POC expires) Auth Status? (BOMN, approved, pending) Unit limit (Daily) Auth Start date Expiration date PT/OT + Visit Limit?   5/22/2025  BOMN         Date of Service 03/13/25 03/21 3/26 4/3     Visits Used 1 2 3 4     Visits Remaining BOMN        Medbridge Created                  Neuro Re-Ed         Urge deferral         Defecation mechanics         Breathing Mechanics Inhale and relax 5' 10' 10'       PFM Coordination  10' contract relax bear down 5'       PFM Down Training  10'       Internal Cueing          Biofeedback                  Ther Ex         PFM Strengthening         Hip strengthening         Functional Strengthening         Abdominal Strengthening    TA activation x10     Marches supine x20     SL hip abduction x15     Squat #15 x10 (increased difficulty )     Dilator Training  15'       Aerobic         Therapeutic Rest Breaks         Mobility          High Impact         UE Strengthening                   Ther Activity         Voiding Diaries         Review of sxs         Fluid Intake          Defecation mechanics  10'  HEP        Manual Ther         PFM exam Performed         Ortho exam    15'      Dynamometer Testing         Fascial Decompression         Bowel Massage   30'  15'     PFM stretching   15'                Modalities                           Outcome Measure          PFDI - 93

## 2025-04-03 ENCOUNTER — OFFICE VISIT (OUTPATIENT)
Dept: PHYSICAL THERAPY | Facility: REHABILITATION | Age: 25
End: 2025-04-03
Payer: COMMERCIAL

## 2025-04-03 DIAGNOSIS — M62.89 HIGH-TONE PELVIC FLOOR DYSFUNCTION: ICD-10-CM

## 2025-04-03 DIAGNOSIS — M62.89 PELVIC FLOOR DYSFUNCTION: Primary | ICD-10-CM

## 2025-04-03 LAB — BACTERIA UR CULT: NORMAL

## 2025-04-03 PROCEDURE — 97140 MANUAL THERAPY 1/> REGIONS: CPT

## 2025-04-03 PROCEDURE — 97110 THERAPEUTIC EXERCISES: CPT

## 2025-04-04 ENCOUNTER — RESULTS FOLLOW-UP (OUTPATIENT)
Dept: OTHER | Facility: HOSPITAL | Age: 25
End: 2025-04-04

## 2025-04-07 ENCOUNTER — SOCIAL WORK (OUTPATIENT)
Dept: BEHAVIORAL/MENTAL HEALTH CLINIC | Facility: CLINIC | Age: 25
End: 2025-04-07
Payer: COMMERCIAL

## 2025-04-07 DIAGNOSIS — F41.1 GAD (GENERALIZED ANXIETY DISORDER): Primary | ICD-10-CM

## 2025-04-07 PROCEDURE — 90837 PSYTX W PT 60 MINUTES: CPT | Performed by: SOCIAL WORKER

## 2025-04-07 NOTE — PSYCH
"Behavioral Health Psychotherapy Progress Note    Psychotherapy Provided: Individual Psychotherapy     Encounter Diagnoses   Name Primary?   • INES (generalized anxiety disorder) Yes               Goals addressed in session: Goal 1     DATA:  Nury spoke during today's session about her feelings re: the discomfort involved in pelvic floor PT as she shared two chapters she recently completed in a book on the topic of sexual comfortability.  Nury also discussed how muc time it takes her body to recover from three night shifts of work.  During this session, this clinician used the following therapeutic modalities: Motivational Interviewing and Supportive Psychotherapy    Substance Abuse was not addressed during this session. If the client is diagnosed with a co-occurring substance use disorder, please indicate any changes in the frequency or amount of use: . Stage of change for addressing substance use diagnoses: No substance use/Not applicable    ASSESSMENT:  Nury Hernandez presents with a Euthymic/ normal mood.  Nury again allowed this worker to provide her with support and feedback on this topic.  She was again able to acknowledge how shame and childhood beliefs impact her thoughts re: sex.    her affect is Normal range and intensity, which is congruent, with her mood and the content of the session. Nury Hernandez was oriented to person, place, and time. Grooming and Hygiene were good  and clothing was casually dressed and appropriate for weather. Ability to perform ADLs has not changed. she was cooperative. Nury Hernandez denied suicidal thoughts, homicidal thoughts, and self injurious behaviors.   The client has made progress on their goals.     Nury Hernandez presents with a minimal risk of suicide, minimal risk of self-harm, and minimal risk of harm to others.    For any risk assessment that surpasses a \"low\" rating, a safety plan must be developed.    A safety plan was indicated: no  If yes, " describe in detail     PLAN: Between sessions, Nury Hernandez will continue to utilize therapy sessions to increase her mindfulness and self compassion. . At the next session, the therapist will use Supportive Psychotherapy to address the above in further detail.    Behavioral Health Treatment Plan and Discharge Planning: Nury Hernandez is aware of and agrees to continue to work on their treatment plan. They have identified and are working toward their discharge goals. yes        Visit start and stop times:      4/7/25

## 2025-04-08 ENCOUNTER — APPOINTMENT (OUTPATIENT)
Dept: PHYSICAL THERAPY | Facility: REHABILITATION | Age: 25
End: 2025-04-08
Payer: COMMERCIAL

## 2025-04-11 ENCOUNTER — OFFICE VISIT (OUTPATIENT)
Dept: PHYSICAL THERAPY | Facility: REHABILITATION | Age: 25
End: 2025-04-11
Payer: COMMERCIAL

## 2025-04-11 DIAGNOSIS — M62.89 PELVIC FLOOR DYSFUNCTION: Primary | ICD-10-CM

## 2025-04-11 DIAGNOSIS — M62.89 HIGH-TONE PELVIC FLOOR DYSFUNCTION: ICD-10-CM

## 2025-04-11 PROCEDURE — 97140 MANUAL THERAPY 1/> REGIONS: CPT

## 2025-04-11 PROCEDURE — 97110 THERAPEUTIC EXERCISES: CPT

## 2025-04-11 NOTE — PROGRESS NOTES
Daily Note     Today's date: 2025  Patient name: Nury Hernandez  : 2000  MRN: [id number]  Referring provider: Padmini Urias MD  Dx:   Encounter Diagnosis     ICD-10-CM    1. Pelvic floor dysfunction  M62.89       2. High-tone pelvic floor dysfunction  M62.89                 Start Time: 1000  Stop Time: 1100  Total time in clinic (min): 60 minutes    Chief Complaint: Pelvic pain  HPI: Nury Hernandez is a 24 y.o. nulliparous female referred by OBGYN for complaints of pelvic pain.  Patient reports symptoms have been present for years going through flares and remissions.  Currently presents with bladder symptoms including sensation of incomplete evacuation, frequency, hesitancy and 100% pushing to pee.  Additionally notes history of constipation since childhood.  Is currently evacuating 2-3 times a week with [place] stool type II/III.  Recent medication (2 months) has been helpful however, requires mod/max straining with 95% of BM.    Occupation: Nurse - NICU          Subjective: Reports good week in terms of BM.  Reports bowel movements daily from  through Tuesday with no need for straining.  Reports continued constipated/compacted stool but was pain-free.  Notes onset of cramping/bloating today and feels that she may need to have a bowel movement soon in the next few days.  Notes able to initiate stream w/o pushing during the days, mornings w/ full bladder still require pushing.       Objective: See treatment diary below      Assessment: Tolerated treatment well. Patient demonstrated fatigue post treatment. Continued PFM stretching/ dilator training today w/ good performance. Patient tolerated PFM stretching well w/ 1/10 or less pain noted throughout. Noted increased soreness in L side but great improvement in penetration tolerance, dynamic tolerance, and able to initiate eccentric lengthening (15%) during today's session.   Pt was educated through dilator training. Pt tolerated size 2  to 100% insertion w/ 0/10 pain w/ <10sec insertion. Initiated size 3 w/ ability to insert ~25% - Reinforced pain management and breathing tactics throughout.         Plan: Continue per plan of care.      Precautions: Standard , Loja  Patient Active Problem List   Diagnosis    INES (generalized anxiety disorder)    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HCC)    Median arcuate ligament syndrome (HCC)    POTS (postural orthostatic tachycardia syndrome)    Seasonal allergic rhinitis    Hammertoe of left foot    Uncomplicated asthma    Non-celiac gluten sensitivity    Chronic idiopathic constipation    EDS (Malachi-Danlos syndrome)    Chronic migraine without aura without status migrainosus, not intractable    Urinary retention    Menstrual migraine without status migrainosus, not intractable    Stress at work    Migraine without aura and without status migrainosus, not intractable         Diagnosis:    POC expires (Date that your POC expires) Auth Status? (BOMN, approved, pending) Unit limit (Daily) Auth Start date Expiration date PT/OT + Visit Limit?   5/22/2025  BOMN         Date of Service 03/13/25 03/21 3/26 4/3 4/11    Visits Used 1 2 3 4 5    Visits Remaining BOMN        [employer] Created                  Neuro Re-Ed         Urge deferral         Defecation mechanics         Breathing Mechanics Inhale and relax 5' 10' 10'       PFM Coordination  10' contract relax bear down 5'       PFM Down Training  10'       Internal Cueing          Biofeedback                  Ther Ex         PFM Strengthening         Hip strengthening         Functional Strengthening         Abdominal Strengthening    TA activation x10     Marches supine x20     SL hip abduction x15     Squat #15 x10 (increased difficulty )     Dilator Training  15'   15'     Aerobic         Therapeutic Rest Breaks         Mobility          High Impact         UE Strengthening                   Ther Activity         Voiding Diaries         Review of sxs         Fluid  Intake          Defecation mechanics  10'  HEP        Manual Ther         PFM exam Performed         Ortho exam    15'      Dynamometer Testing         Fascial Decompression         Bowel Massage   30'  15' 10'     PFM stretching   15'   35' winback             Modalities                           Outcome Measure          PFDI - 93

## 2025-04-14 ENCOUNTER — SOCIAL WORK (OUTPATIENT)
Dept: BEHAVIORAL/MENTAL HEALTH CLINIC | Facility: CLINIC | Age: 25
End: 2025-04-14
Payer: COMMERCIAL

## 2025-04-14 DIAGNOSIS — F41.1 GAD (GENERALIZED ANXIETY DISORDER): Primary | ICD-10-CM

## 2025-04-14 PROCEDURE — 90837 PSYTX W PT 60 MINUTES: CPT | Performed by: SOCIAL WORKER

## 2025-04-14 NOTE — PSYCH
"Behavioral Health Psychotherapy Progress Note    Psychotherapy Provided: Individual Psychotherapy     Encounter Diagnoses   Name Primary?   • INES (generalized anxiety disorder) Yes         Goals addressed in session: Goal 1     DATA:  Nury spoke during today's session about her feelings re: the \"regression\" that she experienced during this week's pelvic floor PT as she shared her struggle with perfectionism and self criticism.  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 If the client is diagnosed with a co-occurring substance use disorder, please indicate any changes in the frequency or amount of use: . Stage of change for addressing substance use diagnoses: No substance use/Not applicable    ASSESSMENT:  Nury Hernandez presents with a Euthymic/ normal mood.  Nury again allowed this worker to provide her with support and feedback on this topic.  She was able to acknowledge how  her thoughts impact her outcomes.    her affect is Normal range and intensity, which is congruent, with her mood and the content of the session. Nury Hernandez was oriented to person, place, and time. Grooming and Hygiene were good  and clothing was casually dressed and appropriate for weather. Ability to perform ADLs has not changed. she was cooperative. Nury Hernandez denied suicidal thoughts, homicidal thoughts, and self injurious behaviors.   The client has made progress on their goals.     Nury Hernandez presents with a minimal risk of suicide, minimal risk of self-harm, and minimal risk of harm to others.    For any risk assessment that surpasses a \"low\" rating, a safety plan must be developed.    A safety plan was indicated: no  If yes, describe " in detail     PLAN: Between sessions, Nury Hernandez will continue to utilize therapy sessions to increase her mindfulness and self compassion. . At the next session, the therapist will use Supportive Psychotherapy to address the above in further detail.    Behavioral Health Treatment Plan and Discharge Planning: Nury Hernandez is aware of and agrees to continue to work on their treatment plan. They have identified and are working toward their discharge goals. yes        Visit start and stop times:      4/14/25

## 2025-04-16 ENCOUNTER — OFFICE VISIT (OUTPATIENT)
Dept: PHYSICAL THERAPY | Facility: REHABILITATION | Age: 25
End: 2025-04-16
Payer: COMMERCIAL

## 2025-04-16 DIAGNOSIS — M62.89 PELVIC FLOOR DYSFUNCTION: Primary | ICD-10-CM

## 2025-04-16 DIAGNOSIS — M62.89 HIGH-TONE PELVIC FLOOR DYSFUNCTION: ICD-10-CM

## 2025-04-16 PROCEDURE — 97530 THERAPEUTIC ACTIVITIES: CPT

## 2025-04-16 PROCEDURE — 97140 MANUAL THERAPY 1/> REGIONS: CPT

## 2025-04-16 NOTE — PROGRESS NOTES
Daily Note     Today's date: 2025  Patient name: Nury Hernandez  : 2000  MRN: [id number]  Referring provider: Padmini Urias MD  Dx:   Encounter Diagnosis     ICD-10-CM    1. Pelvic floor dysfunction  M62.89       2. High-tone pelvic floor dysfunction  M62.89                   Start Time: 1100  Stop Time: 1200  Total time in clinic (min): 60 minutes    Chief Complaint: Pelvic pain  HPI: Nury Hernandez is a 24 y.o. nulliparous female referred by OBGYN for complaints of pelvic pain.  Patient reports symptoms have been present for years going through flares and remissions.  Currently presents with bladder symptoms including sensation of incomplete evacuation, frequency, hesitancy and 100% pushing to pee.  Additionally notes history of constipation since childhood.  Is currently evacuating 2-3 times a week with [place] stool type II/III.  Recent medication (2 months) has been helpful however, requires mod/max straining with 95% of BM.    Occupation: Nurse - NICU          Subjective: Report mild flare of nocturia last night (4-5x).   Notes good bowel function following previous session on Friday, however sensation of incomplete evacuation present the following days.  However notes overall experience during passing bowel movements as improved with use of eccentric lengthening and deep breathing.      Objective: See treatment diary below      Assessment: Tolerated treatment well. Patient demonstrated fatigue post treatment.  Massage today with reported improvement in symptoms following an gargling with palpable throughout.  Educated patient on proper bladder physiology and encouraged patient to perform scheduled voiding over the next week (approximately every 2 hours) with pelvic floor muscle contraction after urination for neuro feedback to bladder filling sensation.      Plan: Continue per plan of care.      Precautions: Standard , Loja  Patient Active Problem List   Diagnosis    INES (generalized  anxiety disorder)    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HCC)    Median arcuate ligament syndrome (HCC)    POTS (postural orthostatic tachycardia syndrome)    Seasonal allergic rhinitis    Hammertoe of left foot    Uncomplicated asthma    Non-celiac gluten sensitivity    Chronic idiopathic constipation    EDS (Malachi-Danlos syndrome)    Chronic migraine without aura without status migrainosus, not intractable    Urinary retention    Menstrual migraine without status migrainosus, not intractable    Stress at work    Migraine without aura and without status migrainosus, not intractable         Diagnosis:    POC expires (Date that your POC expires) Auth Status? (BOMN, approved, pending) Unit limit (Daily) Auth Start date Expiration date PT/OT + Visit Limit?   5/22/2025  BOMN         Date of Service 03/13/25 03/21 3/26 4/3 4/11 4/16   Visits Used 1 2 3 4 5    Visits Remaining BOMN        [employer] Created                  Neuro Re-Ed         Urge deferral         Defecation mechanics         Breathing Mechanics Inhale and relax 5' 10' 10'       PFM Coordination  10' contract relax bear down 5'       PFM Down Training  10'       Internal Cueing          Biofeedback                  Ther Ex         PFM Strengthening         Hip strengthening         Functional Strengthening         Abdominal Strengthening    TA activation x10     Marches supine x20     SL hip abduction x15     Squat #15 x10 (increased difficulty )     Dilator Training  15'   15'     Aerobic         Therapeutic Rest Breaks         Mobility          High Impact         UE Strengthening                   Ther Activity         Voiding Diaries         Review of sxs         Fluid Intake          Defecation mechanics  10'  HEP     Reviewed     Educated on bladder physiology and void interval as part of home exercise program  15'   Manual Ther         PFM exam Performed         Ortho exam    15'      Dynamometer Testing         Fascial Decompression         Bowel  Massage   30'  15' 10'  45'    PFM stretching   15'   35' winback             Modalities                           Outcome Measure          PFDI - 93

## 2025-04-22 ENCOUNTER — SOCIAL WORK (OUTPATIENT)
Dept: BEHAVIORAL/MENTAL HEALTH CLINIC | Facility: CLINIC | Age: 25
End: 2025-04-22
Payer: COMMERCIAL

## 2025-04-22 ENCOUNTER — OFFICE VISIT (OUTPATIENT)
Dept: PHYSICAL THERAPY | Facility: REHABILITATION | Age: 25
End: 2025-04-22
Attending: STUDENT IN AN ORGANIZED HEALTH CARE EDUCATION/TRAINING PROGRAM
Payer: COMMERCIAL

## 2025-04-22 DIAGNOSIS — M62.89 HIGH-TONE PELVIC FLOOR DYSFUNCTION: ICD-10-CM

## 2025-04-22 DIAGNOSIS — F41.1 GAD (GENERALIZED ANXIETY DISORDER): Primary | ICD-10-CM

## 2025-04-22 DIAGNOSIS — M62.89 PELVIC FLOOR DYSFUNCTION: Primary | ICD-10-CM

## 2025-04-22 PROCEDURE — 97140 MANUAL THERAPY 1/> REGIONS: CPT

## 2025-04-22 PROCEDURE — 90837 PSYTX W PT 60 MINUTES: CPT | Performed by: SOCIAL WORKER

## 2025-04-22 PROCEDURE — 97112 NEUROMUSCULAR REEDUCATION: CPT

## 2025-04-22 NOTE — PROGRESS NOTES
Daily Note     Today's date: 2025  Patient name: Nury Hernandez  : 2000  MRN: [id number]  Referring provider: Padmini Urias MD  Dx:   Encounter Diagnosis     ICD-10-CM    1. Pelvic floor dysfunction  M62.89       2. High-tone pelvic floor dysfunction  M62.89                     Start Time: 0845  Stop Time: 0940  Total time in clinic (min): 55 minutes    Chief Complaint: Pelvic pain  HPI: Nury Hernandez is a 24 y.o. nulliparous female referred by OBGYN for complaints of pelvic pain.  Patient reports symptoms have been present for years going through flares and remissions.  Currently presents with bladder symptoms including sensation of incomplete evacuation, frequency, hesitancy and 100% pushing to pee.  Additionally notes history of constipation since childhood.  Is currently evacuating 2-3 times a week with [place] stool type II/III.  Recent medication (2 months) has been helpful however, requires mod/max straining with 95% of BM.    Occupation: Nurse - NICU          Subjective: Had a bad day yesterday 8 out of 10 which decreased considerably with attending a hot yoga session.  Pt does feel she has better strategies to work through the flare ups but unsure what causes it.     Objective: See treatment diary below      Assessment: Tolerated treatment well. Patient demonstrated fatigue post treatment.  Used winback wand over abdominal area and bowel massage after which were both well tolerated.  Added reformer exercises following manual techniques.     Plan: Continue per plan of care.      Precautions: Standard , Loja  Patient Active Problem List   Diagnosis    INES (generalized anxiety disorder)    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HCC)    Median arcuate ligament syndrome (HCC)    POTS (postural orthostatic tachycardia syndrome)    Seasonal allergic rhinitis    Hammertoe of left foot    Uncomplicated asthma    Non-celiac gluten sensitivity    Chronic idiopathic constipation    EDS (Malachi-Danlos  syndrome)    Chronic migraine without aura without status migrainosus, not intractable    Urinary retention    Menstrual migraine without status migrainosus, not intractable    Stress at work    Migraine without aura and without status migrainosus, not intractable         Diagnosis:    POC expires (Date that your POC expires) Auth Status? (BOMN, approved, pending) Unit limit (Daily) Auth Start date Expiration date PT/OT + Visit Limit?   5/22/2025  BOMN         Date of Service 03/13/25 03/21 3/26 4/3 4/11 4/16 4/22   Visits Used 1 2 3 4 5  6   Visits Remaining BOMN         [employer] Created                    Neuro Re-Ed          Urge deferral          Defecation mechanics          Breathing Mechanics Inhale and relax 5' 10' 10'        PFM Coordination  10' contract relax bear down 5'        PFM Down Training  10'        Internal Cueing           Biofeedback          Reformer       1B heel slides, straight legs, single adductor, straight legs with circles, wheelbarrow both ways, tall knee    Ther Ex          PFM Strengthening          Hip strengthening          Functional Strengthening          Abdominal Strengthening    TA activation x10     Marches supine x20     SL hip abduction x15     Squat #15 x10 (increased difficulty )      Dilator Training  15'   15'      Aerobic          Therapeutic Rest Breaks          Mobility           High Impact          UE Strengthening                     Ther Activity          Voiding Diaries          Review of sxs          Fluid Intake           Defecation mechanics  10'  HEP     Reviewed     Educated on bladder physiology and void interval as part of home exercise program  15'    Manual Ther          PFM exam Performed          Ortho exam    15'       Dynamometer Testing          Fascial Decompression          Bowel Massage   30'  15' 10'  45'  CET 20-30% + bowel massage   PFM stretching   15'   35' winback               Modalities                              Outcome Measure            PFDI - 93

## 2025-04-22 NOTE — PSYCH
"Behavioral Health Psychotherapy Progress Note    Psychotherapy Provided: Individual Psychotherapy     Encounter Diagnoses   Name Primary?   • INES (generalized anxiety disorder) Yes           Goals addressed in session: Goal 1     DATA:  Nury spoke during today's session about her feelings re: the previous two sessions of pelvic floor PT as she shared her struggle with fully voiding urine and pain.  She discussed ways that she perceives penetration as \"something that women tolerate,\" while admitting to considerable difficulty discussing this topic.   During this session, this clinician used the following therapeutic modalities: Motivational Interviewing and Supportive Psychotherapy    Substance Abuse was not addressed during this session. If the client is diagnosed with a co-occurring substance use disorder, please indicate any changes in the frequency or amount of use: . Stage of change for addressing substance use diagnoses: No substance use/Not applicable    ASSESSMENT:  Nury Hernandez presents with a Euthymic/ normal mood.  Nury again allowed this worker to provide her with support and feedback on this topic.  She was able to acknowledge how  much progress that she has made on this issue even in the last two months.   her affect is Normal range and intensity, which is congruent, with her mood and the content of the session. Nury Hernandez was oriented to person, place, and time. Grooming and Hygiene were good  and clothing was casually dressed and appropriate for weather. Ability to perform ADLs has not changed. she was cooperative. Nury Hernandez denied suicidal thoughts, homicidal thoughts, and self injurious behaviors.   The client has made progress on their goals.     Nury Hernandez presents with a minimal risk of suicide, minimal risk of self-harm, and minimal risk of harm to others.    For any risk assessment that surpasses a \"low\" rating, a safety plan must be developed.    A safety " plan was indicated: no  If yes, describe in detail     PLAN: Between sessions, Nury Hernandez will continue to utilize therapy sessions to increase her mindfulness and self compassion. . At the next session, the therapist will use Supportive Psychotherapy to address the above in further detail.    Behavioral Health Treatment Plan and Discharge Planning: Nury Hernandez is aware of and agrees to continue to work on their treatment plan. They have identified and are working toward their discharge goals. yes        Visit start and stop times:      4/22/25

## 2025-04-23 ENCOUNTER — DOCUMENTATION (OUTPATIENT)
Dept: PSYCHIATRY | Facility: CLINIC | Age: 25
End: 2025-04-23

## 2025-04-23 NOTE — PSYCH
"    Psychiatric Discharge - Department of Behavioral Health     [facility]    Name and Date of Birth:  Nury Hernandez 25 y.o. 2000    Admission Date: 7/17/24     Referral source: previous psychiatrist    Discharge Date: 4/23/2025    Discharge Type: Did not return for follow up    Discharge Diagnosis:   1. INES (generalized anxiety disorder)    2. Stress at work        Treating Physician: Debora Dee MD     Treatment Complications: Did not return for follow up.     Admit with discharge: No    Prognosis at time of discharge: Guarded    SUBJECTIVE     Presenting Problems/Pertinent Findings:      Initial psychiatric intake by this writer on 7/17/24:     \"Nury Hernandez is a 24 y.o. 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) , with past medical history of POTS (managed at [facility]), migraines (follows with neurology), Malachi Danlos syndrome, mast cell activation syndome, and PCOS, and past psychiatric history of generalized anxiety disorder (Psychiatric Hospitalizations: denied, Outpatient Treatment History: Leif Bowman MD, ARCENIO Monique, Fred Jessica MD, Suicide Attempts: denied, History of non-suicidal self injury: denied, Violence History: denied), who presents to the [facility] outpatient clinic for comprehensive psychiatric assessment due to anxiety symptoms and for psychiatric medication management. Patient was referred by Dr. Leif Bowman for transition of care.      In review of Nury Hernandez's past psychiatric history per chart review and discussion with patient, she reported psychiatric symptoms first started gradually in childhood following parents' divorce and have improved and remained stable over the last few " months. Stressors preceding today's visit included stress at work, health issues, social difficulties, everyday stressors, and occasional anxiety.      Today on evaluation, Nury presents reporting:  ANXIETY SYMPTOMS: worrying about everyday issues related to work, worrying daily for the past few or two related to work stress due to increased intubated neonates at work, panic attack about a week ago due to hearing ventilator alarms that wakes her up from sleep  7/17 INES-7: 4, somewhat difficult  7/17 PHQ-9: 3, no or minimal depression     Nury currently denies neurovegetative symptomatology suggestive of major depressive disorder or dysthymia. Nury denies fragmented or non-restorative sleep. Nury endorses robust appetite, baseline energy, and no impairment of motivation. Nury reports adequate concentration/memory and denies new-onset forgetfulness or inattentiveness. Nury does not experience daily crying spells or limited pleasure in activities previously found pleasurable. Nury adamantly denies acute thoughts of suicide or self-harm. Nury has no plans to harm others. There is no documented history of prior suicidal gestures or suicidal attempts. Nury denies historical non-suicidal self injurious behavior. Nury is future-oriented and demonstrates self preservation as evidenced by today's evaluation in which Nury is seeking psychiatric intervention to improve overall mental health and outlook on life. Nury denies a pervasive history of worthlessness, hopelessness, or guilt.       Nury vehemently denies any acute or chronic history suggestive of an underlying affective (bipolar) organization. Nury denies previous episodes of elevated/expansive mood, lengthy periods without sleep, grandiosity, or intense and prolonged irritability. Nury denies atypical periods of increased goal-directed behavior, excessive spending, or sexual promiscuity. The patient has no history of pathologic impulsivity  "or extreme mood lability. During today's evaluation, Nury does not exhibit objective evidence of hypomania/patti. Nury is mostly organized in thought without flight of ideas or loosening of associations. Speech does not appear to be pressured or rapid and Nury responds well to verbal redirecting.     Nury denies historical symptomatology suggestive of an underlying psychotic process. Nury does not currently endorse acute perceptual disturbances such as A/V hallucinations, paranoia, referential ideation, or delusions. Nury denies acute and chronic Schneiderian symptoms, including: thought-broadcasting, thought-insertion, thought-withdrawal or audible thoughts. During today's evaluation, Nury does not exhibit objective evidence of delores psychosis as the patient does not appear internally preoccupied or easily distracted. Nury's thoughts are organized, linear, and reality-based.     Nury denies historical symptomatology suggestive of PTSD, OCD, or disordered eating.     She reports fluctuating sleep pattern in setting of frequent shift schedule changes between days and nights in the NICU, fluctuating appetite, fluctuating energy levels. Patient reported not being on psychotropic medications currently. She reports that medications were slowly discontinued and she has not taken them since March or April. She reports feeling well and not seeking psychotropic medications at this time. However, she hopes to continue to follow at the clinic due to medical comorbidities and concerns about recurrence of symptoms.     Presently, patient adamantly denies suicidal ideation, intent or plan at present in addition to thoughts of self-injury, citing \"work, sense of purpose, family, friends\" as deterrents against self-harm; contracts for safety, see risk assessment for further detail. She denies homicidal ideation, intent or plan at present. At conclusion of evaluation, patient is amenable to the recommendations of " "this writer including: medications as prescribed, attending routine appointments, attending therapy sessions.  Also, patient is amenable to calling/contacting the outpatient office including this writer if any acute adverse effects of their medication regimen arise in addition to any comments or concerns pertaining to their psychiatric management.  Patient is amenable to calling/contacting crisis and/or attending to the nearest emergency department if their clinical condition deteriorates to assure their safety and stability, stating that they are able to appropriately confide in their \"Clementina, best friend, parents\" regarding their psychiatric state.  \"    Therapist: Clementina Sky    Course of Treatment: Psychiatric Evaluation and Medication Management    Summary of Treatment Progress:     Nury was seen for initial Psychiatric Evaluation and medication management on 7/17/24. The patient has been following with the resident clinic for many years and has had 2 resident provider(s). Current psychotropic medications include none.      Psychiatric medications were  started and discontinued during  treatment course in the [facility] resident clinic. To address anxiety symptoms, Nury was treated with antidepressant Effexor, which was later discontinued in Spring 2024.  Prior to beginning of treatment, medications risks and benefits and possible side effects including risk of suicidality and serotonin syndrome related to treatment with antidepressants were discussed with Nury. She verbalized understanding and agreement for treatment. Nury's symptoms slowly improved over the treatment course.     At the patient's previous outpatient psychiatric appointment with this writer on 7/17/24: \"   Nury Hernandez is a 24 y.o. 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) , with past medical history of POTS (managed at [facility]), migraines (follows with neurology), Malachi Danlos syndrome, mast cell activation syndome, and PCOS, and past psychiatric history of generalized anxiety disorder (Psychiatric Hospitalizations: denied, Outpatient Treatment History: Leif Bowman MD, ARCENIO Monique, Fred Jessica MD, Suicide Attempts: denied, History of non-suicidal self injury: denied, Violence History: denied), who presents to the [facility] outpatient clinic for comprehensive psychiatric assessment due to anxiety symptoms and for psychiatric medication management. Patient was referred by Dr. Leif Bowman for transition of care.      In review of Nury Hernandez's past psychiatric history per chart review and discussion with patient, she reported psychiatric symptoms first started gradually in childhood following parents' divorce and have improved and remained stable over the last few months. Stressors preceding today's visit included stress at work, health issues, social difficulties, everyday stressors, and occasional anxiety.      Today on evaluation, Nury presents reporting:  ANXIETY SYMPTOMS: worrying about everyday issues related to work, worrying daily for the past few or two related to work stress due to increased intubated neonates at work, panic attack about a week ago due to hearing ventilator alarms that wakes her up from sleep  7/17 INES-7: 4, somewhat difficult  7/17 PHQ-9: 3, no or minimal depression     Nury currently denies neurovegetative symptomatology suggestive of major depressive disorder or dysthymia. Nury denies fragmented or non-restorative sleep. Nury endorses robust appetite, baseline energy, and no impairment of motivation. Nury reports adequate concentration/memory and denies new-onset forgetfulness or inattentiveness. Nury does not experience daily crying spells or limited pleasure in  activities previously found pleasurable. Nury adamantly denies acute thoughts of suicide or self-harm. Nury has no plans to harm others. There is no documented history of prior suicidal gestures or suicidal attempts. Nury denies historical non-suicidal self injurious behavior. Nury is future-oriented and demonstrates self preservation as evidenced by today's evaluation in which Nury is seeking psychiatric intervention to improve overall mental health and outlook on life. Nury denies a pervasive history of worthlessness, hopelessness, or guilt.       Nury vehemently denies any acute or chronic history suggestive of an underlying affective (bipolar) organization. Nury denies previous episodes of elevated/expansive mood, lengthy periods without sleep, grandiosity, or intense and prolonged irritability. Nury denies atypical periods of increased goal-directed behavior, excessive spending, or sexual promiscuity. The patient has no history of pathologic impulsivity or extreme mood lability. During today's evaluation, Nury does not exhibit objective evidence of hypomania/patti. Nury is mostly organized in thought without flight of ideas or loosening of associations. Speech does not appear to be pressured or rapid and Nury responds well to verbal redirecting.     Nury denies historical symptomatology suggestive of an underlying psychotic process. Nury does not currently endorse acute perceptual disturbances such as A/V hallucinations, paranoia, referential ideation, or delusions. Nury denies acute and chronic Schneiderian symptoms, including: thought-broadcasting, thought-insertion, thought-withdrawal or audible thoughts. During today's evaluation, Nury does not exhibit objective evidence of delores psychosis as the patient does not appear internally preoccupied or easily distracted. Nury's thoughts are organized, linear, and reality-based.     Nury denies historical symptomatology  "suggestive of PTSD, OCD, or disordered eating.     She reports fluctuating sleep pattern in setting of frequent shift schedule changes between days and nights in the NICU, fluctuating appetite, fluctuating energy levels. Patient reported not being on psychotropic medications currently. She reports that medications were slowly discontinued and she has not taken them since March or April. She reports feeling well and not seeking psychotropic medications at this time. However, she hopes to continue to follow at the clinic due to medical comorbidities and concerns about recurrence of symptoms.     Presently, patient adamantly denies suicidal ideation, intent or plan at present in addition to thoughts of self-injury, citing \"work, sense of purpose, family, friends\" as deterrents against self-harm; contracts for safety, see risk assessment for further detail. She denies homicidal ideation, intent or plan at present. At conclusion of evaluation, patient is amenable to the recommendations of this writer including: medications as prescribed, attending routine appointments, attending therapy sessions.  Also, patient is amenable to calling/contacting the outpatient office including this writer if any acute adverse effects of their medication regimen arise in addition to any comments or concerns pertaining to their psychiatric management.  Patient is amenable to calling/contacting crisis and/or attending to the nearest emergency department if their clinical condition deteriorates to assure their safety and stability, stating that they are able to appropriately confide in their \"Clementina, best friend, parents\" regarding their psychiatric state.  \".      Lethality Risk at the time of discharge from clinic: LOW.     At the time of the most recent psychiatric assessment, Nury was future-oriented, forward-thinking, and demonstrated ability to act in a self-preserving manner as evidenced by volitionally presenting to the clinic, " seeking treatment. To mitigate future risk, patient should adhere to treatment recommendations, avoid alcohol/illicit substance use, utilize community-based resources and familiar support, and prioritize mental health treatment.     Suicide/Homicide Risk Assessment at last office visit on 7/17/24  Risk of Harm to Self:  The following ratings are based on assessment at the time of the interview and review of records  Demographic risk factors include: , never , age: young adult (15-24)  Historical Risk Factors include: history of anxiety  Recent Specific Risk Factors include: mental illness diagnosis, current anxiety symptoms  Protective Factors: no current suicidal ideation, ability to adapt to change, able to manage anger well, access to mental health treatment, compliant with mental health treatment, cultural beliefs discouraging suicide, good health, good self-esteem, having a desire to be alive, having a sense of purpose or meaning in life, personal beliefs about the meaning and value of life, responsibilities and duties to others, restricted access to lethal means  Weapons: none. The following steps have been taken to ensure weapons are properly secured: not applicable  Based on today's assessment, Nury presents the following risk of harm to self: low     Risk of Harm to Others:  The following ratings are based on assessment at the time of the interview and review of records  Demographic Risk Factors include: 16-25 years of age, under age 40.  Historical Risk Factors include: none.  Recent Specific Risk Factors include: none.  Protective Factors: no current homicidal ideation, ability to adapt to change, able to manage anger well, access to mental health treatment, compliant with mental health treatment, contact with caregivers, no substance use problems, personal beliefs, restricted access to lethal means  Weapons: none. The following steps have been taken to ensure weapons are properly  secured: not applicable  Based on today's assessment, Nury presents the following risk of harm to others: low    OBJECTIVE     History Review: The following portions of the patient's history were reviewed and updated as appropriate: allergies, current medications, past family history, past medical history, past social history, past surgical history, and problem list.. Reviewed on 04/23/25.    Psychiatric Review Of Systems:     Sleep change:  fluctuating  Interest change: no  Interests include: hang out with friends and family, traveling  Guilt/Hopelessness/helplessness/worthlessness: no  Energy change:  fluctuating  Concentration/Attention change: no  Appetite change: no, fluctuating  Weight changes & timeframe: no  Psychomotor agitation/retardation: no  Somatic symptoms: no  Suicidal ideation: no  Homicidal ideation: no  Beti/hypomania: no     Anxiety/panic attack: yes  INES symptoms: fatigue  Panic Disorder symptoms: palpitations/racing heart  Social Anxiety symptoms: social anxiety due to fear of judgment or embarassment related to performance at work  Obsessive/compulsive symptoms:  denies OCD symptoms; reported preference for structure  Eating Disorder symptoms: no historical or current eating disorder. no binge eating disorder; no anorexia nervosa. no symptoms of bulimia     Auditory hallucinations: no  Visual hallucinations: no  Other perceptual disturbances: no  Delusional thinking: no     Review Of Systems:     Constitutional: negative  ENT:  negative  Cardiovascular: negative  Respiratory: negative  Gastrointestinal: negative  Genitourinary: negative  Musculoskeletal: negative  Integumentary: negative  Neurological: negative  Endocrine: negative  Other Symptoms: none, all other systems are negative     Past Psychiatric History:      Past psychiatric diagnoses: INES  Inpatient psychiatric admissions: denied  Prior outpatient psychiatric treatment: Leif Bowman MD ([phone]), ARCENIO Monique ([phone],  switched between [facility] and Dr. Jessica), Fred Jessica MD (approximately [phone])  Past/current psychotherapy: follows at [facility] with therapist Clementina Sky ([phone])  Other Services: patient denies     History of suicidal attempts/gestures: denied   History of non-suicidal self-injurious behavior: denied  History of violence/aggressive behaviors: denied     Psychotropic medication trials:   Antidepressants: Effexor (effective, later discontinued due to symptom improvement, had significant discontinuation syndrome symptoms); Zoloft (ADR: fatigue); Cymbalta (ADR: fatigue)  Antipsychotics: denied  Mood stabilizers: Depakote (for migraines as a rescue; discontinued after improvement); Topamax (for migraines as a rescue; discontinued after improvement)  Anxiolytics: Xanax (short-term for anxiety attacks and panic attacks while being titrated on antidepressant)  Other: Methylphenidate (for energy by [facility] provider due to fatigue during POTS work up; discontinued); Modafinil (for energy in setting of sleep-wake cycle changes by [employer]; discontinued due to lack of benefit for circadian rhythm and patient preference)     Substance Abuse History:     Alcohol: occasional, social use   Denies history of illict substance, or tobacco abuse., Patient denies previous legal actions or arrests related to substance intoxication including prior DWIs/DUIs., Patient does not exhibit objective evidence of substance withdrawal during today's examination nor do they appear under the influence of any psychoactive substance.       History of Inpatient/Outpatient rehabilitation program: no  Longest clean time: not applicable        Family Psychiatric History:      Family History         Family History   Problem Relation Age of Onset    Gestational diabetes Mother      Migraines Mother      Anxiety disorder Father      Hyperlipidemia Father      Autism Brother      Diabetes Other      Uterine cancer Maternal Grandmother      Rheumatic fever  Maternal Grandmother      Diabetes Maternal Grandfather      Hypertension Maternal Grandfather      Heart attack Maternal Grandfather      Stroke Maternal Grandfather      Hyperlipidemia Maternal Grandfather      Hypertension Paternal Grandmother      Anxiety disorder Paternal Grandmother      Hypertension Paternal Grandfather              Psychiatric Illness:      no family history of psychiatric illness  Substance Abuse:       no family history of substance abuse  Suicide Attempts:        no family history of suicide attempts     Patient otherwise denies known family history of psychiatric illness, substance use, or suicide attempts.     Social History:     Birth history: in [place] [facility]  Raised: in [place] by father and step-mother with sister, twin brother, step sister; parents  when patient was 5 yo  Developmental: There is no documented history of 504/IEP or need for special education., Patient denies any known in-utero exposure to toxins or illicit substances., Reports a history of premature delivery (at 29 weeks), required speech and physical therapy, caught up to developmental milestones by age 4 years.  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 , Patient denies history of arrests or violence with a deadly weapon.    history: None  Legal history: None     Traumatic History:      Abuse: no history of sexual abuse, no history of physical abuse, no history of emotional abuse, no history of verbal abuse  Other Traumatic Events:  related to work in NICU; otherwise denies     Past Medical History:     Medical History        Past Medical History:    Diagnosis Date    Annual physical exam 2019    Anxiety      Asthma      Dizziness       at times    Dyspepsia 2021    Exertional headache 2022    GERD (gastroesophageal reflux disease)      Hammertoe of left foot      Headache       occ    Hyperlipemia      Hypermobile joints      Hypertriglyceridemia 2019    IBS (irritable bowel syndrome) 2020    Irritable bowel syndrome      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HCC)      Mast cell disorder      Migraine      PONV (postoperative nausea and vomiting)      POTS (postural orthostatic tachycardia syndrome)       infant      Wears glasses           Medical History Pertinent Negatives         Hx of seizures: no  Hx of concussions & ongoing symptoms: no  Surgical History         Past Surgical History:   Procedure Laterality Date    COLONOSCOPY        EGD AND COLONOSCOPY N/A 1/10/2017     Procedure: EGD AND COLONOSCOPY;  Surgeon: Ozzy Billingsley MD;  Location: [facility];  Service:     ESOPHAGOGASTRODUODENOSCOPY         with biopsy    FOOT SURGERY         Excision of lesion feet benign    CO CORRECTION HAMMERTOE Left 2019     Procedure: 2nd arthrodesis; 5th arthroplasty, flexor tenotomy 2,3,4,5.;  Surgeon: Odilon Berger DPM;  Location: [facility];  Service: Podiatry    CO CORRECTION HAMMERTOE Left 2020     Procedure: 2ND TOE Revision hammertoe with broken implant;  Surgeon: Odilon Berger DPM;  Location: [facility];  Service: Podiatry    UPPER GASTROINTESTINAL ENDOSCOPY   2021    WISDOM TOOTH EXTRACTION             Allergies         Allergies   Allergen Reactions    Nuts - Food Allergy Other (See Comments)       Avani Nuts - itchy throat    Other Hives        At surgical site and IV site- not sure if type of cleanser used    Pollen Extract      Reyvow [Lasmiditan] Palpitations and Hallucinations              Mental Status Evaluation (as of most recent visit on 24):    Appearance age appropriate, casually  "dressed   Behavior cooperative, calm   Speech normal rate, normal volume, normal pitch   Mood \"good\"   Affect normal range and intensity, appropriate   Thought Processes organized, goal directed   Associations intact associations   Thought Content no overt delusions   Perceptual Disturbances: Denies auditory or visual hallucinations   Abnormal Thoughts  Risk Potential Denies suicidal or homicidal ideation, plan, or intent   Orientation oriented to person, place, time/date, and situation   Memory recent and remote memory grossly intact   Consciousness alert and awake   Attention Span Concentration Span attention span and concentration are age appropriate   Intellect appears to be of average intelligence   Insight intact   Judgement intact   Muscle Strength and  Gait normal muscle strength and normal muscle tone, normal gait and normal balance   Motor Activity no abnormal movements   Language no difficulty naming common objects, no difficulty repeating a phrase, no difficulty writing a sentence   Fund of Knowledge adequate knowledge of current events  adequate fund of knowledge regarding past history  adequate fund of knowledge regarding vocabulary    .    ASSESSMENT & PLAN     To what extent did Nury achieve her goals?: Most    Criteria for Discharge: Did not return for treatment. Did not respond to reminder letter to reschedule follow up appointment.    Aftercare Recommendations:   Follow up with therapist recommended.  Follow up with family physician for psychiatric issues.    Discharge Medications:     Current Outpatient Medications:     acetaminophen (TYLENOL) 500 mg tablet, Take 1,000 mg by mouth every 4 (four) hours as needed , Disp: , Rfl:     albuterol (2.5 mg/3 mL) 0.083 % nebulizer solution, Take 3 mL (2.5 mg total) by nebulization every 6 (six) hours as needed for wheezing or shortness of breath, Disp: 30 mL, Rfl: 0    albuterol (ProAir HFA) 90 mcg/act inhaler, Inhale 2 puffs every 6 (six) hours as " needed for wheezing or shortness of breath, Disp: 8.5 g, Rfl: 0    ASMANEX  MCG/ACT AERO, Inhale 2 puffs every 4 (four) hours as needed (2 puffs), Disp: , Rfl:     Atogepant (Qulipta) 60 MG TABS, Take 60 mg by mouth in the morning, Disp: 90 tablet, Rfl: 4    clindamycin (CLEOCIN T) 1 % lotion, Apply topically in the morning To face, chest and back, Disp: 60 mL, Rfl: 3    diphenhydrAMINE (BENADRYL) 25 mg tablet, Take 1 tablet (25 mg total) by mouth every 6 (six) hours as needed for itching, Disp: 30 tablet, Rfl: 0    Elastic Bandages & Supports (TRUFORM STOCKINGS 20-30MMHG) MISC, , Disp: , Rfl:     famotidine (PEPCID) 20 mg tablet, Take 1 tablet (20 mg total) by mouth daily at bedtime, Disp: 30 tablet, Rfl: 3    fludrocortisone (FLORINEF) 0.1 mg tablet, Take 0.1 mg by mouth 2 (two) times a day, Disp: , Rfl:     metoprolol succinate (TOPROL-XL) 50 mg 24 hr tablet, Take 50 mg by mouth in the morning and 50 mg before bedtime., Disp: , Rfl:     norgestimate-ethinyl estradiol (ORTHO-CYCLEN) 0.25-35 MG-MCG per tablet, Take 1 tablet by mouth daily, Disp: , Rfl:     omeprazole (PriLOSEC) 20 mg delayed release capsule, Take 1 capsule (20 mg total) by mouth daily, Disp: 30 capsule, Rfl: 3    onabotulinumtoxin A (BOTOX) 100 units, Inject  as directed. Injection every 91 days for migraines, Disp: , Rfl:     ondansetron (Zofran ODT) 4 mg disintegrating tablet, Take 1 tablet (4 mg total) by mouth every 6 (six) hours as needed for nausea or vomiting, Disp: 30 tablet, Rfl: 0    ondansetron (ZOFRAN) 8 mg tablet, Take 1 tablet (8 mg total) by mouth every 8 (eight) hours as needed for nausea or vomiting, Disp: 20 tablet, Rfl: 0    prochlorperazine (COMPAZINE) 10 mg tablet, Take 1 tablet (10 mg total) by mouth every 6 (six) hours as needed (migraine), Disp: 10 tablet, Rfl: 0    rimegepant sulfate (Nurtec) 75 mg TBDP, Take 1 tablet (75 mg total) by mouth as needed (migraine) Limit of 1 in 24 hours, Disp: 16 tablet, Rfl: 11     scopolamine (TRANSDERM-SCOP) 1 mg/3 days TD 72 hr patch, Place 1 patch on the skin over 72 hours every third day, Disp: 13 patch, Rfl: 3    Tenapanor HCl 50 MG TABS, Take 50 mg by mouth 2 (two) times a day, Disp: 60 tablet, Rfl: 3    tretinoin (RETIN-A) 0.025 % cream, Apply a pea sized amount to face one hour before bedtime after moisturizing. Start with 1-2 nights per week and build up to nightly as tolerated., Disp: 45 g, Rfl: 2    tretinoin (RETIN-A) 0.05 % cream, Spread one pea-sized amount of medication over entire face about one hour before bedtime., Disp: 45 g, Rfl: 3    verapamil (CALAN) 120 mg tablet, Take 1 tab by mouth twice a day, Disp: 180 tablet, Rfl: 3    Describe ability and willingness to work and solve mental problems:   May have difficulty solving her mental health problems}      Note Share: This note was shared with patient.      Debora Dee MD   04/23/25

## 2025-04-24 ENCOUNTER — TELEPHONE (OUTPATIENT)
Dept: GASTROENTEROLOGY | Facility: MEDICAL CENTER | Age: 25
End: 2025-04-24

## 2025-04-24 ENCOUNTER — APPOINTMENT (OUTPATIENT)
Dept: PHYSICAL THERAPY | Facility: REHABILITATION | Age: 25
End: 2025-04-24
Payer: COMMERCIAL

## 2025-04-24 ENCOUNTER — OFFICE VISIT (OUTPATIENT)
Dept: GASTROENTEROLOGY | Facility: MEDICAL CENTER | Age: 25
End: 2025-04-24
Payer: COMMERCIAL

## 2025-04-24 VITALS
HEIGHT: 67 IN | SYSTOLIC BLOOD PRESSURE: 146 MMHG | TEMPERATURE: 96.7 F | DIASTOLIC BLOOD PRESSURE: 82 MMHG | OXYGEN SATURATION: 99 % | WEIGHT: 158 LBS | HEART RATE: 82 BPM | BODY MASS INDEX: 24.8 KG/M2

## 2025-04-24 DIAGNOSIS — K59.04 CHRONIC IDIOPATHIC CONSTIPATION: Primary | ICD-10-CM

## 2025-04-24 DIAGNOSIS — R14.3 FLATUS: ICD-10-CM

## 2025-04-24 DIAGNOSIS — M62.89 PELVIC FLOOR DYSFUNCTION: ICD-10-CM

## 2025-04-24 DIAGNOSIS — R10.13 DYSPEPSIA: ICD-10-CM

## 2025-04-24 PROCEDURE — 99214 OFFICE O/P EST MOD 30 MIN: CPT | Performed by: INTERNAL MEDICINE

## 2025-04-24 NOTE — PROGRESS NOTES
Name: Nury Hernandez      : 2000      MRN: [id number]  Encounter Provider: Diana M Jaiyeola, MD  Encounter Date: 2025   Encounter department: [facility] [place]  :  Assessment & Plan  Chronic idiopathic constipation  She has a history of chronic constipation and has previously trialed and failed multiple over-the-counter and prescription medications for constipation as documented below.  She is currently taking Ibsrela and having more frequent bowel movements.  I recommend adding MiraLAX and titrating as needed as well to have a soft, formed bowel movement daily or every other day.  She will continue pelvic physical therapy.  She reports worsening flatus symptoms after taking a course of Diflucan for yeast infection and is interested in SIBO testing which I have also ordered.       Pelvic floor dysfunction         Flatus    Orders:    Small intestinal bacterial overgrowth    Dyspepsia  She noted dyspepsia symptoms at her last office visit was started omeprazole 40 mg in the morning and Pepcid at night.  She has not had improvement of her abdominal pain and discontinued both medications.           History of Present Illness   Nury Hernandez is a 25 y.o. female who presents for follow-up evaluation.  She has a history of chronic constipation.    She was last seen in the GI office in 2024.  She has had a longstanding history of constipation and had previously trialed fiber supplementation, docusate, bisacodyl, Amitiza, MiraLAX and Linzess, Trulance with no relief.  At her last office visit she had been taking Motegrity and was transition to Ibsrela.  She also noted dyspepsia and was recommended a trial of omeprazole 40 mg in Pepcid 40 mg.    She has also previously undergone pelvic physical therapy and has restarted pelvic physical therapy.  She has completed 6 sessions and reports improvement.  She continues to take Ibsrela is having a bowel movement 3-4  times per week.  She has minimal straining with bowel movements but reports hard [place] type I stool at times.  She was taking omeprazole and Pepcid but had resolution of her abdominal pain and discontinue the medication.  She also reports worsening flatus after taking Diflucan for yeast infection.  She is using a probiotic          11/2024 liver enzymes are within normal limit, hemoglobin 11.5, platelets 259    She has no recent abdominal imaging available for review    2022 anal manometry showed normal anal tone and adequate squeeze response, mildly low sensation and difficult mireille urge unlikely to be clinically significant.  There was normal balloon expulsion time and low suspicion for ongoing dyssynergy defecation.  2021 EGD was normal.  Duodenal and gastric biopsies were unremarkable  2017 colonoscopy was normal    HPI  History obtained from: patient  Review of Systems   Constitutional:  Negative for chills and fever.   HENT:  Negative for ear pain and sore throat.    Eyes:  Negative for pain and visual disturbance.   Respiratory:  Negative for cough and shortness of breath.    Cardiovascular:  Negative for chest pain and palpitations.   Gastrointestinal:  Positive for constipation. Negative for abdominal pain and vomiting.   Genitourinary:  Negative for dysuria and hematuria.   Musculoskeletal:  Negative for arthralgias and back pain.   Skin:  Negative for color change and rash.   Neurological:  Negative for seizures and syncope.   All other systems reviewed and are negative.   A complete review of systems is negative other than that noted above in the HPI.    Past Medical History   Past Medical History:   Diagnosis Date    Acne     Annual physical exam 04/08/2019    Anxiety     Asthma     Dizziness     at times    Dyspepsia 01/05/2021    Exertional headache 05/23/2022    GERD (gastroesophageal reflux disease)     Hammertoe of left foot     Headache     occ    Headache(784.0) July 2021    Hyperlipemia      Hypermobile joints     Hypertriglyceridemia 2019    IBS (irritable bowel syndrome) 2020    Irritable bowel syndrome     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HCC)     Mast cell disorder     Migraine     PCOS (polycystic ovarian syndrome) 2024    PONV (postoperative nausea and vomiting)     POTS (postural orthostatic tachycardia syndrome)      infant     Wears glasses      Past Surgical History:   Procedure Laterality Date    COLONOSCOPY      EGD AND COLONOSCOPY N/A 01/10/2017    Procedure: EGD AND COLONOSCOPY;  Surgeon: Ozzy Billingsley MD;  Location: [facility];  Service:     ESOPHAGOGASTRODUODENOSCOPY      with biopsy    FOOT SURGERY      Excision of lesion feet benign    PA CORRECTION HAMMERTOE Left 2019    Procedure: 2nd arthrodesis; 5th arthroplasty, flexor tenotomy 2,3,4,5.;  Surgeon: Odilon Berger DPM;  Location: [facility];  Service: Podiatry    PA CORRECTION HAMMERTOE Left 2020    Procedure: 2ND TOE Revision hammertoe with broken implant;  Surgeon: Odilon Berger DPM;  Location: [facility];  Service: Podiatry    SKIN BIOPSY      L foot growth removal    UPPER GASTROINTESTINAL ENDOSCOPY  2021    WISDOM TOOTH EXTRACTION       Family History   Problem Relation Age of Onset    Gestational diabetes Mother     Migraines Mother     Anxiety disorder Mother     Anxiety disorder Father     Hyperlipidemia Father     Hypertension Father     Autism Brother     Diabetes Other     Uterine cancer Maternal Grandmother     Rheumatic fever Maternal Grandmother     Migraines Maternal Grandmother     Basal cell carcinoma Maternal Grandmother     Squamous cell carcinoma Maternal Grandmother     Diabetes Maternal Grandfather     Hypertension Maternal Grandfather     Heart attack Maternal Grandfather     Stroke Maternal Grandfather     Hyperlipidemia Maternal Grandfather     Hypertension Paternal Grandmother     Anxiety disorder Paternal Grandmother     Hypertension Paternal Grandfather      Anxiety disorder Sister     Anxiety disorder Sister       reports that she has never smoked. She has never used smokeless tobacco. She reports that she does not drink alcohol and does not use drugs.  Current Outpatient Medications   Medication Instructions    acetaminophen (TYLENOL) 1,000 mg, Every 4 hours PRN    albuterol (ProAir HFA) 90 mcg/act inhaler 2 puffs, Inhalation, Every 6 hours PRN    albuterol 2.5 mg, Nebulization, Every 6 hours PRN    ASMANEX  MCG/ACT AERO 2 puffs, Every 4 hours PRN    clindamycin (CLEOCIN T) 1 % lotion Topical, Daily, To face, chest and back    diphenhydrAMINE (BENADRYL) 25 mg, Oral, Every 6 hours PRN    Elastic Bandages & Supports (TRUFORM STOCKINGS 20-30MMHG) MISC     fludrocortisone (FLORINEF) 0.1 mg, 2 times daily    metoprolol succinate (TOPROL-XL) 50 mg, 2 times daily    norgestimate-ethinyl estradiol (ORTHO-CYCLEN) 0.25-35 MG-MCG per tablet 1 tablet, Daily    Nurtec 75 mg, Oral, As needed, Limit of 1 in 24 hours    onabotulinumtoxin A (BOTOX) 100 units Inject  as directed. Injection every 91 days for migraines    ondansetron (ZOFRAN ODT) 4 mg, Oral, Every 6 hours PRN    ondansetron (ZOFRAN) 8 mg, Oral, Every 8 hours PRN    prochlorperazine (COMPAZINE) 10 mg, Oral, Every 6 hours PRN    Qulipta 60 mg, Oral, Daily    scopolamine (TRANSDERM-SCOP) 1 mg/3 days TD 72 hr patch 1 patch, Transdermal, Every 72 hours    Tenapanor HCl 50 mg, Oral, 2 times daily    tretinoin (RETIN-A) 0.025 % cream Apply a pea sized amount to face one hour before bedtime after moisturizing. Start with 1-2 nights per week and build up to nightly as tolerated.    tretinoin (RETIN-A) 0.05 % cream Spread one pea-sized amount of medication over entire face about one hour before bedtime.    verapamil (CALAN) 120 mg tablet Take 1 tab by mouth twice a day     Allergies   Allergen Reactions    Nuts - Food Allergy Other (See Comments)     Avani Nuts - itchy throat    Other Hives      At surgical site and IV  "site- not sure if type of cleanser used    Pollen Extract     Reyvow [Lasmiditan] Palpitations and Hallucinations         Objective   /82 (BP Location: Right arm, Patient Position: Sitting, Cuff Size: Standard)   Pulse 82   Temp (!) 96.7 °F (35.9 °C) (Tympanic)   Ht 5' 7\" (1.702 m)   Wt 71.7 kg (158 lb)   SpO2 99%   BMI 24.75 kg/m²     Physical Exam  Vitals and nursing note reviewed.   Constitutional:       General: She is not in acute distress.     Appearance: She is well-developed.   HENT:      Head: Normocephalic and atraumatic.   Eyes:      Conjunctiva/sclera: Conjunctivae normal.   Cardiovascular:      Rate and Rhythm: Normal rate and regular rhythm.      Heart sounds: No murmur heard.  Pulmonary:      Effort: Pulmonary effort is normal. No respiratory distress.      Breath sounds: Normal breath sounds.   Abdominal:      Palpations: Abdomen is soft.      Tenderness: There is no abdominal tenderness.   Musculoskeletal:         General: No swelling.      Cervical back: Neck supple.   Skin:     General: Skin is warm and dry.      Capillary Refill: Capillary refill takes less than 2 seconds.   Neurological:      Mental Status: She is alert.   Psychiatric:         Mood and Affect: Mood normal.            Lab Results: I personally reviewed relevant lab results.       Results for orders placed during the hospital encounter of 01/28/21    EGD    Narrative  Table formatting from the original result was not included.    [facility]  [address]  [place] [address]  [phone]      DATE OF SERVICE:  1/28/21    PHYSICIAN(S):  Anant Vasquez MD - Attending Physician      INDICATION:  Dyspepsia    POST-OP DIAGNOSIS:  See the impression below.    PREPROCEDURE:  Informed consent was obtained for the procedure, including sedation.  Risks of perforation, hemorrhage, adverse drug reaction and aspiration were discussed. The patient was placed in the left lateral decubitus " position.    Patient was explained about the risks and benefits of the procedure. Risks including but not limited to bleeding, infection, and perforation were explained in detail. Also explained about less than 100% sensitivity with the exam and other alternatives.    DETAILS OF PROCEDURE:  Patient was taken to the procedure room where a time out was performed to confirm correct patient and correct procedure. The patient underwent monitored anesthesia care, which was administered by an anesthesia professional. The patient's blood pressure, heart rate, level of consciousness, respirations and oxygen were monitored throughout the procedure. The scope was advanced to the second part of the duodenum. Retroflexion was performed in the fundus. The patient experienced no blood loss. The procedure was not difficult. The patient tolerated the procedure well. There were no apparent complications.    ANESTHESIA INFORMATION:  ASA: II  Anesthesia Type: IV Sedation with Anesthesia    FINDINGS:  The esophagus, stomach and duodenum appeared normal. Gastroesophageal junction located 35 cm from the entry site.  Performed biopsies in the body of the stomach, antrum, duodenal bulb and 2nd part of the duodenum. Biopsies were taken from stomach to rule out H pylori.  Biopsies were taken from small bowel to rule out celiac disease.      SPECIMENS:  ID Type Source Tests Collected by Time Destination  1 : bx, R/O celiac Tissue Duodenum TISSUE EXAM Anant Vasquez MD 1/28/2021  7:41 AM  2 : gastric bx, R/O H. pylori Tissue Stomach TISSUE EXAM Anant Vasquez MD 1/28/2021  7:42 AM        Impression  The esophagus, stomach and duodenum appeared normal. Gastroesophageal junction located 35 cm from the entry site.    RECOMMENDATION:  Follow-up biopsy results  Can restart gluten free diet      Anant Vasquez MD

## 2025-04-24 NOTE — TELEPHONE ENCOUNTER
Patient left their OV with a SIBO kit today as well as our instruction sheet. Patient accepted to receive a [employer] message with how to view the SIBO Breath Kit Instructions. Patient would like to drop the kit off at the [place] office. Thank you!

## 2025-04-28 ENCOUNTER — SOCIAL WORK (OUTPATIENT)
Dept: BEHAVIORAL/MENTAL HEALTH CLINIC | Facility: CLINIC | Age: 25
End: 2025-04-28
Payer: COMMERCIAL

## 2025-04-28 ENCOUNTER — OFFICE VISIT (OUTPATIENT)
Dept: INTERNAL MEDICINE CLINIC | Facility: CLINIC | Age: 25
End: 2025-04-28
Payer: COMMERCIAL

## 2025-04-28 VITALS
DIASTOLIC BLOOD PRESSURE: 76 MMHG | HEIGHT: 67 IN | HEART RATE: 78 BPM | OXYGEN SATURATION: 98 % | SYSTOLIC BLOOD PRESSURE: 126 MMHG | BODY MASS INDEX: 25.11 KG/M2 | WEIGHT: 160 LBS

## 2025-04-28 DIAGNOSIS — Z13.220 SCREENING, LIPID: ICD-10-CM

## 2025-04-28 DIAGNOSIS — Z13.1 SCREENING FOR DIABETES MELLITUS: ICD-10-CM

## 2025-04-28 DIAGNOSIS — G90.A POTS (POSTURAL ORTHOSTATIC TACHYCARDIA SYNDROME): ICD-10-CM

## 2025-04-28 DIAGNOSIS — G43.709 CHRONIC MIGRAINE WITHOUT AURA WITHOUT STATUS MIGRAINOSUS, NOT INTRACTABLE: ICD-10-CM

## 2025-04-28 DIAGNOSIS — F41.1 GAD (GENERALIZED ANXIETY DISORDER): Primary | ICD-10-CM

## 2025-04-28 DIAGNOSIS — K59.04 CHRONIC IDIOPATHIC CONSTIPATION: ICD-10-CM

## 2025-04-28 DIAGNOSIS — Z00.00 ANNUAL PHYSICAL EXAM: Primary | ICD-10-CM

## 2025-04-28 PROCEDURE — 90837 PSYTX W PT 60 MINUTES: CPT | Performed by: SOCIAL WORKER

## 2025-04-28 PROCEDURE — 99395 PREV VISIT EST AGE 18-39: CPT | Performed by: INTERNAL MEDICINE

## 2025-04-28 NOTE — PATIENT INSTRUCTIONS
"Patient Education     Routine physical for adults   The Basics   Written by the doctors and editors at [facility]   What is a physical? -- A physical is a routine visit, or \"check-up,\" with your doctor. You might also hear it called a \"wellness visit\" or \"preventive visit.\"  During each visit, the doctor will:   Ask about your physical and mental health   Ask about your habits, behaviors, and lifestyle   Do an exam   Give you vaccines if needed   Talk to you about any medicines you take   Give advice about your health   Answer your questions  Getting regular check-ups is an important part of taking care of your health. It can help your doctor find and treat any problems you have. But it's also important for preventing health problems.  A routine physical is different from a \"sick visit.\" A sick visit is when you see a doctor because of a health concern or problem. Since physicals are scheduled ahead of time, you can think about what you want to ask the doctor.  How often should I get a physical? -- It depends on your age and health. In general, for people age 21 years and older:   If you are younger than 50 years, you might be able to get a physical every 3 years.   If you are 50 years or older, your doctor might recommend a physical every year.  If you have an ongoing health condition, like diabetes or high blood pressure, your doctor will probably want to see you more often.  What happens during a physical? -- In general, each visit will include:   Physical exam - The doctor or nurse will check your height, weight, heart rate, and blood pressure. They will also look at your eyes and ears. They will ask about how you are feeling and whether you have any symptoms that bother you.   Medicines - It's a good idea to bring a list of all the medicines you take to each doctor visit. Your doctor will talk to you about your medicines and answer any questions. Tell them if you are having any side effects that bother you. You " "should also tell them if you are having trouble paying for any of your medicines.   Habits and behaviors - This includes:   Your diet   Your exercise habits   Whether you smoke, drink alcohol, or use drugs   Whether you are sexually active   Whether you feel safe at home  Your doctor will talk to you about things you can do to improve your health and lower your risk of health problems. They will also offer help and support. For example, if you want to quit smoking, they can give you advice and might prescribe medicines. If you want to improve your diet or get more physical activity, they can help you with this, too.   Lab tests, if needed - The tests you get will depend on your age and situation. For example, your doctor might want to check your:   Cholesterol   Blood sugar   Iron level   Vaccines - The recommended vaccines will depend on your age, health, and what vaccines you already had. Vaccines are very important because they can prevent certain serious or deadly infections.   Discussion of screening - \"Screening\" means checking for diseases or other health problems before they cause symptoms. Your doctor can recommend screening based on your age, risk, and preferences. This might include tests to check for:   Cancer, such as breast, prostate, cervical, ovarian, colorectal, prostate, lung, or skin cancer   Sexually transmitted infections, such as chlamydia and gonorrhea   Mental health conditions like depression and anxiety  Your doctor will talk to you about the different types of screening tests. They can help you decide which screenings to have. They can also explain what the results might mean.   Answering questions - The physical is a good time to ask the doctor or nurse questions about your health. If needed, they can refer you to other doctors or specialists, too.  Adults older than 65 years often need other care, too. As you get older, your doctor will talk to you about:   How to prevent falling at " home   Hearing or vision tests   Memory testing   How to take your medicines safely   Making sure that you have the help and support you need at home  All topics are updated as new evidence becomes available and our peer review process is complete.  This topic retrieved from [employer] on: May 02, 2024.  Topic 383958 Version 1.0  Release: 32.4.3 - C32.122  © 2024 UpToDate, Inc. and/or its affiliates. All rights reserved.  Consumer Information Use and Disclaimer   Disclaimer: This generalized information is a limited summary of diagnosis, treatment, and/or medication information. It is not meant to be comprehensive and should be used as a tool to help the user understand and/or assess potential diagnostic and treatment options. It does NOT include all information about conditions, treatments, medications, side effects, or risks that may apply to a specific patient. It is not intended to be medical advice or a substitute for the medical advice, diagnosis, or treatment of a health care provider based on the health care provider's examination and assessment of a patient's specific and unique circumstances. Patients must speak with a health care provider for complete information about their health, medical questions, and treatment options, including any risks or benefits regarding use of medications. This information does not endorse any treatments or medications as safe, effective, or approved for treating a specific patient. UpToDate, Inc. and its affiliates disclaim any warranty or liability relating to this information or the use thereof.The use of this information is governed by the Terms of Use, available at https://www.woltersoragenicsuwer.com/en/know/clinical-effectiveness-terms. 2024© UpToDate, Inc. and its affiliates and/or licensors. All rights reserved.  Copyright   © 2024 UpToDate, Inc. and/or its affiliates. All rights reserved.

## 2025-04-28 NOTE — PSYCH
Behavioral Health Psychotherapy Progress Note    Psychotherapy Provided: Individual Psychotherapy     Encounter Diagnoses   Name Primary?   • INES (generalized anxiety disorder) Yes             Goals addressed in session: Goal 1     DATA:  Nury spoke during today's session about her feelings re: the significant pelvic floor pain that she experienced while working this past weekend and the considerable stress that may have contributed to this pain.  She verbalized uncertainty about remaining in her current position long-term not only as a result of the toll that this role has on her body but also as she has learned that her manager will be leaving in a month.    During this session, this clinician used the following therapeutic modalities: Motivational Interviewing and Supportive Psychotherapy    Substance Abuse was not addressed during this session. If the client is diagnosed with a co-occurring substance use disorder, please indicate any changes in the frequency or amount of use: . Stage of change for addressing substance use diagnoses: No substance use/Not applicable    ASSESSMENT:  Nury Hernandez presents with a Euthymic/ normal mood.  Nury again allowed this worker to provide her with support and feedback on this topic.  She was able to notice her tendency to  perseverate on numbers as her increased body weight was discussed   her affect is Normal range and intensity, which is congruent, with her mood and the content of the session. Nury Hernandez was oriented to person, place, and time. Grooming and Hygiene were good  and clothing was casually dressed and appropriate for weather. Ability to perform ADLs has not changed. she was cooperative. Nury Hernandez denied suicidal thoughts, homicidal thoughts, and self injurious behaviors.   The client has made progress on their goals.     Nury Hernandez presents with a minimal risk of suicide, minimal risk of self-harm, and minimal risk of harm to  "others.    For any risk assessment that surpasses a \"low\" rating, a safety plan must be developed.    A safety plan was indicated: no  If yes, describe in detail     PLAN: Between sessions, Nury Hernandez will continue to utilize therapy sessions to increase her mindfulness and self compassion. . At the next session, the therapist will use Supportive Psychotherapy to address the above in further detail.    Behavioral Health Treatment Plan and Discharge Planning: Nury Hernandez is aware of and agrees to continue to work on their treatment plan. They have identified and are working toward their discharge goals. yes        Visit start and stop times:      4/28/25        "

## 2025-04-28 NOTE — PROGRESS NOTES
Adult Annual Physical  Name: Nury Hernandez      : 2000      MRN: [id number]  Encounter Provider: Lea Reyes, MD  Encounter Date: 2025   [facility]    :  Assessment & Plan  Annual physical exam         Screening, lipid    Orders:  •  Lipid panel    Screening for diabetes mellitus    Orders:  •  Hemoglobin A1C; Future    Chronic migraine without aura without status migrainosus, not intractable  Managed by neurology on verapamil, Nurtec, Qulipta  Receiving Botox injections       POTS (postural orthostatic tachycardia syndrome)  Follows up with cardiology       Chronic idiopathic constipation  Follows up with gastroenterology       Encounter for immunization         Annual physical exam               Preventive Screenings:  - Diabetes Screening: screening up-to-date  - Cholesterol Screening: orders placed   - Cervical cancer screening: orders placed   - Colon cancer screening: screening not indicated   - Lung cancer screening: screening not indicated       Depression Screening and Follow-up Plan: Patient was screened for depression during today's encounter. They screened negative with a PHQ-2 score of 0.          History of Present Illness     Adult Annual Physical:  Patient presents for annual physical.     Diet and Physical Activity:  - Diet/Nutrition: well balanced diet.  - Exercise: 5-7 times a week on average.    Depression Screening:  - PHQ-2 Score: 0    General Health:  - Sleep: sleeps well.  - Hearing: normal hearing bilateral ears.  - Vision: most recent eye exam < 1 year ago.  - Dental: regular dental visits.    /GYN Health:  - Follows with GYN: yes.   - Menopause: premenopausal.   - Contraception: oral contraceptives.      Review of Systems   Constitutional:  Positive for unexpected weight change (weight gain). Negative for fatigue.   Respiratory:  Negative for shortness of breath.    Cardiovascular:  Positive for palpitations. Negative for chest  "pain and leg swelling.   Gastrointestinal:  Positive for constipation.   Genitourinary:  Positive for frequency, pelvic pain and urgency. Negative for dysuria.   Neurological:  Positive for headaches.         Objective   /76   Pulse 78   Ht 5' 7\" (1.702 m)   Wt 72.6 kg (160 lb)   SpO2 98%   BMI 25.06 kg/m²     Physical Exam  Constitutional:       General: She is not in acute distress.     Appearance: She is well-developed. She is not ill-appearing, toxic-appearing or diaphoretic.   HENT:      Right Ear: External ear normal. There is no impacted cerumen.      Left Ear: External ear normal. There is no impacted cerumen.   Eyes:      Conjunctiva/sclera: Conjunctivae normal.   Cardiovascular:      Rate and Rhythm: Normal rate and regular rhythm.      Heart sounds: Normal heart sounds. No murmur heard.  Pulmonary:      Effort: Pulmonary effort is normal. No respiratory distress.      Breath sounds: Normal breath sounds. No stridor. No wheezing or rales.   Abdominal:      General: There is no distension.      Palpations: Abdomen is soft. There is no mass.      Tenderness: There is no abdominal tenderness. There is no guarding or rebound.   Musculoskeletal:      Cervical back: Neck supple.      Right lower leg: No edema.      Left lower leg: No edema.   Neurological:      Mental Status: She is alert and oriented to person, place, and time.   Psychiatric:         Mood and Affect: Mood normal.         Behavior: Behavior normal.         Thought Content: Thought content normal.         Judgment: Judgment normal.         "

## 2025-04-29 ENCOUNTER — OFFICE VISIT (OUTPATIENT)
Dept: PHYSICAL THERAPY | Facility: REHABILITATION | Age: 25
End: 2025-04-29
Payer: COMMERCIAL

## 2025-04-29 DIAGNOSIS — M62.89 PELVIC FLOOR DYSFUNCTION: Primary | ICD-10-CM

## 2025-04-29 DIAGNOSIS — M62.89 HIGH-TONE PELVIC FLOOR DYSFUNCTION: ICD-10-CM

## 2025-04-29 PROCEDURE — 97110 THERAPEUTIC EXERCISES: CPT

## 2025-04-29 PROCEDURE — 97140 MANUAL THERAPY 1/> REGIONS: CPT

## 2025-04-29 NOTE — PROGRESS NOTES
Daily Note     Today's date: 2025  Patient name: Nury Hernandez  : 2000  MRN: [id number]  Referring provider: Padmini Urias MD  Dx:   Encounter Diagnosis     ICD-10-CM    1. Pelvic floor dysfunction  M62.89       2. High-tone pelvic floor dysfunction  M62.89                       Start Time: 1600  Stop Time: 1700  Total time in clinic (min): 60 minutes    Chief Complaint: Pelvic pain  HPI: Nury Hernandez is a 24 y.o. nulliparous female referred by OBGYN for complaints of pelvic pain.  Patient reports symptoms have been present for years going through flares and remissions.  Currently presents with bladder symptoms including sensation of incomplete evacuation, frequency, hesitancy and 100% pushing to pee.  Additionally notes history of constipation since childhood.  Is currently evacuating 2-3 times a week with [place] stool type II/III.  Recent medication (2 months) has been helpful however, requires mod/max straining with 95% of BM.    Occupation: Nurse - NICU          Subjective: Reports flare of sxs, mild pain since running today. Per GI, going to increase current medication to x2 a day w/ use of Murelax for stool consistency.   Reports increased to size 4 dilator this week. Feels that aspect is going well.     Objective: See treatment diary below      Assessment: Tolerated treatment well. Patient demonstrated fatigue post treatment.  Used winback wand over abdominal area and bowel massage after which were both well tolerated. Added cupping to lower abdomin, w/ slight relief of sxs, but reported increased relief w/ winback therapy. Educated on precautions and skin unremarkable pre and post.   Incorporated TA activation w/ good tolerance/ performance. Added to SLR for activation.   Next visit, likely to benefit from continued manual therapy and increased abdominal strengthening.     Plan: Continue per plan of care.      Precautions: Standard , Loja  Patient Active Problem List   Diagnosis     INES (generalized anxiety disorder)    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HCC)    Median arcuate ligament syndrome (HCC)    POTS (postural orthostatic tachycardia syndrome)    Seasonal allergic rhinitis    Hammertoe of left foot    Uncomplicated asthma    Non-celiac gluten sensitivity    Chronic idiopathic constipation    EDS (Malachi-Danlos syndrome)    Chronic migraine without aura without status migrainosus, not intractable    Urinary retention    Menstrual migraine without status migrainosus, not intractable    Stress at work    Migraine without aura and without status migrainosus, not intractable         Diagnosis:    POC expires (Date that your POC expires) Auth Status? (BOMN, approved, pending) Unit limit (Daily) Auth Start date Expiration date PT/OT + Visit Limit?   5/22/2025  BOMN         Date of Service 03/13/25 03/21 3/26 4/3 4/11 4/16 4/22 4/29   Visits Used 1 2 3 4 5  6 7   Visits Remaining BOMN          Med[employer] Created                      Neuro Re-Ed           Urge deferral           Defecation mechanics           Breathing Mechanics Inhale and relax 5' 10' 10'         PFM Coordination  10' contract relax bear down 5'         PFM Down Training  10'         Internal Cueing            Biofeedback           Reformer       1B heel slides, straight legs, single adductor, straight legs with circles, wheelbarrow both ways, tall knee     Ther Ex           PFM Strengthening           Hip strengthening           Functional Strengthening           Abdominal Strengthening    TA activation x10     Marches supine x20     SL hip abduction x15     Squat #15 x10 (increased difficulty )    TA activation   15'     SLR + TA x10    Dilator Training  15'   15'       Aerobic           Therapeutic Rest Breaks           Mobility            High Impact           UE Strengthening                       Ther Activity           Voiding Diaries           Review of sxs           Fluid Intake            Defecation mechanics  10'  HEP      Reviewed     Educated on bladder physiology and void interval as part of home exercise program  15'     Manual Ther           PFM exam Performed           Ortho exam    15'        Dynamometer Testing           Fascial Decompression           Bowel Massage   30'  15' 10'  45'  CET 20-30% + bowel massage CET 20% + bowel massage  + Cupping   45'    PFM stretching   15'   35' winback                 Modalities                                 Outcome Measure            PFDI - 93

## 2025-05-04 PROBLEM — Z56.6 STRESS AT WORK: Status: RESOLVED | Noted: 2024-07-17 | Resolved: 2025-05-04

## 2025-05-04 PROBLEM — I77.4 MEDIAN ARCUATE LIGAMENT SYNDROME (HCC): Status: RESOLVED | Noted: 2017-09-08 | Resolved: 2025-05-04

## 2025-05-04 PROBLEM — Q79.60 EDS (EHLERS-DANLOS SYNDROME): Status: RESOLVED | Noted: 2022-02-03 | Resolved: 2025-05-04

## 2025-05-05 ENCOUNTER — SOCIAL WORK (OUTPATIENT)
Dept: BEHAVIORAL/MENTAL HEALTH CLINIC | Facility: CLINIC | Age: 25
End: 2025-05-05
Payer: COMMERCIAL

## 2025-05-05 ENCOUNTER — RESULTS FOLLOW-UP (OUTPATIENT)
Dept: INTERNAL MEDICINE CLINIC | Facility: CLINIC | Age: 25
End: 2025-05-05

## 2025-05-05 ENCOUNTER — OFFICE VISIT (OUTPATIENT)
Dept: PHYSICAL THERAPY | Facility: REHABILITATION | Age: 25
End: 2025-05-05
Payer: COMMERCIAL

## 2025-05-05 ENCOUNTER — APPOINTMENT (OUTPATIENT)
Dept: LAB | Age: 25
End: 2025-05-05
Attending: INTERNAL MEDICINE
Payer: COMMERCIAL

## 2025-05-05 ENCOUNTER — PROCEDURE VISIT (OUTPATIENT)
Dept: NEUROLOGY | Facility: CLINIC | Age: 25
End: 2025-05-05
Payer: COMMERCIAL

## 2025-05-05 ENCOUNTER — TRANSCRIBE ORDERS (OUTPATIENT)
Dept: ADMINISTRATIVE | Age: 25
End: 2025-05-05

## 2025-05-05 VITALS — HEART RATE: 70 BPM | SYSTOLIC BLOOD PRESSURE: 122 MMHG | DIASTOLIC BLOOD PRESSURE: 76 MMHG | TEMPERATURE: 97.9 F

## 2025-05-05 DIAGNOSIS — Z79.899 MEDICATION MANAGEMENT: ICD-10-CM

## 2025-05-05 DIAGNOSIS — R55 NEAR SYNCOPE: ICD-10-CM

## 2025-05-05 DIAGNOSIS — G90.A POTS (POSTURAL ORTHOSTATIC TACHYCARDIA SYNDROME): ICD-10-CM

## 2025-05-05 DIAGNOSIS — R00.0 TACHYCARDIA: ICD-10-CM

## 2025-05-05 DIAGNOSIS — D89.40 MAST CELL ACTIVATION SYNDROME (HCC): ICD-10-CM

## 2025-05-05 DIAGNOSIS — M62.89 HIGH-TONE PELVIC FLOOR DYSFUNCTION: ICD-10-CM

## 2025-05-05 DIAGNOSIS — Z13.1 SCREENING FOR DIABETES MELLITUS: ICD-10-CM

## 2025-05-05 DIAGNOSIS — G43.709 CHRONIC MIGRAINE WITHOUT AURA WITHOUT STATUS MIGRAINOSUS, NOT INTRACTABLE: Primary | ICD-10-CM

## 2025-05-05 DIAGNOSIS — Q79.60 EDS (EHLERS-DANLOS SYNDROME): ICD-10-CM

## 2025-05-05 DIAGNOSIS — G90.A POTS (POSTURAL ORTHOSTATIC TACHYCARDIA SYNDROME): Primary | ICD-10-CM

## 2025-05-05 DIAGNOSIS — F41.1 GAD (GENERALIZED ANXIETY DISORDER): Primary | ICD-10-CM

## 2025-05-05 DIAGNOSIS — M62.89 PELVIC FLOOR DYSFUNCTION: Primary | ICD-10-CM

## 2025-05-05 LAB
ALBUMIN SERPL BCG-MCNC: 3.7 G/DL (ref 3.5–5)
ALP SERPL-CCNC: 48 U/L (ref 34–104)
ALT SERPL W P-5'-P-CCNC: 13 U/L (ref 7–52)
ANION GAP SERPL CALCULATED.3IONS-SCNC: 11 MMOL/L (ref 4–13)
AST SERPL W P-5'-P-CCNC: 16 U/L (ref 13–39)
BILIRUB SERPL-MCNC: 0.31 MG/DL (ref 0.2–1)
BUN SERPL-MCNC: 14 MG/DL (ref 5–25)
CALCIUM SERPL-MCNC: 8.7 MG/DL (ref 8.4–10.2)
CHLORIDE SERPL-SCNC: 102 MMOL/L (ref 96–108)
CHOLEST SERPL-MCNC: 165 MG/DL (ref ?–200)
CO2 SERPL-SCNC: 27 MMOL/L (ref 21–32)
CREAT SERPL-MCNC: 0.73 MG/DL (ref 0.6–1.3)
ERYTHROCYTE [DISTWIDTH] IN BLOOD BY AUTOMATED COUNT: 11.9 % (ref 11.6–15.1)
EST. AVERAGE GLUCOSE BLD GHB EST-MCNC: 117 MG/DL
GFR SERPL CREATININE-BSD FRML MDRD: 115 ML/MIN/1.73SQ M
GLUCOSE P FAST SERPL-MCNC: 94 MG/DL (ref 65–99)
HBA1C MFR BLD: 5.7 %
HCT VFR BLD AUTO: 39.7 % (ref 34.8–46.1)
HDLC SERPL-MCNC: 53 MG/DL
HGB BLD-MCNC: 12.8 G/DL (ref 11.5–15.4)
LDLC SERPL CALC-MCNC: 77 MG/DL (ref 0–100)
MCH RBC QN AUTO: 29 PG (ref 26.8–34.3)
MCHC RBC AUTO-ENTMCNC: 32.2 G/DL (ref 31.4–37.4)
MCV RBC AUTO: 90 FL (ref 82–98)
NONHDLC SERPL-MCNC: 112 MG/DL
PLATELET # BLD AUTO: 317 THOUSANDS/UL (ref 149–390)
PMV BLD AUTO: 10.7 FL (ref 8.9–12.7)
POTASSIUM SERPL-SCNC: 4 MMOL/L (ref 3.5–5.3)
PROT SERPL-MCNC: 6.9 G/DL (ref 6.4–8.4)
RBC # BLD AUTO: 4.42 MILLION/UL (ref 3.81–5.12)
SODIUM SERPL-SCNC: 140 MMOL/L (ref 135–147)
TRIGL SERPL-MCNC: 175 MG/DL (ref ?–150)
WBC # BLD AUTO: 5.03 THOUSAND/UL (ref 4.31–10.16)

## 2025-05-05 PROCEDURE — 85027 COMPLETE CBC AUTOMATED: CPT

## 2025-05-05 PROCEDURE — 90837 PSYTX W PT 60 MINUTES: CPT | Performed by: SOCIAL WORKER

## 2025-05-05 PROCEDURE — 36415 COLL VENOUS BLD VENIPUNCTURE: CPT

## 2025-05-05 PROCEDURE — 64615 CHEMODENERV MUSC MIGRAINE: CPT | Performed by: PHYSICIAN ASSISTANT

## 2025-05-05 PROCEDURE — 83036 HEMOGLOBIN GLYCOSYLATED A1C: CPT

## 2025-05-05 PROCEDURE — 97110 THERAPEUTIC EXERCISES: CPT

## 2025-05-05 PROCEDURE — 80053 COMPREHEN METABOLIC PANEL: CPT

## 2025-05-05 PROCEDURE — 97140 MANUAL THERAPY 1/> REGIONS: CPT

## 2025-05-05 PROCEDURE — 80061 LIPID PANEL: CPT | Performed by: INTERNAL MEDICINE

## 2025-05-05 NOTE — PSYCH
Behavioral Health Psychotherapy Progress Note    Psychotherapy Provided: Individual Psychotherapy     Encounter Diagnoses   Name Primary?   • INES (generalized anxiety disorder) Yes               Goals addressed in session: Goal 1     DATA:  Nury spoke during today's session about her feelings re: last week's appt with her PCP during which they discussed the weight changes that have resulted from her return to taking birth control.  Nury reported that her dr is not concerned and that she needs to remain on this medication. Nury admitted that seeing her weight changes daily is upsetting and agreed to decrease weight checks to weekly.  Nury also acknowledged the stress that she has been under considering her Supervisor will be leaving in a month.   During this session, this clinician used the following therapeutic modalities: Motivational Interviewing and Supportive Psychotherapy    Substance Abuse was not addressed during this session. If the client is diagnosed with a co-occurring substance use disorder, please indicate any changes in the frequency or amount of use: . Stage of change for addressing substance use diagnoses: No substance use/Not applicable    ASSESSMENT:  Nury Hernandez presents with a Euthymic/ normal mood.  Nruy again allowed this worker to provide her with support and feedback on this topic.  She was able to notice ways that her health is dramatically impacted by her stress level, particularly as evidenced by a recent increase in her blood pressure.   her affect is Normal range and intensity, which is congruent, with her mood and the content of the session. Nury Hernandez was oriented to person, place, and time. Grooming and Hygiene were good  and clothing was casually dressed and appropriate for weather. Ability to perform ADLs has not changed. she was cooperative. Nury Hernandez denied suicidal thoughts, homicidal thoughts, and self injurious behaviors.   The client has made  "progress on their goals.     Nury Hernandez presents with a minimal risk of suicide, minimal risk of self-harm, and minimal risk of harm to others.    For any risk assessment that surpasses a \"low\" rating, a safety plan must be developed.    A safety plan was indicated: no  If yes, describe in detail     PLAN: Between sessions, Nury Hernandez will continue to utilize therapy sessions to increase her mindfulness and self compassion. . At the next session, the therapist will use Supportive Psychotherapy to address the above in further detail.    Behavioral Health Treatment Plan and Discharge Planning: Nury Hernandez is aware of and agrees to continue to work on their treatment plan. They have identified and are working toward their discharge goals. yes        Visit start and stop times:      5/5/25        "

## 2025-05-05 NOTE — PROGRESS NOTES
"Daily Note     Today's date: 2025  Patient name: Nury Hernandez  : 2000  MRN: [id number]  Referring provider: Padmini Urias MD  Dx:   Encounter Diagnosis     ICD-10-CM    1. Pelvic floor dysfunction  M62.89       2. High-tone pelvic floor dysfunction  M62.89                                    Chief Complaint: Pelvic pain  HPI: Nury Hernandez is a 24 y.o. nulliparous female referred by OBGYN for complaints of pelvic pain.  Patient reports symptoms have been present for years going through flares and remissions.  Currently presents with bladder symptoms including sensation of incomplete evacuation, frequency, hesitancy and 100% pushing to pee.  Additionally notes history of constipation since childhood.  Is currently evacuating 2-3 times a week with [place] stool type II/III.  Recent medication (2 months) has been helpful however, requires mod/max straining with 95% of BM.    Occupation: Nurse - NICU          Subjective: Menstruation over past few days. Taking murelax every other day. \" Good week\" in terms of BM, able to evacuate w/ no straining w/ formed/soft stool x4 this week.   Minimal pelvic pain, noted though resurface of pain after running for first time this week and wondering wether that plays a role in pain.     Objective: See treatment diary below    Emiliana: 150   Findings: L knee valgus, L heel whip, L hip drop, bilateral knee extension during heel strike, bilateral narrow knee window     Assessment: Tolerated treatment well. Performed running analysis today, video on pt's phone w/ consent.  Findings of note: low cadance, L knee valgus, L heel whip, L hip drop, bilateral knee extension during heel strike, bilateral narrow knee window. Cues provided for \"running lighter\" and \"taking more steps\". Pt noted 5/10 during/post running. Pt likely to benefit from increased cues to improve GF absorption. Next visit, incorporated SL stability and muscle activation exercises indicated by " objective findings.  Patient demonstrated fatigue post treatment.  Used winback wand over abdominal area w/ improvement in sxs noted following. skin unremarkable pre and post. Reviewed TA activation.   Plan: Continue per plan of care.      Precautions: Standard , Loja  Patient Active Problem List   Diagnosis    INES (generalized anxiety disorder)    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HCC)    Median arcuate ligament syndrome (HCC)    POTS (postural orthostatic tachycardia syndrome)    Seasonal allergic rhinitis    Hammertoe of left foot    Uncomplicated asthma    Non-celiac gluten sensitivity    Chronic idiopathic constipation    EDS (Malachi-Danlos syndrome)    Chronic migraine without aura without status migrainosus, not intractable    Urinary retention    Menstrual migraine without status migrainosus, not intractable    Stress at work    Migraine without aura and without status migrainosus, not intractable         Diagnosis:    POC expires (Date that your POC expires) Auth Status? (BOMN, approved, pending) Unit limit (Daily) Auth Start date Expiration date PT/OT + Visit Limit?   5/22/2025  BOMN         Date of Service 03/13/25 03/21 3/26 4/3 4/11 4/16 4/22 4/29 5/5   Visits Used 1 2 3 4 5  6 7 8   Visits Remaining BOMN           [employer] Created                        Neuro Re-Ed            Urge deferral            Defecation mechanics            Breathing Mechanics Inhale and relax 5' 10' 10'          PFM Coordination  10' contract relax bear down 5'          PFM Down Training  10'          Internal Cueing             Biofeedback            Reformer       1B heel slides, straight legs, single adductor, straight legs with circles, wheelbarrow both ways, tall knee      Ther Ex            PFM Strengthening            Hip strengthening            Functional Strengthening            Abdominal Strengthening    TA activation x10     Marches supine x20     SL hip abduction x15     Squat #15 x10 (increased difficulty )    TA  activation   15'     SLR + TA x10  TA activation x5    Dilator Training  15'   15'        Aerobic         TM running / analysis  45'   3.0-5.9 mph       Therapeutic Rest Breaks            Mobility             High Impact            UE Strengthening                         Ther Activity            Voiding Diaries            Review of sxs            Fluid Intake             Defecation mechanics  10'  HEP     Reviewed     Educated on bladder physiology and void interval as part of home exercise program  15'      Manual Ther            PFM exam Performed            Ortho exam    15'         Dynamometer Testing            Fascial Decompression            Bowel Massage   30'  15' 10'  45'  CET 20-30% + bowel massage CET 20% + bowel massage  + Cupping   45'  CET 20% dynamic lower abdomin   15'    PFM stretching   15'   35' winback                   Modalities                                    Outcome Measure             PFDI - 93

## 2025-05-05 NOTE — PROGRESS NOTES
"Universal Protocol   procedure performed by consultantConsent: Verbal consent obtained. Written consent obtained.  Risks and benefits: risks, benefits and alternatives were discussed  Consent given by: patient  Time out: Immediately prior to procedure a \"time out\" was called to verify the correct patient, procedure, equipment, support staff and site/side marked as required.  Patient understanding: patient states understanding of the procedure being performed  Patient consent: the patient's understanding of the procedure matches consent given  Procedure consent: procedure consent matches procedure scheduled  Relevant documents: relevant documents present and verified  Patient identity confirmed: verbally with patient      Chemodenervation     Date/Time  5/5/2025 3:00 PM     Performed by  Monique Coffey PA-C   Authorized by  Monique Coffey PA-C     Pre-procedure details      Prepped With: Alcohol     Anesthesia  (see MAR for exact dosages):     Anesthesia method:  None   Procedure details      Position:  Upright   Botox      Botox Type:  Type A    Brand:  Botox    mL's of Botulinum Toxin:  200    Final Concentration per CC:  50 units    Needle Gauge:  30 G 2.5 inch   Procedures      Botox Procedures: chronic headache      Indications: migraines     Injection Location      Head / Face:  L superior cervical paraspinal, R superior cervical paraspinal, L , R , L frontalis, R frontalis, L medial occipitalis, R medial occipitalis, procerus, R temporalis, L temporalis, R superior trapezius and L superior trapezius    L  injection amount:  5 unit(s)    R  injection amount:  5 unit(s)    L lateral frontalis:  5 unit(s)    R lateral frontalis:  5 unit(s)    L medial frontalis:  5 unit(s)    R medial frontalis:  5 unit(s)    L temporalis injection amount:  20 unit(s)    R temporalis injection amount:  20 unit(s)    Procerus injection amount:  5 unit(s)    L medial occipitalis injection " amount:  15 unit(s)    R medial occipitalis injection amount:  15 unit(s)    L superior cervical paraspinal injection amount:  10 unit(s)    R superior cervical paraspinal injection amount:  10 unit(s)    L superior trapezius injection amount:  15 unit(s)    R superior trapezius injection amount:  15 unit(s)   Total Units      Total units used:  200    Total units discarded:  0   Post-procedure details      Chemodenervation:  Chronic migraine    Facial Nerve Location::  Bilateral facial nerve    Patient tolerance of procedure:  Tolerated well, no immediate complications   Comments          20 units frontalis  5 units right splenius capitis  20 units right lower occipitalis/cervical paraspinal  All medically necessary

## 2025-05-14 ENCOUNTER — TELEPHONE (OUTPATIENT)
Dept: OBGYN CLINIC | Facility: CLINIC | Age: 25
End: 2025-05-14

## 2025-05-14 ENCOUNTER — SOCIAL WORK (OUTPATIENT)
Dept: BEHAVIORAL/MENTAL HEALTH CLINIC | Facility: CLINIC | Age: 25
End: 2025-05-14
Payer: COMMERCIAL

## 2025-05-14 ENCOUNTER — OFFICE VISIT (OUTPATIENT)
Dept: PHYSICAL THERAPY | Facility: REHABILITATION | Age: 25
End: 2025-05-14
Attending: STUDENT IN AN ORGANIZED HEALTH CARE EDUCATION/TRAINING PROGRAM
Payer: COMMERCIAL

## 2025-05-14 DIAGNOSIS — F41.1 GAD (GENERALIZED ANXIETY DISORDER): Primary | ICD-10-CM

## 2025-05-14 DIAGNOSIS — M62.89 PELVIC FLOOR DYSFUNCTION: Primary | ICD-10-CM

## 2025-05-14 DIAGNOSIS — M62.89 HIGH-TONE PELVIC FLOOR DYSFUNCTION: ICD-10-CM

## 2025-05-14 PROCEDURE — 97110 THERAPEUTIC EXERCISES: CPT

## 2025-05-14 PROCEDURE — 97140 MANUAL THERAPY 1/> REGIONS: CPT

## 2025-05-14 PROCEDURE — 90837 PSYTX W PT 60 MINUTES: CPT | Performed by: SOCIAL WORKER

## 2025-05-14 NOTE — PSYCH
Behavioral Health Psychotherapy Progress Note    Psychotherapy Provided: Individual Psychotherapy     Encounter Diagnoses   Name Primary?   • INES (generalized anxiety disorder) Yes                 Goals addressed in session: Goal 1     DATA:  Nury spoke during today's session about her feelings re: her parents' decision to sell their house and move into a rental at the end of the summer.  She indicated how difficult it will be to leave her childhood home.  Nury also expressed her success at not weighing herself for a week as recommended by this worker and her surprise that there was no weight change.  She has begun to make plans to return to school and is considering ways that this would subsequently allow her to adjust her work-life balance.   During this session, this clinician used the following therapeutic modalities: Motivational Interviewing and Supportive Psychotherapy    Substance Abuse was not addressed during this session. If the client is diagnosed with a co-occurring substance use disorder, please indicate any changes in the frequency or amount of use: . Stage of change for addressing substance use diagnoses: No substance use/Not applicable    ASSESSMENT:  Nury Hernandez presents with a Euthymic/ normal mood.  Nury again allowed this worker to provide her with support and feedback on this topic.  She was able to notice ways that her health is dramatically impacted by her stress level, particularly as evidenced by a return of pelvic floor pain after running.    her affect is Normal range and intensity, which is congruent, with her mood and the content of the session. Nury Hernandez was oriented to person, place, and time. Grooming and Hygiene were good  and clothing was casually dressed and appropriate for weather. Ability to perform ADLs has not changed. she was cooperative. Nury Hernandez denied suicidal thoughts, homicidal thoughts, and self injurious behaviors.   The client has made  "progress on their goals.     Nury Hernandez presents with a minimal risk of suicide, minimal risk of self-harm, and minimal risk of harm to others.    For any risk assessment that surpasses a \"low\" rating, a safety plan must be developed.    A safety plan was indicated: no  If yes, describe in detail     PLAN: Between sessions, Nury Hernandez will continue to utilize therapy sessions to increase her mindfulness and self compassion. . At the next session, the therapist will use Supportive Psychotherapy to address the above in further detail.    Behavioral Health Treatment Plan and Discharge Planning: Nury Hernandez is aware of and agrees to continue to work on their treatment plan. They have identified and are working toward their discharge goals. yes        Visit start and stop times:      5/14/25        "

## 2025-05-14 NOTE — PROGRESS NOTES
Daily Note     Today's date: 2025  Patient name: Nury Hernandez  : 2000  MRN: [id number]  Referring provider: Padmini Urias MD  Dx:   Encounter Diagnosis     ICD-10-CM    1. Pelvic floor dysfunction  M62.89       2. High-tone pelvic floor dysfunction  M62.89                        Chief Complaint: Pelvic pain  HPI: Nury Hernandez is a 24 y.o. nulliparous female referred by OBGYN for complaints of pelvic pain.  Patient reports symptoms have been present for years going through flares and remissions.  Currently presents with bladder symptoms including sensation of incomplete evacuation, frequency, hesitancy and 100% pushing to pee.  Additionally notes history of constipation since childhood.  Is currently evacuating 2-3 times a week with [place] stool type II/III.  Recent medication (2 months) has been helpful however, requires mod/max straining with 95% of BM.    Occupation: Nurse - NICU          Subjective: Reported min sxs of pain over past week she feels associated w/ limiting running.   BM daily w/ no straining. Feels urinary sxs have improved as well.     Objective: See treatment diary below      Assessment: Tolerated treatment well. Performed functional strengthening today w/ good performance to fatigue. Updated HEP. Patient demonstrated fatigue post treatment.  Used winback wand over abdominal area w/ improvement in sxs noted following. skin unremarkable pre and post. Reviewed TA activation. Pt to limit running and substitute w/ supplemental cardio.    Plan: Continue per plan of care.      Precautions: Standard , Loja  Patient Active Problem List   Diagnosis    INES (generalized anxiety disorder)    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HCC)    Median arcuate ligament syndrome (HCC)    POTS (postural orthostatic tachycardia syndrome)    Seasonal allergic rhinitis    Hammertoe of left foot    Uncomplicated asthma    Non-celiac gluten sensitivity    Chronic idiopathic constipation    EDS  (Malachi-Danlos syndrome)    Chronic migraine without aura without status migrainosus, not intractable    Urinary retention    Menstrual migraine without status migrainosus, not intractable    Stress at work    Migraine without aura and without status migrainosus, not intractable         Diagnosis:    POC expires (Date that your POC expires) Auth Status? (BOMN, approved, pending) Unit limit (Daily) Auth Start date Expiration date PT/OT + Visit Limit?   5/22/2025  BOMN         Date of Service 4/29 5/5 5/14   Visits Used 7 8 9   Visits Remaining      Medbridge Created            Neuro Re-Ed      Urge deferral      Defecation mechanics      Breathing Mechanics      PFM Coordination      PFM Down Training      Internal Cueing       Biofeedback      Reformer      Ther Ex      PFM Strengthening      Hip strengthening      Functional Strengthening      Abdominal Strengthening TA activation   15'     SLR + TA x10  TA activation x5  TA activaiton 5'    Dilator Training      Aerobic  TM running / analysis  45'   3.0-5.9 mph     Recumbent bike 5'    Therapeutic Rest Breaks      Mobility       High Impact   Return to running protocol   30'     + Bear pose 5 sec hold x5    UE Strengthening             Ther Activity      Voiding Diaries      Review of sxs      Fluid Intake            Manual Ther      PFM exam      Ortho exam      Dynamometer Testing      Fascial Decompression      Bowel Massage CET 20% + bowel massage  + Cupping   45'  CET 20% dynamic lower abdomin   15'  Gentle cephalic tensioing massage of lower abdomin   15'     Winback 10'   Skin unremarkable pre and post   PFM stretching            Modalities                  Outcome Measure

## 2025-05-14 NOTE — HOME EXERCISE EDUCATION
Program_ID:[id number]   Access Code: [email or web address]  URL: [email or web address]  Date: 05-  Prepared By: Mariely Genao    Program Notes      Exercises      - Single Leg Bridge - 1 x daily - 7 x weekly - 2 sets - 15 reps      - Sidelying Hip Abduction - 1 x daily - 7 x weekly - 2 sets - 15 reps      - Standing Single Leg Heel Raise - 1 x daily - 7 x weekly - 2 sets - 15 reps      - Single Leg Sit to Stand with Arms Crossed - 1 x daily - 7 x weekly - 2 sets - 10 reps      - Quadruped Bent Leg Hip Extension - 1 x daily - 7 x weekly - 2 sets - 10 reps      - Quadruped with Knee Lift - 1 x daily - 7 x weekly - 2 sets - 8 reps - 3 Second hold      - Supine TA Bracing - 1 x daily - 7 x weekly - 1 sets - 5 reps

## 2025-05-16 ENCOUNTER — OFFICE VISIT (OUTPATIENT)
Dept: OBGYN CLINIC | Facility: CLINIC | Age: 25
End: 2025-05-16
Payer: COMMERCIAL

## 2025-05-16 VITALS — DIASTOLIC BLOOD PRESSURE: 78 MMHG | BODY MASS INDEX: 25.84 KG/M2 | WEIGHT: 165 LBS | SYSTOLIC BLOOD PRESSURE: 124 MMHG

## 2025-05-16 DIAGNOSIS — N89.8 VAGINAL IRRITATION: Primary | ICD-10-CM

## 2025-05-16 LAB
BV WHIFF TEST VAG QL: NORMAL
CLUE CELLS SPEC QL WET PREP: NORMAL
PH SMN: 4 [PH]
T VAGINALIS VAG QL WET PREP: NORMAL
YEAST VAG QL WET PREP: NORMAL

## 2025-05-16 PROCEDURE — 99213 OFFICE O/P EST LOW 20 MIN: CPT

## 2025-05-16 PROCEDURE — 87660 TRICHOMONAS VAGIN DIR PROBE: CPT

## 2025-05-16 PROCEDURE — 87210 SMEAR WET MOUNT SALINE/INK: CPT

## 2025-05-16 PROCEDURE — 87510 GARDNER VAG DNA DIR PROBE: CPT

## 2025-05-16 PROCEDURE — 87480 CANDIDA DNA DIR PROBE: CPT

## 2025-05-16 RX ORDER — CLOTRIMAZOLE 1 %
1 CREAM WITH APPLICATOR VAGINAL DAILY
Qty: 45 G | Refills: 0 | Status: SHIPPED | OUTPATIENT
Start: 2025-05-16 | End: 2025-05-23

## 2025-05-16 NOTE — PROGRESS NOTES
Name: Nury Hernandez      : 2000      MRN: [id number]  Encounter Provider: Jayshree Francis PA-C  Encounter Date: 2025   Encounter department: [facility] [place]  :  Assessment & Plan  Vaginal irritation  - Wet mount was negative.  -Affirm sent for further confirmation of vaginal discharge and irritation and since this has been recurrent.  -Clotrimazole sent to pharmacy for patient since Diflucan gave her GI upset and it is Friday.  -Reviewed perineal hygiene with patient.  -Reviewed recurrent yeast infection treatment guidelines with patient.  -Will follow-up pending affirm results.  -Advised to follow-up if symptoms do not resolve after clotrimazole  Orders:    GP Bacterial Vaginosis Screen, DNA - BD AFFIRM    clotrimazole (GYNE-LOTRIMIN) 1 % vaginal cream; Insert 1 applicator into the vagina daily for 7 days    POCT wet mount      History of Present Illness   HPI  Nury Hernandez is a 25 y.o.  female who presents for thick yellow discharge, slight, intermittent vaginal itching, and burning with urination since . She denies odor but has been having pelvic discomfort. Denies nausea, vomiting, fevers, chills, urgency, and frequency. She denies taking anything yet. She has never been sexually active. She has yeast infections in 2025 and 3/2025 and was treated with diflucan.She states that she had GI upset with the Diflucan.  Was seen 2025 for dysuria and urine culture was negative. She states that it went away. She is on OCPs for PCOS.    Review of Systems   Genitourinary:  Positive for pelvic pain and vaginal discharge. Negative for dysuria, frequency, hematuria, urgency, vaginal bleeding and vaginal pain.       Past Medical History:   Diagnosis Date    Acne     Annual physical exam 2019    Anxiety     Asthma     Dizziness     at times    Dyspepsia 2021    EDS (Malachi-Danlos syndrome) 2022    Exertional headache 2022     GERD (gastroesophageal reflux disease)     Hammertoe of left foot     Headache     occ    Headache(784.0) 2021    Hyperlipemia     Hypermobile joints     Hypertriglyceridemia 2019    IBS (irritable bowel syndrome) 2020    Irritable bowel syndrome     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HCC)     Mast cell disorder     Median arcuate ligament syndrome (HCC) 2017    Migraine     PCOS (polycystic ovarian syndrome) 2024    PONV (postoperative nausea and vomiting)     POTS (postural orthostatic tachycardia syndrome)      infant     Stress at work 2024    Wears glasses        Past Surgical History:   Procedure Laterality Date    COLONOSCOPY      EGD AND COLONOSCOPY N/A 01/10/2017    Procedure: EGD AND COLONOSCOPY;  Surgeon: Ozzy Billingsley MD;  Location: [facility];  Service:     ESOPHAGOGASTRODUODENOSCOPY      with biopsy    FOOT SURGERY      Excision of lesion feet benign    [place] CORRECTION HAMMERTOE Left 2019    Procedure: 2nd arthrodesis; 5th arthroplasty, flexor tenotomy 2,3,4,5.;  Surgeon: Odilon Berger DPM;  Location: [facility];  Service: Podiatry    [place] CORRECTION HAMMERTOE Left 2020    Procedure: 2ND TOE Revision hammertoe with broken implant;  Surgeon: Odilon Berger DPM;  Location: [facility];  Service: Podiatry    SKIN BIOPSY      L foot growth removal    UPPER GASTROINTESTINAL ENDOSCOPY  2021    WISDOM TOOTH EXTRACTION         Allergies   Allergen Reactions    Nuts - Food Allergy Other (See Comments)     Avani Nuts - itchy throat    Other Hives      At surgical site and IV site- not sure if type of cleanser used    Pollen Extract     Reyvow [Lasmiditan] Palpitations and Hallucinations       Objective   /78 (BP Location: Left arm, Patient Position: Sitting, Cuff Size: Standard)   Wt 74.8 kg (165 lb)   BMI 25.84 kg/m²     Results for orders placed or performed in visit on 25   POCT wet mount   Result Value Ref Range    Yeast, Wet Prep  neg     pH 4     Whiff Test neg     Clue Cells neg     Trich, Wet Prep neg      *Note: Due to a large number of results and/or encounters for the requested time period, some results have not been displayed. A complete set of results can be found in Results Review.          Physical Exam  Constitutional:       Appearance: Normal appearance. She is well-developed and well-groomed.   Genitourinary:      No lesions in the vagina.      Right Labia: No rash, tenderness, lesions or skin changes.     Left Labia: No tenderness, lesions, skin changes or rash.     Vaginal discharge (yellowish thick discharge) present.      No vaginal erythema, tenderness, bleeding or ulceration.        Right Adnexa: not tender, not full and no mass present.     Left Adnexa: not tender, not full and no mass present.     Cervical discharge present.      No cervical motion tenderness or friability.      Uterus is not enlarged, fixed or tender.   HENT:      Head: Normocephalic and atraumatic.   Pulmonary:      Effort: Pulmonary effort is normal.   Abdominal:      General: Abdomen is flat.     Neurological:      Mental Status: She is alert.     Psychiatric:         Mood and Affect: Mood normal.         Behavior: Behavior normal. Behavior is cooperative.         Thought Content: Thought content normal.         Judgment: Judgment normal.

## 2025-05-17 LAB
CANDIDA RRNA VAG QL PROBE: NOT DETECTED
G VAGINALIS RRNA GENITAL QL PROBE: NOT DETECTED
T VAGINALIS RRNA GENITAL QL PROBE: NOT DETECTED

## 2025-05-22 ENCOUNTER — SOCIAL WORK (OUTPATIENT)
Dept: BEHAVIORAL/MENTAL HEALTH CLINIC | Facility: CLINIC | Age: 25
End: 2025-05-22
Payer: COMMERCIAL

## 2025-05-22 DIAGNOSIS — F41.1 GAD (GENERALIZED ANXIETY DISORDER): Primary | ICD-10-CM

## 2025-05-22 PROCEDURE — 90834 PSYTX W PT 45 MINUTES: CPT | Performed by: SOCIAL WORKER

## 2025-05-22 NOTE — PSYCH
"Behavioral Health Psychotherapy Progress Note    Psychotherapy Provided: Individual Psychotherapy     Encounter Diagnoses   Name Primary?   • INES (generalized anxiety disorder) Yes                   Goals addressed in session: Goal 1     DATA:  Nury spoke during today's session about her feelings re: 5 [ages of notes that she had written since her last session in which she detailed her thoughts about pelvic floor therapy, dating, returning to school, and participating in interviews for a new Manager. During this session, this clinician used the following therapeutic modalities: Motivational Interviewing and Supportive Psychotherapy    Substance Abuse was not addressed during this session. If the client is diagnosed with a co-occurring substance use disorder, please indicate any changes in the frequency or amount of use: . Stage of change for addressing substance use diagnoses: No substance use/Not applicable    ASSESSMENT:  Nury Hernandez presents with a Euthymic/ normal mood.  Nury again allowed this worker to provide her with support and feedback on these topics.  She demonstrated considerable insight.     her affect is Normal range and intensity, which is congruent, with her mood and the content of the session. Nury Hernandez was oriented to person, place, and time. Grooming and Hygiene were good  and clothing was casually dressed and appropriate for weather. Ability to perform ADLs has not changed. she was cooperative. Nury Hernandez denied suicidal thoughts, homicidal thoughts, and self injurious behaviors.   The client has made progress on their goals.     Nury Hernandez presents with a minimal risk of suicide, minimal risk of self-harm, and minimal risk of harm to others.    For any risk assessment that surpasses a \"low\" rating, a safety plan must be developed.    A safety plan was indicated: no  If yes, describe in detail     PLAN: Between sessions, Nury Hernandez will continue to " utilize therapy sessions to increase her mindfulness and self compassion. . At the next session, the therapist will use Supportive Psychotherapy to address the above in further detail.    Behavioral Health Treatment Plan and Discharge Planning: Nury Hernandez is aware of and agrees to continue to work on their treatment plan. They have identified and are working toward their discharge goals. yes        Visit start and stop times:      5/22/25

## 2025-05-23 ENCOUNTER — OFFICE VISIT (OUTPATIENT)
Dept: PHYSICAL THERAPY | Facility: REHABILITATION | Age: 25
End: 2025-05-23
Attending: STUDENT IN AN ORGANIZED HEALTH CARE EDUCATION/TRAINING PROGRAM
Payer: COMMERCIAL

## 2025-05-23 ENCOUNTER — RESULTS FOLLOW-UP (OUTPATIENT)
Dept: OBGYN CLINIC | Facility: CLINIC | Age: 25
End: 2025-05-23

## 2025-05-23 DIAGNOSIS — M62.89 HIGH-TONE PELVIC FLOOR DYSFUNCTION: ICD-10-CM

## 2025-05-23 DIAGNOSIS — M62.89 PELVIC FLOOR DYSFUNCTION: Primary | ICD-10-CM

## 2025-05-23 PROCEDURE — 97530 THERAPEUTIC ACTIVITIES: CPT

## 2025-05-23 PROCEDURE — 97164 PT RE-EVAL EST PLAN CARE: CPT

## 2025-05-23 PROCEDURE — 97112 NEUROMUSCULAR REEDUCATION: CPT

## 2025-05-23 NOTE — PROGRESS NOTES
Daily Note     Today's date: 2025  Patient name: Nury Hernandez  : 2000  MRN: [id number]  Referring provider: Padmini Urias MD  Dx:   Encounter Diagnosis     ICD-10-CM    1. Pelvic floor dysfunction  M62.89       2. High-tone pelvic floor dysfunction  M62.89                        Chief Complaint: Pelvic pain  HPI: Nury Hernandez is a 24 y.o. nulliparous female referred by OBGYN for complaints of pelvic pain.  Patient reports symptoms have been present for years going through flares and remissions.  Currently presents with bladder symptoms including sensation of incomplete evacuation, frequency, hesitancy and 100% pushing to pee.  Additionally notes history of constipation since childhood.  Is currently evacuating 2-3 times a week with [place] stool type II/III.  Recent medication (2 months) has been helpful however, requires mod/max straining with 95% of BM.    Occupation: Nurse - NICU          Subjective: Reported min sxs of pain over past week she feels associated w/ limiting running.   BM daily w/ no straining. Feels urinary sxs have improved as well.     Objective: See treatment diary below      Assessment: Tolerated treatment well. Performed functional strengthening today w/ good performance to fatigue. Updated HEP. Patient demonstrated fatigue post treatment.  Used winback wand over abdominal area w/ improvement in sxs noted following. skin unremarkable pre and post. Reviewed TA activation. Pt to limit running and substitute w/ supplemental cardio.    Plan: Continue per plan of care.      Precautions: Standard , Loja  Patient Active Problem List   Diagnosis    INES (generalized anxiety disorder)    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HCC)    Median arcuate ligament syndrome (HCC)    POTS (postural orthostatic tachycardia syndrome)    Seasonal allergic rhinitis    Hammertoe of left foot    Uncomplicated asthma    Non-celiac gluten sensitivity    Chronic idiopathic constipation    EDS  (Malachi-Danlos syndrome)    Chronic migraine without aura without status migrainosus, not intractable    Urinary retention    Menstrual migraine without status migrainosus, not intractable    Stress at work    Migraine without aura and without status migrainosus, not intractable         Diagnosis:    POC expires (Date that your POC expires) Auth Status? (BOMN, approved, pending) Unit limit (Daily) Auth Start date Expiration date PT/OT + Visit Limit?   5/22/2025  BOMN         Date of Service 4/29 5/5 5/14   Visits Used 7 8 9   Visits Remaining      Medbridge Created            Neuro Re-Ed      Urge deferral      Defecation mechanics      Breathing Mechanics      PFM Coordination      PFM Down Training      Internal Cueing       Biofeedback      Reformer      Ther Ex      PFM Strengthening      Hip strengthening      Functional Strengthening      Abdominal Strengthening TA activation   15'     SLR + TA x10  TA activation x5  TA activaiton 5'    Dilator Training      Aerobic  TM running / analysis  45'   3.0-5.9 mph     Recumbent bike 5'    Therapeutic Rest Breaks      Mobility       High Impact   Return to running protocol   30'     + Bear pose 5 sec hold x5    UE Strengthening             Ther Activity      Voiding Diaries      Review of sxs      Fluid Intake            Manual Ther      PFM exam      Ortho exam      Dynamometer Testing      Fascial Decompression      Bowel Massage CET 20% + bowel massage  + Cupping   45'  CET 20% dynamic lower abdomin   15'  Gentle cephalic tensioing massage of lower abdomin   15'     Winback 10'   Skin unremarkable pre and post   PFM stretching            Modalities                  Outcome Measure

## 2025-05-23 NOTE — PROGRESS NOTES
Re-Evaluation     Today's date: 2025  Patient name: Nury Hernandez  : 2000  MRN: [id number]  Referring provider: Padmini Urias MD  Dx:   Encounter Diagnosis     ICD-10-CM    1. Pelvic floor dysfunction  M62.89       2. High-tone pelvic floor dysfunction  M62.89                      Subjective: Reports overall improvement in bladder, bowel, and dilator function since start of PT. Able to tolerate size 4 dilator w/ 100% insertion w/ no pain.   Reports goal of sexual dysfunction present of inability to achieve orgasm. Has been discussing additional tactics for nervous sxs control w/ counselor, however would like to discover any musculoskeletal involvement that may be contributing to impairments.       Objective: See treatment diary below    Goals: :   Bladder: ( has met all )   In 5 weeks, patient will reduce nocturia from 1 to 0 per night for 3/7 nights to indicate improved sleep quality/quantity.   In 5 weeks, patient will report ability to successfully suppress an urge to empty for at least 20' or greater w/o no UI.   In 10 weeks, patient will demonstrate normalized daytime void interval of 2-4 hours with adequate fluid intake.   In 10 weeks, patient will reduce nocturia to 0-1 per night to indicate improve sleep quality/ quantity.   In 10 weeks, patient will implement urge suppression strategies throughout the day and reports that her average voiding interval is 2+ hours upon discharge.     Bowel: Has met atlll   In 5 weeks, patient will perform x2 consecutive bear downs with proper breathing mechanics and eccentric lengthening present.   In 10 weeks, patient will report improvement in defecatory function as evidenced by straining w/ 50% or less w/ all BM.    Dysfunction:   In 5 weeks, patient will be able to perform 5 or more consecutive PFMCs w/ proper breathing mechanics w/ 3 sec hold to indicate improved PFM activation. - has met  In 10 weeks, patient will demonstrate improved PFM strength  to at least 3/5. - has met  In 10 weeks, patient will demonstrate improved PFM endurance to 8 sec of 3/5 strength or greater. - has met  In 10 weeks, patient will demonstrate improved PFM relaxation to at least 90%.  - on going    Function:   In 5 weeks, patient will report at least 50% improvement in subjective sxs presentation since start of PT care.   In 10 weeks, patient will report at least 90% improvement in subjective sxs presentation since start of PT care. - on going  In 10 weeks, pt will report improvement in tolerance to pain free speculum exam to indicate improve penetration tolerance. On going  10 weeks, patient will be able to insert full-size dilator into 100%. - Size 4 - on going     Pain:   In 5 weeks, patient will report 1/10 or less supra pubic discomfort following 20 minutes or greater of job progression. - on going  In 5 weeks, patient will report painfree intravaginal PFM exam. - has met   In 10 weeks, patient will report 0/10 or less suprapubic pain with running for1 mile or greater. - on going   In 10 weeks, patient will be able to perform 30 min of exercise w/ no pain and no cues required for proper breathing mechanics to indicate improved abdominal pressure mechanics and activity tolerance. Has met     Outcome Measure:  In 10 weeks, patient will score at least 20 or less on PFDI to indicate meaningful change from 93 at IE.    New Goals:   Pt will report ability to maintain PFM during build up phase prior to orgasm to indicate improved musculoskeletal ability  in sexual function.        Assessment: Nury Hernandez has attended 10 physical therapy visits for complaints of bladder dysfunction, bowel dysfunction, pain, and sexual dysfunction. Nury Hernandez reports symptomatic improvement in bladder dysfunction, bowel dysfunction, and tolerance to penetration. While patient is progressing well, symptoms of bowel dysfunction, sexual dysfunction, suprapubic pain, and sexual dysfunction  persist. There is lingering impairments of PFM coordination, tissue intolerance to load, and PFM relaxation deficit that affect function. Therefore, Nury Hernandez would benefit from continued PT for 10 weeks to include 10 therapies to address remaining goals. The goal of PT is to progress patient towards independence of self care management, of which patient has not achieved yet.     Tolerated treatment well. Patient would benefit from continued PT.      Plan: Extend POC  POC to include: winback, PFM stretching, PFM coordination, down training, functional strengthening, visceral manipulation, and high impact progression   Duration: 10 weeks   Frequency: 1-2x a week        Precautions: Standard , Loja  Patient Active Problem List   Diagnosis    INES (generalized anxiety disorder)    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HCC)    Median arcuate ligament syndrome (HCC)    POTS (postural orthostatic tachycardia syndrome)    Seasonal allergic rhinitis    Hammertoe of left foot    Uncomplicated asthma    Non-celiac gluten sensitivity    Chronic idiopathic constipation    EDS (Malachi-Danlos syndrome)    Chronic migraine without aura without status migrainosus, not intractable    Urinary retention    Menstrual migraine without status migrainosus, not intractable    Stress at work    Migraine without aura and without status migrainosus, not intractable         Diagnosis:    POC expires (Date that your POC expires) Auth Status? (BOMN, approved, pending) Unit limit (Daily) Auth Start date Expiration date PT/OT + Visit Limit?   5/22/2025  BOMN         Date of Service 4/29 5/5 5/14 5/23   Visits Used 7 8 9 10   Visits Remaining       Moraima Created              Neuro Re-Ed       Urge deferral       Defecation mechanics    PFM mapping and C/R  15'    Breathing Mechanics       PFM Coordination       PFM Down Training       Internal Cueing        Biofeedback       Reformer       Ther Ex       PFM Strengthening       Hip  strengthening       Functional Strengthening       Abdominal Strengthening TA activation   15'     SLR + TA x10  TA activation x5  TA activaiton 5'     Dilator Training       Aerobic  TM running / analysis  45'   3.0-5.9 mph     Recumbent bike 5'     Therapeutic Rest Breaks       Mobility        High Impact   Return to running protocol   30'     + Bear pose 5 sec hold x5     UE Strengthening               Ther Activity       Voiding Diaries       Review of sxs       Fluid Intake           Education of PFM function in sexual health   15'    Manual Ther       PFM exam    Re-eval    Ortho exam       Dynamometer Testing       Fascial Decompression       Bowel Massage CET 20% + bowel massage  + Cupping   45'  CET 20% dynamic lower abdomin   15'  Gentle cephalic tensioing massage of lower abdomin   15'     Winback 10'   Skin unremarkable pre and post    PFM stretching           KT taping over bilateral suprapubic region w/ cephalic tensioning.    Modalities                     Outcome Measure

## 2025-05-27 ENCOUNTER — SOCIAL WORK (OUTPATIENT)
Dept: BEHAVIORAL/MENTAL HEALTH CLINIC | Facility: CLINIC | Age: 25
End: 2025-05-27
Payer: COMMERCIAL

## 2025-05-27 DIAGNOSIS — F41.1 GAD (GENERALIZED ANXIETY DISORDER): Primary | ICD-10-CM

## 2025-05-27 PROCEDURE — 90834 PSYTX W PT 45 MINUTES: CPT | Performed by: SOCIAL WORKER

## 2025-05-27 NOTE — PSYCH
"Behavioral Health Psychotherapy Progress Note    Psychotherapy Provided: Individual Psychotherapy     Encounter Diagnoses   Name Primary?   • INES (generalized anxiety disorder) Yes         Goals addressed in session: Goal 1     DATA:  Nury spoke during today's session about her feelings re:  her grandmother's deteriorating health, admission to Inpatient, her progress in pelvic floor therapy, and insights after participating in interviews for a new Manager. During this session, this clinician used the following therapeutic modalities: Motivational Interviewing and Supportive Psychotherapy    Substance Abuse was not addressed during this session. If the client is diagnosed with a co-occurring substance use disorder, please indicate any changes in the frequency or amount of use: . Stage of change for addressing substance use diagnoses: No substance use/Not applicable    ASSESSMENT:  Nury Hernandez presents with a Euthymic/ normal mood.  Nury again allowed this worker to provide her with support and feedback on these topics.  She demonstrated considerable insight.     her affect is Normal range and intensity, which is congruent, with her mood and the content of the session. Nury Hernandez was oriented to person, place, and time. Grooming and Hygiene were good  and clothing was casually dressed and appropriate for weather. Ability to perform ADLs has not changed. she was cooperative. Nury Hernandez denied suicidal thoughts, homicidal thoughts, and self injurious behaviors.   The client has made progress on their goals.     Nury Hernandez presents with a minimal risk of suicide, minimal risk of self-harm, and minimal risk of harm to others.    For any risk assessment that surpasses a \"low\" rating, a safety plan must be developed.    A safety plan was indicated: no  If yes, describe in detail     PLAN: Between sessions, Nury Hernandez will continue to utilize therapy sessions to increase her " mindfulness and self compassion. . At the next session, the therapist will use Supportive Psychotherapy to address the above in further detail.    Behavioral Health Treatment Plan and Discharge Planning: Nury Hernandez is aware of and agrees to continue to work on their treatment plan. They have identified and are working toward their discharge goals. yes        Visit start and stop times:      5/27/25

## 2025-05-29 ENCOUNTER — OFFICE VISIT (OUTPATIENT)
Dept: PHYSICAL THERAPY | Facility: REHABILITATION | Age: 25
End: 2025-05-29
Attending: STUDENT IN AN ORGANIZED HEALTH CARE EDUCATION/TRAINING PROGRAM
Payer: COMMERCIAL

## 2025-05-29 DIAGNOSIS — M62.89 HIGH-TONE PELVIC FLOOR DYSFUNCTION: ICD-10-CM

## 2025-05-29 DIAGNOSIS — M62.89 PELVIC FLOOR DYSFUNCTION: Primary | ICD-10-CM

## 2025-05-29 PROCEDURE — 97110 THERAPEUTIC EXERCISES: CPT

## 2025-05-29 PROCEDURE — 97140 MANUAL THERAPY 1/> REGIONS: CPT

## 2025-05-29 NOTE — PROGRESS NOTES
Daily Note     Today's date: 2025  Patient name: Nury Hernandez  : 2000  MRN: [id number]  Referring provider: Padmini Urias MD  Dx:   Encounter Diagnosis     ICD-10-CM    1. Pelvic floor dysfunction  M62.89       2. High-tone pelvic floor dysfunction  M62.89           Start Time: 1235  Stop Time: 1330  Total time in clinic (min): 55 minutes    Subjective: Is still using the dilator no. 4 Ran for 10 mnutes on Saturday, pressure after 10 minutes but better than in the past.      Objective: See treatment diary below      Assessment: Tolerated treatment well. Patient would benefit from continued PT. Following warmup on TM, performed winback over lower abdominal area which remains sensitive but has good awareness of heat and can tolerate pressure of wand.  Re-applied k-tape and given an additional piece in case it peeled off.  Attempted to add bouncing on pball but it did reproduce more tension in lower abdominal area.  Fatigues and more tender with reformer wheelbarrows and standing slider series.       Plan: Continue per plan of care.      Precautions: Standard , Loja  Patient Active Problem List   Diagnosis    INES (generalized anxiety disorder)    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HCC)    Median arcuate ligament syndrome (HCC)    POTS (postural orthostatic tachycardia syndrome)    Seasonal allergic rhinitis    Hammertoe of left foot    Uncomplicated asthma    Non-celiac gluten sensitivity    Chronic idiopathic constipation    EDS (Malachi-Danlos syndrome)    Chronic migraine without aura without status migrainosus, not intractable    Urinary retention    Menstrual migraine without status migrainosus, not intractable    Stress at work    Migraine without aura and without status migrainosus, not intractable         Diagnosis:    POC expires (Date that your POC expires) Auth Status? (BOMN, approved, pending) Unit limit (Daily) Auth Start date Expiration date PT/OT + Visit Limit?   2025  BOMN          Date of Service 4/29 5/5 5/14 5/23 5/29   Visits Used 7 8 9 10 11   Visits Remaining        Med[employer] Created                Neuro Re-Ed        Urge deferral        Defecation mechanics    PFM mapping and C/R  15'     Breathing Mechanics        PFM Coordination        PFM Down Training        Internal Cueing         Biofeedback        Reformer        Ther Ex        PFM Strengthening        Hip strengthening        Functional Strengthening        Abdominal Strengthening TA activation   15'     SLR + TA x10  TA activation x5  TA activaiton 5'      Dilator Training        Aerobic  TM running / analysis  45'   3.0-5.9 mph     Recumbent bike 5'   TM 8'   Sliders     10x each direction   Reformer wheelbarrows     1' 1B   High Impact   Return to running protocol   30'     + Bear pose 5 sec hold x5      UE Strengthening                 Ther Activity        Voiding Diaries        Review of sxs        Fluid Intake            Education of PFM function in sexual health   15'     Manual Ther        PFM exam    Re-eval     Ortho exam        Dynamometer Testing        Fascial Decompression        Bowel Massage CET 20% + bowel massage  + Cupping   45'  CET 20% dynamic lower abdomin   15'  Gentle cephalic tensioing massage of lower abdomin   15'     Winback 10'   Skin unremarkable pre and post  Gentle cephalic tensioing massage of lower abdomin   10'     Winback 15   PFM stretching            KT taping over bilateral suprapubic region w/ cephalic tensioning.  KT taping over bilateral suprapubic region w/ cephalic tensioning.   Modalities                        Outcome Measure

## 2025-05-30 ENCOUNTER — APPOINTMENT (OUTPATIENT)
Dept: PHYSICAL THERAPY | Facility: REHABILITATION | Age: 25
End: 2025-05-30
Attending: STUDENT IN AN ORGANIZED HEALTH CARE EDUCATION/TRAINING PROGRAM
Payer: COMMERCIAL

## 2025-06-02 ENCOUNTER — SOCIAL WORK (OUTPATIENT)
Dept: BEHAVIORAL/MENTAL HEALTH CLINIC | Facility: CLINIC | Age: 25
End: 2025-06-02
Payer: COMMERCIAL

## 2025-06-02 DIAGNOSIS — F41.1 GAD (GENERALIZED ANXIETY DISORDER): Primary | ICD-10-CM

## 2025-06-02 PROCEDURE — 90834 PSYTX W PT 45 MINUTES: CPT | Performed by: SOCIAL WORKER

## 2025-06-02 NOTE — PSYCH
"Behavioral Health Psychotherapy Progress Note    Psychotherapy Provided: Individual Psychotherapy     Encounter Diagnoses   Name Primary?   • INES (generalized anxiety disorder) Yes           Goals addressed in session: Goal 1     DATA:  Nury spoke during today's session about her feelings re:  her grandmother's discharge from Inpt following an admission for pneumonia.  She shared how grief-filled she will be when she passes.  Ways that her family dismisses emotions was processed at length.   During this session, this clinician used the following therapeutic modalities: Motivational Interviewing and Supportive Psychotherapy    Substance Abuse was not addressed during this session. If the client is diagnosed with a co-occurring substance use disorder, please indicate any changes in the frequency or amount of use: . Stage of change for addressing substance use diagnoses: No substance use/Not applicable    ASSESSMENT:  Nury Hernandez presents with a Euthymic/ normal mood.  Nury again allowed this worker to provide her with support and feedback on these topics.  She again demonstrated considerable insight.     her affect is Normal range and intensity, which is congruent, with her mood and the content of the session. Nury Hernandez was oriented to person, place, and time. Grooming and Hygiene were good  and clothing was casually dressed and appropriate for weather. Ability to perform ADLs has not changed. she was cooperative. Nury Hernandez denied suicidal thoughts, homicidal thoughts, and self injurious behaviors.   The client has made progress on their goals.     Nury Hernandez presents with a minimal risk of suicide, minimal risk of self-harm, and minimal risk of harm to others.    For any risk assessment that surpasses a \"low\" rating, a safety plan must be developed.    A safety plan was indicated: no  If yes, describe in detail     PLAN: Between sessions, Nury Hernandez will continue to " utilize therapy sessions to increase her mindfulness and self compassion. . At the next session, the therapist will use Supportive Psychotherapy to address the above in further detail.    Behavioral Health Treatment Plan and Discharge Planning: Nury Hernandez is aware of and agrees to continue to work on their treatment plan. They have identified and are working toward their discharge goals. yes        Visit start and stop times:      6/2/25

## 2025-06-05 ENCOUNTER — OFFICE VISIT (OUTPATIENT)
Dept: URGENT CARE | Facility: CLINIC | Age: 25
End: 2025-06-05
Payer: COMMERCIAL

## 2025-06-05 VITALS
SYSTOLIC BLOOD PRESSURE: 136 MMHG | DIASTOLIC BLOOD PRESSURE: 74 MMHG | OXYGEN SATURATION: 99 % | RESPIRATION RATE: 18 BRPM | TEMPERATURE: 98.2 F | HEART RATE: 94 BPM

## 2025-06-05 DIAGNOSIS — R09.81 NASAL CONGESTION: Primary | ICD-10-CM

## 2025-06-05 DIAGNOSIS — B34.9 ACUTE VIRAL SYNDROME: ICD-10-CM

## 2025-06-05 LAB — S PYO AG THROAT QL: NEGATIVE

## 2025-06-05 PROCEDURE — 87880 STREP A ASSAY W/OPTIC: CPT

## 2025-06-05 PROCEDURE — 99213 OFFICE O/P EST LOW 20 MIN: CPT

## 2025-06-05 PROCEDURE — 87636 SARSCOV2 & INF A&B AMP PRB: CPT

## 2025-06-05 PROCEDURE — 87070 CULTURE OTHR SPECIMN AEROBIC: CPT

## 2025-06-05 RX ORDER — METHYLPREDNISOLONE 4 MG/1
TABLET ORAL
Qty: 21 TABLET | Refills: 0 | Status: SHIPPED | OUTPATIENT
Start: 2025-06-05

## 2025-06-05 NOTE — LETTER
June 5, 2025     Patient: Nury Hernandez   YOB: 2000   Date of Visit: 6/5/2025       To Whom It May Concern:    It is my medical opinion that Nury Hernandez may return to work on 06/07/2025.    If you have any questions or concerns, please don't hesitate to call.         Sincerely,        KETAN Ponce    CC: No Recipients

## 2025-06-05 NOTE — PROGRESS NOTES
Daily Note     Today's date: 2025  Patient name: Nury Hernandez  : 2000  MRN: [id number]  Referring provider: Padmini Urias MD  Dx:   No diagnosis found.                 Subjective: Is still using the dilator no. 4 Ran for 10 mnutes on Saturday, pressure after 10 minutes but better than in the past.      Objective: See treatment diary below      Assessment: Tolerated treatment well. Patient would benefit from continued PT. Following warmup on TM, performed winback over lower abdominal area which remains sensitive but has good awareness of heat and can tolerate pressure of wand.  Re-applied k-tape and given an additional piece in case it peeled off.  Attempted to add bouncing on pball but it did reproduce more tension in lower abdominal area.  Fatigues and more tender with reformer wheelbarrows and standing slider series.       Plan: Continue per plan of care.      Precautions: Standard , Loja  Patient Active Problem List   Diagnosis    INES (generalized anxiety disorder)    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HCC)    Median arcuate ligament syndrome (HCC)    POTS (postural orthostatic tachycardia syndrome)    Seasonal allergic rhinitis    Hammertoe of left foot    Uncomplicated asthma    Non-celiac gluten sensitivity    Chronic idiopathic constipation    EDS (Malachi-Danlos syndrome)    Chronic migraine without aura without status migrainosus, not intractable    Urinary retention    Menstrual migraine without status migrainosus, not intractable    Stress at work    Migraine without aura and without status migrainosus, not intractable         Diagnosis:    POC expires (Date that your POC expires) Auth Status? (BOMN, approved, pending) Unit limit (Daily) Auth Start date Expiration date PT/OT + Visit Limit?   2025  BOMN         Date of Service    Visits Used 7 8 9 10 11   Visits Remaining        Medbridge Created                Neuro Re-Ed        Urge deferral        Defecation  mechanics    PFM mapping and C/R  15'     Breathing Mechanics        PFM Coordination        PFM Down Training        Internal Cueing         Biofeedback        Reformer        Ther Ex        PFM Strengthening        Hip strengthening        Functional Strengthening        Abdominal Strengthening TA activation   15'     SLR + TA x10  TA activation x5  TA activaiton 5'      Dilator Training        Aerobic  TM running / analysis  45'   3.0-5.9 mph     Recumbent bike 5'   TM 8'   Sliders     10x each direction   Reformer wheelbarrows     1' 1B   High Impact   Return to running protocol   30'     + Bear pose 5 sec hold x5      UE Strengthening                 Ther Activity        Voiding Diaries        Review of sxs        Fluid Intake            Education of PFM function in sexual health   15'     Manual Ther        PFM exam    Re-eval     Ortho exam        Dynamometer Testing        Fascial Decompression        Bowel Massage CET 20% + bowel massage  + Cupping   45'  CET 20% dynamic lower abdomin   15'  Gentle cephalic tensioing massage of lower abdomin   15'     Winback 10'   Skin unremarkable pre and post  Gentle cephalic tensioing massage of lower abdomin   10'     Winback 15   PFM stretching            KT taping over bilateral suprapubic region w/ cephalic tensioning.  KT taping over bilateral suprapubic region w/ cephalic tensioning.   Modalities                        Outcome Measure

## 2025-06-05 NOTE — PATIENT INSTRUCTIONS
Saline nasal spray as often as needed in each nostril    Nase nasal spray 1 spray to each nostril daily    Continue taking your Zyrtec daily you can add some Mucinex in the blue box    Gargle with salt water    Strep in the office today was negative.  We will send your swab for a throat culture    Start the steroids tomorrow

## 2025-06-05 NOTE — PROGRESS NOTES
[facility]        NAME: Nury Hernandez is a 25 y.o. female  : 2000    MRN: [id number]  DATE: 2025  TIME: 1:54 PM    Assessment and Plan   Nasal congestion [R09.81]  1. Nasal congestion  Covid19 and INFLUENZA A/B PCR    POCT rapid strepA      2. Acute viral syndrome  methylPREDNISolone 4 MG tablet therapy pack    POCT rapid strepA    Throat culture    Throat culture            Patient Instructions   Saline nasal spray as often as needed in each nostril    Nase nasal spray 1 spray to each nostril daily    Continue taking your Zyrtec daily you can add some Mucinex in the blue box    Gargle with salt water    Strep in the office today was negative.  We will send your swab for a throat culture    Start the steroids tomorrow    Follow up with PCP in 3-5 days.  Proceed to  ER if symptoms worsen.    If tests have been performed at [facility], our office will contact you with results if changes need to be made to the care plan discussed with you at the visit.  [facility]    Chief Complaint     Chief Complaint   Patient presents with    Cold Like Symptoms     Patient started on Tuesday with a sore throat, sinus pressure,congestion and body aches          History of Present Illness       This is a 25-year-old female who presents today with a scratchy throat that started on Tuesday.  She states she started feeling worse yesterday has some nasal congestion postnasal drip body aches.  She denies any fever and she also has some sinus pressure.  She did go to work yesterday and was sent home sick.  She has been taking Motrin Tylenol and Sudafed.  She also takes Zyrtec daily.  For her seasonal allergies  Rapid strep in the office today was        Review of Systems   Review of Systems   Constitutional:  Positive for chills and fatigue.   HENT:  Positive for congestion, postnasal drip, sinus pressure and sore throat. Negative for ear discharge and ear pain.     Respiratory:  Positive for cough. Negative for shortness of breath, wheezing and stridor.    Cardiovascular: Negative.    Gastrointestinal: Negative.    Genitourinary: Negative.    Musculoskeletal: Negative.    Neurological: Negative.          Current Medications     Current Medications[1]    Current Allergies     Allergies as of 06/05/2025 - Reviewed 06/05/2025   Allergen Reaction Noted    Nuts - food allergy Other (See Comments) 02/11/2020    Other Hives 05/29/2020    Pollen extract  09/10/2014    Reyvow [lasmiditan] Palpitations and Hallucinations 12/13/2022            The following portions of the patient's history were reviewed and updated as appropriate: allergies, current medications, past family history, past medical history, past social history, past surgical history and problem list.     Past Medical History[2]    Past Surgical History[3]    Family History[4]      Medications have been verified.        Objective   /74   Pulse 94   Temp 98.2 °F (36.8 °C)   Resp 18   SpO2 99%   No LMP recorded.       Physical Exam     Physical Exam  Constitutional:       Appearance: Normal appearance.   HENT:      Head: Normocephalic and atraumatic.      Right Ear: Tympanic membrane, ear canal and external ear normal.      Left Ear: Tympanic membrane, ear canal and external ear normal.      Nose: Congestion and rhinorrhea present.      Mouth/Throat:      Pharynx: Posterior oropharyngeal erythema present.     Cardiovascular:      Rate and Rhythm: Normal rate and regular rhythm.      Pulses: Normal pulses.      Heart sounds: Normal heart sounds.   Pulmonary:      Effort: Pulmonary effort is normal.      Breath sounds: Normal breath sounds.   Abdominal:      General: Abdomen is flat.      Palpations: Abdomen is soft.     Musculoskeletal:         General: Normal range of motion.   Lymphadenopathy:      Cervical: No cervical adenopathy.     Skin:     General: Skin is warm and dry.      Capillary Refill: Capillary  refill takes less than 2 seconds.     Neurological:      General: No focal deficit present.      Mental Status: She is alert and oriented to person, place, and time.     Psychiatric:         Mood and Affect: Mood normal.         Thought Content: Thought content normal.         Judgment: Judgment normal.                        [1]   Current Outpatient Medications:     methylPREDNISolone 4 MG tablet therapy pack, Use as directed on package, Disp: 21 tablet, Rfl: 0    acetaminophen (TYLENOL) 500 mg tablet, Take 1,000 mg by mouth every 4 (four) hours as needed, Disp: , Rfl:     albuterol (2.5 mg/3 mL) 0.083 % nebulizer solution, Take 3 mL (2.5 mg total) by nebulization every 6 (six) hours as needed for wheezing or shortness of breath, Disp: 30 mL, Rfl: 0    albuterol (ProAir HFA) 90 mcg/act inhaler, Inhale 2 puffs every 6 (six) hours as needed for wheezing or shortness of breath, Disp: 8.5 g, Rfl: 0    ASMANEX  MCG/ACT AERO, Inhale 2 puffs every 4 (four) hours as needed (2 puffs), Disp: , Rfl:     Atogepant (Qulipta) 60 MG TABS, Take 60 mg by mouth in the morning, Disp: 90 tablet, Rfl: 4    clindamycin (CLEOCIN T) 1 % lotion, Apply topically in the morning To face, chest and back, Disp: 60 mL, Rfl: 3    diphenhydrAMINE (BENADRYL) 25 mg tablet, Take 1 tablet (25 mg total) by mouth every 6 (six) hours as needed for itching, Disp: 30 tablet, Rfl: 0    Elastic Bandages & Supports (TRUFORM STOCKINGS 20-30MMHG) MISC, , Disp: , Rfl:     fludrocortisone (FLORINEF) 0.1 mg tablet, Take 0.1 mg by mouth in the morning and 0.1 mg in the evening., Disp: , Rfl:     metoprolol succinate (TOPROL-XL) 50 mg 24 hr tablet, Take 50 mg by mouth in the morning and 50 mg before bedtime., Disp: , Rfl:     norgestimate-ethinyl estradiol (ORTHO-CYCLEN) 0.25-35 MG-MCG per tablet, Take 1 tablet by mouth in the morning., Disp: , Rfl:     onabotulinumtoxin A (BOTOX) 100 units, , Disp: , Rfl:     ondansetron (Zofran ODT) 4 mg disintegrating  tablet, Take 1 tablet (4 mg total) by mouth every 6 (six) hours as needed for nausea or vomiting, Disp: 30 tablet, Rfl: 0    ondansetron (ZOFRAN) 8 mg tablet, Take 1 tablet (8 mg total) by mouth every 8 (eight) hours as needed for nausea or vomiting, Disp: 20 tablet, Rfl: 0    prochlorperazine (COMPAZINE) 10 mg tablet, Take 1 tablet (10 mg total) by mouth every 6 (six) hours as needed (migraine), Disp: 10 tablet, Rfl: 0    rimegepant sulfate (Nurtec) 75 mg TBDP, Take 1 tablet (75 mg total) by mouth as needed (migraine) Limit of 1 in 24 hours, Disp: 16 tablet, Rfl: 11    scopolamine (TRANSDERM-SCOP) 1 mg/3 days TD 72 hr patch, Place 1 patch on the skin over 72 hours every third day, Disp: 13 patch, Rfl: 3    Tenapanor HCl 50 MG TABS, Take 50 mg by mouth 2 (two) times a day, Disp: 60 tablet, Rfl: 3    tretinoin (RETIN-A) 0.025 % cream, Apply a pea sized amount to face one hour before bedtime after moisturizing. Start with 1-2 nights per week and build up to nightly as tolerated., Disp: 45 g, Rfl: 2    tretinoin (RETIN-A) 0.05 % cream, Spread one pea-sized amount of medication over entire face about one hour before bedtime., Disp: 45 g, Rfl: 3    verapamil (CALAN) 120 mg tablet, Take 1 tab by mouth twice a day, Disp: 180 tablet, Rfl: 3    Current Facility-Administered Medications:     Botulinum Toxin Type A SOLR 200 Units, 200 Units, Intramuscular, Q3 Months, , 200 Units at 05/05/25 1539  [2]   Past Medical History:  Diagnosis Date    Acne     Annual physical exam 04/08/2019    Anxiety     Asthma     Dizziness     at times    Dyspepsia 01/05/2021    EDS (Malachi-Danlos syndrome) 02/03/2022    Exertional headache 05/23/2022    GERD (gastroesophageal reflux disease)     Hammertoe of left foot     Headache     occ    Headache(784.0) July 2021    Hyperlipemia     Hypermobile joints     Hypertriglyceridemia 06/12/2019    IBS (irritable bowel syndrome) 05/12/2020    Irritable bowel syndrome     Mast cell activation syndrome  (HCC)     Mast cell disorder     Median arcuate ligament syndrome (HCC) 2017    Migraine     PCOS (polycystic ovarian syndrome) 2024    PONV (postoperative nausea and vomiting)     POTS (postural orthostatic tachycardia syndrome)      infant     Stress at work 2024    Wears glasses    [3]   Past Surgical History:  Procedure Laterality Date    COLONOSCOPY      EGD AND COLONOSCOPY N/A 01/10/2017    Procedure: EGD AND COLONOSCOPY;  Surgeon: Ozzy Billingsley MD;  Location: [facility];  Service:     ESOPHAGOGASTRODUODENOSCOPY      with biopsy    FOOT SURGERY      Excision of lesion feet benign    UT CORRECTION HAMMERTOE Left 2019    Procedure: 2nd arthrodesis; 5th arthroplasty, flexor tenotomy 2,3,4,5.;  Surgeon: Odilon Berger DPM;  Location: [facility];  Service: Podiatry    UT CORRECTION HAMMERTOE Left 2020    Procedure: 2ND TOE Revision hammertoe with broken implant;  Surgeon: Odilon Berger DPM;  Location: [facility];  Service: Podiatry    SKIN BIOPSY      L foot growth removal    UPPER GASTROINTESTINAL ENDOSCOPY  2021    WISDOM TOOTH EXTRACTION     [4]   Family History  Problem Relation Name Age of Onset    Gestational diabetes Mother Hortencia David     Migraines Mother Hortencia David     Anxiety disorder Mother Hortencia David     Anxiety disorder Father Maxx David     Hyperlipidemia Father Maxx Shaffertraub     Hypertension Father Maxx Shaffertraub     Autism Brother      Diabetes Other Grandparent     Uterine cancer Maternal Grandmother Keely Woods     Rheumatic fever Maternal Grandmother Keely Woods     Migraines Maternal Grandmother Keely Woods     Basal cell carcinoma Maternal Grandmother Keely Woods     Squamous cell carcinoma Maternal Grandmother Keely Woods     Diabetes Maternal Grandfather Darinel Woods     Hypertension Maternal Grandfather Darinel Woods     Heart attack Maternal Grandfather Darinel Woods     Stroke Maternal  Grandfather Darinel Woods     Hyperlipidemia Maternal Grandfather Darinel Woods     Hypertension Paternal Grandmother Tahira Musa     Anxiety disorder Paternal Grandmother Tahira Musa     Hypertension Paternal Grandfather Darinel Woods     Anxiety disorder Sister Beth Hernandez     Anxiety disorder Sister Beth Hernandez

## 2025-06-06 ENCOUNTER — APPOINTMENT (OUTPATIENT)
Dept: PHYSICAL THERAPY | Facility: REHABILITATION | Age: 25
End: 2025-06-06
Attending: STUDENT IN AN ORGANIZED HEALTH CARE EDUCATION/TRAINING PROGRAM
Payer: COMMERCIAL

## 2025-06-07 LAB — BACTERIA THROAT CULT: NORMAL

## 2025-06-10 ENCOUNTER — APPOINTMENT (OUTPATIENT)
Dept: PHYSICAL THERAPY | Facility: REHABILITATION | Age: 25
End: 2025-06-10
Attending: STUDENT IN AN ORGANIZED HEALTH CARE EDUCATION/TRAINING PROGRAM
Payer: COMMERCIAL

## 2025-06-10 ENCOUNTER — SOCIAL WORK (OUTPATIENT)
Dept: BEHAVIORAL/MENTAL HEALTH CLINIC | Facility: CLINIC | Age: 25
End: 2025-06-10
Payer: COMMERCIAL

## 2025-06-10 DIAGNOSIS — F41.1 GAD (GENERALIZED ANXIETY DISORDER): Primary | ICD-10-CM

## 2025-06-10 PROCEDURE — 90834 PSYTX W PT 45 MINUTES: CPT | Performed by: SOCIAL WORKER

## 2025-06-10 NOTE — PSYCH
Behavioral Health Psychotherapy Progress Note    Psychotherapy Provided: Individual Psychotherapy     Encounter Diagnoses   Name Primary?   • INES (generalized anxiety disorder) Yes             Goals addressed in session: Goal 1     DATA:  Nury spoke during today's session about her feelings re:  her grandmother's admission this am into hospice as the likelihood that she will pass this week.  Nury shared that she wants to spend as much time as she can with her grandmother and is considering taking several weeks of FMLA in order to be able to do this.  During this session, this clinician used the following therapeutic modalities: Motivational Interviewing and Supportive Psychotherapy    Substance Abuse was not addressed during this session. If the client is diagnosed with a co-occurring substance use disorder, please indicate any changes in the frequency or amount of use: . Stage of change for addressing substance use diagnoses: No substance use/Not applicable    ASSESSMENT:  Nury Hernandez presents with a Dysthymic mood.  Nury  allowed this worker to provide her with support  as she is extremely close with her grandmother.  She again demonstrated considerable insight into her ability to manage her emotions far better than he expected while  presenting today with minimal anxiety. her affect is Normal range and intensity, which is congruent, with her mood and the content of the session. Nury Hernandez was oriented to person, place, and time. Grooming and Hygiene were good  and clothing was casually dressed and appropriate for weather. Ability to perform ADLs has not changed. she was cooperative. Nury Hernandez denied suicidal thoughts, homicidal thoughts, and self injurious behaviors.   The client has made progress on their goals.     Nury Hernandez presents with a minimal risk of suicide, minimal risk of self-harm, and minimal risk of harm to others.    For any risk assessment that surpasses a  "\"low\" rating, a safety plan must be developed.    A safety plan was indicated: no  If yes, describe in detail     PLAN: Between sessions, Nury Hernandez will continue to utilize therapy sessions to increase her mindfulness and self compassion. . At the next session, the therapist will use Supportive Psychotherapy to address the above in further detail.    Behavioral Health Treatment Plan and Discharge Planning: Nury Hernandez is aware of and agrees to continue to work on their treatment plan. They have identified and are working toward their discharge goals. yes        Visit start and stop times:      6/10/25        "

## 2025-06-17 ENCOUNTER — OFFICE VISIT (OUTPATIENT)
Dept: PHYSICAL THERAPY | Facility: REHABILITATION | Age: 25
End: 2025-06-17
Attending: STUDENT IN AN ORGANIZED HEALTH CARE EDUCATION/TRAINING PROGRAM
Payer: COMMERCIAL

## 2025-06-17 DIAGNOSIS — M62.89 HIGH-TONE PELVIC FLOOR DYSFUNCTION: ICD-10-CM

## 2025-06-17 DIAGNOSIS — M62.89 PELVIC FLOOR DYSFUNCTION: Primary | ICD-10-CM

## 2025-06-17 PROCEDURE — 97110 THERAPEUTIC EXERCISES: CPT

## 2025-06-17 NOTE — PROGRESS NOTES
Daily Note     Today's date: 2025  Patient name: Nury Hernandez  : 2000  MRN: [id number]  Referring provider: Padmini Urias MD  Dx:   Encounter Diagnosis     ICD-10-CM    1. Pelvic floor dysfunction  M62.89       2. High-tone pelvic floor dysfunction  M62.89                        Subjective: Unable to adhere to as much exercise and dilator training w/ recent loss in family. However, has been trying as able. Felt that most  recent session of dilator training size 4 was uncomfortable.   Bladder is doing well overall. Small bout diarrhea but likely due to increased stress.     Objective: See treatment diary below      Assessment: Tolerated treatment well. Patient would benefit from continued PT. Review of sxs performed.   Performed reformed exercises to tolerance w/ no reproduction of pain. Next visit, continue abdominal strengthening and functional strengthening to high impact tolerance.     Plan: Continue per plan of care.      Precautions: Standard , Loja  Patient Active Problem List   Diagnosis    INES (generalized anxiety disorder)    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HCC)    Median arcuate ligament syndrome (HCC)    POTS (postural orthostatic tachycardia syndrome)    Seasonal allergic rhinitis    Hammertoe of left foot    Uncomplicated asthma    Non-celiac gluten sensitivity    Chronic idiopathic constipation    EDS (Malachi-Danlos syndrome)    Chronic migraine without aura without status migrainosus, not intractable    Urinary retention    Menstrual migraine without status migrainosus, not intractable    Stress at work    Migraine without aura and without status migrainosus, not intractable         Diagnosis:    POC expires (Date that your POC expires) Auth Status? (BOMN, approved, pending) Unit limit (Daily) Auth Start date Expiration date PT/OT + Visit Limit?   2025  BOMN         Date of Service    Visits Used 7 8 9 10 11 12   Visits Remaining          [facility] Created                  Neuro Re-Ed         Urge deferral         Defecation mechanics    PFM mapping and C/R  15'      Breathing Mechanics         PFM Coordination         PFM Down Training         Internal Cueing          Biofeedback         Reformer         Ther Ex         PFM Strengthening         Hip strengthening         Functional Strengthening         Abdominal Strengthening TA activation   15'     SLR + TA x10  TA activation x5  TA activaiton 5'       Dilator Training         Aerobic  TM running / analysis  45'   3.0-5.9 mph     Recumbent bike 5'   TM 8'    Sliders     10x each direction    Reformer wheelbarrows     1' 1B    High Impact   Return to running protocol   30'     + Bear pose 5 sec hold x5       UE Strengthening                Reformer:     SL leg press, leg press, SL IR/ER/Neutral leg press, Latpull down, tricep ex supine, wheel [place], quad hip ex   45'    Ther Activity         Voiding Diaries         Review of sxs         Fluid Intake             Education of PFM function in sexual health   15'      Manual Ther         PFM exam    Re-eval      Ortho exam         Dynamometer Testing         Fascial Decompression         Bowel Massage CET 20% + bowel massage  + Cupping   45'  CET 20% dynamic lower abdomin   15'  Gentle cephalic tensioing massage of lower abdomin   15'     Winback 10'   Skin unremarkable pre and post  Gentle cephalic tensioing massage of lower abdomin   10'     Winback 15    PFM stretching             KT taping over bilateral suprapubic region w/ cephalic tensioning.  KT taping over bilateral suprapubic region w/ cephalic tensioning.    Modalities                           Outcome Measure

## 2025-06-18 ENCOUNTER — SOCIAL WORK (OUTPATIENT)
Dept: BEHAVIORAL/MENTAL HEALTH CLINIC | Facility: CLINIC | Age: 25
End: 2025-06-18
Payer: COMMERCIAL

## 2025-06-18 DIAGNOSIS — F41.1 GAD (GENERALIZED ANXIETY DISORDER): Primary | ICD-10-CM

## 2025-06-18 PROCEDURE — 90834 PSYTX W PT 45 MINUTES: CPT | Performed by: SOCIAL WORKER

## 2025-06-18 NOTE — PSYCH
"Behavioral Health Psychotherapy Progress Note    Psychotherapy Provided: Individual Psychotherapy     Encounter Diagnoses   Name Primary?   • INES (generalized anxiety disorder) Yes       Goals addressed in session: Goal 1     DATA:  Nury spoke during today's session about her feelings re:  her grandmother's death yesterday and the considerable sadness that she is experiencing.   Nury shared what she will be reading a her grandmother's  and a letter that her grandmother wrote her to share at her time of passing.  During this session, this clinician used the following therapeutic modalities: Motivational Interviewing and Supportive Psychotherapy    Substance Abuse was not addressed during this session. If the client is diagnosed with a co-occurring substance use disorder, please indicate any changes in the frequency or amount of use: . Stage of change for addressing substance use diagnoses: No substance use/Not applicable    ASSESSMENT:  Nury Hernandez presents with a Anxious and Dysthymic mood.  Nury  allowed this worker to provide her with support  as she expressed the fear of her mother's ex showing up at the .  Reasons for this were processed at length. her affect is Normal range and intensity, which is congruent, with her mood and the content of the session. Nury Hernandez was oriented to person, place, and time. Grooming and Hygiene were good  and clothing was casually dressed and appropriate for weather. Ability to perform ADLs has not changed. she was cooperative. Nury Hernandez denied suicidal thoughts, homicidal thoughts, and self injurious behaviors.   The client has made progress on their goals.     Nury Hernandez presents with a minimal risk of suicide, minimal risk of self-harm, and minimal risk of harm to others.    For any risk assessment that surpasses a \"low\" rating, a safety plan must be developed.    A safety plan was indicated: no  If yes, describe in detail "     PLAN: Between sessions, Nury Hernandez will continue to utilize therapy sessions to increase her mindfulness and self compassion. . At the next session, the therapist will use Supportive Psychotherapy to address the above in further detail.    Behavioral Health Treatment Plan and Discharge Planning: Nury Hernandez is aware of and agrees to continue to work on their treatment plan. They have identified and are working toward their discharge goals. yes        Visit start and stop times:      6/18/25

## 2025-06-25 ENCOUNTER — SOCIAL WORK (OUTPATIENT)
Dept: BEHAVIORAL/MENTAL HEALTH CLINIC | Facility: CLINIC | Age: 25
End: 2025-06-25
Payer: COMMERCIAL

## 2025-06-25 ENCOUNTER — OFFICE VISIT (OUTPATIENT)
Dept: PHYSICAL THERAPY | Facility: REHABILITATION | Age: 25
End: 2025-06-25
Attending: STUDENT IN AN ORGANIZED HEALTH CARE EDUCATION/TRAINING PROGRAM
Payer: COMMERCIAL

## 2025-06-25 DIAGNOSIS — F41.1 GAD (GENERALIZED ANXIETY DISORDER): Primary | ICD-10-CM

## 2025-06-25 DIAGNOSIS — M62.89 HIGH-TONE PELVIC FLOOR DYSFUNCTION: ICD-10-CM

## 2025-06-25 DIAGNOSIS — F43.10 POST TRAUMATIC STRESS DISORDER (PTSD): ICD-10-CM

## 2025-06-25 DIAGNOSIS — M62.89 PELVIC FLOOR DYSFUNCTION: Primary | ICD-10-CM

## 2025-06-25 PROCEDURE — 97110 THERAPEUTIC EXERCISES: CPT

## 2025-06-25 PROCEDURE — 90837 PSYTX W PT 60 MINUTES: CPT | Performed by: SOCIAL WORKER

## 2025-06-25 NOTE — PSYCH
"Behavioral Health Psychotherapy Progress Note    Psychotherapy Provided: Individual Psychotherapy     Encounter Diagnoses   Name Primary?   • INES (generalized anxiety disorder) Yes   • Post traumatic stress disorder (PTSD)          Goals addressed in session: Goal 1     DATA:  Nury spoke during today's session about her feelings re:  having returned to work one week after her grandmother's death.  The session was spent processing Nury's journal entries re: the past week and the sadness that she has resisted experiencing following this loss.  During this session, this clinician used the following therapeutic modalities: Motivational Interviewing and Supportive Psychotherapy    Substance Abuse was not addressed during this session. If the client is diagnosed with a co-occurring substance use disorder, please indicate any changes in the frequency or amount of use: . Stage of change for addressing substance use diagnoses: No substance use/Not applicable    ASSESSMENT:  Nury Hernandez presents with a Dysthymic mood.  Nury  allowed this worker to provide her with support  as she shared re: the above as well as ways that her grief has triggered fears from her past.  her affect is Normal range and intensity, which is congruent, with her mood and the content of the session. Nury Hernandez was oriented to person, place, and time. Grooming and Hygiene were good  and clothing was casually dressed and appropriate for weather. Ability to perform ADLs has not changed. she was cooperative. Nury Hernandez denied suicidal thoughts, homicidal thoughts, and self injurious behaviors.   The client has made progress on their goals.     Nury Hernandez presents with a minimal risk of suicide, minimal risk of self-harm, and minimal risk of harm to others.    For any risk assessment that surpasses a \"low\" rating, a safety plan must be developed.    A safety plan was indicated: no  If yes, describe in detail     PLAN: " Between sessions, Nury Hernandez will continue to utilize therapy sessions to grieve the loss of her grandmother. At the next session, the therapist will use Supportive Psychotherapy to address the above in further detail.    Behavioral Health Treatment Plan and Discharge Planning: Nury Hernandez is aware of and agrees to continue to work on their treatment plan. They have identified and are working toward their discharge goals. yes        Visit start and stop times:      6/25/25

## 2025-06-25 NOTE — PROGRESS NOTES
Daily Note     Today's date: 2025  Patient name: Nury Hernandez  : 2000  MRN: [id number]  Referring provider: Padmini Urias MD  Dx:   No diagnosis found.                   Subjective: Mild constipation this week. No BM since Friday, able to evacuate small  amounts this morning w/ constipated stool. Reports some good days and some harder days w/ dilator training. Felt she responded well to pelvic mapping in the past.   Was able to run for ~20 min before pain began. Has purchased KT taping for home use.     Objective: See treatment diary below      Assessment: Tolerated treatment well. Patient would benefit from continued PT. Performed IC today w/ good performance. Increased muscle soreness of R sided PRPC/ layer 1 PFM. Improved w/ repetition and cues for PFM relaxation w/ use of C/R. Performed bowel massage given subjective. Today, performed KT taping over suprapubic region w/ cephalic tensioning and alternating. Educated pt on precautions for removal. Denied previous reactions to adhesives. Skin unremarkable pre and post.     Plan: Continue per plan of care.      Precautions: Standard , Loja  Patient Active Problem List   Diagnosis    INES (generalized anxiety disorder)    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HCC)    Median arcuate ligament syndrome (HCC)    POTS (postural orthostatic tachycardia syndrome)    Seasonal allergic rhinitis    Hammertoe of left foot    Uncomplicated asthma    Non-celiac gluten sensitivity    Chronic idiopathic constipation    EDS (Malachi-Danlos syndrome)    Chronic migraine without aura without status migrainosus, not intractable    Urinary retention    Menstrual migraine without status migrainosus, not intractable    Stress at work    Migraine without aura and without status migrainosus, not intractable         Diagnosis:    POC expires (Date that your POC expires) Auth Status? (BOMN, approved, pending) Unit limit (Daily) Auth Start date Expiration date PT/OT + Visit  Limit?   5/22/2025  BOMN         Date of Service 4/29 5/5 5/14 5/23 5/29 6/17 6/25   Visits Used 7 8 9 10 11 12 13   Visits Remaining          [facility] Created                    Neuro Re-Ed          Urge deferral          Defecation mechanics    PFM mapping and C/R  15'       Breathing Mechanics          PFM Coordination          PFM Down Training          Internal Cueing        30'    Biofeedback          Reformer          Ther Ex          PFM Strengthening          Hip strengthening          Functional Strengthening          Abdominal Strengthening TA activation   15'     SLR + TA x10  TA activation x5  TA activaiton 5'        Dilator Training          Aerobic  TM running / analysis  45'   3.0-5.9 mph     Recumbent bike 5'   TM 8'     Sliders     10x each direction     Reformer wheelbarrows     1' 1B     High Impact   Return to running protocol   30'     + Bear pose 5 sec hold x5        UE Strengthening                 Reformer:     SL leg press, leg press, SL IR/ER/Neutral leg press, Latpull down, tricep ex supine, wheel [place], quad hip ex   45'     Ther Activity          Voiding Diaries          Review of sxs          Fluid Intake              Education of PFM function in sexual health   15'       Manual Ther          PFM exam    Re-eval       Ortho exam          Dynamometer Testing          Fascial Decompression          Bowel Massage CET 20% + bowel massage  + Cupping   45'  CET 20% dynamic lower abdomin   15'  Gentle cephalic tensioing massage of lower abdomin   15'     Winback 10'   Skin unremarkable pre and post  Gentle cephalic tensioing massage of lower abdomin   10'     Winback 15  Bowel massage/ KT   30'    PFM stretching              KT taping over bilateral suprapubic region w/ cephalic tensioning.  KT taping over bilateral suprapubic region w/ cephalic tensioning.     Modalities                              Outcome Measure

## 2025-07-01 ENCOUNTER — SOCIAL WORK (OUTPATIENT)
Dept: BEHAVIORAL/MENTAL HEALTH CLINIC | Facility: CLINIC | Age: 25
End: 2025-07-01
Payer: COMMERCIAL

## 2025-07-01 ENCOUNTER — OFFICE VISIT (OUTPATIENT)
Dept: PHYSICAL THERAPY | Facility: REHABILITATION | Age: 25
End: 2025-07-01
Attending: STUDENT IN AN ORGANIZED HEALTH CARE EDUCATION/TRAINING PROGRAM
Payer: COMMERCIAL

## 2025-07-01 ENCOUNTER — OFFICE VISIT (OUTPATIENT)
Dept: DERMATOLOGY | Facility: CLINIC | Age: 25
End: 2025-07-01
Payer: COMMERCIAL

## 2025-07-01 DIAGNOSIS — L70.0 ACNE VULGARIS: ICD-10-CM

## 2025-07-01 DIAGNOSIS — F41.1 GAD (GENERALIZED ANXIETY DISORDER): Primary | ICD-10-CM

## 2025-07-01 DIAGNOSIS — F43.10 POST TRAUMATIC STRESS DISORDER (PTSD): ICD-10-CM

## 2025-07-01 DIAGNOSIS — R61 HYPERHIDROSIS: Primary | ICD-10-CM

## 2025-07-01 DIAGNOSIS — M62.89 PELVIC FLOOR DYSFUNCTION: Primary | ICD-10-CM

## 2025-07-01 DIAGNOSIS — M62.89 HIGH-TONE PELVIC FLOOR DYSFUNCTION: ICD-10-CM

## 2025-07-01 PROCEDURE — 99213 OFFICE O/P EST LOW 20 MIN: CPT

## 2025-07-01 PROCEDURE — 97110 THERAPEUTIC EXERCISES: CPT

## 2025-07-01 PROCEDURE — 90837 PSYTX W PT 60 MINUTES: CPT | Performed by: SOCIAL WORKER

## 2025-07-01 PROCEDURE — 97140 MANUAL THERAPY 1/> REGIONS: CPT

## 2025-07-01 NOTE — PSYCH
Behavioral Health Psychotherapy Progress Note    Psychotherapy Provided: Individual Psychotherapy     Encounter Diagnoses   Name Primary?   • INES (generalized anxiety disorder) Yes   • Post traumatic stress disorder (PTSD)            Goals addressed in session: Goal 1     DATA:  Nury spoke during today's session about her feelings re:  her visit at [facility] today.  She reported that her weight remains elevated, but stable and that her blood pressure also remains elevated.  The remainder of the session was spent processing Nury's journal entries re: the past week and the sadness that she has been experiencing following the death of her grandmother.  During this session, this clinician used the following therapeutic modalities: Motivational Interviewing and Supportive Psychotherapy    Substance Abuse was not addressed during this session. If the client is diagnosed with a co-occurring substance use disorder, please indicate any changes in the frequency or amount of use: . Stage of change for addressing substance use diagnoses: No substance use/Not applicable    ASSESSMENT:  Nury Hernandez presents with a Euthymic/ normal mood.  Nury  allowed this worker to provide her with support  as she shared re: the above as well as ways that her grief has triggered other losses from her past.  her affect is Normal range and intensity, which is congruent, with her mood and the content of the session. Nury Hernandez was oriented to person, place, and time. Grooming and Hygiene were good  and clothing was casually dressed and appropriate for weather. Ability to perform ADLs has not changed. she was cooperative. Nury Hernandez denied suicidal thoughts, homicidal thoughts, and self injurious behaviors.   The client has made progress on their goals.     Nury Hernandez presents with a minimal risk of suicide, minimal risk of self-harm, and minimal risk of harm to others.    For any risk assessment that surpasses a  "\"low\" rating, a safety plan must be developed.    A safety plan was indicated: no  If yes, describe in detail     PLAN: Between sessions, Nury Hernandez will continue to utilize therapy sessions to grieve the loss of her grandmother. At the next session, the therapist will use Supportive Psychotherapy to address the above in further detail.    Behavioral Health Treatment Plan and Discharge Planning: Nury Hernandez is aware of and agrees to continue to work on their treatment plan. They have identified and are working toward their discharge goals. yes        Visit start and stop times:      7/1/25        "

## 2025-07-01 NOTE — PROGRESS NOTES
"[facility] Note     Patient Name: Nury Hernandez  Encounter Date: 7/1/2025     Have you been cared for by a [facility] in the last 3 years and, if so, which description applies to you? Yes. I have been here within the last 3 years, and my medical history has NOT changed since that time. I am of child-bearing potential.     REVIEW OF SYSTEMS:  Have you recently had or currently have any of the following? No changes in my recent health.   PAST MEDICAL HISTORY:  Have you personally ever had or currently have any of the following?  If \"YES,\" then please provide more detail. No changes in my medical history.   HISTORY OF IMMUNOSUPPRESSION: Do you have a history of any of the following:  Systemic Immunosuppression such as Diabetes, Biologic or Immunotherapy, Chemotherapy, Organ Transplantation, Bone Marrow Transplantation or Prednisone?  No     Answering \"YES\" requires the addition of the dotphrase \"IMMUNOSUPPRESSED\" as the first diagnosis of the patient's visit.   FAMILY HISTORY:  Any \"first degree relatives\" (parent, brother, sister, or child) with the following?    No changes in my family's known health.   PATIENT EXPERIENCE:    Do you want the Dermatologist to perform a COMPLETE skin exam today including a clinical examination under the \"bra and underwear\" areas?  NO  If necessary, do we have your permission to call and leave a detailed message on your Preferred Phone number that includes your specific medical information?  Yes      Allergies[1] Current Medications[2]        Whom besides the patient is providing clinical information about today's encounter?   NO ADDITIONAL HISTORIAN (patient alone provided history)    Physical Exam and Assessment/Plan by Diagnosis:    ACNE VULGARIS (\"COMMON ACNE\")     Physical Exam:  Anatomic Location Affected:  upper back, cheeks  Morphological Description: clear today     Additional History of Present Condition:  patient presents for follow up. " "Last seen in office on 12/16/2024. Patient currently on Ortho-tri-cyclen and using benzoyl peroxide wash and tretinoin 0.025% cream. She also does a benzoyl peroxide as a spot cream. Patient is using 10% benzoyl peroxide wash on chest and back.      From last note: patient has POTS and PCOS. Unable to go on spironolactone d/t POTS. Has been on depoprovera shot in the past but it caused significant weight gain and acne flaring so she has discontinued and saw gyn and was started on Ortho-tri-cyclen. Tolerating OCP well without adverse effects     History notable for acne that flares around menses  Discussed that history and physical are consistent with hormonal acne  Educated that hormonal acne, including hormonal acne related to PCOS, does particularly well with medications that targets hormones, including spironolactone and OCP. Spironolactone not an option given hx of POTS but patient is now doing well on ortho tri cyclen and seeing specialist for PCOS     Plan today:     AM:  - Continue benzoyl peroxide cleanser for face and back. Wash off thoroughly to avoid bleaching towels/clothes  Apply clindamycin 1% lotion to entire face and may use on back as well  - SPF 30 or greater (can be a lotion with SPF like CeraVe AM)  - Start non-comedogenic moisturizer such as CeraVe, Cetaphil or Vanicream.      PM:  Continue tretinoin 0.05% cream nightly to face followed by non-comedogenic moisturizer. If retinoid is too drying, may employ the \"sandwich method.\" To do this, apply layer of non-comedogenic moisturizer, followed by layer of retinoid, followed by another layer of non-comedogenic moisturizer.   OTC 10% Azelaic Acid for redness      SYSTEMIC:  Continue Ortho-tri-cyclen from outside source. Continue routine follow up with gyn     Call with any questions or concerns before next follow-up visit; do not stop medications abruptly without consulting provider. Call our office at [phone] (SKIN).  It is better to be safe " than to be sorry!     Follow up in 1 year    HYPERHIDROSIS     Physical Exam:  Anatomic Location Affected:  bilateral axilla  Morphological Description:  excess sweating  Pertinent Positives: hx POTS   Pertinent Negatives:     Additional History of Present Condition:  patient did not start Qbrexza wipes as insurance stated they were too expensive for her. Patient is fine with current prescription deodorant she is using.     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 It is not as apparent when she is off from work. Patient also has hx of POTS. She has tried multiple brands of clinical strength antiperspirant without improvement.      Assessment and Plan:  Based on a thorough discussion of this condition and the management approach to it (including a comprehensive discussion of the known risks, side effects and potential benefits of treatment), the patient (family) agrees to implement the following specific plan:  Patient is using prescription deodorant and feels the sweating is manageable at this time  Can re-try sending qbrexza pads through insurance if desired     What is hyperhidrosis?  Hyperhidrosis is the name given to excessive and uncontrollable sweating.  Sweat is a weak salt solution produced by the eccrine sweat glands. These are distributed over the entire body but are most numerous on the palms and soles (with about 700 glands per square centimetre).     Who gets hyperhidrosis?  Primary hyperhidrosis is reported to affect 1-3% of the [place] population and nearly always starts during childhood or adolescence. The tendency may be inherited, and it is reported to be particularly prevalent in [place] people.  Secondary hyperhidrosis is less common and can present at any age.     What causes hyperhidrosis?  Primary hyperhidrosis appears to be due to overactivity of the hypothalamic thermoregulatory centre in  the brain and is transmitted via the sympathetic nervous system to the eccrine sweat gland.     Triggers to attacks of sweating may include:  Hot weather   Exercise   Fever   Anxiety   Spicy food     Causes of secondary localised hyperhidrosis include:  Stroke   Spinal nerve damage   Peripheral nerve damage   Surgical sympathectomy   Neuropathy   A brain tumour   Chronic anxiety disorder     Causes of secondary generalised hyperhidrosis include:  Obesity   Diabetes   Menopause   Overactive thyroid   Cardiovascular disorders   Respiratory failure   Other endocrine tumours, eg pheochromocytoma   Parkinson disease   Hodgkin lymphoma   Drugs: alcohol, caffeine, corticosteroids, cholinesterase inhibitors, tricyclic antidepressants, selective serotonin reuptake inhibitors, nicotinamide and opioids     What are the clinical features of hyperhidrosis?  Hyperhidrosis can be localised or generalised.  Localised hyperhidrosis affects armpits, palms, soles, face or other sites   Generalised hyperhidrosis affects most or all of the body     It can be primary or secondary.     Primary hyperhidrosis  Starts in childhood or adolescence   May persist lifelong or improve with age   There may be a family history   Tends to involve armpits, palms and or soles symmetrically   Usually, sweating reduces at night and disappears during sleep     Secondary hyperhidrosis  Less common than primary hyperhidrosis   More likely to be unilateral and asymmetrical, or generalised   Can occur at night or during sleep.   Due to endocrine or neurological conditions     What is the impact of excessive sweating?  Hyperhidrosis is embarrassing and interferes with many daily activities.     How is hyperhidrosis diagnosed?  Hyperhidrosis is usually diagnosed clinically. Tests relate to the potential underlying cause of hyperhidrosis and are rarely necessary for primary hyperhidrosis.  The precise site of localised hyperhidrosis can be revealed using the  "Minor test.  Iodine (orange) is painted onto the skin and air dried.   Starch (white) is dusted on the iodine.   Sweating is revealed by a change to dark blue/black colour.     Screening tests in secondary generalised hyperhidrosis depend on other clinical features but should include as a minimum:  Blood sugar / glycosylated haemoglobin   Thyroid function     What is the treatment of hyperhidrosis?  General measures  Wear loose-fitting, stain-resistant, sweat-proof garments.   Change clothing and footwear when damp.   Socks containing silver or copper reduce infection and odour.   Use absorbent insoles in shoes and replace them frequently.   Use a non-soap cleanser.   Apply corn starch powder after bathing.   Avoid caffeinated food and drink.   Discontinue any drug that may be causing hyperhidrosis.   Apply antiperspirant.     Topical antiperspirants  Deodorants are fragrances or antiseptics to disguise unpleasant smells; on their own, they do not reduce perspiration.   Antiperspirants contain 10-25% aluminium salts to reduce sweating; \"clinical strength\" aluminium zirconium salts are more effective than aluminium chloride.   Topical anticholinergics such as glycopyrrolate and oxybutynin gel have been successful in reducing sweating; cloths containing glycopyrronium tosylate (Qbrexza™) were approved by the FDA in July 2018 for axillary hyperhidrosis in adults and children 9 years of age and older. Dusting powder is available containing the anticholinergic drug, diphemanil 2%.   Antiperspirants are available as a cream, aerosol spray, stick, roll-on, wipe, powder, and paint.   Specific products are available for different body sites such as underarms, other skin folds, face, hands and feet.   They are best applied when skin is dry, after a cool shower just before sleep.   Wash off in the morning if tending to irritate.   Use from once weekly to daily if necessary.   If irritating, apply hydrocortisone cream " short-term.     Iontophoresis  Iontophoresis is used for hyperhidrosis of palms, soles and armpits.   Des and battery-powered units are available.   The affected area is immersed in water, or, with a more significant effect, glycopyrronium solution.   A gentle electrical current is passed across the skin surface for 10-20 minutes.   Repeated daily for several weeks then less frequently as required   Iontophoresis may cause discomfort, irritation or irritant contact dermatitis.   The treatment requires a long-term commitment.   It is not always effective.     Oral medications  Oral anticholinergic drugs  Available drugs are propantheline 15-30 mg up to three times daily, oxybutynin 2.5-7.5 mg daily, benztropine, glycopyrrolate (unapproved).   They can cause dry mouth, and less often, blurred vision, constipation, dizziness, palpitations and other side effects.   People with glaucoma or urinary retention should not take them.   Caution in elderly patients: increased risk of side effects is reported, including dementia.   Oral anticholinergics may interact with other medications.  Beta-blockers  Beta blockers block the physical effects of anxiety.   They may aggravate asthma or symptoms of peripheral vascular disease.  Calcium channel blockers, alpha adrenergic agonists (clonidine) nonsteroidal anti-inflammatory drugs and anxiolytics may also be useful for some patients.     Botulinum toxin injections  Botulinum toxin injections are approved for hyperhidrosis affecting the armpits.   The injections reduce or stop sweating for three to six months.   Botulinum toxins are used off-license for localised hyperhidrosis in other sites such as palms.   Topical botulinum toxin gel is under investigation for hyperhidrosis.     Surgical removal of axillary sweat glands  Overactive sweat glands in the armpits may be removed by several methods, usually under local anaesthetic.  Tumescent liposuction (sucking them out)    Subcutaneous curettage (scraping them out)   Microwave thermolysis (the MiraDry® system approved by FDA in 2011)   Subdermal Nd:YAG laser   High-intensity micro-focused ultrasound (experimental)   Surgery to cut out the sweat gland-bearing skin of the armpits. If a large area needs to be removed, it may be repaired using a skin graft     Sympathectomy  Division of the sympathetic spinal nerves by chemical or surgical endoscopic thoracic sympathectomy (ETS) may reduce sweating of face (T2 ganglion) or armpit and hand (T3 or T4 ganglion) but is reserved for the most severely affected individuals due to potential risks and complications.  Hyperhidrosis may recur in up to 15% of cases.   Sympathectomy is often accompanied by undesirable skin warmth and dryness.   New-onset hyperhidrosis of other sites occurs in 50-90% of patients, and is severe in 2%. It is reported to be less frequent after T4 ganglion sympathectomy compared with T2 ganglion sympathectomy.   Serious complications include Mitch syndrome, pneumothorax (in up to 10%), pneumonia and persistent pain (in fewer than 2%).  Lumbar sympathectomy is not recommended for hyperhidrosis affecting the feet, as it can interfere with sexual function.     What is the outlook for hyperhidrosis?  Localised primary hyperhidrosis tends to improve with age. The outlook for secondary localised or generalised hyperhidrosis depends on the cause.     Future treatments for hyperhidrosis  Several research projects are underway to find safer and more effective treatments for hyperhidrosis. These include:  Topical anticholinergic DRM04   Combination of oxybutynin and pilocarpine (to counteract the adverse effects of the anticholinergic, oxybutynin) VD-102    Scribe Attestation    I,:  Elisabet Rangel MA am acting as a scribe while in the presence of the attending physician.:       I,:  Francia Gates PA-C personally performed the services described in this documentation    as  scribed in my presence.:                  [1]  Allergies  Allergen Reactions   • Nuts - Food Allergy Other (See Comments)     Avani Nuts - itchy throat   • Other Hives      At surgical site and IV site- not sure if type of cleanser used   • Pollen Extract    • Reyvow [Lasmiditan] Palpitations and Hallucinations   [2]    Current Outpatient Medications:   •  acetaminophen (TYLENOL) 500 mg tablet, Take 1,000 mg by mouth every 4 (four) hours as needed, Disp: , Rfl:   •  albuterol (2.5 mg/3 mL) 0.083 % nebulizer solution, Take 3 mL (2.5 mg total) by nebulization every 6 (six) hours as needed for wheezing or shortness of breath, Disp: 30 mL, Rfl: 0  •  albuterol (ProAir HFA) 90 mcg/act inhaler, Inhale 2 puffs every 6 (six) hours as needed for wheezing or shortness of breath, Disp: 8.5 g, Rfl: 0  •  ASMANEX  MCG/ACT AERO, Inhale 2 puffs every 4 (four) hours as needed (2 puffs), Disp: , Rfl:   •  Atogepant (Qulipta) 60 MG TABS, Take 60 mg by mouth in the morning, Disp: 90 tablet, Rfl: 4  •  clindamycin (CLEOCIN T) 1 % lotion, Apply topically in the morning To face, chest and back, Disp: 60 mL, Rfl: 3  •  diphenhydrAMINE (BENADRYL) 25 mg tablet, Take 1 tablet (25 mg total) by mouth every 6 (six) hours as needed for itching, Disp: 30 tablet, Rfl: 0  •  Elastic Bandages & Supports (TRUFORM STOCKINGS 20-30MMHG) MISC, , Disp: , Rfl:   •  fludrocortisone (FLORINEF) 0.1 mg tablet, Take 0.1 mg by mouth in the morning and 0.1 mg in the evening., Disp: , Rfl:   •  methylPREDNISolone 4 MG tablet therapy pack, Use as directed on package, Disp: 21 tablet, Rfl: 0  •  metoprolol succinate (TOPROL-XL) 50 mg 24 hr tablet, Take 50 mg by mouth in the morning and 50 mg before bedtime., Disp: , Rfl:   •  norgestimate-ethinyl estradiol (ORTHO-CYCLEN) 0.25-35 MG-MCG per tablet, Take 1 tablet by mouth in the morning., Disp: , Rfl:   •  onabotulinumtoxin A (BOTOX) 100 units, , Disp: , Rfl:   •  ondansetron (Zofran ODT) 4 mg disintegrating  tablet, Take 1 tablet (4 mg total) by mouth every 6 (six) hours as needed for nausea or vomiting, Disp: 30 tablet, Rfl: 0  •  ondansetron (ZOFRAN) 8 mg tablet, Take 1 tablet (8 mg total) by mouth every 8 (eight) hours as needed for nausea or vomiting, Disp: 20 tablet, Rfl: 0  •  prochlorperazine (COMPAZINE) 10 mg tablet, Take 1 tablet (10 mg total) by mouth every 6 (six) hours as needed (migraine), Disp: 10 tablet, Rfl: 0  •  rimegepant sulfate (Nurtec) 75 mg TBDP, Take 1 tablet (75 mg total) by mouth as needed (migraine) Limit of 1 in 24 hours, Disp: 16 tablet, Rfl: 11  •  scopolamine (TRANSDERM-SCOP) 1 mg/3 days TD 72 hr patch, Place 1 patch on the skin over 72 hours every third day, Disp: 13 patch, Rfl: 3  •  Tenapanor HCl 50 MG TABS, Take 50 mg by mouth 2 (two) times a day, Disp: 60 tablet, Rfl: 3  •  tretinoin (RETIN-A) 0.025 % cream, Apply a pea sized amount to face one hour before bedtime after moisturizing. Start with 1-2 nights per week and build up to nightly as tolerated., Disp: 45 g, Rfl: 2  •  tretinoin (RETIN-A) 0.05 % cream, Spread one pea-sized amount of medication over entire face about one hour before bedtime., Disp: 45 g, Rfl: 3  •  verapamil (CALAN) 120 mg tablet, Take 1 tab by mouth twice a day, Disp: 180 tablet, Rfl: 3    Current Facility-Administered Medications:   •  Botulinum Toxin Type A SOLR 200 Units, 200 Units, Intramuscular, Q3 Months, , 200 Units at 05/05/25 4961

## 2025-07-01 NOTE — PROGRESS NOTES
Daily Note     Today's date: 2025  Patient name: Nury Hernandez  : 2000  MRN: [id number]  Referring provider: Pamdini Urias MD  Dx:   Encounter Diagnosis     ICD-10-CM    1. Pelvic floor dysfunction  M62.89       2. High-tone pelvic floor dysfunction  M62.89                          Subjective: 5/10 p! Today. Feels flare in urinary frequency and straining for BM today (did not require valsalva).   Saw cardiologist last week and was informed that HOLMAN sxs may be consistent w/ pelvic congestion syndrome. Following up w/ 2 additional providers over next few months.     Objective: See treatment diary below      Assessment: Tolerated treatment well. Patient would benefit from continued PT.Performed low grae TE w/ focus on counter rib/ pelvic movements w/ slight improvement in pain following. Continued w/ bowel massage today. Deferred KT taping for mild skin irritation - not aggravating today.     Plan: Continue per plan of care.      Precautions: Standard , Holman  Patient Active Problem List   Diagnosis    INES (generalized anxiety disorder)    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HCC)    Median arcuate ligament syndrome (HCC)    POTS (postural orthostatic tachycardia syndrome)    Seasonal allergic rhinitis    Hammertoe of left foot    Uncomplicated asthma    Non-celiac gluten sensitivity    Chronic idiopathic constipation    EDS (Malachi-Danlos syndrome)    Chronic migraine without aura without status migrainosus, not intractable    Urinary retention    Menstrual migraine without status migrainosus, not intractable    Stress at work    Migraine without aura and without status migrainosus, not intractable         Diagnosis:    POC expires (Date that your POC expires) Auth Status? (BOMN, approved, pending) Unit limit (Daily) Auth Start date Expiration date PT/OT + Visit Limit?   2025  BOMN         Date of Service    Visits Used 12 13 14   Visits Remaining      Medbridge Created             Neuro Re-Ed      Urge deferral      Defecation mechanics      Breathing Mechanics      PFM Coordination      PFM Down Training      Internal Cueing   30'     Biofeedback      Reformer      Ther Ex      PFM Strengthening      Hip strengthening      Functional Strengthening      Abdominal Strengthening      Dilator Training      Aerobic      Sliders      Reformer napoleon      High Impact      UE Strengthening        Reformer:     SL leg press, leg press, SL IR/ER/Neutral leg press, Latpull down, tricep ex supine, wheel [place], quad hip ex   45'   Pelvic tilts, cirlces, pball trunk twists, lumbar traction  15'     Cat cow, chas pose, adductor transition, adductor stretch, forward HS stretch, open book,  and deep squat  15'    Ther Activity      Voiding Diaries      Review of sxs      Fluid Intake            Manual Ther      PFM exam      Ortho exam      Dynamometer Testing      Fascial Decompression      Bowel Massage  Bowel massage/ KT   30'  Bowel massage 30'    PFM stretching            Modalities                  Outcome Measure

## 2025-07-06 ENCOUNTER — PATIENT MESSAGE (OUTPATIENT)
Dept: OBGYN CLINIC | Facility: CLINIC | Age: 25
End: 2025-07-06

## 2025-07-07 DIAGNOSIS — M62.89 PELVIC FLOOR DYSFUNCTION: Primary | ICD-10-CM

## 2025-07-07 DIAGNOSIS — R10.2 PELVIC PAIN: ICD-10-CM

## 2025-07-09 ENCOUNTER — OFFICE VISIT (OUTPATIENT)
Dept: PHYSICAL THERAPY | Facility: REHABILITATION | Age: 25
End: 2025-07-09
Attending: STUDENT IN AN ORGANIZED HEALTH CARE EDUCATION/TRAINING PROGRAM
Payer: COMMERCIAL

## 2025-07-09 ENCOUNTER — SOCIAL WORK (OUTPATIENT)
Dept: BEHAVIORAL/MENTAL HEALTH CLINIC | Facility: CLINIC | Age: 25
End: 2025-07-09
Payer: COMMERCIAL

## 2025-07-09 DIAGNOSIS — F41.1 GAD (GENERALIZED ANXIETY DISORDER): Primary | ICD-10-CM

## 2025-07-09 DIAGNOSIS — M62.89 PELVIC FLOOR DYSFUNCTION: Primary | ICD-10-CM

## 2025-07-09 DIAGNOSIS — M62.89 HIGH-TONE PELVIC FLOOR DYSFUNCTION: ICD-10-CM

## 2025-07-09 PROCEDURE — 97140 MANUAL THERAPY 1/> REGIONS: CPT

## 2025-07-09 PROCEDURE — 97110 THERAPEUTIC EXERCISES: CPT

## 2025-07-09 PROCEDURE — 90837 PSYTX W PT 60 MINUTES: CPT | Performed by: SOCIAL WORKER

## 2025-07-09 NOTE — PROGRESS NOTES
Daily Note     Today's date: 2025  Patient name: Nury Hernandez  : 2000  MRN: [id number]  Referring provider: Padmini Urias MD  Dx:   Encounter Diagnosis     ICD-10-CM    1. Pelvic floor dysfunction  M62.89       2. High-tone pelvic floor dysfunction  M62.89                 Start Time: 09  Stop Time: 1007  Total time in clinic (min): 54 minutes    Subjective: 5/10 p! Today. Has MRI scheduled for late July regarding pelvic congestion.   Objective: See treatment diary below      Assessment: Tolerated treatment well. Patient would benefit from continued PT. Continued w/ PFM stretching w/ 2/10 or less pain. PFM presented w/ increased tone today, but improved w/ mod stretch pressure. Continued w/ TE today. Next visit, pt may benefit from visceral mobilization techniques.   Plan: Continue per plan of care.      Precautions: Standard , [place]  Patient Active Problem List   Diagnosis    INES (generalized anxiety disorder)    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HCC)    Median arcuate ligament syndrome (HCC)    POTS (postural orthostatic tachycardia syndrome)    Seasonal allergic rhinitis    Hammertoe of left foot    Uncomplicated asthma    Non-celiac gluten sensitivity    Chronic idiopathic constipation    EDS (Malachi-Danlos syndrome)    Chronic migraine without aura without status migrainosus, not intractable    Urinary retention    Menstrual migraine without status migrainosus, not intractable    Stress at work    Migraine without aura and without status migrainosus, not intractable         Diagnosis:    POC expires (Date that your POC expires) Auth Status? (BOMN, approved, pending) Unit limit (Daily) Auth Start date Expiration date PT/OT + Visit Limit?   2025  BOMN         Date of Service    Visits Used 12 13 14 15   Visits Remaining       Medbridge Created              Neuro Re-Ed       Urge deferral       Defecation mechanics       Breathing Mechanics       PFM Coordination       PFM  Down Training       Internal Cueing   30'      Biofeedback       Reformer       Ther Ex       PFM Strengthening       Hip strengthening       Functional Strengthening       Abdominal Strengthening       Dilator Training       Aerobic       Sliders       Reformer napoleon       High Impact       UE Strengthening         Reformer:     SL leg press, leg press, SL IR/ER/Neutral leg press, Latpull down, tricep ex supine, wheel [place], quad hip ex   45'   Pelvic tilts, cirlces, pball trunk twists, lumbar traction  15'     Cat cow, chas pose, adductor transition, adductor stretch, forward HS stretch, open book,  and deep squat  15'  Supported bridge 2'    LE on wall 2'     LTR x10      Ther Activity       Voiding Diaries       Review of sxs       Fluid Intake              Manual Ther       PFM exam       Ortho exam       Dynamometer Testing       Fascial Decompression       Bowel Massage  Bowel massage/ KT   30'  Bowel massage 30'     PFM stretching    30'          Modalities                     Outcome Measure

## 2025-07-09 NOTE — PSYCH
"Behavioral Health Psychotherapy Progress Note    Psychotherapy Provided: Individual Psychotherapy     Encounter Diagnoses   Name Primary?   • INES (generalized anxiety disorder) Yes             Goals addressed in session: Goal 1     DATA:  Nury spoke during today's session about her feelings re: several recent conflicts with her mother re: her grandmother's estate.  Nury expressed pride in her ability to delay responses to her mom in order to communicate more calmly and intentionally.  Nury also shared concern for her sister's recent diagnosis with lymphoma while also seeking a cause for  her own pelvic pain.  .  During this session, this clinician used the following therapeutic modalities: Motivational Interviewing and Supportive Psychotherapy    Substance Abuse was not addressed during this session. If the client is diagnosed with a co-occurring substance use disorder, please indicate any changes in the frequency or amount of use: . Stage of change for addressing substance use diagnoses: No substance use/Not applicable    ASSESSMENT:  Nury Hernandez presents with a Euthymic/ normal mood.  Nury  allowed this worker to provide her with support  as she shared re: the above as well as ways that she has not been able to process her grief due to the above issues.   her affect is Normal range and intensity, which is congruent, with her mood and the content of the session. Nury Hernandez was oriented to person, place, and time. Grooming and Hygiene were good  and clothing was casually dressed and appropriate for weather. Ability to perform ADLs has not changed. she was cooperative. Nury Hernandez denied suicidal thoughts, homicidal thoughts, and self injurious behaviors.   The client has made progress on their goals.     Nury Hernandez presents with a minimal risk of suicide, minimal risk of self-harm, and minimal risk of harm to others.    For any risk assessment that surpasses a \"low\" rating, a " safety plan must be developed.    A safety plan was indicated: no  If yes, describe in detail     PLAN: Between sessions, Nury Hernandez will continue to utilize therapy sessions to grieve the loss of her grandmother. At the next session, the therapist will use Supportive Psychotherapy to address the above in further detail.    Behavioral Health Treatment Plan and Discharge Planning: Nury Hernandez is aware of and agrees to continue to work on their treatment plan. They have identified and are working toward their discharge goals. yes        Visit start and stop times:      7/925

## 2025-07-11 ENCOUNTER — APPOINTMENT (OUTPATIENT)
Dept: PHYSICAL THERAPY | Facility: REHABILITATION | Age: 25
End: 2025-07-11
Attending: STUDENT IN AN ORGANIZED HEALTH CARE EDUCATION/TRAINING PROGRAM
Payer: COMMERCIAL

## 2025-07-15 NOTE — PROGRESS NOTES
Medication Refill    Pharmacy: [ [facility] ]    Medication: [ Sertraline 100 mg ]    Quantity: [ 90 day ]    LOV: [ 06/17/24 ]    NOV: [ 08/05/25 ]    PT canceled 12/16/24 and 01/07/25. PT states he will keep 08/05/25 apt but will call to adjust apt time if he cannot find a ride.   Universal Protocol   Consent: Verbal consent obtained. Written consent obtained. Risks and benefits: risks, benefits and alternatives were discussed  Consent given by: patient  Time out: Immediately prior to procedure a "time out" was called to verify the correct patient, procedure, equipment, support staff and site/side marked as required. Patient understanding: patient states understanding of the procedure being performed  Patient consent: the patient's understanding of the procedure matches consent given  Procedure consent: procedure consent matches procedure scheduled  Relevant documents: relevant documents present and verified  Patient identity confirmed: verbally with patient      Chemodenervation     Date/Time  10/18/2023 11:30 AM     Performed by  Timo Henderson PA-C   Authorized by  Timo Henderson PA-C     Pre-procedure details      Prepped With: Alcohol     Anesthesia  (see MAR for exact dosages):      Anesthesia method:  None   Procedure details      Position:  Upright   Botox      Botox Type:  Type A    Brand:  Botox    mL's of Botulinum Toxin:  200    Final Concentration per CC:  50 units    Needle Gauge:  30 G 2.5 inch   Procedures      Botox Procedures: chronic headache      Indications: migraines     Injection Location      Head / Face:  L superior cervical paraspinal, R superior cervical paraspinal, L , R , L frontalis, R frontalis, L medial occipitalis, R medial occipitalis, procerus, R temporalis, L temporalis, R superior trapezius and L superior trapezius    L  injection amount:  5 unit(s)    R  injection amount:  5 unit(s)    L lateral frontalis:  5 unit(s)    R lateral frontalis:  5 unit(s)    L medial frontalis:  5 unit(s)    R medial frontalis:  5 unit(s)    L temporalis injection amount:  20 unit(s)    R temporalis injection amount:  20 unit(s)    Procerus injection amount:  5 unit(s)    L medial occipitalis injection amount:  15 unit(s)    R medial occipitalis injection amount:  15 unit(s)    L superior cervical paraspinal injection amount:  10 unit(s)    R superior cervical paraspinal injection amount:  10 unit(s)    L superior trapezius injection amount:  15 unit(s)    R superior trapezius injection amount:  15 unit(s)   Total Units      Total units used:  200    Total units discarded:  0   Post-procedure details      Chemodenervation:  Chronic migraine    Facial Nerve Location[de-identified]  Bilateral facial nerve    Patient tolerance of procedure:   Tolerated well, no immediate complications   Comments       20 units frontalis  5 units right splenius capitis  20 units right lower occipitalis/cervical paraspinal  All medically necessary

## 2025-07-18 ENCOUNTER — OFFICE VISIT (OUTPATIENT)
Dept: PHYSICAL THERAPY | Facility: REHABILITATION | Age: 25
End: 2025-07-18
Attending: STUDENT IN AN ORGANIZED HEALTH CARE EDUCATION/TRAINING PROGRAM
Payer: COMMERCIAL

## 2025-07-18 DIAGNOSIS — M62.89 PELVIC FLOOR DYSFUNCTION: Primary | ICD-10-CM

## 2025-07-18 DIAGNOSIS — M62.89 HIGH-TONE PELVIC FLOOR DYSFUNCTION: ICD-10-CM

## 2025-07-18 PROCEDURE — 97140 MANUAL THERAPY 1/> REGIONS: CPT

## 2025-07-18 NOTE — PROGRESS NOTES
18-Jan-2018 03:45 Daily Note     Today's date: 2025  Patient name: Nury Hernandez  : 2000  MRN: [id number]  Referring provider: Padmini Urias MD  Dx:   Encounter Diagnosis     ICD-10-CM    1. Pelvic floor dysfunction  M62.89       2. High-tone pelvic floor dysfunction  M62.89                   Start Time: 1300  Stop Time: 1345  Total time in clinic (min): 45 minutes    Subjective: Just returned from a trip at the beach, felt better when she was away.  Is scheduled for her MRI this upcoming Monday.     Objective: See treatment diary below      Assessment: Tolerated treatment well. Patient would benefit from continued PT. Focussed today's session on visceral MFR w/ & w/o cupping. Unsure if she experienced any relief especially since she had donw weight lifting this morning, will let Mariely know on Monday.      Plan: Continue per plan of care.      Precautions: Standard , Loja  Patient Active Problem List   Diagnosis    INES (generalized anxiety disorder)    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HCC)    Median arcuate ligament syndrome (HCC)    POTS (postural orthostatic tachycardia syndrome)    Seasonal allergic rhinitis    Hammertoe of left foot    Uncomplicated asthma    Non-celiac gluten sensitivity    Chronic idiopathic constipation    EDS (Malachi-Danlos syndrome)    Chronic migraine without aura without status migrainosus, not intractable    Urinary retention    Menstrual migraine without status migrainosus, not intractable    Stress at work    Migraine without aura and without status migrainosus, not intractable         Diagnosis:    POC expires (Date that your POC expires) Auth Status? (BOMN, approved, pending) Unit limit (Daily) Auth Start date Expiration date PT/OT + Visit Limit?   2025  BOMN         Date of Service    Visits Used 12 13 14 15 18   Visits Remaining        [facility]                Neuro Re-Ed        Urge deferral        Defecation mechanics        Breathing  Mechanics        PFM Coordination        PFM Down Training        Internal Cueing   30'       Biofeedback        Reformer        Ther Ex        TM     5'   PFM Strengthening        Hip strengthening        Functional Strengthening        Abdominal Strengthening        Dilator Training        Aerobic        Sliders        Reformer napoleon        High Impact        UE Strengthening          Reformer:     SL leg press, leg press, SL IR/ER/Neutral leg press, Latpull down, tricep ex supine, wheel [place], quad hip ex   45'   Pelvic tilts, cirlces, pball trunk twists, lumbar traction  15'     Cat cow, chas pose, adductor transition, adductor stretch, forward HS stretch, open book,  and deep squat  15'  Supported bridge 2'    LE on wall 2'     LTR x10       Ther Activity        Voiding Diaries        Review of sxs        Fluid Intake                Manual Ther        PFM exam        Ortho exam        Dynamometer Testing        Fascial Decompression        Bowel Massage  Bowel massage/ KT   30'  Bowel massage 30'   Abdominal work 40 w/ & w/o cupping   PFM stretching    30'            Modalities                        Outcome Measure                               18-Jan-2018 03:50

## 2025-07-21 ENCOUNTER — HOSPITAL ENCOUNTER (OUTPATIENT)
Dept: MRI IMAGING | Facility: HOSPITAL | Age: 25
Discharge: HOME | End: 2025-07-21
Attending: STUDENT IN AN ORGANIZED HEALTH CARE EDUCATION/TRAINING PROGRAM
Payer: COMMERCIAL

## 2025-07-21 ENCOUNTER — OFFICE VISIT (OUTPATIENT)
Dept: PHYSICAL THERAPY | Facility: REHABILITATION | Age: 25
End: 2025-07-21
Attending: STUDENT IN AN ORGANIZED HEALTH CARE EDUCATION/TRAINING PROGRAM
Payer: COMMERCIAL

## 2025-07-21 ENCOUNTER — PATIENT MESSAGE (OUTPATIENT)
Dept: INTERNAL MEDICINE CLINIC | Facility: CLINIC | Age: 25
End: 2025-07-21

## 2025-07-21 ENCOUNTER — SOCIAL WORK (OUTPATIENT)
Dept: BEHAVIORAL/MENTAL HEALTH CLINIC | Facility: CLINIC | Age: 25
End: 2025-07-21
Payer: COMMERCIAL

## 2025-07-21 DIAGNOSIS — M62.89 PELVIC FLOOR DYSFUNCTION: Primary | ICD-10-CM

## 2025-07-21 DIAGNOSIS — R10.2 PELVIC PAIN: ICD-10-CM

## 2025-07-21 DIAGNOSIS — M62.89 HIGH-TONE PELVIC FLOOR DYSFUNCTION: ICD-10-CM

## 2025-07-21 DIAGNOSIS — M62.89 PELVIC FLOOR DYSFUNCTION: ICD-10-CM

## 2025-07-21 DIAGNOSIS — F41.1 GAD (GENERALIZED ANXIETY DISORDER): Primary | ICD-10-CM

## 2025-07-21 DIAGNOSIS — F43.10 POST TRAUMATIC STRESS DISORDER (PTSD): ICD-10-CM

## 2025-07-21 PROCEDURE — 97140 MANUAL THERAPY 1/> REGIONS: CPT

## 2025-07-21 PROCEDURE — 72197 MRI PELVIS W/O & W/DYE: CPT

## 2025-07-21 PROCEDURE — 90837 PSYTX W PT 60 MINUTES: CPT | Performed by: SOCIAL WORKER

## 2025-07-21 PROCEDURE — 97110 THERAPEUTIC EXERCISES: CPT

## 2025-07-21 PROCEDURE — A9585 GADOBUTROL INJECTION: HCPCS | Performed by: STUDENT IN AN ORGANIZED HEALTH CARE EDUCATION/TRAINING PROGRAM

## 2025-07-21 RX ORDER — GADOBUTROL 604.72 MG/ML
7 INJECTION INTRAVENOUS
Status: COMPLETED | OUTPATIENT
Start: 2025-07-21 | End: 2025-07-21

## 2025-07-21 RX ADMIN — GADOBUTROL 7 ML: 604.72 INJECTION INTRAVENOUS at 20:31

## 2025-07-21 NOTE — PSYCH
Behavioral Health Psychotherapy Progress Note    Psychotherapy Provided: Individual Psychotherapy     Encounter Diagnoses   Name Primary?   • INES (generalized anxiety disorder) Yes   • Post traumatic stress disorder (PTSD)                Goals addressed in session: Goal 1     DATA:  Nury spoke during today's session about her feelings re: her  recent MAST cell activation that resulted in a sever panic attack.  She was unable to identify a reason or cause for this  but spoke at length about the trauma that it brought up for her as it has been a considerable time since her last activation.  Nury also shared a 4 typed page journal entry in which she expressed her concern about her limited contact, experience with male peers.  During this session, this clinician used the following therapeutic modalities: Motivational Interviewing and Supportive Psychotherapy    Substance Abuse was not addressed during this session. If the client is diagnosed with a co-occurring substance use disorder, please indicate any changes in the frequency or amount of use: . Stage of change for addressing substance use diagnoses: No substance use/Not applicable    ASSESSMENT:  Nury Hernandez presents with a Anxious mood.  Nury  allowed this worker to provide her with support  as she shared re: the above as well as ways that she has grown, been challenged over the past several months.    her affect is Normal range and intensity, which is congruent, with her mood and the content of the session. Nury Hernandez was oriented to person, place, and time. Grooming and Hygiene were good  and clothing was casually dressed and appropriate for weather. Ability to perform ADLs has not changed. she was cooperative. Nury Hernandez denied suicidal thoughts, homicidal thoughts, and self injurious behaviors.   The client has made progress on their goals.     Nury Hernandez presents with a minimal risk of suicide, minimal risk of self-harm,  "and minimal risk of harm to others.    For any risk assessment that surpasses a \"low\" rating, a safety plan must be developed.    A safety plan was indicated: no  If yes, describe in detail     PLAN: Between sessions, Nury Hernandez will continue to utilize therapy sessions to grieve the loss of her grandmother. At the next session, the therapist will use Supportive Psychotherapy to address the above in further detail.    Behavioral Health Treatment Plan and Discharge Planning: Nury Hernandez is aware of and agrees to continue to work on their treatment plan. They have identified and are working toward their discharge goals. yes        Visit start and stop times:      7/21/25        "

## 2025-07-21 NOTE — PROGRESS NOTES
"Daily Note     Today's date: 2025  Patient name: Nury Hernandez  : 2000  MRN: [id number]  Referring provider: Padmini Urias MD  Dx:   Encounter Diagnosis     ICD-10-CM    1. Pelvic floor dysfunction  M62.89       2. High-tone pelvic floor dysfunction  M62.89                                Subjective: Felt very good on Saturday, was able to perform walk/jog on TM.   Performed dilator training upon return from vacation - felt she had to \"push\" to insert 4, but able to perform additional time w/ improvement.     Objective: See treatment diary below      Assessment: Tolerated treatment well. Patient would benefit from continued PT. Perform PFM stretching w/ good tolerance. No TTP throughout all PFM, greatest stretch noted in layer 1 bilaterally w/ additional PFM relaxation delay /incomplete 75% of Layer 1 - improved w/ cues. Incorporated pelvic elevators with 75% accuracy. Educated pt on self stretching prior to dilator training for additional layer 1 activation.  Incorporated low mobility following.        Plan: Continue per plan of care.      Precautions: Standard , Loja  Patient Active Problem List   Diagnosis    INES (generalized anxiety disorder)    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HCC)    Median arcuate ligament syndrome (HCC)    POTS (postural orthostatic tachycardia syndrome)    Seasonal allergic rhinitis    Hammertoe of left foot    Uncomplicated asthma    Non-celiac gluten sensitivity    Chronic idiopathic constipation    EDS (Malachi-Danlos syndrome)    Chronic migraine without aura without status migrainosus, not intractable    Urinary retention    Menstrual migraine without status migrainosus, not intractable    Stress at work    Migraine without aura and without status migrainosus, not intractable         Diagnosis:    POC expires (Date that your POC expires) Auth Status? (BOMN, approved, pending) Unit limit (Daily) Auth Start date Expiration date PT/OT + Visit Limit?   2025  BOMN    "      Date of Service 6/17 6/25 7/1 7/9 7/18 7/21   Visits Used 12 13 14 15 18 19   Visits Remaining         [facility] Created                  Neuro Re-Ed         Urge deferral         Defecation mechanics         Breathing Mechanics         PFM Coordination         PFM Down Training         Internal Cueing   30'        Biofeedback         Reformer         Ther Ex         TM     5'    PFM Strengthening         Hip strengthening         Functional Strengthening         Abdominal Strengthening         Dilator Training         Aerobic         Sliders         Reformer wheelbarrows         High Impact         UE Strengthening           Reformer:     SL leg press, leg press, SL IR/ER/Neutral leg press, Latpull down, tricep ex supine, wheel [place], quad hip ex   45'   Pelvic tilts, cirlces, pball trunk twists, lumbar traction  15'     Cat cow, chas pose, adductor transition, adductor stretch, forward HS stretch, open book,  and deep squat  15'  Supported bridge 2'    LE on wall 2'     LTR x10     Cat cow x10     LTR x10     Forward lunge x10     Lunge + lateral trunk stretch x10        Ther Activity         Voiding Diaries         Review of sxs         Fluid Intake                  Manual Ther         PFM exam         Ortho exam         Dynamometer Testing         Fascial Decompression         Bowel Massage  Bowel massage/ KT   30'  Bowel massage 30'   Abdominal work 40 w/ & w/o cupping    PFM stretching    30'  30'             Modalities                           Outcome Measure

## 2025-07-21 NOTE — PATIENT COMMUNICATION
Patient called and scheduled an appointment to see Dr. Reyes tomorrow regarding this message. Please return her call if you need to speak with her sooner. Thank you.

## 2025-07-22 ENCOUNTER — OFFICE VISIT (OUTPATIENT)
Dept: INTERNAL MEDICINE CLINIC | Facility: CLINIC | Age: 25
End: 2025-07-22
Payer: COMMERCIAL

## 2025-07-22 VITALS
OXYGEN SATURATION: 100 % | DIASTOLIC BLOOD PRESSURE: 80 MMHG | HEART RATE: 70 BPM | WEIGHT: 163 LBS | BODY MASS INDEX: 25.53 KG/M2 | SYSTOLIC BLOOD PRESSURE: 130 MMHG

## 2025-07-22 DIAGNOSIS — D89.40 MAST CELL ACTIVATION SYNDROME (HCC): Primary | ICD-10-CM

## 2025-07-22 DIAGNOSIS — F41.1 GAD (GENERALIZED ANXIETY DISORDER): ICD-10-CM

## 2025-07-22 PROCEDURE — 99214 OFFICE O/P EST MOD 30 MIN: CPT | Performed by: INTERNAL MEDICINE

## 2025-07-22 RX ORDER — FAMOTIDINE 40 MG/1
40 TABLET, FILM COATED ORAL
Qty: 30 TABLET | Refills: 3 | Status: SHIPPED | OUTPATIENT
Start: 2025-07-22

## 2025-07-22 RX ORDER — CYPROHEPTADINE HYDROCHLORIDE 4 MG/1
4 TABLET ORAL
Qty: 30 TABLET | Refills: 3 | Status: SHIPPED | OUTPATIENT
Start: 2025-07-22

## 2025-07-22 RX ORDER — BUSPIRONE HYDROCHLORIDE 5 MG/1
5 TABLET ORAL 3 TIMES DAILY
Qty: 90 TABLET | Refills: 3 | Status: SHIPPED | OUTPATIENT
Start: 2025-07-22

## 2025-07-22 NOTE — ASSESSMENT & PLAN NOTE
Established diagnosis that was previously treated with multiple antihistamines  Last flare where she experienced pruritus was years ago  More recent flares have been gastrointestinal  Continue cetirizine and famotidine 40 mg daily.  For now, continue cyproheptadine 4 mg daily.  She is concerned about weight gain and cyproheptadine so we discussed stopping in the next 2 weeks to see how she does without it    Orders:  •  cyproheptadine (PERIACTIN) 4 mg tablet; Take 1 tablet (4 mg total) by mouth daily at bedtime as needed for allergies (headaches)  •  famotidine (PEPCID) 40 MG tablet; Take 1 tablet (40 mg total) by mouth daily at bedtime

## 2025-07-22 NOTE — PROGRESS NOTES
Name: Nury Hernandez      : 2000      MRN: [id number]  Encounter Provider: Lea Reyes, MD  Encounter Date: 2025   Enc[facility]: [facility]  :  Assessment & Plan  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HCC)  Established diagnosis that was previously treated with multiple antihistamines  Last flare where she experienced pruritus was years ago  More recent flares have been gastrointestinal  Continue cetirizine and famotidine 40 mg daily.  For now, continue cyproheptadine 4 mg daily.  She is concerned about weight gain and cyproheptadine so we discussed stopping in the next 2 weeks to see how she does without it    Orders:  •  cyproheptadine (PERIACTIN) 4 mg tablet; Take 1 tablet (4 mg total) by mouth daily at bedtime as needed for allergies (headaches)  •  famotidine (PEPCID) 40 MG tablet; Take 1 tablet (40 mg total) by mouth daily at bedtime    INES (generalized anxiety disorder)  Start buspirone 5 mg daily then up to twice a day then 3 times a day  Discussed that dose can be raised more  Orders:  •  busPIRone (BUSPAR) 5 mg tablet; Take 1 tablet (5 mg total) by mouth 3 (three) times a day           History of Present Illness   Previously dx with MCAS, saw specialist at [facility]  Previously treated with antihistamines-Zyrtec famotidine and cyproheptadine  He has been off treatment for a few years except for Zyrtec  Experienced generalized itching while on treadmill 2 days ago  He took Benadryl which helped a little bit  Afraid that the MCAS will flare so resumed famotidine and cyproheptadine which helped  Has been under stress with recent death in the family so also feeling more anxious and seeking to go on buspirone based on [employer] testing  She was previously on venlafaxine but had difficulty weaning off  BP has been higher and fludrocortisone dose reduced            Review of Systems   Respiratory:  Negative for shortness of breath.    Cardiovascular:  Negative for chest pain.    Gastrointestinal:  Positive for constipation. Negative for abdominal pain.   Genitourinary:  Positive for pelvic pain.   Skin:  Negative for rash.   Psychiatric/Behavioral:  The patient is nervous/anxious.        Objective   /80 (BP Location: Right arm, Patient Position: Sitting, Cuff Size: Standard)   Pulse 70   Wt 73.9 kg (163 lb)   SpO2 100%   BMI 25.53 kg/m²      Physical Exam  Constitutional:       General: She is not in acute distress.     Appearance: She is well-developed. She is not ill-appearing, toxic-appearing or diaphoretic.     Eyes:      Conjunctiva/sclera: Conjunctivae normal.       Cardiovascular:      Rate and Rhythm: Normal rate and regular rhythm.      Heart sounds: Normal heart sounds. No murmur heard.  Pulmonary:      Effort: Pulmonary effort is normal. No respiratory distress.      Breath sounds: Normal breath sounds. No stridor. No wheezing or rales.   Abdominal:      General: There is no distension.      Palpations: Abdomen is soft. There is no mass.      Tenderness: There is no abdominal tenderness. There is no guarding or rebound.     Musculoskeletal:      Cervical back: Neck supple.      Right lower leg: No edema.      Left lower leg: No edema.     Skin:     Findings: No rash.     Neurological:      Mental Status: She is alert and oriented to person, place, and time.     Psychiatric:         Mood and Affect: Mood normal.         Behavior: Behavior normal.         Thought Content: Thought content normal.         Judgment: Judgment normal.

## 2025-07-22 NOTE — ASSESSMENT & PLAN NOTE
Start buspirone 5 mg daily then up to twice a day then 3 times a day  Discussed that dose can be raised more  Orders:  •  busPIRone (BUSPAR) 5 mg tablet; Take 1 tablet (5 mg total) by mouth 3 (three) times a day

## 2025-07-28 ENCOUNTER — NURSE TRIAGE (OUTPATIENT)
Dept: OTHER | Facility: OTHER | Age: 25
End: 2025-07-28

## 2025-07-28 ENCOUNTER — TELEPHONE (OUTPATIENT)
Age: 25
End: 2025-07-28

## 2025-07-29 ENCOUNTER — OFFICE VISIT (OUTPATIENT)
Dept: PHYSICAL THERAPY | Facility: REHABILITATION | Age: 25
End: 2025-07-29
Attending: STUDENT IN AN ORGANIZED HEALTH CARE EDUCATION/TRAINING PROGRAM
Payer: COMMERCIAL

## 2025-07-29 ENCOUNTER — OFFICE VISIT (OUTPATIENT)
Age: 25
End: 2025-07-29
Payer: COMMERCIAL

## 2025-07-29 ENCOUNTER — RESULTS FOLLOW-UP (OUTPATIENT)
Dept: OBGYN CLINIC | Facility: CLINIC | Age: 25
End: 2025-07-29

## 2025-07-29 ENCOUNTER — SOCIAL WORK (OUTPATIENT)
Dept: BEHAVIORAL/MENTAL HEALTH CLINIC | Facility: CLINIC | Age: 25
End: 2025-07-29
Payer: COMMERCIAL

## 2025-07-29 DIAGNOSIS — F43.10 POST TRAUMATIC STRESS DISORDER (PTSD): ICD-10-CM

## 2025-07-29 DIAGNOSIS — F41.1 GAD (GENERALIZED ANXIETY DISORDER): Primary | ICD-10-CM

## 2025-07-29 DIAGNOSIS — R10.2 CHRONIC PELVIC PAIN IN FEMALE: ICD-10-CM

## 2025-07-29 DIAGNOSIS — M62.89 PELVIC FLOOR DYSFUNCTION: Primary | ICD-10-CM

## 2025-07-29 DIAGNOSIS — G89.29 CHRONIC PELVIC PAIN IN FEMALE: ICD-10-CM

## 2025-07-29 DIAGNOSIS — M62.89 HIGH-TONE PELVIC FLOOR DYSFUNCTION: ICD-10-CM

## 2025-07-29 DIAGNOSIS — E28.2 PCOS (POLYCYSTIC OVARIAN SYNDROME): Primary | ICD-10-CM

## 2025-07-29 PROCEDURE — 90837 PSYTX W PT 60 MINUTES: CPT | Performed by: SOCIAL WORKER

## 2025-07-29 PROCEDURE — 99215 OFFICE O/P EST HI 40 MIN: CPT | Performed by: OBSTETRICS & GYNECOLOGY

## 2025-07-29 PROCEDURE — 97140 MANUAL THERAPY 1/> REGIONS: CPT

## 2025-07-29 PROCEDURE — 97110 THERAPEUTIC EXERCISES: CPT

## 2025-08-01 ENCOUNTER — PROCEDURE VISIT (OUTPATIENT)
Dept: NEUROLOGY | Facility: CLINIC | Age: 25
End: 2025-08-01
Payer: COMMERCIAL

## 2025-08-01 VITALS — SYSTOLIC BLOOD PRESSURE: 108 MMHG | TEMPERATURE: 98.3 F | DIASTOLIC BLOOD PRESSURE: 88 MMHG | HEART RATE: 92 BPM

## 2025-08-01 DIAGNOSIS — G43.709 CHRONIC MIGRAINE WITHOUT AURA WITHOUT STATUS MIGRAINOSUS, NOT INTRACTABLE: Primary | ICD-10-CM

## 2025-08-01 PROCEDURE — 64615 CHEMODENERV MUSC MIGRAINE: CPT | Performed by: PHYSICIAN ASSISTANT

## 2025-08-03 PROBLEM — E28.2 POLYCYSTIC OVARIAN SYNDROME: Status: ACTIVE | Noted: 2024-09-26

## 2025-08-03 PROBLEM — G43.009 MIGRAINE WITHOUT AURA AND WITHOUT STATUS MIGRAINOSUS, NOT INTRACTABLE: Status: RESOLVED | Noted: 2025-03-03 | Resolved: 2025-08-03

## 2025-08-06 ENCOUNTER — TELEPHONE (OUTPATIENT)
Age: 25
End: 2025-08-06

## 2025-08-07 ENCOUNTER — OFFICE VISIT (OUTPATIENT)
Dept: PHYSICAL THERAPY | Facility: REHABILITATION | Age: 25
End: 2025-08-07
Attending: STUDENT IN AN ORGANIZED HEALTH CARE EDUCATION/TRAINING PROGRAM
Payer: COMMERCIAL

## 2025-08-07 ENCOUNTER — SOCIAL WORK (OUTPATIENT)
Dept: BEHAVIORAL/MENTAL HEALTH CLINIC | Facility: CLINIC | Age: 25
End: 2025-08-07
Payer: COMMERCIAL

## 2025-08-07 DIAGNOSIS — M62.89 PELVIC FLOOR DYSFUNCTION: Primary | ICD-10-CM

## 2025-08-07 DIAGNOSIS — F41.1 GAD (GENERALIZED ANXIETY DISORDER): Primary | ICD-10-CM

## 2025-08-07 DIAGNOSIS — F43.10 POST TRAUMATIC STRESS DISORDER (PTSD): ICD-10-CM

## 2025-08-07 DIAGNOSIS — M62.89 HIGH-TONE PELVIC FLOOR DYSFUNCTION: ICD-10-CM

## 2025-08-07 PROCEDURE — 97140 MANUAL THERAPY 1/> REGIONS: CPT

## 2025-08-07 PROCEDURE — 90837 PSYTX W PT 60 MINUTES: CPT | Performed by: SOCIAL WORKER

## 2025-08-12 ENCOUNTER — EVALUATION (OUTPATIENT)
Dept: PHYSICAL THERAPY | Facility: REHABILITATION | Age: 25
End: 2025-08-12
Attending: STUDENT IN AN ORGANIZED HEALTH CARE EDUCATION/TRAINING PROGRAM
Payer: COMMERCIAL

## 2025-08-12 ENCOUNTER — SOCIAL WORK (OUTPATIENT)
Dept: BEHAVIORAL/MENTAL HEALTH CLINIC | Facility: CLINIC | Age: 25
End: 2025-08-12
Payer: COMMERCIAL

## 2025-08-18 ENCOUNTER — OFFICE VISIT (OUTPATIENT)
Dept: PHYSICAL THERAPY | Facility: REHABILITATION | Age: 25
End: 2025-08-18
Attending: STUDENT IN AN ORGANIZED HEALTH CARE EDUCATION/TRAINING PROGRAM
Payer: COMMERCIAL

## 2025-08-18 ENCOUNTER — SOCIAL WORK (OUTPATIENT)
Dept: BEHAVIORAL/MENTAL HEALTH CLINIC | Facility: CLINIC | Age: 25
End: 2025-08-18
Payer: COMMERCIAL

## 2025-08-18 ENCOUNTER — OFFICE VISIT (OUTPATIENT)
Dept: OBGYN CLINIC | Facility: CLINIC | Age: 25
End: 2025-08-18
Payer: COMMERCIAL

## 2025-08-18 VITALS — SYSTOLIC BLOOD PRESSURE: 120 MMHG | BODY MASS INDEX: 24.9 KG/M2 | DIASTOLIC BLOOD PRESSURE: 72 MMHG | WEIGHT: 159 LBS

## 2025-08-18 DIAGNOSIS — G89.29 CHRONIC PELVIC PAIN IN FEMALE: Primary | ICD-10-CM

## 2025-08-18 DIAGNOSIS — F41.1 GAD (GENERALIZED ANXIETY DISORDER): Primary | ICD-10-CM

## 2025-08-18 DIAGNOSIS — R10.2 CHRONIC PELVIC PAIN IN FEMALE: Primary | ICD-10-CM

## 2025-08-18 DIAGNOSIS — F43.10 POST TRAUMATIC STRESS DISORDER (PTSD): ICD-10-CM

## 2025-08-18 DIAGNOSIS — M62.89 PELVIC FLOOR DYSFUNCTION: Primary | ICD-10-CM

## 2025-08-18 DIAGNOSIS — M62.89 HIGH-TONE PELVIC FLOOR DYSFUNCTION: ICD-10-CM

## 2025-08-18 PROCEDURE — 97140 MANUAL THERAPY 1/> REGIONS: CPT

## 2025-08-18 PROCEDURE — 90847 FAMILY PSYTX W/PT 50 MIN: CPT | Performed by: SOCIAL WORKER

## 2025-08-18 PROCEDURE — 99214 OFFICE O/P EST MOD 30 MIN: CPT | Performed by: STUDENT IN AN ORGANIZED HEALTH CARE EDUCATION/TRAINING PROGRAM

## 2025-08-18 PROCEDURE — 97530 THERAPEUTIC ACTIVITIES: CPT

## 2025-08-19 ENCOUNTER — SOCIAL WORK (OUTPATIENT)
Dept: BEHAVIORAL/MENTAL HEALTH CLINIC | Facility: CLINIC | Age: 25
End: 2025-08-19
Payer: COMMERCIAL

## 2025-08-19 DIAGNOSIS — F43.10 POST TRAUMATIC STRESS DISORDER (PTSD): ICD-10-CM

## 2025-08-19 DIAGNOSIS — F41.1 GAD (GENERALIZED ANXIETY DISORDER): Primary | ICD-10-CM

## 2025-08-19 PROCEDURE — 90837 PSYTX W PT 60 MINUTES: CPT | Performed by: SOCIAL WORKER

## 2025-08-21 ENCOUNTER — PREP FOR PROCEDURE (OUTPATIENT)
Dept: OBGYN CLINIC | Facility: CLINIC | Age: 25
End: 2025-08-21

## 2025-08-21 ENCOUNTER — TELEPHONE (OUTPATIENT)
Age: 25
End: 2025-08-21

## 2025-08-21 DIAGNOSIS — G89.29 CHRONIC PELVIC PAIN IN FEMALE: Primary | ICD-10-CM

## 2025-08-21 DIAGNOSIS — R10.2 CHRONIC PELVIC PAIN IN FEMALE: Primary | ICD-10-CM

## (undated) DEVICE — ACE WRAP 4 IN UNSTERILE

## (undated) DEVICE — BETHLEHEM UNIVERSAL  MIONR EXT: Brand: CARDINAL HEALTH

## (undated) DEVICE — BLADE SAGITTAL 25.6 X 9.5MM

## (undated) DEVICE — GLOVE INDICATOR PI UNDERGLOVE SZ 6.5 BLUE

## (undated) DEVICE — 10FR FRAZIER SUCTION HANDLE: Brand: CARDINAL HEALTH

## (undated) DEVICE — CAST PADDING 4 IN SYNTHETIC NON-STRL

## (undated) DEVICE — GLOVE SRG BIOGEL 6.5

## (undated) DEVICE — SUT VICRYL 4-0 PS-2 27 IN J426H

## (undated) DEVICE — NEEDLE 18 G X 1 1/2

## (undated) DEVICE — SCD SEQUENTIAL COMPRESSION COMFORT SLEEVE MEDIUM KNEE LENGTH: Brand: KENDALL SCD

## (undated) DEVICE — INTENDED FOR TISSUE SEPARATION, AND OTHER PROCEDURES THAT REQUIRE A SHARP SURGICAL BLADE TO PUNCTURE OR CUT.: Brand: BARD-PARKER ® CARBON RIB-BACK BLADES

## (undated) DEVICE — GLOVE INDICATOR PI UNDERGLOVE SZ 7.5 BLUE

## (undated) DEVICE — GLOVE SRG BIOGEL 7.5

## (undated) DEVICE — 2000CC GUARDIAN II: Brand: GUARDIAN

## (undated) DEVICE — PLUMEPEN PRO 10FT

## (undated) DEVICE — SYRINGE 10ML LL

## (undated) DEVICE — SUT ETHILON 4-0 PS-2 18 IN 1667H

## (undated) DEVICE — CURITY STRETCH BANDAGE: Brand: CURITY

## (undated) DEVICE — NEEDLE 25G X 1 1/2

## (undated) DEVICE — GLOVE SRG BIOGEL 7

## (undated) DEVICE — TUBING SUCTION 5MM X 12 FT

## (undated) DEVICE — DRILL KIT FOR BME HAMMERLOCK2 SYSTEM: Brand: HAMMERLOCK2

## (undated) DEVICE — SPONGE STICK WITH PVP-I: Brand: KENDALL

## (undated) DEVICE — PENCIL ELECTROSURG E-Z CLEAN -0035H

## (undated) DEVICE — SUT ETHILON 4-0 P-S 18 IN 699H

## (undated) DEVICE — CHLORAPREP HI-LITE 26ML ORANGE

## (undated) DEVICE — Device

## (undated) DEVICE — GLOVE INDICATOR PI UNDERGLOVE SZ 7 BLUE

## (undated) DEVICE — CURITY NON-ADHERENT STRIPS: Brand: CURITY

## (undated) DEVICE — SUT VICRYL 3-0 PS-2 27 IN J427H

## (undated) DEVICE — SINGLE-USE BIOPSY FORCEPS: Brand: RADIAL JAW 4